# Patient Record
Sex: FEMALE | Race: WHITE | NOT HISPANIC OR LATINO | Employment: PART TIME | URBAN - METROPOLITAN AREA
[De-identification: names, ages, dates, MRNs, and addresses within clinical notes are randomized per-mention and may not be internally consistent; named-entity substitution may affect disease eponyms.]

---

## 2020-04-10 ENCOUNTER — TELEMEDICINE (OUTPATIENT)
Dept: GASTROENTEROLOGY | Facility: CLINIC | Age: 60
End: 2020-04-10
Payer: COMMERCIAL

## 2020-04-10 VITALS — WEIGHT: 227.07 LBS | HEIGHT: 64 IN | BODY MASS INDEX: 38.77 KG/M2

## 2020-04-10 DIAGNOSIS — K21.9 GERD WITHOUT ESOPHAGITIS: ICD-10-CM

## 2020-04-10 DIAGNOSIS — N12 PYELONEPHRITIS OF RIGHT KIDNEY: ICD-10-CM

## 2020-04-10 DIAGNOSIS — K58.9 IRRITABLE BOWEL SYNDROME, UNSPECIFIED TYPE: ICD-10-CM

## 2020-04-10 DIAGNOSIS — K30 FUNCTIONAL DYSPEPSIA: ICD-10-CM

## 2020-04-10 DIAGNOSIS — K57.92 DIVERTICULITIS: Primary | ICD-10-CM

## 2020-04-10 PROCEDURE — 99214 OFFICE O/P EST MOD 30 MIN: CPT | Performed by: INTERNAL MEDICINE

## 2020-04-10 RX ORDER — MECLIZINE HYDROCHLORIDE 25 MG/1
TABLET ORAL EVERY 12 HOURS PRN
COMMUNITY

## 2020-04-10 RX ORDER — ATORVASTATIN CALCIUM 40 MG/1
40 TABLET, FILM COATED ORAL
COMMUNITY

## 2020-04-10 RX ORDER — METOPROLOL SUCCINATE 25 MG/1
25 TABLET, EXTENDED RELEASE ORAL DAILY
COMMUNITY

## 2020-04-10 RX ORDER — CETIRIZINE HYDROCHLORIDE 10 MG/1
10 TABLET ORAL DAILY
COMMUNITY

## 2020-04-10 RX ORDER — ZOLMITRIPTAN 5 MG/1
5 TABLET, FILM COATED ORAL ONCE AS NEEDED
COMMUNITY

## 2020-04-10 RX ORDER — HYDROCHLOROTHIAZIDE 25 MG/1
25 TABLET ORAL DAILY
COMMUNITY

## 2020-04-10 RX ORDER — PANTOPRAZOLE SODIUM 40 MG/1
40 TABLET, DELAYED RELEASE ORAL DAILY
COMMUNITY

## 2020-04-10 RX ORDER — FAMOTIDINE 40 MG/1
40 TABLET, FILM COATED ORAL DAILY
COMMUNITY
End: 2020-06-08

## 2020-04-10 RX ORDER — CEFUROXIME AXETIL 500 MG/1
500 TABLET ORAL EVERY 12 HOURS SCHEDULED
COMMUNITY
Start: 2020-04-01 | End: 2020-04-15

## 2020-04-10 RX ORDER — MULTIVITAMIN
1 TABLET ORAL DAILY
COMMUNITY

## 2020-04-10 RX ORDER — IBUPROFEN 800 MG/1
800 TABLET ORAL EVERY 6 HOURS PRN
COMMUNITY

## 2020-04-10 RX ORDER — LOSARTAN POTASSIUM 50 MG/1
50 TABLET ORAL DAILY
COMMUNITY

## 2020-04-10 RX ORDER — TRIAMCINOLONE ACETONIDE 55 UG/1
SPRAY, METERED NASAL
COMMUNITY

## 2020-04-10 RX ORDER — GLIMEPIRIDE 4 MG/1
4 TABLET ORAL 2 TIMES DAILY
COMMUNITY

## 2020-04-10 RX ORDER — MELATONIN
1000 DAILY
COMMUNITY

## 2020-04-10 RX ORDER — TOPIRAMATE 100 MG/1
100 TABLET, FILM COATED ORAL DAILY
COMMUNITY
End: 2020-06-29

## 2020-04-10 RX ORDER — ASPIRIN 81 MG/1
81 TABLET ORAL DAILY
COMMUNITY

## 2020-06-08 ENCOUNTER — TELEMEDICINE (OUTPATIENT)
Dept: GASTROENTEROLOGY | Facility: CLINIC | Age: 60
End: 2020-06-08
Payer: COMMERCIAL

## 2020-06-08 VITALS — BODY MASS INDEX: 38.76 KG/M2 | HEIGHT: 64 IN | WEIGHT: 227 LBS

## 2020-06-08 DIAGNOSIS — K58.9 IRRITABLE BOWEL SYNDROME, UNSPECIFIED TYPE: Primary | ICD-10-CM

## 2020-06-08 DIAGNOSIS — K30 FUNCTIONAL DYSPEPSIA: ICD-10-CM

## 2020-06-08 DIAGNOSIS — K21.9 GERD WITHOUT ESOPHAGITIS: ICD-10-CM

## 2020-06-08 DIAGNOSIS — K57.92 DIVERTICULITIS: ICD-10-CM

## 2020-06-08 PROCEDURE — 99214 OFFICE O/P EST MOD 30 MIN: CPT | Performed by: INTERNAL MEDICINE

## 2020-06-08 NOTE — H&P (VIEW-ONLY)
Virtual Brief Visit  It was my intent to perform this visit via video technology but the patient was not able to do a video connection so the visit was completed via audio telephone only  Assessment/Plan:  1  Diverticulitis  2  Pyelonephritis of right kidney  Hospitalized in March at [facility] secondary to left lower quadrant abdominal pain and a CT scan which showed right pyelonephritis  Completed course of antibiotics with resolution of symptoms  Still with some intermittent abdominal pain which is probably related to her IBS  Last colonoscopy 2013  - continue probiotic  - colonoscopy at [facility]     3  Irritable bowel syndrome, unspecified type  Chronic problem  Stable  - follow exam  - eventual colonoscopy     4  GERD without esophagitis  5  Functional dyspepsia  Stable  - continue Protonix  Problem List Items Addressed This Visit        Digestive    Irritable bowel syndrome - Primary    Functional dyspepsia    GERD without esophagitis      Other Visit Diagnoses     Diverticulitis                    Reason for visit is   Chief Complaint   Patient presents with    Follow-up     Diverticulitis    Virtual Brief Visit        Encounter provider Lloyd Phillips MD    Provider located at [address] [address] [phone]    Recent Visits  No visits were found meeting these conditions  Showing recent visits within past 7 days and meeting all other requirements     Today's Visits  Date Type Provider Dept   06/08/20 Telemedicine Myriam Block MD [facility]   Showing today's visits and meeting all other requirements     Future Appointments  No visits were found meeting these conditions     Showing future appointments within next 150 days and meeting all other requirements        After connecting through telephone and patient was informed that this is not a secure, HIPAA-complaint platform  She agrees to proceed  , the patient was identified by name and date of birth  Cong Sanchez was informed that this is a telemedicine visit and that the visit is being conducted through telephone and patient was informed that this is not a secure, HIPAA-complaint platform  She agrees to proceed     My office door was closed  No one else was in the room  She acknowledged consent and understanding of privacy and security of the platform  The patient has agreed to participate and understands she can discontinue the visit at any time  Patient is aware this is a billable service  Subjective    Cong Sanchez is a 60 y o  female with a history of irritable bowel syndrome and diverticulitis was hospitalized in March with left-sided abdominal pain suspicious for diverticulitis although CT scan showed some mild right pyelonephritis  She was treated with a course of antibiotics and her symptoms have resolved  Her bowels are back to normal which is twice a day  She denies any diarrhea or rectal bleeding  She reports some intermittent lower quadrant abdominal pain which is her chronic IBS symptoms  From a GERD standpoint she is taking Protonix daily and this is controlling her symptoms  Her weight is stable  Her last colonoscopy was 2013        HPI     Past Medical History:   Diagnosis Date    Diabetes mellitus ([address] )     Functional dyspepsia     GERD without esophagitis     Hyperlipidemia     Irritable bowel syndrome        Past Surgical History:   Procedure Laterality Date    APPENDECTOMY      CHOLECYSTECTOMY      FOOT SURGERY Left     Bone spur       Current Outpatient Medications   Medication Sig Dispense Refill    aspirin (ECOTRIN LOW STRENGTH) 81 mg EC tablet Take 81 mg by mouth daily      atorvastatin (LIPITOR) 40 mg tablet Take 40 mg by mouth daily at bedtime      cetirizine (ZyrTEC) 10 mg tablet Take 10 mg by mouth daily      cholecalciferol (VITAMIN D3) 1,000 units tablet Take 1,000 Units by mouth daily 2 daily      glimepiride (AMARYL) 4 mg tablet Take 4 mg by mouth 2 (two) times a day      hydrochlorothiazide (HYDRODIURIL) 25 mg tablet Take 25 mg by mouth daily      ibuprofen (MOTRIN) 800 mg tablet Take 800 mg by mouth every 6 (six) hours as needed for mild pain      losartan (COZAAR) 50 mg tablet Take 50 mg by mouth daily      meclizine (ANTIVERT) 25 mg tablet Take by mouth every 12 (twelve) hours as needed for dizziness      metoprolol succinate (TOPROL-XL) 25 mg 24 hr tablet Take 25 mg by mouth daily      Multiple Vitamin (MULTIVITAMIN) tablet Take 1 tablet by mouth daily      other medication, see sig, Medication/product name: Lifitegrast (xiidra)  Strength:   Sig (include dose, route, frequency): one drop ou BID      pantoprazole (PROTONIX) 40 mg tablet Take 40 mg by mouth daily      sertraline (ZOLOFT) 50 mg tablet Take 150 mg by mouth daily      topiramate (TOPAMAX) 100 mg tablet Take 100 mg by mouth daily Daily at HS      Triamcinolone Acetonide 55 MCG/ACT AERO into each nostril One spray each nostril daily      ZOLMitriptan (ZOMIG) 5 MG tablet Take 5 mg by mouth once as needed for migraine Daily prn       No current facility-administered medications for this visit  Allergies   Allergen Reactions    Sulfa Antibiotics      High fever  Pt unsure - it happened when she was 12years old       Review of Systems    Vitals:    06/08/20 0915   Weight: 103 kg (227 lb)   Height: 5' 4" (1 626 m)       As a result of this visit, I have not referred the patient for further respiratory evaluation  I spent 20 minutes directly with the patient during this visit    VIRTUAL VISIT DISCLAIMER    Sarah Colon acknowledges that she has consented to an online visit or consultation   She understands that the online visit is based solely on information provided by her, and that, in the absence of a face-to-face physical evaluation by the physician, the diagnosis she receives is both limited and provisional in terms of accuracy and completeness  This is not intended to replace a full medical face-to-face evaluation by the physician  Clemencia Pathak understands and accepts these terms

## 2020-06-22 ENCOUNTER — CLINICAL SUPPORT (OUTPATIENT)
Dept: GASTROENTEROLOGY | Facility: CLINIC | Age: 60
End: 2020-06-22

## 2020-06-22 VITALS — BODY MASS INDEX: 38.76 KG/M2 | HEIGHT: 64 IN | WEIGHT: 227 LBS

## 2020-06-22 DIAGNOSIS — K57.92 DIVERTICULITIS: Primary | ICD-10-CM

## 2020-06-23 DIAGNOSIS — K57.92 DIVERTICULITIS: ICD-10-CM

## 2020-06-23 DIAGNOSIS — Z11.59 SCREENING FOR VIRAL DISEASE: ICD-10-CM

## 2020-06-23 PROCEDURE — U0003 INFECTIOUS AGENT DETECTION BY NUCLEIC ACID (DNA OR RNA); SEVERE ACUTE RESPIRATORY SYNDROME CORONAVIRUS 2 (SARS-COV-2) (CORONAVIRUS DISEASE [COVID-19]), AMPLIFIED PROBE TECHNIQUE, MAKING USE OF HIGH THROUGHPUT TECHNOLOGIES AS DESCRIBED BY CMS-2020-01-R: HCPCS

## 2020-06-23 RX ORDER — SODIUM PICOSULFATE, MAGNESIUM OXIDE, AND ANHYDROUS CITRIC ACID 10; 3.5; 12 MG/160ML; G/160ML; G/160ML
LIQUID ORAL
Qty: 320 ML | Refills: 0 | Status: SHIPPED | OUTPATIENT
Start: 2020-06-23 | End: 2020-06-24

## 2020-06-24 ENCOUNTER — TELEPHONE (OUTPATIENT)
Dept: GASTROENTEROLOGY | Facility: CLINIC | Age: 60
End: 2020-06-24

## 2020-06-24 DIAGNOSIS — K57.92 DIVERTICULITIS: ICD-10-CM

## 2020-06-24 LAB — SARS-COV-2 RNA SPEC QL NAA+PROBE: NOT DETECTED

## 2020-06-24 RX ORDER — SODIUM PICOSULFATE, MAGNESIUM OXIDE, AND ANHYDROUS CITRIC ACID 10; 3.5; 12 MG/160ML; G/160ML; G/160ML
LIQUID ORAL
Qty: 320 ML | Refills: 0 | Status: SHIPPED | OUTPATIENT
Start: 2020-06-24

## 2020-06-26 ENCOUNTER — ANESTHESIA EVENT (OUTPATIENT)
Dept: GASTROENTEROLOGY | Facility: AMBULATORY SURGERY CENTER | Age: 60
End: 2020-06-26

## 2020-06-29 ENCOUNTER — ANESTHESIA (OUTPATIENT)
Dept: GASTROENTEROLOGY | Facility: AMBULATORY SURGERY CENTER | Age: 60
End: 2020-06-29

## 2020-06-29 ENCOUNTER — HOSPITAL ENCOUNTER (OUTPATIENT)
Dept: GASTROENTEROLOGY | Facility: AMBULATORY SURGERY CENTER | Age: 60
Discharge: HOME | End: 2020-06-29
Payer: COMMERCIAL

## 2020-06-29 VITALS
DIASTOLIC BLOOD PRESSURE: 82 MMHG | RESPIRATION RATE: 24 BRPM | TEMPERATURE: 98.8 F | OXYGEN SATURATION: 94 % | HEART RATE: 87 BPM | SYSTOLIC BLOOD PRESSURE: 140 MMHG

## 2020-06-29 DIAGNOSIS — K57.92 DIVERTICULITIS: ICD-10-CM

## 2020-06-29 DIAGNOSIS — K58.9 IRRITABLE BOWEL SYNDROME, UNSPECIFIED TYPE: ICD-10-CM

## 2020-06-29 PROCEDURE — 45385 COLONOSCOPY W/LESION REMOVAL: CPT | Performed by: INTERNAL MEDICINE

## 2020-06-29 RX ORDER — SODIUM CHLORIDE 9 MG/ML
50 INJECTION, SOLUTION INTRAVENOUS ONCE
Status: COMPLETED | OUTPATIENT
Start: 2020-06-29 | End: 2020-06-29

## 2020-06-29 RX ORDER — SODIUM CHLORIDE 9 MG/ML
INJECTION, SOLUTION INTRAVENOUS CONTINUOUS PRN
Status: DISCONTINUED | OUTPATIENT
Start: 2020-06-29 | End: 2020-06-29 | Stop reason: SURG

## 2020-06-29 RX ORDER — PROPOFOL 10 MG/ML
INJECTION, EMULSION INTRAVENOUS AS NEEDED
Status: DISCONTINUED | OUTPATIENT
Start: 2020-06-29 | End: 2020-06-29 | Stop reason: SURG

## 2020-06-29 RX ADMIN — PROPOFOL 50 MG: 10 INJECTION, EMULSION INTRAVENOUS at 11:10

## 2020-06-29 RX ADMIN — PROPOFOL 50 MG: 10 INJECTION, EMULSION INTRAVENOUS at 11:18

## 2020-06-29 RX ADMIN — PROPOFOL 50 MG: 10 INJECTION, EMULSION INTRAVENOUS at 11:24

## 2020-06-29 RX ADMIN — SODIUM CHLORIDE: 9 INJECTION, SOLUTION INTRAVENOUS at 10:53

## 2020-06-29 RX ADMIN — PROPOFOL 50 MG: 10 INJECTION, EMULSION INTRAVENOUS at 11:16

## 2020-06-29 RX ADMIN — PROPOFOL 50 MG: 10 INJECTION, EMULSION INTRAVENOUS at 11:13

## 2020-06-29 RX ADMIN — PROPOFOL 100 MG: 10 INJECTION, EMULSION INTRAVENOUS at 11:02

## 2020-06-29 RX ADMIN — PROPOFOL 50 MG: 10 INJECTION, EMULSION INTRAVENOUS at 11:06

## 2020-06-29 RX ADMIN — PROPOFOL 50 MG: 10 INJECTION, EMULSION INTRAVENOUS at 11:05

## 2020-06-29 RX ADMIN — SODIUM CHLORIDE 50 ML/HR: 9 INJECTION, SOLUTION INTRAVENOUS at 10:57

## 2020-06-29 RX ADMIN — PROPOFOL 50 MG: 10 INJECTION, EMULSION INTRAVENOUS at 11:27

## 2020-06-29 NOTE — DISCHARGE INSTRUCTIONS
Diverticulosis   WHAT YOU NEED TO KNOW:   What is diverticulosis? Diverticulosis is a condition that causes small pockets called diverticula to form in your intestine  These pockets make it difficult for bowel movements to pass through your digestive system  What causes diverticulosis? Diverticula form when muscles have to work hard to move bowel movements through the intestine  The force causes bulges to form at weak areas in the intestine  This may happen if you eat foods that are low in fiber  Fiber helps give your bowel movements more bulk so they are larger and easier to move through your colon  The following may increase your risk of diverticulosis:  · A history of constipation    · Age 36 or older    · Obesity    · Lack of exercise  What are the signs and symptoms of diverticulosis? Diverticulosis usually does not cause any signs or symptoms  It may cause any of the following in some people:  · Pain or discomfort in your lower abdomen    · Abdominal bloating    · Constipation or diarrhea  How is diverticulosis diagnosed? Your healthcare provider will examine you and ask about your bowel movements, diet, and symptoms  He or she will also ask about any medical conditions you have or medicines you take  You may need any of the following:  · Blood tests  may be done to check for signs of inflammation  · A barium enema  is an x-ray of your colon that may show diverticula  A tube is put into your anus, and a liquid called barium is put through the tube  Barium is used so that healthcare providers can see your colon more clearly  · Flexible sigmoidoscopy  is a test to look for any changes in your lower intestines and rectum  It may also show the cause of any bleeding or pain  A soft, bendable tube with a light on the end will be put into your anus  It will then be moved forward into your intestine  · A colonoscopy  is used to look at your whole colon   A scope (long bendable tube with a light on the end) is used to take pictures  This test may show diverticula  · A CT scan , or CAT scan, may show diverticula  You may be given contrast liquid before the scan  Tell the healthcare provider if you have ever had an allergic reaction to contrast liquid  How is diverticulosis managed? The goal of treatment is to manage any symptoms you have and prevent other problems such as diverticulitis  Diverticulitis is swelling or infection of the diverticula  Your healthcare provider may recommend any of the following:  · Eat a variety of high-fiber foods  High-fiber foods help you have regular bowel movements  High-fiber foods include cooked beans, fruits, vegetables, and some cereals  Most adults need 25 to 35 grams of fiber each day  Your healthcare provider may recommend that you have more  Ask your healthcare provider how much fiber you need  Increase fiber slowly  You may have abdominal discomfort, bloating, and gas if you add fiber to your diet too quickly  You may need to take a fiber supplement if you are not getting enough fiber from food  · Medicines  to soften your bowel movements may be given  You may also need medicines to treat symptoms such as bloating and pain  · Drink liquids as directed  You may need to drink 2 to 3 liters (8 to 12 cups) of liquids every day  Ask your healthcare provider how much liquid to drink each day and which liquids are best for you  · Apply heat  on your abdomen for 20 to 30 minutes every 2 hours for as many days as directed  Heat helps decrease pain and muscle spasms  How can I help prevent diverticulitis or other symptoms? The following may help decrease your risk for diverticulitis or symptoms, such as bleeding  Talk to your provider about these or other things you can do to prevent problems that may occur with diverticulosis  · Exercise regularly  Ask your healthcare provider about the best exercise plan for you   Exercise can help you have regular bowel movements  Get 30 minutes of exercise on most days of the week  · Maintain a healthy weight  Ask your healthcare provider how much you should weigh  Ask him or her to help you create a weight loss plan if you are overweight  · Do not smoke  Nicotine and other chemicals in cigarettes increase your risk for diverticulitis  Ask your healthcare provider for information if you currently smoke and need help to quit  E-cigarettes or smokeless tobacco still contain nicotine  Talk to your healthcare provider before you use these products  · Ask your healthcare provider if it is safe to take NSAIDs  NSAIDs may increase your risk of diverticulitis  When should I seek immediate care? · You have severe pain on the left side of your lower abdomen  · Your bowel movements are bright or dark red  When should I contact my healthcare provider? · You have a fever and chills  · You feel dizzy or lightheaded  · You have nausea, or you are vomiting  · You have a change in your bowel movements  · You have questions or concerns about your condition or care  CARE AGREEMENT:   You have the right to help plan your care  Learn about your health condition and how it may be treated  Discuss treatment options with your caregivers to decide what care you want to receive  You always have the right to refuse treatment  The above information is an  only  It is not intended as medical advice for individual conditions or treatments  Talk to your doctor, nurse or pharmacist before following any medical regimen to see if it is safe and effective for you  © 2017 [address] [employer] [employer] Information is for End User's use only and may not be sold, redistributed or otherwise used for commercial purposes  All illustrations and images included in CareNotes® are the copyrighted property of A D [employer] M [employer]  or Marcial Raman  Hemorrhoids   WHAT YOU NEED TO KNOW:   What are hemorrhoids? Hemorrhoids are swollen blood vessels inside your rectum (internal hemorrhoids) or on your anus (external hemorrhoids)  Sometimes a hemorrhoid may prolapse  This means it extends out of your anus  What increases my risk for hemorrhoids? · Pregnancy or obesity    · Straining or sitting for a long time during bowel movements    · Liver disease    · Weak muscles around the anus caused by older age, rectal surgery, or anal intercourse    · A lack of physical activity    · Chronic diarrhea or constipation    · A low-fiber diet  What are the signs and symptoms of hemorrhoids? · Pain or itching around your anus or inside your rectum    · Swelling or bumps around your anus    · Bright red blood in your bowel movement, on the toilet paper, or in the toilet bowl    · Tissue bulging out of your anus (prolapsed hemorrhoids)    · Incontinence (poor control over urine or bowel movements)  How are hemorrhoids diagnosed? Your healthcare provider will ask about your symptoms, the foods you eat, and your bowel movements  He will examine your anus for external hemorrhoids  You may need the following:  · A digital rectal exam  is a test to check for hemorrhoids  Your healthcare provider will put a gloved finger inside your anus to feel for the hemorrhoids  · An anoscopy  is a test that uses a scope (small tube with a light and camera on the end) to look at your hemorrhoids  How are hemorrhoids treated? Treatment will depend on your symptoms  You may need any of the following:  · Medicines  can help decrease pain and swelling, and soften your bowel movement  The medicine may be a pill, pad, cream, or ointment  · Procedures  may be used to shrink or remove your hemorrhoid  Examples include rubber-band ligation, sclerotherapy, and photocoagulation  These procedures may be done in your healthcare provider's office  Ask your healthcare provider for more information about these procedures       · Surgery  may be needed to shrink or remove your hemorrhoids  How can I manage my symptoms? · Apply ice on your anus for 15 to 20 minutes every hour or as directed  Use an ice pack, or put crushed ice in a plastic bag  Cover it with a towel before you apply it to your anus  Ice helps prevent tissue damage and decreases swelling and pain  · Take a sitz bath  Fill a bathtub with 4 to 6 inches of warm water  You may also use a sitz bath pan that fits inside a toilet bowl  Sit in the sitz bath for 15 minutes  Do this 3 times a day, and after each bowel movement  The warm water can help decrease pain and swelling  · Keep your anal area clean  Gently wash the area with warm water daily  Soap may irritate the area  After a bowel movement, wipe with moist towelettes or wet toilet paper  Dry toilet paper can irritate the area  How can I help prevent hemorrhoids? · Do not strain to have a bowel movement  Do not sit on the toilet too long  These actions can increase pressure on the tissues in your rectum and anus  · Drink plenty of liquids  Liquids can help prevent constipation  Ask how much liquid to drink each day and which liquids are best for you  · Eat a variety of high-fiber foods  Examples include fruits, vegetables, and whole grains  Ask your healthcare provider how much fiber you need each day  You may need to take a fiber supplement  · Exercise as directed  Exercise, such as walking, may make it easier to have a bowel movement  Ask your healthcare provider to help you create an exercise plan  · Do not have anal sex  Anal sex can weaken the skin around your rectum and anus  · Avoid heavy lifting  This can cause straining and increase your risk for another hemorrhoid  When should I seek immediate care? · You have severe pain in your rectum or around your anus  · You have severe pain in your abdomen and you are vomiting       · You have bleeding from your anus that soaks through your underwear  When should I contact my healthcare provider? · You have frequent and painful bowel movements  · Your hemorrhoid looks or feels more swollen than usual      · You do not have a bowel movement for 2 days or more  · You see or feel tissue coming through your anus  · You have questions or concerns about your condition or care  CARE AGREEMENT:   You have the right to help plan your care  Learn about your health condition and how it may be treated  Discuss treatment options with your caregivers to decide what care you want to receive  You always have the right to refuse treatment  The above information is an  only  It is not intended as medical advice for individual conditions or treatments  Talk to your doctor, nurse or pharmacist before following any medical regimen to see if it is safe and effective for you  © 2017 [address] Information is for End User's use only and may not be sold, redistributed or otherwise used for commercial purposes  All illustrations and images included in CareNotes® are the copyrighted property of A [employer] A M , Inc  or Marcial Raman  Colorectal Polyps   WHAT YOU NEED TO KNOW:   What are colorectal polyps? Colorectal polyps are small growths of tissue in the lining of the colon and rectum  Most polyps are hyperplastic polyps and are usually benign (noncancerous)  Certain types of polyps, called adenomatous polyps, may turn into cancer  What increases my risk of colorectal polyps? The exact cause of colorectal polyps is unknown  The following may increase your risk:  · Older age    · A diet of foods high in fat and low in fiber     · Family history of polyps    · Intestinal diseases, such as Crohn's disease or ulcerative colitis    · An unhealthy lifestyle, such as physical inactivity, smoking, or drinking alcohol    · Obesity  What are the signs and symptoms of colorectal polyps?    · Blood in your bowel movement or bleeding from the rectum    · Change in bowel movement habits, such as diarrhea and constipation    · Abdominal pain  How are colorectal polyps diagnosed? You should have fecal blood screening once a year for colorectal disease if you are over 48years old  You should be screened earlier if you have an intestinal disease or a family history of polyps or colorectal cancer  During this screening, a sample of your bowel movement is checked for blood, which may be an early sign of colorectal polyps or cancer  You may also need any of the following tests:  · Digital rectal exam:  Your healthcare provider will examine your anus and use a finger to check your rectum for polyps  · Barium enema: A barium enema is an x-ray of the colon  A tube is put into your anus, and a liquid called barium is put through the tube  Barium is used so that caregivers can see your colon better on the x-ray film  · Virtual colonoscopy: This is a CT scan that takes pictures of the inside of your colon and rectum  A small, flexible tube is put into your rectum and air or carbon dioxide (gas) is used to expand your colon  This lets healthcare providers clearly see your colon and any polyps on a monitor  · Colonoscopy or sigmoidoscopy: These procedures help your healthcare provider see the inside of your colon using a flexible tube with a small light and camera on the end  During a sigmoidoscopy, your healthcare provider will only look at rectum and lower colon  During a colonoscopy, healthcare providers will look at the full length of your colon  Healthcare providers may remove a small amount of tissue from the colon for a biopsy  How are colorectal polyps treated? · Polyp removal:  Polyps may be removed during your sigmoidoscopy or colonoscopy  · Polypectomy: This is surgery to remove your polyps  You may need laparoscopic or open surgery, depending on the type, size, and number of polyps that you have   Laparoscopy is done by inserting a small, flexible scope into incisions made on your abdomen  Open surgery is done by making a larger incision on your abdomen   What are the risks of colorectal polyps? You may bleed during a colonoscopy procedure  Your bowel may be perforated (torn) when polyps are removed  This may lead to an open abdominal surgery  During surgery, you may bleed too much or get an infection  Adenomatous polyps that are not removed may turn into cancer and become more difficult to treat  Where can I find support and more information? · Paty Epps ([facility])  [address] [place], [place] [address]  Phone: [phone]  Web Address: Chela Mendez  [facility] gov  When should I contact my healthcare provider? · You have a fever  · You have chills, a cough, or feel weak and achy  · You have abdominal pain that does not go away or gets worse after you take medicine  · Your abdomen is swollen  · You are losing weight without trying  · You have questions or concerns about your condition or care  When should I seek immediate care or call 911? · You have sudden shortness of breath  · You have a fast heart rate, fast breathing, or are too dizzy to stand up  · You have severe abdominal pain  · You see blood in your bowel movement  CARE AGREEMENT:   You have the right to help plan your care  Learn about your health condition and how it may be treated  Discuss treatment options with your caregivers to decide what care you want to receive  You always have the right to refuse treatment  The above information is an  only  It is not intended as medical advice for individual conditions or treatments  Talk to your doctor, nurse or pharmacist before following any medical regimen to see if it is safe and effective for you    © 2017 [employer] Information is for End User's use only and may not be sold, redistributed or otherwise used for commercial purposes  All illustrations and images included in CareNotes® are the copyrighted property of A [employer] A M , Inc  or Marcial Raman

## 2020-09-19 LAB
CREAT ?TM UR-SCNC: 221.4 UMOL/L
EXT MICROALBUMIN URINE RANDOM: 184
HBA1C MFR BLD HPLC: 8.1 %
MICROALBUMIN/CREAT UR: 83 MG/G{CREAT}

## 2022-12-14 ENCOUNTER — TELEPHONE (OUTPATIENT)
Dept: GASTROENTEROLOGY | Facility: CLINIC | Age: 62
End: 2022-12-14

## 2022-12-14 ENCOUNTER — PREP FOR PROCEDURE (OUTPATIENT)
Dept: GASTROENTEROLOGY | Facility: CLINIC | Age: 62
End: 2022-12-14

## 2022-12-14 DIAGNOSIS — Z86.010 HISTORY OF COLON POLYPS: Primary | ICD-10-CM

## 2022-12-14 NOTE — TELEPHONE ENCOUNTER
12/14/22  Screened by: Jhonny Melo MA    Referring Provider recall oa    Pre- Screening: There is no height or weight on file to calculate BMI  Has patient been referred for a routine screening Colonoscopy? yes  Is the patient between 39-70 years old? yes      Previous Colonoscopy yes   If yes:    Date: 1-2 years ago    Facility:     Reason:       SCHEDULING STAFF: If the patient is between 39yrs-47yrs, please advise patient to confirm benefits/coverage with their insurance company for a routine screening colonoscopy, some insurance carriers will only cover at [address] or older  If the patient is over 66years old, please schedule an office visit  Does the patient want to see a Gastroenterologist prior to their procedure OR are they having any GI symptoms? no    Has the patient been hospitalized or had abdominal surgery in the past 6 months? no    Does the patient use supplemental oxygen? no    Does the patient take Coumadin, Lovenox, Plavix, Elliquis, Xarelto, or other blood thinning medication? no    Has the patient had a stroke, cardiac event, or stent placed in the past year? no    SCHEDULING STAFF: If patient answers NO to above questions, then schedule procedure  If patient answers YES to above questions, then schedule office appointment  If patient is between 45yrs - 49yrs, please advise patient that we will have to confirm benefits & coverage with their insurance company for a routine screening colonoscopy

## 2022-12-14 NOTE — TELEPHONE ENCOUNTER
ASC Assessment    If the patient answers yes to any of these questions, schedule in a hospital  Are you pregnant: No  Do you rely on a wheelchair for mobility: No  Have you been diagnosed with End Stage Renal Disease (ESRD): No  Do you need oxygen during the day: No  Have you had a heart attack or stroke within the past three months: No  Have you had a seizure within the past three months: No  Have you ever been informed by anesthesia that you have a difficult airway: No  Additional Questions  Have you had any cardiac testing or are under the care of a Cardiologist (see cardiac list): No  Cardiac list:   Do you have an implanted cardiac defibrillator: No (Comment:  This patient should be scheduled in the hospital)    Have any bleeding problems, such as anemia or hemophilia (If patient has H&H result below 8, schedule in hospital   H&H must be within 30 days of procedure): No    Had an organ transplant within the past 3 months: No    Do you have any present infections: No  Do you get short of breath when walking a few blocks: Yes  Have you been diagnosed with diabetes: Yes  Comments (provide cardiac provider information if applicable):

## 2022-12-14 NOTE — TELEPHONE ENCOUNTER
Scheduled date of colonoscopy (as of today): 02/17/23  Physician performing colonoscopy:tiara  Location of colonoscopy:[facility]  Bowel prep reviewed with patient: to review with pt  Instructions reviewed with patient by: to review with pt  Clearances:sofia

## 2022-12-30 ENCOUNTER — TELEPHONE (OUTPATIENT)
Dept: GASTROENTEROLOGY | Facility: CLINIC | Age: 62
End: 2022-12-30

## 2022-12-30 NOTE — TELEPHONE ENCOUNTER
Pt cannot be scheduled for colon until recall date of 6/29/23 per tracy  Pt is aware the 2/17/23 date call center scheduled has been cancelled  Will contact pt when June schedule is available

## 2023-06-02 ENCOUNTER — HOSPITAL ENCOUNTER (INPATIENT)
Facility: HOSPITAL | Age: 63
LOS: 5 days | Discharge: HOME HEALTH | DRG: 740 | End: 2023-06-07
Attending: OBSTETRICS & GYNECOLOGY | Admitting: OBSTETRICS & GYNECOLOGY
Payer: COMMERCIAL

## 2023-06-02 DIAGNOSIS — R10.30 LOWER ABDOMINAL PAIN: ICD-10-CM

## 2023-06-02 DIAGNOSIS — Z98.890 POST-OPERATIVE STATE: Primary | ICD-10-CM

## 2023-06-02 DIAGNOSIS — C54.9 UTERINE SARCOMA (HCC): ICD-10-CM

## 2023-06-02 PROCEDURE — 99223 1ST HOSP IP/OBS HIGH 75: CPT | Performed by: PHYSICIAN ASSISTANT

## 2023-06-02 RX ORDER — METOPROLOL SUCCINATE 25 MG/1
25 TABLET, EXTENDED RELEASE ORAL DAILY
Status: DISCONTINUED | OUTPATIENT
Start: 2023-06-03 | End: 2023-06-03

## 2023-06-02 RX ORDER — SERTRALINE HYDROCHLORIDE 100 MG/1
100 TABLET, FILM COATED ORAL DAILY
Status: DISCONTINUED | OUTPATIENT
Start: 2023-06-03 | End: 2023-06-07 | Stop reason: HOSPADM

## 2023-06-02 RX ORDER — TOPIRAMATE 100 MG/1
100 TABLET, FILM COATED ORAL
Status: DISCONTINUED | OUTPATIENT
Start: 2023-06-02 | End: 2023-06-07 | Stop reason: HOSPADM

## 2023-06-02 RX ORDER — LORATADINE 10 MG/1
10 TABLET ORAL DAILY
Status: COMPLETED | OUTPATIENT
Start: 2023-06-03 | End: 2023-06-03

## 2023-06-02 RX ORDER — HYDROMORPHONE HCL/PF 1 MG/ML
1 SYRINGE (ML) INJECTION
Status: DISCONTINUED | OUTPATIENT
Start: 2023-06-02 | End: 2023-06-04

## 2023-06-02 RX ORDER — LOSARTAN POTASSIUM 50 MG/1
50 TABLET ORAL DAILY
Status: COMPLETED | OUTPATIENT
Start: 2023-06-03 | End: 2023-06-03

## 2023-06-02 RX ORDER — DEXTROSE AND SODIUM CHLORIDE 5; .45 G/100ML; G/100ML
75 INJECTION, SOLUTION INTRAVENOUS CONTINUOUS
Status: DISCONTINUED | OUTPATIENT
Start: 2023-06-02 | End: 2023-06-03

## 2023-06-02 RX ORDER — FAMOTIDINE 20 MG/1
20 TABLET, FILM COATED ORAL DAILY
Status: DISCONTINUED | OUTPATIENT
Start: 2023-06-03 | End: 2023-06-07 | Stop reason: HOSPADM

## 2023-06-02 RX ORDER — HYDROCHLOROTHIAZIDE 25 MG/1
25 TABLET ORAL DAILY
Status: COMPLETED | OUTPATIENT
Start: 2023-06-03 | End: 2023-06-03

## 2023-06-02 RX ORDER — OXYCODONE HYDROCHLORIDE 10 MG/1
10 TABLET ORAL EVERY 4 HOURS PRN
Status: DISCONTINUED | OUTPATIENT
Start: 2023-06-02 | End: 2023-06-04

## 2023-06-02 RX ORDER — ROPINIROLE 0.25 MG/1
0.5 TABLET, FILM COATED ORAL
Status: DISCONTINUED | OUTPATIENT
Start: 2023-06-02 | End: 2023-06-07 | Stop reason: HOSPADM

## 2023-06-02 RX ORDER — ONDANSETRON 2 MG/ML
4 INJECTION INTRAMUSCULAR; INTRAVENOUS EVERY 6 HOURS PRN
Status: DISCONTINUED | OUTPATIENT
Start: 2023-06-02 | End: 2023-06-07 | Stop reason: HOSPADM

## 2023-06-02 RX ORDER — OXYCODONE HYDROCHLORIDE 5 MG/1
5 TABLET ORAL EVERY 4 HOURS PRN
Status: DISCONTINUED | OUTPATIENT
Start: 2023-06-02 | End: 2023-06-04

## 2023-06-02 RX ORDER — HEPARIN SODIUM 5000 [USP'U]/ML
5000 INJECTION, SOLUTION INTRAVENOUS; SUBCUTANEOUS EVERY 8 HOURS SCHEDULED
Status: DISCONTINUED | OUTPATIENT
Start: 2023-06-02 | End: 2023-06-03

## 2023-06-02 RX ORDER — ATORVASTATIN CALCIUM 40 MG/1
40 TABLET, FILM COATED ORAL
Status: COMPLETED | OUTPATIENT
Start: 2023-06-02 | End: 2023-06-03

## 2023-06-02 RX ORDER — PANTOPRAZOLE SODIUM 40 MG/1
40 TABLET, DELAYED RELEASE ORAL
Status: DISCONTINUED | OUTPATIENT
Start: 2023-06-03 | End: 2023-06-07 | Stop reason: HOSPADM

## 2023-06-02 RX ORDER — DULOXETIN HYDROCHLORIDE 30 MG/1
30 CAPSULE, DELAYED RELEASE ORAL DAILY
Status: DISCONTINUED | OUTPATIENT
Start: 2023-06-03 | End: 2023-06-07 | Stop reason: HOSPADM

## 2023-06-02 RX ORDER — GLIMEPIRIDE 2 MG/1
4 TABLET ORAL
Status: DISCONTINUED | OUTPATIENT
Start: 2023-06-03 | End: 2023-06-03

## 2023-06-02 RX ADMIN — ATORVASTATIN CALCIUM 40 MG: 40 TABLET, FILM COATED ORAL at 22:34

## 2023-06-02 RX ADMIN — ROPINIROLE 0.5 MG: 0.25 TABLET, FILM COATED ORAL at 22:34

## 2023-06-02 RX ADMIN — HEPARIN SODIUM 5000 UNITS: 5000 INJECTION INTRAVENOUS; SUBCUTANEOUS at 22:35

## 2023-06-02 RX ADMIN — TOPIRAMATE 100 MG: 100 TABLET, FILM COATED ORAL at 23:23

## 2023-06-02 RX ADMIN — DEXTROSE AND SODIUM CHLORIDE 75 ML/HR: 5; .45 INJECTION, SOLUTION INTRAVENOUS at 22:34

## 2023-06-03 ENCOUNTER — PREP FOR PROCEDURE (OUTPATIENT)
Dept: GYNECOLOGIC ONCOLOGY | Facility: CLINIC | Age: 63
End: 2023-06-03

## 2023-06-03 ENCOUNTER — APPOINTMENT (INPATIENT)
Dept: RADIOLOGY | Facility: HOSPITAL | Age: 63
DRG: 740 | End: 2023-06-03
Payer: COMMERCIAL

## 2023-06-03 DIAGNOSIS — C54.9 UTERINE SARCOMA (HCC): ICD-10-CM

## 2023-06-03 DIAGNOSIS — R10.30 LOWER ABDOMINAL PAIN: Primary | ICD-10-CM

## 2023-06-03 LAB
ABO GROUP BLD: NORMAL
ALBUMIN SERPL BCP-MCNC: 3.3 G/DL (ref 3.5–5)
ALP SERPL-CCNC: 79 U/L (ref 46–116)
ALT SERPL W P-5'-P-CCNC: 19 U/L (ref 12–78)
ANION GAP SERPL CALCULATED.3IONS-SCNC: 1 MMOL/L (ref 4–13)
AST SERPL W P-5'-P-CCNC: 12 U/L (ref 5–45)
BILIRUB SERPL-MCNC: 1.18 MG/DL (ref 0.2–1)
BLD GP AB SCN SERPL QL: NEGATIVE
BUN SERPL-MCNC: 11 MG/DL (ref 5–25)
CALCIUM ALBUM COR SERPL-MCNC: 9.4 MG/DL (ref 8.3–10.1)
CALCIUM SERPL-MCNC: 8.8 MG/DL (ref 8.3–10.1)
CHLORIDE SERPL-SCNC: 104 MMOL/L (ref 96–108)
CO2 SERPL-SCNC: 29 MMOL/L (ref 21–32)
CREAT SERPL-MCNC: 0.62 MG/DL (ref 0.6–1.3)
ERYTHROCYTE [DISTWIDTH] IN BLOOD BY AUTOMATED COUNT: 13.9 % (ref 11.6–15.1)
GFR SERPL CREATININE-BSD FRML MDRD: 96 ML/MIN/1.73SQ M
GLUCOSE SERPL-MCNC: 125 MG/DL (ref 65–140)
GLUCOSE SERPL-MCNC: 146 MG/DL (ref 65–140)
GLUCOSE SERPL-MCNC: 149 MG/DL (ref 65–140)
GLUCOSE SERPL-MCNC: 162 MG/DL (ref 65–140)
GLUCOSE SERPL-MCNC: 95 MG/DL (ref 65–140)
HCT VFR BLD AUTO: 31.6 % (ref 34.8–46.1)
HGB BLD-MCNC: 10.3 G/DL (ref 11.5–15.4)
INR PPP: 0.99 (ref 0.84–1.19)
MAGNESIUM SERPL-MCNC: 2.2 MG/DL (ref 1.6–2.6)
MCH RBC QN AUTO: 29.5 PG (ref 26.8–34.3)
MCHC RBC AUTO-ENTMCNC: 32.6 G/DL (ref 31.4–37.4)
MCV RBC AUTO: 91 FL (ref 82–98)
PHOSPHATE SERPL-MCNC: 2.9 MG/DL (ref 2.3–4.1)
PLATELET # BLD AUTO: 246 THOUSANDS/UL (ref 149–390)
PLATELET # BLD AUTO: 270 THOUSANDS/UL (ref 149–390)
PMV BLD AUTO: 10.6 FL (ref 8.9–12.7)
PMV BLD AUTO: 11 FL (ref 8.9–12.7)
POTASSIUM SERPL-SCNC: 3.7 MMOL/L (ref 3.5–5.3)
PROT SERPL-MCNC: 7.2 G/DL (ref 6.4–8.4)
PROTHROMBIN TIME: 13.3 SECONDS (ref 11.6–14.5)
RBC # BLD AUTO: 3.49 MILLION/UL (ref 3.81–5.12)
RH BLD: POSITIVE
SODIUM SERPL-SCNC: 134 MMOL/L (ref 135–147)
SPECIMEN EXPIRATION DATE: NORMAL
WBC # BLD AUTO: 9.04 THOUSAND/UL (ref 4.31–10.16)

## 2023-06-03 PROCEDURE — 86900 BLOOD TYPING SEROLOGIC ABO: CPT | Performed by: OBSTETRICS & GYNECOLOGY

## 2023-06-03 PROCEDURE — 84100 ASSAY OF PHOSPHORUS: CPT | Performed by: PHYSICIAN ASSISTANT

## 2023-06-03 PROCEDURE — 85610 PROTHROMBIN TIME: CPT | Performed by: OBSTETRICS & GYNECOLOGY

## 2023-06-03 PROCEDURE — 82948 REAGENT STRIP/BLOOD GLUCOSE: CPT

## 2023-06-03 PROCEDURE — G1004 CDSM NDSC: HCPCS

## 2023-06-03 PROCEDURE — 80053 COMPREHEN METABOLIC PANEL: CPT | Performed by: PHYSICIAN ASSISTANT

## 2023-06-03 PROCEDURE — 85049 AUTOMATED PLATELET COUNT: CPT | Performed by: PHYSICIAN ASSISTANT

## 2023-06-03 PROCEDURE — 71250 CT THORAX DX C-: CPT

## 2023-06-03 PROCEDURE — 86920 COMPATIBILITY TEST SPIN: CPT

## 2023-06-03 PROCEDURE — 85027 COMPLETE CBC AUTOMATED: CPT | Performed by: PHYSICIAN ASSISTANT

## 2023-06-03 PROCEDURE — 86850 RBC ANTIBODY SCREEN: CPT | Performed by: OBSTETRICS & GYNECOLOGY

## 2023-06-03 PROCEDURE — 86901 BLOOD TYPING SEROLOGIC RH(D): CPT | Performed by: OBSTETRICS & GYNECOLOGY

## 2023-06-03 PROCEDURE — 83735 ASSAY OF MAGNESIUM: CPT | Performed by: PHYSICIAN ASSISTANT

## 2023-06-03 RX ORDER — METRONIDAZOLE 500 MG/100ML
500 INJECTION, SOLUTION INTRAVENOUS EVERY 8 HOURS
Status: CANCELLED | OUTPATIENT
Start: 2023-06-03

## 2023-06-03 RX ORDER — CEFAZOLIN SODIUM 2 G/50ML
2000 SOLUTION INTRAVENOUS ONCE
Status: CANCELLED | OUTPATIENT
Start: 2023-06-03 | End: 2023-06-03

## 2023-06-03 RX ORDER — ACETAMINOPHEN 325 MG/1
975 TABLET ORAL ONCE
Status: CANCELLED | OUTPATIENT
Start: 2023-06-03 | End: 2023-06-03

## 2023-06-03 RX ORDER — SODIUM CHLORIDE 9 MG/ML
50 INJECTION, SOLUTION INTRAVENOUS CONTINUOUS
Status: DISCONTINUED | OUTPATIENT
Start: 2023-06-03 | End: 2023-06-07

## 2023-06-03 RX ORDER — HEPARIN SODIUM 5000 [USP'U]/ML
5000 INJECTION, SOLUTION INTRAVENOUS; SUBCUTANEOUS EVERY 8 HOURS SCHEDULED
Status: COMPLETED | OUTPATIENT
Start: 2023-06-03 | End: 2023-06-03

## 2023-06-03 RX ORDER — HEPARIN SODIUM 5000 [USP'U]/ML
5000 INJECTION, SOLUTION INTRAVENOUS; SUBCUTANEOUS
Status: CANCELLED | OUTPATIENT
Start: 2023-06-03 | End: 2023-06-04

## 2023-06-03 RX ORDER — METOPROLOL SUCCINATE 25 MG/1
25 TABLET, EXTENDED RELEASE ORAL DAILY
Status: DISCONTINUED | OUTPATIENT
Start: 2023-06-04 | End: 2023-06-07 | Stop reason: HOSPADM

## 2023-06-03 RX ORDER — GABAPENTIN 100 MG/1
200 CAPSULE ORAL ONCE
Status: CANCELLED | OUTPATIENT
Start: 2023-06-03 | End: 2023-06-03

## 2023-06-03 RX ORDER — SODIUM CHLORIDE, SODIUM LACTATE, POTASSIUM CHLORIDE, CALCIUM CHLORIDE 600; 310; 30; 20 MG/100ML; MG/100ML; MG/100ML; MG/100ML
125 INJECTION, SOLUTION INTRAVENOUS CONTINUOUS
Status: CANCELLED | OUTPATIENT
Start: 2023-06-03

## 2023-06-03 RX ORDER — INSULIN LISPRO 100 [IU]/ML
1-6 INJECTION, SOLUTION INTRAVENOUS; SUBCUTANEOUS
Status: DISCONTINUED | OUTPATIENT
Start: 2023-06-03 | End: 2023-06-07 | Stop reason: HOSPADM

## 2023-06-03 RX ADMIN — HEPARIN SODIUM 5000 UNITS: 5000 INJECTION INTRAVENOUS; SUBCUTANEOUS at 06:19

## 2023-06-03 RX ADMIN — SODIUM CHLORIDE 50 ML/HR: 0.9 INJECTION, SOLUTION INTRAVENOUS at 08:48

## 2023-06-03 RX ADMIN — TOPIRAMATE 100 MG: 100 TABLET, FILM COATED ORAL at 21:18

## 2023-06-03 RX ADMIN — HYDROCHLOROTHIAZIDE 25 MG: 25 TABLET ORAL at 08:09

## 2023-06-03 RX ADMIN — OXYCODONE HYDROCHLORIDE 10 MG: 10 TABLET ORAL at 08:48

## 2023-06-03 RX ADMIN — SERTRALINE 100 MG: 100 TABLET, FILM COATED ORAL at 08:09

## 2023-06-03 RX ADMIN — PANTOPRAZOLE SODIUM 40 MG: 40 TABLET, DELAYED RELEASE ORAL at 06:19

## 2023-06-03 RX ADMIN — ROPINIROLE 0.5 MG: 0.25 TABLET, FILM COATED ORAL at 21:18

## 2023-06-03 RX ADMIN — LOSARTAN POTASSIUM 50 MG: 50 TABLET, FILM COATED ORAL at 08:09

## 2023-06-03 RX ADMIN — FAMOTIDINE 20 MG: 20 TABLET, FILM COATED ORAL at 08:09

## 2023-06-03 RX ADMIN — LORATADINE 10 MG: 10 TABLET ORAL at 08:09

## 2023-06-03 RX ADMIN — METOPROLOL SUCCINATE 25 MG: 25 TABLET, FILM COATED, EXTENDED RELEASE ORAL at 08:13

## 2023-06-03 RX ADMIN — DULOXETINE HYDROCHLORIDE 30 MG: 30 CAPSULE, DELAYED RELEASE ORAL at 08:09

## 2023-06-03 RX ADMIN — HEPARIN SODIUM 5000 UNITS: 5000 INJECTION INTRAVENOUS; SUBCUTANEOUS at 15:18

## 2023-06-03 RX ADMIN — HEPARIN SODIUM 5000 UNITS: 5000 INJECTION INTRAVENOUS; SUBCUTANEOUS at 21:18

## 2023-06-03 RX ADMIN — GLIMEPIRIDE 4 MG: 2 TABLET ORAL at 08:10

## 2023-06-03 RX ADMIN — ATORVASTATIN CALCIUM 40 MG: 40 TABLET, FILM COATED ORAL at 17:21

## 2023-06-03 RX ADMIN — OXYCODONE HYDROCHLORIDE 5 MG: 5 TABLET ORAL at 15:21

## 2023-06-03 NOTE — UTILIZATION REVIEW
Initial Clinical Review    Admission: Date/Time/Statement:   Admission Orders (From admission, onward)     Ordered        06/02/23 2149  Inpatient Admission  Once                      Orders Placed This Encounter   Procedures   • Inpatient Admission     Standing Status:   Standing     Number of Occurrences:   1     Order Specific Question:   Level of Care     Answer:   Med Surg [16]     Order Specific Question:   Estimated length of stay     Answer:   More than 2 Midnights     Order Specific Question:   Certification     Answer:   I certify that inpatient services are medically necessary for this patient for a duration of greater than two midnights  See H&P and MD Progress Notes for additional information about the patient's course of treatment  Initial Presentation: 60 y o  female with a pmh of type 2 diabetes mellitus, hypertension, hypercholesterolemia, uterine myoma, restless leg syndrome, GERD, IBS, cardiomyopathy, and obesity  Presented to the ED at [facility]  With c/o lower abdominal pain  She reports she has a history of intermittent lower and mid abd pain  She was found to have a marked interval enlargement  Of her uterus on CT scan ( from 3/31/20) which measures 20 5x 14 5 x 19 5 cm with multiple large fibroids and cystic degeneration  Her pcp ordered a CT scan a month ago but was  refused by her insurance company she was then given pain meds  Due to concerns for malignancy  She is transferred to [facility] for gynelogic evaluation  She is admitted inpatient  For possible surgical intervention for enlarged uterus, fibroid  vrs malignant pelvic mass  CT scan ordered to r/o lung malignancy  She reports a 29 pound weight loss in the last 2 months due to low appetite  Date: 6/3/2023     Day 2:    Pt with known fibroid uterus, and hx of abnormal bleeding  S/p endometrial ablation 13 years ago   Markedly enl;arged uterus,  21 x 20 cm, She has severe abd and pelvic  Pain requiring multiple doses of narcotics  Discussed Surgical options  And all other indicated procedures  She consents to the surgery  Transition to sliding scale insulin for today and post op when NPO  Emmanuel Perera Renal stone with follow up with urology after hysterectomy               ED Triage Vitals [06/02/23 2100]   Temperature Pulse Respirations Blood Pressure SpO2   99 °F (37 2 °C) 68 18 138/80 90 %      Temp Source Heart Rate Source Patient Position - Orthostatic VS BP Location FiO2 (%)   Oral Monitor Lying Right arm --      Pain Score       No Pain          Wt Readings from Last 1 Encounters:   06/22/20 103 kg (227 lb)     Additional Vital Signs:   Date/Time Temp Pulse Resp BP MAP (mmHg) SpO2 O2 Device Patient Position - Orthostatic VS   06/03/23 15:31:39 97 9 °F (36 6 °C) 66 17 121/68 86 94 % -- --   06/03/23 08:33:14 98 7 °F (37 1 °C) 71 -- 126/82 97 90 % -- --   06/03/23 08:11:40 98 6 °F (37 °C) 71 -- 128/79 95 91 % -- --   06/02/23 2224 -- -- -- -- -- -- None (Room air) --       Pertinent Labs/Diagnostic Test Results:   CT chest wo contrast    (Results Pending) 6/3          Results from last 7 days   Lab Units 06/03/23  0723 06/03/23  0021   HEMATOCRIT % 31 6*  --    HEMOGLOBIN g/dL 10 3*  --    PLATELETS Thousands/uL 246 270   WBC Thousand/uL 9 04  --          Results from last 7 days   Lab Units 06/03/23  0723   ANION GAP mmol/L 1*   BUN mg/dL 11   CALCIUM mg/dL 8 8   CHLORIDE mmol/L 104   CO2 mmol/L 29   CREATININE mg/dL 0 62   EGFR ml/min/1 73sq m 96   POTASSIUM mmol/L 3 7   MAGNESIUM mg/dL 2 2   PHOSPHORUS mg/dL 2 9   SODIUM mmol/L 134*     Results from last 7 days   Lab Units 06/03/23  0723   ALBUMIN g/dL 3 3*   ALK PHOS U/L 79   ALT U/L 19   AST U/L 12   TOTAL BILIRUBIN mg/dL 1 18*   TOTAL PROTEIN g/dL 7 2     Results from last 7 days   Lab Units 06/03/23  1209 06/03/23  0856   POC GLUCOSE mg/dl 146* 149*     Results from last 7 days   Lab Units 06/03/23  0723   GLUCOSE RANDOM mg/dL 162*       Results from last 7 days   Lab Units 06/03/23  1133   INR  0 99   PROTIME seconds 13 3         Past Medical History:   Diagnosis Date   • Anxiety    • Depression    • Diabetes mellitus ([facility] [address] )    • Functional dyspepsia    • GERD without esophagitis    • Hyperlipidemia    • Hypertension    • Irritable bowel syndrome    • Migraines      Present on Admission:  **None**      Admitting Diagnosis: Abdominal pain [R10 9]  Pelvic mass [R19 00]  Uterine fibroid [D25 9]  Age/Sex: 60 y o  Female      Admission Orders:  Scheduled Medications:  atorvastatin, 40 mg, Oral, Daily With Dinner  DULoxetine, 30 mg, Oral, Daily  famotidine, 20 mg, Oral, Daily  heparin (porcine) 5,000 Units in sodium chloride 0 9 % 500 mL irrigation, , Irrigation, Once  heparin (porcine), 5,000 Units, Subcutaneous, Q8H MAN  insulin lispro, 1-6 Units, Subcutaneous, TID AC  [START ON 6/4/2023] metoprolol succinate, 25 mg, Oral, Daily  pantoprazole, 40 mg, Oral, Early Morning  rOPINIRole, 0 5 mg, Oral, HS  sertraline, 100 mg, Oral, Daily  topiramate, 100 mg, Oral, HS      Continuous IV Infusions:  sodium chloride, 50 mL/hr, Intravenous, Continuous- started 6/3 @ 8:48  dextrose 5 % and sodium chloride 0 45 % infusion  Rate: 75 mL/hr Dose: 75 mL/hr  Freq: Continuous Route: IV  Indications of Use: IV Hydration  Last Dose: Stopped (06/03/23 0808)  Start: 06/02/23 2200 End: 06/03/23 0806    PRN Meds:  HYDROmorphone, 1 mg, Intravenous, Q3H PRN  ondansetron, 4 mg, Intravenous, Q6H PRN  oxyCODONE, 10 mg, Oral, Q4H PRN-6/3 x 1   oxyCODONE, 5 mg, Oral, Q4H PRN-6/3 x 1             Network Utilization Review Department  ATTENTION: Please call with any questions or concerns to [phone] and carefully listen to the prompts so that you are directed to the right person   All voicemails are confidential   Mohit Bentley all requests for admission clinical reviews, approved or denied determinations and any other requests to dedicated fax number below belonging to the campus where the patient is receiving treatment   List of dedicated fax numbers for the Facilities:  [address] [place] [address] DENIALS (Administrative/Medical Necessity) [phone]   [address]  (Maternity/NICU/Pediatrics) [phone]   [address] Tre[address] [phone]   Joi [address] [phone]   [address] [address] [facility] [phone]   [address] [facility] Thais Cavazos [facility] 134 815 [facility] [phone]

## 2023-06-03 NOTE — H&P
H&P Exam - GYN ONC Surgery   Red Lozoya 60 y o  female MRN: [id number]  [facility] Encounter: [id number]    Assessment/Plan     Assessment:  27-year-old female, with a past medical history of type 2 diabetes mellitus, hypertension, hypercholesterolemia, uterine myoma, restless leg syndrome, GERD, IBS, cardiomyopathy, and obesity,  And  a 1 month history of severe lower and mid abdominal pain presented to [facility] yesterday for evaluation  She was found to have a marked interval enlargement of her uterus on CT scan (from 3/31/2020) which measures 20 5 x 14 5 x 19 5 cm with multiple large fibroids and cystic degeneration  Because of concerns for malignancy patient was transferred to [facility] for gynecologic oncology evaluation  Plan:  Admit to [address] [place] [address] service for further evaluation and surgical invention for enlarged uterus, fibroid versus malignant pelvic mass  Will obtain CT chest to r/o lung malignancy  Fluids, pain meds, AM labs, diet, home meds ordered  See orders     1 cm nonobstructing calculus of the right kidney, can be followed up as an outpatient       History of Present Illness     HPI:  Red Lozoya is a 60 y o  female who presents with a 1 month history of intermittent lower abdominal and mid abdominal pain  She states she saw her PCP who initially ordered a CT scan a month ago, however, she relates that it was refused by her insurance company  She was then given medications for pain, she thinks it may have been a narcotic  The pain did subside until yesterday when she developed a sudden onset of lower abdominal pain that caused her to double over  Her work colleagues called the ambulance and she was taken to [facility]   Patient denied any associated nausea, vomiting, fever, chills, diarrhea, constipation had a normal bowel movement yesterday  Patient states she had a 29 pound weight loss the last 2 months because she had no appetite   She has a distant history of a prior appendectomy and cholecystectomy  She also has had diverticulitis in the past and she initially associated her pain with that  Patient denies any vaginal bleeding or vaginal discharge  Patient denies all urinary related symptoms  Review of Systems   Constitutional: Positive for appetite change and unexpected weight change  Negative for activity change, chills, diaphoresis, fatigue and fever  HENT: Negative  Eyes: Negative  Respiratory: Negative for chest tightness, shortness of breath, wheezing and stridor  Cardiovascular: Negative for chest pain, palpitations and leg swelling  Gastrointestinal: Positive for abdominal distention and abdominal pain  Negative for anal bleeding, blood in stool, constipation, diarrhea, nausea, rectal pain and vomiting  Bloating   Endocrine: Negative  Genitourinary: Negative for difficulty urinating, dysuria, flank pain, frequency, hematuria, vaginal bleeding, vaginal discharge and vaginal pain  Musculoskeletal: Negative  Neurological: Negative  Psychiatric/Behavioral: Negative          Historical Information   Past Medical History:   Diagnosis Date   • Anxiety    • Depression    • Diabetes mellitus ([facility] [address] )    • Functional dyspepsia    • GERD without esophagitis    • Hyperlipidemia    • Hypertension    • Irritable bowel syndrome    • Migraines      Past Surgical History:   Procedure Laterality Date   • APPENDECTOMY     • CHOLECYSTECTOMY     • FOOT SURGERY Left     Bone spur     Social History   Social History     Substance and Sexual Activity   Alcohol Use Never     Social History     Substance and Sexual Activity   Drug Use Not on file     Social History     Tobacco Use   Smoking Status Never   Smokeless Tobacco Never     E-Cigarette/Vaping   • E-Cigarette Use Never User      E-Cigarette/Vaping Substances   • Nicotine No    • THC No    • CBD No    • Flavoring No    • Other No    • Unknown No      Family History: Patient states her mother had breast cancer and her father had cancer in his jaw  Meds/Allergies   PTA meds:   Prior to Admission Medications   Prescriptions Last Dose Informant Patient Reported? Taking?    CLENPIQ 10-3 5-12 MG-GM -GM/160ML SOLN   No No   Sig: USE AS DIRECTED   Multiple Vitamin (MULTIVITAMIN) tablet  Self Yes No   Sig: Take 1 tablet by mouth daily   Triamcinolone Acetonide 55 MCG/ACT AERO  Self Yes No   Sig: into each nostril One spray each nostril daily   ZOLMitriptan (ZOMIG) 5 MG tablet  Self Yes No   Sig: Take 5 mg by mouth once as needed for migraine Daily prn   aspirin (ECOTRIN LOW STRENGTH) 81 mg EC tablet  Self Yes No   Sig: Take 81 mg by mouth daily   atorvastatin (LIPITOR) 40 mg tablet  Self Yes No   Sig: Take 40 mg by mouth daily at bedtime   cetirizine (ZyrTEC) 10 mg tablet  Self Yes No   Sig: Take 10 mg by mouth daily   cholecalciferol (VITAMIN D3) 1,000 units tablet  Self Yes No   Sig: Take 1,000 Units by mouth daily 2 daily   glimepiride (AMARYL) 4 mg tablet  Self Yes No   Sig: Take 4 mg by mouth 2 (two) times a day   hydrochlorothiazide (HYDRODIURIL) 25 mg tablet  Self Yes No   Sig: Take 25 mg by mouth daily   ibuprofen (MOTRIN) 800 mg tablet  Self Yes No   Sig: Take 800 mg by mouth every 6 (six) hours as needed for mild pain   losartan (COZAAR) 50 mg tablet  Self Yes No   Sig: Take 50 mg by mouth daily   meclizine (ANTIVERT) 25 mg tablet  Self Yes No   Sig: Take by mouth every 12 (twelve) hours as needed for dizziness   metoprolol succinate (TOPROL-XL) 25 mg 24 hr tablet  Self Yes No   Sig: Take 25 mg by mouth daily   other medication, see sig,  Self Yes No   Sig: Medication/product name: Lifitegrast (xiidra)  Strength:   Sig (include dose, route, frequency): one drop ou BID   pantoprazole (PROTONIX) 40 mg tablet  Self Yes No   Sig: Take 40 mg by mouth daily   sertraline (ZOLOFT) 50 mg tablet  Self Yes No   Sig: Take 150 mg by mouth daily      Facility-Administered Medications: None Allergies   Allergen Reactions   • Sulfa Antibiotics      High fever  Pt unsure - it happened when she was 12years old       Objective   First Vitals:     /80 (BP Location: Right arm)   Pulse 68   Temp 99 °F (37 2 °C) (Oral)   Resp 18   SpO2 90%     Current Vitals:        No intake or output data in the 24 hours ending 06/02/23 2057    Invasive Devices     None                 Physical Exam  Vitals (from [facility]) reviewed  Constitutional:       Appearance: Normal appearance  She is obese  HENT:      Head: Normocephalic and atraumatic  Nose: Nose normal  No congestion  Mouth/Throat:      Mouth: Mucous membranes are dry  Pharynx: Oropharynx is clear  Eyes:      Extraocular Movements: Extraocular movements intact  Pupils: Pupils are equal, round, and reactive to light  Cardiovascular:      Rate and Rhythm: Normal rate and regular rhythm  Heart sounds: No murmur heard  No friction rub  No gallop  Pulmonary:      Effort: Pulmonary effort is normal  No respiratory distress  Breath sounds: Normal breath sounds  No wheezing or rales  Abdominal:      Tenderness: There is abdominal tenderness  There is guarding  There is no right CVA tenderness, left CVA tenderness or rebound  Comments: Obese,  very tender right mid to upper abdomen and suprapubic area, otherwise soft  Genitourinary:     Comments: Deferred  Musculoskeletal:         General: No swelling  Normal range of motion  Cervical back: Normal range of motion  Skin:     General: Skin is warm and dry  Neurological:      General: No focal deficit present  Mental Status: She is alert and oriented to person, place, and time  Lab Results: I have personally reviewed pertinent lab results  Br[facility] patient from [facility] to [facility]  Imaging: I have personally reviewed pertinent reports  EKG, Pathology, and Other Studies: I have personally reviewed pertinent reports        Code Status: level 1  Advance Directive and Living Will:      Power of :    POLST:      Counseling / Coordination of Care  Total floor / unit time spent today 60 minutes  Greater than 50% of total time was spent with the patient and / or family counseling and / or coordination of care  Hilaria Meadows PA-C     This text is generated with voice recognition software  There may be translation, syntax,  or grammatical errors  If you have any questions, please contact the dictating provider

## 2023-06-03 NOTE — CASE MANAGEMENT
Case Management Assessment & Discharge Planning Note    Patient name Richard Talbot  Location [address] 934/PPHP 904-40 MRN [id number]  : 1960 Date 6/3/2023       Current Admission Date: 2023  Current Admission Diagnosis:Abdominal pain, Pelvic mass, Uterine fibroid   Patient Active Problem List    Diagnosis Date Noted   • Irritable bowel syndrome    • Functional dyspepsia    • GERD without esophagitis       LOS (days): 1  Geometric Mean LOS (GMLOS) (days):   Days to GMLOS:     OBJECTIVE:    Risk of Unplanned Readmission Score: 5 94         Current admission status: Inpatient       Preferred Pharmacy:   [id number], [address] [place] [address]  Phone: [phone] Fax: [phone]    Primary Care Provider: Eda Rico MD    Primary Insurance: BLUE CROSS  Secondary Insurance:     ASSESSMENT:  [address] Proxies    There are no active Health Care Proxies on file  Advance Directives  Does patient have a [address] [place] [address] Avenue?: No  Does patient have Advance Directives?: No         Readmission Root Cause  30 Day Readmission: No    Patient Information  Admitted from[de-identified] Facility  Mental Status: Alert  During Assessment patient was accompanied by: Not accompanied during assessment  Assessment information provided by[de-identified] Patient  Primary Caregiver: Self  Support Systems: Spouse/significant other, Friends/neighbors, Family members  [place]   Select all that apply : Stairs  Number of steps to enter home : 5  Do the steps have railings?: Yes  Type of Current Residence: 2 story home  Upon entering residence, is there a bedroom on the main floor (no further steps)?: No  A bedroom is located on the following floor levels of residence (select all that apply):: 2nd Floor  Upon entering residence, is there a bathroom on the main floor (no further steps)?: No  Indicate which floors of current residence have a bathroom (select all the apply):: 2nd Floor  Number of steps to 2nd floor from main floor: One Flight  In the last 12 months, was there a time when you were not able to pay the mortgage or rent on time?: No  In the last 12 months, how many places have you lived?: 1  In the last 12 months, was there a time when you did not have a steady place to sleep or slept in a shelter (including now)?: No  Living Arrangements: Lives w/ Spouse/significant other, Lives w/ Extended Family    Activities of Daily Living Prior to Admission  Functional Status: Independent  Completes ADLs independently?: Yes  Ambulates independently?: Yes  Does patient use assisted devices?: No  Does patient currently own DME?: No  Does patient have a history of Outpatient Therapy (PT/OT)?: No  Does the patient have a history of Short-Term Rehab?: No  Does patient have a history of HHC?: Yes (unknown name)         Patient Information Continued  Income Source: Employed  Does patient have prescription coverage?: Yes  Within the past 12 months, you worried that your food would run out before you got the money to buy more : Never true  Within the past 12 months, the food you bought just didn't last and you didn't have money to get more : Never true  Does patient receive dialysis treatments?: No  Does patient have a history of substance abuse?: No  Does patient have a history of Mental Health Diagnosis?: No         Means of Transportation  Means of Transport to Appts[de-identified] Drives Self  In the past 12 months, has lack of transportation kept you from medical appointments or from getting medications?: No  In the past 12 months, has lack of transportation kept you from meetings, work, or from getting things needed for daily living?: No        DISCHARGE DETAILS:    Discharge planning discussed with[de-identified] patient  Freedom of Choice: Yes                   Contacts  Patient Contacts: rudy Melendez  Relationship to Patient[de-identified] Family  Contact Method: Phone  Phone Number: [phone]  Reason/Outcome: Continuity of Care, Emergency Contact, Discharge Planning                   Would you like to participate in our [address] service program?  : No - Declined                                                 Additional Comments: [facility]     CM reviewed d/c planning process including the following: identifying help at home, patient preference for d/c planning needs, Discharge Lounge, [facility] Meds to Bed program, availability of treatment team to discuss questions or concerns patient and/or family may have regarding understanding medications and recognizing signs and symptoms once discharged  CM also encouraged patient to follow up with all recommended appointments after discharge  Patient advised of importance for patient and family to participate in managing patient’s medical well being  Patient/caregiver received discharge checklist   Content reviewed  Patient/caregiver encouraged to participate in discharge plan of care prior to discharge home

## 2023-06-03 NOTE — PLAN OF CARE
Problem: PAIN - ADULT  Goal: Verbalizes/displays adequate comfort level or baseline comfort level  Description: Interventions:  - Encourage patient to monitor pain and request assistance  - Assess pain using appropriate pain scale  - Administer analgesics based on type and severity of pain and evaluate response  - Implement non-pharmacological measures as appropriate and evaluate response  - Consider cultural and social influences on pain and pain management  - Notify physician/advanced practitioner if interventions unsuccessful or patient reports new pain  Outcome: Progressing     Problem: INFECTION - ADULT  Goal: Absence or prevention of progression during hospitalization  Description: INTERVENTIONS:  - Assess and monitor for signs and symptoms of infection  - Monitor lab/diagnostic results  - Monitor all insertion sites, i e  indwelling lines, tubes, and drains  - Monitor endotracheal if appropriate and nasal secretions for changes in amount and color  - [place] appropriate cooling/warming therapies per order  - Administer medications as ordered  - Instruct and encourage patient and family to use good hand hygiene technique  - Identify and instruct in appropriate isolation precautions for identified infection/condition  Outcome: Progressing  Goal: Absence of fever/infection during neutropenic period  Description: INTERVENTIONS:  - Monitor WBC    Outcome: Progressing     Problem: SAFETY ADULT  Goal: Patient will remain free of falls  Description: INTERVENTIONS:  - Educate patient/family on patient safety including physical limitations  - Instruct patient to call for assistance with activity   - Consult OT/PT to assist with strengthening/mobility   - Keep Call bell within reach  - Keep bed low and locked with side rails adjusted as appropriate  - Keep care items and personal belongings within reach  - Initiate and maintain comfort rounds  - Make Fall Risk Sign visible to staff  - Apply yellow socks and bracelet for high fall risk patients  - Consider moving patient to room near nurses station  Outcome: Progressing  Goal: Maintain or return to baseline ADL function  Description: INTERVENTIONS:  -  Assess patient's ability to carry out ADLs; assess patient's baseline for ADL function and identify physical deficits which impact ability to perform ADLs (bathing, care of mouth/teeth, toileting, grooming, dressing, etc )  - Assess/evaluate cause of self-care deficits   - Assess range of motion  - Assess patient's mobility; develop plan if impaired  - Assess patient's need for assistive devices and provide as appropriate  - Encourage maximum independence but intervene and supervise when necessary  - Involve family in performance of ADLs  - Assess for home care needs following discharge   - Consider OT consult to assist with ADL evaluation and planning for discharge  - Provide patient education as appropriate  Outcome: Progressing  Goal: Maintains/Returns to pre admission functional level  Description: INTERVENTIONS:  - Perform BMAT or MOVE assessment daily    - Set and communicate daily mobility goal to care team and patient/family/caregiver     - Collaborate with rehabilitation services on mobility goals if consulted  - Record patient progress and toleration of activity level   Outcome: Progressing     Problem: DISCHARGE PLANNING  Goal: Discharge to home or other facility with appropriate resources  Description: INTERVENTIONS:  - Identify barriers to discharge w/patient and caregiver  - Arrange for needed discharge resources and transportation as appropriate  - Identify discharge learning needs (meds, wound care, etc )  - Arrange for interpretive services to assist at discharge as needed  - Refer to Case Management Department for coordinating discharge planning if the patient needs post-hospital services based on physician/advanced practitioner order or complex needs related to functional status, cognitive ability, or social support system  Outcome: Progressing     Problem: Knowledge Deficit  Goal: Patient/family/caregiver demonstrates understanding of disease process, treatment plan, medications, and discharge instructions  Description: Complete learning assessment and assess knowledge base    Interventions:  - Provide teaching at level of understanding  - Provide teaching via preferred learning methods  Outcome: Progressing

## 2023-06-04 ENCOUNTER — ANESTHESIA EVENT (INPATIENT)
Dept: PERIOP | Facility: HOSPITAL | Age: 63
DRG: 740 | End: 2023-06-04
Payer: COMMERCIAL

## 2023-06-04 ENCOUNTER — ANESTHESIA (INPATIENT)
Dept: PERIOP | Facility: HOSPITAL | Age: 63
DRG: 740 | End: 2023-06-04
Payer: COMMERCIAL

## 2023-06-04 PROBLEM — N85.8 UTERINE MASS: Status: ACTIVE | Noted: 2023-06-04

## 2023-06-04 PROBLEM — R10.30 LOWER ABDOMINAL PAIN: Status: ACTIVE | Noted: 2023-06-04

## 2023-06-04 PROBLEM — E66.09 CLASS 2 OBESITY DUE TO EXCESS CALORIES WITHOUT SERIOUS COMORBIDITY IN ADULT: Status: ACTIVE | Noted: 2023-06-04

## 2023-06-04 PROBLEM — E11.9 TYPE 2 DIABETES MELLITUS WITHOUT COMPLICATION, WITHOUT LONG-TERM CURRENT USE OF INSULIN (HCC): Status: ACTIVE | Noted: 2023-06-04

## 2023-06-04 PROBLEM — E66.812 CLASS 2 OBESITY DUE TO EXCESS CALORIES WITHOUT SERIOUS COMORBIDITY IN ADULT: Status: ACTIVE | Noted: 2023-06-04

## 2023-06-04 LAB
ABO GROUP BLD: NORMAL
ALBUMIN SERPL BCP-MCNC: 3.3 G/DL (ref 3.5–5)
ALP SERPL-CCNC: 83 U/L (ref 46–116)
ALT SERPL W P-5'-P-CCNC: 17 U/L (ref 12–78)
ANION GAP SERPL CALCULATED.3IONS-SCNC: 3 MMOL/L (ref 4–13)
ANION GAP SERPL CALCULATED.3IONS-SCNC: 3 MMOL/L (ref 4–13)
AST SERPL W P-5'-P-CCNC: 16 U/L (ref 5–45)
BASOPHILS # BLD AUTO: 0.02 THOUSANDS/ÂΜL (ref 0–0.1)
BASOPHILS # BLD AUTO: 0.04 THOUSANDS/ÂΜL (ref 0–0.1)
BASOPHILS NFR BLD AUTO: 0 % (ref 0–1)
BASOPHILS NFR BLD AUTO: 0 % (ref 0–1)
BILIRUB SERPL-MCNC: 1.22 MG/DL (ref 0.2–1)
BUN SERPL-MCNC: 11 MG/DL (ref 5–25)
BUN SERPL-MCNC: 12 MG/DL (ref 5–25)
CALCIUM ALBUM COR SERPL-MCNC: 9.8 MG/DL (ref 8.3–10.1)
CALCIUM SERPL-MCNC: 7.8 MG/DL (ref 8.3–10.1)
CALCIUM SERPL-MCNC: 9.2 MG/DL (ref 8.3–10.1)
CHLORIDE SERPL-SCNC: 106 MMOL/L (ref 96–108)
CHLORIDE SERPL-SCNC: 107 MMOL/L (ref 96–108)
CO2 SERPL-SCNC: 24 MMOL/L (ref 21–32)
CO2 SERPL-SCNC: 26 MMOL/L (ref 21–32)
CREAT SERPL-MCNC: 0.59 MG/DL (ref 0.6–1.3)
CREAT SERPL-MCNC: 0.63 MG/DL (ref 0.6–1.3)
EOSINOPHIL # BLD AUTO: 0.15 THOUSAND/ÂΜL (ref 0–0.61)
EOSINOPHIL # BLD AUTO: 0.39 THOUSAND/ÂΜL (ref 0–0.61)
EOSINOPHIL NFR BLD AUTO: 1 % (ref 0–6)
EOSINOPHIL NFR BLD AUTO: 4 % (ref 0–6)
ERYTHROCYTE [DISTWIDTH] IN BLOOD BY AUTOMATED COUNT: 14 % (ref 11.6–15.1)
ERYTHROCYTE [DISTWIDTH] IN BLOOD BY AUTOMATED COUNT: 14 % (ref 11.6–15.1)
GFR SERPL CREATININE-BSD FRML MDRD: 95 ML/MIN/1.73SQ M
GFR SERPL CREATININE-BSD FRML MDRD: 97 ML/MIN/1.73SQ M
GLUCOSE SERPL-MCNC: 146 MG/DL (ref 65–140)
GLUCOSE SERPL-MCNC: 150 MG/DL (ref 65–140)
GLUCOSE SERPL-MCNC: 154 MG/DL (ref 65–140)
GLUCOSE SERPL-MCNC: 179 MG/DL (ref 65–140)
GLUCOSE SERPL-MCNC: 198 MG/DL (ref 65–140)
GLUCOSE SERPL-MCNC: 207 MG/DL (ref 65–140)
HCT VFR BLD AUTO: 29.4 % (ref 34.8–46.1)
HCT VFR BLD AUTO: 31.4 % (ref 34.8–46.1)
HGB BLD-MCNC: 10.6 G/DL (ref 11.5–15.4)
HGB BLD-MCNC: 9.5 G/DL (ref 11.5–15.4)
IMM GRANULOCYTES # BLD AUTO: 0.04 THOUSAND/UL (ref 0–0.2)
IMM GRANULOCYTES # BLD AUTO: 0.16 THOUSAND/UL (ref 0–0.2)
IMM GRANULOCYTES NFR BLD AUTO: 0 % (ref 0–2)
IMM GRANULOCYTES NFR BLD AUTO: 1 % (ref 0–2)
LYMPHOCYTES # BLD AUTO: 2.35 THOUSANDS/ÂΜL (ref 0.6–4.47)
LYMPHOCYTES # BLD AUTO: 3.32 THOUSANDS/ÂΜL (ref 0.6–4.47)
LYMPHOCYTES NFR BLD AUTO: 12 % (ref 14–44)
LYMPHOCYTES NFR BLD AUTO: 34 % (ref 14–44)
MCH RBC QN AUTO: 29.7 PG (ref 26.8–34.3)
MCH RBC QN AUTO: 30.5 PG (ref 26.8–34.3)
MCHC RBC AUTO-ENTMCNC: 32.3 G/DL (ref 31.4–37.4)
MCHC RBC AUTO-ENTMCNC: 33.8 G/DL (ref 31.4–37.4)
MCV RBC AUTO: 90 FL (ref 82–98)
MCV RBC AUTO: 92 FL (ref 82–98)
MONOCYTES # BLD AUTO: 0.46 THOUSAND/ÂΜL (ref 0.17–1.22)
MONOCYTES # BLD AUTO: 0.51 THOUSAND/ÂΜL (ref 0.17–1.22)
MONOCYTES NFR BLD AUTO: 2 % (ref 4–12)
MONOCYTES NFR BLD AUTO: 5 % (ref 4–12)
NEUTROPHILS # BLD AUTO: 16.03 THOUSANDS/ÂΜL (ref 1.85–7.62)
NEUTROPHILS # BLD AUTO: 5.52 THOUSANDS/ÂΜL (ref 1.85–7.62)
NEUTS SEG NFR BLD AUTO: 57 % (ref 43–75)
NEUTS SEG NFR BLD AUTO: 84 % (ref 43–75)
NRBC BLD AUTO-RTO: 0 /100 WBCS
NRBC BLD AUTO-RTO: 0 /100 WBCS
PLATELET # BLD AUTO: 275 THOUSANDS/UL (ref 149–390)
PLATELET # BLD AUTO: 297 THOUSANDS/UL (ref 149–390)
PMV BLD AUTO: 10.3 FL (ref 8.9–12.7)
PMV BLD AUTO: 10.6 FL (ref 8.9–12.7)
POTASSIUM SERPL-SCNC: 3.5 MMOL/L (ref 3.5–5.3)
POTASSIUM SERPL-SCNC: 3.5 MMOL/L (ref 3.5–5.3)
PROT SERPL-MCNC: 7.2 G/DL (ref 6.4–8.4)
RBC # BLD AUTO: 3.2 MILLION/UL (ref 3.81–5.12)
RBC # BLD AUTO: 3.48 MILLION/UL (ref 3.81–5.12)
RH BLD: POSITIVE
SODIUM SERPL-SCNC: 134 MMOL/L (ref 135–147)
SODIUM SERPL-SCNC: 135 MMOL/L (ref 135–147)
WBC # BLD AUTO: 19.19 THOUSAND/UL (ref 4.31–10.16)
WBC # BLD AUTO: 9.8 THOUSAND/UL (ref 4.31–10.16)

## 2023-06-04 PROCEDURE — 99255 IP/OBS CONSLTJ NEW/EST HI 80: CPT | Performed by: STUDENT IN AN ORGANIZED HEALTH CARE EDUCATION/TRAINING PROGRAM

## 2023-06-04 PROCEDURE — 99233 SBSQ HOSP IP/OBS HIGH 50: CPT | Performed by: OBSTETRICS & GYNECOLOGY

## 2023-06-04 PROCEDURE — 80053 COMPREHEN METABOLIC PANEL: CPT | Performed by: OBSTETRICS & GYNECOLOGY

## 2023-06-04 PROCEDURE — 82948 REAGENT STRIP/BLOOD GLUCOSE: CPT

## 2023-06-04 PROCEDURE — 03HY32Z INSERTION OF MONITORING DEVICE INTO UPPER ARTERY, PERCUTANEOUS APPROACH: ICD-10-PCS | Performed by: OBSTETRICS & GYNECOLOGY

## 2023-06-04 PROCEDURE — 0UT90ZZ RESECTION OF UTERUS, OPEN APPROACH: ICD-10-PCS | Performed by: OBSTETRICS & GYNECOLOGY

## 2023-06-04 PROCEDURE — 0UT20ZZ RESECTION OF BILATERAL OVARIES, OPEN APPROACH: ICD-10-PCS | Performed by: OBSTETRICS & GYNECOLOGY

## 2023-06-04 PROCEDURE — 85025 COMPLETE CBC W/AUTO DIFF WBC: CPT | Performed by: OBSTETRICS & GYNECOLOGY

## 2023-06-04 PROCEDURE — 80048 BASIC METABOLIC PNL TOTAL CA: CPT

## 2023-06-04 PROCEDURE — 85025 COMPLETE CBC W/AUTO DIFF WBC: CPT

## 2023-06-04 PROCEDURE — 0UT70ZZ RESECTION OF BILATERAL FALLOPIAN TUBES, OPEN APPROACH: ICD-10-PCS | Performed by: OBSTETRICS & GYNECOLOGY

## 2023-06-04 RX ORDER — SODIUM CHLORIDE, SODIUM LACTATE, POTASSIUM CHLORIDE, CALCIUM CHLORIDE 600; 310; 30; 20 MG/100ML; MG/100ML; MG/100ML; MG/100ML
INJECTION, SOLUTION INTRAVENOUS CONTINUOUS PRN
Status: DISCONTINUED | OUTPATIENT
Start: 2023-06-04 | End: 2023-06-04

## 2023-06-04 RX ORDER — METRONIDAZOLE 500 MG/100ML
500 INJECTION, SOLUTION INTRAVENOUS EVERY 8 HOURS
Status: DISCONTINUED | OUTPATIENT
Start: 2023-06-04 | End: 2023-06-04 | Stop reason: HOSPADM

## 2023-06-04 RX ORDER — HYDROMORPHONE HCL/PF 1 MG/ML
0.5 SYRINGE (ML) INJECTION EVERY 2 HOUR PRN
Status: DISCONTINUED | OUTPATIENT
Start: 2023-06-04 | End: 2023-06-07 | Stop reason: HOSPADM

## 2023-06-04 RX ORDER — BUPIVACAINE HYDROCHLORIDE 2.5 MG/ML
INJECTION, SOLUTION EPIDURAL; INFILTRATION; INTRACAUDAL AS NEEDED
Status: DISCONTINUED | OUTPATIENT
Start: 2023-06-04 | End: 2023-06-04

## 2023-06-04 RX ORDER — SODIUM CHLORIDE 9 MG/ML
INJECTION, SOLUTION INTRAVENOUS CONTINUOUS PRN
Status: DISCONTINUED | OUTPATIENT
Start: 2023-06-04 | End: 2023-06-04

## 2023-06-04 RX ORDER — FENTANYL CITRATE 50 UG/ML
INJECTION, SOLUTION INTRAMUSCULAR; INTRAVENOUS AS NEEDED
Status: DISCONTINUED | OUTPATIENT
Start: 2023-06-04 | End: 2023-06-04

## 2023-06-04 RX ORDER — MIDAZOLAM HYDROCHLORIDE 2 MG/2ML
INJECTION, SOLUTION INTRAMUSCULAR; INTRAVENOUS AS NEEDED
Status: DISCONTINUED | OUTPATIENT
Start: 2023-06-04 | End: 2023-06-04

## 2023-06-04 RX ORDER — HEPARIN SODIUM 5000 [USP'U]/ML
5000 INJECTION, SOLUTION INTRAVENOUS; SUBCUTANEOUS EVERY 8 HOURS SCHEDULED
Status: DISCONTINUED | OUTPATIENT
Start: 2023-06-04 | End: 2023-06-07 | Stop reason: HOSPADM

## 2023-06-04 RX ORDER — DEXAMETHASONE SODIUM PHOSPHATE 10 MG/ML
INJECTION, SOLUTION INTRAMUSCULAR; INTRAVENOUS AS NEEDED
Status: DISCONTINUED | OUTPATIENT
Start: 2023-06-04 | End: 2023-06-04

## 2023-06-04 RX ORDER — METHOCARBAMOL 500 MG/1
500 TABLET, FILM COATED ORAL EVERY 6 HOURS SCHEDULED
Status: DISCONTINUED | OUTPATIENT
Start: 2023-06-04 | End: 2023-06-07 | Stop reason: HOSPADM

## 2023-06-04 RX ORDER — CEFAZOLIN SODIUM 2 G/50ML
2000 SOLUTION INTRAVENOUS ONCE
Status: COMPLETED | OUTPATIENT
Start: 2023-06-04 | End: 2023-06-04

## 2023-06-04 RX ORDER — LIDOCAINE HYDROCHLORIDE 10 MG/ML
INJECTION, SOLUTION EPIDURAL; INFILTRATION; INTRACAUDAL; PERINEURAL AS NEEDED
Status: DISCONTINUED | OUTPATIENT
Start: 2023-06-04 | End: 2023-06-04

## 2023-06-04 RX ORDER — ACETAMINOPHEN 325 MG/1
975 TABLET ORAL EVERY 8 HOURS
Status: DISCONTINUED | OUTPATIENT
Start: 2023-06-04 | End: 2023-06-07 | Stop reason: HOSPADM

## 2023-06-04 RX ORDER — HYDROMORPHONE HCL IN WATER/PF 6 MG/30 ML
0.2 PATIENT CONTROLLED ANALGESIA SYRINGE INTRAVENOUS
Status: DISCONTINUED | OUTPATIENT
Start: 2023-06-04 | End: 2023-06-04 | Stop reason: HOSPADM

## 2023-06-04 RX ORDER — VASOPRESSIN 20 U/ML
INJECTION PARENTERAL AS NEEDED
Status: DISCONTINUED | OUTPATIENT
Start: 2023-06-04 | End: 2023-06-04

## 2023-06-04 RX ORDER — GLYCOPYRROLATE 0.2 MG/ML
INJECTION INTRAMUSCULAR; INTRAVENOUS AS NEEDED
Status: DISCONTINUED | OUTPATIENT
Start: 2023-06-04 | End: 2023-06-04

## 2023-06-04 RX ORDER — MAGNESIUM HYDROXIDE 1200 MG/15ML
LIQUID ORAL AS NEEDED
Status: DISCONTINUED | OUTPATIENT
Start: 2023-06-04 | End: 2023-06-04 | Stop reason: HOSPADM

## 2023-06-04 RX ORDER — ATORVASTATIN CALCIUM 40 MG/1
40 TABLET, FILM COATED ORAL
Status: DISCONTINUED | OUTPATIENT
Start: 2023-06-05 | End: 2023-06-07 | Stop reason: HOSPADM

## 2023-06-04 RX ORDER — PROPOFOL 10 MG/ML
INJECTION, EMULSION INTRAVENOUS AS NEEDED
Status: DISCONTINUED | OUTPATIENT
Start: 2023-06-04 | End: 2023-06-04

## 2023-06-04 RX ORDER — BUPIVACAINE HYDROCHLORIDE AND EPINEPHRINE 2.5; 5 MG/ML; UG/ML
INJECTION, SOLUTION EPIDURAL; INFILTRATION; INTRACAUDAL; PERINEURAL AS NEEDED
Status: DISCONTINUED | OUTPATIENT
Start: 2023-06-04 | End: 2023-06-04

## 2023-06-04 RX ORDER — EPHEDRINE SULFATE 50 MG/ML
INJECTION INTRAVENOUS AS NEEDED
Status: DISCONTINUED | OUTPATIENT
Start: 2023-06-04 | End: 2023-06-04

## 2023-06-04 RX ORDER — ONDANSETRON 2 MG/ML
4 INJECTION INTRAMUSCULAR; INTRAVENOUS ONCE AS NEEDED
Status: DISCONTINUED | OUTPATIENT
Start: 2023-06-04 | End: 2023-06-04 | Stop reason: HOSPADM

## 2023-06-04 RX ORDER — METOCLOPRAMIDE HYDROCHLORIDE 5 MG/ML
10 INJECTION INTRAMUSCULAR; INTRAVENOUS ONCE AS NEEDED
Status: DISCONTINUED | OUTPATIENT
Start: 2023-06-04 | End: 2023-06-04 | Stop reason: HOSPADM

## 2023-06-04 RX ORDER — DIPHENHYDRAMINE HYDROCHLORIDE 50 MG/ML
12.5 INJECTION INTRAMUSCULAR; INTRAVENOUS ONCE AS NEEDED
Status: DISCONTINUED | OUTPATIENT
Start: 2023-06-04 | End: 2023-06-04 | Stop reason: HOSPADM

## 2023-06-04 RX ORDER — ROCURONIUM BROMIDE 10 MG/ML
INJECTION, SOLUTION INTRAVENOUS AS NEEDED
Status: DISCONTINUED | OUTPATIENT
Start: 2023-06-04 | End: 2023-06-04

## 2023-06-04 RX ORDER — FENTANYL CITRATE/PF 50 MCG/ML
25 SYRINGE (ML) INJECTION
Status: DISCONTINUED | OUTPATIENT
Start: 2023-06-04 | End: 2023-06-04 | Stop reason: HOSPADM

## 2023-06-04 RX ORDER — ONDANSETRON 2 MG/ML
INJECTION INTRAMUSCULAR; INTRAVENOUS AS NEEDED
Status: DISCONTINUED | OUTPATIENT
Start: 2023-06-04 | End: 2023-06-04

## 2023-06-04 RX ADMIN — SODIUM CHLORIDE, SODIUM LACTATE, POTASSIUM CHLORIDE, AND CALCIUM CHLORIDE: .6; .31; .03; .02 INJECTION, SOLUTION INTRAVENOUS at 09:31

## 2023-06-04 RX ADMIN — BUPIVACAINE HYDROCHLORIDE 2.5 ML: 2.5 INJECTION, SOLUTION EPIDURAL; INFILTRATION; INTRACAUDAL; PERINEURAL at 09:09

## 2023-06-04 RX ADMIN — BUPIVACAINE HYDROCHLORIDE 2.5 ML: 2.5 INJECTION, SOLUTION EPIDURAL; INFILTRATION; INTRACAUDAL; PERINEURAL at 09:36

## 2023-06-04 RX ADMIN — BUPIVACAINE HYDROCHLORIDE 2.5 ML: 2.5 INJECTION, SOLUTION EPIDURAL; INFILTRATION; INTRACAUDAL; PERINEURAL at 10:05

## 2023-06-04 RX ADMIN — DEXAMETHASONE SODIUM PHOSPHATE 8 MG: 10 INJECTION, SOLUTION INTRAMUSCULAR; INTRAVENOUS at 09:03

## 2023-06-04 RX ADMIN — METHOCARBAMOL TABLETS 500 MG: 500 TABLET, COATED ORAL at 23:11

## 2023-06-04 RX ADMIN — ROCURONIUM BROMIDE 10 MG: 10 INJECTION, SOLUTION INTRAVENOUS at 10:03

## 2023-06-04 RX ADMIN — VASOPRESSIN 2 UNITS: 20 INJECTION INTRAVENOUS at 08:49

## 2023-06-04 RX ADMIN — ROPIVACAINE HYDROCHLORIDE: 2 INJECTION, SOLUTION EPIDURAL; INFILTRATION at 11:15

## 2023-06-04 RX ADMIN — SUGAMMADEX 200 MG: 100 INJECTION, SOLUTION INTRAVENOUS at 10:29

## 2023-06-04 RX ADMIN — GLYCOPYRROLATE 0.4 MG: 0.2 INJECTION, SOLUTION INTRAMUSCULAR; INTRAVENOUS at 08:44

## 2023-06-04 RX ADMIN — TOPIRAMATE 100 MG: 100 TABLET, FILM COATED ORAL at 23:12

## 2023-06-04 RX ADMIN — EPHEDRINE SULFATE 10 MG: 50 INJECTION INTRAVENOUS at 08:47

## 2023-06-04 RX ADMIN — ACETAMINOPHEN 975 MG: 325 TABLET, FILM COATED ORAL at 23:11

## 2023-06-04 RX ADMIN — PANTOPRAZOLE SODIUM 40 MG: 40 TABLET, DELAYED RELEASE ORAL at 06:05

## 2023-06-04 RX ADMIN — FENTANYL CITRATE 50 MCG: 50 INJECTION, SOLUTION INTRAMUSCULAR; INTRAVENOUS at 10:03

## 2023-06-04 RX ADMIN — BUPIVACAINE HYDROCHLORIDE AND EPINEPHRINE BITARTRATE 5 ML: 2.5; .005 INJECTION, SOLUTION EPIDURAL; INFILTRATION; INTRACAUDAL; PERINEURAL at 07:50

## 2023-06-04 RX ADMIN — ROPIVACAINE HYDROCHLORIDE: 5 INJECTION EPIDURAL; INFILTRATION; PERINEURAL at 11:25

## 2023-06-04 RX ADMIN — SODIUM CHLORIDE: 0.9 INJECTION, SOLUTION INTRAVENOUS at 08:18

## 2023-06-04 RX ADMIN — FAMOTIDINE 20 MG: 20 TABLET, FILM COATED ORAL at 13:32

## 2023-06-04 RX ADMIN — HEPARIN SODIUM 5000 UNITS: 5000 INJECTION INTRAVENOUS; SUBCUTANEOUS at 23:11

## 2023-06-04 RX ADMIN — METHOCARBAMOL TABLETS 500 MG: 500 TABLET, COATED ORAL at 17:32

## 2023-06-04 RX ADMIN — SERTRALINE 100 MG: 100 TABLET, FILM COATED ORAL at 13:32

## 2023-06-04 RX ADMIN — MIDAZOLAM 2 MG: 1 INJECTION INTRAMUSCULAR; INTRAVENOUS at 07:47

## 2023-06-04 RX ADMIN — ONDANSETRON 4 MG: 2 INJECTION INTRAMUSCULAR; INTRAVENOUS at 09:54

## 2023-06-04 RX ADMIN — BUPIVACAINE HYDROCHLORIDE 5 ML: 2.5 INJECTION, SOLUTION EPIDURAL; INFILTRATION; INTRACAUDAL; PERINEURAL at 08:18

## 2023-06-04 RX ADMIN — ROPINIROLE 0.5 MG: 0.25 TABLET, FILM COATED ORAL at 23:11

## 2023-06-04 RX ADMIN — DULOXETINE HYDROCHLORIDE 30 MG: 30 CAPSULE, DELAYED RELEASE ORAL at 13:32

## 2023-06-04 RX ADMIN — INSULIN LISPRO 2 UNITS: 100 INJECTION, SOLUTION INTRAVENOUS; SUBCUTANEOUS at 17:32

## 2023-06-04 RX ADMIN — SODIUM CHLORIDE 50 ML/HR: 0.9 INJECTION, SOLUTION INTRAVENOUS at 11:21

## 2023-06-04 RX ADMIN — ROPIVACAINE HYDROCHLORIDE: 5 INJECTION EPIDURAL; INFILTRATION; PERINEURAL at 15:32

## 2023-06-04 RX ADMIN — OXYCODONE HYDROCHLORIDE 10 MG: 10 TABLET ORAL at 00:13

## 2023-06-04 RX ADMIN — LIDOCAINE HYDROCHLORIDE 50 MG: 10 INJECTION, SOLUTION EPIDURAL; INFILTRATION; INTRACAUDAL; PERINEURAL at 08:16

## 2023-06-04 RX ADMIN — ROCURONIUM BROMIDE 50 MG: 10 INJECTION, SOLUTION INTRAVENOUS at 08:16

## 2023-06-04 RX ADMIN — METRONIDAZOLE: 500 INJECTION, SOLUTION INTRAVENOUS at 08:29

## 2023-06-04 RX ADMIN — SODIUM CHLORIDE 50 ML/HR: 0.9 INJECTION, SOLUTION INTRAVENOUS at 06:06

## 2023-06-04 RX ADMIN — SODIUM CHLORIDE 50 ML/HR: 0.9 INJECTION, SOLUTION INTRAVENOUS at 23:15

## 2023-06-04 RX ADMIN — HYDROMORPHONE HYDROCHLORIDE 0.5 MG: 1 INJECTION, SOLUTION INTRAMUSCULAR; INTRAVENOUS; SUBCUTANEOUS at 17:32

## 2023-06-04 RX ADMIN — EPHEDRINE SULFATE 10 MG: 50 INJECTION INTRAVENOUS at 10:30

## 2023-06-04 RX ADMIN — PHENYLEPHRINE HYDROCHLORIDE 20 MCG/MIN: 10 INJECTION INTRAVENOUS at 08:44

## 2023-06-04 RX ADMIN — PROPOFOL 150 MG: 10 INJECTION, EMULSION INTRAVENOUS at 08:16

## 2023-06-04 RX ADMIN — ROCURONIUM BROMIDE 20 MG: 10 INJECTION, SOLUTION INTRAVENOUS at 08:55

## 2023-06-04 RX ADMIN — FENTANYL CITRATE 50 MCG: 50 INJECTION, SOLUTION INTRAMUSCULAR; INTRAVENOUS at 08:16

## 2023-06-04 RX ADMIN — SODIUM CHLORIDE, SODIUM LACTATE, POTASSIUM CHLORIDE, AND CALCIUM CHLORIDE: .6; .31; .03; .02 INJECTION, SOLUTION INTRAVENOUS at 07:43

## 2023-06-04 RX ADMIN — ACETAMINOPHEN 975 MG: 325 TABLET, FILM COATED ORAL at 15:34

## 2023-06-04 RX ADMIN — CEFAZOLIN SODIUM 2000 MG: 2 SOLUTION INTRAVENOUS at 08:10

## 2023-06-04 RX ADMIN — METOPROLOL SUCCINATE 25 MG: 25 TABLET, EXTENDED RELEASE ORAL at 06:09

## 2023-06-04 RX ADMIN — ROCURONIUM BROMIDE 20 MG: 10 INJECTION, SOLUTION INTRAVENOUS at 09:31

## 2023-06-04 NOTE — QUICK NOTE
"Post-Op Check - GYN ONC Surgery   Red Lozoya 60 y o  female MRN: [id number]  Unit/Bed#: [facility] 934-01 Encounter: [id number]    Assessment:  25-year-old female who underwent a DOROTHY/BSO with excision and biopsy lesion/mass abdominal-pelvic peritoneum earlier today with Dr Sykes Files  Plan:  Continue to trend labs   Monitor hgb- 9 5 post-op  SCD's for DVT ppx  Continue IVF   Clear liquid diet  PCEA  SQH for DVT prophylaxis  Incentive spirometry- encourage use  Zofran PRN nausea  *Anesthesia concern for higher epidural placement making patient a fall risk- extreme care must be taken when moving patient to chair etc  as patient has increased risk for LE weakness*    Subjective/Objective      Subjective: Patient seen and examined at bedside  Patient is very sleepy but arousable  She endorses abdominal pain across lower abdomen with epidural in place but falls asleep shortly after conversing  Encouraged utilization of button for pain control  Denies nausea and tolerating sips of water  She is waiting for her clear liquid meal to come  Spencer in place with clear, pale yellow urine in bag  Denies CP, SOB  Objective: Patient is resting but arousable, NAD  Laying in bed  Spencer in place  Blood pressure 155/96, pulse 75, temperature 98 9 °F (37 2 °C), resp  rate 20, height 5' 4\" (1 626 m), weight 100 kg (221 lb 5 5 oz), SpO2 95 %  ,Body mass index is 37 99 kg/m²        Intake/Output Summary (Last 24 hours) at 6/4/2023 1453  Last data filed at 6/4/2023 1145  Gross per 24 hour   Intake 2200 ml   Output 1425 ml   Net 775 ml       Invasive Devices     Peripheral Intravenous Line  Duration           Peripheral IV 06/02/23 Right Antecubital 1 day    Peripheral IV 06/04/23 Left Forearm <1 day          Epidural Line  Duration           Epidural Catheter 06/04/23 <1 day          Drain  Duration           Urethral Catheter Non-latex 16 Fr  <1 day              Physical Exam:  Gen: Resting but arousable, in NAD   Cardiovascular: " Normal rate and regular rhythm  Respiratory: In no acute respiratory distress, pulmonary effort is normal and breathing appropriate  Abd: Soft, tender throughout with light palpation  No guarding  Midline incision covered with ABD pad x2 and tape  Extremities: No visible wounds or edema   Skin: Warm/dry  Psychiatric: Appropriate mood/affect      Lab, Imaging and other studies:  I have personally reviewed pertinent lab results       CBC:   Lab Results   Component Value Date    HCT 29 4 (L) 06/04/2023    HGB 9 5 (L) 06/04/2023    MCH 29 7 06/04/2023    MCHC 32 3 06/04/2023    MCV 92 06/04/2023    MPV 10 3 06/04/2023    NRBC 0 06/04/2023     06/04/2023    RBC 3 20 (L) 06/04/2023    RDW 14 0 06/04/2023    WBC 19 19 (H) 06/04/2023    CMP:   Lab Results   Component Value Date    ALKPHOS 83 06/04/2023    ALT 17 06/04/2023    AST 16 06/04/2023    BUN 12 06/04/2023    CALCIUM 7 8 (L) 06/04/2023     06/04/2023    CO2 24 06/04/2023    CREATININE 0 63 06/04/2023    EGFR 95 06/04/2023    K 3 5 06/04/2023    SODIUM 134 (L) 06/04/2023     VTE Pharmacologic Prophylaxis: Heparin  VTE Mechanical Prophylaxis: sequential compression device     Katarzyna Glaser PA-C   6/4/2023  3:30 PM

## 2023-06-04 NOTE — OP NOTE
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]    Procedure(s): HYSTERECTOMY TOTAL ABDOMINAL (DOROTHY)  BILATERAL SALPINGOOPHORECTOMY  EXCISION  BIOPSY LESION/MASS ABDOMINAL-PELVIC PERITONEUM    Specimen(s):  ID Type Source Tests Collected by Time Destination   1 :  Tissue Uterus w/Bilateral Ovaries and Fallopian Tubes TISSUE EXAM Shawna Null MD 2023 [id number]    2 : right pelvic peritoneum Tissue Peritoneum TISSUE EXAM Shawna Null MD 2023 [id number]        Estimated Blood Loss:   800 mL    Drains:  Urethral Catheter Non-latex 16 Fr  (Active)   Number of days: 0       Anesthesia Type:   General w/ Epidural    Operative Indications:  Lower abdominal pain [R10 30]  Uterine sarcoma ([address] ) [C479]  60year-old with a rapidly enlarging uterus now measuring 20 x 20 cm, severe abdominal pain requiring narcotics presents for definitive operative management  Operative Findings:  1  Exam under anesthesia revealed a gross normal cervix  The uterus was measured approximately 22 weeks in size and was mobile  2   On laparotomy, there was approximately 250 mL of dark blood present in the peritoneum  The uterus was markedly enlarged  There was a mass at the right fundus which was densely adherent to the ileal mesentery  Grossly, 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  The adnexa were grossly normal   Given the blood in the abdomen, there was concern for preoperative rupture of the right fundal fibroid/sarcoma   3    A portion of ileal peritoneum extending into the right pelvic sidewall was removed with gross residual sarcoma present  The peritoneal incision measured 10 x 8 cm  The mass that was present on the peritoneum measured approximately 2 x 4 cm and was likely a continuation of the uterine sarcoma  It was not a separate peritoneal lesion  4   At the conclusion the procedure, there was no gross visible disease remaining  Complications:   None    Procedure and Technique:  After informed consent was obtained, the patient underwent epidural placement followed by general endotracheal anesthesia without incident  She was then prepped and draped in the normal sterile fashion in the low dorsolithotomy position  Examination under anesthesia was then performed with findings noted as above  Spencer catheter was inserted  Attention was then turned to the abdomen  A midline vertical incision was made using cutting current cautery  This was taken down to the underlying layer of fascia with cautery  The fascia was opened in the midline and the fascial incision extended superiorly and inferiorly  The peritoneum was then identified and entered sharply  The peritoneal incision extended superiorly and inferiorly  Blood in the peritoneum was suctioned using a pool suction device  The uterus was able to be partially extruded from the incision  There was a dense adhesion to the mesentery and epiploic appendages  These were all lysed using the Enseal X1  The uterus was unable to be mobilized entirely out of the pelvis  It was markedly enlarged and measured approximately 22 weeks in size  There was bleeding noted from the right fundal myoma  A Bookwalter retractor was placed  The bowel was packed away with laparotomy sponges  The left retroperitoneal space was opened using monopolar cautery  The round ligament was cauterized and transected  The ureter was identified coursing normally within the medial leaf of the broad ligament    The infundibulopelvic ligament was then skeletonized, cauterized and transected using the Enseal X1  Attention was then turned to the right side  The round ligament was cauterized and transected  The retroperitoneal space was opened  The ureter was identified coursing normally within the medial leaf of the broad ligament  The infundibulopelvic ligament was then skeletonized, cauterized and transected with the excellent  The vesicovaginal space was then developed  The bladder was able to be taken down below the level of the external os of the cervix  The left uterine vasculature was then skeletonized, clamped with Zeppelin clamps transected and secured using 0 Vicryl suture  On the right side, the uterine vessels were skeletonized, clamped with Zeppelin clamps transected and secured using 0 Vicryl suture  The bilateral cardinal ligaments were then clamped with straight Zeppelin clamps transected and secured using 0 Vicryl suture until the level of the external os of the cervix was reached  The upper vagina was then clamped with a right angle Zeppelin clamps transected and secured using interrupted figure-of-eight 0 Vicryl suture  Hemostasis was found to be excellent  There is a small amount of bleeding noted in the right parametria which was controlled using interrupted 3-0 Vicryl suture  Attention was then turned to the upper abdomen  The small bowel and mesentery was run from the ligament of Treitz to the terminal ileum  There was a lesion noted on the ileal mesentery extending to the right pelvic sidewall  The cecum was elevated  There was bleeding at the mesentery as well  The bleeding was controlled using a 3-0 Vicryl suture  An incision was made on the mesentery and the peritoneum reflected along with the residual mass  The ureter was then dissected  The infundibulopelvic ligament was dissected and the peritoneum was removed from the terminal ileum mesentery extending across the common iliac vessel and into the pelvis  The peritoneal incision measured approximately 10 x 8 cm  The residual mass was removed completely  The remainder of the peritoneal cavity was then inspected  There were no masses present in the omentum  The gutters were clear  There is no evidence of any palpable liver surface disease  The abdomen pelvis were then irrigated extensively  Hemostasis was noted to be adequate  Gowns and gloves were then exchanged  The closure tray was opened  The fascial incision was closed using #1 looped PDS in a running fashion  The subcutaneous space was irrigated  Subcutaneous adipose tissue was closed using interrupted 2-0 plain suture  The skin was closed with 3-0 strata fix in a subcuticular fashion  Exofin was applied  A sterile dressing was applied  She was then awakened and transferred to the recovery room in stable condition  All instrument counts correct x2 for the procedure  No complications  Estimate blood loss was 800 mL which included the 250 mL present in the peritoneum initially  I was present for the entire procedure , A qualified resident physician was not available  and A physician assistant was required during the procedure for retraction, tissue handling, dissection and suturing      Patient Disposition:  PACU         SIGNATURE: Shawna Chan MD  DATE: June 4, 2023  TIME: 10:40 AM

## 2023-06-04 NOTE — PLAN OF CARE
Problem: PAIN - ADULT  Goal: Verbalizes/displays adequate comfort level or baseline comfort level  Description: Interventions:  - Encourage patient to monitor pain and request assistance  - Assess pain using appropriate pain scale  - Administer analgesics based on type and severity of pain and evaluate response  - Implement non-pharmacological measures as appropriate and evaluate response  - Consider cultural and social influences on pain and pain management  - Notify physician/advanced practitioner if interventions unsuccessful or patient reports new pain  Outcome: Progressing     Problem: INFECTION - ADULT  Goal: Absence or prevention of progression during hospitalization  Description: INTERVENTIONS:  - Assess and monitor for signs and symptoms of infection  - Monitor lab/diagnostic results  - Monitor all insertion sites, i e  indwelling lines, tubes, and drains  - Monitor endotracheal if appropriate and nasal secretions for changes in amount and color  - [place] appropriate cooling/warming therapies per order  - Administer medications as ordered  - Instruct and encourage patient and family to use good hand hygiene technique  - Identify and instruct in appropriate isolation precautions for identified infection/condition  Outcome: Progressing  Goal: Absence of fever/infection during neutropenic period  Description: INTERVENTIONS:  - Monitor WBC    Outcome: Progressing     Problem: SAFETY ADULT  Goal: Patient will remain free of falls  Description: INTERVENTIONS:  - Educate patient/family on patient safety including physical limitations  - Instruct patient to call for assistance with activity   - Consult OT/PT to assist with strengthening/mobility   - Keep Call bell within reach  - Keep bed low and locked with side rails adjusted as appropriate  - Keep care items and personal belongings within reach  - Initiate and maintain comfort rounds  - Make Fall Risk Sign visible to staff  - Apply yellow socks and bracelet for high fall risk patients  - Consider moving patient to room near nurses station  Outcome: Progressing  Goal: Maintain or return to baseline ADL function  Description: INTERVENTIONS:  -  Assess patient's ability to carry out ADLs; assess patient's baseline for ADL function and identify physical deficits which impact ability to perform ADLs (bathing, care of mouth/teeth, toileting, grooming, dressing, etc )  - Assess/evaluate cause of self-care deficits   - Assess range of motion  - Assess patient's mobility; develop plan if impaired  - Assess patient's need for assistive devices and provide as appropriate  - Encourage maximum independence but intervene and supervise when necessary  - Involve family in performance of ADLs  - Assess for home care needs following discharge   - Consider OT consult to assist with ADL evaluation and planning for discharge  - Provide patient education as appropriate  Outcome: Progressing  Goal: Maintains/Returns to pre admission functional level  Description: INTERVENTIONS:  - Perform BMAT or MOVE assessment daily    - Set and communicate daily mobility goal to care team and patient/family/caregiver     - Collaborate with rehabilitation services on mobility goals if consulted  - Record patient progress and toleration of activity level   Outcome: Progressing     Problem: DISCHARGE PLANNING  Goal: Discharge to home or other facility with appropriate resources  Description: INTERVENTIONS:  - Identify barriers to discharge w/patient and caregiver  - Arrange for needed discharge resources and transportation as appropriate  - Identify discharge learning needs (meds, wound care, etc )  - Arrange for interpretive services to assist at discharge as needed  - Refer to Case Management Department for coordinating discharge planning if the patient needs post-hospital services based on physician/advanced practitioner order or complex needs related to functional status, cognitive ability, or social support system  Outcome: Progressing     Problem: Knowledge Deficit  Goal: Patient/family/caregiver demonstrates understanding of disease process, treatment plan, medications, and discharge instructions  Description: Complete learning assessment and assess knowledge base    Interventions:  - Provide teaching at level of understanding  - Provide teaching via preferred learning methods  Outcome: Progressing     Problem: Prexisting or High Potential for Compromised Skin Integrity  Goal: Skin integrity is maintained or improved  Description: INTERVENTIONS:  - Identify patients at risk for skin breakdown  - Assess and monitor skin integrity  - Assess and monitor nutrition and hydration status  - Monitor labs   - Assess for incontinence   - Turn and reposition patient  - Assist with mobility/ambulation  - Relieve pressure over bony prominences  - Avoid friction and shearing  - Provide appropriate hygiene as needed including keeping skin clean and dry  - Evaluate need for skin moisturizer/barrier cream  - Collaborate with interdisciplinary team   - Patient/family teaching  - Consider wound care consult   Outcome: Progressing

## 2023-06-04 NOTE — UTILIZATION REVIEW
Continued Stay Review    Date: 6/4/2023                         Current Patient Class:  Inpatient   Current Level of Care:  Med surg     HPI:62 y o  female initially admitted on 6/2  Due to possible surgical intervention for enlarged uterus vrs malignant pelvic mass  Assessment/Plan: 6/4 - pre-op note - positive abdominal tenderness with guarding  No new symptoms,   Plan for DOROTHY, BSO and all indicated procedures  OP report - 6/4 - Procedure(s): HYSTERECTOMY TOTAL ABDOMINAL (DOROTHY)  BILATERAL SALPINGOOPHORECTOMY  EXCISION  BIOPSY LESION/MASS ABDOMINAL-PELVIC PERITONEUM   Anesthesia - General w/ epidural  Operative findings - 1  Exam under anesthesia revealed a gross normal cervix  The uterus was measured approximately 22 weeks in size and was mobile  2   On laparotomy, there was approximately 250 mL of dark blood present in the peritoneum  The uterus was markedly enlarged  There was a mass at the right fundus which was densely adherent to the ileal mesentery  Grossly, 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  The adnexa were grossly normal   Given the blood in the abdomen, there was concern for preoperative rupture of the right fundal fibroid/sarcoma   3  A portion of ileal peritoneum extending into the right pelvic sidewall was removed with gross residual sarcoma present  The peritoneal incision measured 10 x 8 cm  The mass that was present on the peritoneum measured approximately 2 x 4 cm and was likely a continuation of the uterine sarcoma  It was not a separate peritoneal lesion    4   At the conclusion the procedure, there was no gross visible disease remaining            Vital Signs:   Date/Time Temp Pulse Resp BP MAP (mmHg) SpO2 Calculated FIO2 (%) - Nasal Cannula O2 Flow Rate (L/min) Nasal Cannula O2 Flow Rate (L/min) O2 Device Cardiac (WDL) Patient Position - Orthostatic VS   06/04/23 13:35:35 98 9 °F (37 2 °C) 75 20 155/96 116 95 % -- -- -- -- -- --   06/04/23 1252 -- -- -- -- -- -- 28 -- 2 L/min Nasal cannula -- --   06/04/23 1145 98 °F (36 7 °C) 64 19 129/74 98 94 % 36 -- 4 L/min Nasal cannula WDL --   06/04/23 1115 -- 62 22 98/58 70 95 % 44 -- 6 L/min Nasal cannula -- --   06/04/23 1100 -- 70 22 115/57 72 93 % 44 -- 6 L/min Nasal cannula -- --   06/04/23 1047 97 7 °F (36 5 °C) 71 16 125/69 97 95 % -- 6 L/min -- Simple mask WDL --   06/04/23 07:12:54 98 6 °F (37 °C) 65 -- 125/80 95 93 % -- -- -- -- -- --   06/04/23 06:09:12 -- 63 -- 124/82 96 91 % -- -- -- -- -- --   06/04/23 0609 -- 63 -- 124/82 -- -- -- -- -- -- -- --   06/03/23 22:14:33 99 °F (37 2 °C) 66 17 120/85 97 92 % -- -- -- None (Room air) -- --   06/03/23 15:31:39 97 9 °F (36 6 °C) 66 17 121/68 86 94 % -- -- -- -- -- --   06/03/23 08:33:14 98 7 °F (37 1 °C) 71 -- 126/82 97 90 % -- -- -- -- --      Pertinent Labs/Diagnostic Results:       Results from last 7 days   Lab Units 06/04/23  1056 06/04/23  0601 06/03/23  0723   HEMATOCRIT % 29 4* 31 4* 31 6*   HEMOGLOBIN g/dL 9 5* 10 6* 10 3*   NEUTROS ABS Thousands/µL 16 03* 5 52  --    PLATELETS Thousands/uL 297 275 246   WBC Thousand/uL 19 19* 9 80 9 04         Results from last 7 days   Lab Units 06/04/23  1056 06/04/23  0601 06/03/23  0723   ANION GAP mmol/L 3* 3* 1*   BUN mg/dL 12 11 11   CALCIUM mg/dL 7 8* 9 2 8 8   CHLORIDE mmol/L 107 106 104   CO2 mmol/L 24 26 29   CREATININE mg/dL 0 63 0 59* 0 62   EGFR ml/min/1 73sq m 95 97 96   POTASSIUM mmol/L 3 5 3 5 3 7   MAGNESIUM mg/dL  --   --  2 2   PHOSPHORUS mg/dL  --   --  2 9   SODIUM mmol/L 134* 135 134*     Results from last 7 days   Lab Units 06/04/23  0601 06/03/23  0723   ALBUMIN g/dL 3 3* 3 3*   ALK PHOS U/L 83 79   ALT U/L 17 19   AST U/L 16 12   TOTAL BILIRUBIN mg/dL 1 22* 1 18*   TOTAL PROTEIN g/dL 7 2 7 2     Results from last 7 days   Lab Units 06/04/23  1056 06/04/23  0713 06/03/23  2047 06/03/23  1648 06/03/23  1209 06/03/23  0856   POC GLUCOSE mg/dl 179* 146* 125 95 146* 149*     Results from last 7 days   Lab Units 06/04/23  1056 06/04/23  0601 06/03/23  0723   GLUCOSE RANDOM mg/dL 207* 150* 162*       Results from last 7 days   Lab Units 06/03/23  1133   INR  0 99   PROTIME seconds 13 3       Results from last 7 days   Lab Units 06/04/23  1048   CROSSMATCH  Compatible  Compatible   UNITABO  A  A   UNIT DISPENSE STATUS  Crossmatched  Crossmatched   UNIT NUMBER  [id number]  [id number]   UNIT PRODUCT CODE  [id number]  [id number]   UNIT PRODUCT VOL mL 350  350   UNITRH  POS  POS       Medications:   Scheduled Medications:  [START ON 6/5/2023] atorvastatin, 40 mg, Oral, Daily With Dinner -  on hold on 6/4  DULoxetine, 30 mg, Oral, Daily  famotidine, 20 mg, Oral, Daily  heparin (porcine), 5,000 Units, Subcutaneous, Q8H MAN  insulin lispro, 1-6 Units, Subcutaneous, TID AC  metoprolol succinate, 25 mg, Oral, Daily  pantoprazole, 40 mg, Oral, Early Morning  rOPINIRole, 0 5 mg, Oral, HS  sertraline, 100 mg, Oral, Daily  topiramate, 100 mg, Oral, HS  Ancef 2000 mg iv once 6/4 x 1   Flagyl 500 mg iv once 6/4 x 1   Claritin 10 mg po 6/3 x 1  Cozaar 50 mg po qd 6/3 x 1       Continuous IV Infusions:  ropivacaine 0 1% and fentaNYL 2 mcg/mL, , Epidural, Continuous  sodium chloride, 50 mL/hr, Intravenous, Continuous      PRN Meds:  ondansetron, 4 mg, Intravenous, Q6H PRN  Oxycodone 10 mg po prn - 6/3 x1 - 6/4 x 1   Oxycodone 5mg po prn - 6/3 x 1         Discharge Plan:  D     Network Utilization Review Department  ATTENTION: Please call with any questions or concerns to [phone] and carefully listen to the prompts so that you are directed to the right person  All voicemails are confidential   Doug Montano all requests for admission clinical reviews, approved or denied determinations and any other requests to dedicated fax number below belonging to the campus where the patient is receiving treatment   List of dedicated fax numbers for the Facilities:  FACILITY NAME UR FAX NUMBER   ADMISSION DENIALS (Administrative/Medical Necessity) [address] [facility] (Maternity/NICU/Pediatrics) Meka Enriquez [address] Maya Byrnes  [phone]   [address] [place] [address] [facility] [address] [facility] [address] [facility] 134 815 [facility] [phone]

## 2023-06-04 NOTE — ANESTHESIA POSTPROCEDURE EVALUATION
Post-Op Assessment Note    No notable events documented  Patient has a high lumbar epidural  Pain is well controlled  Fall risk bracelet attached in PACU  Communicated to primary team and bedside nurse that high lumbar has increased risk for leg weakness and fall risk  Patient with good motor function in PACU but will continue to monitor  Pain team aware and appreciate sukumar Flores MD

## 2023-06-04 NOTE — PROGRESS NOTES
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  Severe lower abdominal pain requiring multiple doses of narcotics  1   Plan for total abdominal hysterectomy, bilateral salpingo-oophorectomy, all other indicated procedures  Subjective:    Carlos Lopez has lower abdominal pain  No new symptoms  No postmenopausal bleeding  Objective:  /80   Pulse 65   Temp 98 6 °F (37 °C)   Resp 17   Ht 5' 4\" (1 626 m)   Wt 100 kg (221 lb 5 5 oz)   SpO2 93%   BMI 37 99 kg/m²     I/O last 3 completed shifts: In: 717 5 [I V :717 5]  Out: 0   No intake/output data recorded  Lab Results   Component Value Date    HCT 31 4 (L) 06/04/2023    HGB 10 6 (L) 06/04/2023    MCV 90 06/04/2023     06/04/2023    WBC 9 80 06/04/2023       Lab Results   Component Value Date    BUN 11 06/04/2023    CALCIUM 9 2 06/04/2023     06/04/2023    CO2 26 06/04/2023    CREATININE 0 59 (L) 06/04/2023    K 3 5 06/04/2023           Physical Exam  Vitals reviewed  Constitutional:       General: She is not in acute distress  Appearance: Normal appearance  She is obese  She is not ill-appearing  HENT:      Head: Normocephalic and atraumatic  Mouth/Throat:      Mouth: Mucous membranes are moist    Eyes:      General: No scleral icterus  Right eye: No discharge  Left eye: No discharge        Conjunctiva/sclera: Conjunctivae normal  " Pulmonary:      Effort: Pulmonary effort is normal    Abdominal:      General: There is no distension  Palpations: Abdomen is soft  There is no mass  Tenderness: There is abdominal tenderness  There is guarding  Musculoskeletal:      Right lower leg: No edema  Left lower leg: No edema  Skin:     General: Skin is warm and dry  Coloration: Skin is not jaundiced  Findings: No rash  Neurological:      General: No focal deficit present  Mental Status: She is alert and oriented to person, place, and time  Cranial Nerves: No cranial nerve deficit  Sensory: No sensory deficit  Motor: No weakness  Gait: Gait normal    Psychiatric:         Mood and Affect: Mood normal          Behavior: Behavior normal          Thought Content:  Thought content normal          Judgment: Judgment normal            Yu Holland MD  6/4/2023  7:47 AM

## 2023-06-04 NOTE — ANESTHESIA PREPROCEDURE EVALUATION
Procedure:  HYSTERECTOMY TOTAL ABDOMINAL (DOROTHY), tumor resection (Abdomen)    Relevant Problems   ENDO   (+) Type 2 diabetes mellitus without complication, without long-term current use of insulin (HCC)      GI/HEPATIC   (+) GERD without esophagitis        Physical Exam    Airway    Mallampati score: II  TM Distance: >3 FB  Neck ROM: full     Dental       Cardiovascular      Pulmonary      Other Findings        Anesthesia Plan  ASA Score- 3     Anesthesia Type- general with ASA Monitors  Additional Monitors:   Airway Plan: ETT  Comment: High lumbar epidural and ETT  Plan Factors-Exercise tolerance (METS): >4 METS  Chart reviewed  Patient is not a current smoker  Patient did not smoke on day of surgery  Obstructive sleep apnea risk education given perioperatively  Induction- intravenous  Postoperative Plan- Plan for postoperative opioid use  Planned trial extubation    Informed Consent- Anesthetic plan and risks discussed with patient  I personally reviewed this patient with the CRNA  Discussed and agreed on the Anesthesia Plan with the CRNA  Eugenio Brock Anesthesia plan and consent discussed with Yolanda Mendez who expressed understanding and agreement  Risks/benefits and alternatives discussed with patient including possible PONV, sore throat, damage to teeth/lips/gums and possibility of rare anesthetic and surgical emergencies

## 2023-06-04 NOTE — CONSULTS
Consult Note- Acute Pain Service   Dorcas Moran 60 y o  female MRN: [id number]  Unit/Bed#: [facility] 934-01 Encounter: [id number]               Assessment/Plan     Assessment:   Patient Active Problem List   Diagnosis   • Irritable bowel syndrome   • Functional dyspepsia   • GERD without esophagitis   • Uterine mass   • Lower abdominal pain   • Type 2 diabetes mellitus without complication, without long-term current use of insulin (HCC)   • Class 2 obesity due to excess calories without serious comorbidity in adult   • Uterine sarcoma (HCC)      Dorcas Moran is a 60 y o  female with PMHx of GERD, obesity who originally presented with uterine mass now s/p open DOROTHY-BSO on 6/4  A high lumbar epidural was placed for post-operative analgesia  APS consulted for epidural/post-op pain control  Upon bedside evaluation, Song Plascencia was somewhat sleepy but responsive as she arrived from the PACU  She reports breakthrough abdominal pain especially in the lower quadrants despite epidural support  Good motor function in the LE bilaterally despite lumbar epidural placement  Epidural site c/d/i  No evidence of excessive sympathectomy-induced hypotension/pressor requirements/abnormal sensorimotor deficits  Plan:   - Continue lumbar epidural, will increase PCEA settings to 10/5/10/3; monitor for LE weakness with PCEA setting adjustment; alert APS if there are LE motor issues  - IV dilaudid 0 5mg q2hr PRN for breakthrough pain    Multimodal analgesia:  - Tylenol 975 mg PO q8hrs standing  - Robaxin 500 mg PO q6hrs   - Cymbalta 30mg daily    Bowel Regimen:  - Bowel regimen when appropriate from surgical perspective    APS will continue to follow  Please contact Acute Pain Service - [facility] via [facility] from [phone] with additional questions or concerns  See Sergo or Rowena for additional contacts and after hours information      History of Present Illness    Admit Date:  6/2/2023  Hospital Day:  2 days  Primary Service:  GYN Oncology  Attending Provider:  Amy Padron,*  Reason for Consult / Principal Problem: Post-operative pain control  HPI: Greg Balderas is a 60 y o  female who presents with abdominal-pelvic mass s/p open DOROTHY-BSO on 6/4  A high lumbar epidural was placed for post-operative analgesia  Current pain location(s): Abdomen  Pain Scale:   8-10  Quality: Sharp, achy  Current Analgesic regimen:  See above    Pain History: N/A  Pain Management Provider:  N/A    I have reviewed the patient's controlled substance dispensing history in the Prescription Drug Monitoring Program in compliance with the [facility] regulations before prescribing any controlled substances  Inpatient consult to Acute Pain Service  Consult performed by: Lora Mueller MD  Consult ordered by: Abbi Milligan MD          Review of Systems   Constitutional: Negative  HENT: Negative  Respiratory: Negative  Cardiovascular: Negative  Gastrointestinal: Positive for abdominal pain  Genitourinary: Negative  Musculoskeletal: Negative  Skin: Negative  Neurological: Negative  Psychiatric/Behavioral: Negative          Historical Information   Past Medical History:   Diagnosis Date   • Anxiety    • Depression    • Diabetes mellitus ([facility] [address] )    • Functional dyspepsia    • GERD without esophagitis    • Hyperlipidemia    • Hypertension    • Irritable bowel syndrome    • Migraines      Past Surgical History:   Procedure Laterality Date   • APPENDECTOMY     • CHOLECYSTECTOMY     • FOOT SURGERY Left     Bone spur     Social History   Social History     Substance and Sexual Activity   Alcohol Use Never     Social History     Substance and Sexual Activity   Drug Use Not on file     Social History     Tobacco Use   Smoking Status Never   Smokeless Tobacco Never     Family History: non-contributory    Meds/Allergies   all current active meds have been reviewed    Allergies   Allergen Reactions   • Bee Pollen Swelling   • Sulfa Antibiotics      High fever  Pt unsure - it happened when she was 12years old       Objective   Temp:  [97 7 °F (36 5 °C)-99 °F (37 2 °C)] 97 8 °F (36 6 °C)  HR:  [62-82] 82  Resp:  [16-22] 16  BP: ()/(57-97) 154/97    Intake/Output Summary (Last 24 hours) at 6/4/2023 1620  Last data filed at 6/4/2023 1532  Gross per 24 hour   Intake 2265 5 ml   Output 1425 ml   Net 840 5 ml       Physical Exam  Constitutional:       Appearance: She is obese  HENT:      Head: Atraumatic  Mouth/Throat:      Mouth: Mucous membranes are moist    Eyes:      Pupils: Pupils are equal, round, and reactive to light  Cardiovascular:      Rate and Rhythm: Normal rate and regular rhythm  Pulses: Normal pulses  Pulmonary:      Effort: Pulmonary effort is normal    Abdominal:      Palpations: Abdomen is soft  Tenderness: There is abdominal tenderness  Musculoskeletal:         General: Normal range of motion  Skin:     General: Skin is dry  Neurological:      General: No focal deficit present  Mental Status: She is alert and oriented to person, place, and time  Psychiatric:         Mood and Affect: Mood normal          Lab Results: I have personally reviewed pertinent labs  Imaging Studies: I have personally reviewed pertinent reports  EKG, Pathology, and Other Studies: I have personally reviewed pertinent reports  Counseling / Coordination of Care  Total floor / unit time spent today Level 1 = 20 minutes  Greater than 50% of total time was spent with the patient and / or family counseling and / or coordination of care  Please note that the APS provides consultative services regarding pain management only  With the exception of ketamine and epidural infusions and except when indicated, final decisions regarding starting or changing doses of analgesic medications are at the discretion of the consulting service    Off hours consultation and/or medication management is generally not available      Radha Young MD  Acute Pain Service

## 2023-06-04 NOTE — ASSESSMENT & PLAN NOTE
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

## 2023-06-04 NOTE — ANESTHESIA POSTPROCEDURE EVALUATION
Post-Op Assessment Note    CV Status:  Stable  Pain Score: 0    Pain management: adequate  Multimodal analgesia used between 6 hours prior to anesthesia start to PACU discharge    Mental Status:  Awake and sleepy   Hydration Status:  Euvolemic   PONV Controlled:  Controlled   Airway Patency:  Patent  Airway: intubated   Two or more mitigation strategies used for obstructive sleep apnea   Post Op Vitals Reviewed: Yes      Staff: CRNA     Post-op block assessment: sterile dressing applied and secured with tape      No notable events documented      /69 (06/04/23 1047)    Temp 97 7 °F (36 5 °C) (06/04/23 1047)    Pulse 71 (06/04/23 1047)   Resp 16 (06/04/23 1047)    SpO2 95 % (06/04/23 1047)

## 2023-06-04 NOTE — ANESTHESIA PROCEDURE NOTES
"Arterial Line Insertion    Performed by: Nancy Connell MD  Authorized by: Nancy Connell MD  Consent: Written consent obtained  Risks and benefits: risks, benefits and alternatives were discussed  Consent given by: patient  Patient understanding: patient states understanding of the procedure being performed  Patient consent: the patient's understanding of the procedure matches consent given  Procedure consent: procedure consent matches procedure scheduled  Relevant documents: relevant documents present and verified  Test results: test results available and properly labeled  Site marked: the operative site was marked  Radiology Images: Radiology Images displayed and confirmed  If images not available, report reviewed  Required items: required blood products, implants, devices, and special equipment available  Patient identity confirmed: arm band and hospital-assigned identification number  Time out: Immediately prior to procedure a \"time out\" was called to verify the correct patient, procedure, equipment, support staff and site/side marked as required  Preparation: Patient was prepped and draped in the usual sterile fashion    Indications: hemodynamic monitoring  Orientation:  Left  Location: radial artery  Sedation:  Patient sedated: no    Procedure Details:  Yaniv's test normal: yes  Needle gauge: 20  Seldinger technique: Seldinger technique used  Number of attempts: 1    Post-procedure:  Post-procedure: dressing applied  Waveform: good waveform  Post-procedure CNS: normal  Patient tolerance: patient tolerated the procedure well with no immediate complications          "

## 2023-06-04 NOTE — ANESTHESIA PROCEDURE NOTES
Epidural Block    Patient location during procedure: holding area  Start time: 6/4/2023 7:50 AM  Reason for block: procedure for pain and at surgeon's request  Staffing  Performed by: Pete Mejia MD  Authorized by: Pete Mejia MD    Preanesthetic Checklist  Completed: patient identified, IV checked, site marked, risks and benefits discussed, surgical consent, monitors and equipment checked, pre-op evaluation and timeout performed  Epidural  Patient position: sitting  Prep: ChloraPrep  Patient monitoring: cardiac monitor and frequent blood pressure checks  Approach: midline  Location: lumbar  Injection technique: LICHA air  Needle  Needle type: Tuohy   Needle gauge: 18 G  Catheter type: side hole  Catheter size: 20 G  Catheter at skin depth: 11 cm  Catheter securement method: clear occlusive dressing  Test dose: negative  Assessment  Number of attempts: 1negative aspiration for CSF, negative aspiration for heme and no paresthesia on injection  patient tolerated the procedure well with no immediate complications  Additional Notes  Uncomplicated single pass high lumbar epidural

## 2023-06-05 PROBLEM — Z98.890 POST-OPERATIVE STATE: Status: ACTIVE | Noted: 2023-06-05

## 2023-06-05 LAB
ABO GROUP BLD BPU: NORMAL
ABO GROUP BLD BPU: NORMAL
ALBUMIN SERPL BCP-MCNC: 3.2 G/DL (ref 3.5–5)
ALP SERPL-CCNC: 71 U/L (ref 46–116)
ALT SERPL W P-5'-P-CCNC: 16 U/L (ref 12–78)
ANION GAP SERPL CALCULATED.3IONS-SCNC: 1 MMOL/L (ref 4–13)
AST SERPL W P-5'-P-CCNC: 17 U/L (ref 5–45)
BASOPHILS # BLD AUTO: 0.01 THOUSANDS/ÂΜL (ref 0–0.1)
BASOPHILS NFR BLD AUTO: 0 % (ref 0–1)
BILIRUB SERPL-MCNC: 1.06 MG/DL (ref 0.2–1)
BPU ID: NORMAL
BPU ID: NORMAL
BUN SERPL-MCNC: 11 MG/DL (ref 5–25)
CALCIUM ALBUM COR SERPL-MCNC: 8.9 MG/DL (ref 8.3–10.1)
CALCIUM SERPL-MCNC: 8.3 MG/DL (ref 8.3–10.1)
CHLORIDE SERPL-SCNC: 108 MMOL/L (ref 96–108)
CO2 SERPL-SCNC: 27 MMOL/L (ref 21–32)
CREAT SERPL-MCNC: 0.6 MG/DL (ref 0.6–1.3)
CROSSMATCH: NORMAL
CROSSMATCH: NORMAL
EOSINOPHIL # BLD AUTO: 0.01 THOUSAND/ÂΜL (ref 0–0.61)
EOSINOPHIL NFR BLD AUTO: 0 % (ref 0–6)
ERYTHROCYTE [DISTWIDTH] IN BLOOD BY AUTOMATED COUNT: 14.2 % (ref 11.6–15.1)
GFR SERPL CREATININE-BSD FRML MDRD: 97 ML/MIN/1.73SQ M
GLUCOSE SERPL-MCNC: 137 MG/DL (ref 65–140)
GLUCOSE SERPL-MCNC: 138 MG/DL (ref 65–140)
GLUCOSE SERPL-MCNC: 140 MG/DL (ref 65–140)
GLUCOSE SERPL-MCNC: 148 MG/DL (ref 65–140)
GLUCOSE SERPL-MCNC: 152 MG/DL (ref 65–140)
HCT VFR BLD AUTO: 29.3 % (ref 34.8–46.1)
HGB BLD-MCNC: 9.8 G/DL (ref 11.5–15.4)
IMM GRANULOCYTES # BLD AUTO: 0.08 THOUSAND/UL (ref 0–0.2)
IMM GRANULOCYTES NFR BLD AUTO: 1 % (ref 0–2)
LYMPHOCYTES # BLD AUTO: 2.43 THOUSANDS/ÂΜL (ref 0.6–4.47)
LYMPHOCYTES NFR BLD AUTO: 19 % (ref 14–44)
MAGNESIUM SERPL-MCNC: 2.1 MG/DL (ref 1.6–2.6)
MCH RBC QN AUTO: 30.3 PG (ref 26.8–34.3)
MCHC RBC AUTO-ENTMCNC: 33.4 G/DL (ref 31.4–37.4)
MCV RBC AUTO: 91 FL (ref 82–98)
MONOCYTES # BLD AUTO: 0.79 THOUSAND/ÂΜL (ref 0.17–1.22)
MONOCYTES NFR BLD AUTO: 6 % (ref 4–12)
NEUTROPHILS # BLD AUTO: 9.31 THOUSANDS/ÂΜL (ref 1.85–7.62)
NEUTS SEG NFR BLD AUTO: 74 % (ref 43–75)
NRBC BLD AUTO-RTO: 0 /100 WBCS
PHOSPHATE SERPL-MCNC: 3.5 MG/DL (ref 2.3–4.1)
PLATELET # BLD AUTO: 323 THOUSANDS/UL (ref 149–390)
PMV BLD AUTO: 10.6 FL (ref 8.9–12.7)
POTASSIUM SERPL-SCNC: 3.5 MMOL/L (ref 3.5–5.3)
PROT SERPL-MCNC: 7.1 G/DL (ref 6.4–8.4)
RBC # BLD AUTO: 3.23 MILLION/UL (ref 3.81–5.12)
SODIUM SERPL-SCNC: 136 MMOL/L (ref 135–147)
UNIT DISPENSE STATUS: NORMAL
UNIT DISPENSE STATUS: NORMAL
UNIT PRODUCT CODE: NORMAL
UNIT PRODUCT CODE: NORMAL
UNIT PRODUCT VOLUME: 350 ML
UNIT PRODUCT VOLUME: 350 ML
UNIT RH: NORMAL
UNIT RH: NORMAL
WBC # BLD AUTO: 12.63 THOUSAND/UL (ref 4.31–10.16)

## 2023-06-05 PROCEDURE — 82948 REAGENT STRIP/BLOOD GLUCOSE: CPT

## 2023-06-05 PROCEDURE — 83735 ASSAY OF MAGNESIUM: CPT

## 2023-06-05 PROCEDURE — 84100 ASSAY OF PHOSPHORUS: CPT

## 2023-06-05 PROCEDURE — 99024 POSTOP FOLLOW-UP VISIT: CPT | Performed by: OBSTETRICS & GYNECOLOGY

## 2023-06-05 PROCEDURE — 85025 COMPLETE CBC W/AUTO DIFF WBC: CPT

## 2023-06-05 PROCEDURE — 80053 COMPREHEN METABOLIC PANEL: CPT

## 2023-06-05 PROCEDURE — 99233 SBSQ HOSP IP/OBS HIGH 50: CPT | Performed by: STUDENT IN AN ORGANIZED HEALTH CARE EDUCATION/TRAINING PROGRAM

## 2023-06-05 RX ORDER — OXYCODONE HYDROCHLORIDE 10 MG/1
10 TABLET ORAL EVERY 4 HOURS PRN
Status: DISCONTINUED | OUTPATIENT
Start: 2023-06-05 | End: 2023-06-07 | Stop reason: HOSPADM

## 2023-06-05 RX ORDER — KETOROLAC TROMETHAMINE 30 MG/ML
15 INJECTION, SOLUTION INTRAMUSCULAR; INTRAVENOUS EVERY 6 HOURS SCHEDULED
Status: DISCONTINUED | OUTPATIENT
Start: 2023-06-05 | End: 2023-06-07 | Stop reason: HOSPADM

## 2023-06-05 RX ORDER — OXYCODONE HYDROCHLORIDE 5 MG/1
5 TABLET ORAL EVERY 4 HOURS PRN
Status: DISCONTINUED | OUTPATIENT
Start: 2023-06-05 | End: 2023-06-07 | Stop reason: HOSPADM

## 2023-06-05 RX ADMIN — TOPIRAMATE 100 MG: 100 TABLET, FILM COATED ORAL at 22:03

## 2023-06-05 RX ADMIN — SERTRALINE 100 MG: 100 TABLET, FILM COATED ORAL at 08:51

## 2023-06-05 RX ADMIN — SODIUM CHLORIDE 50 ML/HR: 0.9 INJECTION, SOLUTION INTRAVENOUS at 16:43

## 2023-06-05 RX ADMIN — FAMOTIDINE 20 MG: 20 TABLET, FILM COATED ORAL at 08:51

## 2023-06-05 RX ADMIN — KETOROLAC TROMETHAMINE 15 MG: 30 INJECTION, SOLUTION INTRAMUSCULAR; INTRAVENOUS at 18:08

## 2023-06-05 RX ADMIN — METOPROLOL SUCCINATE 25 MG: 25 TABLET, EXTENDED RELEASE ORAL at 05:51

## 2023-06-05 RX ADMIN — PANTOPRAZOLE SODIUM 40 MG: 40 TABLET, DELAYED RELEASE ORAL at 05:51

## 2023-06-05 RX ADMIN — DULOXETINE HYDROCHLORIDE 30 MG: 30 CAPSULE, DELAYED RELEASE ORAL at 08:51

## 2023-06-05 RX ADMIN — HEPARIN SODIUM 5000 UNITS: 5000 INJECTION INTRAVENOUS; SUBCUTANEOUS at 22:03

## 2023-06-05 RX ADMIN — METHOCARBAMOL TABLETS 500 MG: 500 TABLET, COATED ORAL at 05:51

## 2023-06-05 RX ADMIN — HEPARIN SODIUM 5000 UNITS: 5000 INJECTION INTRAVENOUS; SUBCUTANEOUS at 05:52

## 2023-06-05 RX ADMIN — ATORVASTATIN CALCIUM 40 MG: 40 TABLET, FILM COATED ORAL at 16:30

## 2023-06-05 RX ADMIN — ACETAMINOPHEN 975 MG: 325 TABLET, FILM COATED ORAL at 05:51

## 2023-06-05 RX ADMIN — INSULIN LISPRO 1 UNITS: 100 INJECTION, SOLUTION INTRAVENOUS; SUBCUTANEOUS at 16:42

## 2023-06-05 RX ADMIN — HYDROMORPHONE HYDROCHLORIDE 0.5 MG: 1 INJECTION, SOLUTION INTRAMUSCULAR; INTRAVENOUS; SUBCUTANEOUS at 08:51

## 2023-06-05 RX ADMIN — HEPARIN SODIUM 5000 UNITS: 5000 INJECTION INTRAVENOUS; SUBCUTANEOUS at 16:32

## 2023-06-05 RX ADMIN — ROPIVACAINE HYDROCHLORIDE: 5 INJECTION EPIDURAL; INFILTRATION; PERINEURAL at 05:42

## 2023-06-05 RX ADMIN — ACETAMINOPHEN 975 MG: 325 TABLET, FILM COATED ORAL at 16:30

## 2023-06-05 RX ADMIN — METHOCARBAMOL TABLETS 500 MG: 500 TABLET, COATED ORAL at 18:08

## 2023-06-05 RX ADMIN — KETOROLAC TROMETHAMINE 15 MG: 30 INJECTION, SOLUTION INTRAMUSCULAR; INTRAVENOUS at 12:11

## 2023-06-05 RX ADMIN — ROPINIROLE 0.5 MG: 0.25 TABLET, FILM COATED ORAL at 22:03

## 2023-06-05 RX ADMIN — METHOCARBAMOL TABLETS 500 MG: 500 TABLET, COATED ORAL at 12:11

## 2023-06-05 NOTE — PLAN OF CARE
Problem: PAIN - ADULT  Goal: Verbalizes/displays adequate comfort level or baseline comfort level  Description: Interventions:  - Encourage patient to monitor pain and request assistance  - Assess pain using appropriate pain scale  - Administer analgesics based on type and severity of pain and evaluate response  - Implement non-pharmacological measures as appropriate and evaluate response  - Consider cultural and social influences on pain and pain management  - Notify physician/advanced practitioner if interventions unsuccessful or patient reports new pain  6/5/2023 0902 by Amy Mercedes RN  Outcome: Progressing  6/5/2023 0902 by Amy Mercedes RN  Outcome: Progressing     Problem: INFECTION - ADULT  Goal: Absence or prevention of progression during hospitalization  Description: INTERVENTIONS:  - Assess and monitor for signs and symptoms of infection  - Monitor lab/diagnostic results  - Monitor all insertion sites, i e  indwelling lines, tubes, and drains  - Monitor endotracheal if appropriate and nasal secretions for changes in amount and color  - [place] appropriate cooling/warming therapies per order  - Administer medications as ordered  - Instruct and encourage patient and family to use good hand hygiene technique  - Identify and instruct in appropriate isolation precautions for identified infection/condition  6/5/2023 0902 by Amy Mercedes RN  Outcome: Progressing  6/5/2023 0902 by Amy Mercedes RN  Outcome: Progressing  Goal: Absence of fever/infection during neutropenic period  Description: INTERVENTIONS:  - Monitor WBC    6/5/2023 0902 by Amy Mercedes RN  Outcome: Progressing  6/5/2023 0902 by Amy Mercedes RN  Outcome: Progressing     Problem: SAFETY ADULT  Goal: Patient will remain free of falls  Description: INTERVENTIONS:  - Educate patient/family on patient safety including physical limitations  - Instruct patient to call for assistance with activity   - Consult OT/PT to assist with strengthening/mobility   - Keep Call bell within reach  - Keep bed low and locked with side rails adjusted as appropriate  - Keep care items and personal belongings within reach  - Initiate and maintain comfort rounds  - Make Fall Risk Sign visible to staff  - Apply yellow socks and bracelet for high fall risk patients  - Consider moving patient to room near nurses station  6/5/2023 0902 by Aura Rose RN  Outcome: Progressing  6/5/2023 0902 by Aura Rose RN  Outcome: Progressing  Goal: Maintain or return to baseline ADL function  Description: INTERVENTIONS:  -  Assess patient's ability to carry out ADLs; assess patient's baseline for ADL function and identify physical deficits which impact ability to perform ADLs (bathing, care of mouth/teeth, toileting, grooming, dressing, etc )  - Assess/evaluate cause of self-care deficits   - Assess range of motion  - Assess patient's mobility; develop plan if impaired  - Assess patient's need for assistive devices and provide as appropriate  - Encourage maximum independence but intervene and supervise when necessary  - Involve family in performance of ADLs  - Assess for home care needs following discharge   - Consider OT consult to assist with ADL evaluation and planning for discharge  - Provide patient education as appropriate  6/5/2023 0902 by Aura Rose RN  Outcome: Progressing  6/5/2023 0902 by Aura Rsoe RN  Outcome: Progressing  Goal: Maintains/Returns to pre admission functional level  Description: INTERVENTIONS:  - Perform BMAT or MOVE assessment daily    - Set and communicate daily mobility goal to care team and patient/family/caregiver     - Collaborate with rehabilitation services on mobility goals if consulted  - Record patient progress and toleration of activity level   6/5/2023 0902 by Aura Rose RN  Outcome: Progressing  6/5/2023 0902 by Aura Rose RN  Outcome: Progressing     Problem: DISCHARGE PLANNING  Goal: Discharge to home or other facility with appropriate resources  Description: INTERVENTIONS:  - Identify barriers to discharge w/patient and caregiver  - Arrange for needed discharge resources and transportation as appropriate  - Identify discharge learning needs (meds, wound care, etc )  - Arrange for interpretive services to assist at discharge as needed  - Refer to Case Management Department for coordinating discharge planning if the patient needs post-hospital services based on physician/advanced practitioner order or complex needs related to functional status, cognitive ability, or social support system  6/5/2023 0902 by Faye Mijares RN  Outcome: Progressing  6/5/2023 0902 by Faye Mijares RN  Outcome: Progressing     Problem: Knowledge Deficit  Goal: Patient/family/caregiver demonstrates understanding of disease process, treatment plan, medications, and discharge instructions  Description: Complete learning assessment and assess knowledge base    Interventions:  - Provide teaching at level of understanding  - Provide teaching via preferred learning methods  6/5/2023 0902 by Faye Mijares RN  Outcome: Progressing  6/5/2023 0902 by Faye Mijares RN  Outcome: Progressing     Problem: Prexisting or High Potential for Compromised Skin Integrity  Goal: Skin integrity is maintained or improved  Description: INTERVENTIONS:  - Identify patients at risk for skin breakdown  - Assess and monitor skin integrity  - Assess and monitor nutrition and hydration status  - Monitor labs   - Assess for incontinence   - Turn and reposition patient  - Assist with mobility/ambulation  - Relieve pressure over bony prominences  - Avoid friction and shearing  - Provide appropriate hygiene as needed including keeping skin clean and dry  - Evaluate need for skin moisturizer/barrier cream  - Collaborate with interdisciplinary team   - Patient/family teaching  - Consider wound care consult   6/5/2023 0902 by Faye Mijares RN  Outcome: Progressing  6/5/2023 0902 by Faye Mijares RN  Outcome: Progressing     Problem: MOBILITY - ADULT  Goal: Maintain or return to baseline ADL function  Description: INTERVENTIONS:  -  Assess patient's ability to carry out ADLs; assess patient's baseline for ADL function and identify physical deficits which impact ability to perform ADLs (bathing, care of mouth/teeth, toileting, grooming, dressing, etc )  - Assess/evaluate cause of self-care deficits   - Assess range of motion  - Assess patient's mobility; develop plan if impaired  - Assess patient's need for assistive devices and provide as appropriate  - Encourage maximum independence but intervene and supervise when necessary  - Involve family in performance of ADLs  - Assess for home care needs following discharge   - Consider OT consult to assist with ADL evaluation and planning for discharge  - Provide patient education as appropriate  6/5/2023 0902 by Alin Lock RN  Outcome: Progressing  6/5/2023 0902 by Alin Lock RN  Outcome: Progressing  Goal: Maintains/Returns to pre admission functional level  Description: INTERVENTIONS:  - Perform BMAT or MOVE assessment daily    - Set and communicate daily mobility goal to care team and patient/family/caregiver     - Collaborate with rehabilitation services on mobility goals if consulted  - Record patient progress and toleration of activity level   6/5/2023 0902 by Alin Lock RN  Outcome: Progressing  6/5/2023 0902 by Alin Lock RN  Outcome: Progressing

## 2023-06-05 NOTE — RESTORATIVE TECHNICIAN NOTE
Restorative Technician Note      Patient Name: [facility]     Restorative Tech Visit Date: 06/05/23  Note Type: Mobility  Patient Position Upon Consult: Other (Comment) (pt seated in BR)  Activity Performed: Ambulated  Assistive Device: Roller walker  Patient Position at End of Consult: Supine;  All needs within [facility]

## 2023-06-05 NOTE — UTILIZATION REVIEW
NOTIFICATION OF INPATIENT ADMISSION   AUTHORIZATION REQUEST   SERVICING FACILITY:   [facility]  Address: [address] [place] [address], [address] [place] [address]  Tax ID: [id number]  NPI: [id number] ATTENDING PROVIDER:  Attending Name and NPI#: Shakira Stewart Heshamjohnny [[id number]]  Address: [address] [place] [address], [address] [place] [address]  Phone: [phone]   ADMISSION INFORMATION:  Place of Service: [place] [address]  Place of Service Code: 21  Inpatient Admission Date/Time: 6/2/23  8:26 PM  Discharge Date/Time: No discharge date for patient encounter  Admitting Diagnosis Code/Description:  Abdominal pain [R10 9]  Pelvic mass [R19 00]  Uterine fibroid [D25 9]     UTILIZATION REVIEW CONTACT:  Hira Estrada Utilization   Network Utilization Review Department  Phone: [phone]  Fax: [phone]  Email: [email or web address]  Contact for approvals/pending authorizations, clinical reviews, and discharge  PHYSICIAN ADVISORY SERVICES:  Medical Necessity Denial & [facility] Review  Phone: [phone]  Fax: [phone]  Email: [email or web address]

## 2023-06-05 NOTE — PLAN OF CARE
Problem: PAIN - ADULT  Goal: Verbalizes/displays adequate comfort level or baseline comfort level  Description: Interventions:  - Encourage patient to monitor pain and request assistance  - Assess pain using appropriate pain scale  - Administer analgesics based on type and severity of pain and evaluate response  - Implement non-pharmacological measures as appropriate and evaluate response  - Consider cultural and social influences on pain and pain management  - Notify physician/advanced practitioner if interventions unsuccessful or patient reports new pain  Outcome: Progressing       Outcome: Progressing     Problem: INFECTION - ADULT  Goal: Absence or prevention of progression during hospitalization  Description: INTERVENTIONS:  - Assess and monitor for signs and symptoms of infection  - Monitor lab/diagnostic results  - Monitor all insertion sites, i e  indwelling lines, tubes, and drains  - Monitor endotracheal if appropriate and nasal secretions for changes in amount and color  - [place] appropriate cooling/warming therapies per order  - Administer medications as ordered  - Instruct and encourage patient and family to use good hand hygiene technique  - Identify and instruct in appropriate isolation precautions for identified infection/condition  Outcome: Progressing  Goal: Absence of fever/infection during neutropenic period  Description: INTERVENTIONS:  - Monitor WBC    Outcome: Progressing

## 2023-06-05 NOTE — ASSESSMENT & PLAN NOTE
Follow up final pathology  Pain control with: PO pain medications ordered per anesthesia   Voiding spontaneously   Cosider d/cing IV fluids  Advanced to regular diet, tolerating well   VTE prophylaxis:    Encourage ambulation and out of bed as tolerated, SubQ heparin  Encourage incentive spirometer use

## 2023-06-05 NOTE — PROGRESS NOTES
"For questions/concerns on this patient, please reach out to the following:  [facility]-OB GYN-GynOnc- Resident/AP  Gyn Oncology Progress note   Lilliam Richard 60 y o  female MRN: [id number]  [facility] Encounter: [id number]    Assessment/Plan:    60 y o  female POD# 1 from total abdominal hysterectomy, bilateral salpingo-oophorectomy, excision biopsy of abdominal/pelvic peritoneum    Post-operative state  Assessment & Plan  Follow up final pathology  Pain control with: PCA  Voiding, johnston to be discontiuned today   Advance to regular diet  VTE prophylaxis:   Encourage ambulation and out of bed as tolerated, Heparin drip  Encourage incentive spirometer use     Uterine mass  Assessment & Plan  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  Severe lower abdominal pain requiring multiple doses of narcotics  1   Plan for total abdominal hysterectomy, bilateral salpingo-oophorectomy, all other indicated procedures  Subjective:    Lilliam Richard has no current complaints  Patient is currently on a PCA, which she says helps when she remembers to use her button  Patient currently has a johnston catheter in place  She is ambulating  Patient is not currently passing flatus and has had no bowel movement  She is tolerating PO, and denies nausea or vomitting  Patient denies fever, chills, chest pain, shortness of breath, or calf tenderness  Objective:  /86   Pulse 70   Temp 98 5 °F (36 9 °C)   Resp 16   Ht 5' 4\" (1 626 m)   Wt 100 kg (221 lb 5 5 oz)   SpO2 96%   BMI 37 99 kg/m²     I/O last 3 completed shifts: In: 2690 5 [P O :240; I V :2450 5]  Out: 3325 [Urine:2525; Blood:800]  I/O this shift:   In: 71 5 [I V :71 5]  Out: 550 [Urine:550]    Lab Results   Component Value Date    HCT 29 3 (L) 06/05/2023    HGB 9 8 (L) 06/05/2023    MCV 91 06/05/2023     06/05/2023    WBC 12 63 (H) 06/05/2023       Lab Results   Component Value Date    BUN 11 06/05/2023    CALCIUM 8 3 06/05/2023     " 06/05/2023    CO2 27 06/05/2023    CREATININE 0 60 06/05/2023    K 3 5 06/05/2023           Physical Exam  Constitutional:       Appearance: Normal appearance  HENT:      Head: Normocephalic and atraumatic  Cardiovascular:      Rate and Rhythm: Normal rate and regular rhythm  Abdominal:      General: Abdomen is flat  Palpations: Abdomen is soft  Comments: Tender in lower quadrants bilaterally   Skin:     General: Skin is warm and dry  Neurological:      General: No focal deficit present  Mental Status: She is alert     Psychiatric:         Mood and Affect: Mood normal            Nusratverly Halsted, MD  6/5/2023  9:28 AM

## 2023-06-05 NOTE — CASE MANAGEMENT
Case Management Discharge Planning Note    Patient name Armando aWnding  Location [address] [place] [address] [facility] [id number]  : 1960 Date 2023       Current Admission Date: 2023  Current Admission Diagnosis:Lower abdominal pain   Patient Active Problem List    Diagnosis Date Noted   • Post-operative state 2023   • Uterine mass 2023   • Lower abdominal pain 2023   • Type 2 diabetes mellitus without complication, without long-term current use of insulin ([facility] [address] ) 2023   • Class 2 obesity due to excess calories without serious comorbidity in adult 2023   • Uterine sarcoma ([facility] [address] )    • Irritable bowel syndrome    • Functional dyspepsia    • GERD without esophagitis       LOS (days): 3  Geometric Mean LOS (GMLOS) (days):   Days to GMLOS:     OBJECTIVE:  Risk of Unplanned Readmission Score: 8 61         Current admission status: Inpatient   Preferred Pharmacy:   [facility]/pharmacy #[id number]- [address]  Phone: [phone] Fax: [phone]    Primary Care Provider: Sahra Daily MD    Primary Insurance: BLUE CROSS  Secondary Insurance:     DISCHARGE DETAILS:       Additional Comments: Per chart review, pt not yet medically clear for discharge  CM to follow

## 2023-06-05 NOTE — PROGRESS NOTES
Epidural Follow-up Note - Acute Pain Service    Kelly Levine 60 y o  female MRN: [id number]  Unit/Bed#: [facility] 934-01 Encounter: [id number]      Assessment:   Principal Problem:    Lower abdominal pain  Active Problems:    Uterine mass    Type 2 diabetes mellitus without complication, without long-term current use of insulin (HCC)    Class 2 obesity due to excess calories without serious comorbidity in adult    Uterine sarcoma [facility])    Post-operative state      Kelly Levine is a 60y o  year old female with PMHx of GERD, obesity who originally presented with uterine mass now s/p open DOROTHY-BSO on 6/4  A high lumbar epidural was placed for post-operative analgesia  APS consulted for epidural/post-op pain control  Upon bedside evaluation, Nickolas Mckeon was resting in bed  She reports moderate-severe abdominal pain  Minimal relief with extra PCEA dosing  Ice-testing revealed no sensory deficits  Epidural still remains intact upon inspection of the back  No evidence of excessive sympathectomy-induced hypotension/pressor requirements  Denies opioid-induced side effects including nausea/vomiting/itching/constipation  Plan: Lack of sensory deficits and severe post-operative pain indicates non-functioning epidural  Discussed with Dr Abhi Thapa to remove epidural and transition to PO/IV opioids for post-operative analgesia       Analgesia:  - Discontinue Thoracic epidural infusion around noon today (6 hours from previous SQ heparin dose)   - heparin SQ last at 0600 on 6/5;    - Last platelet count   Lab Results   Component Value Date     06/05/2023     - Transition to standard oral opioid regimen with oxycodone 5 mg/10 mg PO q4hrs PRN for moderate/severe pain, Dilaudid 0 5 mg IV q2hrs PRN for breakthrough pain   - Start IV toradol 15mg q6hr x3 days    Multimodal analgesia:  - Tylenol 975 mg PO q8hrs standing  - Robaxin 500 mg PO q6hrs    - Cymbalta 30mg daily    Bowel Regimen:  - Bowel regimen when appropriate from surgical perspective    APS will continue to follow  Please contact Acute Pain Service - [facility] via [place] from [phone] with additional questions or concerns  See Sergo or Rowena for additional contacts and after hours information  Plan discussed with primary team    Pain History  Current pain location(s): Low abdomen  Pain Scale:   8-9/10  Quality: Diffuse, achy  24 hour history: See above    PCEA use: 11 doses requested, 11 doses given  Opioid requirement previous 24 hours: IV dilaudid 1mg, 100mcg fentanyl    Meds/Allergies   all current active meds have been reviewed    Allergies   Allergen Reactions   • Bee Pollen Swelling   • Sulfa Antibiotics      High fever  Pt unsure - it happened when she was 12years old       Objective     Temp:  [97 4 °F (36 3 °C)-99 °F (37 2 °C)] 98 5 °F (36 9 °C)  HR:  [62-82] 70  Resp:  [16-22] 16  BP: ()/(57-97) 128/86    Physical Exam  HENT:      Head: Atraumatic  Mouth/Throat:      Mouth: Mucous membranes are dry  Eyes:      Pupils: Pupils are equal, round, and reactive to light  Cardiovascular:      Rate and Rhythm: Normal rate  Pulses: Normal pulses  Pulmonary:      Effort: Pulmonary effort is normal    Abdominal:      Palpations: Abdomen is soft  Tenderness: There is abdominal tenderness  Musculoskeletal:         General: Normal range of motion  Skin:     General: Skin is dry  Neurological:      General: No focal deficit present  Mental Status: She is alert and oriented to person, place, and time     Psychiatric:         Mood and Affect: Mood normal        Epidural: Site clean/dry/intact, no surrounding erythema/edema/induration, infusion functioning appropriately    Lab Results:   Results from last 7 days   Lab Units 06/05/23  0505   HEMATOCRIT % 29 3*   HEMOGLOBIN g/dL 9 8*   PLATELETS Thousands/uL 323   WBC Thousand/uL 12 63*      Results from last 7 days   Lab Units 06/05/23  0505   ALK PHOS U/L 71   ALT U/L 16   AST U/L 17   BUN mg/dL 11 CALCIUM mg/dL 8 3   CHLORIDE mmol/L 108   CO2 mmol/L 27   CREATININE mg/dL 0 60   POTASSIUM mmol/L 3 5      Results from last 7 days   Lab Units 06/03/23  1133   INR  0 99       Imaging Studies: I have personally reviewed pertinent reports  EKG, Pathology, and Other Studies: I have personally reviewed pertinent reports  Counseling / Coordination of Care  Total floor / unit time spent today 20 minutes  Greater than 50% of total time was spent with the patient and / or family counseling and / or coordination of care  Please note that the APS provides consultative services regarding pain management only  With the exception of ketamine, peripheral nerve catheters, and epidural infusions (and except when indicated), final decisions regarding starting or changing doses of analgesic medications are at the discretion of the consulting service  Off hours consultation and/or medication management is generally not available      Savita Mtz MD  Acute Pain Service

## 2023-06-06 LAB
ALBUMIN SERPL BCP-MCNC: 3 G/DL (ref 3.5–5)
ALP SERPL-CCNC: 78 U/L (ref 46–116)
ALT SERPL W P-5'-P-CCNC: 18 U/L (ref 12–78)
ANION GAP SERPL CALCULATED.3IONS-SCNC: 0 MMOL/L (ref 4–13)
AST SERPL W P-5'-P-CCNC: 18 U/L (ref 5–45)
BASOPHILS # BLD AUTO: 0.03 THOUSANDS/ÂΜL (ref 0–0.1)
BASOPHILS NFR BLD AUTO: 0 % (ref 0–1)
BILIRUB SERPL-MCNC: 0.71 MG/DL (ref 0.2–1)
BUN SERPL-MCNC: 13 MG/DL (ref 5–25)
CALCIUM ALBUM COR SERPL-MCNC: 8.8 MG/DL (ref 8.3–10.1)
CALCIUM SERPL-MCNC: 8 MG/DL (ref 8.3–10.1)
CHLORIDE SERPL-SCNC: 111 MMOL/L (ref 96–108)
CO2 SERPL-SCNC: 27 MMOL/L (ref 21–32)
CREAT SERPL-MCNC: 0.59 MG/DL (ref 0.6–1.3)
EOSINOPHIL # BLD AUTO: 0.24 THOUSAND/ÂΜL (ref 0–0.61)
EOSINOPHIL NFR BLD AUTO: 2 % (ref 0–6)
ERYTHROCYTE [DISTWIDTH] IN BLOOD BY AUTOMATED COUNT: 14.5 % (ref 11.6–15.1)
GFR SERPL CREATININE-BSD FRML MDRD: 97 ML/MIN/1.73SQ M
GLUCOSE SERPL-MCNC: 127 MG/DL (ref 65–140)
GLUCOSE SERPL-MCNC: 132 MG/DL (ref 65–140)
GLUCOSE SERPL-MCNC: 134 MG/DL (ref 65–140)
GLUCOSE SERPL-MCNC: 139 MG/DL (ref 65–140)
GLUCOSE SERPL-MCNC: 156 MG/DL (ref 65–140)
HCT VFR BLD AUTO: 27.7 % (ref 34.8–46.1)
HGB BLD-MCNC: 9.1 G/DL (ref 11.5–15.4)
IMM GRANULOCYTES # BLD AUTO: 0.05 THOUSAND/UL (ref 0–0.2)
IMM GRANULOCYTES NFR BLD AUTO: 1 % (ref 0–2)
LYMPHOCYTES # BLD AUTO: 2.96 THOUSANDS/ÂΜL (ref 0.6–4.47)
LYMPHOCYTES NFR BLD AUTO: 27 % (ref 14–44)
MAGNESIUM SERPL-MCNC: 2.1 MG/DL (ref 1.6–2.6)
MCH RBC QN AUTO: 29.9 PG (ref 26.8–34.3)
MCHC RBC AUTO-ENTMCNC: 32.9 G/DL (ref 31.4–37.4)
MCV RBC AUTO: 91 FL (ref 82–98)
MONOCYTES # BLD AUTO: 0.44 THOUSAND/ÂΜL (ref 0.17–1.22)
MONOCYTES NFR BLD AUTO: 4 % (ref 4–12)
NEUTROPHILS # BLD AUTO: 7.09 THOUSANDS/ÂΜL (ref 1.85–7.62)
NEUTS SEG NFR BLD AUTO: 66 % (ref 43–75)
NRBC BLD AUTO-RTO: 0 /100 WBCS
PHOSPHATE SERPL-MCNC: 2.5 MG/DL (ref 2.3–4.1)
PLATELET # BLD AUTO: 287 THOUSANDS/UL (ref 149–390)
PMV BLD AUTO: 10.2 FL (ref 8.9–12.7)
POTASSIUM SERPL-SCNC: 3.6 MMOL/L (ref 3.5–5.3)
PROT SERPL-MCNC: 6.7 G/DL (ref 6.4–8.4)
RBC # BLD AUTO: 3.04 MILLION/UL (ref 3.81–5.12)
SODIUM SERPL-SCNC: 138 MMOL/L (ref 135–147)
WBC # BLD AUTO: 10.81 THOUSAND/UL (ref 4.31–10.16)

## 2023-06-06 PROCEDURE — 82948 REAGENT STRIP/BLOOD GLUCOSE: CPT

## 2023-06-06 PROCEDURE — 99024 POSTOP FOLLOW-UP VISIT: CPT | Performed by: OBSTETRICS & GYNECOLOGY

## 2023-06-06 PROCEDURE — 84100 ASSAY OF PHOSPHORUS: CPT

## 2023-06-06 PROCEDURE — 80053 COMPREHEN METABOLIC PANEL: CPT

## 2023-06-06 PROCEDURE — 99233 SBSQ HOSP IP/OBS HIGH 50: CPT | Performed by: STUDENT IN AN ORGANIZED HEALTH CARE EDUCATION/TRAINING PROGRAM

## 2023-06-06 PROCEDURE — 83735 ASSAY OF MAGNESIUM: CPT

## 2023-06-06 PROCEDURE — 85025 COMPLETE CBC W/AUTO DIFF WBC: CPT

## 2023-06-06 RX ADMIN — HEPARIN SODIUM 5000 UNITS: 5000 INJECTION INTRAVENOUS; SUBCUTANEOUS at 06:31

## 2023-06-06 RX ADMIN — METHOCARBAMOL TABLETS 500 MG: 500 TABLET, COATED ORAL at 13:53

## 2023-06-06 RX ADMIN — ROPINIROLE 0.5 MG: 0.25 TABLET, FILM COATED ORAL at 22:56

## 2023-06-06 RX ADMIN — INSULIN LISPRO 1 UNITS: 100 INJECTION, SOLUTION INTRAVENOUS; SUBCUTANEOUS at 07:52

## 2023-06-06 RX ADMIN — HEPARIN SODIUM 5000 UNITS: 5000 INJECTION INTRAVENOUS; SUBCUTANEOUS at 22:56

## 2023-06-06 RX ADMIN — FAMOTIDINE 20 MG: 20 TABLET, FILM COATED ORAL at 07:55

## 2023-06-06 RX ADMIN — ACETAMINOPHEN 975 MG: 325 TABLET, FILM COATED ORAL at 07:53

## 2023-06-06 RX ADMIN — ACETAMINOPHEN 975 MG: 325 TABLET, FILM COATED ORAL at 00:35

## 2023-06-06 RX ADMIN — ATORVASTATIN CALCIUM 40 MG: 40 TABLET, FILM COATED ORAL at 17:25

## 2023-06-06 RX ADMIN — DULOXETINE HYDROCHLORIDE 30 MG: 30 CAPSULE, DELAYED RELEASE ORAL at 07:54

## 2023-06-06 RX ADMIN — KETOROLAC TROMETHAMINE 15 MG: 30 INJECTION, SOLUTION INTRAMUSCULAR; INTRAVENOUS at 17:25

## 2023-06-06 RX ADMIN — SODIUM CHLORIDE 50 ML/HR: 0.9 INJECTION, SOLUTION INTRAVENOUS at 13:54

## 2023-06-06 RX ADMIN — TOPIRAMATE 100 MG: 100 TABLET, FILM COATED ORAL at 22:56

## 2023-06-06 RX ADMIN — HEPARIN SODIUM 5000 UNITS: 5000 INJECTION INTRAVENOUS; SUBCUTANEOUS at 13:56

## 2023-06-06 RX ADMIN — METHOCARBAMOL TABLETS 500 MG: 500 TABLET, COATED ORAL at 00:35

## 2023-06-06 RX ADMIN — KETOROLAC TROMETHAMINE 15 MG: 30 INJECTION, SOLUTION INTRAMUSCULAR; INTRAVENOUS at 00:35

## 2023-06-06 RX ADMIN — METHOCARBAMOL TABLETS 500 MG: 500 TABLET, COATED ORAL at 17:25

## 2023-06-06 RX ADMIN — SERTRALINE 100 MG: 100 TABLET, FILM COATED ORAL at 07:55

## 2023-06-06 RX ADMIN — HYDROMORPHONE HYDROCHLORIDE 0.5 MG: 1 INJECTION, SOLUTION INTRAMUSCULAR; INTRAVENOUS; SUBCUTANEOUS at 07:49

## 2023-06-06 RX ADMIN — ACETAMINOPHEN 975 MG: 325 TABLET, FILM COATED ORAL at 17:25

## 2023-06-06 RX ADMIN — METHOCARBAMOL TABLETS 500 MG: 500 TABLET, COATED ORAL at 06:31

## 2023-06-06 RX ADMIN — KETOROLAC TROMETHAMINE 15 MG: 30 INJECTION, SOLUTION INTRAMUSCULAR; INTRAVENOUS at 06:31

## 2023-06-06 RX ADMIN — KETOROLAC TROMETHAMINE 15 MG: 30 INJECTION, SOLUTION INTRAMUSCULAR; INTRAVENOUS at 13:55

## 2023-06-06 RX ADMIN — PANTOPRAZOLE SODIUM 40 MG: 40 TABLET, DELAYED RELEASE ORAL at 06:31

## 2023-06-06 RX ADMIN — OXYCODONE HYDROCHLORIDE 10 MG: 10 TABLET ORAL at 06:32

## 2023-06-06 RX ADMIN — METOPROLOL SUCCINATE 25 MG: 25 TABLET, EXTENDED RELEASE ORAL at 06:36

## 2023-06-06 NOTE — CASE MANAGEMENT
Case Management Discharge Planning Note    Patient name Eddie Diallo  Location [address] 934/[facility] 395-70 MRN [id number]  : 1960 Date 2023       Current Admission Date: 2023  Current Admission Diagnosis:Lower abdominal pain   Patient Active Problem List    Diagnosis Date Noted   • Post-operative state 2023   • Uterine mass 2023   • Lower abdominal pain 2023   • Type 2 diabetes mellitus without complication, without long-term current use of insulin ([facility] [address] ) 2023   • Class 2 obesity due to excess calories without serious comorbidity in adult 2023   • Uterine sarcoma (HCC)    • Irritable bowel syndrome    • Functional dyspepsia    • GERD without esophagitis       LOS (days): 4  Geometric Mean LOS (GMLOS) (days): 6 50  Days to GMLOS:2 8     OBJECTIVE:  Risk of Unplanned Readmission Score: 10 17         Current admission status: Inpatient   Preferred Pharmacy:   [facility]/pharmacy #[id number]- [address]  Phone: [phone] Fax: [phone]    Primary Care Provider: Magnus Hawkins MD    Primary Insurance: BLUE CROSS  Secondary Insurance:     DISCHARGE DETAILS:      [address]         Is the patient interested in [facility] at discharge?: Yes  [facility] [facility] requested[de-identified] [address] Name[de-identified] Other (Pending referral)  [address] Provider[de-identified] PCP  Home Health Services Needed[de-identified] Other (comment) (Post-op care)  Homebound Criteria Met[de-identified] Requires the Assistance of Another Person for Safe Ambulation or to Leave the Home  Supporting Clincal Findings[de-identified] Fatigues Easliy in [employer]         Other Referral/Resources/Interventions Provided:  Interventions: [facility]  Referral Comments: Referral in Aidin         Treatment Team Recommendation: Home with  [address]  Discharge Destination Plan[de-identified] Home with [place] at Discharge : Family            Additional Comments: Per provider, pt and family inquiring about VNA for pt's incisions and post-op care  CM called pt's daughter who was agreeable to referral for VNA  CM sent referral via Aidin

## 2023-06-06 NOTE — CASE MANAGEMENT
Case Management Discharge Planning Note    Patient name Myriam Hutchinson  Location [address] [facility] [id number]  : 1960 Date 2023       Current Admission Date: 2023  Current Admission Diagnosis:Lower abdominal pain   Patient Active Problem List    Diagnosis Date Noted   • Post-operative state 2023   • Uterine mass 2023   • Lower abdominal pain 2023   • Type 2 diabetes mellitus without complication, without long-term current use of insulin ([facility] [address] ) 2023   • Class 2 obesity due to excess calories without serious comorbidity in adult 2023   • Uterine sarcoma (HCC)    • Irritable bowel syndrome    • Functional dyspepsia    • GERD without esophagitis       LOS (days): 4  Geometric Mean LOS (GMLOS) (days): 6 50  Days to GMLOS:2 7     OBJECTIVE:  Risk of Unplanned Readmission Score: 10 19         Current admission status: Inpatient   Preferred Pharmacy:   [facility]/pharmacy #[id number]- [address] [place] [address]  Phone: [phone] Fax: [phone]    Primary Care Provider: Luis F Joseph MD    Primary Insurance: BLUE CROSS  Secondary Insurance:     DISCHARGE DETAILS:       DME Referral Provided  Referral made for DME?: Yes  DME referral completed for the following items[de-identified] Bedside Commode  DME Supplier Name[de-identified] [employer]       Additional Comments: Provider reached out to CM via TT to order [facility] for pt  CM placed order via [place]  CM to follow-up tomorrow morning

## 2023-06-06 NOTE — ASSESSMENT & PLAN NOTE
Lab Results   Component Value Date    HGBA1C 8 1 09/19/2020       Recent Labs     06/05/23  0805 06/05/23  1105 06/05/23  1549 06/05/23  2116   POCGLU 137 138 152* 148*       Blood Sugar Average: Last 72 hrs:  (P) 147 25     Currently on SSI, has received 3 units of insulin total in last 24 hours

## 2023-06-06 NOTE — PROGRESS NOTES
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[facility])    Post-operative state    Dar Morley is a 60 y o  female  with PMHx of GERD, obesity who originally presented with uterine mass now s/p open DOROTHY-BSO on 6/4  A high lumbar epidural was placed on 6/4 but removed on 6/5 due to a non-functioning catheter  APS consulted for post-operative pain control  Upon bedside evaluation, Sue Romberg was resting in bed eating breakfast  Reports moderate lower quadrant abdominal pain but PO oxycodone seems to alleviate this pain somewhat  Otherwise doing well  +Flatus  Denies opioid-induced side effects including nausea/vomiting/itching/constipation  Able to get OOB into chair  Tolerating PO  Plan:   - Continue PO oxycodone 5/10mg q4hr PRN for moderate-severe pain  - Continue IV dilaudid 0 5mg q2hr PRN for breakthrough    Multimodal analgesia:  - Tylenol 975 mg PO q8hrs standing  - Robaxin 500 mg PO q6hrs    - Cymbalta 30mg daily  - IV toradol 15mg q6hr scheduled    Bowel Regimen:  - Bowel regimen when appropriate from surgical perspective    APS will sign off at this time  Thank you for the consult  All opioids and other analgesics to be written at discretion of primary team  Please contact Acute Pain Service - [facility] via [employer] from [phone] with additional questions or concerns  See Sergo or Rowena for additional contacts and after hours information      Pain History  Current pain location(s): Abdomen (lower quadrants)  Pain Scale:   5-8/10  Quality: Achy, throbbing  24 hour history: See above    Opioid requirement previous 24 hours: PO oxycodone 10mg, IV dilaudid 0 5mg    Meds/Allergies   all current active meds have been reviewed    Allergies   Allergen Reactions   • Bee Pollen Swelling   • Sulfa Antibiotics      High fever  Pt unsure - it happened when she was 12years old       Objective     Temp:  [98 4 °F (36 9 °C)-99 1 °F (37 3 °C)] 99 1 °F (37 3 °C)  HR:  [62-72] 68  Resp:  [18-19] 18  BP: (113-137)/(61-81) 137/80    Physical Exam  Constitutional:       Appearance: She is obese  HENT:      Head: Atraumatic  Mouth/Throat:      Mouth: Mucous membranes are dry  Eyes:      Pupils: Pupils are equal, round, and reactive to light  Cardiovascular:      Rate and Rhythm: Normal rate  Pulses: Normal pulses  Pulmonary:      Effort: Pulmonary effort is normal    Abdominal:      Palpations: Abdomen is soft  Tenderness: There is abdominal tenderness  Musculoskeletal:         General: Normal range of motion  Skin:     General: Skin is dry  Neurological:      General: No focal deficit present  Mental Status: She is alert and oriented to person, place, and time  Psychiatric:         Mood and Affect: Mood normal          Lab Results:   Results from last 7 days   Lab Units 06/06/23  0944   HEMATOCRIT % 27 7*   HEMOGLOBIN g/dL 9 1*   PLATELETS Thousands/uL 287   WBC Thousand/uL 10 81*      Results from last 7 days   Lab Units 06/05/23  0505   ALK PHOS U/L 71   ALT U/L 16   AST U/L 17   BUN mg/dL 11   CALCIUM mg/dL 8 3   CHLORIDE mmol/L 108   CO2 mmol/L 27   CREATININE mg/dL 0 60   POTASSIUM mmol/L 3 5       Imaging Studies: I have personally reviewed pertinent reports  EKG, Pathology, and Other Studies: I have personally reviewed pertinent reports  Counseling / Coordination of Care  Total floor / unit time spent today 20 minutes  Greater than 50% of total time was spent with the patient and / or family counseling and / or coordination of care  Please note that the APS provides consultative services regarding pain management only  With the exception of ketamine and epidural infusions and except when indicated, final decisions regarding starting or changing doses of analgesic medications are at the discretion of the consulting service  Off hours consultation and/or medication management is generally not available      Radha Young MD  Acute Pain Service

## 2023-06-06 NOTE — UTILIZATION REVIEW
Continued Stay Review    Date:    6/6/23                          Current Patient Class:   Inpatient  Current Level of Care:   Med surg    HPI:62 y o  female initially admitted on    6/2   Due to possible surgical intervention for enlarged uterus vrs malignant pelvic mass       Assessment/Plan: 6/4 - pre-op note - positive abdominal tenderness with guarding  No new symptoms,   Plan for DOROTHY, BSO and all indicated procedures       OP report - 6/4 - Procedure(s): HYSTERECTOMY TOTAL ABDOMINAL (DOROTHY)  BILATERAL SALPINGOOPHORECTOMY  EXCISION  BIOPSY LESION/MASS ABDOMINAL-PELVIC PERITONEUM    Assessment/Plan:   6/6  POD   #  2  Continue   Post op care  Spencer  D/c  Can advance diet to regular  Continue  IVF  50/hr  PCA  D/c and continue pain control as needed  Encourage ambulation  - BM   - flatus    Vital Signs:    99 1-68-18      137/80          sats  97  %   1L  NC    Pertinent Labs/Diagnostic Results:       Results from last 7 days   Lab Units 06/06/23  0944 06/05/23  0505 06/04/23  1056 06/04/23  0601 06/03/23  0723 06/03/23  0723   HEMATOCRIT % 27 7* 29 3* 29 4* 31 4*  --  31 6*   HEMOGLOBIN g/dL 9 1* 9 8* 9 5* 10 6*  --  10 3*   NEUTROS ABS Thousands/µL 7 09 9 31* 16 03* 5 52   < >  --    PLATELETS Thousands/uL 287 323 297 275  --  246   WBC Thousand/uL 10 81* 12 63* 19 19* 9 80  --  9 04    < > = values in this interval not displayed           Results from last 7 days   Lab Units 06/06/23  0944 06/05/23  0505 06/04/23  1056 06/04/23  0601 06/03/23  0723   ANION GAP mmol/L 0* 1* 3* 3* 1*   BUN mg/dL 13 11 12 11 11   CALCIUM mg/dL 8 0* 8 3 7 8* 9 2 8 8   CHLORIDE mmol/L 111* 108 107 106 104   CO2 mmol/L 27 27 24 26 29   CREATININE mg/dL 0 59* 0 60 0 63 0 59* 0 62   EGFR ml/min/1 73sq m 97 97 95 97 96   POTASSIUM mmol/L 3 6 3 5 3 5 3 5 3 7   MAGNESIUM mg/dL 2 1 2 1  --   --  2 2   PHOSPHORUS mg/dL 2 5 3 5  --   --  2 9   SODIUM mmol/L 138 136 134* 135 134*     Results from last 7 days   Lab Units 06/06/23  0903 06/05/23  0505 06/04/23  0601 06/03/23  0723   ALBUMIN g/dL 3 0* 3 2* 3 3* 3 3*   ALK PHOS U/L 78 71 83 79   ALT U/L 18 16 17 19   AST U/L 18 17 16 12   TOTAL BILIRUBIN mg/dL 0 71 1 06* 1 22* 1 18*   TOTAL PROTEIN g/dL 6 7 7 1 7 2 7 2     Results from last 7 days   Lab Units 06/06/23  1108 06/06/23  0715 06/05/23  2116 06/05/23  1549 06/05/23  1105 06/05/23  0805 06/04/23  2100 06/04/23  1628 06/04/23  1056 06/04/23  0713 06/03/23  2047 06/03/23  1648   POC GLUCOSE mg/dl 127 156* 148* 152* 138 137 154* 198* 179* 146* 125 95     Results from last 7 days   Lab Units 06/06/23  0944 06/05/23  0505 06/04/23  1056 06/04/23  0601 06/03/23  0723   GLUCOSE RANDOM mg/dL 139 140 207* 150* 162*               Results from last 7 days   Lab Units 06/03/23  1133   INR  0 99   PROTIME seconds 13 3                   Results from last 7 days   Lab Units 06/05/23  0707   CROSSMATCH  Compatible  Compatible   UNITABO  A  A   UNIT DISPENSE STATUS  Return to [facility]  Return to [facility]   UNIT NUMBER  [id number]  [id number]   UNIT PRODUCT CODE  [id number]  [id number]   UNIT PRODUCT VOL mL 350  350   UNITRH  POS  POS         Medications:   Scheduled Medications:  acetaminophen, 975 mg, Oral, Q8H  atorvastatin, 40 mg, Oral, Daily With Dinner  DULoxetine, 30 mg, Oral, Daily  famotidine, 20 mg, Oral, Daily  heparin (porcine), 5,000 Units, Subcutaneous, Q8H MAN  insulin lispro, 1-6 Units, Subcutaneous, TID AC  ketorolac, 15 mg, Intravenous, Q6H MAN  methocarbamol, 500 mg, Oral, Q6H MAN  metoprolol succinate, 25 mg, Oral, Daily  pantoprazole, 40 mg, Oral, Early Morning  rOPINIRole, 0 5 mg, Oral, HS  sertraline, 100 mg, Oral, Daily  topiramate, 100 mg, Oral, HS      Continuous IV Infusions:  sodium chloride, 50 mL/hr, Intravenous, Continuous      PRN Meds:  HYDROmorphone, 0 5 mg, Intravenous, Q2H PRN  ( x1  6/6 thus far)  ondansetron, 4 mg, Intravenous, Q6H PRN  oxyCODONE, 10 mg, Oral, Q4H PRN  oxyCODONE, 5 mg, Oral, Q4H PRN        Discharge Plan:   D    Network Utilization Review Department  ATTENTION: Please call with any questions or concerns to [phone] and carefully listen to the prompts so that you are directed to the right person  All voicemails are confidential   Enmanuel Dykes all requests for admission clinical reviews, approved or denied determinations and any other requests to dedicated fax number below belonging to the campus where the patient is receiving treatment   List of dedicated fax numbers for the Facilities:  [address] DENIALS (Administrative/Medical Necessity) [phone]   [address] (Maternity/NICU/Pediatrics) [phone]   91 Suri Trejo [phone]   [facility] 77 [phone]   [address] [address] Carleen Dorado 28 [phone]   155 [facility] Thais Cavazos [facility] 134 815 [facility] [phone]

## 2023-06-06 NOTE — PROGRESS NOTES
"For questions/concerns on this patient, please reach out to the following:  [facility]-OB GYN-GynOnc- Resident/AP  Gyn Oncology Progress note   Dorcas Moran 60 y o  female MRN: [id number]  Unit/Bed#: [facility] 934-01 Encounter: [id number]    Assessment/Plan:    60 y o  total abdominal hysterectomy, bilateral salpingo-oophorectomy, excision biopsy of abdominal/pelvic peritoneum    Post-operative state  Assessment & Plan  Follow up final pathology  Pain control with: PO pain medications ordered per anesthesia   Voiding spontaneously   Cosider d/cing IV fluids  Advanced to regular diet, tolerating well   VTE prophylaxis:   Encourage ambulation and out of bed as tolerated, Heparin drip  Encourage incentive spirometer use     Type 2 diabetes mellitus without complication, without long-term current use of insulin [facility])  Assessment & Plan  Lab Results   Component Value Date    HGBA1C 8 1 09/19/2020       Recent Labs     06/05/23  0805 06/05/23  1105 06/05/23  1549 06/05/23  2116   POCGLU 137 138 152* 148*       Blood Sugar Average: Last 72 hrs:  (P) 147 25     Currently on SSI, has received 3 units of insulin total in last 24 hours    Uterine mass  Assessment & Plan  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  Severe lower abdominal pain requiring multiple doses of narcotics  Now s/p abdominal hysterectomy, bilateral salpingo-oophorectomy           Subjective:    Dorcas Moran has no currently complains of pain but states the medications that she receives do help Patient is currently voiding  She is ambulating  Patient is currently passing flatus and has had no bowel movement  She is tolerating PO, and denies nausea or vomitting  Patient denies fever, chills, chest pain, shortness of breath, or calf tenderness  Objective:  /80   Pulse 72   Temp 98 4 °F (36 9 °C)   Resp 19   Ht 5' 4\" (1 626 m)   Wt 100 kg (221 lb 5 5 oz)   SpO2 97%   BMI 37 99 kg/m²     I/O last 3 completed shifts:   In: 3001 9 [P O :480; " I V :2521 9]  Out: 6300 [Urine:3325; Blood:800]  I/O this shift:  In: 250 [P O :250]  Out: 1625 [Urine:1625]    Lab Results   Component Value Date    HCT 29 3 (L) 06/05/2023    HGB 9 8 (L) 06/05/2023    MCV 91 06/05/2023     06/05/2023    WBC 12 63 (H) 06/05/2023       Lab Results   Component Value Date    BUN 11 06/05/2023    CALCIUM 8 3 06/05/2023     06/05/2023    CO2 27 06/05/2023    CREATININE 0 60 06/05/2023    K 3 5 06/05/2023           Physical Exam  Cardiovascular:      Rate and Rhythm: Normal rate and regular rhythm  Pulses: Normal pulses  Pulmonary:      Effort: Pulmonary effort is normal    Abdominal:      Palpations: Abdomen is soft  Comments: Well healing midline inscsion present  CDI  ABD and gauze changed today   Skin:     General: Skin is warm and dry  Neurological:      General: No focal deficit present  Mental Status: She is alert     Psychiatric:         Mood and Affect: Mood normal          Behavior: Behavior normal            Carlton Macias MD  6/6/2023  6:51 AM

## 2023-06-06 NOTE — PLAN OF CARE
Problem: PAIN - ADULT  Goal: Verbalizes/displays adequate comfort level or baseline comfort level  Description: Interventions:  - Encourage patient to monitor pain and request assistance  - Assess pain using appropriate pain scale  - Administer analgesics based on type and severity of pain and evaluate response  - Implement non-pharmacological measures as appropriate and evaluate response  - Consider cultural and social influences on pain and pain management  - Notify physician/advanced practitioner if interventions unsuccessful or patient reports new pain  Outcome: Progressing     Problem: INFECTION - ADULT  Goal: Absence or prevention of progression during hospitalization  Description: INTERVENTIONS:  - Assess and monitor for signs and symptoms of infection  - Monitor lab/diagnostic results  - Monitor all insertion sites, i e  indwelling lines, tubes, and drains  - Monitor endotracheal if appropriate and nasal secretions for changes in amount and color  - [place] appropriate cooling/warming therapies per order  - Administer medications as ordered  - Instruct and encourage patient and family to use good hand hygiene technique  - Identify and instruct in appropriate isolation precautions for identified infection/condition  Outcome: Progressing  Goal: Absence of fever/infection during neutropenic period  Description: INTERVENTIONS:  - Monitor WBC    Outcome: Progressing     Problem: SAFETY ADULT  Goal: Patient will remain free of falls  Description: INTERVENTIONS:  - Educate patient/family on patient safety including physical limitations  - Instruct patient to call for assistance with activity   - Consult OT/PT to assist with strengthening/mobility   - Keep Call bell within reach  - Keep bed low and locked with side rails adjusted as appropriate  - Keep care items and personal belongings within reach  - Initiate and maintain comfort rounds  - Make Fall Risk Sign visible to staff  - Apply yellow socks and bracelet for high fall risk patients  - Consider moving patient to room near nurses station  Outcome: Progressing  Goal: Maintain or return to baseline ADL function  Description: INTERVENTIONS:  -  Assess patient's ability to carry out ADLs; assess patient's baseline for ADL function and identify physical deficits which impact ability to perform ADLs (bathing, care of mouth/teeth, toileting, grooming, dressing, etc )  - Assess/evaluate cause of self-care deficits   - Assess range of motion  - Assess patient's mobility; develop plan if impaired  - Assess patient's need for assistive devices and provide as appropriate  - Encourage maximum independence but intervene and supervise when necessary  - Involve family in performance of ADLs  - Assess for home care needs following discharge   - Consider OT consult to assist with ADL evaluation and planning for discharge  - Provide patient education as appropriate  Outcome: Progressing  Goal: Maintains/Returns to pre admission functional level  Description: INTERVENTIONS:  - Perform BMAT or MOVE assessment daily    - Set and communicate daily mobility goal to care team and patient/family/caregiver     - Collaborate with rehabilitation services on mobility goals if consulted  - Record patient progress and toleration of activity level   Outcome: Progressing     Problem: DISCHARGE PLANNING  Goal: Discharge to home or other facility with appropriate resources  Description: INTERVENTIONS:  - Identify barriers to discharge w/patient and caregiver  - Arrange for needed discharge resources and transportation as appropriate  - Identify discharge learning needs (meds, wound care, etc )  - Arrange for interpretive services to assist at discharge as needed  - Refer to Case Management Department for coordinating discharge planning if the patient needs post-hospital services based on physician/advanced practitioner order or complex needs related to functional status, cognitive ability, or social support system  Outcome: Progressing     Problem: Knowledge Deficit  Goal: Patient/family/caregiver demonstrates understanding of disease process, treatment plan, medications, and discharge instructions  Description: Complete learning assessment and assess knowledge base    Interventions:  - Provide teaching at level of understanding  - Provide teaching via preferred learning methods  Outcome: Progressing     Problem: Prexisting or High Potential for Compromised Skin Integrity  Goal: Skin integrity is maintained or improved  Description: INTERVENTIONS:  - Identify patients at risk for skin breakdown  - Assess and monitor skin integrity  - Assess and monitor nutrition and hydration status  - Monitor labs   - Assess for incontinence   - Turn and reposition patient  - Assist with mobility/ambulation  - Relieve pressure over bony prominences  - Avoid friction and shearing  - Provide appropriate hygiene as needed including keeping skin clean and dry  - Evaluate need for skin moisturizer/barrier cream  - Collaborate with interdisciplinary team   - Patient/family teaching  - Consider wound care consult   Outcome: Progressing     Problem: MOBILITY - ADULT  Goal: Maintain or return to baseline ADL function  Description: INTERVENTIONS:  -  Assess patient's ability to carry out ADLs; assess patient's baseline for ADL function and identify physical deficits which impact ability to perform ADLs (bathing, care of mouth/teeth, toileting, grooming, dressing, etc )  - Assess/evaluate cause of self-care deficits   - Assess range of motion  - Assess patient's mobility; develop plan if impaired  - Assess patient's need for assistive devices and provide as appropriate  - Encourage maximum independence but intervene and supervise when necessary  - Involve family in performance of ADLs  - Assess for home care needs following discharge   - Consider OT consult to assist with ADL evaluation and planning for discharge  - Provide patient education as appropriate  Outcome: Progressing  Goal: Maintains/Returns to pre admission functional level  Description: INTERVENTIONS:  - Perform BMAT or MOVE assessment daily    - Set and communicate daily mobility goal to care team and patient/family/caregiver  - Collaborate with rehabilitation services on mobility goals if consulted  - Record patient progress and toleration of activity level   Outcome: Progressing     Problem: Nutrition/Hydration-ADULT  Goal: Nutrient/Hydration intake appropriate for improving, restoring or maintaining nutritional needs  Description: Monitor and assess patient's nutrition/hydration status for malnutrition  Collaborate with interdisciplinary team and initiate plan and interventions as ordered  Monitor patient's weight and dietary intake as ordered or per policy  Utilize nutrition screening tool and intervene as necessary  Determine patient's food preferences and provide high-protein, high-caloric foods as appropriate       INTERVENTIONS:  - Monitor oral intake, urinary output, labs, and treatment plans  - Assess nutrition and hydration status and recommend course of action  - Evaluate amount of meals eaten  - Assist patient with eating if necessary   - Allow adequate time for meals  - Recommend/ encourage appropriate diets, oral nutritional supplements, and vitamin/mineral supplements  - Order, calculate, and assess calorie counts as needed  - Recommend, monitor, and adjust tube feedings and TPN/PPN based on assessed needs  - Assess need for intravenous fluids  - Provide specific nutrition/hydration education as appropriate  - Include patient/family/caregiver in decisions related to nutrition  Outcome: Progressing

## 2023-06-06 NOTE — CASE MANAGEMENT
Case Management Discharge Planning Note    Patient name Brian Pandya  Location [address] 934/PPHP 403-88 MRN [id number]  : 1960 Date 2023       Current Admission Date: 2023  Current Admission Diagnosis:Lower abdominal pain   Patient Active Problem List    Diagnosis Date Noted   • Post-operative state 2023   • Uterine mass 2023   • Lower abdominal pain 2023   • Type 2 diabetes mellitus without complication, without long-term current use of insulin ([facility] [place] [address] ) 2023   • Class 2 obesity due to excess calories without serious comorbidity in adult 2023   • Uterine sarcoma ([facility] [address] )    • Irritable bowel syndrome    • Functional dyspepsia    • GERD without esophagitis       LOS (days): 4  Geometric Mean LOS (GMLOS) (days): 6 50  Days to GMLOS:2 7     OBJECTIVE:  Risk of Unplanned Readmission Score: 10 19         Current admission status: Inpatient   Preferred Pharmacy:   [facility]/pharmacy #[id number] [address]  Phone: [phone] Fax: [phone]    Primary Care Provider: Karla Yan MD    Primary Insurance: BLUE CROSS  Secondary Insurance:     DISCHARGE DETAILS:      [address] [facility] Agency Name[de-identified] Other ([address] [place] [address])    DME Referral Provided  Referral made for DME?: No    Other Referral/Resources/Interventions Provided:  Referral Comments: [address] [place] [address]       Additional Comments: CM reviewed patient choice list with pt's daughter  [address] [place] [address] was chosen  Reserved in [address] [place] [address]

## 2023-06-07 VITALS
RESPIRATION RATE: 19 BRPM | HEIGHT: 64 IN | SYSTOLIC BLOOD PRESSURE: 143 MMHG | DIASTOLIC BLOOD PRESSURE: 85 MMHG | TEMPERATURE: 97.2 F | HEART RATE: 63 BPM | WEIGHT: 221.34 LBS | OXYGEN SATURATION: 97 % | BODY MASS INDEX: 37.79 KG/M2

## 2023-06-07 LAB
ALBUMIN SERPL BCP-MCNC: 3.2 G/DL (ref 3.5–5)
ALP SERPL-CCNC: 81 U/L (ref 46–116)
ALT SERPL W P-5'-P-CCNC: 17 U/L (ref 12–78)
ANION GAP SERPL CALCULATED.3IONS-SCNC: 2 MMOL/L (ref 4–13)
AST SERPL W P-5'-P-CCNC: 16 U/L (ref 5–45)
BASOPHILS # BLD AUTO: 0.02 THOUSANDS/ÂΜL (ref 0–0.1)
BASOPHILS NFR BLD AUTO: 0 % (ref 0–1)
BILIRUB SERPL-MCNC: 0.66 MG/DL (ref 0.2–1)
BUN SERPL-MCNC: 12 MG/DL (ref 5–25)
CALCIUM ALBUM COR SERPL-MCNC: 9.3 MG/DL (ref 8.3–10.1)
CALCIUM SERPL-MCNC: 8.7 MG/DL (ref 8.3–10.1)
CHLORIDE SERPL-SCNC: 112 MMOL/L (ref 96–108)
CO2 SERPL-SCNC: 25 MMOL/L (ref 21–32)
CREAT SERPL-MCNC: 0.74 MG/DL (ref 0.6–1.3)
EOSINOPHIL # BLD AUTO: 0.3 THOUSAND/ÂΜL (ref 0–0.61)
EOSINOPHIL NFR BLD AUTO: 3 % (ref 0–6)
ERYTHROCYTE [DISTWIDTH] IN BLOOD BY AUTOMATED COUNT: 14.4 % (ref 11.6–15.1)
GFR SERPL CREATININE-BSD FRML MDRD: 86 ML/MIN/1.73SQ M
GLUCOSE SERPL-MCNC: 127 MG/DL (ref 65–140)
GLUCOSE SERPL-MCNC: 137 MG/DL (ref 65–140)
GLUCOSE SERPL-MCNC: 139 MG/DL (ref 65–140)
HCT VFR BLD AUTO: 28.6 % (ref 34.8–46.1)
HGB BLD-MCNC: 9.4 G/DL (ref 11.5–15.4)
IMM GRANULOCYTES # BLD AUTO: 0.04 THOUSAND/UL (ref 0–0.2)
IMM GRANULOCYTES NFR BLD AUTO: 0 % (ref 0–2)
LYMPHOCYTES # BLD AUTO: 2.94 THOUSANDS/ÂΜL (ref 0.6–4.47)
LYMPHOCYTES NFR BLD AUTO: 31 % (ref 14–44)
MCH RBC QN AUTO: 29.9 PG (ref 26.8–34.3)
MCHC RBC AUTO-ENTMCNC: 32.9 G/DL (ref 31.4–37.4)
MCV RBC AUTO: 91 FL (ref 82–98)
MONOCYTES # BLD AUTO: 0.41 THOUSAND/ÂΜL (ref 0.17–1.22)
MONOCYTES NFR BLD AUTO: 4 % (ref 4–12)
NEUTROPHILS # BLD AUTO: 5.82 THOUSANDS/ÂΜL (ref 1.85–7.62)
NEUTS SEG NFR BLD AUTO: 62 % (ref 43–75)
NRBC BLD AUTO-RTO: 0 /100 WBCS
PLATELET # BLD AUTO: 317 THOUSANDS/UL (ref 149–390)
PMV BLD AUTO: 10.3 FL (ref 8.9–12.7)
POTASSIUM SERPL-SCNC: 3.6 MMOL/L (ref 3.5–5.3)
PROT SERPL-MCNC: 7.1 G/DL (ref 6.4–8.4)
RBC # BLD AUTO: 3.14 MILLION/UL (ref 3.81–5.12)
SODIUM SERPL-SCNC: 139 MMOL/L (ref 135–147)
WBC # BLD AUTO: 9.53 THOUSAND/UL (ref 4.31–10.16)

## 2023-06-07 PROCEDURE — 82948 REAGENT STRIP/BLOOD GLUCOSE: CPT

## 2023-06-07 PROCEDURE — NC001 PR NO CHARGE: Performed by: OBSTETRICS & GYNECOLOGY

## 2023-06-07 PROCEDURE — 85025 COMPLETE CBC W/AUTO DIFF WBC: CPT

## 2023-06-07 PROCEDURE — 80053 COMPREHEN METABOLIC PANEL: CPT

## 2023-06-07 PROCEDURE — 99024 POSTOP FOLLOW-UP VISIT: CPT | Performed by: OBSTETRICS & GYNECOLOGY

## 2023-06-07 RX ORDER — OXYCODONE HYDROCHLORIDE AND ACETAMINOPHEN 5; 325 MG/1; MG/1
1 TABLET ORAL EVERY 4 HOURS PRN
Qty: 30 TABLET | Refills: 0 | Status: SHIPPED | OUTPATIENT
Start: 2023-06-07

## 2023-06-07 RX ADMIN — KETOROLAC TROMETHAMINE 15 MG: 30 INJECTION, SOLUTION INTRAMUSCULAR; INTRAVENOUS at 11:22

## 2023-06-07 RX ADMIN — PANTOPRAZOLE SODIUM 40 MG: 40 TABLET, DELAYED RELEASE ORAL at 06:43

## 2023-06-07 RX ADMIN — HEPARIN SODIUM 5000 UNITS: 5000 INJECTION INTRAVENOUS; SUBCUTANEOUS at 06:43

## 2023-06-07 RX ADMIN — METOPROLOL SUCCINATE 25 MG: 25 TABLET, EXTENDED RELEASE ORAL at 06:45

## 2023-06-07 RX ADMIN — METHOCARBAMOL TABLETS 500 MG: 500 TABLET, COATED ORAL at 06:43

## 2023-06-07 RX ADMIN — METHOCARBAMOL TABLETS 500 MG: 500 TABLET, COATED ORAL at 00:28

## 2023-06-07 RX ADMIN — KETOROLAC TROMETHAMINE 15 MG: 30 INJECTION, SOLUTION INTRAMUSCULAR; INTRAVENOUS at 06:43

## 2023-06-07 RX ADMIN — FAMOTIDINE 20 MG: 20 TABLET, FILM COATED ORAL at 09:19

## 2023-06-07 RX ADMIN — METHOCARBAMOL TABLETS 500 MG: 500 TABLET, COATED ORAL at 11:22

## 2023-06-07 RX ADMIN — SODIUM CHLORIDE 50 ML/HR: 0.9 INJECTION, SOLUTION INTRAVENOUS at 06:52

## 2023-06-07 RX ADMIN — OXYCODONE HYDROCHLORIDE 10 MG: 10 TABLET ORAL at 09:22

## 2023-06-07 RX ADMIN — ACETAMINOPHEN 975 MG: 325 TABLET, FILM COATED ORAL at 00:27

## 2023-06-07 RX ADMIN — ACETAMINOPHEN 975 MG: 325 TABLET, FILM COATED ORAL at 09:19

## 2023-06-07 RX ADMIN — SERTRALINE 100 MG: 100 TABLET, FILM COATED ORAL at 09:19

## 2023-06-07 RX ADMIN — DULOXETINE HYDROCHLORIDE 30 MG: 30 CAPSULE, DELAYED RELEASE ORAL at 09:19

## 2023-06-07 RX ADMIN — OXYCODONE HYDROCHLORIDE 10 MG: 10 TABLET ORAL at 13:54

## 2023-06-07 RX ADMIN — KETOROLAC TROMETHAMINE 15 MG: 30 INJECTION, SOLUTION INTRAMUSCULAR; INTRAVENOUS at 00:27

## 2023-06-07 NOTE — CASE MANAGEMENT
Case Management Discharge Planning Note    Patient name Dorcas Moran  Location [address] [facility] [facility] [address] 934/PPHP 476-48 MRN [id number]  : 1960 Date 2023       Current Admission Date: 2023  Current Admission Diagnosis:Lower abdominal pain   Patient Active Problem List    Diagnosis Date Noted   • Post-operative state 2023   • Uterine mass 2023   • Lower abdominal pain 2023   • Type 2 diabetes mellitus without complication, without long-term current use of insulin ([facility] ) 2023   • Class 2 obesity due to excess calories without serious comorbidity in adult 2023   • Uterine sarcoma (HCC)    • Irritable bowel syndrome    • Functional dyspepsia    • GERD without esophagitis       LOS (days): 5  Geometric Mean LOS (GMLOS) (days): 6 50  Days to GMLOS:1 9     OBJECTIVE:  Risk of Unplanned Readmission Score: 10 18         Current admission status: Inpatient   Preferred Pharmacy:   [facility]pharmacy #[id number]- [address] [place]  Phone: [phone] Fax: [phone]    Primary Care Provider: Ana Galeano MD    Primary Insurance: BLUE CROSS  Secondary Insurance:     DISCHARGE DETAILS:          Additional Comments: CM reviewed [facility] with pt and reviewed co-pay for [facility]  Pt reported her neighbor is providing her with a BSC, so she is agreeable to CM cancelling order  [facility]

## 2023-06-07 NOTE — DISCHARGE INSTRUCTIONS
[facility]  Natanael Neri, Carola 1981, and Herman Guevara  [phone]    Hysterectomy Discharge Instructions    WHAT YOU NEED TO KNOW:   A hysterectomy is surgery to remove your uterus  Your ovaries, fallopian tubes, cervix, or part of your vagina may also need to be removed  The organs and tissue that will be removed depends on your medical condition  After a hysterectomy, you will not be able to become pregnant  If your ovaries are removed, you will go through menopause if you have not already  DISCHARGE INSTRUCTIONS:   Contact your doctor at the number above if:   You have a fever over 101o  You have nausea or are vomiting that does not improve after a light meal    Your pain is getting worse, even after you take medicine  You feel pain or burning when you urinate, or you have trouble urinating  You have pus or a foul-smelling odor coming from your vagina  Your wound is red, swollen, or draining pus  You see new or an increased amount of bright red blood coming from your vagina or your incisions  You have questions or concerns about your condition or care  Seek care immediately:   Your arm or leg feels warm, tender, and painful  It may look swollen and red  You have increasing abdominal or pelvic pain  You have heavy vaginal bleeding that fills 1 or more sanitary pads in 1 hour  Call 911 for any of the following: You feel lightheaded, short of breath, and have chest pain  You cough up blood  Medicines: You may need any of the following:  Prescription medicine may be given  You may receive a prescription for pain medication or be advised to use over the counter (OTC) pain medication such as acetaminophen (Tylenol) or ibuprofen (Advil, Motrin)  Ask your healthcare provider how to take this medicine safely  NSAIDs , such as ibuprofen, help decrease swelling, pain, and fever   NSAIDs can cause stomach bleeding or kidney problems in certain people  If you take blood thinner medicine, always ask your healthcare provider if NSAIDs are safe for you  Always read the medicine label and follow directions  Stool softeners help treat or prevent constipation  Take your medicine as directed  Contact your healthcare provider if you think your medicine is not helping or if you have side effects  Tell him or her if you are allergic to any medicine  Keep a list of the medicines, vitamins, and herbs you take  Include the amounts, and when and why you take them  Bring the list or the pill bottles to follow-up visits  Carry your medicine list with you in case of an emergency  Activity:   Rest as needed  Get up and move around as directed to help prevent blood clots  Start with short walks and slowly increase the distance every day  Limit the number of times you climb stairs to 2 times each day for the first week  Plan most of your daily activities on one level of your home  Do not lift objects heavier than 10 pounds for 6 weeks  Avoid strenuous activity for 2 weeks  Do not strain during bowel movements  High-fiber foods and extra liquids can help you prevent constipation  Examples of high-fiber foods are fruit and bran  Prune juice and water are good liquids to drink  Do not have sex, use tampons, or douche for up to 8 weeks  You may shower as soon as the day after surgery  Tub baths can be taken starting 2 weeks after surgery  Do not go into pools or hot tubs until cleared by your doctor  Ask when it is safe for you to drive  It is generally safe to drive after 2 weeks and when no longer taking prescription pain medication  Ask when you may return to work and to other regular activities  Wound care: Care for your abdominal incisions as directed  Carefully wash around the wound with soap and water   If you have Hibiclens or medicated soap that you were instructed to use before surgery, you may use that to wash with for up to 2 days after surgery  If not, any mild non-scented, non-abrasive soap is safe  It is okay to let the soap and water run over your incision  Do not scrub your incision  Dry the area and put on new, clean bandages as directed  Change your bandages when they get wet or dirty  If you have strips of medical tape, let them fall off on their own  It may take 7 to 14 days for them to fall off  Check your incision every day for redness, swelling, or pus  Deep breathing: Take deep breaths and cough 10 times each hour  This will decrease your risk for a lung infection  Take a deep breath and hold it for as long as you can  Let the air out and then cough strongly  Deep breaths help open your airway  You may be given an incentive spirometer to help you take deep breaths  Put the plastic piece in your mouth and take a slow, deep breath, then let the air out and cough  Repeat these steps 10 times every hour  Get support: This surgery may be life-changing for you and your family  You will no longer be able to get pregnant  Sudden changes in the levels of your hormones may occur and cause mood swings and depression  You may feel angry, sad, or frightened, or cry frequently and unexpectedly  These feelings are normal  Talk to your healthcare provider about where you can get support  You can also ask if hormone replacement medicine is right for you  Follow up with your healthcare provider or gynecologist as directed: You may need to return to have stitches removed, and for other tests  Write down your questions so you remember to ask them during your visits  © 2017 [employer] Information is for End User's use only and may not be sold, redistributed or otherwise used for commercial purposes  All illustrations and images included in CareNotes® are the copyrighted property of A D A M [employer] Inc  or Marcial Raman  The above information is an  only   It is not intended as medical advice for individual conditions or treatments  Talk to your doctor, nurse or pharmacist before following any medical regimen to see if it is safe and effective for you

## 2023-06-07 NOTE — QUICK NOTE
Quick note  Called to send prescription for Percocet to patient's local pharmacy after she was already discharged  According to her nurse, at the time of her discharge she did not want to wait for prescription to be written by her physician  After she got home, she and her daughter called the hospital for prescription  Sent a prescription for Percocet 5/325 (30) [facility] DELVIN Meadows PA-C

## 2023-06-07 NOTE — PROGRESS NOTES
"For questions/concerns on this patient, please reach out to the following:  [facility] G[facility]-GynOnc- Resident/AP  Gyn Oncology Progress note   Kelly Levine 60 y o  female MRN: [id number]  [facility] Encounter: [id number]    Assessment/Plan:    60 y o  POD #3 total abdominal hysterectomy, bilateral salpingo-oophorectomy, excision biopsy of abdominal/pelvic peritoneum  She is meeting post-operative milsetones and should be discharged later today  Post-operative state  Assessment & Plan  Follow up final pathology  Pain control with: PO pain medications ordered per anesthesia   Voiding spontaneously   Cosider d/cing IV fluids  Advanced to regular diet, tolerating well   VTE prophylaxis:   Encourage ambulation and out of bed as tolerated, SubQ heparin  Encourage incentive spirometer use     Type 2 diabetes mellitus without complication, without long-term current use of insulin [facility])  Assessment & Plan  Lab Results   Component Value Date    HGBA1C 8 1 09/19/2020       Recent Labs     06/05/23  0805 06/05/23  1105 06/05/23  1549 06/05/23  2116   POCGLU 137 138 152* 148*       Blood Sugar Average: Last 72 hrs:  (P) 147 25     Currently on SSI, has received 3 units of insulin total in last 24 hours    Uterine mass  Assessment & Plan  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  Severe lower abdominal pain requiring multiple doses of narcotics  Now s/p abdominal hysterectomy, bilateral salpingo-oophorectomy           Subjective:    Kelly Levine has no current complaints  Pain is well controlled  Patient is currently voiding  She is ambulating  Patient is currently passing flatus and has had no bowel movement  She is tolerating PO, and denies nausea or vomitting  Patient denies fever, chills, chest pain, shortness of breath, or calf tenderness       Objective:  /65 (BP Location: Right arm)   Pulse 74   Temp 98 7 °F (37 1 °C) (Oral)   Resp 22   Ht 5' 4\" (1 626 m)   Wt 100 kg (221 lb 5 5 oz)   " SpO2 98%   BMI 37 99 kg/m²     I/O last 3 completed shifts: In: 961 5 [P O :490; I V :471 5]  Out: 3125 [BDEKX:2834]  I/O this shift:  In: 120 [P O :120]  Out: 1300 [Urine:1300]    Lab Results   Component Value Date    HCT 27 7 (L) 06/06/2023    HGB 9 1 (L) 06/06/2023    MCV 91 06/06/2023     06/06/2023    WBC 10 81 (H) 06/06/2023       Lab Results   Component Value Date    BUN 13 06/06/2023    CALCIUM 8 0 (L) 06/06/2023     (H) 06/06/2023    CO2 27 06/06/2023    CREATININE 0 59 (L) 06/06/2023    K 3 6 06/06/2023           Physical Exam  Vitals reviewed  HENT:      Head: Normocephalic and atraumatic  Cardiovascular:      Rate and Rhythm: Normal rate  Pulmonary:      Effort: Pulmonary effort is normal       Breath sounds: Normal breath sounds  Abdominal:      Comments: Midline incision CDI  Moderate tenderness to palpation throughout, particularly in lower abdomen   Skin:     General: Skin is warm and dry  Neurological:      General: No focal deficit present  Mental Status: She is alert     Psychiatric:         Mood and Affect: Mood normal          Behavior: Behavior normal            Carter Diego MD  6/7/2023  6:38 AM

## 2023-06-07 NOTE — DISCHARGE SUMMARY
Gyn Oncology Discharge Summary   Neto Soni MRN: [id number]  Unit/Bed#: [facility] 934-01 Encounter: [id number]      Admission Date: 6/2/2023     Discharge Date: 6/7/2023    Attending: Ian Betancourt,*    Principal Diagnosis: Abdominal pain [R10 9]  Pelvic mass [R19 00]  Uterine fibroid [D25 9]    Procedures:       Hospital course: Patient was initially admitted on 6/02/23 due to a one month history of severe lower and mid abdominal pain  She had a known history of a fibroid uterus and a history of abnormal uterine bleeding  On admission her uterus was noted to be markedly enlarged (21X 20 cm from 18 X 10cm in 2020)  Given her severe pain the decision was made to proceed with total abdominal hysterectomy, bilateral salpingo-oophorectomy  Patient tolerated the procedure well  On post-operative day one, she was advanced to a regular diet and her johnston catheter was removed and she was able to void spontaneously  Her epidural was also removed at this time by anesthesia and she was transitioned to oral pain medications  Patient was discharged home on post-operative day 3  On day of discharge, patient was ambulating, voiding spontaneously, tolerating PO, and passing flatus  Her incision was clean, dry, and intact  She was seen by case management, and home health was coordinated to assist with wound care as needed  Patient was given discharge instructions and precautions      Lab Results:   Lab Results   Component Value Date    HCT 28 6 (L) 06/07/2023    HGB 9 4 (L) 06/07/2023    MCV 91 06/07/2023     06/07/2023    WBC 9 53 06/07/2023     Lab Results   Component Value Date    BUN 12 06/07/2023    CALCIUM 8 7 06/07/2023     (H) 06/07/2023    CO2 25 06/07/2023    CREATININE 0 74 06/07/2023    K 3 6 06/07/2023     Lab Results   Component Value Date/Time    POCGLU 137 06/07/2023 11:06 AM    POCGLU 139 06/07/2023 07:16 AM    POCGLU 132 06/06/2023 09:00 PM    POCGLU 134 06/06/2023 04:02 PM    POCGLU "127 06/06/2023 11:08 AM     No results found for: \"PTT\"  Lab Results   Component Value Date    INR 0 99 06/03/2023    PROTIME 13 3 67/87/8570       Complications: none apparent    Condition at discharge: stable     Discharge instructions/Information to patient and family:   See After Visit Summary for information provided to patient and family  Provisions for Follow-Up Care:  See After Visit Summary for information related to follow-up care and any pertinent home health orders  Disposition: See After Visit Summary for discharge disposition information  Planned Readmission: No    Discharge Medications: For a complete list of the patient's medications, please refer to her med rec      Alex Garza MD  6/7/2023  2:32 PM                "

## 2023-06-07 NOTE — CASE MANAGEMENT
Case Management Discharge Planning Note    Patient name Socrates Naqvi  Location [address] 934/PPHP 255-49 MRN [id number]  : 1960 Date 2023       Current Admission Date: 2023  Current Admission Diagnosis:Lower abdominal pain   Patient Active Problem List    Diagnosis Date Noted   • Post-operative state 2023   • Uterine mass 2023   • Lower abdominal pain 2023   • Type 2 diabetes mellitus without complication, without long-term current use of insulin ([facility] [address] ) 2023   • Class 2 obesity due to excess calories without serious comorbidity in adult 2023   • Uterine sarcoma ([facility] [address] )    • Irritable bowel syndrome    • Functional dyspepsia    • GERD without esophagitis       LOS (days): 5  Geometric Mean LOS (GMLOS) (days): 6 50  Days to GMLOS:1 9     OBJECTIVE:  Risk of Unplanned Readmission Score: 10 18         Current admission status: Inpatient   Preferred Pharmacy:   [facility]/pharmacy #[id number]- [address]  Phone: [phone] Fax: [phone]    Primary Care Provider: Ap Brown MD    Primary Insurance: BLUE CROSS  Secondary Insurance:     DISCHARGE DETAILS:        Additional Comments: Per chart, pt likely to be discharged today  [facility] liaison to deliver [facility] pt's room prior to discharge  [facility] is reserved for SN once pt is discharged  Provider notified

## 2023-06-08 ENCOUNTER — TELEPHONE (OUTPATIENT)
Dept: HEMATOLOGY ONCOLOGY | Facility: CLINIC | Age: 63
End: 2023-06-08

## 2023-06-08 NOTE — TELEPHONE ENCOUNTER
Return call placed to patient's daughter  Post-operative appointment scheduled 6/27/23  Information patient and family results are not yet back

## 2023-06-08 NOTE — UTILIZATION REVIEW
NOTIFICATION OF ADMISSION DISCHARGE   This is a Notification of Discharge from [address]  Please be advised that this patient has been discharge from our facility  Below you will find the admission and discharge date and time including the patient’s disposition  UTILIZATION REVIEW CONTACT:  Radha Keller  Utilization   Network Utilization Review Department  Phone: [phone] x carefully listen to the prompts  All voicemails are confidential   Email: [email or web address]     ADMISSION INFORMATION  PRESENTATION DATE: 6/2/2023  8:26 PM  OBERVATION ADMISSION DATE:   INPATIENT ADMISSION DATE: 6/2/23  8:26 PM   DISCHARGE DATE: 6/7/2023  2:40 PM   DISPOSITION:Home with Home Health Care    IMPORTANT INFORMATION:  Send all requests for admission clinical reviews, approved or denied determinations and any other requests to dedicated fax number below belonging to the campus where the patient is receiving treatment   List of dedicated fax numbers:  [address] DENIALS (Administrative/Medical Necessity) [phone]   [address] (Maternity/NICU/Pediatrics) [phone]   [facility] [phone]   [facility] 87 [phone]   [address] [phone]   220 [facility] [phone]   90 [facility] [phone]   [facility] 119 [phone]   [facility]  [phone]   [address] [facility] [phone] 412 [facility] [address] [facility] [phone]

## 2023-06-08 NOTE — TELEPHONE ENCOUNTER
Patient Call    Who are you speaking with? Patient and Child    If it is not the patient, are they listed on an active communication consent form? N/A   What is the reason for this call? Patient and her daughter, Devan Serrano, calling in regarding a hospital follow up needed with Dr Aster Aviles around 6/28/23  The next available in  is not until 7/13/23, and in [place] it looked as if there would be nothing sooner  Devan Serrano would like to know if the patient is okay to wait this long or how they should go forward  The patient and Devan Serrano would also like to know if any test results have came back yet  Does this require a call back? Yes   If a call back is required, please list best call back number [phone]   If a call back is required, advise that a message will be forwarded to their care team and someone will return their call as soon as possible  Did you relay this information to the patient?  Yes

## 2023-06-19 ENCOUNTER — TELEPHONE (OUTPATIENT)
Dept: HEMATOLOGY ONCOLOGY | Facility: CLINIC | Age: 63
End: 2023-06-19

## 2023-06-19 NOTE — TELEPHONE ENCOUNTER
Return call placed to patient  Pathology is still preliminary  Patient was seen in the ED and placed on antibiotic due to green drainage from her incision  This is not worsening

## 2023-06-19 NOTE — TELEPHONE ENCOUNTER
Patient Call    Who are you speaking with? Child    If it is not the patient, are they listed on an active communication consent form? No  Patient does not have a current communication consent form on file  What is the reason for this call? Patient's daughter Tricia Koehler calling to discuss if patient has any new test results in from Dr Kentrell Herbert   Does this require a call back? Yes   If a call back is required, please list [facility] call back number [phone]   If a call back is required, advise that a message will be forwarded to their care team and someone will return their call as soon as possible  Did you relay this information to the patient?  Yes

## 2023-06-26 ENCOUNTER — TELEPHONE (OUTPATIENT)
Dept: HEMATOLOGY ONCOLOGY | Facility: CLINIC | Age: 63
End: 2023-06-26

## 2023-06-26 NOTE — TELEPHONE ENCOUNTER
Return call to Florin Hart notifying her that her daughter Kash Rosario called requesting test results  Explained that she has a f/u appointment this week with Dr Leydi Obrien and he will go over all results and potential POC if indicated at that time

## 2023-06-26 NOTE — TELEPHONE ENCOUNTER
Patient Call    Who are you speaking with? Layo Tucker    If it is not the patient, are they listed on an active communication consent form? No   What is the reason for this call? Patients daughter would like to speak with Dr Josef Morton  She wants to go over test results    Does this require a call back? Yes    If a call back is required, please list best call back number [phone]   If a call back is required, advise that a message will be forwarded to their care team and someone will return their call as soon as possible  Did you relay this information to the patient?  yes

## 2023-06-28 ENCOUNTER — DOCUMENTATION (OUTPATIENT)
Dept: HEMATOLOGY ONCOLOGY | Facility: CLINIC | Age: 63
End: 2023-06-28

## 2023-06-28 NOTE — PROGRESS NOTES
In-basket message received from Dr Sharan Jeronimo to add patient to [facility] on 7/10/2023  Chart reviewed and prep completed

## 2023-06-29 ENCOUNTER — TELEPHONE (OUTPATIENT)
Dept: INTERVENTIONAL RADIOLOGY/VASCULAR | Facility: HOSPITAL | Age: 63
End: 2023-06-29

## 2023-06-29 ENCOUNTER — OFFICE VISIT (OUTPATIENT)
Age: 63
End: 2023-06-29
Payer: COMMERCIAL

## 2023-06-29 VITALS
HEIGHT: 64 IN | TEMPERATURE: 97.9 F | HEART RATE: 61 BPM | WEIGHT: 211.4 LBS | SYSTOLIC BLOOD PRESSURE: 130 MMHG | BODY MASS INDEX: 36.09 KG/M2 | DIASTOLIC BLOOD PRESSURE: 90 MMHG | OXYGEN SATURATION: 97 %

## 2023-06-29 DIAGNOSIS — C55 UTERINE LEIOMYOSARCOMA (HCC): Primary | ICD-10-CM

## 2023-06-29 PROCEDURE — 99215 OFFICE O/P EST HI 40 MIN: CPT | Performed by: OBSTETRICS & GYNECOLOGY

## 2023-06-29 RX ORDER — ESCITALOPRAM OXALATE 10 MG/1
10 TABLET ORAL DAILY
COMMUNITY

## 2023-06-29 RX ORDER — CEFAZOLIN SODIUM 2 G/50ML
2000 SOLUTION INTRAVENOUS ONCE
Status: CANCELLED | OUTPATIENT
Start: 2023-06-30

## 2023-06-29 RX ORDER — SODIUM CHLORIDE 9 MG/ML
75 INJECTION, SOLUTION INTRAVENOUS CONTINUOUS
Status: CANCELLED | OUTPATIENT
Start: 2023-06-30

## 2023-06-29 RX ORDER — DULOXETIN HYDROCHLORIDE 30 MG/1
CAPSULE, DELAYED RELEASE ORAL
COMMUNITY
Start: 2023-06-16

## 2023-06-29 RX ORDER — FAMOTIDINE 40 MG/1
TABLET, FILM COATED ORAL
COMMUNITY
Start: 2023-06-25

## 2023-06-29 RX ORDER — NYSTATIN 100000 [USP'U]/G
POWDER TOPICAL
COMMUNITY
Start: 2023-06-16

## 2023-06-29 RX ORDER — DULOXETIN HYDROCHLORIDE 30 MG/1
30 CAPSULE, DELAYED RELEASE ORAL DAILY
COMMUNITY

## 2023-06-29 RX ORDER — SERTRALINE HYDROCHLORIDE 100 MG/1
TABLET, FILM COATED ORAL
COMMUNITY
Start: 2023-06-16

## 2023-06-29 NOTE — PROGRESS NOTES
Assessment/Plan:    Problem List Items Addressed This Visit        Genitourinary    Uterine leiomyosarcoma ([facility] [address] ) - Primary     50-year-old with stage II uterine leiomyosarcoma, peritoneal cavity at risk for recurrence given the malignancy had perforated the uterus  She is recovering well from surgery  Her performance status is 1   1   I discussed the pathophysiology and prognosis of uterine leiomyosarcoma  2   Given the peritoneal cavity is at risk for metastatic disease, I recommended starting with adjuvant chemotherapy  Radiation can be considered to reduce the risk of pelvic disease after completion of chemotherapy  3   I discussed the risks and benefits of starting gemcitabine at 800 mg per metered squared on day 1 and 8 of a 21-day cycle along with docetaxel at 65 mg per metered squared on day 8 of a 21-day cycle followed by PEG filgrastim for 4 cycles followed by 4 cycles of single agent Adriamycin at 60 mg per metered squared every 21 days for 4 additional cycles  She understands the risks and benefits of treatment including the risks of pancytopenia, alopecia, allergic reaction, neuropathy, fluid retention, hepatic and renal damage, skin rashes, fatigue and she agrees to proceed as outlined  Consent for treatment was obtained by me in the office  Plan for follow-up CT imaging after completion of 3 cycles of of gemcitabine and docetaxel  4   Referral to interventional radiology for Mediport placement  5   We discussed ongoing activity limitations  I spent 30 minutes with the patient  More than half the time was spent in counseling and coordination of care regarding treatment of her leiomyosarcoma  Only brief time was spent on the postoperative history and physical examination           Relevant Orders    CBC and differential    Comprehensive metabolic panel    Ambulatory referral to Interventional Radiology         CHIEF COMPLAINT: Treatment discussion, postoperative evaluation Problem:  Cancer Staging   Uterine leiomyosarcoma [facility])  Staging form: Corpus Uteri - Sarcoma, AJCC 8th Edition  - Pathologic stage from 6/4/2023: FIGO Stage II, calculated as Stage Unknown (pT2, pNX, cM0) - Signed by Suki Gill MD on 6/29/2023        Previous therapy:  Oncology History   Uterine leiomyosarcoma ([facility] [address] )   6/4/2023 Initial Diagnosis    Uterine sarcoma ([facility] [address] )     6/4/2023 -  Cancer Staged    Staging form: Corpus Uteri - Sarcoma, AJCC 8th Edition  - Pathologic stage from 6/4/2023: FIGO Stage II, calculated as Stage Unknown (pT2, pNX, cM0) - Signed by Suki Gill MD on 6/29/2023  Stage prefix: Initial diagnosis       6/4/2023 Surgery    HYSTERECTOMY TOTAL ABDOMINAL (DOROTHY)  BILATERAL SALPINGOOPHORECTOMY  EXCISION  BIOPSY LESION/MASS ABDOMINAL-PELVIC PERITONEUM  -Leiomyosarcoma 13 5 cm extending through the myometrium, right pelvic peritoneum involved with uterine leiomyosarcoma with tumor present at unoriented resection surface  Patient ID: Jarrod Medina is a 60 y o  female  Who returns for postoperative evaluation and treatment discussion  She is ambulating, voiding, having bowel movements but is constipated  No vaginal bleeding  No fevers  She does not require pain medication  The following portions of the patient's history were reviewed and updated as appropriate: allergies, current medications, past family history, past medical history, past social history, past surgical history and problem list     Review of Systems   Constitutional: Negative for activity change and unexpected weight change  HENT: Negative  Eyes: Negative  Respiratory: Negative  Cardiovascular: Negative  Gastrointestinal: Positive for constipation  Negative for abdominal distention and abdominal pain  Endocrine: Negative  Genitourinary: Negative for pelvic pain and vaginal bleeding  Musculoskeletal: Negative  Skin: Negative  Allergic/Immunologic: Negative  Neurological: Negative  Hematological: Negative  Psychiatric/Behavioral: Negative            Current Outpatient Medications:   •  aspirin (ECOTRIN LOW STRENGTH) 81 mg EC tablet, Take 81 mg by mouth daily, Disp: , Rfl:   •  atorvastatin (LIPITOR) 40 mg tablet, Take 40 mg by mouth daily at bedtime, Disp: , Rfl:   •  cetirizine (ZyrTEC) 10 mg tablet, Take 10 mg by mouth daily, Disp: , Rfl:   •  Cetirizine HCl 0 24 % SOLN, Take by mouth, Disp: , Rfl:   •  cholecalciferol (VITAMIN D3) 1,000 units tablet, Take 1,000 Units by mouth daily 2 daily, Disp: , Rfl:   •  CLENPIQ 10-3 5-12 MG-GM -GM/160ML SOLN, USE AS DIRECTED, Disp: 320 mL, Rfl: 0  •  DULoxetine (CYMBALTA) 30 mg delayed release capsule, Take 30 mg by mouth daily, Disp: , Rfl:   •  DULoxetine (CYMBALTA) 30 mg delayed release capsule, TAKE 1 CAPSULE BY MOUTH EVERY DAY FOR 90 DAYS, Disp: , Rfl:   •  escitalopram (LEXAPRO) 10 mg tablet, Take 10 mg by mouth daily, Disp: , Rfl:   •  famotidine (PEPCID) 40 MG tablet, , Disp: , Rfl:   •  glimepiride (AMARYL) 4 mg tablet, Take 4 mg by mouth 2 (two) times a day, Disp: , Rfl:   •  hydrochlorothiazide (HYDRODIURIL) 25 mg tablet, Take 25 mg by mouth daily, Disp: , Rfl:   •  ibuprofen (MOTRIN) 800 mg tablet, Take 800 mg by mouth every 6 (six) hours as needed for mild pain, Disp: , Rfl:   •  losartan (COZAAR) 50 mg tablet, Take 50 mg by mouth daily, Disp: , Rfl:   •  meclizine (ANTIVERT) 25 mg tablet, Take by mouth every 12 (twelve) hours as needed for dizziness, Disp: , Rfl:   •  metoprolol succinate (TOPROL-XL) 25 mg 24 hr tablet, Take 25 mg by mouth daily, Disp: , Rfl:   •  Multiple Vitamin (MULTIVITAMIN) tablet, Take 1 tablet by mouth daily, Disp: , Rfl:   •  nystatin (MYCOSTATIN) powder, USE AS DIRECTED 3 TIMES A DAY FOR 10 DAYS, Disp: , Rfl:   •  other medication, see sig,, Medication/product name: Lifitegrast (xiidra) Strength:  Sig (include dose, route, frequency): one drop ou BID, Disp: , Rfl:   • "oxyCODONE-acetaminophen (Percocet) 5-325 mg per tablet, Take 1 tablet by mouth every 4 (four) hours as needed for moderate pain for up to 30 doses Max Daily Amount: 6 tablets, Disp: 30 tablet, Rfl: 0  •  pantoprazole (PROTONIX) 40 mg tablet, Take 40 mg by mouth daily, Disp: , Rfl:   •  sertraline (ZOLOFT) 100 mg tablet, TAKE 1 AND 1/2 TABS BY MOUTH DAILY, Disp: , Rfl:   •  sertraline (ZOLOFT) 50 mg tablet, Take 150 mg by mouth daily, Disp: , Rfl:   •  Triamcinolone Acetonide 55 MCG/ACT AERO, into each nostril One spray each nostril daily, Disp: , Rfl:   •  ZOLMitriptan (ZOMIG) 5 MG tablet, Take 5 mg by mouth once as needed for migraine Daily prn, Disp: , Rfl:     Objective:    Blood pressure 130/90, pulse 61, temperature 97 9 °F (36 6 °C), temperature source Temporal, height 5' 4\" (1 626 m), weight 95 9 kg (211 lb 6 4 oz), SpO2 97 %  Body mass index is 36 29 kg/m²  Body surface area is 2 meters squared  Physical Exam  Vitals reviewed  Constitutional:       General: She is not in acute distress  Appearance: Normal appearance  HENT:      Head: Normocephalic and atraumatic  Mouth/Throat:      Mouth: Mucous membranes are moist    Pulmonary:      Effort: Pulmonary effort is normal       Breath sounds: Normal breath sounds  Abdominal:      Palpations: Abdomen is soft  There is no mass  Tenderness: There is no abdominal tenderness  There is no guarding or rebound  Hernia: No hernia is present  Skin:     General: Skin is warm and dry  Comments: Incision is clean dry and intact  Neurological:      Mental Status: She is alert and oriented to person, place, and time  Psychiatric:         Mood and Affect: Mood normal          Behavior: Behavior normal          Thought Content:  Thought content normal          Judgment: Judgment normal                       "

## 2023-06-29 NOTE — ASSESSMENT & PLAN NOTE
59-year-old with stage II uterine leiomyosarcoma, peritoneal cavity at risk for recurrence given the malignancy had perforated the uterus  She is recovering well from surgery  Her performance status is 1   1   I discussed the pathophysiology and prognosis of uterine leiomyosarcoma  2   Given the peritoneal cavity is at risk for metastatic disease, I recommended starting with adjuvant chemotherapy  Radiation can be considered to reduce the risk of pelvic disease after completion of chemotherapy  3   I discussed the risks and benefits of starting gemcitabine at 800 mg per metered squared on day 1 and 8 of a 21-day cycle along with docetaxel at 65 mg per metered squared on day 8 of a 21-day cycle followed by PEG filgrastim for 4 cycles followed by 4 cycles of single agent Adriamycin at 60 mg per metered squared every 21 days for 4 additional cycles  She understands the risks and benefits of treatment including the risks of pancytopenia, alopecia, allergic reaction, neuropathy, fluid retention, hepatic and renal damage, skin rashes, fatigue and she agrees to proceed as outlined  Consent for treatment was obtained by me in the office  Plan for follow-up CT imaging after completion of 3 cycles of of gemcitabine and docetaxel  4   Referral to interventional radiology for Mediport placement  5   We discussed ongoing activity limitations  I spent 30 minutes with the patient  More than half the time was spent in counseling and coordination of care regarding treatment of her leiomyosarcoma  Only brief time was spent on the postoperative history and physical examination

## 2023-06-29 NOTE — H&P (VIEW-ONLY)
Assessment/Plan:    Problem List Items Addressed This Visit        Genitourinary    Uterine leiomyosarcoma ([facility] [address] ) - Primary     50-year-old with stage II uterine leiomyosarcoma, peritoneal cavity at risk for recurrence given the malignancy had perforated the uterus  She is recovering well from surgery  Her performance status is 1   1   I discussed the pathophysiology and prognosis of uterine leiomyosarcoma  2   Given the peritoneal cavity is at risk for metastatic disease, I recommended starting with adjuvant chemotherapy  Radiation can be considered to reduce the risk of pelvic disease after completion of chemotherapy  3   I discussed the risks and benefits of starting gemcitabine at 800 mg per metered squared on day 1 and 8 of a 21-day cycle along with docetaxel at 65 mg per metered squared on day 8 of a 21-day cycle followed by PEG filgrastim for 4 cycles followed by 4 cycles of single agent Adriamycin at 60 mg per metered squared every 21 days for 4 additional cycles  She understands the risks and benefits of treatment including the risks of pancytopenia, alopecia, allergic reaction, neuropathy, fluid retention, hepatic and renal damage, skin rashes, fatigue and she agrees to proceed as outlined  Consent for treatment was obtained by me in the office  Plan for follow-up CT imaging after completion of 3 cycles of of gemcitabine and docetaxel  4   Referral to interventional radiology for Mediport placement  5   We discussed ongoing activity limitations  I spent 30 minutes with the patient  More than half the time was spent in counseling and coordination of care regarding treatment of her leiomyosarcoma  Only brief time was spent on the postoperative history and physical examination           Relevant Orders    CBC and differential    Comprehensive metabolic panel    Ambulatory referral to Interventional Radiology         CHIEF COMPLAINT: Treatment discussion, postoperative evaluation Problem:  Cancer Staging   Uterine leiomyosarcoma [facility])  Staging form: Corpus Uteri - Sarcoma, AJCC 8th Edition  - Pathologic stage from 6/4/2023: FIGO Stage II, calculated as Stage Unknown (pT2, pNX, cM0) - Signed by Misael Cardenas MD on 6/29/2023        Previous therapy:  Oncology History   Uterine leiomyosarcoma ([facility] [address] )   6/4/2023 Initial Diagnosis    Uterine sarcoma ([facility] [address] )     6/4/2023 -  Cancer Staged    Staging form: Corpus Uteri - Sarcoma, AJCC 8th Edition  - Pathologic stage from 6/4/2023: FIGO Stage II, calculated as Stage Unknown (pT2, pNX, cM0) - Signed by Misael Cardenas MD on 6/29/2023  Stage prefix: Initial diagnosis       6/4/2023 Surgery    HYSTERECTOMY TOTAL ABDOMINAL (DOROTHY)  BILATERAL SALPINGOOPHORECTOMY  EXCISION  BIOPSY LESION/MASS ABDOMINAL-PELVIC PERITONEUM  -Leiomyosarcoma 13 5 cm extending through the myometrium, right pelvic peritoneum involved with uterine leiomyosarcoma with tumor present at unoriented resection surface  Patient ID: Mihaela Whipple is a 60 y o  female  Who returns for postoperative evaluation and treatment discussion  She is ambulating, voiding, having bowel movements but is constipated  No vaginal bleeding  No fevers  She does not require pain medication  The following portions of the patient's history were reviewed and updated as appropriate: allergies, current medications, past family history, past medical history, past social history, past surgical history and problem list     Review of Systems   Constitutional: Negative for activity change and unexpected weight change  HENT: Negative  Eyes: Negative  Respiratory: Negative  Cardiovascular: Negative  Gastrointestinal: Positive for constipation  Negative for abdominal distention and abdominal pain  Endocrine: Negative  Genitourinary: Negative for pelvic pain and vaginal bleeding  Musculoskeletal: Negative  Skin: Negative  Allergic/Immunologic: Negative  Neurological: Negative  Hematological: Negative  Psychiatric/Behavioral: Negative            Current Outpatient Medications:   •  aspirin (ECOTRIN LOW STRENGTH) 81 mg EC tablet, Take 81 mg by mouth daily, Disp: , Rfl:   •  atorvastatin (LIPITOR) 40 mg tablet, Take 40 mg by mouth daily at bedtime, Disp: , Rfl:   •  cetirizine (ZyrTEC) 10 mg tablet, Take 10 mg by mouth daily, Disp: , Rfl:   •  Cetirizine HCl 0 24 % SOLN, Take by mouth, Disp: , Rfl:   •  cholecalciferol (VITAMIN D3) 1,000 units tablet, Take 1,000 Units by mouth daily 2 daily, Disp: , Rfl:   •  CLENPIQ 10-3 5-12 MG-GM -GM/160ML SOLN, USE AS DIRECTED, Disp: 320 mL, Rfl: 0  •  DULoxetine (CYMBALTA) 30 mg delayed release capsule, Take 30 mg by mouth daily, Disp: , Rfl:   •  DULoxetine (CYMBALTA) 30 mg delayed release capsule, TAKE 1 CAPSULE BY MOUTH EVERY DAY FOR 90 DAYS, Disp: , Rfl:   •  escitalopram (LEXAPRO) 10 mg tablet, Take 10 mg by mouth daily, Disp: , Rfl:   •  famotidine (PEPCID) 40 MG tablet, , Disp: , Rfl:   •  glimepiride (AMARYL) 4 mg tablet, Take 4 mg by mouth 2 (two) times a day, Disp: , Rfl:   •  hydrochlorothiazide (HYDRODIURIL) 25 mg tablet, Take 25 mg by mouth daily, Disp: , Rfl:   •  ibuprofen (MOTRIN) 800 mg tablet, Take 800 mg by mouth every 6 (six) hours as needed for mild pain, Disp: , Rfl:   •  losartan (COZAAR) 50 mg tablet, Take 50 mg by mouth daily, Disp: , Rfl:   •  meclizine (ANTIVERT) 25 mg tablet, Take by mouth every 12 (twelve) hours as needed for dizziness, Disp: , Rfl:   •  metoprolol succinate (TOPROL-XL) 25 mg 24 hr tablet, Take 25 mg by mouth daily, Disp: , Rfl:   •  Multiple Vitamin (MULTIVITAMIN) tablet, Take 1 tablet by mouth daily, Disp: , Rfl:   •  nystatin (MYCOSTATIN) powder, USE AS DIRECTED 3 TIMES A DAY FOR 10 DAYS, Disp: , Rfl:   •  other medication, see sig,, Medication/product name: Lifitegrast (xiidra) Strength:  Sig (include dose, route, frequency): one drop ou BID, Disp: , Rfl:   • "oxyCODONE-acetaminophen (Percocet) 5-325 mg per tablet, Take 1 tablet by mouth every 4 (four) hours as needed for moderate pain for up to 30 doses Max Daily Amount: 6 tablets, Disp: 30 tablet, Rfl: 0  •  pantoprazole (PROTONIX) 40 mg tablet, Take 40 mg by mouth daily, Disp: , Rfl:   •  sertraline (ZOLOFT) 100 mg tablet, TAKE 1 AND 1/2 TABS BY MOUTH DAILY, Disp: , Rfl:   •  sertraline (ZOLOFT) 50 mg tablet, Take 150 mg by mouth daily, Disp: , Rfl:   •  Triamcinolone Acetonide 55 MCG/ACT AERO, into each nostril One spray each nostril daily, Disp: , Rfl:   •  ZOLMitriptan (ZOMIG) 5 MG tablet, Take 5 mg by mouth once as needed for migraine Daily prn, Disp: , Rfl:     Objective:    Blood pressure 130/90, pulse 61, temperature 97 9 °F (36 6 °C), temperature source Temporal, height 5' 4\" (1 626 m), weight 95 9 kg (211 lb 6 4 oz), SpO2 97 %  Body mass index is 36 29 kg/m²  Body surface area is 2 meters squared  Physical Exam  Vitals reviewed  Constitutional:       General: She is not in acute distress  Appearance: Normal appearance  HENT:      Head: Normocephalic and atraumatic  Mouth/Throat:      Mouth: Mucous membranes are moist    Pulmonary:      Effort: Pulmonary effort is normal       Breath sounds: Normal breath sounds  Abdominal:      Palpations: Abdomen is soft  There is no mass  Tenderness: There is no abdominal tenderness  There is no guarding or rebound  Hernia: No hernia is present  Skin:     General: Skin is warm and dry  Comments: Incision is clean dry and intact  Neurological:      Mental Status: She is alert and oriented to person, place, and time  Psychiatric:         Mood and Affect: Mood normal          Behavior: Behavior normal          Thought Content:  Thought content normal          Judgment: Judgment normal                       "

## 2023-06-29 NOTE — LETTER
June 29, 2023     [address]    Patient: Reginaldo Gomez   YOB: 1960   Date of Visit: 6/29/2023       Dear Dr Dylan Parker: Thank you for referring Reginaldo Gomez to me for evaluation  Below are my notes for this consultation  If you have questions, please do not hesitate to call me  I look forward to following your patient along with you  Sincerely,        Marianne Huerta MD        CC: No Recipients    Marianne Huerta MD  6/29/2023  6:26 PM  Sign when Signing Visit  Assessment/Plan:    Problem List Items Addressed This Visit          Genitourinary    Uterine leiomyosarcoma ([facility] [address] ) - Primary     27-year-old with stage II uterine leiomyosarcoma, peritoneal cavity at risk for recurrence given the malignancy had perforated the uterus  She is recovering well from surgery  Her performance status is 1   1   I discussed the pathophysiology and prognosis of uterine leiomyosarcoma  2   Given the peritoneal cavity is at risk for metastatic disease, I recommended starting with adjuvant chemotherapy  Radiation can be considered to reduce the risk of pelvic disease after completion of chemotherapy  3   I discussed the risks and benefits of starting gemcitabine at 800 mg per metered squared on day 1 and 8 of a 21-day cycle along with docetaxel at 65 mg per metered squared on day 8 of a 21-day cycle followed by PEG filgrastim for 4 cycles followed by 4 cycles of single agent Adriamycin at 60 mg per metered squared every 21 days for 4 additional cycles  She understands the risks and benefits of treatment including the risks of pancytopenia, alopecia, allergic reaction, neuropathy, fluid retention, hepatic and renal damage, skin rashes, fatigue and she agrees to proceed as outlined  Consent for treatment was obtained by me in the office  Plan for follow-up CT imaging after completion of 3 cycles of of gemcitabine and docetaxel    4   Referral to interventional radiology for Mediport placement  5   We discussed ongoing activity limitations  I spent 30 minutes with the patient  More than half the time was spent in counseling and coordination of care regarding treatment of her leiomyosarcoma  Only brief time was spent on the postoperative history and physical examination  Relevant Orders    CBC and differential    Comprehensive metabolic panel    Ambulatory referral to Interventional Radiology         CHIEF COMPLAINT: Treatment discussion, postoperative evaluation       Problem:  Cancer Staging   Uterine leiomyosarcoma [facility])  Staging form: Corpus Uteri - Sarcoma, AJCC 8th Edition  - Pathologic stage from 6/4/2023: FIGO Stage II, calculated as Stage Unknown (pT2, pNX, cM0) - Signed by Kirsten Umaña MD on 6/29/2023        Previous therapy:  Oncology History   Uterine leiomyosarcoma ([facility] [address] )   6/4/2023 Initial Diagnosis    Uterine sarcoma ([facility] [address] )     6/4/2023 -  Cancer Staged    Staging form: Corpus Uteri - Sarcoma, AJCC 8th Edition  - Pathologic stage from 6/4/2023: FIGO Stage II, calculated as Stage Unknown (pT2, pNX, cM0) - Signed by Kirsten Umaña MD on 6/29/2023  Stage prefix: Initial diagnosis       6/4/2023 Surgery    HYSTERECTOMY TOTAL ABDOMINAL (DOROTHY)  BILATERAL SALPINGOOPHORECTOMY  EXCISION  BIOPSY LESION/MASS ABDOMINAL-PELVIC PERITONEUM  -Leiomyosarcoma 13 5 cm extending through the myometrium, right pelvic peritoneum involved with uterine leiomyosarcoma with tumor present at unoriented resection surface  Patient ID: Wagner Kim is a 60 y o  female  Who returns for postoperative evaluation and treatment discussion  She is ambulating, voiding, having bowel movements but is constipated  No vaginal bleeding  No fevers  She does not require pain medication        The following portions of the patient's history were reviewed and updated as appropriate: allergies, current medications, past family history, past medical history, past social history, past surgical history and problem list     Review of Systems   Constitutional: Negative for activity change and unexpected weight change  HENT: Negative  Eyes: Negative  Respiratory: Negative  Cardiovascular: Negative  Gastrointestinal: Positive for constipation  Negative for abdominal distention and abdominal pain  Endocrine: Negative  Genitourinary: Negative for pelvic pain and vaginal bleeding  Musculoskeletal: Negative  Skin: Negative  Allergic/Immunologic: Negative  Neurological: Negative  Hematological: Negative  Psychiatric/Behavioral: Negative            Current Outpatient Medications:   •  aspirin (ECOTRIN LOW STRENGTH) 81 mg EC tablet, Take 81 mg by mouth daily, Disp: , Rfl:   •  atorvastatin (LIPITOR) 40 mg tablet, Take 40 mg by mouth daily at bedtime, Disp: , Rfl:   •  cetirizine (ZyrTEC) 10 mg tablet, Take 10 mg by mouth daily, Disp: , Rfl:   •  Cetirizine HCl 0 24 % SOLN, Take by mouth, Disp: , Rfl:   •  cholecalciferol (VITAMIN D3) 1,000 units tablet, Take 1,000 Units by mouth daily 2 daily, Disp: , Rfl:   •  CLENPIQ 10-3 5-12 MG-GM -GM/160ML SOLN, USE AS DIRECTED, Disp: 320 mL, Rfl: 0  •  DULoxetine (CYMBALTA) 30 mg delayed release capsule, Take 30 mg by mouth daily, Disp: , Rfl:   •  DULoxetine (CYMBALTA) 30 mg delayed release capsule, TAKE 1 CAPSULE BY MOUTH EVERY DAY FOR 90 DAYS, Disp: , Rfl:   •  escitalopram (LEXAPRO) 10 mg tablet, Take 10 mg by mouth daily, Disp: , Rfl:   •  famotidine (PEPCID) 40 MG tablet, , Disp: , Rfl:   •  glimepiride (AMARYL) 4 mg tablet, Take 4 mg by mouth 2 (two) times a day, Disp: , Rfl:   •  hydrochlorothiazide (HYDRODIURIL) 25 mg tablet, Take 25 mg by mouth daily, Disp: , Rfl:   •  ibuprofen (MOTRIN) 800 mg tablet, Take 800 mg by mouth every 6 (six) hours as needed for mild pain, Disp: , Rfl:   •  losartan (COZAAR) 50 mg tablet, Take 50 mg by mouth daily, Disp: , Rfl:   •  meclizine (ANTIVERT) "25 mg tablet, Take by mouth every 12 (twelve) hours as needed for dizziness, Disp: , Rfl:   •  metoprolol succinate (TOPROL-XL) 25 mg 24 hr tablet, Take 25 mg by mouth daily, Disp: , Rfl:   •  Multiple Vitamin (MULTIVITAMIN) tablet, Take 1 tablet by mouth daily, Disp: , Rfl:   •  nystatin (MYCOSTATIN) powder, USE AS DIRECTED 3 TIMES A DAY FOR 10 DAYS, Disp: , Rfl:   •  other medication, see sig,, Medication/product name: Lifitegrast (xiidra) Strength:  Sig (include dose, route, frequency): one drop ou BID, Disp: , Rfl:   •  oxyCODONE-acetaminophen (Percocet) 5-325 mg per tablet, Take 1 tablet by mouth every 4 (four) hours as needed for moderate pain for up to 30 doses Max Daily Amount: 6 tablets, Disp: 30 tablet, Rfl: 0  •  pantoprazole (PROTONIX) 40 mg tablet, Take 40 mg by mouth daily, Disp: , Rfl:   •  sertraline (ZOLOFT) 100 mg tablet, TAKE 1 AND 1/2 TABS BY MOUTH DAILY, Disp: , Rfl:   •  sertraline (ZOLOFT) 50 mg tablet, Take 150 mg by mouth daily, Disp: , Rfl:   •  Triamcinolone Acetonide 55 MCG/ACT AERO, into each nostril One spray each nostril daily, Disp: , Rfl:   •  ZOLMitriptan (ZOMIG) 5 MG tablet, Take 5 mg by mouth once as needed for migraine Daily prn, Disp: , Rfl:     Objective:    Blood pressure 130/90, pulse 61, temperature 97 9 °F (36 6 °C), temperature source Temporal, height 5' 4\" (1 626 m), weight 95 9 kg (211 lb 6 4 oz), SpO2 97 %  Body mass index is 36 29 kg/m²  Body surface area is 2 meters squared  Physical Exam  Vitals reviewed  Constitutional:       General: She is not in acute distress  Appearance: Normal appearance  HENT:      Head: Normocephalic and atraumatic  Mouth/Throat:      Mouth: Mucous membranes are moist    Pulmonary:      Effort: Pulmonary effort is normal       Breath sounds: Normal breath sounds  Abdominal:      Palpations: Abdomen is soft  There is no mass  Tenderness: There is no abdominal tenderness  There is no guarding or rebound        Hernia: " No hernia is present  Skin:     General: Skin is warm and dry  Comments: Incision is clean dry and intact  Neurological:      Mental Status: She is alert and oriented to person, place, and time  Psychiatric:         Mood and Affect: Mood normal          Behavior: Behavior normal          Thought Content:  Thought content normal          Judgment: Judgment normal

## 2023-06-30 ENCOUNTER — HOSPITAL ENCOUNTER (OUTPATIENT)
Dept: INTERVENTIONAL RADIOLOGY/VASCULAR | Facility: HOSPITAL | Age: 63
Discharge: HOME | End: 2023-06-30
Attending: OBSTETRICS & GYNECOLOGY | Admitting: RADIOLOGY
Payer: COMMERCIAL

## 2023-06-30 VITALS
HEART RATE: 79 BPM | SYSTOLIC BLOOD PRESSURE: 121 MMHG | OXYGEN SATURATION: 95 % | DIASTOLIC BLOOD PRESSURE: 70 MMHG | RESPIRATION RATE: 18 BRPM | TEMPERATURE: 98.2 F

## 2023-06-30 DIAGNOSIS — C55 UTERINE LEIOMYOSARCOMA (HCC): Primary | ICD-10-CM

## 2023-06-30 DIAGNOSIS — C55 UTERINE LEIOMYOSARCOMA (HCC): ICD-10-CM

## 2023-06-30 PROBLEM — Z45.2 ENCOUNTER FOR CENTRAL LINE CARE: Status: ACTIVE | Noted: 2023-06-30

## 2023-06-30 LAB — GLUCOSE SERPL-MCNC: 146 MG/DL (ref 65–140)

## 2023-06-30 PROCEDURE — 77001 FLUOROGUIDE FOR VEIN DEVICE: CPT

## 2023-06-30 PROCEDURE — 82948 REAGENT STRIP/BLOOD GLUCOSE: CPT

## 2023-06-30 PROCEDURE — C1788 PORT, INDWELLING, IMP: HCPCS

## 2023-06-30 PROCEDURE — 99152 MOD SED SAME PHYS/QHP 5/>YRS: CPT

## 2023-06-30 PROCEDURE — 36561 INSERT TUNNELED CV CATH: CPT

## 2023-06-30 PROCEDURE — 99153 MOD SED SAME PHYS/QHP EA: CPT

## 2023-06-30 PROCEDURE — C1894 INTRO/SHEATH, NON-LASER: HCPCS

## 2023-06-30 PROCEDURE — 76937 US GUIDE VASCULAR ACCESS: CPT

## 2023-06-30 RX ORDER — SODIUM CHLORIDE 9 MG/ML
75 INJECTION, SOLUTION INTRAVENOUS CONTINUOUS
Status: DISCONTINUED | OUTPATIENT
Start: 2023-06-30 | End: 2023-07-01 | Stop reason: HOSPADM

## 2023-06-30 RX ORDER — ONDANSETRON HYDROCHLORIDE 8 MG/1
8 TABLET, FILM COATED ORAL EVERY 8 HOURS PRN
Qty: 20 TABLET | Refills: 1 | Status: SHIPPED | OUTPATIENT
Start: 2023-06-30

## 2023-06-30 RX ORDER — LORAZEPAM 0.5 MG/1
0.5 TABLET ORAL ONCE
Status: DISCONTINUED | OUTPATIENT
Start: 2023-06-30 | End: 2023-06-30

## 2023-06-30 RX ORDER — MIDAZOLAM HYDROCHLORIDE 2 MG/2ML
INJECTION, SOLUTION INTRAMUSCULAR; INTRAVENOUS AS NEEDED
Status: COMPLETED | OUTPATIENT
Start: 2023-06-30 | End: 2023-06-30

## 2023-06-30 RX ORDER — FENTANYL CITRATE 50 UG/ML
INJECTION, SOLUTION INTRAMUSCULAR; INTRAVENOUS AS NEEDED
Status: COMPLETED | OUTPATIENT
Start: 2023-06-30 | End: 2023-06-30

## 2023-06-30 RX ORDER — LORAZEPAM 1 MG/1
1 TABLET ORAL EVERY 8 HOURS PRN
Qty: 30 TABLET | Refills: 0 | Status: SHIPPED | OUTPATIENT
Start: 2023-06-30

## 2023-06-30 RX ORDER — CEFAZOLIN SODIUM 2 G/50ML
2000 SOLUTION INTRAVENOUS ONCE
Status: COMPLETED | OUTPATIENT
Start: 2023-06-30 | End: 2023-06-30

## 2023-06-30 RX ORDER — DEXAMETHASONE 4 MG/1
TABLET ORAL
Qty: 40 TABLET | Refills: 0 | Status: SHIPPED | OUTPATIENT
Start: 2023-06-30

## 2023-06-30 RX ADMIN — MIDAZOLAM 0.5 MG: 1 INJECTION INTRAMUSCULAR; INTRAVENOUS at 09:12

## 2023-06-30 RX ADMIN — Medication 10 ML: at 09:20

## 2023-06-30 RX ADMIN — FENTANYL CITRATE 25 MCG: 50 INJECTION, SOLUTION INTRAMUSCULAR; INTRAVENOUS at 09:13

## 2023-06-30 RX ADMIN — FENTANYL CITRATE 50 MCG: 50 INJECTION, SOLUTION INTRAMUSCULAR; INTRAVENOUS at 09:07

## 2023-06-30 RX ADMIN — CEFAZOLIN SODIUM 2000 MG: 2 SOLUTION INTRAVENOUS at 09:02

## 2023-06-30 RX ADMIN — Medication 10 ML: at 09:22

## 2023-06-30 RX ADMIN — MIDAZOLAM 0.5 MG: 1 INJECTION INTRAMUSCULAR; INTRAVENOUS at 09:16

## 2023-06-30 RX ADMIN — FENTANYL CITRATE 25 MCG: 50 INJECTION, SOLUTION INTRAMUSCULAR; INTRAVENOUS at 09:16

## 2023-06-30 RX ADMIN — MIDAZOLAM 1 MG: 1 INJECTION INTRAMUSCULAR; INTRAVENOUS at 09:07

## 2023-06-30 NOTE — DISCHARGE INSTRUCTIONS
Implanted Venous Access Port     WHAT YOU NEED TO KNOW:   An implanted venous access port is a device used to give treatments and take blood  It may also be called a central venous access device (CVAD)  The port is a small container that is placed under your skin, usually in your upper chest  The port is attached to a catheter that enters a large vein  DISCHARGE INSTRUCTIONS:   Resume your normal diet  Small sips of flat soda will help with mild nausea  Prevent an infection:   Wash your hands often  Use soap and water  Clean your hands before and after you care for your port  Remind everyone who cares for your port to wash their hands  Check your skin for infection every day  Look for redness, swelling, or fluid oozing from the port site  Care for your port:   1  You may shower beginning 48 hours after procedure  2   Leave glue in place  3  It is normal for some bruising to occur  4  Use Tylenol for pain  5  Limit use of arm on the side that your port was placed  Lift nothing heavier than 5 pounds for 1 week, and then gradually increase activity as tolerated  6  DO NOT apply ointment, lotion or cream to port site until incision is healed  Allow glue to fall off  DO NOT attempt to peel glue from skin even it it begins to flake  7  After the port incision is healed you may swim, bathe  Notify the Interventional Radiologist if you have any of the followin  Fever above 101 F    2  Increased redness or swelling after 1st day  3  Increased pain after 1st day  4  Any sign of infection (drainage from port site, skin separation, hot to touch)  5  Persistent nausea or vomiting  Contact Interventional Radiology at [phone] Procedural Sedation   WHAT YOU NEED TO KNOW:   Procedural sedation is medicine used during procedures to help you feel relaxed and calm  You will remember little to none of the procedure  After sedation you may feel tired, weak, or unsteady on your feet  You may also have trouble concentrating or short-term memory loss  These symptoms should go away in 24 hours or less  DISCHARGE INSTRUCTIONS:     Call 911 or have someone else call for any of the following: You have sudden trouble breathing  You cannot be woken  Contact Interventional Radiology at [phone]   Jonah PATIENTS: Contact Interventional Radiology at [phone]) Wiley Araujo PATIENTS: Contact Interventional Radiology at [phone]) if any of the following occur: You have a severe headache or dizziness  Your heart is beating faster than usual     You have a fever or chills  Your skin is itchy, swollen, or you have a rash  You have nausea or are vomiting for more than 8 hours after the procedure  You have questions or concerns about your condition or care  Self-care:   Have someone stay with you for 24 hours  This person can drive you to errands and help you do things around the house  This person can also watch for problems  Rest and do quiet activities for 24 hours  Do not exercise, ride a bike, or play sports  Stand up slowly to prevent dizziness and falls  Take short walks around the house with another person  Slowly return to your usual activities the next day  Do not drive or use dangerous machines or tools for 24 hours  You may injure yourself or others  Examples include a lawnmower, saw, or drill  Do not return to work for 24 hours if you use dangerous machines or tools for work  Do not make important decisions for 24 hours  For example, do not sign important papers or invest money  Drink liquids as directed  Liquids help flush the sedation medicine out of your body  Ask how much liquid to drink each day and which liquids are best for you  Eat small, frequent meals to prevent nausea and vomiting  Start with clear liquids such as juice or broth  If you do not vomit after clear liquids, you can eat your usual foods        Do not drink alcohol or take medicines that make you drowsy  This includes medicines that help you sleep and anxiety medicines  Ask your healthcare provider if it is safe for you to take pain medicine  Follow up with your healthcare provider as directed: Write down your questions so you remember to ask them during your visits

## 2023-06-30 NOTE — INTERVAL H&P NOTE
Patient arrived to IR for port placement    The procedure and risks were discussed with the patient  All questions were answered  Informed consent was obtained  H & P reviewed after examining the patient and I find no changes in the patient condition since the H & P has been written  /84   Pulse 81   Temp 98 1 °F (36 7 °C) (Oral)   Resp 18   SpO2 97%     Patient re-evaluated   Accept as history and physical     Raman Shelton, DO/June 30, 2023/8:40 AM

## 2023-07-07 ENCOUNTER — HOSPITAL ENCOUNTER (OUTPATIENT)
Dept: INFUSION CENTER | Facility: HOSPITAL | Age: 63
End: 2023-07-07
Payer: COMMERCIAL

## 2023-07-07 ENCOUNTER — PATIENT OUTREACH (OUTPATIENT)
Dept: HEMATOLOGY ONCOLOGY | Facility: CLINIC | Age: 63
End: 2023-07-07

## 2023-07-07 ENCOUNTER — TELEPHONE (OUTPATIENT)
Age: 63
End: 2023-07-07

## 2023-07-07 DIAGNOSIS — C55 UTERINE LEIOMYOSARCOMA (HCC): Primary | ICD-10-CM

## 2023-07-07 DIAGNOSIS — C55 UTERINE LEIOMYOSARCOMA (HCC): ICD-10-CM

## 2023-07-07 LAB
ALBUMIN SERPL BCP-MCNC: 4.4 G/DL (ref 3.5–5)
ALP SERPL-CCNC: 73 U/L (ref 34–104)
ALT SERPL W P-5'-P-CCNC: 12 U/L (ref 7–52)
ANION GAP SERPL CALCULATED.3IONS-SCNC: 8 MMOL/L
AST SERPL W P-5'-P-CCNC: 12 U/L (ref 13–39)
BASOPHILS # BLD AUTO: 0.03 THOUSANDS/ÂΜL (ref 0–0.1)
BASOPHILS NFR BLD AUTO: 0 % (ref 0–1)
BILIRUB SERPL-MCNC: 0.93 MG/DL (ref 0.2–1)
BUN SERPL-MCNC: 23 MG/DL (ref 5–25)
CALCIUM SERPL-MCNC: 10.2 MG/DL (ref 8.4–10.2)
CHLORIDE SERPL-SCNC: 100 MMOL/L (ref 96–108)
CO2 SERPL-SCNC: 31 MMOL/L (ref 21–32)
CREAT SERPL-MCNC: 0.76 MG/DL (ref 0.6–1.3)
EOSINOPHIL # BLD AUTO: 0.18 THOUSAND/ÂΜL (ref 0–0.61)
EOSINOPHIL NFR BLD AUTO: 2 % (ref 0–6)
ERYTHROCYTE [DISTWIDTH] IN BLOOD BY AUTOMATED COUNT: 13.2 % (ref 11.6–15.1)
GFR SERPL CREATININE-BSD FRML MDRD: 83 ML/MIN/1.73SQ M
GLUCOSE SERPL-MCNC: 141 MG/DL (ref 65–140)
HCT VFR BLD AUTO: 36.4 % (ref 34.8–46.1)
HGB BLD-MCNC: 11.7 G/DL (ref 11.5–15.4)
IMM GRANULOCYTES # BLD AUTO: 0.02 THOUSAND/UL (ref 0–0.2)
IMM GRANULOCYTES NFR BLD AUTO: 0 % (ref 0–2)
LYMPHOCYTES # BLD AUTO: 4.02 THOUSANDS/ÂΜL (ref 0.6–4.47)
LYMPHOCYTES NFR BLD AUTO: 43 % (ref 14–44)
MAGNESIUM SERPL-MCNC: 1.8 MG/DL (ref 1.9–2.7)
MCH RBC QN AUTO: 29.4 PG (ref 26.8–34.3)
MCHC RBC AUTO-ENTMCNC: 32.1 G/DL (ref 31.4–37.4)
MCV RBC AUTO: 92 FL (ref 82–98)
MONOCYTES # BLD AUTO: 0.5 THOUSAND/ÂΜL (ref 0.17–1.22)
MONOCYTES NFR BLD AUTO: 5 % (ref 4–12)
NEUTROPHILS # BLD AUTO: 4.54 THOUSANDS/ÂΜL (ref 1.85–7.62)
NEUTS SEG NFR BLD AUTO: 50 % (ref 43–75)
NRBC BLD AUTO-RTO: 0 /100 WBCS
PLATELET # BLD AUTO: 312 THOUSANDS/UL (ref 149–390)
PMV BLD AUTO: 10.2 FL (ref 8.9–12.7)
POTASSIUM SERPL-SCNC: 3.7 MMOL/L (ref 3.5–5.3)
PROT SERPL-MCNC: 7.7 G/DL (ref 6.4–8.4)
RBC # BLD AUTO: 3.98 MILLION/UL (ref 3.81–5.12)
SODIUM SERPL-SCNC: 139 MMOL/L (ref 135–147)
WBC # BLD AUTO: 9.29 THOUSAND/UL (ref 4.31–10.16)

## 2023-07-07 PROCEDURE — 80053 COMPREHEN METABOLIC PANEL: CPT

## 2023-07-07 PROCEDURE — 83735 ASSAY OF MAGNESIUM: CPT

## 2023-07-07 PROCEDURE — 85025 COMPLETE CBC W/AUTO DIFF WBC: CPT

## 2023-07-07 RX ORDER — LIDOCAINE AND PRILOCAINE 25; 25 MG/G; MG/G
CREAM TOPICAL
Qty: 30 G | Refills: 2 | Status: SHIPPED | OUTPATIENT
Start: 2023-07-07

## 2023-07-07 NOTE — PROGRESS NOTES
Pt arrived amb with family for port labs. Questions answered to pt and family's satisfaction. Port accessed, brisk blood return, labs obtained, de-accessed, dsd applied. Tour of unit given. Disch amb to home, steady gait.

## 2023-07-07 NOTE — TELEPHONE ENCOUNTER
Diana White stopped in to the office today after appointment at infusion to get a script for emla cream. Please send to her [facility] pharmacy in [address]

## 2023-07-10 RX ORDER — MAGNESIUM SULFATE HEPTAHYDRATE 40 MG/ML
2 INJECTION, SOLUTION INTRAVENOUS ONCE
Status: CANCELLED | OUTPATIENT
Start: 2023-07-11

## 2023-07-10 RX ORDER — SODIUM CHLORIDE 9 MG/ML
20 INJECTION, SOLUTION INTRAVENOUS ONCE
Status: CANCELLED | OUTPATIENT
Start: 2023-07-11

## 2023-07-11 ENCOUNTER — DOCUMENTATION (OUTPATIENT)
Dept: GYNECOLOGIC ONCOLOGY | Facility: CLINIC | Age: 63
End: 2023-07-11

## 2023-07-11 ENCOUNTER — HOSPITAL ENCOUNTER (OUTPATIENT)
Dept: INFUSION CENTER | Facility: HOSPITAL | Age: 63
Discharge: HOME | End: 2023-07-11
Attending: OBSTETRICS & GYNECOLOGY
Payer: COMMERCIAL

## 2023-07-11 ENCOUNTER — PATIENT OUTREACH (OUTPATIENT)
Dept: CASE MANAGEMENT | Facility: HOSPITAL | Age: 63
End: 2023-07-11

## 2023-07-11 VITALS
OXYGEN SATURATION: 97 % | RESPIRATION RATE: 16 BRPM | TEMPERATURE: 97.8 F | WEIGHT: 212.3 LBS | DIASTOLIC BLOOD PRESSURE: 81 MMHG | SYSTOLIC BLOOD PRESSURE: 131 MMHG | HEART RATE: 78 BPM | HEIGHT: 65 IN | BODY MASS INDEX: 35.37 KG/M2

## 2023-07-11 DIAGNOSIS — C55 UTERINE LEIOMYOSARCOMA (HCC): Primary | ICD-10-CM

## 2023-07-11 PROCEDURE — 96367 TX/PROPH/DG ADDL SEQ IV INF: CPT

## 2023-07-11 PROCEDURE — 96413 CHEMO IV INFUSION 1 HR: CPT

## 2023-07-11 PROCEDURE — 96368 THER/DIAG CONCURRENT INF: CPT

## 2023-07-11 RX ORDER — MAGNESIUM SULFATE HEPTAHYDRATE 40 MG/ML
2 INJECTION, SOLUTION INTRAVENOUS ONCE
Status: COMPLETED | OUTPATIENT
Start: 2023-07-11 | End: 2023-07-11

## 2023-07-11 RX ORDER — SODIUM CHLORIDE 9 MG/ML
20 INJECTION, SOLUTION INTRAVENOUS ONCE
Status: COMPLETED | OUTPATIENT
Start: 2023-07-11 | End: 2023-07-11

## 2023-07-11 RX ADMIN — DEXAMETHASONE SODIUM PHOSPHATE 20 MG: 10 INJECTION, SOLUTION INTRAMUSCULAR; INTRAVENOUS at 08:35

## 2023-07-11 RX ADMIN — GEMCITABINE 1600 MG: 38 INJECTION, SOLUTION INTRAVENOUS at 09:09

## 2023-07-11 RX ADMIN — MAGNESIUM SULFATE HEPTAHYDRATE 2 G: 2 INJECTION, SOLUTION INTRAVENOUS at 08:43

## 2023-07-11 RX ADMIN — SODIUM CHLORIDE 20 ML/HR: 0.9 INJECTION, SOLUTION INTRAVENOUS at 08:35

## 2023-07-11 NOTE — PROGRESS NOTES
Multidisciplinary Gynecologic Oncology Tumor Case Review       Physician Recommended Plan     China Eckert is a 60 y.o. female     Diagnosis: Stage 2 B uterine leiomyosarcoma     Patient was discussed at the Multidisciplinary Gynecologic Oncology Case review on 7/10/2023. The group recommended to consider treatment with chemotherapy and referral to Radiation Oncology in consideration of vaginal brachytherapy. Follow-up appointment with Severo Roberson PA-C on 8/3/2023. NCCN guidelines were available for review. The final treatment plan will be left to the discretion of the patient and the treating physician. DISCLAIMERS:    TO THE TREATING PHYSICIAN:  This conference is a meeting of clinicians from various specialty areas who evaluate and discuss patients for whom a multidisciplinary treatment approach is being considered. Please note that the above opinion was a consensus of the conference attendees and is intended only to assist in quality care of the discussed patient. The responsibility for follow up on the input given during the conference, along with any final decisions regarding plan of care, is that of the patient and the patient's provider. Accordingly, appointments have only been recommended based on this information and have NOT been scheduled unless otherwise noted. TO THE PATIENT:  This summary is a brief record of major aspects of your cancer treatment. You may choose to share a copy with any of your doctors or nurses. However, this is not a detailed or comprehensive record of your care.

## 2023-07-11 NOTE — PROGRESS NOTES
Chemotherapy infusion completed with no adverse reactions. Declined AVS, left unit ambulatory with steady gait.

## 2023-07-11 NOTE — PROGRESS NOTES
Biopsychosocial and Barriers Assessment    Cancer Diagnosis: Uterine Leiomyosarcoma  Home/Cell Phone: [phone] (W) [phone] (H)  Emergency Contact: Trudy Argueta [phone]  Marital Status:   Interpretation concerns, speaks another language, preferred language: English   Cultural concerns: none reported   Ability to read or write:     Caregiver/Support: Patient's daughter, Katelin Miller is a stay at home mom and provides support to patient in her home and accompanies to appointments. Patient's spouse also has cancer and is on an oral treatment. Children: Giuseppe Schmidt (daughter), Katelin Miller (daughter) and Mora Cm (son)      Child/Elder care: none    Housing: owns home   Home Setup: Patient has steps to enter and steps to get to room. Patient reported that she is currently sleeping downstairs in recliner due to not being able to lay flat. Lives With:  Spouse and 21 yr old grandson  Daily Living Activities: independent   Durable Medical Equipment: Has a commode and a shower chair  Ambulation: independent     Preferred Pharmacy:  [facility]  High co-pays with insurance:  none     High co-pays with medication coverage: Patient reported that she was getting a numbing cream but payment with insurance was $50 and the pharmacy used prescription card and the cost was below $10.   No medication coverage:  Has medication coverage     Primary Care Provider: Dr. Evlin Contreras  Hx of [address] [place] [address] St: [address] [place] [address]  Hx of Short term rehab:  none  Mental Health Hx: depression, treated with medication  Substance Abuse Hx:  none  Employment: Works as a  during the year, currently on unemployment   Status/Location: none  Ability to pay bills: Patient is on payment plans for some of her bills. Patient does have an outstanding bill for PPL she thinks of $800. Patient was switched over to paperless and didn't realize that she hadn't paid the bills.  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 POA/LW/AD: was provided today with the [facility] CHRISTIAN Representative and Advanced Medical Directive Declaration. Transportation Plan/Concerns: patient denies difficulty with transportation. Daughter or patient's best friend. What do you know about your Cancer Diagnosis    What has your doctor told you about your cancer diagnosis:    What has your doctor told you about your cancer treatment: Patient starting first treatment today. Patient reported that she doesn't have any questions/concerns today. What specific concerns do you have about your diagnosis and treatment: denies, stating that her provider answered questions. Have you been made aware of any hair loss associated with treatment:    Additional Comments: Met with patient and her daughter, Hugo Romero in the infusion center. Patient is starting her first chemo today. Patient reported that she does have a history of depression but is being treated medically for it and it has been helpful. Patient reported that she has always had difficulty sleeping, gets about 4-5 hours a night. Patient reported that she does nap during the day. Patient does report difficulty with some of her bills, particularly her PPL bill as it transitioned to paperless and she lost track. She is having difficulty catching up due to being on unemployment for the summer. Patient's daughter, Hugo Romero and her best friend assist with transportation. Patient was provided with paperwork to complete her Advanced Directive and LSW reviewed with her. Patient was also provided with information on virtual event provided by [facility] Oncology Department. Patient will bring in bill for PPL to see if she can be assisted with payment.

## 2023-07-12 ENCOUNTER — DOCUMENTATION (OUTPATIENT)
Dept: HEMATOLOGY ONCOLOGY | Facility: CLINIC | Age: 63
End: 2023-07-12

## 2023-07-12 NOTE — PROGRESS NOTES
Oncology Finance Advocacy Intake and Intervention  Oncology Finance Counselor/Advocate placed call to patient. This writer informed patient that this writer is here to assist patient with billing questions, financial assistance, payment/payment plans, quotes, copayment assistance, insurance optimization, and insurance navigation. This writer conducted a thorough benefit review of copayment, deductible, and out of pocket cost. This information is documented below and has been reviewed with patient. Copayment: N/A  Deductible: $2000 MET REMAINING -0-  Out of Pocket Cost: $8550 MET $2860.25 REMAINING $0098.47  Insurance optimization (Limited benefit vs self-pay):  Patient assistance status:  Free Drug Applications:  Interventions:  Called pt on [phone] got voicemail left a message for pt to call me back  Called pt on [phone] got her voicemail left a message for pt to call me back  Added to careteam        Information above was review thoroughly with patient and patient was advise of possible assistance programs/interventions. If any question arise patient can contact this writer at below information. This information was given to patient at time of contact. Jenny Castañeda  Phone:[phone]  Email: [email or web address]

## 2023-07-14 ENCOUNTER — HOSPITAL ENCOUNTER (OUTPATIENT)
Dept: INFUSION CENTER | Facility: HOSPITAL | Age: 63
End: 2023-07-14
Attending: OBSTETRICS & GYNECOLOGY
Payer: COMMERCIAL

## 2023-07-14 DIAGNOSIS — Z45.2 ENCOUNTER FOR CENTRAL LINE CARE: ICD-10-CM

## 2023-07-14 DIAGNOSIS — C55 UTERINE LEIOMYOSARCOMA (HCC): Primary | ICD-10-CM

## 2023-07-14 PROBLEM — D70.1 CHEMOTHERAPY INDUCED NEUTROPENIA (HCC): Status: ACTIVE | Noted: 2023-07-14

## 2023-07-14 PROBLEM — T45.1X5A CHEMOTHERAPY INDUCED NEUTROPENIA (HCC): Status: ACTIVE | Noted: 2023-07-14

## 2023-07-14 LAB
ALBUMIN SERPL BCP-MCNC: 4.3 G/DL (ref 3.5–5)
ALP SERPL-CCNC: 68 U/L (ref 34–104)
ALT SERPL W P-5'-P-CCNC: 13 U/L (ref 7–52)
ANION GAP SERPL CALCULATED.3IONS-SCNC: 9 MMOL/L
AST SERPL W P-5'-P-CCNC: 13 U/L (ref 13–39)
BASOPHILS # BLD AUTO: 0.02 THOUSANDS/ÂΜL (ref 0–0.1)
BASOPHILS NFR BLD AUTO: 0 % (ref 0–1)
BILIRUB SERPL-MCNC: 1.31 MG/DL (ref 0.2–1)
BUN SERPL-MCNC: 22 MG/DL (ref 5–25)
CALCIUM SERPL-MCNC: 10.1 MG/DL (ref 8.4–10.2)
CHLORIDE SERPL-SCNC: 98 MMOL/L (ref 96–108)
CO2 SERPL-SCNC: 31 MMOL/L (ref 21–32)
CREAT SERPL-MCNC: 0.8 MG/DL (ref 0.6–1.3)
EOSINOPHIL # BLD AUTO: 0.11 THOUSAND/ÂΜL (ref 0–0.61)
EOSINOPHIL NFR BLD AUTO: 1 % (ref 0–6)
ERYTHROCYTE [DISTWIDTH] IN BLOOD BY AUTOMATED COUNT: 13.1 % (ref 11.6–15.1)
GFR SERPL CREATININE-BSD FRML MDRD: 78 ML/MIN/1.73SQ M
GLUCOSE SERPL-MCNC: 139 MG/DL (ref 65–140)
HCT VFR BLD AUTO: 36.5 % (ref 34.8–46.1)
HGB BLD-MCNC: 12 G/DL (ref 11.5–15.4)
IMM GRANULOCYTES # BLD AUTO: 0.03 THOUSAND/UL (ref 0–0.2)
IMM GRANULOCYTES NFR BLD AUTO: 0 % (ref 0–2)
LYMPHOCYTES # BLD AUTO: 3.72 THOUSANDS/ÂΜL (ref 0.6–4.47)
LYMPHOCYTES NFR BLD AUTO: 43 % (ref 14–44)
MAGNESIUM SERPL-MCNC: 1.9 MG/DL (ref 1.9–2.7)
MCH RBC QN AUTO: 29.6 PG (ref 26.8–34.3)
MCHC RBC AUTO-ENTMCNC: 32.9 G/DL (ref 31.4–37.4)
MCV RBC AUTO: 90 FL (ref 82–98)
MONOCYTES # BLD AUTO: 0.05 THOUSAND/ÂΜL (ref 0.17–1.22)
MONOCYTES NFR BLD AUTO: 1 % (ref 4–12)
NEUTROPHILS # BLD AUTO: 4.8 THOUSANDS/ÂΜL (ref 1.85–7.62)
NEUTS SEG NFR BLD AUTO: 55 % (ref 43–75)
NRBC BLD AUTO-RTO: 0 /100 WBCS
PLATELET # BLD AUTO: 337 THOUSANDS/UL (ref 149–390)
PMV BLD AUTO: 10.2 FL (ref 8.9–12.7)
POTASSIUM SERPL-SCNC: 3.6 MMOL/L (ref 3.5–5.3)
PROT SERPL-MCNC: 7.3 G/DL (ref 6.4–8.4)
RBC # BLD AUTO: 4.05 MILLION/UL (ref 3.81–5.12)
SODIUM SERPL-SCNC: 138 MMOL/L (ref 135–147)
WBC # BLD AUTO: 8.73 THOUSAND/UL (ref 4.31–10.16)

## 2023-07-14 PROCEDURE — 85025 COMPLETE CBC W/AUTO DIFF WBC: CPT

## 2023-07-14 PROCEDURE — 80053 COMPREHEN METABOLIC PANEL: CPT

## 2023-07-14 PROCEDURE — 83735 ASSAY OF MAGNESIUM: CPT

## 2023-07-14 RX ORDER — SODIUM CHLORIDE 9 MG/ML
20 INJECTION, SOLUTION INTRAVENOUS ONCE
Status: CANCELLED | OUTPATIENT
Start: 2023-07-17

## 2023-07-17 ENCOUNTER — HOSPITAL ENCOUNTER (OUTPATIENT)
Dept: INFUSION CENTER | Facility: HOSPITAL | Age: 63
Discharge: HOME | End: 2023-07-17
Attending: OBSTETRICS & GYNECOLOGY
Payer: COMMERCIAL

## 2023-07-17 VITALS
DIASTOLIC BLOOD PRESSURE: 75 MMHG | TEMPERATURE: 98.3 F | HEART RATE: 87 BPM | SYSTOLIC BLOOD PRESSURE: 137 MMHG | BODY MASS INDEX: 35.11 KG/M2 | WEIGHT: 210.76 LBS | RESPIRATION RATE: 12 BRPM | OXYGEN SATURATION: 97 % | HEIGHT: 65 IN

## 2023-07-17 DIAGNOSIS — C55 UTERINE LEIOMYOSARCOMA (HCC): Primary | ICD-10-CM

## 2023-07-17 DIAGNOSIS — D70.1 CHEMOTHERAPY INDUCED NEUTROPENIA (HCC): ICD-10-CM

## 2023-07-17 DIAGNOSIS — T45.1X5A CHEMOTHERAPY INDUCED NEUTROPENIA (HCC): ICD-10-CM

## 2023-07-17 PROCEDURE — 96367 TX/PROPH/DG ADDL SEQ IV INF: CPT

## 2023-07-17 PROCEDURE — 96413 CHEMO IV INFUSION 1 HR: CPT

## 2023-07-17 PROCEDURE — 96415 CHEMO IV INFUSION ADDL HR: CPT

## 2023-07-17 PROCEDURE — 96417 CHEMO IV INFUS EACH ADDL SEQ: CPT

## 2023-07-17 RX ORDER — SODIUM CHLORIDE 9 MG/ML
20 INJECTION, SOLUTION INTRAVENOUS ONCE
Status: COMPLETED | OUTPATIENT
Start: 2023-07-17 | End: 2023-07-17

## 2023-07-17 RX ADMIN — SODIUM CHLORIDE 20 ML/HR: 0.9 INJECTION, SOLUTION INTRAVENOUS at 12:14

## 2023-07-17 RX ADMIN — DOCETAXEL 130 MG: 20 INJECTION, SOLUTION, CONCENTRATE INTRAVENOUS at 14:24

## 2023-07-17 RX ADMIN — GEMCITABINE 1600 MG: 38 INJECTION, SOLUTION INTRAVENOUS at 12:49

## 2023-07-17 RX ADMIN — DEXAMETHASONE SODIUM PHOSPHATE 20 MG: 10 INJECTION, SOLUTION INTRAMUSCULAR; INTRAVENOUS at 12:14

## 2023-07-17 NOTE — PATIENT INSTRUCTIONS
Cycle 1, Day 8 Cycle 1, Day 9       23     24     25     26     27     28     29                30     31                                                Treatment Details         7/11/2023 - Cycle 1, Day 1      Supportive Care: magnesium sulfate      Chemotherapy: ONCBCN PROVIDER COMMUNICATION8, GEMCITABINE INFUSION    7/17/2023 - Cycle 1, Day 8      Chemotherapy: ONCBCN PROVIDER COMMUNICATION2, GEMCITABINE INFUSION, DOCETAXEL INFUSION    7/18/2023 - Cycle 1, Day 9      Supportive Care: pegfilgrastim (Fredia Mask), APPT 17 August 2023 Sunday Monday Tuesday Wednesday Thursday Friday Saturday             1     2     3    OFFICE VISIT SHORT PG  10:45 AM   (30 min.)   Eva Nelson PA-C   [facility] Gynecology Oncology Upper [address]    INF CATHETER MAINTENANCE  11:30 AM   (60 min.)   UB INF QUICK CHAIR   [facility] [address] [place] [address] [address] [place] 4    INF ONCOLOGY TX-TREATMENT PLAN  11:00 AM   (140 min.)   UB INFUSION CHAIR 7   [address]1 [address] [facility] [place] 5            Cycle 2, Day 1    6     7     8     9    INF CATHETER MAINTENANCE  10:30 AM   (60 min.)   UB INF 7101 [facility] [address] [place] 10     11    INF ONCOLOGY TX-TREATMENT PLAN  10:00 AM   (200 min.)   UB INFUSION CHAIR 10   [place] [id number] Infusion Center 12            Cycle 2, Day 8    13     14     15     16    INF CATHETER MAINTENANCE  11:00 AM   (60 min.)   [address] Infusion Center 17     18     19                20     21     22     23     24    OFFICE VISIT SHORT PG   9:45 AM   (30 min.)   Eva Nelson PA-C   [facility] Gynecology Oncology Upper [address]    INF CATHETER MAINTENANCE  10:30 AM   (60 min.)   UB INFUSION CHAIR 4   [address] Infusion Center 25    INF ONCOLOGY TX-TREATMENT PLAN  10:30 AM   (140 min.) UB INFUSION CHAIR 2   [facility] Infusion Center 26            Cycle 3, Day 1    27     28     29     30    INF CATHETER MAINTENANCE  11:00 AM   (60 min.)   UB INF 7101 [facility] [address] [place] 31                                 Treatment Details         8/4/2023 - Cycle 2, Day 1      Chemotherapy: ONCBCN PROVIDER COMMUNICATION8, GEMCITABINE INFUSION    8/11/2023 - Cycle 2, Day 8      Chemotherapy: ONCBCN PROVIDER COMMUNICATION2, GEMCITABINE INFUSION, DOCETAXEL INFUSION      Supportive Care: [facility] PROVIDER COMMUNICATION7, pegfilgrastim (Livan Melinda)    8/25/2023 - Cycle 3, Day 1      Chemotherapy: ONCBCN PROVIDER COMMUNICATION8, GEMCITABINE INFUSION             July 2023 Sunday Monday Tuesday Wednesday Thursday Friday Saturday                                 1                2     3     4     5     6     7    INF CATHETER MAINTENANCE  11:30 AM   (60 min.)   UB INF QUICK CHAIR   [facility] [address] [place] 8                9     10     11    INF ONCOLOGY TX-TREATMENT PLAN   8:00 AM   (140 min.)   UB INFUSION CHAIR 3   [facility] [address] Infusion Center 12     13     14    INF CATHETER MAINTENANCE   2:00 PM   (60 min.)   UB INFUSION CHAIR 6   [facility] [address] [place] 15         Cycle 1, Day 1       16     17    INF ONCOLOGY TX-TREATMENT PLAN  12:00 PM   (200 min.)   UB INFUSION CHAIR 12   [facility] [address] Infusion Center 18    INF THERAPY PLAN   3:00 PM   (30 min.)   UB INF QUICK CHAIR   [facility] [address] Infusion Center 19     20     21     22        Cycle 1, Day 8 Cycle 1, Day 9       23     24     25     26     27     28     29                30     31                                                Treatment Details         7/11/2023 - Cycle 1, Day 1      Supportive Care: magnesium sulfate      Chemotherapy: ONCBCN PROVIDER COMMUNICATION8, GEMCITABINE INFUSION    7/17/2023 - Cycle 1, Day 8      Chemotherapy: ONCBCN PROVIDER COMMUNICATION2, GEMCITABINE INFUSION, DOCETAXEL INFUSION    7/18/2023 - Cycle 1, Day 9      Supportive Care: pegfilgrastim (Yumiko Zeng), APPT 17 August 2023 Sunday Monday Tuesday Wednesday Thursday Friday Saturday             1     2     3    OFFICE VISIT SHORT PG  10:45 AM   (30 min.)   Rafa Duke PA-C   [facility] 2215 [facility] Gynecology Oncology Upper [address]    INF CATHETER MAINTENANCE  11:30 AM   (60 min.)   UB INF QUICK CHAIR   [facility] Upper [address] [place] 4    INF ONCOLOGY TX-TREATMENT PLAN  11:00 AM   (140 min.)   UB INFUSION CHAIR 7   . [facility] Upper [address] [place] 5            Cycle 2, Day 1    6     7     8     9    INF CATHETER MAINTENANCE  10:30 AM   (60 min.)   UB INF [address] [place] 10     11    INF ONCOLOGY TX-TREATMENT PLAN  10:00 AM   (200 min.)   UB INFUSION CHAIR 10   [facility] Upper [address] [place] 12            Cycle 2, Day 8    13     14     15     16    INF CATHETER MAINTENANCE  11:00 AM   (60 min.)   UB INF [address] [place] 17     18     19                20     21     22     23     24    OFFICE VISIT SHORT PG   9:45 AM   (30 min.)   Rafa Duke PA-C   1650 [facility] Gynecology Oncology Upper [address]    INF CATHETER MAINTENANCE  10:30 AM   (60 min.)   UB INFUSION CHAIR 4   [facility] Upper [address] [place] 25    INF ONCOLOGY TX-TREATMENT PLAN  10:30 AM   (140 min.)   UB INFUSION CHAIR 2   [address] [place] [address] [place] 26            Cycle 3, Day 1    27     28     29     30    INF CATHETER MAINTENANCE  11:00 AM   (60 min.)   UB INF [address] [place] 31                                 Treatment Details         8/4/2023 - Cycle 2, Day 1      Chemotherapy: [address], GEMCITABINE INFUSION    8/11/2023 - Cycle 2, Day 8      Chemotherapy: ONCBCN PROVIDER COMMUNICATION2, GEMCITABINE INFUSION, DOCETAXEL INFUSION      Supportive Care: [facility] PROVIDER COMMUNICATION7, pegfilgrastim (NEULASTA ONPRO)    8/25/2023 - Cycle 3, Day 1      Chemotherapy: ONCBCN PROVIDER COMMUNICATION8, GEMCITABINE INFUSION              July 2023 Sunday Monday Tuesday Wednesday Thursday Friday Saturday                                 1                2     3     4     5     6     7    INF CATHETER MAINTENANCE  11:30 AM   (60 min.)   UB INF QUICK CHAIR   [facility] Upper [address] [place] 8                9     10     11    INF ONCOLOGY TX-TREATMENT PLAN   8:00 AM   (140 min.)   UB INFUSION CHAIR 3   [facility] Upper [address] Infusion Center 12     13     14    INF CATHETER MAINTENANCE   2:00 PM   (60 min.)   UB INFUSION CHAIR 6   [facility] Infusion Center 15         Cycle 1, Day 1       16     17    INF ONCOLOGY TX-TREATMENT PLAN  12:00 PM   (200 min.)   UB INFUSION CHAIR 12   [facility] Upper [address] Infusion Center 18    INF THERAPY PLAN   3:00 PM   (30 min.)   UB INF QUICK CHAIR   [facility] Upper [address] Infusion Center 19     20     21     22        Cycle 1, Day 8 Cycle 1, Day 9       23     24     25     26     27     28     29                30     31                                                Treatment Details         7/11/2023 - Cycle 1, Day 1      Supportive Care: magnesium sulfate      Chemotherapy: ONCBCN PROVIDER COMMUNICATION8, GEMCITABINE INFUSION    7/17/2023 - Cycle 1, Day 8      Chemotherapy: ONCBCN PROVIDER COMMUNICATION2, GEMCITABINE INFUSION, DOCETAXEL INFUSION    7/18/2023 - Cycle 1, Day 9      Supportive Care: pegfilgrastim (Fabricio Zavala), APPT 17 August 2023 Sunday Monday Tuesday Wednesday Thursday Friday Saturday             1     2     3    OFFICE VISIT SHORT PG  10:45 AM   (30 min.)   JIM Saeed's 2215 [facility] Gynecology Oncology Upper [address]    INF CATHETER MAINTENANCE  11:30 AM   (60 min.)   UB INF QUICK CHAIR   [facility] [address] [place] 4    INF ONCOLOGY TX-TREATMENT PLAN  11:00 AM   (140 min.)   UB INFUSION CHAIR 7   [facility] [address] [place] 5            Cycle 2, Day 1    6     7     8     9    INF CATHETER MAINTENANCE  10:30 AM   (60 min.)   UB INF [address] [place] 10     11    INF ONCOLOGY TX-TREATMENT PLAN  10:00 AM   (200 min.)   UB INFUSION CHAIR 10   [facility] [address] Infusion Center 12            Cycle 2, Day 8    13     14     15     16    INF CATHETER MAINTENANCE  11:00 AM   (60 min.)   UB INF [address] [facility] [address] Infusion Center 17     18     19                20     21     22     23     24    OFFICE VISIT SHORT PG   9:45 AM   (30 min.)   Baljinder Dubon PA-C   1650 [facility] Gynecology Oncology Upper [address]    INF CATHETER MAINTENANCE  10:30 AM   (60 min.)   UB INFUSION CHAIR 4   [facility] [address] [place] 25    INF ONCOLOGY TX-TREATMENT PLAN  10:30 AM   (140 min.)   UB INFUSION CHAIR 2   [address] [facility] [address] [place] 26            Cycle 3, Day 1    27     28     29     30    INF CATHETER MAINTENANCE  11:00 AM   (60 min.)   UB INF 7101 [facility] [address] [place] 31                                 Treatment Details         8/4/2023 - Cycle 2, Day 1      Chemotherapy: ONCBCN PROVIDER COMMUNICATION8, GEMCITABINE INFUSION    8/11/2023 - Cycle 2, Day 8      Chemotherapy: ONCBCN PROVIDER COMMUNICATION2, GEMCITABINE INFUSION, DOCETAXEL INFUSION      Supportive Care: [facility] PROVIDER COMMUNICATION7, pegfilgrastim (Angel Garza)    8/25/2023 - Cycle 3, Day 1      Chemotherapy: ONCBCN PROVIDER COMMUNICATION8, GEMCITABINE INFUSION              July 2023 Sunday Monday Tuesday Wednesday Thursday Friday Saturday                                 1                2     3     4     5     6     7    INF CATHETER MAINTENANCE  11:30 AM   (60 min.)   UB INF QUICK CHAIR   [facility] Upper [address] [place] 8                9     10     11    INF ONCOLOGY TX-TREATMENT PLAN   8:00 AM   (140 min.)   UB INFUSION CHAIR 3   [facility] Upper [address] Infusion Center 12     13     14    INF CATHETER MAINTENANCE   2:00 PM   (60 min.)   UB INFUSION CHAIR 6   [facility] Upper [address] Infusion Center 15         Cycle 1, Day 1       16     17    INF ONCOLOGY TX-TREATMENT PLAN  12:00 PM   (200 min.)   UB INFUSION CHAIR 12   [facility] [address] Infusion Center 18    INF THERAPY PLAN   3:00 PM   (30 min.)   UB INF QUICK CHAIR   [facility] Upper [address] Infusion Center 19     20     21     22        Cycle 1, Day 8 Cycle 1, Day 9       23     24     25     26     27     28     29                30     31                                                Treatment Details         7/11/2023 - Cycle 1, Day 1      Supportive Care: magnesium sulfate      Chemotherapy: ONCBCN PROVIDER COMMUNICATION8, GEMCITABINE INFUSION    7/17/2023 - Cycle 1, Day 8      Chemotherapy: ONCBCN PROVIDER COMMUNICATION2, GEMCITABINE INFUSION, DOCETAXEL INFUSION    7/18/2023 - Cycle 1, Day 9      Supportive Care: pegfilgrastim (Jose M Cook), APPT 17 August 2023 Sunday Monday Tuesday Wednesday Thursday Friday Saturday             1     2     3    OFFICE VISIT SHORT PG  10:45 AM   (30 min.)   Karen Gee PA-C   1650 [facility] Gynecology Oncology Upper [address]    INF CATHETER MAINTENANCE  11:30 AM   (60 min.)   UB INF 7101 [facility] Infusion Center 4    INF ONCOLOGY TX-TREATMENT PLAN  11:00 AM   (140 min.)   UB INFUSION CHAIR 7   [address] 5            Cycle 2, Day 1    6     7     8     9    INF CATHETER MAINTENANCE  10:30 AM   (60 min.)   UB INF QUICK CHAIR   [facility] Upper [address] [place] 10     11    INF ONCOLOGY TX-TREATMENT PLAN  10:00 AM   (200 min.)   UB INFUSION CHAIR 10   [facility] Upper [address] Infusion Center 12            Cycle 2, Day 8    13     14     15     16    INF CATHETER MAINTENANCE  11:00 AM   (60 min.)   UB INF 7101 [facility] [address] [place] 17     18     19                20     21     22     23     24    OFFICE VISIT SHORT PG   9:45 AM   (30 min.)   Paloma Steel PA-C   1650 [facility] Gynecology Oncology Upper [address]    INF CATHETER MAINTENANCE  10:30 AM   (60 min.)   UB INFUSION CHAIR 4   [place] [address] Infusion Center 25    INF ONCOLOGY TX-TREATMENT PLAN  10:30 AM   (140 min.)   UB INFUSION CHAIR 2   [address] [place] 26            Cycle 3, Day 1    27     28     29     30    INF CATHETER MAINTENANCE  11:00 AM   (60 min.)   UB INF 7101 [facility] [address] [place] 31                                 Treatment Details         8/4/2023 - Cycle 2, Day 1      Chemotherapy: ONCBCN PROVIDER COMMUNICATION8, GEMCITABINE INFUSION    8/11/2023 - Cycle 2, Day 8      Chemotherapy: ONCBCN PROVIDER COMMUNICATION2, GEMCITABINE INFUSION, DOCETAXEL INFUSION      Supportive Care: [facility] PROVIDER COMMUNICATION7, pegfilgrastim (Aretta Nic)    8/25/2023 - Cycle 3, Day 1      Chemotherapy: ONCBCN PROVIDER COMMUNICATION8, GEMCITABINE INFUSION              July 2023 Sunday Monday Tuesday Wednesday Thursday Friday Saturday                                 1                2     3     4     5     6     7    INF CATHETER MAINTENANCE  11:30 AM   (60 min.)   UB  [place] 8                9     10     11    INF ONCOLOGY TX-TREATMENT PLAN   8:00 AM   (140 min.)   UB INFUSION CHAIR 3   [facility] [place] Infusion Center 12     13     14    INF CATHETER MAINTENANCE   2:00 PM   (60 min.)   UB INFUSION CHAIR 1000 [facility] 15         Cycle 1, Day 1       16     17    INF ONCOLOGY TX-TREATMENT PLAN  12:00 PM   (200 min.)   UB INFUSION CHAIR 12   [facility] [address] Infusion Center 18    INF THERAPY PLAN   3:00 PM   (30 min.)   UB INF QUICK CHAIR   [facility] Upper [address] Infusion Center 19     20     21     22        Cycle 1, Day 8 Cycle 1, Day 9       23     24     25     26     27     28     29                30     31                                                Treatment Details         7/11/2023 - Cycle 1, Day 1      Supportive Care: magnesium sulfate      Chemotherapy: ONCBCN PROVIDER COMMUNICATION8, GEMCITABINE INFUSION    7/17/2023 - Cycle 1, Day 8      Chemotherapy: ONCBCN PROVIDER COMMUNICATION2, GEMCITABINE INFUSION, DOCETAXEL INFUSION    7/18/2023 - Cycle 1, Day 9      Supportive Care: pegfilgrastim (Alejandra Mellow), APPT 17 August 2023 Sunday Monday Tuesday Wednesday Thursday Friday Saturday             1     2     3    OFFICE VISIT SHORT PG  10:45 AM   (30 min.)   Birdie Leo PA-C   1650 [facility] Gynecology Oncology Upper [address]    INF CATHETER MAINTENANCE  11:30 AM   (60 min.)   UB INF 7101 [facility] 113 [address] [place] 4    INF ONCOLOGY TX-TREATMENT PLAN  11:00 AM   (140 min.)   UB INFUSION CHAIR 7   [facility] Upper [address] 5            Cycle 2, Day 1    6     7     8     9    INF CATHETER MAINTENANCE  10:30 AM   (60 min.)   UB INF [address] [facility] [address] [place] 10     11    INF ONCOLOGY TX-TREATMENT PLAN  10:00 AM   (200 min.)   UB INFUSION CHAIR [address] [facility] Infusion Center 12            Cycle 2, Day 8    13     14     15     16    INF CATHETER MAINTENANCE  11:00 AM   (60 min.)   [address] [place] [address] [address] [facility] Infusion Center 17     18     19                20     21     22     23     24    OFFICE VISIT SHORT PG   9:45 AM   (30 min.)   Cecilio Cranker, PA-C   1650 [facility] Gynecology Oncology Upper [address]    INF CATHETER MAINTENANCE  10:30 AM   (60 min.)   UB INFUSION CHAIR 4   [facility] Upper [address] 25    INF ONCOLOGY TX-TREATMENT PLAN  10:30 AM   (140 min.)   UB INFUSION CHAIR 2   [facility] [address] Infusion Center 26            Cycle 3, Day 1    27     28     29     30    INF CATHETER MAINTENANCE  11:00 AM   (60 min.)   UB INF 7101 [facility] Infusion Center 31                                 Treatment Details         8/4/2023 - Cycle 2, Day 1      Chemotherapy: ONCBCN PROVIDER COMMUNICATION8, GEMCITABINE INFUSION    8/11/2023 - Cycle 2, Day 8      Chemotherapy: ONCBCN PROVIDER COMMUNICATION2, GEMCITABINE INFUSION, DOCETAXEL INFUSION      Supportive Care: [facility] PROVIDER COMMUNICATION7, pegfilgrastim (Kasia Lion)    8/25/2023 - Cycle 3, Day 1      Chemotherapy: ONCBCN PROVIDER COMMUNICATION8, GEMCITABINE INFUSION              July 2023 Sunday Monday Tuesday Wednesday Thursday Friday Saturday                                 1                2     3     4     5     6     7    INF CATHETER MAINTENANCE  11:30 AM   (60 min.)   UB INF 7101 [facility] [address] [place] 8                9     10     11    INF ONCOLOGY TX-TREATMENT PLAN   8:00 AM   (140 min.)   UB INFUSION CHAIR [address] [facility] [facility] 12     13     14    INF CATHETER MAINTENANCE   2:00 PM   (60 min.)   [facility]IR [facility] [address] [place] 15         Cycle 1, Day 1       16     17    INF ONCOLOGY TX-TREATMENT PLAN  12:00 PM   (200 min.)   UB INFUSION CHAIR 12   [facility] Infusion Center 18    INF THERAPY PLAN   3:00 PM   (30 min.) UB INF QUICK CHAIR   [facility] Upper [address] Infusion Center 19     20     21     22        Cycle 1, Day 8 Cycle 1, Day 9       23     24     25     26     27     28     29                30     31                                                Treatment Details         7/11/2023 - Cycle 1, Day 1      Supportive Care: magnesium sulfate      Chemotherapy: ONCBCN PROVIDER COMMUNICATION8, GEMCITABINE INFUSION    7/17/2023 - Cycle 1, Day 8      Chemotherapy: ONCBCN PROVIDER COMMUNICATION2, GEMCITABINE INFUSION, DOCETAXEL INFUSION    7/18/2023 - Cycle 1, Day 9      Supportive Care: pegfilgrastim (Tyler Hack), APPT 17 August 2023 Sunday Monday Tuesday Wednesday Thursday Friday Saturday             1     2     3    OFFICE VISIT SHORT PG  10:45 AM   (30 min.)   Cailin Adame PA-C   1650 [facility] Gynecology Oncology Upper [address]    INF CATHETER MAINTENANCE  11:30 AM   (60 min.)   UB INF QUICK CHAIR   [facility] Upper [address] [place] 4    INF ONCOLOGY TX-TREATMENT PLAN  11:00 AM   (140 min.)   UB INFUSION CHAIR 7   [facility] Upper [address] [place] 5            Cycle 2, Day 1    6     7     8     9    INF CATHETER MAINTENANCE  10:30 AM   (60 min.)   UB INF 7101 [facility] [address] [place] 10     11    INF ONCOLOGY TX-TREATMENT PLAN  10:00 AM   (200 min.)   UB INFUSION CHAIR 10   [facility] Upper [address] [place] 12            Cycle 2, Day 8    13     14     15     16    INF CATHETER MAINTENANCE  11:00 AM   (60 min.)   [facility] [address] Infusion Center 17     18     19                20     21     22     23     24    OFFICE VISIT SHORT PG   9:45 AM   (30 min.)   Cailin Adame PA-C   1650 [facility] Gynecology Oncology Upper [address]    INF CATHETER MAINTENANCE  10:30 AM   (60 min.)   UB INFUSION CHAIR 113 [facility] 113 [address] [place] 25    INF ONCOLOGY TX-TREATMENT PLAN  10:30 AM   (140 min.)   UB INFUSION CHAIR 2   [facility] 26            Cycle 3, Day 1    27     28     29     30    INF CATHETER MAINTENANCE  11:00 AM   (60 min.)   UB INF 7101 [facility] Infusion Center 31                                 Treatment Details         8/4/2023 - Cycle 2, Day 1      Chemotherapy: ONCBCN PROVIDER COMMUNICATION8, GEMCITABINE INFUSION    8/11/2023 - Cycle 2, Day 8      Chemotherapy: ONCBCN PROVIDER COMMUNICATION2, GEMCITABINE INFUSION, DOCETAXEL INFUSION      Supportive Care: [facility] PROVIDER COMMUNICATION7, pegfilgrastim (NEULASTA ONPRO)    8/25/2023 - Cycle 3, Day 1      Chemotherapy: ONCBCN PROVIDER COMMUNICATION8, GEMCITABINE INFUSION              July 2023 Sunday Monday Tuesday Wednesday Thursday Friday Saturday                                 1                2     3     4     5     6     7    INF CATHETER MAINTENANCE  11:30 AM   (60 min.)   UB INF QUICK CHAIR   [facility] [address] [place] 8                9     10     11    INF ONCOLOGY TX-TREATMENT PLAN   8:00 AM   (140 min.)   UB INFUSION CHAIR 3   [facility] [address] Infusion Center 12     13     14    INF CATHETER MAINTENANCE   2:00 PM   (60 min.)   UB INFUSION CHAIR 6   [facility] [address] [place] 15         Cycle 1, Day 1       16     17    INF ONCOLOGY TX-TREATMENT PLAN  12:00 PM   (200 min.)   UB INFUSION CHAIR 12   [facility] [address] Infusion Center 18    INF THERAPY PLAN   3:00 PM   (30 min.)   UB INF QUICK CHAIR   [facility] [address] Infusion Center 19     20     21     22        Cycle 1, Day 8 Cycle 1, Day 9       23     24     25     26     27     28     29                30     31                                                Treatment Details         7/11/2023 - Cycle 1, Day 1      Supportive Care: magnesium sulfate      Chemotherapy: ONCBCN PROVIDER COMMUNICATION8, GEMCITABINE INFUSION    7/17/2023 - Cycle 1, Day 8      Chemotherapy: ONCBCN PROVIDER COMMUNICATION2, GEMCITABINE INFUSION, DOCETAXEL INFUSION    7/18/2023 - Cycle 1, Day 9      Supportive Care: pegfilgrastim (Herber Claw), APPT 17 August 2023 Sunday Monday Tuesday Wednesday Thursday Friday Saturday             1     2     3    OFFICE VISIT SHORT PG  10:45 AM   (30 min.)   Kaylah Rya PA-C   1650 [facility] Gynecology Oncology Upper [address]    INF CATHETER MAINTENANCE  11:30 AM   (60 min.)   UB INF QUICK CHAIR   [facility] [address] [place] 4    INF ONCOLOGY TX-TREATMENT PLAN  11:00 AM   (140 min.)   UB INFUSION CHAIR 7   [facility] [address] [place] 5            Cycle 2, Day 1    6     7     8     9    INF CATHETER MAINTENANCE  10:30 AM   (60 min.)   UB INF [address] [place] 10     11    INF ONCOLOGY TX-TREATMENT PLAN  10:00 AM   (200 min.)   UB INFUSION CHAIR 10   [facility] [address] [place] 12            Cycle 2, Day 8    13     14     15     16    INF CATHETER MAINTENANCE  11:00 AM   (60 min.)   UB INF [address] Infusion Center 17     18     19                20     21     22     23     24    OFFICE VISIT SHORT PG   9:45 AM   (30 min.)   Kaylah Ray PA-C   1650 [facility] Gynecology Oncology Upper [address]    INF CATHETER MAINTENANCE  10:30 AM   (60 min.)   UB INFUSION CHAIR 4   [facility] [address] [place] 25    INF ONCOLOGY TX-TREATMENT PLAN  10:30 AM   (140 min.)   UB INFUSION CHAIR 2   [address] 26            Cycle 3, Day 1    27     28     29     30    INF CATHETER MAINTENANCE  11:00 AM   (60 min.)   UB INF [address] [place] 31                                 Treatment Details 8/4/2023 - Cycle 2, Day 1      Chemotherapy: ONCBCN PROVIDER COMMUNICATION8, GEMCITABINE INFUSION    8/11/2023 - Cycle 2, Day 8      Chemotherapy: ONCBCN PROVIDER COMMUNICATION2, GEMCITABINE INFUSION, DOCETAXEL INFUSION      Supportive Care: [facility] PROVIDER COMMUNICATION7, pegfilgrastim (NEULASTA ONPRO)    8/25/2023 - Cycle 3, Day 1      Chemotherapy: ONCBCN PROVIDER COMMUNICATION8, GEMCITABINE INFUSION              July 2023 Sunday Monday Tuesday Wednesday Thursday Friday Saturday                                 1                2     3     4     5     6     7    INF CATHETER MAINTENANCE  11:30 AM   (60 min.)   UB INF QUICK CHAIR   [facility] [address] [place] 8                9     10     11    INF ONCOLOGY TX-TREATMENT PLAN   8:00 AM   (140 min.)   UB INFUSION CHAIR 3   [facility] [address] [facility]fusion Center 12     13     14    INF CATHETER MAINTENANCE   2:00 PM   (60 min.)   UB INFUSION CHAIR 6   [facility] [address] [place] 15         Cycle 1, Day 1       16     17    INF ONCOLOGY TX-TREATMENT PLAN  12:00 PM   (200 min.)   UB INFUSION CHAIR 12   [facility] [address] Infusion Center 18    INF THERAPY PLAN   3:00 PM   (30 min.)   UB INF QUICK CHAIR   [facility] [address] Infusion Center 19     20     21     22        Cycle 1, Day 8 Cycle 1, Day 9       23     24     25     26     27     28     29                30     31                                                Treatment Details         7/11/2023 - Cycle 1, Day 1      Supportive Care: magnesium sulfate      Chemotherapy: ONCBCN PROVIDER COMMUNICATION8, GEMCITABINE INFUSION    7/17/2023 - Cycle 1, Day 8      Chemotherapy: ONCBCN PROVIDER COMMUNICATION2, GEMCITABINE INFUSION, DOCETAXEL INFUSION    7/18/2023 - Cycle 1, Day 9      Supportive Care: pegfilgrastim (Fabricio Zavala), APPT 17 August 2023 Sunday Monday Tuesday Wednesday Thursday Friday Saturday             1     2     3    OFFICE VISIT SHORT PG  10:45 AM   (30 min.)   Cailin Adame PA-C   [facility] Gynecology Oncology Upper [address]    INF CATHETER MAINTENANCE  11:30 AM   (60 min.)   UB INF QUICK CHAIR   [facility] [address] [place] 4    INF ONCOLOGY TX-TREATMENT PLAN  11:00 AM   (140 min.)   UB INFUSION CHAIR 7   [facility] [address] [place] 5            Cycle 2, Day 1    6     7     8     9    INF CATHETER MAINTENANCE  10:30 AM   (60 min.)   UB INF [address] [place] 10     11    INF ONCOLOGY TX-TREATMENT PLAN  10:00 AM   (200 min.)   UB INFUSION CHAIR 10   [facility] [address] [place] 12            Cycle 2, Day 8    13     14     15     16    INF CATHETER MAINTENANCE  11:00 AM   (60 min.)   UB INF [address] Infusion Center 17     18     19                20     21     22     23     24    OFFICE VISIT SHORT PG   9:45 AM   (30 min.)   Cailin Adame PA-C   [facility] Gyne[facility] Oncology Upper [address]    INF CATHETER MAINTENANCE  10:30 AM   (60 min.)   UB INFUSION CHAIR 4   [facility] [address] [place] 25    INF ONCOLOGY TX-TREATMENT PLAN  10:30 AM   (140 min.)   UB INFUSION CHAIR 2   [facility] [address] [place] 26            Cycle 3, Day 1    27     28     29     30    INF CATHETER MAINTENANCE  11:00 AM   (60 min.)   UB INF [address] [place] 31                                 Treatment Details         8/4/2023 - Cycle 2, Day 1      Chemotherapy: ONCBCN PROVIDER COMMUNICATION8, GEMCITABINE INFUSION    8/11/2023 - Cycle 2, Day 8      Chemotherapy: ONCBCN PROVIDER COMMUNICATION2, GEMCITABINE INFUSION, DOCETAXEL INFUSION      Supportive Care: [facility] PROVIDER COMMUNICATION7, pegfilgrastim (Merari Suazo)    8/25/2023 - Cycle 3, Day 1      Chemotherapy: [facility] PROVIDER COMMUNICATION8, GEMCITABINE INFUSION              July 2023 Sunday Monday Tuesday Wednesday Thursday Friday Saturday                                 1                2     3     4     5     6     7    INF CATHETER MAINTENANCE  11:30 AM   (60 min.)   UB INF QUICK CHAIR   [facility] [address] [place] 8                9     10     11    INF ONCOLOGY TX-TREATMENT PLAN   8:00 AM   (140 min.)   UB INFUSION CHAIR 3   [facility] [address] [facility]fusion Center 12     13     14    INF CATHETER MAINTENANCE   2:00 PM   (60 min.)   UB INFUSION CHAIR 6   [facility] [address] [facility]fusion Center 15         Cycle 1, Day 1       16     17    INF ONCOLOGY TX-TREATMENT PLAN  12:00 PM   (200 min.)   UB INFUSION CHAIR 12   [facility] [address] [facility] Center 18    INF THERAPY PLAN   3:00 PM   (30 min.)   UB INF QUICK CHAIR   [facility] [address] Infusion Center 19     20     21     22        Cycle 1, Day 8 Cycle 1, Day 9       23     24     25     26     27     28     29                30     31                                                Treatment Details         7/11/2023 - Cycle 1, Day 1      Supportive Care: magnesium sulfate      Chemotherapy: ONCBCN PROVIDER COMMUNICATION8, GEMCITABINE INFUSION    7/17/2023 - Cycle 1, Day 8      Chemotherapy: ONCBCN PROVIDER COMMUNICATION2, GEMCITABINE INFUSION, DOCETAXEL INFUSION    7/18/2023 - Cycle 1, Day 9      Supportive Care: pegfilgrastim (Mara Poot), APPT 17 August 2023 Sunday Monday Tuesday Wednesday Thursday Friday Saturday             1     2     3    OFFICE VISIT SHORT PG  10:45 AM   (30 min.)   Paloma Steel PA-C   1650 [facility] Gynecology Oncology Upper [address]    INF CATHETER MAINTENANCE  11:30 AM   (60 min.)   UB INF [address] [facility] [address] [place] 4    INF ONCOLOGY TX-TREATMENT PLAN  11:00 AM   (140 min.)   UB INFUSION CHAIR [address] [facility] Infusion Center 5            Cycle 2, Day 1    6     7     8     9    INF CATHETER MAINTENANCE  10:30 AM   (60 min.)   UB INF QUICK CHAIR   [facility] [address] [place] 10     11    INF ONCOLOGY TX-TREATMENT PLAN  10:00 AM   (200 min.)   UB INFUSION CHAIR 10   [facility] [address] Infusion Center 12            Cycle 2, Day 8    13     14     15     16    INF CATHETER MAINTENANCE  11:00 AM   (60 min.)   UB INF [address] [facility] [address] Infusion Center 17     18     19                20     21     22     23     24    OFFICE VISIT SHORT PG   9:45 AM   (30 min.)   Linnea Rose PA-C   [address] [facility] Gynecology Oncology Upper [address]    INF CATHETER MAINTENANCE  10:30 AM   (60 min.)   UB INFUSION CHAIR 4   [facility] [address] [place] 25    INF ONCOLOGY TX-TREATMENT PLAN  10:30 AM   (140 min.)   UB INFUSION CHAIR 2   [facility] [address] [place] 26            Cycle 3, Day 1    27     28     29     30    INF CATHETER MAINTENANCE  11:00 AM   (60 min.)   UB INF [address] [place] 31                                 Treatment Details         8/4/2023 - Cycle 2, Day 1      Chemotherapy: ONCBCN PROVIDER COMMUNICATION8, GEMCITABINE INFUSION    8/11/2023 - Cycle 2, Day 8      Chemotherapy: ONCBCN PROVIDER COMMUNICATION2, GEMCITABINE INFUSION, DOCETAXEL INFUSION      Supportive Care: [facility] PROVIDER COMMUNICATION7, pegfilgrastim (Sherial Clos)    8/25/2023 - Cycle 3, Day 1      Chemotherapy: ONCBCN PROVIDER COMMUNICATION8, GEMCITABINE INFUSION              July 2023 Sunday Monday Tuesday Wednesday Thursday Friday Saturday                                 1                2     3     4     5     6     7    INF CATHETER MAINTENANCE  11:30 AM   (60 min.)   UB INF [address] [place] 8                9     10     11    INF ONCOLOGY TX-TREATMENT PLAN   8:00 AM   (140 min.)   UB INFUSION CHAIR 3   [facility] [address] [facility] Infusion Center 12     13     14    INF CATHETER MAINTENANCE   2:00 PM   (60 min.)   UB INFUSION CHAIR [address] [facility] 15         Cycle 1, Day 1       16     17    INF ONCOLOGY TX-TREATMENT PLAN  12:00 PM   (200 min.)   UB INFUSION CHAIR 12   [facility] [address] Infusion Center 18    INF THERAPY PLAN   3:00 PM   (30 min.)   UB INF QUICK CHAIR   [facility] [address] Infusion Center 19     20     21     22        Cycle 1, Day 8 Cycle 1, Day 9       23     24     25     26     27     28     29                30     31                                                Treatment Details         7/11/2023 - Cycle 1, Day 1      Supportive Care: magnesium sulfate      Chemotherapy: ONCBCN PROVIDER COMMUNICATION8, GEMCITABINE INFUSION    7/17/2023 - Cycle 1, Day 8      Chemotherapy: ONCBCN PROVIDER COMMUNICATION2, GEMCITABINE INFUSION, DOCETAXEL INFUSION    7/18/2023 - Cycle 1, Day 9      Supportive Care: pegfilgrastim (Tura Downer), APPT 17 August 2023 Sunday Monday Tuesday Wednesday Thursday Friday Saturday             1     2     3    OFFICE VISIT SHORT PG  10:45 AM   (30 min.)   Chad Bernardo PA-C   [address] [facility] Gynecology Oncology Upper [address]    INF CATHETER MAINTENANCE  11:30 AM   (60 min.)   UB INF [address] [facility] Infusion Center 4    INF ONCOLOGY TX-TREATMENT PLAN  11:00 AM   (140 min.)   UB INFUSION CHAIR 7   [facility] [address] 5            Cycle 2, Day 1    6     7     8     9    INF CATHETER MAINTENANCE  10:30 AM   (60 min.)   UB  [facility] 10     11    INF ONCOLOGY TX-TREATMENT PLAN  10:00 AM   (200 min.)   UB INFUSION CHAIR [address] [facility] Infusion Center 12            Cycle 2, Day 8    13     14 15     16    INF CATHETER MAINTENANCE  11:00 AM   (60 min.)   UB INF [address] [place] [address] Infusion Center 17     18     19                20     21     22     23     24    OFFICE VISIT SHORT PG   9:45 AM   (30 min.)   Jonnie Hernandez PA-C   [address] Gynecology Oncology Upper [facility] [address]    INF CATHETER MAINTENANCE  10:30 AM   (60 min.)   UB INFUSION CHAIR 4   [place] [address] Infusion Center 25    INF ONCOLOGY TX-TREATMENT PLAN  10:30 AM   (140 min.)   UB INFUSION CHAIR 2   [place] [address] Infusion Center 26            Cycle 3, Day 1    27     28     29     30    INF CATHETER MAINTENANCE  11:00 AM   (60 min.)   UB INF [address] [place] [address] Infusion Center 31                                 Treatment Details         8/4/2023 - Cycle 2, Day 1      Chemotherapy: ONCBCN PROVIDER COMMUNICATION8, GEMCITABINE INFUSION    8/11/2023 - Cycle 2, Day 8      Chemotherapy: ONCBCN PROVIDER COMMUNICATION2, GEMCITABINE INFUSION, DOCETAXEL INFUSION      Supportive Care: [facility] PROVIDER COMMUNICATION7, pegfilgrastim (Maurer Lesa)    8/25/2023 - Cycle 3, Day 1      Chemotherapy: ONCBCN PROVIDER COMMUNICATION8, GEMCITABINE INFUSION              July 2023 Sunday Monday Tuesday Wednesday Thursday Friday Saturday                                 1                2     3     4     5     6     7    INF CATHETER MAINTENANCE  11:30 AM   (60 min.)   UB INF [address] [place] [address] [address] [place] 8                9     10     11    INF ONCOLOGY TX-TREATMENT PLAN   8:00 AM   (140 min.)   UB INFUSION CHAIR [address] [facility] 12     13     14    INF CATHETER MAINTENANCE   2:00 PM   (60 min.)   UB INFUSION CHAIR 6   [address] [place] [address] 15         Cycle 1, Day 1       16     17    INF ONCOLOGY TX-TREATMENT PLAN  12:00 PM   (200 min.)   UB INFUSION CHAIR 12   . [address] [facility] Infusion Center 18    INF THERAPY PLAN   3:00 PM   (30 min.)   UB INF QUICK CHAIR   [facility] [address] Infusion Center 19     20     21     22        Cycle 1, Day 8 Cycle 1, Day 9       23     24     25     26     27     28     29                30     31                                                Treatment Details         7/11/2023 - Cycle 1, Day 1      Supportive Care: magnesium sulfate      Chemotherapy: ONCBCN PROVIDER COMMUNICATION8, GEMCITABINE INFUSION    7/17/2023 - Cycle 1, Day 8      Chemotherapy: ONCBCN PROVIDER COMMUNICATION2, GEMCITABINE INFUSION, DOCETAXEL INFUSION    7/18/2023 - Cycle 1, Day 9      Supportive Care: pegfilgrastim (Wilbershelia Schilder), APPT 17 August 2023 Sunday Monday Tuesday Wednesday Thursday Friday Saturday             1     2     3    OFFICE VISIT SHORT PG  10:45 AM   (30 min.)   Gris Hensley PA-C   1650 [facility] Gynecology Oncology Upper [address]    INF CATHETER MAINTENANCE  11:30 AM   (60 min.)   UB INF QUICK CHAIR   [facility] [address] [place] 4    INF ONCOLOGY TX-TREATMENT PLAN  11:00 AM   (140 min.)   UB INFUSION CHAIR 7   [facility] [address] 5            Cycle 2, Day 1    6     7     8     9    INF CATHETER MAINTENANCE  10:30 AM   (60 min.)   UB INF 7[address] [facility] [address] [facility] [place] 10     11    INF ONCOLOGY TX-TREATMENT PLAN  10:00 AM   (200 min.)   UB INFUSION CHAIR [facility] 12            Cycle 2, Day 8    13     14     15     16    INF CATHETER MAINTENANCE  11:00 AM   (60 min.)   [address] Infusion Center 17     18     19                20     21     22     23     24    OFFICE VISIT SHORT PG   9:45 AM   (30 min.)   Gris Hensley PA-C   [address] [facility] Gynecology Oncology Upper [address]    INF CATHETER MAINTENANCE  10:30 AM   (60 min.)   UB INFUSION CHAIR 4   [facility] [address] [facility] 25    INF ONCOLOGY TX-TREATMENT PLAN  10:30 AM   (140 min.)   UB INFUSION CHAIR 2   [facility] [address] [facility] Infusion Center 26            Cycle 3, Day 1    27     28     29     30    INF CATHETER MAINTENANCE  11:00 AM   (60 min.)   UB INF 7101 [facility] [address] [place] 31                                 Treatment Details         8/4/2023 - Cycle 2, Day 1      Chemotherapy: ONCBCN PROVIDER COMMUNICATION8, GEMCITABINE INFUSION    8/11/2023 - Cycle 2, Day 8      Chemotherapy: ONCBCN PROVIDER COMMUNICATION2, GEMCITABINE INFUSION, DOCETAXEL INFUSION      Supportive Care: [facility] PROVIDER COMMUNICATION7, pegfilgrastim (Elvin Pipe)    8/25/2023 - Cycle 3, Day 1      Chemotherapy: ONCBCN PROVIDER COMMUNICATION8, GEMCITABINE INFUSION              July 2023 Sunday Monday Tuesday Wednesday Thursday Friday Saturday                                 1                2     3     4     5     6     7    INF CATHETER MAINTENANCE  11:30 AM   (60 min.)   UB INF QUICK CHAIR   [facility] [address] [facility] [address] [place] 8                9     10     11    INF ONCOLOGY TX-TREATMENT PLAN   8:00 AM   (140 min.)   UB INFUSION CHAIR [address] [place] 12     13     14    INF CATHETER MAINTENANCE   2:00 PM   (60 min.)   UB INFUSION CHAIR [address] [place] 15         Cycle 1, Day 1       16     17    INF ONCOLOGY TX-TREATMENT PLAN  12:00 PM   (200 min.)   UB INFUSION CHAIR 12   [facility] Infusion Center 18    INF THERAPY PLAN   3:00 PM   (30 min.)   UB INF 7[address] [facility] Infusion Center 19     20     21     22        Cycle 1, Day 8 Cycle 1, Day 9       23     24     25     26     27     28     29                30     31                                                Treatment Details 7/11/2023 - Cycle 1, Day 1      Supportive Care: magnesium sulfate      Chemotherapy: ONCBCN PROVIDER COMMUNICATION8, GEMCITABINE INFUSION    7/17/2023 - Cycle 1, Day 8      Chemotherapy: ONCBCN PROVIDER COMMUNICATION2, GEMCITABINE INFUSION, DOCETAXEL INFUSION    7/18/2023 - Cycle 1, Day 9      Supportive Care: pegfilgrastim (Tollie Billow), APPT 17 August 2023 Sunday Monday Tuesday Wednesday Thursday Friday Saturday             1     2     3    OFFICE VISIT SHORT PG  10:45 AM   (30 min.)   Linnea Rose PA-C   1650 [facility] Gynecology Oncology Upper [address]    INF CATHETER MAINTENANCE  11:30 AM   (60 min.)   UB INF QUICK CHAIR   [facility] Upper [address] [place] 4    INF ONCOLOGY TX-TREATMENT PLAN  11:00 AM   (140 min.)   UB INFUSION CHAIR 7   [facility] Upper [address] [place] 5            Cycle 2, Day 1    6     7     8     9    INF CATHETER MAINTENANCE  10:30 AM   (60 min.)   UB INF [address] [facility] [address] [place] 10     11    INF ONCOLOGY TX-TREATMENT PLAN  10:00 AM   (200 min.)   UB INFUSION CHAIR 10   [facility] Upper [address] 12            Cycle 2, Day 8    13     14     15     16    INF CATHETER MAINTENANCE  11:00 AM   (60 min.)   [address] [place] 70     11     50

## 2023-07-17 NOTE — PROGRESS NOTES
Patient completed chemotherapy infusion with no adverse reactions. She wore ice packs on hands and feet during taxotere infusion and tolerated with no problem. Dressing CD&I. AVS provided, patient left unit ambulatory with steady gait.

## 2023-07-18 ENCOUNTER — HOSPITAL ENCOUNTER (OUTPATIENT)
Dept: INFUSION CENTER | Facility: HOSPITAL | Age: 63
Discharge: HOME | End: 2023-07-18
Attending: OBSTETRICS & GYNECOLOGY
Payer: COMMERCIAL

## 2023-07-18 DIAGNOSIS — T45.1X5A CHEMOTHERAPY INDUCED NEUTROPENIA (HCC): ICD-10-CM

## 2023-07-18 DIAGNOSIS — C55 UTERINE LEIOMYOSARCOMA (HCC): Primary | ICD-10-CM

## 2023-07-18 DIAGNOSIS — D70.1 CHEMOTHERAPY INDUCED NEUTROPENIA (HCC): ICD-10-CM

## 2023-07-18 RX ADMIN — PEGFILGRASTIM 6 MG: 6 INJECTION SUBCUTANEOUS at 15:14

## 2023-07-18 NOTE — PROGRESS NOTES
Patient received Neulasta injection SQ left arm. Aware of next appointment, left unit ambulatory with steady gait.

## 2023-07-19 ENCOUNTER — TELEPHONE (OUTPATIENT)
Dept: HEMATOLOGY ONCOLOGY | Facility: CLINIC | Age: 63
End: 2023-07-19

## 2023-07-19 NOTE — TELEPHONE ENCOUNTER
Patient Call    Who are you speaking with? Child    If it is not the patient, are they listed on an active communication consent form? Yes   What is the reason for this call? Suzi calling in wanting to know if the patient can swim with her port, or if it has to be covered. Suzi calling in stating they would like to know if Dr Rafael Gonzalez can write a script for the machine that is able to check the patient's blood sugar, and the supplies that come with it. Does this require a call back? Yes   If a call back is required, please list best call back number [phone]   If a call back is required, advise that a message will be forwarded to their care team and someone will return their call as soon as possible. Did you relay this information to the patient?  Yes

## 2023-08-03 ENCOUNTER — HOSPITAL ENCOUNTER (OUTPATIENT)
Dept: INFUSION CENTER | Facility: HOSPITAL | Age: 63
Discharge: HOME | End: 2023-08-03
Payer: COMMERCIAL

## 2023-08-03 ENCOUNTER — DOCUMENTATION (OUTPATIENT)
Dept: HEMATOLOGY ONCOLOGY | Facility: CLINIC | Age: 63
End: 2023-08-03

## 2023-08-03 ENCOUNTER — OFFICE VISIT (OUTPATIENT)
Age: 63
End: 2023-08-03

## 2023-08-03 VITALS
OXYGEN SATURATION: 96 % | HEART RATE: 84 BPM | RESPIRATION RATE: 16 BRPM | SYSTOLIC BLOOD PRESSURE: 118 MMHG | BODY MASS INDEX: 36.15 KG/M2 | DIASTOLIC BLOOD PRESSURE: 98 MMHG | WEIGHT: 217 LBS | HEIGHT: 65 IN | TEMPERATURE: 98 F

## 2023-08-03 DIAGNOSIS — C55 UTERINE LEIOMYOSARCOMA (HCC): Primary | ICD-10-CM

## 2023-08-03 DIAGNOSIS — Z45.2 ENCOUNTER FOR CENTRAL LINE CARE: ICD-10-CM

## 2023-08-03 DIAGNOSIS — Z98.890 POST-OPERATIVE STATE: ICD-10-CM

## 2023-08-03 LAB
ALBUMIN SERPL BCP-MCNC: 4.2 G/DL (ref 3.5–5)
ALP SERPL-CCNC: 76 U/L (ref 34–104)
ALT SERPL W P-5'-P-CCNC: 18 U/L (ref 7–52)
ANION GAP SERPL CALCULATED.3IONS-SCNC: 6 MMOL/L
ANISOCYTOSIS BLD QL SMEAR: PRESENT
AST SERPL W P-5'-P-CCNC: 17 U/L (ref 13–39)
BASOPHILS # BLD MANUAL: 0.2 THOUSAND/UL (ref 0–0.1)
BASOPHILS NFR MAR MANUAL: 1 % (ref 0–1)
BILIRUB SERPL-MCNC: 0.74 MG/DL (ref 0.2–1)
BUN SERPL-MCNC: 21 MG/DL (ref 5–25)
CALCIUM SERPL-MCNC: 9.2 MG/DL (ref 8.4–10.2)
CHLORIDE SERPL-SCNC: 105 MMOL/L (ref 96–108)
CO2 SERPL-SCNC: 28 MMOL/L (ref 21–32)
CREAT SERPL-MCNC: 0.63 MG/DL (ref 0.6–1.3)
EOSINOPHIL # BLD MANUAL: 0 THOUSAND/UL (ref 0–0.4)
EOSINOPHIL NFR BLD MANUAL: 0 % (ref 0–6)
ERYTHROCYTE [DISTWIDTH] IN BLOOD BY AUTOMATED COUNT: 15.1 % (ref 11.6–15.1)
GFR SERPL CREATININE-BSD FRML MDRD: 95 ML/MIN/1.73SQ M
GLUCOSE SERPL-MCNC: 162 MG/DL (ref 65–140)
HCT VFR BLD AUTO: 34.1 % (ref 34.8–46.1)
HGB BLD-MCNC: 11 G/DL (ref 11.5–15.4)
LYMPHOCYTES # BLD AUTO: 35 % (ref 14–44)
LYMPHOCYTES # BLD AUTO: 7.64 THOUSAND/UL (ref 0.6–4.47)
MAGNESIUM SERPL-MCNC: 2 MG/DL (ref 1.9–2.7)
MCH RBC QN AUTO: 29.2 PG (ref 26.8–34.3)
MCHC RBC AUTO-ENTMCNC: 32.3 G/DL (ref 31.4–37.4)
MCV RBC AUTO: 91 FL (ref 82–98)
METAMYELOCYTES NFR BLD MANUAL: 1 % (ref 0–1)
MONOCYTES # BLD AUTO: 1.61 THOUSAND/UL (ref 0–1.22)
MONOCYTES NFR BLD: 8 % (ref 4–12)
MYELOCYTES NFR BLD MANUAL: 1 % (ref 0–1)
NEUTROPHILS # BLD MANUAL: 10.25 THOUSAND/UL (ref 1.85–7.62)
NEUTS SEG NFR BLD AUTO: 51 % (ref 43–75)
PLATELET # BLD AUTO: 463 THOUSANDS/UL (ref 149–390)
PLATELET BLD QL SMEAR: ABNORMAL
PMV BLD AUTO: 9.9 FL (ref 8.9–12.7)
POLYCHROMASIA BLD QL SMEAR: PRESENT
POTASSIUM SERPL-SCNC: 4.2 MMOL/L (ref 3.5–5.3)
PROT SERPL-MCNC: 7.5 G/DL (ref 6.4–8.4)
RBC # BLD AUTO: 3.77 MILLION/UL (ref 3.81–5.12)
RBC MORPH BLD: PRESENT
SODIUM SERPL-SCNC: 139 MMOL/L (ref 135–147)
VARIANT LYMPHS # BLD AUTO: 3 %
WBC # BLD AUTO: 20.1 THOUSAND/UL (ref 4.31–10.16)

## 2023-08-03 PROCEDURE — 83735 ASSAY OF MAGNESIUM: CPT

## 2023-08-03 PROCEDURE — 80053 COMPREHEN METABOLIC PANEL: CPT

## 2023-08-03 PROCEDURE — 85027 COMPLETE CBC AUTOMATED: CPT

## 2023-08-03 PROCEDURE — 99024 POSTOP FOLLOW-UP VISIT: CPT | Performed by: PHYSICIAN ASSISTANT

## 2023-08-03 PROCEDURE — 85007 BL SMEAR W/DIFF WBC COUNT: CPT

## 2023-08-03 RX ORDER — SODIUM CHLORIDE 9 MG/ML
20 INJECTION, SOLUTION INTRAVENOUS ONCE
Status: CANCELLED | OUTPATIENT
Start: 2023-08-04

## 2023-08-03 RX ORDER — OXYCODONE HYDROCHLORIDE 5 MG/1
5 TABLET ORAL EVERY 6 HOURS PRN
Qty: 10 TABLET | Refills: 0 | Status: SHIPPED | OUTPATIENT
Start: 2023-08-03

## 2023-08-03 NOTE — PATIENT INSTRUCTIONS
For constipation:  Colace (stool softener) breakfast/lunch/dinner    For leg pain following treatment:  Start Claritin the Thursday before week 2 and continue through Monday/Tuesday. Continue ibuprofen as needed and new prescription for oxycodone available for severe leg pain.

## 2023-08-03 NOTE — PROGRESS NOTES
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 She tolerated treatment well with the exception of constipation and GSF related bone/muscle pain. Continue with cycle 2 of treatment as planned. Labs from 8/3/23 reviewed and are adequate for treatment. Plan to start claritin day 7 and continue through day 10/11, as well as oxycodone prn for neulasta-related bone bone. Additionally for constipation, will start colace TID with miralax BID. Return to the office as per her chemotherapy calendar. Relevant Medications    oxyCODONE (Roxicodone) 5 immediate release tablet       Other    Post-operative state     S/p DOROTHY, BSO and resection of pelvic mass on 6/4/23. Recovered well from surgery. Vaginal cuff is well healed. CHIEF COMPLAINT:   Pre-chemotherapy and post-operative evaluation    Problem:  Cancer Staging   Uterine leiomyosarcoma [facility])  Staging form: Corpus Uteri - Sarcoma, AJCC 8th Edition  - Pathologic stage from 6/4/2023: FIGO Stage II, calculated as Stage Unknown (pT2, pNX, cM0) - Signed by Genevieve Fuchs MD on 6/29/2023        Previous therapy:  Oncology History   Uterine leiomyosarcoma ([address])   6/4/2023 Initial Diagnosis    Uterine sarcoma ([address])     6/4/2023 -  Cancer Staged    Staging form: Corpus Uteri - Sarcoma, AJCC 8th Edition  - Pathologic stage from 6/4/2023: FIGO Stage II, calculated as Stage Unknown (pT2, pNX, cM0) - Signed by Genevieve Fuchs MD on 6/29/2023  Stage prefix: Initial diagnosis       6/4/2023 Surgery    HYSTERECTOMY TOTAL ABDOMINAL (DOROTHY).  BILATERAL SALPINGOOPHORECTOMY  EXCISION  BIOPSY LESION/MASS ABDOMINAL-PELVIC PERITONEUM  -Leiomyosarcoma 13.5 cm extending through the myometrium, right pelvic peritoneum involved with uterine leiomyosarcoma with tumor present at unoriented resection surface. 7/11/2023 -  Chemotherapy    Gemzar 800 mg/m2 IV day 1 and 8 as well as taxotere 65 mg/m2 day 8 every 21 days. Patient ID: Nahun Youngblood is a 60 y.o. female  who presents to the office for pre-chemotherapy and post-operative evaluation. She has been afebrile and tolerated her first cycle of treatment well. She denies n/v/abdominal pain. Appetite is appropriate. She notes normal bladder function. She is constipated, and has been using colace twice daily. No vaginal bleeding or discharge. The patient notes muscle/bone leg pain for approximately 3 days following neulasta injection. She was using claritin and ibuprofen with minimal relief. CBC/Diff, CMP, Mg from 8/3/23 reveiwed. The following portions of the patient's history were reviewed and updated as appropriate: allergies, current medications, past medical history, past surgical history and problem list.    Review of Systems   Constitutional: Negative. HENT: Negative. Eyes: Negative. Respiratory: Negative. Cardiovascular: Negative. Gastrointestinal: Negative. Genitourinary: Negative. Musculoskeletal: Positive for myalgias (as per HPI). Skin: Negative. Neurological: Negative. Psychiatric/Behavioral: Negative.         Current Outpatient Medications   Medication Sig Dispense Refill   • aspirin (ECOTRIN LOW STRENGTH) 81 mg EC tablet Take 81 mg by mouth daily     • atorvastatin (LIPITOR) 40 mg tablet Take 40 mg by mouth daily at bedtime     • cetirizine (ZyrTEC) 10 mg tablet Take 10 mg by mouth daily     • Cetirizine HCl 0.24 % SOLN Take by mouth     • cholecalciferol (VITAMIN D3) 1,000 units tablet Take 1,000 Units by mouth daily 2 daily     • CLENPIQ 10-3.5-12 MG-GM -GM/160ML SOLN USE AS DIRECTED 320 mL 0   • dexamethasone (DECADRON) 4 mg tablet Take 2 tabs PO BID the day prior to chemo, then 2 tabs PO the night after chemo, then 2 tabs PO BID the day after chemo 40 tablet 0   • DULoxetine (CYMBALTA) 30 mg delayed release capsule Take 30 mg by mouth daily     • escitalopram (LEXAPRO) 10 mg tablet Take 10 mg by mouth daily     • famotidine (PEPCID) 40 MG tablet      • glimepiride (AMARYL) 4 mg tablet Take 4 mg by mouth 2 (two) times a day     • hydrochlorothiazide (HYDRODIURIL) 25 mg tablet Take 25 mg by mouth daily     • lidocaine-prilocaine (EMLA) cream Apply to PORT 30 min prior to labs/chemo 30 g 2   • LORazepam (ATIVAN) 1 mg tablet Take 1 tablet (1 mg total) by mouth every 8 (eight) hours as needed for anxiety (nausea or anxiety) 30 tablet 0   • losartan (COZAAR) 50 mg tablet Take 50 mg by mouth daily     • meclizine (ANTIVERT) 25 mg tablet Take by mouth every 12 (twelve) hours as needed for dizziness     • metoprolol succinate (TOPROL-XL) 25 mg 24 hr tablet Take 25 mg by mouth daily     • Multiple Vitamin (MULTIVITAMIN) tablet Take 1 tablet by mouth daily     • ondansetron (ZOFRAN) 8 mg tablet Take 1 tablet (8 mg total) by mouth every 8 (eight) hours as needed for nausea or vomiting 20 tablet 1   • other medication, see sig, Medication/product name: Lifitegrast (xiidra)  Strength:   Sig (include dose, route, frequency): one drop ou BID     • oxyCODONE (Roxicodone) 5 immediate release tablet Take 1 tablet (5 mg total) by mouth every 6 (six) hours as needed for moderate pain Max Daily Amount: 20 mg 10 tablet 0   • pantoprazole (PROTONIX) 40 mg tablet Take 40 mg by mouth daily     • sertraline (ZOLOFT) 100 mg tablet TAKE 1 AND 1/2 TABS BY MOUTH DAILY     • sertraline (ZOLOFT) 50 mg tablet Take 150 mg by mouth daily     • Triamcinolone Acetonide 55 MCG/ACT AERO into each nostril One spray each nostril daily     • ZOLMitriptan (ZOMIG) 5 MG tablet Take 5 mg by mouth once as needed for migraine Daily prn     • DULoxetine (CYMBALTA) 30 mg delayed release capsule TAKE 1 CAPSULE BY MOUTH EVERY DAY FOR 90 DAYS     • ibuprofen (MOTRIN) 800 mg tablet Take 800 mg by mouth every 6 (six) hours as needed for mild pain (Patient not taking: Reported on 8/3/2023)     • nystatin (MYCOSTATIN) powder USE AS DIRECTED 3 TIMES A DAY FOR 10 DAYS (Patient not taking: Reported on 8/3/2023)     • oxyCODONE-acetaminophen (Percocet) 5-325 mg per tablet Take 1 tablet by mouth every 4 (four) hours as needed for moderate pain for up to 30 doses Max Daily Amount: 6 tablets (Patient not taking: Reported on 8/3/2023) 30 tablet 0     No current facility-administered medications for this visit. Objective:    Blood pressure 118/98, pulse 84, temperature 98 °F (36.7 °C), temperature source Temporal, resp. rate 16, height 5' 5" (1.651 m), weight 98.4 kg (217 lb), SpO2 96 %. Body mass index is 36.11 kg/m². Body surface area is 2.05 meters squared. Physical Exam  Vitals reviewed. Exam conducted with a chaperone present. Constitutional:       General: She is not in acute distress. Appearance: Normal appearance. HENT:      Head: Normocephalic and atraumatic. Mouth/Throat:      Mouth: Mucous membranes are moist.   Abdominal:      Palpations: Abdomen is soft. There is no mass. Tenderness: There is no abdominal tenderness. Genitourinary:     Comments: The external female genitalia is normal. The bartholin's, uretheral and skenes glands are normal. The urethral meatus is normal (midline with no lesions). Anus without fissure or lesion. Speculum exam reveals a grossly normal vagina. Vagina is intact, without dehiscense. No masses, lesions, discharge or bleeding. No significant cystocele or rectocele noted. Bimanual exam notes a surgical absent cervix, uterus and adnexal structures. No masses or fullness. Bladder is without fullness, mass or tenderness. Skin:     General: Skin is warm and dry. Comments: Midline incision is well healed. Neurological:      Mental Status: She is alert and oriented to person, place, and time. Psychiatric:         Mood and Affect: Mood normal.         Behavior: Behavior normal.         Thought Content: Thought content normal.         Judgment: Judgment normal.     Performance status is zero.     Lab Results   Component Value Date    K 4.2 08/03/2023     08/03/2023    CO2 28 08/03/2023    BUN 21 08/03/2023    CREATININE 0.63 08/03/2023    CALCIUM 9.2 08/03/2023    CORRECTEDCA 9.3 06/07/2023    AST 17 08/03/2023    ALT 18 08/03/2023    ALKPHOS 76 08/03/2023    EGFR 95 08/03/2023     Lab Results   Component Value Date    WBC 20.10 (H) 08/03/2023    HGB 11.0 (L) 08/03/2023    HCT 34.1 (L) 08/03/2023    MCV 91 08/03/2023     (H) 08/03/2023     Lab Results   Component Value Date    NEUTROABS 4.80 07/14/2023

## 2023-08-03 NOTE — PROGRESS NOTES
Port labs drawn and sent , no problems encountered. Discharged with steady gait. AVS declined, Aware of next appt.

## 2023-08-03 NOTE — ASSESSMENT & PLAN NOTE
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 She tolerated treatment well with the exception of constipation and GSF related bone/muscle pain. Continue with cycle 2 of treatment as planned. Labs from 8/3/23 reviewed and are adequate for treatment. Plan to start claritin day 7 and continue through day 10/11, as well as oxycodone prn for neulasta-related bone bone. Additionally for constipation, will start colace TID with miralax BID. Return to the office as per her chemotherapy calendar.

## 2023-08-03 NOTE — ASSESSMENT & PLAN NOTE
S/p DOROTHY, BSO and resection of pelvic mass on 6/4/23. Recovered well from surgery. Vaginal cuff is well healed.

## 2023-08-03 NOTE — PROGRESS NOTES
2ND ATTEMPT  Called pt on [phone] got her voicemail left a message for pt to call me back  Called pt on [phone] got her voicemail  Left a message for pt to call me back

## 2023-08-04 ENCOUNTER — HOSPITAL ENCOUNTER (OUTPATIENT)
Dept: INFUSION CENTER | Facility: HOSPITAL | Age: 63
End: 2023-08-04
Attending: OBSTETRICS & GYNECOLOGY
Payer: COMMERCIAL

## 2023-08-04 VITALS
BODY MASS INDEX: 36.11 KG/M2 | WEIGHT: 216.71 LBS | SYSTOLIC BLOOD PRESSURE: 130 MMHG | HEART RATE: 74 BPM | TEMPERATURE: 97.5 F | HEIGHT: 65 IN | RESPIRATION RATE: 16 BRPM | DIASTOLIC BLOOD PRESSURE: 82 MMHG | OXYGEN SATURATION: 94 %

## 2023-08-04 DIAGNOSIS — T45.1X5A CHEMOTHERAPY INDUCED NEUTROPENIA (HCC): ICD-10-CM

## 2023-08-04 DIAGNOSIS — D70.1 CHEMOTHERAPY INDUCED NEUTROPENIA (HCC): ICD-10-CM

## 2023-08-04 DIAGNOSIS — C55 UTERINE LEIOMYOSARCOMA (HCC): Primary | ICD-10-CM

## 2023-08-04 RX ORDER — SODIUM CHLORIDE 9 MG/ML
20 INJECTION, SOLUTION INTRAVENOUS ONCE
Status: COMPLETED | OUTPATIENT
Start: 2023-08-04 | End: 2023-08-04

## 2023-08-04 RX ADMIN — SODIUM CHLORIDE 20 ML/HR: 0.9 INJECTION, SOLUTION INTRAVENOUS at 11:13

## 2023-08-04 RX ADMIN — GEMCITABINE 1600 MG: 38 INJECTION, SOLUTION INTRAVENOUS at 11:43

## 2023-08-04 RX ADMIN — DEXAMETHASONE SODIUM PHOSPHATE 20 MG: 10 INJECTION, SOLUTION INTRAMUSCULAR; INTRAVENOUS at 11:11

## 2023-08-09 ENCOUNTER — HOSPITAL ENCOUNTER (OUTPATIENT)
Dept: INFUSION CENTER | Facility: HOSPITAL | Age: 63
Discharge: HOME | End: 2023-08-09
Payer: COMMERCIAL

## 2023-08-09 DIAGNOSIS — Z45.2 ENCOUNTER FOR CENTRAL LINE CARE: ICD-10-CM

## 2023-08-09 DIAGNOSIS — C55 UTERINE LEIOMYOSARCOMA (HCC): Primary | ICD-10-CM

## 2023-08-09 LAB
ALBUMIN SERPL BCP-MCNC: 3.9 G/DL (ref 3.5–5)
ALP SERPL-CCNC: 72 U/L (ref 34–104)
ALT SERPL W P-5'-P-CCNC: 15 U/L (ref 7–52)
ANION GAP SERPL CALCULATED.3IONS-SCNC: 8 MMOL/L
AST SERPL W P-5'-P-CCNC: 13 U/L (ref 13–39)
BASOPHILS # BLD MANUAL: 0 THOUSAND/UL (ref 0–0.1)
BASOPHILS NFR MAR MANUAL: 0 % (ref 0–1)
BILIRUB SERPL-MCNC: 1.31 MG/DL (ref 0.2–1)
BUN SERPL-MCNC: 25 MG/DL (ref 5–25)
CALCIUM SERPL-MCNC: 8.9 MG/DL (ref 8.4–10.2)
CHLORIDE SERPL-SCNC: 97 MMOL/L (ref 96–108)
CO2 SERPL-SCNC: 28 MMOL/L (ref 21–32)
CREAT SERPL-MCNC: 0.64 MG/DL (ref 0.6–1.3)
EOSINOPHIL # BLD MANUAL: 0.08 THOUSAND/UL (ref 0–0.4)
EOSINOPHIL NFR BLD MANUAL: 1 % (ref 0–6)
ERYTHROCYTE [DISTWIDTH] IN BLOOD BY AUTOMATED COUNT: 15.3 % (ref 11.6–15.1)
GFR SERPL CREATININE-BSD FRML MDRD: 95 ML/MIN/1.73SQ M
GLUCOSE SERPL-MCNC: 316 MG/DL (ref 65–140)
HCT VFR BLD AUTO: 31.9 % (ref 34.8–46.1)
HGB BLD-MCNC: 10.4 G/DL (ref 11.5–15.4)
LYMPHOCYTES # BLD AUTO: 2.48 THOUSAND/UL (ref 0.6–4.47)
LYMPHOCYTES # BLD AUTO: 27 % (ref 14–44)
MAGNESIUM SERPL-MCNC: 1.7 MG/DL (ref 1.9–2.7)
MCH RBC QN AUTO: 29.1 PG (ref 26.8–34.3)
MCHC RBC AUTO-ENTMCNC: 32.6 G/DL (ref 31.4–37.4)
MCV RBC AUTO: 89 FL (ref 82–98)
MONOCYTES # BLD AUTO: 0 THOUSAND/UL (ref 0–1.22)
MONOCYTES NFR BLD: 0 % (ref 4–12)
MYELOCYTES NFR BLD MANUAL: 1 % (ref 0–1)
NEUTROPHILS # BLD MANUAL: 5.62 THOUSAND/UL (ref 1.85–7.62)
NEUTS BAND NFR BLD MANUAL: 1 % (ref 0–8)
NEUTS SEG NFR BLD AUTO: 67 % (ref 43–75)
PLATELET # BLD AUTO: 346 THOUSANDS/UL (ref 149–390)
PLATELET BLD QL SMEAR: ADEQUATE
PMV BLD AUTO: 9.8 FL (ref 8.9–12.7)
POTASSIUM SERPL-SCNC: 3.2 MMOL/L (ref 3.5–5.3)
PROT SERPL-MCNC: 6.8 G/DL (ref 6.4–8.4)
RBC # BLD AUTO: 3.58 MILLION/UL (ref 3.81–5.12)
SODIUM SERPL-SCNC: 133 MMOL/L (ref 135–147)
VARIANT LYMPHS # BLD AUTO: 3 %
WBC # BLD AUTO: 8.27 THOUSAND/UL (ref 4.31–10.16)

## 2023-08-09 PROCEDURE — 80053 COMPREHEN METABOLIC PANEL: CPT

## 2023-08-09 PROCEDURE — 85007 BL SMEAR W/DIFF WBC COUNT: CPT

## 2023-08-09 PROCEDURE — 85027 COMPLETE CBC AUTOMATED: CPT

## 2023-08-09 PROCEDURE — 83735 ASSAY OF MAGNESIUM: CPT

## 2023-08-09 RX ORDER — SODIUM CHLORIDE 9 MG/ML
20 INJECTION, SOLUTION INTRAVENOUS ONCE
Status: CANCELLED | OUTPATIENT
Start: 2023-08-11

## 2023-08-09 NOTE — PROGRESS NOTES
Pt arrived amb for port labs. Accessed, labs obtained, de-accessed, dsd applied, disch amb to home, steady gait.

## 2023-08-11 ENCOUNTER — HOSPITAL ENCOUNTER (OUTPATIENT)
Dept: INFUSION CENTER | Facility: HOSPITAL | Age: 63
Discharge: HOME | End: 2023-08-11
Attending: OBSTETRICS & GYNECOLOGY
Payer: COMMERCIAL

## 2023-08-11 VITALS
HEART RATE: 95 BPM | HEIGHT: 65 IN | RESPIRATION RATE: 16 BRPM | DIASTOLIC BLOOD PRESSURE: 90 MMHG | TEMPERATURE: 97 F | SYSTOLIC BLOOD PRESSURE: 150 MMHG | WEIGHT: 216.27 LBS | OXYGEN SATURATION: 96 % | BODY MASS INDEX: 36.03 KG/M2

## 2023-08-11 DIAGNOSIS — C55 UTERINE LEIOMYOSARCOMA (HCC): Primary | ICD-10-CM

## 2023-08-11 DIAGNOSIS — D70.1 CHEMOTHERAPY INDUCED NEUTROPENIA (HCC): ICD-10-CM

## 2023-08-11 DIAGNOSIS — T45.1X5A CHEMOTHERAPY INDUCED NEUTROPENIA (HCC): ICD-10-CM

## 2023-08-11 PROCEDURE — 96417 CHEMO IV INFUS EACH ADDL SEQ: CPT

## 2023-08-11 PROCEDURE — 96377 APPLICATON ON-BODY INJECTOR: CPT

## 2023-08-11 PROCEDURE — 96367 TX/PROPH/DG ADDL SEQ IV INF: CPT

## 2023-08-11 PROCEDURE — 96413 CHEMO IV INFUSION 1 HR: CPT

## 2023-08-11 RX ORDER — SODIUM CHLORIDE 9 MG/ML
20 INJECTION, SOLUTION INTRAVENOUS ONCE
Status: COMPLETED | OUTPATIENT
Start: 2023-08-11 | End: 2023-08-11

## 2023-08-11 RX ADMIN — DOCETAXEL 133.2 MG: 20 INJECTION, SOLUTION, CONCENTRATE INTRAVENOUS at 12:55

## 2023-08-11 RX ADMIN — SODIUM CHLORIDE 20 ML/HR: 0.9 INJECTION, SOLUTION INTRAVENOUS at 10:34

## 2023-08-11 RX ADMIN — DEXAMETHASONE SODIUM PHOSPHATE 20 MG: 10 INJECTION, SOLUTION INTRAMUSCULAR; INTRAVENOUS at 10:34

## 2023-08-11 RX ADMIN — PEGFILGRASTIM 6 MG: KIT SUBCUTANEOUS at 13:58

## 2023-08-11 RX ADMIN — GEMCITABINE 1600 MG: 38 INJECTION, SOLUTION INTRAVENOUS at 11:14

## 2023-08-11 NOTE — PROGRESS NOTES
Pt tolerated chemo treatment with no adverse reaction. Neulasta OnPro applied to left upper arm. Green light flashing. Pt educated on s/s to report to MD.  Aware of disconnect time. Left unit ambulatory with steady gait. AVS printed and given to pt.

## 2023-08-16 ENCOUNTER — HOSPITAL ENCOUNTER (OUTPATIENT)
Dept: INFUSION CENTER | Facility: HOSPITAL | Age: 63
Discharge: HOME | End: 2023-08-16
Payer: COMMERCIAL

## 2023-08-16 VITALS — TEMPERATURE: 98.6 F

## 2023-08-16 DIAGNOSIS — C55 UTERINE LEIOMYOSARCOMA (HCC): Primary | ICD-10-CM

## 2023-08-16 DIAGNOSIS — K12.1 STOMATITIS: Primary | ICD-10-CM

## 2023-08-16 DIAGNOSIS — Z45.2 ENCOUNTER FOR CENTRAL LINE CARE: ICD-10-CM

## 2023-08-16 DIAGNOSIS — B37.0 CANDIDA, ORAL: ICD-10-CM

## 2023-08-16 LAB
ALBUMIN SERPL BCP-MCNC: 3.7 G/DL (ref 3.5–5)
ALP SERPL-CCNC: 85 U/L (ref 34–104)
ALT SERPL W P-5'-P-CCNC: 18 U/L (ref 7–52)
ANION GAP SERPL CALCULATED.3IONS-SCNC: 9 MMOL/L
AST SERPL W P-5'-P-CCNC: 17 U/L (ref 13–39)
BASOPHILS # BLD MANUAL: 0 THOUSAND/UL (ref 0–0.1)
BASOPHILS NFR MAR MANUAL: 0 % (ref 0–1)
BILIRUB SERPL-MCNC: 1.01 MG/DL (ref 0.2–1)
BUN SERPL-MCNC: 16 MG/DL (ref 5–25)
CALCIUM SERPL-MCNC: 9.1 MG/DL (ref 8.4–10.2)
CHLORIDE SERPL-SCNC: 99 MMOL/L (ref 96–108)
CO2 SERPL-SCNC: 30 MMOL/L (ref 21–32)
CREAT SERPL-MCNC: 0.52 MG/DL (ref 0.6–1.3)
EOSINOPHIL # BLD MANUAL: 0 THOUSAND/UL (ref 0–0.4)
EOSINOPHIL NFR BLD MANUAL: 0 % (ref 0–6)
ERYTHROCYTE [DISTWIDTH] IN BLOOD BY AUTOMATED COUNT: 16.1 % (ref 11.6–15.1)
GFR SERPL CREATININE-BSD FRML MDRD: 102 ML/MIN/1.73SQ M
GLUCOSE SERPL-MCNC: 181 MG/DL (ref 65–140)
HCT VFR BLD AUTO: 27.7 % (ref 34.8–46.1)
HGB BLD-MCNC: 9.2 G/DL (ref 11.5–15.4)
LYMPHOCYTES # BLD AUTO: 1.6 THOUSAND/UL (ref 0.6–4.47)
LYMPHOCYTES # BLD AUTO: 18 % (ref 14–44)
MAGNESIUM SERPL-MCNC: 1.5 MG/DL (ref 1.9–2.7)
MCH RBC QN AUTO: 29.7 PG (ref 26.8–34.3)
MCHC RBC AUTO-ENTMCNC: 33.2 G/DL (ref 31.4–37.4)
MCV RBC AUTO: 89 FL (ref 82–98)
MONOCYTES # BLD AUTO: 0 THOUSAND/UL (ref 0–1.22)
MONOCYTES NFR BLD: 0 % (ref 4–12)
NEUTROPHILS # BLD MANUAL: 7.27 THOUSAND/UL (ref 1.85–7.62)
NEUTS SEG NFR BLD AUTO: 82 % (ref 43–75)
PLATELET # BLD AUTO: 123 THOUSANDS/UL (ref 149–390)
PLATELET BLD QL SMEAR: ABNORMAL
PMV BLD AUTO: 10 FL (ref 8.9–12.7)
POTASSIUM SERPL-SCNC: 3.1 MMOL/L (ref 3.5–5.3)
PROT SERPL-MCNC: 6.3 G/DL (ref 6.4–8.4)
RBC # BLD AUTO: 3.1 MILLION/UL (ref 3.81–5.12)
RBC MORPH BLD: NORMAL
SODIUM SERPL-SCNC: 138 MMOL/L (ref 135–147)
WBC # BLD AUTO: 8.87 THOUSAND/UL (ref 4.31–10.16)

## 2023-08-16 PROCEDURE — 83735 ASSAY OF MAGNESIUM: CPT

## 2023-08-16 PROCEDURE — 85027 COMPLETE CBC AUTOMATED: CPT

## 2023-08-16 PROCEDURE — 85007 BL SMEAR W/DIFF WBC COUNT: CPT

## 2023-08-16 PROCEDURE — 80053 COMPREHEN METABOLIC PANEL: CPT

## 2023-08-16 RX ORDER — DIPHENHYDRAMINE HYDROCHLORIDE AND LIDOCAINE HYDROCHLORIDE AND ALUMINUM HYDROXIDE AND MAGNESIUM HYDRO
KIT
Qty: 300 ML | Refills: 1 | Status: SHIPPED | OUTPATIENT
Start: 2023-08-16

## 2023-08-16 NOTE — PROGRESS NOTES
Pt here for port labs. C/o general fatigue, arm fatigue, and mouth sores. Unable to wear her upper denture. Tongue heavily coated white top and bottom. Mouth sores on palate and corners of lips. Dipak Crawford notified. Magic mouthwash and Nystatin being called in for pt. Pt to call if doesn't help and she continues to not be able to take oral nourishment. Pt understands. Port accessed, labs obtained, de-accessed, dsd applied. Disch amb to home, steady gait.

## 2023-08-17 ENCOUNTER — DOCUMENTATION (OUTPATIENT)
Dept: HEMATOLOGY ONCOLOGY | Facility: CLINIC | Age: 63
End: 2023-08-17

## 2023-08-17 NOTE — PROGRESS NOTES
3RD ATTEMPT  Called pt on [phone] got voicemail left a message for pt to call me back  Called pt on [phone] got voicemail left a message for pt to call me back

## 2023-08-24 ENCOUNTER — TELEPHONE (OUTPATIENT)
Dept: HEMATOLOGY ONCOLOGY | Facility: CLINIC | Age: 63
End: 2023-08-24

## 2023-08-24 ENCOUNTER — OFFICE VISIT (OUTPATIENT)
Age: 63
End: 2023-08-24
Payer: COMMERCIAL

## 2023-08-24 ENCOUNTER — HOSPITAL ENCOUNTER (OUTPATIENT)
Dept: INFUSION CENTER | Facility: HOSPITAL | Age: 63
Discharge: HOME | End: 2023-08-24
Payer: COMMERCIAL

## 2023-08-24 VITALS
WEIGHT: 222 LBS | RESPIRATION RATE: 16 BRPM | HEIGHT: 65 IN | SYSTOLIC BLOOD PRESSURE: 122 MMHG | TEMPERATURE: 98.3 F | DIASTOLIC BLOOD PRESSURE: 86 MMHG | HEART RATE: 130 BPM | BODY MASS INDEX: 36.99 KG/M2 | OXYGEN SATURATION: 96 %

## 2023-08-24 DIAGNOSIS — L73.9 FOLLICULITIS: ICD-10-CM

## 2023-08-24 DIAGNOSIS — C55 UTERINE LEIOMYOSARCOMA (HCC): Primary | ICD-10-CM

## 2023-08-24 DIAGNOSIS — C55 UTERINE LEIOMYOSARCOMA (HCC): ICD-10-CM

## 2023-08-24 DIAGNOSIS — K12.1 STOMATITIS: ICD-10-CM

## 2023-08-24 LAB
ALBUMIN SERPL BCP-MCNC: 3.9 G/DL (ref 3.5–5)
ALP SERPL-CCNC: 149 U/L (ref 34–104)
ALT SERPL W P-5'-P-CCNC: 25 U/L (ref 7–52)
ANION GAP SERPL CALCULATED.3IONS-SCNC: 10 MMOL/L
AST SERPL W P-5'-P-CCNC: 24 U/L (ref 13–39)
BILIRUB SERPL-MCNC: 1 MG/DL (ref 0.2–1)
BUN SERPL-MCNC: 10 MG/DL (ref 5–25)
CALCIUM SERPL-MCNC: 8.9 MG/DL (ref 8.4–10.2)
CHLORIDE SERPL-SCNC: 101 MMOL/L (ref 96–108)
CO2 SERPL-SCNC: 26 MMOL/L (ref 21–32)
CREAT SERPL-MCNC: 0.54 MG/DL (ref 0.6–1.3)
ERYTHROCYTE [DISTWIDTH] IN BLOOD BY AUTOMATED COUNT: 19.9 % (ref 11.6–15.1)
GFR SERPL CREATININE-BSD FRML MDRD: 100 ML/MIN/1.73SQ M
GLUCOSE SERPL-MCNC: 218 MG/DL (ref 65–140)
HCT VFR BLD AUTO: 32.9 % (ref 34.8–46.1)
HGB BLD-MCNC: 10.5 G/DL (ref 11.5–15.4)
MAGNESIUM SERPL-MCNC: 1.8 MG/DL (ref 1.9–2.7)
MCH RBC QN AUTO: 30 PG (ref 26.8–34.3)
MCHC RBC AUTO-ENTMCNC: 31.9 G/DL (ref 31.4–37.4)
MCV RBC AUTO: 94 FL (ref 82–98)
PLATELET # BLD AUTO: 262 THOUSANDS/UL (ref 149–390)
PMV BLD AUTO: 10 FL (ref 8.9–12.7)
POTASSIUM SERPL-SCNC: 3.5 MMOL/L (ref 3.5–5.3)
PROT SERPL-MCNC: 6.7 G/DL (ref 6.4–8.4)
RBC # BLD AUTO: 3.5 MILLION/UL (ref 3.81–5.12)
SODIUM SERPL-SCNC: 137 MMOL/L (ref 135–147)
WBC # BLD AUTO: 47.53 THOUSAND/UL (ref 4.31–10.16)

## 2023-08-24 PROCEDURE — 85007 BL SMEAR W/DIFF WBC COUNT: CPT

## 2023-08-24 PROCEDURE — 80053 COMPREHEN METABOLIC PANEL: CPT

## 2023-08-24 PROCEDURE — 83735 ASSAY OF MAGNESIUM: CPT

## 2023-08-24 PROCEDURE — 99215 OFFICE O/P EST HI 40 MIN: CPT | Performed by: PHYSICIAN ASSISTANT

## 2023-08-24 PROCEDURE — 85027 COMPLETE CBC AUTOMATED: CPT

## 2023-08-24 RX ORDER — SODIUM CHLORIDE 9 MG/ML
20 INJECTION, SOLUTION INTRAVENOUS ONCE
Status: CANCELLED | OUTPATIENT
Start: 2023-08-25

## 2023-08-24 RX ORDER — CLINDAMYCIN PHOSPHATE 11.9 MG/ML
SOLUTION TOPICAL 2 TIMES DAILY
Qty: 60 ML | Refills: 0 | Status: SHIPPED | OUTPATIENT
Start: 2023-08-24

## 2023-08-24 RX ORDER — BETAMETHASONE DIPROPIONATE 0.5 MG/ML
LOTION, AUGMENTED TOPICAL 2 TIMES DAILY
Qty: 60 ML | Refills: 1 | Status: SHIPPED | OUTPATIENT
Start: 2023-08-24

## 2023-08-24 NOTE — PROGRESS NOTES
Pt's port accessed using sterile technique. Labs drawn without difficulty. Port de-accessed. Band-aid applied. Pt ambulated with a steady gait off unit.  Declined AVS

## 2023-08-24 NOTE — ASSESSMENT & PLAN NOTE
Grade 1-2 with cycle 2. Improved with magic mouthwash. Now resolved. Plan to dose reduce gemcitabine to 650 mg/m2 with cycle 3.

## 2023-08-24 NOTE — TELEPHONE ENCOUNTER
Patient Call    Who are you speaking with? [facility]    If it is not the patient, are they listed on an active communication consent form? N/A   What is the reason for this call? Critical result   Does this require a call back? Yes   If a call back is required, please list best call back number [phone]   If a call back is required, advise that a message will be forwarded to their care team and someone will return their call as soon as possible. Did you relay this information to the patient?  Yes

## 2023-08-24 NOTE — TELEPHONE ENCOUNTER
Patient Call    Who are you speaking with? Connor Nolandhleen kenny, Jerica Pineda     If it is not the patient, are they listed on an active communication consent form? N/A   What is the reason for this call? Jerica Pineda is calling in again with critical results for the patient's WBC count. Jerica Pineda states he needs a call back in order to release the results. Does this require a call back? YES   If a call back is required, please list best call back number                   [phone]   If a call back is required, advise that a message will be forwarded to their care team and someone will return their call as soon as possible. Did you relay this information to the patient?  Yes

## 2023-08-24 NOTE — ASSESSMENT & PLAN NOTE
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 She had grade 1-2 stomatitis with cycle 2. Otherwise, tolerating treatment well.     Continue with cycle 3 of treatment as planned as long as her metabolic and hematologic parameters are adequate. Dose-reduce gemcitabine to 650 mg/m2 due to stomatitis.     Repeat CT chest/abdomen/pelvis imaging after completion of cycle 3. Return to the office as per her chemotherapy calendar.

## 2023-08-24 NOTE — PROGRESS NOTES
Assessment/Plan:    Problem List Items Addressed This Visit        Digestive    Stomatitis     Grade 1-2 with cycle 2. Improved with magic mouthwash. Now resolved. Plan to dose reduce gemcitabine to 650 mg/m2 with cycle 3. Musculoskeletal and Integument    Folliculitis     Scalp, related to alopecia. Plan for clindamycin solution and steroid cream.         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 She had grade 1-2 stomatitis with cycle 2. Otherwise, tolerating treatment well.     Continue with cycle 3 of treatment as planned as long as her metabolic and hematologic parameters are adequate. Dose-reduce gemcitabine to 650 mg/m2 due to stomatitis.     Repeat CT chest/abdomen/pelvis imaging after completion of cycle 3. Return to the office as per her chemotherapy calendar.           Relevant Orders    CT chest abdomen pelvis w contrast         CHIEF COMPLAINT:   Pre-chemotherapy evaluation    Problem:  Cancer Staging   Uterine leiomyosarcoma [facility])  Staging form: Corpus Uteri - Sarcoma, AJCC 8th Edition  - Pathologic stage from 6/4/2023: FIGO Stage II, calculated as Stage Unknown (pT2, pNX, cM0) - Signed by Christian Wayne MD on 6/29/2023        Previous therapy:  Oncology History   Uterine leiomyosarcoma ([address])   6/4/2023 Initial Diagnosis    Uterine sarcoma ([address])     6/4/2023 -  Cancer Staged    Staging form: Corpus Uteri - Sarcoma, AJCC 8th Edition  - Pathologic stage from 6/4/2023: FIGO Stage II, calculated as Stage Unknown (pT2, pNX, cM0) - Signed by Christian Wayne MD on 6/29/2023  Stage prefix: Initial diagnosis       6/4/2023 Surgery    HYSTERECTOMY TOTAL ABDOMINAL (DOROTHY). BILATERAL SALPINGOOPHORECTOMY  EXCISION  BIOPSY LESION/MASS ABDOMINAL-PELVIC PERITONEUM  -Leiomyosarcoma 13.5 cm extending through the myometrium, right pelvic peritoneum involved with uterine leiomyosarcoma with tumor present at unoriented resection surface. 7/11/2023 -  Chemotherapy    Gemzar 800 mg/m2 IV day 1 and 8 as well as taxotere 65 mg/m2 day 8 every 21 days. Patient ID: Beau Briggs is a 60 y.o. female  who presents to the office for pre-chemotherapy evaluation. She has been afebrile. She denies n/v/abdominal pain. Appetite is appropriate. She notes improved of mouth pain and sores with magic mouthwash. Her tongue is still sensitive. Normal bowel/bladder function. She notes an itchy, sore scalp. CBC/Diff, CMP, Mg from 8/16/23 reviewed. The following portions of the patient's history were reviewed and updated as appropriate: allergies, current medications, past medical history, past surgical history and problem list.    Review of Systems   Constitutional: Negative. HENT: Negative. As per HPI. Eyes: Negative. Respiratory: Negative. Cardiovascular: Negative. Gastrointestinal: Negative. Genitourinary: Negative. Musculoskeletal: Negative. Skin:        As per HPI. Neurological: Negative. Psychiatric/Behavioral: Negative.         Current Outpatient Medications   Medication Sig Dispense Refill   • aspirin (ECOTRIN LOW STRENGTH) 81 mg EC tablet Take 81 mg by mouth daily     • atorvastatin (LIPITOR) 40 mg tablet Take 40 mg by mouth daily at bedtime     • cetirizine (ZyrTEC) 10 mg tablet Take 10 mg by mouth daily     • Cetirizine HCl 0.24 % SOLN Take by mouth     • cholecalciferol (VITAMIN D3) 1,000 units tablet Take 1,000 Units by mouth daily 2 daily     • CLENPIQ 10-3.5-12 MG-GM -GM/160ML SOLN USE AS DIRECTED 320 mL 0   • dexamethasone (DECADRON) 4 mg tablet Take 2 tabs PO BID the day prior to chemo, then 2 tabs PO the night after chemo, then 2 tabs PO BID the day after chemo 40 tablet 0   • diphenhydramine, lidocaine, Al/Mg hydroxide, simethicone (Magic Mouthwash) SUSP Swish and spit 10 ML TID prn mouth pain 300 mL 1   • DULoxetine (CYMBALTA) 30 mg delayed release capsule Take 30 mg by mouth daily     • DULoxetine (CYMBALTA) 30 mg delayed release capsule TAKE 1 CAPSULE BY MOUTH EVERY DAY FOR 90 DAYS     • escitalopram (LEXAPRO) 10 mg tablet Take 10 mg by mouth daily     • famotidine (PEPCID) 40 MG tablet      • glimepiride (AMARYL) 4 mg tablet Take 4 mg by mouth 2 (two) times a day     • hydrochlorothiazide (HYDRODIURIL) 25 mg tablet Take 25 mg by mouth daily     • ibuprofen (MOTRIN) 800 mg tablet Take 800 mg by mouth every 6 (six) hours as needed for mild pain (Patient not taking: Reported on 8/3/2023)     • lidocaine-prilocaine (EMLA) cream Apply to PORT 30 min prior to labs/chemo 30 g 2   • LORazepam (ATIVAN) 1 mg tablet Take 1 tablet (1 mg total) by mouth every 8 (eight) hours as needed for anxiety (nausea or anxiety) 30 tablet 0   • losartan (COZAAR) 50 mg tablet Take 50 mg by mouth daily     • meclizine (ANTIVERT) 25 mg tablet Take by mouth every 12 (twelve) hours as needed for dizziness     • metoprolol succinate (TOPROL-XL) 25 mg 24 hr tablet Take 25 mg by mouth daily     • Multiple Vitamin (MULTIVITAMIN) tablet Take 1 tablet by mouth daily     • nystatin (MYCOSTATIN) 500,000 units/5 mL suspension Swish and spit 5-10 ml 4 times daily x 5 days 473 mL 0   • nystatin (MYCOSTATIN) powder USE AS DIRECTED 3 TIMES A DAY FOR 10 DAYS (Patient not taking: Reported on 8/3/2023)     • ondansetron (ZOFRAN) 8 mg tablet Take 1 tablet (8 mg total) by mouth every 8 (eight) hours as needed for nausea or vomiting 20 tablet 1   • other medication, see sig, Medication/product name: Lifitegrast (xiidra)  Strength:   Sig (include dose, route, frequency): one drop ou BID     • oxyCODONE (Roxicodone) 5 immediate release tablet Take 1 tablet (5 mg total) by mouth every 6 (six) hours as needed for moderate pain Max Daily Amount: 20 mg 10 tablet 0   • oxyCODONE-acetaminophen (Percocet) 5-325 mg per tablet Take 1 tablet by mouth every 4 (four) hours as needed for moderate pain for up to 30 doses Max Daily Amount: 6 tablets (Patient not taking: Reported on 8/3/2023) 30 tablet 0   • pantoprazole (PROTONIX) 40 mg tablet Take 40 mg by mouth daily     • sertraline (ZOLOFT) 100 mg tablet TAKE 1 AND 1/2 TABS BY MOUTH DAILY     • sertraline (ZOLOFT) 50 mg tablet Take 150 mg by mouth daily     • Triamcinolone Acetonide 55 MCG/ACT AERO into each nostril One spray each nostril daily     • ZOLMitriptan (ZOMIG) 5 MG tablet Take 5 mg by mouth once as needed for migraine Daily prn       No current facility-administered medications for this visit. Objective:    Blood pressure 122/86, pulse (!) 130, temperature 98.3 °F (36.8 °C), temperature source Temporal, resp. rate 16, height 5' 5" (1.651 m), weight 101 kg (222 lb), SpO2 96 %. Body mass index is 36.94 kg/m². Body surface area is 2.07 meters squared. Physical Exam  Vitals reviewed. Constitutional:       General: She is not in acute distress. Appearance: Normal appearance. She is not ill-appearing. HENT:      Head: Normocephalic and atraumatic. Mouth/Throat:      Mouth: Mucous membranes are moist.   Eyes:      General: No scleral icterus. Right eye: No discharge. Left eye: No discharge. Conjunctiva/sclera: Conjunctivae normal.   Pulmonary:      Effort: Pulmonary effort is normal.   Musculoskeletal:      Right lower leg: No edema. Left lower leg: No edema. Skin:     General: Skin is warm and dry. Coloration: Skin is not jaundiced. Findings: No rash. Neurological:      General: No focal deficit present. Mental Status: She is alert and oriented to person, place, and time.       Cranial Nerves: No cranial nerve deficit. Sensory: No sensory deficit. Motor: No weakness. Gait: Gait normal.   Psychiatric:         Mood and Affect: Mood normal.         Behavior: Behavior normal.         Thought Content: Thought content normal.         Judgment: Judgment normal.     Performance status is zero.

## 2023-08-25 ENCOUNTER — HOSPITAL ENCOUNTER (OUTPATIENT)
Dept: INFUSION CENTER | Facility: HOSPITAL | Age: 63
End: 2023-08-25
Attending: OBSTETRICS & GYNECOLOGY
Payer: COMMERCIAL

## 2023-08-25 VITALS
SYSTOLIC BLOOD PRESSURE: 151 MMHG | HEART RATE: 96 BPM | OXYGEN SATURATION: 95 % | RESPIRATION RATE: 18 BRPM | HEIGHT: 65 IN | BODY MASS INDEX: 37.13 KG/M2 | TEMPERATURE: 97.3 F | WEIGHT: 222.88 LBS | DIASTOLIC BLOOD PRESSURE: 76 MMHG

## 2023-08-25 DIAGNOSIS — C55 UTERINE LEIOMYOSARCOMA (HCC): Primary | ICD-10-CM

## 2023-08-25 DIAGNOSIS — D70.1 CHEMOTHERAPY INDUCED NEUTROPENIA (HCC): ICD-10-CM

## 2023-08-25 DIAGNOSIS — T45.1X5A CHEMOTHERAPY INDUCED NEUTROPENIA (HCC): ICD-10-CM

## 2023-08-25 LAB
ANISOCYTOSIS BLD QL SMEAR: PRESENT
BASOPHILS # BLD MANUAL: 0.95 THOUSAND/UL (ref 0–0.1)
BASOPHILS NFR MAR MANUAL: 2 % (ref 0–1)
EOSINOPHIL # BLD MANUAL: 0 THOUSAND/UL (ref 0–0.4)
EOSINOPHIL NFR BLD MANUAL: 0 % (ref 0–6)
LYMPHOCYTES # BLD AUTO: 7 % (ref 14–44)
LYMPHOCYTES # BLD AUTO: 8.08 THOUSAND/UL (ref 0.6–4.47)
METAMYELOCYTES NFR BLD MANUAL: 5 % (ref 0–1)
MONOCYTES # BLD AUTO: 0.95 THOUSAND/UL (ref 0–1.22)
MONOCYTES NFR BLD: 2 % (ref 4–12)
MYELOCYTES NFR BLD MANUAL: 2 % (ref 0–1)
NEUTROPHILS # BLD MANUAL: 34.22 THOUSAND/UL (ref 1.85–7.62)
NEUTS BAND NFR BLD MANUAL: 15 % (ref 0–8)
NEUTS SEG NFR BLD AUTO: 57 % (ref 43–75)
NRBC BLD AUTO-RTO: 2 /100 WBC (ref 0–2)
PATHOLOGY REVIEW: YES
PLATELET BLD QL SMEAR: ADEQUATE
POLYCHROMASIA BLD QL SMEAR: PRESENT
RBC MORPH BLD: PRESENT
VARIANT LYMPHS # BLD AUTO: 10 %

## 2023-08-25 PROCEDURE — 96413 CHEMO IV INFUSION 1 HR: CPT

## 2023-08-25 PROCEDURE — 96367 TX/PROPH/DG ADDL SEQ IV INF: CPT

## 2023-08-25 RX ORDER — SODIUM CHLORIDE 9 MG/ML
20 INJECTION, SOLUTION INTRAVENOUS ONCE
Status: COMPLETED | OUTPATIENT
Start: 2023-08-25 | End: 2023-08-25

## 2023-08-25 RX ADMIN — DEXAMETHASONE SODIUM PHOSPHATE 20 MG: 10 INJECTION, SOLUTION INTRAMUSCULAR; INTRAVENOUS at 10:49

## 2023-08-25 RX ADMIN — GEMCITABINE 1332.3 MG: 38 INJECTION, SOLUTION INTRAVENOUS at 11:24

## 2023-08-25 RX ADMIN — SODIUM CHLORIDE 20 ML/HR: 0.9 INJECTION, SOLUTION INTRAVENOUS at 10:48

## 2023-08-30 ENCOUNTER — PATIENT OUTREACH (OUTPATIENT)
Dept: HEMATOLOGY ONCOLOGY | Facility: CLINIC | Age: 63
End: 2023-08-30

## 2023-08-30 ENCOUNTER — HOSPITAL ENCOUNTER (OUTPATIENT)
Dept: INFUSION CENTER | Facility: HOSPITAL | Age: 63
Discharge: HOME | End: 2023-08-30
Payer: COMMERCIAL

## 2023-08-30 DIAGNOSIS — C55 UTERINE LEIOMYOSARCOMA (HCC): Primary | ICD-10-CM

## 2023-08-30 DIAGNOSIS — Z45.2 ENCOUNTER FOR CENTRAL LINE CARE: ICD-10-CM

## 2023-08-30 LAB
ALBUMIN SERPL BCP-MCNC: 3.8 G/DL (ref 3.5–5)
ALP SERPL-CCNC: 96 U/L (ref 34–104)
ALT SERPL W P-5'-P-CCNC: 25 U/L (ref 7–52)
ANION GAP SERPL CALCULATED.3IONS-SCNC: 8 MMOL/L
AST SERPL W P-5'-P-CCNC: 24 U/L (ref 13–39)
BASOPHILS # BLD AUTO: 0.05 THOUSANDS/ÂΜL (ref 0–0.1)
BASOPHILS NFR BLD AUTO: 1 % (ref 0–1)
BILIRUB SERPL-MCNC: 1.19 MG/DL (ref 0.2–1)
BUN SERPL-MCNC: 17 MG/DL (ref 5–25)
CALCIUM SERPL-MCNC: 9.3 MG/DL (ref 8.4–10.2)
CHLORIDE SERPL-SCNC: 98 MMOL/L (ref 96–108)
CO2 SERPL-SCNC: 27 MMOL/L (ref 21–32)
CREAT SERPL-MCNC: 0.52 MG/DL (ref 0.6–1.3)
EOSINOPHIL # BLD AUTO: 0.03 THOUSAND/ÂΜL (ref 0–0.61)
EOSINOPHIL NFR BLD AUTO: 0 % (ref 0–6)
ERYTHROCYTE [DISTWIDTH] IN BLOOD BY AUTOMATED COUNT: 18.9 % (ref 11.6–15.1)
GFR SERPL CREATININE-BSD FRML MDRD: 102 ML/MIN/1.73SQ M
GLUCOSE SERPL-MCNC: 259 MG/DL (ref 65–140)
HCT VFR BLD AUTO: 31.1 % (ref 34.8–46.1)
HGB BLD-MCNC: 10.3 G/DL (ref 11.5–15.4)
IMM GRANULOCYTES # BLD AUTO: 0.08 THOUSAND/UL (ref 0–0.2)
IMM GRANULOCYTES NFR BLD AUTO: 1 % (ref 0–2)
LYMPHOCYTES # BLD AUTO: 2.65 THOUSANDS/ÂΜL (ref 0.6–4.47)
LYMPHOCYTES NFR BLD AUTO: 25 % (ref 14–44)
MAGNESIUM SERPL-MCNC: 1.5 MG/DL (ref 1.9–2.7)
MCH RBC QN AUTO: 29.5 PG (ref 26.8–34.3)
MCHC RBC AUTO-ENTMCNC: 33.1 G/DL (ref 31.4–37.4)
MCV RBC AUTO: 89 FL (ref 82–98)
MONOCYTES # BLD AUTO: 0.14 THOUSAND/ÂΜL (ref 0.17–1.22)
MONOCYTES NFR BLD AUTO: 1 % (ref 4–12)
NEUTROPHILS # BLD AUTO: 7.6 THOUSANDS/ÂΜL (ref 1.85–7.62)
NEUTS SEG NFR BLD AUTO: 72 % (ref 43–75)
NRBC BLD AUTO-RTO: 0 /100 WBCS
PLATELET # BLD AUTO: 311 THOUSANDS/UL (ref 149–390)
PMV BLD AUTO: 9.5 FL (ref 8.9–12.7)
POTASSIUM SERPL-SCNC: 3.7 MMOL/L (ref 3.5–5.3)
PROT SERPL-MCNC: 6.4 G/DL (ref 6.4–8.4)
RBC # BLD AUTO: 3.49 MILLION/UL (ref 3.81–5.12)
SODIUM SERPL-SCNC: 133 MMOL/L (ref 135–147)
WBC # BLD AUTO: 10.55 THOUSAND/UL (ref 4.31–10.16)

## 2023-08-30 PROCEDURE — 83735 ASSAY OF MAGNESIUM: CPT

## 2023-08-30 PROCEDURE — 80053 COMPREHEN METABOLIC PANEL: CPT

## 2023-08-30 PROCEDURE — 85025 COMPLETE CBC W/AUTO DIFF WBC: CPT

## 2023-08-30 NOTE — PROGRESS NOTES
MSW received a packet of bills from the pt and outreach was made to her this day. Pt was receptive of the call and states that she was not aware of what was in the envelope. Pt asking MSW to call her daughter. Call placed to pt's daughter who shared that she is overwhelmed caring for her mom and her step-father, who is also dealing with cancer. Pt's daughter shared that her mother is receiving unemployment due to being a  and not being able to work. Her stepfather does work, when he is able but his income is less due to his multiple appointments. As a result, they have fallen behind on household bills. They are requesting assistance with the PP&L and tax bill, along with multiple medical bills. MSW provided information on the [facility] and explained how this could be of assistance. Another bill submitted was for their medical insurance. MSW will plan to reach out to Briana Rogers to discuss and see if they may qualify for MA. Daughter verbalized appreciation for any assistance that could be given. MSW will  place a referral to the [facility] and follow up with the daughter. Daughter was encouraged to reach out to [facility] as many of the bills were from the pt's spouse and they have a similar praveena program.  Significant support was offered and MSW will continue to follow.

## 2023-08-30 NOTE — PROGRESS NOTES
Pt here for port labs. C/o fatigue and muscle pains in arms and legs. Port accessed, laba obtained, de-accessed, dsd applied. Disch amb to home, steady gait.

## 2023-08-31 PROBLEM — E83.42 HYPOMAGNESEMIA: Status: ACTIVE | Noted: 2023-08-31

## 2023-08-31 RX ORDER — SODIUM CHLORIDE 9 MG/ML
20 INJECTION, SOLUTION INTRAVENOUS ONCE
Status: CANCELLED | OUTPATIENT
Start: 2023-09-01

## 2023-08-31 RX ORDER — MAGNESIUM SULFATE HEPTAHYDRATE 40 MG/ML
2 INJECTION, SOLUTION INTRAVENOUS ONCE
Status: CANCELLED | OUTPATIENT
Start: 2023-09-01

## 2023-09-01 ENCOUNTER — HOSPITAL ENCOUNTER (OUTPATIENT)
Dept: INFUSION CENTER | Facility: HOSPITAL | Age: 63
End: 2023-09-01
Attending: OBSTETRICS & GYNECOLOGY
Payer: COMMERCIAL

## 2023-09-01 VITALS
OXYGEN SATURATION: 97 % | SYSTOLIC BLOOD PRESSURE: 134 MMHG | HEART RATE: 96 BPM | TEMPERATURE: 96.7 F | DIASTOLIC BLOOD PRESSURE: 78 MMHG | HEIGHT: 65 IN | BODY MASS INDEX: 36.85 KG/M2 | RESPIRATION RATE: 18 BRPM | WEIGHT: 221.2 LBS

## 2023-09-01 DIAGNOSIS — T45.1X5A CHEMOTHERAPY INDUCED NEUTROPENIA (HCC): ICD-10-CM

## 2023-09-01 DIAGNOSIS — E83.42 HYPOMAGNESEMIA: Primary | ICD-10-CM

## 2023-09-01 DIAGNOSIS — D70.1 CHEMOTHERAPY INDUCED NEUTROPENIA (HCC): ICD-10-CM

## 2023-09-01 DIAGNOSIS — C55 UTERINE LEIOMYOSARCOMA (HCC): ICD-10-CM

## 2023-09-01 PROCEDURE — 96377 APPLICATON ON-BODY INJECTOR: CPT

## 2023-09-01 PROCEDURE — 96367 TX/PROPH/DG ADDL SEQ IV INF: CPT

## 2023-09-01 PROCEDURE — 96417 CHEMO IV INFUS EACH ADDL SEQ: CPT

## 2023-09-01 PROCEDURE — 96413 CHEMO IV INFUSION 1 HR: CPT

## 2023-09-01 PROCEDURE — 96415 CHEMO IV INFUSION ADDL HR: CPT

## 2023-09-01 RX ORDER — MAGNESIUM SULFATE HEPTAHYDRATE 40 MG/ML
2 INJECTION, SOLUTION INTRAVENOUS ONCE
Status: COMPLETED | OUTPATIENT
Start: 2023-09-01 | End: 2023-09-01

## 2023-09-01 RX ORDER — SODIUM CHLORIDE 9 MG/ML
20 INJECTION, SOLUTION INTRAVENOUS ONCE
Status: COMPLETED | OUTPATIENT
Start: 2023-09-01 | End: 2023-09-01

## 2023-09-01 RX ADMIN — DEXAMETHASONE SODIUM PHOSPHATE 20 MG: 10 INJECTION, SOLUTION INTRAMUSCULAR; INTRAVENOUS at 12:23

## 2023-09-01 RX ADMIN — GEMCITABINE 1332.3 MG: 38 INJECTION, SOLUTION INTRAVENOUS at 12:50

## 2023-09-01 RX ADMIN — MAGNESIUM SULFATE HEPTAHYDRATE 2 G: 2 INJECTION, SOLUTION INTRAVENOUS at 11:12

## 2023-09-01 RX ADMIN — DOCETAXEL 133.2 MG: 20 INJECTION, SOLUTION, CONCENTRATE INTRAVENOUS at 14:23

## 2023-09-01 RX ADMIN — SODIUM CHLORIDE 20 ML/HR: 0.9 INJECTION, SOLUTION INTRAVENOUS at 11:11

## 2023-09-01 RX ADMIN — PEGFILGRASTIM 6 MG: KIT SUBCUTANEOUS at 15:33

## 2023-09-01 NOTE — PROGRESS NOTES
Pt tolerated chemotherapy infusion without any adverse reactions. Port flushed and de-accessed. Neulasta On-Pro applied to left arm, green light flashing, aware of removal time. Pt ambulated out of unit with a steady gait.  Declined AVS

## 2023-09-06 ENCOUNTER — HOSPITAL ENCOUNTER (OUTPATIENT)
Dept: INFUSION CENTER | Facility: HOSPITAL | Age: 63
Discharge: HOME | End: 2023-09-06
Payer: COMMERCIAL

## 2023-09-06 DIAGNOSIS — C55 UTERINE LEIOMYOSARCOMA (HCC): ICD-10-CM

## 2023-09-06 DIAGNOSIS — Z45.2 ENCOUNTER FOR CENTRAL LINE CARE: Primary | ICD-10-CM

## 2023-09-06 LAB
ALBUMIN SERPL BCP-MCNC: 3.6 G/DL (ref 3.5–5)
ALP SERPL-CCNC: 113 U/L (ref 34–104)
ALT SERPL W P-5'-P-CCNC: 48 U/L (ref 7–52)
ANION GAP SERPL CALCULATED.3IONS-SCNC: 11 MMOL/L
ANISOCYTOSIS BLD QL SMEAR: PRESENT
AST SERPL W P-5'-P-CCNC: 30 U/L (ref 13–39)
BASOPHILS # BLD MANUAL: 0.11 THOUSAND/UL (ref 0–0.1)
BASOPHILS NFR MAR MANUAL: 1 % (ref 0–1)
BILIRUB SERPL-MCNC: 1.53 MG/DL (ref 0.2–1)
BUN SERPL-MCNC: 18 MG/DL (ref 5–25)
CALCIUM SERPL-MCNC: 8.9 MG/DL (ref 8.4–10.2)
CHLORIDE SERPL-SCNC: 98 MMOL/L (ref 96–108)
CO2 SERPL-SCNC: 28 MMOL/L (ref 21–32)
CREAT SERPL-MCNC: 0.42 MG/DL (ref 0.6–1.3)
EOSINOPHIL # BLD MANUAL: 0 THOUSAND/UL (ref 0–0.4)
EOSINOPHIL NFR BLD MANUAL: 0 % (ref 0–6)
ERYTHROCYTE [DISTWIDTH] IN BLOOD BY AUTOMATED COUNT: 20 % (ref 11.6–15.1)
GFR SERPL CREATININE-BSD FRML MDRD: 109 ML/MIN/1.73SQ M
GLUCOSE SERPL-MCNC: 241 MG/DL (ref 65–140)
HCT VFR BLD AUTO: 28.1 % (ref 34.8–46.1)
HGB BLD-MCNC: 9.3 G/DL (ref 11.5–15.4)
LYMPHOCYTES # BLD AUTO: 1.38 THOUSAND/UL (ref 0.6–4.47)
LYMPHOCYTES # BLD AUTO: 12 % (ref 14–44)
MACROCYTES BLD QL AUTO: PRESENT
MAGNESIUM SERPL-MCNC: 1.7 MG/DL (ref 1.9–2.7)
MCH RBC QN AUTO: 30.2 PG (ref 26.8–34.3)
MCHC RBC AUTO-ENTMCNC: 33.1 G/DL (ref 31.4–37.4)
MCV RBC AUTO: 91 FL (ref 82–98)
MONOCYTES # BLD AUTO: 0 THOUSAND/UL (ref 0–1.22)
MONOCYTES NFR BLD: 0 % (ref 4–12)
NEUTROPHILS # BLD MANUAL: 9.12 THOUSAND/UL (ref 1.85–7.62)
NEUTS BAND NFR BLD MANUAL: 1 % (ref 0–8)
NEUTS SEG NFR BLD AUTO: 85 % (ref 43–75)
PLATELET # BLD AUTO: 99 THOUSANDS/UL (ref 149–390)
PLATELET BLD QL SMEAR: ABNORMAL
PMV BLD AUTO: 10.4 FL (ref 8.9–12.7)
POTASSIUM SERPL-SCNC: 3 MMOL/L (ref 3.5–5.3)
PROT SERPL-MCNC: 6.4 G/DL (ref 6.4–8.4)
RBC # BLD AUTO: 3.08 MILLION/UL (ref 3.81–5.12)
RBC MORPH BLD: PRESENT
SODIUM SERPL-SCNC: 137 MMOL/L (ref 135–147)
VARIANT LYMPHS # BLD AUTO: 1 %
WBC # BLD AUTO: 10.61 THOUSAND/UL (ref 4.31–10.16)

## 2023-09-06 PROCEDURE — 85007 BL SMEAR W/DIFF WBC COUNT: CPT

## 2023-09-06 PROCEDURE — 85027 COMPLETE CBC AUTOMATED: CPT

## 2023-09-06 PROCEDURE — 80053 COMPREHEN METABOLIC PANEL: CPT

## 2023-09-06 PROCEDURE — 83735 ASSAY OF MAGNESIUM: CPT

## 2023-09-06 NOTE — PROGRESS NOTES
Pt arrived amb for port labs. "Feel terrible"  Pt states yesterday was worse, but today is not much better. States the Neulasta makes her upper arms and upper legs hurt and feel like jello. She can barely walk to get to the bathroom and unable to lift anything. Takes Claritin every day. Has Oxy at home but hasn't tried it for her arm and leg pain. Will try. VSS, pulse 120 and regular. Messaged Meng Begum to let her know pt status. Nothing more to be done today. Pt continues to eat and drink well. Has Glucerna @ home. Port accessed, labs obtained, de-accessed, dsd applied. Disch amb to home with family, slow steady gait.

## 2023-09-12 ENCOUNTER — HOSPITAL ENCOUNTER (OUTPATIENT)
Dept: CT IMAGING | Facility: HOSPITAL | Age: 63
Discharge: HOME | End: 2023-09-12
Payer: COMMERCIAL

## 2023-09-12 DIAGNOSIS — C55 UTERINE LEIOMYOSARCOMA (HCC): ICD-10-CM

## 2023-09-12 PROCEDURE — G1004 CDSM NDSC: HCPCS

## 2023-09-12 PROCEDURE — 74177 CT ABD & PELVIS W/CONTRAST: CPT

## 2023-09-12 PROCEDURE — 71260 CT THORAX DX C+: CPT

## 2023-09-12 RX ADMIN — IOHEXOL 100 ML: 350 INJECTION, SOLUTION INTRAVENOUS at 13:15

## 2023-09-12 NOTE — LETTER
[address] [place] [address]  [address] [email or web address]  [address] [address]      September 15, 2023    MRN: [id number]     Phone: [phone]     Dear Ms. Jamie Kamara recently had a(n) Cat Scan performed on 9/12/2023 at  [address] [place] [address] that was requested by Trang Kent PA-C. The study was reviewed by a radiologist, which is a physician who specializes in medical imaging. The radiologist issued a report describing his or her findings. In that report there was a finding that the radiologist felt warranted further discussion with your health care provider and that discussion would be beneficial to you. The results were sent to Trang Kent PA-C on 09/12/2023  3:45 PM. We recommend that you contact Trang Kent PA-C at [phone] or set up an appointment to discuss the results of the imaging test. If you have already heard from Trang Kent PA-C regarding the results of your study, you can disregard this letter. This letter is not meant to alarm you, but intended to encourage you to follow-up on your results with the provider that sent you for the imaging study. In addition, we have enclosed answers to frequently asked questions by other patients who have also received a letter to review results with their health care provider (see page two). Thank you for choosing [address] [place] [address] for your medical imaging needs. FREQUENTLY ASKED QUESTIONS    Why am I receiving this letter? [facility] requires us to notify patients who have findings on imaging exams that may require more testing or follow-up with a health professional within the next 3 months.         How serious is the finding on the imaging test?  This letter is sent to all patients who may need follow-up or more testing within the next 3 months. Receiving this letter does not necessarily mean you have a life-threatening imaging finding or disease. Recommendations in the radiologist’s imaging report are general in nature and it is up to your healthcare provider to say whether those recommendations make sense for your situation. You are strongly encouraged to talk to your health care provider about the results and ask whether additional steps need to be taken. Where can I get a copy of the final report for my recent radiology exam?  To get a full copy of the report you can access your records online at [email or web address] or please contact [facility] at [phone] Monday through Friday between 8 am and 6 pm.         What do I need to do now? Please contact your health care provider who requested the imaging study to discuss what further actions (if any) are needed. You may have already heard from (your ordering provider) in regard to this test in which case you can disregard this letter. NOTICE IN ACCORDANCE WITH THE [place] “PATIENT TEST RESULT INFORMATION ACT OF 2018”    You are receiving this notice as a result of a determination by your diagnostic imaging service that further discussions of your test results are warranted and would be beneficial to you. The complete results of your test or tests have been or will be sent to the health care practitioner that ordered the test or tests. It is recommended that you contact your health care practitioner to discuss your results as soon as possible.

## 2023-09-13 ENCOUNTER — TELEPHONE (OUTPATIENT)
Dept: GYNECOLOGIC ONCOLOGY | Facility: CLINIC | Age: 63
End: 2023-09-13

## 2023-09-13 NOTE — RESULT ENCOUNTER NOTE
LMOM x 1. Will assess pulmonary symptoms and refer to pulmonary. Chemo to be held.    Appt with Dr. Jose Pelayo on 9/14/23

## 2023-09-14 ENCOUNTER — OFFICE VISIT (OUTPATIENT)
Age: 63
End: 2023-09-14
Payer: COMMERCIAL

## 2023-09-14 ENCOUNTER — TELEPHONE (OUTPATIENT)
Dept: HEMATOLOGY ONCOLOGY | Facility: CLINIC | Age: 63
End: 2023-09-14

## 2023-09-14 ENCOUNTER — HOSPITAL ENCOUNTER (OUTPATIENT)
Dept: INFUSION CENTER | Facility: HOSPITAL | Age: 63
Discharge: HOME | End: 2023-09-14
Payer: COMMERCIAL

## 2023-09-14 VITALS
WEIGHT: 220.3 LBS | HEIGHT: 65 IN | BODY MASS INDEX: 36.71 KG/M2 | SYSTOLIC BLOOD PRESSURE: 130 MMHG | OXYGEN SATURATION: 93 % | DIASTOLIC BLOOD PRESSURE: 84 MMHG | TEMPERATURE: 97.4 F | HEART RATE: 128 BPM | RESPIRATION RATE: 20 BRPM

## 2023-09-14 DIAGNOSIS — C55 UTERINE LEIOMYOSARCOMA (HCC): Primary | ICD-10-CM

## 2023-09-14 DIAGNOSIS — J18.9 DRUG-INDUCED PNEUMONITIS: ICD-10-CM

## 2023-09-14 DIAGNOSIS — Z45.2 ENCOUNTER FOR CENTRAL LINE CARE: ICD-10-CM

## 2023-09-14 DIAGNOSIS — B33.24 VIRAL CARDIOMYOPATHY (HCC): ICD-10-CM

## 2023-09-14 DIAGNOSIS — T50.905A DRUG-INDUCED PNEUMONITIS: ICD-10-CM

## 2023-09-14 PROBLEM — J67.9 PNEUMONITIS, HYPERSENSITIVITY (HCC): Status: ACTIVE | Noted: 2023-09-14

## 2023-09-14 PROBLEM — J98.4 DRUG-INDUCED PNEUMONITIS: Status: ACTIVE | Noted: 2023-09-14

## 2023-09-14 LAB
ALBUMIN SERPL BCP-MCNC: 3.6 G/DL (ref 3.5–5)
ALP SERPL-CCNC: 166 U/L (ref 34–104)
ALT SERPL W P-5'-P-CCNC: 18 U/L (ref 7–52)
ANION GAP SERPL CALCULATED.3IONS-SCNC: 12 MMOL/L
ANISOCYTOSIS BLD QL SMEAR: PRESENT
AST SERPL W P-5'-P-CCNC: 22 U/L (ref 13–39)
BASOPHILS # BLD MANUAL: 0 THOUSAND/UL (ref 0–0.1)
BASOPHILS NFR MAR MANUAL: 0 % (ref 0–1)
BILIRUB SERPL-MCNC: 1 MG/DL (ref 0.2–1)
BUN SERPL-MCNC: 12 MG/DL (ref 5–25)
CALCIUM SERPL-MCNC: 9.3 MG/DL (ref 8.4–10.2)
CHLORIDE SERPL-SCNC: 98 MMOL/L (ref 96–108)
CO2 SERPL-SCNC: 27 MMOL/L (ref 21–32)
CREAT SERPL-MCNC: 0.59 MG/DL (ref 0.6–1.3)
EOSINOPHIL # BLD MANUAL: 0.53 THOUSAND/UL (ref 0–0.4)
EOSINOPHIL NFR BLD MANUAL: 1 % (ref 0–6)
GFR SERPL CREATININE-BSD FRML MDRD: 97 ML/MIN/1.73SQ M
GLUCOSE SERPL-MCNC: 233 MG/DL (ref 65–140)
LYMPHOCYTES # BLD AUTO: 10 % (ref 14–44)
LYMPHOCYTES # BLD AUTO: 8.44 THOUSAND/UL (ref 0.6–4.47)
MAGNESIUM SERPL-MCNC: 1.6 MG/DL (ref 1.9–2.7)
METAMYELOCYTES NFR BLD MANUAL: 2 % (ref 0–1)
MONOCYTES # BLD AUTO: 3.16 THOUSAND/UL (ref 0–1.22)
MONOCYTES NFR BLD: 6 % (ref 4–12)
MYELOCYTES NFR BLD MANUAL: 1 % (ref 0–1)
NEUTROPHILS # BLD MANUAL: 39.03 THOUSAND/UL (ref 1.85–7.62)
NEUTS BAND NFR BLD MANUAL: 11 % (ref 0–8)
NEUTS SEG NFR BLD AUTO: 63 % (ref 43–75)
NRBC BLD AUTO-RTO: 3 /100 WBC (ref 0–2)
PLATELET BLD QL SMEAR: ABNORMAL
POLYCHROMASIA BLD QL SMEAR: PRESENT
POTASSIUM SERPL-SCNC: 3.3 MMOL/L (ref 3.5–5.3)
PROT SERPL-MCNC: 6.4 G/DL (ref 6.4–8.4)
RBC MORPH BLD: PRESENT
SODIUM SERPL-SCNC: 137 MMOL/L (ref 135–147)
VARIANT LYMPHS # BLD AUTO: 6 %

## 2023-09-14 PROCEDURE — 80053 COMPREHEN METABOLIC PANEL: CPT

## 2023-09-14 PROCEDURE — 85007 BL SMEAR W/DIFF WBC COUNT: CPT

## 2023-09-14 PROCEDURE — 83735 ASSAY OF MAGNESIUM: CPT

## 2023-09-14 PROCEDURE — 85027 COMPLETE CBC AUTOMATED: CPT

## 2023-09-14 PROCEDURE — 99215 OFFICE O/P EST HI 40 MIN: CPT | Performed by: OBSTETRICS & GYNECOLOGY

## 2023-09-14 RX ORDER — METHYLPREDNISOLONE 4 MG/1
TABLET ORAL
Qty: 1 EACH | Refills: 0 | Status: SHIPPED | OUTPATIENT
Start: 2023-09-14

## 2023-09-14 RX ORDER — SIMVASTATIN 40 MG
40 TABLET ORAL DAILY
COMMUNITY

## 2023-09-14 RX ORDER — ROPINIROLE 1 MG/1
TABLET, FILM COATED ORAL
COMMUNITY
Start: 2023-08-04

## 2023-09-14 NOTE — PROGRESS NOTES
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 Will refer to pulmonary for evaluation. Based on dyspnea, will start Medrol Dosepak. 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 Genitourinary    Uterine leiomyosarcoma ([address]) - Primary     79-year-old with stage II leiomyosarcoma at risk for peritoneal recurrence given perforated uterus currently receiving adjuvant chemotherapy with gemcitabine 800 mg per metered squared days 1 and 8 of a 21-day cycle along with Taxotere at 65 mg per metered squared day 8 of a 21-day cycle with growth factor support. I reviewed her CBC, CMP, CT chest abdomen pelvis images. 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 No visible evidence of recurrent disease. Her performance status is 2.  1.  I reviewed the CT imaging in detail with the patient and her family members. The differential diagnosis is inflammatory versus malignancy. Based on CT appearance, inflammatory is favored. 2.  Given dyspnea in the setting of likely gemcitabine induced pneumonitis, I discussed stopping gemcitabine and Taxotere. The patient and family members are in agreement. 3.  Plan to start adjuvant Adriamycin at 50 mg per metered squared every 21 days for 4 cycles after pulmonary evaluation. Repeat CT imaging will be performed after the fourth cycle to assess disease response. 4. We further discussed the use of ctDNA and that it can be measured to assess the absence or presence of molecular residual disease.  The sensitivity of this assay in gyn cancers has not been readily established but is utilized in other disease sites to monitor progression and treatment response. As a result, it may or may not give us clinical information that is useful at this time but may provide added insight for the future. She is interested in CT DNA testing and agrees to proceed. Relevant Medications    apixaban (Eliquis) 2.5 mg    Other Relevant Orders    Echo complete w/ contrast if indicated         CHIEF COMPLAINT: shortness of breath with exertion, peeling skin bottom of foot. Problem:  Cancer Staging   Uterine leiomyosarcoma [facility])  Staging form: Corpus Uteri - Sarcoma, AJCC 8th Edition  - Pathologic stage from 6/4/2023: FIGO Stage II, calculated as Stage Unknown (pT2, pNX, cM0) - Signed by Bryant Mckenzie MD on 6/29/2023        Previous therapy:  Oncology History   Uterine leiomyosarcoma ([address])   6/4/2023 Initial Diagnosis    Uterine sarcoma ([address])     6/4/2023 -  Cancer Staged    Staging form: Corpus Uteri - Sarcoma, AJCC 8th Edition  - Pathologic stage from 6/4/2023: FIGO Stage II, calculated as Stage Unknown (pT2, pNX, cM0) - Signed by Bryant Mckenzie MD on 6/29/2023  Stage prefix: Initial diagnosis       6/4/2023 Surgery    HYSTERECTOMY TOTAL ABDOMINAL (DOROTHY). BILATERAL SALPINGOOPHORECTOMY  EXCISION  BIOPSY LESION/MASS ABDOMINAL-PELVIC PERITONEUM  -Leiomyosarcoma 13.5 cm extending through the myometrium, right pelvic peritoneum involved with uterine leiomyosarcoma with tumor present at unoriented resection surface. 7/11/2023 -  Chemotherapy    Gemzar 800 mg/m2 IV day 1 and 8 as well as taxotere 65 mg/m2 day 8 every 21 days. Patient ID: Alexander Webster is a 60 y.o. female  Who returns for pretreatment evaluation. She has had 3 cycles of gemcitabine and Taxotere. She has stomatitis. Chemotherapy-induced neutropenia has been managed with colony-stimulating factors.   Labs from 9/6/2023 reveal a total white blood cell count of 10.6, hemoglobin 9.3 g/dL and a platelet count of 99 K. Potassium 3, serum creatinine 0.14 mg/dL. Glucose is 241 mg/dL. Total bilirubin 1.53. CT chest abdomen pelvis 9/12/2023 revealed diffuse bilateral opacities consistent with inflammatory change. There was a 6 mm periaortic lymph node. No evidence of peritoneal disease. She has shortness of breath and can only ambulate approximately 20 feet before becoming short of breath. Her activities of daily living have been impaired by dyspnea. The following portions of the patient's history were reviewed and updated as appropriate: allergies, current medications, past family history, past medical history, past social history, past surgical history and problem list.    Review of Systems   Constitutional: Negative for activity change and unexpected weight change. HENT: Negative. Eyes: Negative. Respiratory: Positive for shortness of breath. Cardiovascular: Negative. Gastrointestinal: Negative for abdominal distention and abdominal pain. Endocrine: Negative. Genitourinary: Negative for pelvic pain and vaginal bleeding. Musculoskeletal: Positive for arthralgias. Skin: Negative. Allergic/Immunologic: Negative. Neurological: Positive for weakness. Hematological: Negative. Psychiatric/Behavioral: Negative.         Current Outpatient Medications   Medication Sig Dispense Refill   • apixaban (Eliquis) 2.5 mg Take 1 tablet (2.5 mg total) by mouth 2 (two) times a day 60 tablet 3   • aspirin (ECOTRIN LOW STRENGTH) 81 mg EC tablet Take 81 mg by mouth daily     • atorvastatin (LIPITOR) 40 mg tablet Take 40 mg by mouth daily at bedtime     • betamethasone, augmented, (DIPROLENE) 0.05 % lotion Apply topically 2 (two) times a day 60 mL 1   • cetirizine (ZyrTEC) 10 mg tablet Take 10 mg by mouth daily     • Cetirizine HCl 0.24 % SOLN Take by mouth     • cholecalciferol (VITAMIN D3) 1,000 units tablet Take 1,000 Units by mouth daily 2 daily     • CLENPIQ 10-3.5-12 MG-GM -GM/160ML SOLN USE AS DIRECTED 320 mL 0   • clindamycin (CLEOCIN T) 1 % external solution Apply topically 2 (two) times a day 60 mL 0   • dexamethasone (DECADRON) 4 mg tablet Take 2 tabs PO BID the day prior to chemo, then 2 tabs PO the night after chemo, then 2 tabs PO BID the day after chemo 40 tablet 0   • diphenhydramine, lidocaine, Al/Mg hydroxide, simethicone (Magic Mouthwash) SUSP Swish and spit 10 ML TID prn mouth pain 300 mL 1   • DULoxetine (CYMBALTA) 30 mg delayed release capsule Take 30 mg by mouth daily     • escitalopram (LEXAPRO) 10 mg tablet Take 10 mg by mouth daily     • famotidine (PEPCID) 40 MG tablet      • glimepiride (AMARYL) 4 mg tablet Take 4 mg by mouth 2 (two) times a day     • hydrochlorothiazide (HYDRODIURIL) 25 mg tablet Take 25 mg by mouth daily     • lidocaine-prilocaine (EMLA) cream Apply to PORT 30 min prior to labs/chemo 30 g 2   • LORazepam (ATIVAN) 1 mg tablet Take 1 tablet (1 mg total) by mouth every 8 (eight) hours as needed for anxiety (nausea or anxiety) 30 tablet 0   • losartan (COZAAR) 50 mg tablet Take 50 mg by mouth daily     • meclizine (ANTIVERT) 25 mg tablet Take by mouth every 12 (twelve) hours as needed for dizziness     • methylPREDNISolone 4 MG tablet therapy pack Use as directed on package 1 each 0   • metoprolol succinate (TOPROL-XL) 25 mg 24 hr tablet Take 25 mg by mouth daily     • Multiple Vitamin (MULTIVITAMIN) tablet Take 1 tablet by mouth daily     • nystatin (MYCOSTATIN) 500,000 units/5 mL suspension Swish and spit 5-10 ml 4 times daily x 5 days 473 mL 0   • other medication, see sig, Medication/product name: Lifitegrast (xiidra)  Strength:   Sig (include dose, route, frequency): one drop ou BID     • pantoprazole (PROTONIX) 40 mg tablet Take 40 mg by mouth daily     • rOPINIRole (REQUIP) 1 mg tablet TAKE 1 TABLET BY MOUTH 1 TO 3 HOURS BEFORE BEDTIME     • sertraline (ZOLOFT) 100 mg tablet TAKE 1 AND 1/2 TABS BY MOUTH DAILY     • sertraline (ZOLOFT) 50 mg tablet Take 150 mg by mouth daily     • simvastatin (ZOCOR) 40 mg tablet Take 40 mg by mouth daily     • Triamcinolone Acetonide 55 MCG/ACT AERO into each nostril One spray each nostril daily     • ZOLMitriptan (ZOMIG) 5 MG tablet Take 5 mg by mouth once as needed for migraine Daily prn     • DULoxetine (CYMBALTA) 30 mg delayed release capsule TAKE 1 CAPSULE BY MOUTH EVERY DAY FOR 90 DAYS     • ibuprofen (MOTRIN) 800 mg tablet Take 800 mg by mouth every 6 (six) hours as needed for mild pain (Patient not taking: Reported on 8/3/2023)     • nystatin (MYCOSTATIN) powder USE AS DIRECTED 3 TIMES A DAY FOR 10 DAYS (Patient not taking: Reported on 8/3/2023)     • ondansetron (ZOFRAN) 8 mg tablet Take 1 tablet (8 mg total) by mouth every 8 (eight) hours as needed for nausea or vomiting (Patient not taking: Reported on 8/24/2023) 20 tablet 1   • oxyCODONE (Roxicodone) 5 immediate release tablet Take 1 tablet (5 mg total) by mouth every 6 (six) hours as needed for moderate pain Max Daily Amount: 20 mg (Patient not taking: Reported on 8/24/2023) 10 tablet 0   • oxyCODONE-acetaminophen (Percocet) 5-325 mg per tablet Take 1 tablet by mouth every 4 (four) hours as needed for moderate pain for up to 30 doses Max Daily Amount: 6 tablets (Patient not taking: Reported on 8/3/2023) 30 tablet 0     No current facility-administered medications for this visit. Facility-Administered Medications Ordered in Other Visits   Medication Dose Route Frequency Provider Last Rate Last Admin   • alteplase (CATHFLO) injection 2 mg  2 mg Intracatheter Q1MIN PRN Lawanda Jo MD               Objective:    Blood pressure 130/84, pulse (!) 128, temperature (!) 97.4 °F (36.3 °C), temperature source Temporal, resp. rate 20, height 5' 5" (1.651 m), weight 99.9 kg (220 lb 4.8 oz), SpO2 93 %. Body mass index is 36.66 kg/m². Body surface area is 2.06 meters squared. Physical Exam  Vitals reviewed.    Constitutional:       General: She is not in acute distress. Appearance: Normal appearance. She is not ill-appearing. HENT:      Head: Normocephalic and atraumatic. Mouth/Throat:      Mouth: Mucous membranes are moist.   Eyes:      General: No scleral icterus. Right eye: No discharge. Left eye: No discharge. Conjunctiva/sclera: Conjunctivae normal.   Pulmonary:      Effort: Pulmonary effort is normal.      Breath sounds: Rales present. Comments: Rales left base  Musculoskeletal:      Right lower leg: No edema. Left lower leg: No edema. Skin:     General: Skin is warm and dry. Coloration: Skin is not jaundiced. Findings: No rash. Neurological:      General: No focal deficit present. Mental Status: She is alert and oriented to person, place, and time. Cranial Nerves: No cranial nerve deficit. Sensory: No sensory deficit. Motor: No weakness. Gait: Gait normal.   Psychiatric:         Mood and Affect: Mood normal.         Behavior: Behavior normal.         Thought Content: Thought content normal.         Judgment: Judgment normal.         No results found for: ""  Lab Results   Component Value Date    K 3.3 (L) 09/14/2023    CL 98 09/14/2023    CO2 27 09/14/2023    BUN 12 09/14/2023    CREATININE 0.59 (L) 09/14/2023    CALCIUM 9.3 09/14/2023    CORRECTEDCA 9.3 06/07/2023    AST 22 09/14/2023    ALT 18 09/14/2023    ALKPHOS 166 (H) 09/14/2023    EGFR 97 09/14/2023     Lab Results   Component Value Date    WBC 52.74 (HH) 09/14/2023    HGB 9.5 (L) 09/14/2023    HCT 29.7 (L) 09/14/2023    MCV 93 09/14/2023     (H) 09/14/2023     Lab Results   Component Value Date    NEUTROABS 7.60 08/30/2023     CT chest abdomen pelvis w contrast  Narrative: CT CHEST, ABDOMEN AND PELVIS WITH IV CONTRAST    INDICATION:   C55: Malignant neoplasm of uterus, part unspecified. COMPARISON: Nicole 3, 2023. TECHNIQUE: CT examination of the chest, abdomen and pelvis was performed.  Multiplanar 2D reformatted images were created from the source data. This examination, like all CT scans performed in the [facility], was performed utilizing techniques to minimize radiation dose exposure, including the use of iterative reconstruction and automated exposure control. Radiation dose length   product (DLP) for this visit:  1063 mGy-cm    IV Contrast:  100 mL of iohexol (OMNIPAQUE)  Enteric Contrast: Enteric contrast was administered. FINDINGS:    CHEST    LUNGS: Development of multifocal areas of groundglass density in the both lungs some of which are peribronchial in distribution and others are randomly distributed. Linear density seen in the left lower lobe. Additional area of linear lucencies in the   right lower lung with associated rounded configuration due to coaptation of adjacent linear density  Additional area of mild nodularity seen along the right dome of diaphragm measuring about 7 mm, image 186 series 3  PLEURA:  No pleural effusion seen HEART/GREAT VESSELS: no thoracic aortic aneurysm. MEDIASTINUM AND JACKY: Lower right paratracheal, left paratracheal lymph nodes do not meet the criteria for pathologic enlargement on the basis of size    CHEST WALL AND LOWER NECK:  Unremarkable. ABDOMEN    LIVER/BILIARY TREE: Hepatic steatosis seen    GALLBLADDER:  No calcified gallstones. No pericholecystic inflammatory change. Common bile duct measures 1.2 cm  SPLEEN: Spleen measures 15 cm    PANCREAS:  Unremarkable. ADRENAL GLANDS:  Unremarkable. KIDNEYS/URETERS: Nonobstructing calculus lower pole right kidney measuring about 1.5 cm 1.1 cm cyst lower pole left kidney with minimal septation and subcentimeter hypodensity right kidney    STOMACH AND BOWEL:  Unremarkable. APPENDIX:  No findings to suggest appendicitis.     ABDOMINOPELVIC CAVITY: Small aortocaval lymph node seen measuring about 6 mm, image 167 series 2  No pelvic sidewall lymph node enlargement seen    VESSELS: Celiac trunk appears unremarkable  SMA appear unremarkable  NELSY is patent  A portacaval lymph node seen measuring about 6 mm in short axis, does not meet the criteria for pathologic enlargement on the basis of size. This is also seen in image 78 series 601  PELVIS    REPRODUCTIVE ORGANS: Postsurgical changes from hysterectomy  Vaginal cuff is identified with mild asymmetry on the left side    URINARY BLADDER:  Unremarkable. ABDOMINAL WALL/INGUINAL REGIONS:  Unremarkable. OSSEOUS STRUCTURES: Mild sclerosis seen within the symphysis pubis  No lytic destructive changes seen within the bony skeleton  Impression: Development of heterogeneous opacities in the lungs with combination of scattered areas of groundglass density ,  linear parenchymal bandlike densities, few areas of mild nodularity right lower lobe. This may be an infectious basis or due to drug-induced   pneumonitis. Metastatic disease less likely though not entirely excluded. . Correlate clinically short interval follow-up in 2 to 3 months suggested    There is no visceral metastatic disease in the abdomen and pelvis    No measurable abdominopelvic or mediastinal lymphadenopathy    The study was marked in EPIC for immediate notification.     Workstation performed: [id number]

## 2023-09-14 NOTE — ASSESSMENT & PLAN NOTE
Likely secondary to gemcitabine. Will refer to pulmonary for evaluation. Based on dyspnea, will start Medrol Dosepak.

## 2023-09-14 NOTE — PROGRESS NOTES
Port labs drawn and port flushed then deaccessed. Pt discharged with steady gait to clinic appt. , AVS declined. Pt aware of next appt.

## 2023-09-14 NOTE — LETTER
September 14, 2023     [facility] [address]  [address] [facility]    Patient: Michelle Ziegler   YOB: 1960   Date of Visit: 9/14/2023       Dear Dr. Omer Valladares: Thank you for referring Michelle Ziegler to me for evaluation. Below are my notes for this consultation. If you have questions, please do not hesitate to call me. I look forward to following your patient along with you. 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 Will refer to pulmonary for evaluation. Based on dyspnea, will start Medrol Dosepak. 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 Genitourinary    Uterine leiomyosarcoma ([address]) - Primary     50-year-old with stage II leiomyosarcoma at risk for peritoneal recurrence given perforated uterus currently receiving adjuvant chemotherapy with gemcitabine 800 mg per metered squared days 1 and 8 of a 21-day cycle along with Taxotere at 65 mg per metered squared day 8 of a 21-day cycle with growth factor support. I reviewed her CBC, CMP, CT chest abdomen pelvis images. 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 No visible evidence of recurrent disease. Her performance status is 2.  1.  I reviewed the CT imaging in detail with the patient and her family members. The differential diagnosis is inflammatory versus malignancy. Based on CT appearance, inflammatory is favored.   2.  Given dyspnea in the setting of likely gemcitabine induced pneumonitis, I discussed stopping gemcitabine and Taxotere. The patient and family members are in agreement. 3.  Plan to start adjuvant Adriamycin at 50 mg per metered squared every 21 days for 4 cycles after pulmonary evaluation. Repeat CT imaging will be performed after the fourth cycle to assess disease response. 4. We further discussed the use of ctDNA and that it can be measured to assess the absence or presence of molecular residual disease. The sensitivity of this assay in gyn cancers has not been readily established but is utilized in other disease sites to monitor progression and treatment response. As a result, it may or may not give us clinical information that is useful at this time but may provide added insight for the future. She is interested in CT DNA testing and agrees to proceed. Relevant Medications    apixaban (Eliquis) 2.5 mg    Other Relevant Orders    Echo complete w/ contrast if indicated         CHIEF COMPLAINT: shortness of breath with exertion, peeling skin bottom of foot. Problem:  Cancer Staging   Uterine leiomyosarcoma [facility])  Staging form: Corpus Uteri - Sarcoma, AJCC 8th Edition  - Pathologic stage from 6/4/2023: FIGO Stage II, calculated as Stage Unknown (pT2, pNX, cM0) - Signed by Severino Taylor MD on 6/29/2023        Previous therapy:  Oncology History   Uterine leiomyosarcoma ([address])   6/4/2023 Initial Diagnosis    Uterine sarcoma ([address])     6/4/2023 -  Cancer Staged    Staging form: Corpus Uteri - Sarcoma, AJCC 8th Edition  - Pathologic stage from 6/4/2023: FIGO Stage II, calculated as Stage Unknown (pT2, pNX, cM0) - Signed by Severino Taylor MD on 6/29/2023  Stage prefix: Initial diagnosis       6/4/2023 Surgery    HYSTERECTOMY TOTAL ABDOMINAL (DOROTHY).  BILATERAL SALPINGOOPHORECTOMY  EXCISION  BIOPSY LESION/MASS ABDOMINAL-PELVIC PERITONEUM  -Leiomyosarcoma 13.5 cm extending through the myometrium, right pelvic peritoneum involved with uterine leiomyosarcoma with tumor present at unoriented resection surface. 7/11/2023 -  Chemotherapy    Gemzar 800 mg/m2 IV day 1 and 8 as well as taxotere 65 mg/m2 day 8 every 21 days. Patient ID: Sheng Yun is a 60 y.o. female  Who returns for pretreatment evaluation. She has had 3 cycles of gemcitabine and Taxotere. She has stomatitis. Chemotherapy-induced neutropenia has been managed with colony-stimulating factors. Labs from 9/6/2023 reveal a total white blood cell count of 10.6, hemoglobin 9.3 g/dL and a platelet count of 99 K. Potassium 3, serum creatinine 0.14 mg/dL. Glucose is 241 mg/dL. Total bilirubin 1.53. CT chest abdomen pelvis 9/12/2023 revealed diffuse bilateral opacities consistent with inflammatory change. There was a 6 mm periaortic lymph node. No evidence of peritoneal disease. She has shortness of breath and can only ambulate approximately 20 feet before becoming short of breath. Her activities of daily living have been impaired by dyspnea. The following portions of the patient's history were reviewed and updated as appropriate: allergies, current medications, past family history, past medical history, past social history, past surgical history and problem list.    Review of Systems   Constitutional: Negative for activity change and unexpected weight change. HENT: Negative. Eyes: Negative. Respiratory: Positive for shortness of breath. Cardiovascular: Negative. Gastrointestinal: Negative for abdominal distention and abdominal pain. Endocrine: Negative. Genitourinary: Negative for pelvic pain and vaginal bleeding. Musculoskeletal: Positive for arthralgias. Skin: Negative. Allergic/Immunologic: Negative. Neurological: Positive for weakness. Hematological: Negative. Psychiatric/Behavioral: Negative.         Current Outpatient Medications   Medication Sig Dispense Refill   • apixaban (Eliquis) 2.5 mg Take 1 tablet (2.5 mg total) by mouth 2 (two) times a day 60 tablet 3   • aspirin (ECOTRIN LOW STRENGTH) 81 mg EC tablet Take 81 mg by mouth daily     • atorvastatin (LIPITOR) 40 mg tablet Take 40 mg by mouth daily at bedtime     • betamethasone, augmented, (DIPROLENE) 0.05 % lotion Apply topically 2 (two) times a day 60 mL 1   • cetirizine (ZyrTEC) 10 mg tablet Take 10 mg by mouth daily     • Cetirizine HCl 0.24 % SOLN Take by mouth     • cholecalciferol (VITAMIN D3) 1,000 units tablet Take 1,000 Units by mouth daily 2 daily     • CLENPIQ 10-3.5-12 MG-GM -GM/160ML SOLN USE AS DIRECTED 320 mL 0   • clindamycin (CLEOCIN T) 1 % external solution Apply topically 2 (two) times a day 60 mL 0   • dexamethasone (DECADRON) 4 mg tablet Take 2 tabs PO BID the day prior to chemo, then 2 tabs PO the night after chemo, then 2 tabs PO BID the day after chemo 40 tablet 0   • diphenhydramine, lidocaine, Al/Mg hydroxide, simethicone (Magic Mouthwash) SUSP Swish and spit 10 ML TID prn mouth pain 300 mL 1   • DULoxetine (CYMBALTA) 30 mg delayed release capsule Take 30 mg by mouth daily     • escitalopram (LEXAPRO) 10 mg tablet Take 10 mg by mouth daily     • famotidine (PEPCID) 40 MG tablet      • glimepiride (AMARYL) 4 mg tablet Take 4 mg by mouth 2 (two) times a day     • hydrochlorothiazide (HYDRODIURIL) 25 mg tablet Take 25 mg by mouth daily     • lidocaine-prilocaine (EMLA) cream Apply to PORT 30 min prior to labs/chemo 30 g 2   • LORazepam (ATIVAN) 1 mg tablet Take 1 tablet (1 mg total) by mouth every 8 (eight) hours as needed for anxiety (nausea or anxiety) 30 tablet 0   • losartan (COZAAR) 50 mg tablet Take 50 mg by mouth daily     • meclizine (ANTIVERT) 25 mg tablet Take by mouth every 12 (twelve) hours as needed for dizziness     • methylPREDNISolone 4 MG tablet therapy pack Use as directed on package 1 each 0   • metoprolol succinate (TOPROL-XL) 25 mg 24 hr tablet Take 25 mg by mouth daily     • Multiple Vitamin (MULTIVITAMIN) tablet Take 1 tablet by mouth daily     • nystatin (MYCOSTATIN) 500,000 units/5 mL suspension Swish and spit 5-10 ml 4 times daily x 5 days 473 mL 0   • other medication, see sig, Medication/product name: Lifitegrast (xiidra)  Strength:   Sig (include dose, route, frequency): one drop ou BID     • pantoprazole (PROTONIX) 40 mg tablet Take 40 mg by mouth daily     • rOPINIRole (REQUIP) 1 mg tablet TAKE 1 TABLET BY MOUTH 1 TO 3 HOURS BEFORE BEDTIME     • sertraline (ZOLOFT) 100 mg tablet TAKE 1 AND 1/2 TABS BY MOUTH DAILY     • sertraline (ZOLOFT) 50 mg tablet Take 150 mg by mouth daily     • simvastatin (ZOCOR) 40 mg tablet Take 40 mg by mouth daily     • Triamcinolone Acetonide 55 MCG/ACT AERO into each nostril One spray each nostril daily     • ZOLMitriptan (ZOMIG) 5 MG tablet Take 5 mg by mouth once as needed for migraine Daily prn     • DULoxetine (CYMBALTA) 30 mg delayed release capsule TAKE 1 CAPSULE BY MOUTH EVERY DAY FOR 90 DAYS     • ibuprofen (MOTRIN) 800 mg tablet Take 800 mg by mouth every 6 (six) hours as needed for mild pain (Patient not taking: Reported on 8/3/2023)     • nystatin (MYCOSTATIN) powder USE AS DIRECTED 3 TIMES A DAY FOR 10 DAYS (Patient not taking: Reported on 8/3/2023)     • ondansetron (ZOFRAN) 8 mg tablet Take 1 tablet (8 mg total) by mouth every 8 (eight) hours as needed for nausea or vomiting (Patient not taking: Reported on 8/24/2023) 20 tablet 1   • oxyCODONE (Roxicodone) 5 immediate release tablet Take 1 tablet (5 mg total) by mouth every 6 (six) hours as needed for moderate pain Max Daily Amount: 20 mg (Patient not taking: Reported on 8/24/2023) 10 tablet 0   • oxyCODONE-acetaminophen (Percocet) 5-325 mg per tablet Take 1 tablet by mouth every 4 (four) hours as needed for moderate pain for up to 30 doses Max Daily Amount: 6 tablets (Patient not taking: Reported on 8/3/2023) 30 tablet 0     No current facility-administered medications for this visit. Facility-Administered Medications Ordered in Other Visits   Medication Dose Route Frequency Provider Last Rate Last Admin   • alteplase (CATHFLO) injection 2 mg  2 mg Intracatheter Q1MIN PRN Severino Taylor MD               Objective:    Blood pressure 130/84, pulse (!) 128, temperature (!) 97.4 °F (36.3 °C), temperature source Temporal, resp. rate 20, height 5' 5" (1.651 m), weight 99.9 kg (220 lb 4.8 oz), SpO2 93 %. Body mass index is 36.66 kg/m². Body surface area is 2.06 meters squared. Physical Exam  Vitals reviewed. Constitutional:       General: She is not in acute distress. Appearance: Normal appearance. She is not ill-appearing. HENT:      Head: Normocephalic and atraumatic. Mouth/Throat:      Mouth: Mucous membranes are moist.   Eyes:      General: No scleral icterus. Right eye: No discharge. Left eye: No discharge. Conjunctiva/sclera: Conjunctivae normal.   Pulmonary:      Effort: Pulmonary effort is normal.      Breath sounds: Rales present. Comments: Rales left base  Musculoskeletal:      Right lower leg: No edema. Left lower leg: No edema. Skin:     General: Skin is warm and dry. Coloration: Skin is not jaundiced. Findings: No rash. Neurological:      General: No focal deficit present. Mental Status: She is alert and oriented to person, place, and time. Cranial Nerves: No cranial nerve deficit. Sensory: No sensory deficit. Motor: No weakness. Gait: Gait normal.   Psychiatric:         Mood and Affect: Mood normal.         Behavior: Behavior normal.         Thought Content:  Thought content normal.         Judgment: Judgment normal.         No results found for: ""  Lab Results   Component Value Date    K 3.3 (L) 09/14/2023    CL 98 09/14/2023    CO2 27 09/14/2023    BUN 12 09/14/2023    CREATININE 0.59 (L) 09/14/2023    CALCIUM 9.3 09/14/2023    CORRECTEDCA 9.3 06/07/2023    AST 22 09/14/2023    ALT 18 09/14/2023    ALKPHOS 166 (H) 09/14/2023    EGFR 97 09/14/2023     Lab Results   Component Value Date    WBC 52.74 (HH) 09/14/2023    HGB 9.5 (L) 09/14/2023    HCT 29.7 (L) 09/14/2023    MCV 93 09/14/2023     (H) 09/14/2023     Lab Results   Component Value Date    NEUTROABS 7.60 08/30/2023     CT chest abdomen pelvis w contrast  Narrative: CT CHEST, ABDOMEN AND PELVIS WITH IV CONTRAST    INDICATION:   C55: Malignant neoplasm of uterus, part unspecified. COMPARISON: Nicole 3, 2023. TECHNIQUE: CT examination of the chest, abdomen and pelvis was performed. Multiplanar 2D reformatted images were created from the source data. This examination, like all CT scans performed in the [facility], was performed utilizing techniques to minimize radiation dose exposure, including the use of iterative reconstruction and automated exposure control. Radiation dose length   product (DLP) for this visit:  1063 mGy-cm    IV Contrast:  100 mL of iohexol (OMNIPAQUE)  Enteric Contrast: Enteric contrast was administered. FINDINGS:    CHEST    LUNGS: Development of multifocal areas of groundglass density in the both lungs some of which are peribronchial in distribution and others are randomly distributed. Linear density seen in the left lower lobe. Additional area of linear lucencies in the   right lower lung with associated rounded configuration due to coaptation of adjacent linear density  Additional area of mild nodularity seen along the right dome of diaphragm measuring about 7 mm, image 186 series 3  PLEURA:  No pleural effusion seen HEART/GREAT VESSELS: no thoracic aortic aneurysm. MEDIASTINUM AND JACKY: Lower right paratracheal, left paratracheal lymph nodes do not meet the criteria for pathologic enlargement on the basis of size    CHEST WALL AND LOWER NECK:  Unremarkable. ABDOMEN    LIVER/BILIARY TREE: Hepatic steatosis seen    GALLBLADDER:  No calcified gallstones.  No pericholecystic inflammatory change. Common bile duct measures 1.2 cm  SPLEEN: Spleen measures 15 cm    PANCREAS:  Unremarkable. ADRENAL GLANDS:  Unremarkable. KIDNEYS/URETERS: Nonobstructing calculus lower pole right kidney measuring about 1.5 cm 1.1 cm cyst lower pole left kidney with minimal septation and subcentimeter hypodensity right kidney    STOMACH AND BOWEL:  Unremarkable. APPENDIX:  No findings to suggest appendicitis. ABDOMINOPELVIC CAVITY: Small aortocaval lymph node seen measuring about 6 mm, image 167 series 2  No pelvic sidewall lymph node enlargement seen    VESSELS: Celiac trunk appears unremarkable  SMA appear unremarkable  NELSY is patent  A portacaval lymph node seen measuring about 6 mm in short axis, does not meet the criteria for pathologic enlargement on the basis of size. This is also seen in image 78 series 601  PELVIS    REPRODUCTIVE ORGANS: Postsurgical changes from hysterectomy  Vaginal cuff is identified with mild asymmetry on the left side    URINARY BLADDER:  Unremarkable. ABDOMINAL WALL/INGUINAL REGIONS:  Unremarkable. OSSEOUS STRUCTURES: Mild sclerosis seen within the symphysis pubis  No lytic destructive changes seen within the bony skeleton  Impression: Development of heterogeneous opacities in the lungs with combination of scattered areas of groundglass density ,  linear parenchymal bandlike densities, few areas of mild nodularity right lower lobe. This may be an infectious basis or due to drug-induced   pneumonitis. Metastatic disease less likely though not entirely excluded. . Correlate clinically short interval follow-up in 2 to 3 months suggested    There is no visceral metastatic disease in the abdomen and pelvis    No measurable abdominopelvic or mediastinal lymphadenopathy    The study was marked in EPIC for immediate notification.     Workstation performed: [id number]

## 2023-09-14 NOTE — ASSESSMENT & PLAN NOTE
79-year-old with stage II leiomyosarcoma at risk for peritoneal recurrence given perforated uterus currently receiving adjuvant chemotherapy with gemcitabine 800 mg per metered squared days 1 and 8 of a 21-day cycle along with Taxotere at 65 mg per metered squared day 8 of a 21-day cycle with growth factor support. I reviewed her CBC, CMP, CT chest abdomen pelvis images. 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 No visible evidence of recurrent disease. Her performance status is 2.  1.  I reviewed the CT imaging in detail with the patient and her family members. The differential diagnosis is inflammatory versus malignancy. Based on CT appearance, inflammatory is favored. 2.  Given dyspnea in the setting of likely gemcitabine induced pneumonitis, I discussed stopping gemcitabine and Taxotere. The patient and family members are in agreement. 3.  Plan to start adjuvant Adriamycin at 50 mg per metered squared every 21 days for 4 cycles after pulmonary evaluation. Repeat CT imaging will be performed after the fourth cycle to assess disease response. 4. We further discussed the use of ctDNA and that it can be measured to assess the absence or presence of molecular residual disease. The sensitivity of this assay in gyn cancers has not been readily established but is utilized in other disease sites to monitor progression and treatment response. As a result, it may or may not give us clinical information that is useful at this time but may provide added insight for the future. She is interested in CT DNA testing and agrees to proceed.

## 2023-09-15 ENCOUNTER — HOSPITAL ENCOUNTER (OUTPATIENT)
Dept: INFUSION CENTER | Facility: HOSPITAL | Age: 63
Discharge: HOME | End: 2023-09-15
Attending: OBSTETRICS & GYNECOLOGY

## 2023-09-15 DIAGNOSIS — C55 UTERINE LEIOMYOSARCOMA (HCC): Primary | ICD-10-CM

## 2023-09-15 LAB
ERYTHROCYTE [DISTWIDTH] IN BLOOD BY AUTOMATED COUNT: 22.4 % (ref 11.6–15.1)
HCT VFR BLD AUTO: 29.7 % (ref 34.8–46.1)
HGB BLD-MCNC: 9.5 G/DL (ref 11.5–15.4)
MCH RBC QN AUTO: 29.9 PG (ref 26.8–34.3)
MCHC RBC AUTO-ENTMCNC: 32 G/DL (ref 31.4–37.4)
MCV RBC AUTO: 93 FL (ref 82–98)
PLATELET # BLD AUTO: 438 THOUSANDS/UL (ref 149–390)
PMV BLD AUTO: 10.2 FL (ref 8.9–12.7)
RBC # BLD AUTO: 3.18 MILLION/UL (ref 3.81–5.12)
WBC # BLD AUTO: 52.74 THOUSAND/UL (ref 4.31–10.16)

## 2023-09-18 NOTE — PROGRESS NOTES
Pulmonary Consultation   Ruslan Wei 60 y.o. female MRN: [id number]  9/19/2023      Reason for Consultation:  Pneumonitis evaluation    Requested by: Dr. Mary Rossi:  1. Shortness of breath and abnormal CT chest  · Noted ground glass infiltrates and timing with 3rd round of chemotherapy is very concerning for drug induced pneumonitis - likely gemcitabine, possibly Taxotere but this is less likely. Do not highly suspect autoimmune or infectious pneumonitis based upon presentation but would need to consider if not improving  · Agree with no further chemotherapy for now pending improvements and avoid further gemcitabine dosing  · Will stop medrol taper and start prednisone 40mg daily and wean by 10mg weekly until off  · Will repeat CT chest - HRCT protocol at 4 week interval  · Obtain repeat PFTs at 4 week interval - noted restriction likely related to body habitus and shortened expiratory time however may be related to pneumonitis   · Follow up in 4-6 weeks or sooner if symptoms not improving  · Discussed if symptoms worsening and/or radiographs not improving will need to expand for serology testing and bronchoscopy - she demonstrated understanding    2. Transient exertional hypoxia   · Suspect related to #1  · Given anticipated recovery, will not prescribe supplemental oxygen at this time - she was in agreement with this plan  · Will repeat 6MWT with PFTs at next visit    3. Leiomyosarcoma of uterus - per Gyn/Oncology - Dr. Cassia Vaz    4. History of cardiomyopathy - await TTE results    Plan:    Diagnoses and all orders for this visit:    Abnormal CT of the chest  -     POCT spirometry  -     POCT 6 minute walk  -     predniSONE 10 mg tablet; Take 4 tablets (40 mg total) by mouth daily for 7 days, THEN 3 tablets (30 mg total) daily for 7 days, THEN 2 tablets (20 mg total) daily for 7 days, THEN 1 tablet (10 mg total) daily for 7 days. -     CT chest high resolution;  Future  -     Complete PFT with post Bronchodilator and Six Minute walk; Future    Drug-induced lung disease  -     predniSONE 10 mg tablet; Take 4 tablets (40 mg total) by mouth daily for 7 days, THEN 3 tablets (30 mg total) daily for 7 days, THEN 2 tablets (20 mg total) daily for 7 days, THEN 1 tablet (10 mg total) daily for 7 days. -     CT chest high resolution; Future  -     Complete PFT with post Bronchodilator and Six Minute walk; Future    Shortness of breath  -     albuterol (Ventolin HFA) 90 mcg/act inhaler; Inhale 2 puffs every 6 (six) hours as needed for wheezing or shortness of breath    Uterine leiomyosarcoma (HCC)    Viral cardiomyopathy (HCC)        History of Present Illness   HPI:  Sheng Yun is a 60 y.o. female who uterine leiomyosarcoma who had completed 3 cycles of gemcitabine and Taxotere presents for evaluation of pneumonitis. 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 She presents today at request of Dr. Fide Araujo for concerns of gemcitabine induced pneumonitis. She presents today with her daughter. She reports noticing increased shortness of breath with minimal exertion and nonproductive cough approximately 4 weeks ago. She denied any infectious symptoms. She denied purulent sputum production or hemoptysis, no pleurisy. She denied any dysphgia or aspiration events. She denied any history of prior lung disease including ILD, recurrent respiratory infections, asthma, or VTE. She denied any associated adenopathy, no rashes, no LE edema, no orthopnea. She denied history of autoimmune disorders and denied family history of lung disease. She denied any recent changes to the home environment. She was started on medrol dose pack 6 days ago by Dr. Fide Araujo and she has noted 50% improvement. Previous dyspnea with only few steps but is not able to walk around home and come in from parking lot.          Review of Systems   Constitutional: Positive for activity change and fatigue. Negative for appetite change and fever. HENT: Positive for mouth sores. Negative for ear pain, postnasal drip, rhinorrhea, sneezing, sore throat and trouble swallowing. Respiratory: Positive for cough, shortness of breath and wheezing. Negative for chest tightness. Cardiovascular: Negative for chest pain, palpitations and leg swelling. Gastrointestinal: Negative for abdominal pain, nausea and vomiting. Musculoskeletal: Positive for myalgias. Negative for arthralgias and gait problem. Skin: Negative for rash. Allergic/Immunologic: Positive for immunocompromised state. Neurological: Positive for headaches. Negative for syncope. Hematological: Negative for adenopathy. Psychiatric/Behavioral: Negative for sleep disturbance. The patient is not nervous/anxious.         Historical Information   Past Medical History:   Diagnosis Date   • Anxiety    • Depression    • Diabetes mellitus ([address])    • Functional dyspepsia    • GERD without esophagitis    • Hyperlipidemia    • Hypertension    • Irritable bowel syndrome    • Migraines      Past Surgical History:   Procedure Laterality Date   • APPENDECTOMY     • CHEST WALL BIOPSY N/A 6/4/2023    Procedure: EXCISION  BIOPSY LESION/MASS ABDOMINAL-PELVIC PERITONEUM;  Surgeon: Rajeev Chiang MD;  Location: [facility];  Service: Gynecology Oncology   • CHOLECYSTECTOMY     • FOOT SURGERY Left     Bone spur   • HYSTERECTOMY N/A 6/4/2023    Procedure: HYSTERECTOMY TOTAL ABDOMINAL (DOROTHY), BILATERAL SALPINGOOPHORECTOMY;  Surgeon: Rajeev Chiang MD;  Location: [facility];  Service: Gynecology Oncology   • IR PORT PLACEMENT  6/30/2023     Family History   Problem Relation Age of Onset   • Heart disease Mother    • Breast cancer Father    • Colon polyps Neg Hx    • Colon cancer Neg Hx        Occupational History:  [employer]    Social History: lifelong nonsmoker, pet cats, no exotic animal exposures    Meds/Allergies     Current Outpatient Medications:   •  albuterol (Ventolin HFA) 90 mcg/act inhaler, Inhale 2 puffs every 6 (six) hours as needed for wheezing or shortness of breath, Disp: 18 g, Rfl: 1  •  aspirin (ECOTRIN LOW STRENGTH) 81 mg EC tablet, Take 81 mg by mouth daily, Disp: , Rfl:   •  atorvastatin (LIPITOR) 40 mg tablet, Take 40 mg by mouth daily at bedtime, Disp: , Rfl:   •  betamethasone, augmented, (DIPROLENE) 0.05 % lotion, Apply topically 2 (two) times a day, Disp: 60 mL, Rfl: 1  •  cetirizine (ZyrTEC) 10 mg tablet, Take 10 mg by mouth daily, Disp: , Rfl:   •  Cetirizine HCl 0.24 % SOLN, Take by mouth, Disp: , Rfl:   •  cholecalciferol (VITAMIN D3) 1,000 units tablet, Take 1,000 Units by mouth daily 2 daily, Disp: , Rfl:   •  CLENPIQ 10-3.5-12 MG-GM -GM/160ML SOLN, USE AS DIRECTED, Disp: 320 mL, Rfl: 0  •  clindamycin (CLEOCIN T) 1 % external solution, Apply topically 2 (two) times a day, Disp: 60 mL, Rfl: 0  •  dexamethasone (DECADRON) 4 mg tablet, Take 2 tabs PO BID the day prior to chemo, then 2 tabs PO the night after chemo, then 2 tabs PO BID the day after chemo, Disp: 40 tablet, Rfl: 0  •  diphenhydramine, lidocaine, Al/Mg hydroxide, simethicone (Magic Mouthwash) SUSP, Swish and spit 10 ML TID prn mouth pain, Disp: 300 mL, Rfl: 1  •  DULoxetine (CYMBALTA) 30 mg delayed release capsule, Take 30 mg by mouth daily, Disp: , Rfl:   •  escitalopram (LEXAPRO) 10 mg tablet, Take 10 mg by mouth daily, Disp: , Rfl:   •  famotidine (PEPCID) 40 MG tablet, , Disp: , Rfl:   •  glimepiride (AMARYL) 4 mg tablet, Take 4 mg by mouth 2 (two) times a day, Disp: , Rfl:   •  hydrochlorothiazide (HYDRODIURIL) 25 mg tablet, Take 25 mg by mouth daily, Disp: , Rfl:   •  lidocaine-prilocaine (EMLA) cream, Apply to PORT 30 min prior to labs/chemo, Disp: 30 g, Rfl: 2  •  methylPREDNISolone 4 MG tablet therapy pack, Use as directed on package, Disp: 1 each, Rfl: 0  •  predniSONE 10 mg tablet, Take 4 tablets (40 mg total) by mouth daily for 7 days, THEN 3 tablets (30 mg total) daily for 7 days, THEN 2 tablets (20 mg total) daily for 7 days, THEN 1 tablet (10 mg total) daily for 7 days. , Disp: 70 tablet, Rfl: 0  •  apixaban (Eliquis) 2.5 mg, Take 1 tablet (2.5 mg total) by mouth 2 (two) times a day, Disp: 60 tablet, Rfl: 3  •  DULoxetine (CYMBALTA) 30 mg delayed release capsule, TAKE 1 CAPSULE BY MOUTH EVERY DAY FOR 90 DAYS, Disp: , Rfl:   •  ibuprofen (MOTRIN) 800 mg tablet, Take 800 mg by mouth every 6 (six) hours as needed for mild pain (Patient not taking: Reported on 8/3/2023), Disp: , Rfl:   •  LORazepam (ATIVAN) 1 mg tablet, Take 1 tablet (1 mg total) by mouth every 8 (eight) hours as needed for anxiety (nausea or anxiety), Disp: 30 tablet, Rfl: 0  •  losartan (COZAAR) 50 mg tablet, Take 50 mg by mouth daily, Disp: , Rfl:   •  meclizine (ANTIVERT) 25 mg tablet, Take by mouth every 12 (twelve) hours as needed for dizziness, Disp: , Rfl:   •  metoprolol succinate (TOPROL-XL) 25 mg 24 hr tablet, Take 25 mg by mouth daily, Disp: , Rfl:   •  Multiple Vitamin (MULTIVITAMIN) tablet, Take 1 tablet by mouth daily, Disp: , Rfl:   •  nystatin (MYCOSTATIN) 500,000 units/5 mL suspension, Swish and spit 5-10 ml 4 times daily x 5 days, Disp: 473 mL, Rfl: 0  •  nystatin (MYCOSTATIN) powder, USE AS DIRECTED 3 TIMES A DAY FOR 10 DAYS (Patient not taking: Reported on 8/3/2023), Disp: , Rfl:   •  ondansetron (ZOFRAN) 8 mg tablet, Take 1 tablet (8 mg total) by mouth every 8 (eight) hours as needed for nausea or vomiting (Patient not taking: Reported on 8/24/2023), Disp: 20 tablet, Rfl: 1  •  other medication, see sig,, Medication/product name: Lifitegrast (xiidra) Strength:  Sig (include dose, route, frequency): one drop ou BID, Disp: , Rfl:   •  oxyCODONE (Roxicodone) 5 immediate release tablet, Take 1 tablet (5 mg total) by mouth every 6 (six) hours as needed for moderate pain Max Daily Amount: 20 mg (Patient not taking: Reported on 8/24/2023), Disp: 10 tablet, Rfl: 0  •  oxyCODONE-acetaminophen (Percocet) 5-325 mg per tablet, Take 1 tablet by mouth every 4 (four) hours as needed for moderate pain for up to 30 doses Max Daily Amount: 6 tablets (Patient not taking: Reported on 8/3/2023), Disp: 30 tablet, Rfl: 0  •  pantoprazole (PROTONIX) 40 mg tablet, Take 40 mg by mouth daily, Disp: , Rfl:   •  rOPINIRole (REQUIP) 1 mg tablet, TAKE 1 TABLET BY MOUTH 1 TO 3 HOURS BEFORE BEDTIME, Disp: , Rfl:   •  sertraline (ZOLOFT) 100 mg tablet, TAKE 1 AND 1/2 TABS BY MOUTH DAILY, Disp: , Rfl:   •  sertraline (ZOLOFT) 50 mg tablet, Take 150 mg by mouth daily, Disp: , Rfl:   •  simvastatin (ZOCOR) 40 mg tablet, Take 40 mg by mouth daily, Disp: , Rfl:   •  Triamcinolone Acetonide 55 MCG/ACT AERO, into each nostril One spray each nostril daily, Disp: , Rfl:   •  ZOLMitriptan (ZOMIG) 5 MG tablet, Take 5 mg by mouth once as needed for migraine Daily prn, Disp: , Rfl:   Allergies   Allergen Reactions   • Bee Pollen Swelling   • Sulfa Antibiotics      High fever  Pt unsure - it happened when she was 12years old       Vitals: Blood pressure 124/78, pulse 95, temperature (!) 97.1 °F (36.2 °C), temperature source Tympanic, resp. rate 18, height 5' 5" (1.651 m), weight 98.4 kg (217 lb), SpO2 90 %. , Body mass index is 36.11 kg/m². Oxygen Therapy  SpO2: 90 %    Physical Exam  Physical Exam  Vitals reviewed. Constitutional:       General: She is not in acute distress. Appearance: Normal appearance. She is well-developed. She is obese. She is not ill-appearing, toxic-appearing or diaphoretic. HENT:      Head: Normocephalic and atraumatic. Right Ear: External ear normal.      Left Ear: External ear normal.      Nose: Nose normal. No congestion or rhinorrhea. Mouth/Throat:      Mouth: Mucous membranes are moist.      Pharynx: Oropharynx is clear. No oropharyngeal exudate.       Comments: Dentures in place, no thrush or oral lesions  Eyes:      General: No scleral icterus. Right eye: No discharge. Left eye: No discharge. Conjunctiva/sclera: Conjunctivae normal.      Pupils: Pupils are equal, round, and reactive to light. Neck:      Vascular: No JVD. Trachea: No tracheal deviation. Cardiovascular:      Rate and Rhythm: Normal rate and regular rhythm. Heart sounds: Normal heart sounds. No murmur heard. No gallop. Pulmonary:      Effort: Pulmonary effort is normal. No respiratory distress. Breath sounds: Normal breath sounds. No stridor. No wheezing, rhonchi or rales. Comments: Slightly diminished BS at bases, no wheeze or rales, no rhonchi  Abdominal:      General: Bowel sounds are normal. There is no distension. Palpations: Abdomen is soft. Tenderness: There is no abdominal tenderness. There is no guarding or rebound. Musculoskeletal:         General: No deformity. Right lower leg: No edema. Left lower leg: No edema. Lymphadenopathy:      Cervical: No cervical adenopathy. Skin:     General: Skin is warm and dry. Coloration: Skin is not jaundiced. Findings: No erythema or rash. Neurological:      General: No focal deficit present. Mental Status: She is alert and oriented to person, place, and time. Mental status is at baseline. Psychiatric:         Mood and Affect: Mood normal.         Behavior: Behavior normal.         Thought Content: Thought content normal.         Labs: I have personally reviewed pertinent lab results.   Lab Results   Component Value Date    WBC 52.74 (HH) 09/14/2023    HGB 9.5 (L) 09/14/2023    HCT 29.7 (L) 09/14/2023    MCV 93 09/14/2023     (H) 09/14/2023     Lab Results   Component Value Date    CALCIUM 9.3 09/14/2023    K 3.3 (L) 09/14/2023    CO2 27 09/14/2023    CL 98 09/14/2023    BUN 12 09/14/2023    CREATININE 0.59 (L) 09/14/2023     No results found for: "IGE"  Lab Results   Component Value Date    ALT 18 09/14/2023    AST 22 09/14/2023 ALKPHOS 166 (H) 2023         Imaging and other studies: I have personally reviewed pertinent reports. and I have personally reviewed pertinent films in PACS  CT Chest/Abd/Pelvis 2023- scattered ground glass infiltrates with more linear/rounded density in the RLL, nodularity 7mm dome or right HD, no effusions, no sig mediastinal/hilar adenopathy, no visceral metastatic disease in pelvis or significant abdominal adenopathy    Pulmonary function testin2023 - Ratio 85%, FVC 2.16L (65%), FEV1 1.83L (71%) - moderate restrictive airflow defect with noted shortened expiratory time of 4.4 seconds will over estimate restriction and under estimate obstruction    Ambulation Testing  2023 - 6MWT on room air - ambulated 207 meters in 6 minutes, initial SpO2 92%, theresa 88% with rapid recovery, maximal HR 116bpm    EKG, Pathology, and Other Studies:   TTE pending    Junior Madera DO, FACP  [facility]    Answers for HPI/ROS submitted by the patient on 9/15/2023  Do you have difficulty breathing?: Yes  Chronicity: new  When did you first notice your symptoms?: 1 to 4 weeks ago  How often do your symptoms occur?: constantly  Since you first noticed this problem, how has it changed?: gradually worsening  Do you have shortness of breath that occurs with effort or exertion?: Yes  Do you have ear congestion?: No  Do you have heartburn?: Yes  Do you have fatigue?: Yes  Do you have nasal congestion?: No  Do you have shortness of breath when lying flat?: No  Do you have shortness of breath when you wake up?: No  Do you have sweats?: No  Have you experienced weight loss?: No  Which of the following makes your symptoms worse?: any activity, climbing stairs, exercise, minimal activity, strenuous activity  Which of the following makes your symptoms better?: nothing, change in position, rest  Risk factors for lung disease: animal exposure

## 2023-09-19 ENCOUNTER — OFFICE VISIT (OUTPATIENT)
Age: 63
End: 2023-09-19
Payer: COMMERCIAL

## 2023-09-19 VITALS
DIASTOLIC BLOOD PRESSURE: 78 MMHG | TEMPERATURE: 97.1 F | WEIGHT: 217 LBS | RESPIRATION RATE: 18 BRPM | SYSTOLIC BLOOD PRESSURE: 124 MMHG | OXYGEN SATURATION: 90 % | HEIGHT: 65 IN | HEART RATE: 95 BPM | BODY MASS INDEX: 36.15 KG/M2

## 2023-09-19 DIAGNOSIS — T50.905A DRUG-INDUCED PNEUMONITIS: ICD-10-CM

## 2023-09-19 DIAGNOSIS — J98.4 DRUG-INDUCED PNEUMONITIS: ICD-10-CM

## 2023-09-19 DIAGNOSIS — J98.4 DRUG-INDUCED LUNG DISEASE: ICD-10-CM

## 2023-09-19 DIAGNOSIS — T50.905A DRUG-INDUCED LUNG DISEASE: ICD-10-CM

## 2023-09-19 DIAGNOSIS — R06.02 SHORTNESS OF BREATH: ICD-10-CM

## 2023-09-19 DIAGNOSIS — R93.89 ABNORMAL CT OF THE CHEST: Primary | ICD-10-CM

## 2023-09-19 DIAGNOSIS — B33.24 VIRAL CARDIOMYOPATHY (HCC): ICD-10-CM

## 2023-09-19 DIAGNOSIS — C55 UTERINE LEIOMYOSARCOMA (HCC): ICD-10-CM

## 2023-09-19 PROCEDURE — 94010 BREATHING CAPACITY TEST: CPT | Performed by: INTERNAL MEDICINE

## 2023-09-19 PROCEDURE — 94618 PULMONARY STRESS TESTING: CPT | Performed by: INTERNAL MEDICINE

## 2023-09-19 PROCEDURE — 99245 OFF/OP CONSLTJ NEW/EST HI 55: CPT | Performed by: INTERNAL MEDICINE

## 2023-09-19 RX ORDER — PREDNISONE 10 MG/1
TABLET ORAL
Qty: 70 TABLET | Refills: 0 | Status: SHIPPED | OUTPATIENT
Start: 2023-09-19 | End: 2023-10-17

## 2023-09-19 RX ORDER — ALBUTEROL SULFATE 90 UG/1
2 AEROSOL, METERED RESPIRATORY (INHALATION) EVERY 6 HOURS PRN
Qty: 18 G | Refills: 1 | Status: SHIPPED | OUTPATIENT
Start: 2023-09-19 | End: 2023-10-19

## 2023-09-19 NOTE — LETTER
September 19, 2023     Marlon Peacock, [address] [id number] [facility] [address]  [address] [facility] [address] [address]    Patient: Danica Feliciano   YOB: 1960   Date of Visit: 9/19/2023       Dear Dr. Jc Shabazz: Thank you for referring Danica Feliciano to me for evaluation. Below are my notes for this consultation. If you have questions, please do not hesitate to call me. I look forward to following your patient along with you. Sincerely,        Severo Salazar DO        CC: No Recipients    Severo Salazar DO  9/19/2023  1:05 PM  Sign when Signing Visit  Pulmonary Consultation   Danica Feliciano 60 y.o. female MRN: [id number]  9/19/2023      Reason for Consultation:  Pneumonitis evaluation    Requested by: Dr. Jc Shabazz    Assessment:  Shortness of breath and abnormal CT chest  Noted ground glass infiltrates and timing with 3rd round of chemotherapy is very concerning for drug induced pneumonitis - likely gemcitabine, possibly Taxotere but this is less likely.   Do not highly suspect autoimmune or infectious pneumonitis based upon presentation but would need to consider if not improving  Agree with no further chemotherapy for now pending improvements and avoid further gemcitabine dosing  Will stop medrol taper and start prednisone 40mg daily and wean by 10mg weekly until off  Will repeat CT chest - HRCT protocol at 4 week interval  Obtain repeat PFTs at 4 week interval - noted restriction likely related to body habitus and shortened expiratory time however may be related to pneumonitis   Follow up in 4-6 weeks or sooner if symptoms not improving  Discussed if symptoms worsening and/or radiographs not improving will need to expand for serology testing and bronchoscopy - she demonstrated understanding    Transient exertional hypoxia   Suspect related to #1  Given anticipated recovery, will not prescribe supplemental oxygen at this time - she was in agreement with this plan  Will repeat 6MWT with PFTs at next visit    Leiomyosarcoma of uterus - per Gyn/Oncology - Dr. Yuan Brandon    History of cardiomyopathy - await TTE results    Plan:    Diagnoses and all orders for this visit:    Abnormal CT of the chest  -     POCT spirometry  -     POCT 6 minute walk  -     predniSONE 10 mg tablet; Take 4 tablets (40 mg total) by mouth daily for 7 days, THEN 3 tablets (30 mg total) daily for 7 days, THEN 2 tablets (20 mg total) daily for 7 days, THEN 1 tablet (10 mg total) daily for 7 days. -     CT chest high resolution; Future  -     Complete PFT with post Bronchodilator and Six Minute walk; Future    Drug-induced lung disease  -     predniSONE 10 mg tablet; Take 4 tablets (40 mg total) by mouth daily for 7 days, THEN 3 tablets (30 mg total) daily for 7 days, THEN 2 tablets (20 mg total) daily for 7 days, THEN 1 tablet (10 mg total) daily for 7 days. -     CT chest high resolution; Future  -     Complete PFT with post Bronchodilator and Six Minute walk; Future    Shortness of breath  -     albuterol (Ventolin HFA) 90 mcg/act inhaler; Inhale 2 puffs every 6 (six) hours as needed for wheezing or shortness of breath    Uterine leiomyosarcoma (HCC)    Viral cardiomyopathy (HCC)        History of Present Illness   HPI:  Andrea Perez is a 60 y.o. female who uterine leiomyosarcoma who had completed 3 cycles of gemcitabine and Taxotere presents for evaluation of pneumonitis. 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 She presents today at request of Dr. Yuan Brandon for concerns of gemcitabine induced pneumonitis. She presents today with her daughter. She reports noticing increased shortness of breath with minimal exertion and nonproductive cough approximately 4 weeks ago. She denied any infectious symptoms. She denied purulent sputum production or hemoptysis, no pleurisy. She denied any dysphgia or aspiration events.  She denied any history of prior lung disease including ILD, recurrent respiratory infections, asthma, or VTE. She denied any associated adenopathy, no rashes, no LE edema, no orthopnea. She denied history of autoimmune disorders and denied family history of lung disease. She denied any recent changes to the home environment. She was started on medrol dose pack 6 days ago by Dr. Jc Shabazz and she has noted 50% improvement. Previous dyspnea with only few steps but is not able to walk around home and come in from parking lot. Review of Systems   Constitutional: Positive for activity change and fatigue. Negative for appetite change and fever. HENT: Positive for mouth sores. Negative for ear pain, postnasal drip, rhinorrhea, sneezing, sore throat and trouble swallowing. Respiratory: Positive for cough, shortness of breath and wheezing. Negative for chest tightness. Cardiovascular: Negative for chest pain, palpitations and leg swelling. Gastrointestinal: Negative for abdominal pain, nausea and vomiting. Musculoskeletal: Positive for myalgias. Negative for arthralgias and gait problem. Skin: Negative for rash. Allergic/Immunologic: Positive for immunocompromised state. Neurological: Positive for headaches. Negative for syncope. Hematological: Negative for adenopathy. Psychiatric/Behavioral: Negative for sleep disturbance. The patient is not nervous/anxious.         Historical Information   Past Medical History:   Diagnosis Date   • Anxiety    • Depression    • Diabetes mellitus ([address])    • Functional dyspepsia    • GERD without esophagitis    • Hyperlipidemia    • Hypertension    • Irritable bowel syndrome    • Migraines      Past Surgical History:   Procedure Laterality Date   • APPENDECTOMY     • CHEST WALL BIOPSY N/A 6/4/2023    Procedure: EXCISION  BIOPSY LESION/MASS ABDOMINAL-PELVIC PERITONEUM;  Surgeon: Marlon Peacock MD;  Location: [facility];  Service: Gynecology Oncology   • CHOLECYSTECTOMY     • FOOT SURGERY Left Bone spur   • HYSTERECTOMY N/A 6/4/2023    Procedure: HYSTERECTOMY TOTAL ABDOMINAL (DOROTHY), BILATERAL SALPINGOOPHORECTOMY;  Surgeon: Federico Crane MD;  Location: [facility];  Service: Gynecology Oncology   • IR PORT PLACEMENT  6/30/2023     Family History   Problem Relation Age of Onset   • Heart disease Mother    • Breast cancer Father    • Colon polyps Neg Hx    • Colon cancer Neg Hx        Occupational History:  [employer]    Social History: lifelong nonsmoker, pet cats, no exotic animal exposures    Meds/Allergies     Current Outpatient Medications:   •  albuterol (Ventolin HFA) 90 mcg/act inhaler, Inhale 2 puffs every 6 (six) hours as needed for wheezing or shortness of breath, Disp: 18 g, Rfl: 1  •  aspirin (ECOTRIN LOW STRENGTH) 81 mg EC tablet, Take 81 mg by mouth daily, Disp: , Rfl:   •  atorvastatin (LIPITOR) 40 mg tablet, Take 40 mg by mouth daily at bedtime, Disp: , Rfl:   •  betamethasone, augmented, (DIPROLENE) 0.05 % lotion, Apply topically 2 (two) times a day, Disp: 60 mL, Rfl: 1  •  cetirizine (ZyrTEC) 10 mg tablet, Take 10 mg by mouth daily, Disp: , Rfl:   •  Cetirizine HCl 0.24 % SOLN, Take by mouth, Disp: , Rfl:   •  cholecalciferol (VITAMIN D3) 1,000 units tablet, Take 1,000 Units by mouth daily 2 daily, Disp: , Rfl:   •  CLENPIQ 10-3.5-12 MG-GM -GM/160ML SOLN, USE AS DIRECTED, Disp: 320 mL, Rfl: 0  •  clindamycin (CLEOCIN T) 1 % external solution, Apply topically 2 (two) times a day, Disp: 60 mL, Rfl: 0  •  dexamethasone (DECADRON) 4 mg tablet, Take 2 tabs PO BID the day prior to chemo, then 2 tabs PO the night after chemo, then 2 tabs PO BID the day after chemo, Disp: 40 tablet, Rfl: 0  •  diphenhydramine, lidocaine, Al/Mg hydroxide, simethicone (Magic Mouthwash) SUSP, Swish and spit 10 ML TID prn mouth pain, Disp: 300 mL, Rfl: 1  •  DULoxetine (CYMBALTA) 30 mg delayed release capsule, Take 30 mg by mouth daily, Disp: , Rfl:   •  escitalopram (LEXAPRO) 10 mg tablet, Take 10 mg by mouth daily, Disp: , Rfl:   •  famotidine (PEPCID) 40 MG tablet, , Disp: , Rfl:   •  glimepiride (AMARYL) 4 mg tablet, Take 4 mg by mouth 2 (two) times a day, Disp: , Rfl:   •  hydrochlorothiazide (HYDRODIURIL) 25 mg tablet, Take 25 mg by mouth daily, Disp: , Rfl:   •  lidocaine-prilocaine (EMLA) cream, Apply to PORT 30 min prior to labs/chemo, Disp: 30 g, Rfl: 2  •  methylPREDNISolone 4 MG tablet therapy pack, Use as directed on package, Disp: 1 each, Rfl: 0  •  predniSONE 10 mg tablet, Take 4 tablets (40 mg total) by mouth daily for 7 days, THEN 3 tablets (30 mg total) daily for 7 days, THEN 2 tablets (20 mg total) daily for 7 days, THEN 1 tablet (10 mg total) daily for 7 days. , Disp: 70 tablet, Rfl: 0  •  apixaban (Eliquis) 2.5 mg, Take 1 tablet (2.5 mg total) by mouth 2 (two) times a day, Disp: 60 tablet, Rfl: 3  •  DULoxetine (CYMBALTA) 30 mg delayed release capsule, TAKE 1 CAPSULE BY MOUTH EVERY DAY FOR 90 DAYS, Disp: , Rfl:   •  ibuprofen (MOTRIN) 800 mg tablet, Take 800 mg by mouth every 6 (six) hours as needed for mild pain (Patient not taking: Reported on 8/3/2023), Disp: , Rfl:   •  LORazepam (ATIVAN) 1 mg tablet, Take 1 tablet (1 mg total) by mouth every 8 (eight) hours as needed for anxiety (nausea or anxiety), Disp: 30 tablet, Rfl: 0  •  losartan (COZAAR) 50 mg tablet, Take 50 mg by mouth daily, Disp: , Rfl:   •  meclizine (ANTIVERT) 25 mg tablet, Take by mouth every 12 (twelve) hours as needed for dizziness, Disp: , Rfl:   •  metoprolol succinate (TOPROL-XL) 25 mg 24 hr tablet, Take 25 mg by mouth daily, Disp: , Rfl:   •  Multiple Vitamin (MULTIVITAMIN) tablet, Take 1 tablet by mouth daily, Disp: , Rfl:   •  nystatin (MYCOSTATIN) 500,000 units/5 mL suspension, Swish and spit 5-10 ml 4 times daily x 5 days, Disp: 473 mL, Rfl: 0  •  nystatin (MYCOSTATIN) powder, USE AS DIRECTED 3 TIMES A DAY FOR 10 DAYS (Patient not taking: Reported on 8/3/2023), Disp: , Rfl:   •  ondansetron (ZOFRAN) 8 mg tablet, Take 1 tablet (8 mg total) by mouth every 8 (eight) hours as needed for nausea or vomiting (Patient not taking: Reported on 8/24/2023), Disp: 20 tablet, Rfl: 1  •  other medication, see sig,, Medication/product name: Lifitegrast (xiidra) Strength:  Sig (include dose, route, frequency): one drop ou BID, Disp: , Rfl:   •  oxyCODONE (Roxicodone) 5 immediate release tablet, Take 1 tablet (5 mg total) by mouth every 6 (six) hours as needed for moderate pain Max Daily Amount: 20 mg (Patient not taking: Reported on 8/24/2023), Disp: 10 tablet, Rfl: 0  •  oxyCODONE-acetaminophen (Percocet) 5-325 mg per tablet, Take 1 tablet by mouth every 4 (four) hours as needed for moderate pain for up to 30 doses Max Daily Amount: 6 tablets (Patient not taking: Reported on 8/3/2023), Disp: 30 tablet, Rfl: 0  •  pantoprazole (PROTONIX) 40 mg tablet, Take 40 mg by mouth daily, Disp: , Rfl:   •  rOPINIRole (REQUIP) 1 mg tablet, TAKE 1 TABLET BY MOUTH 1 TO 3 HOURS BEFORE BEDTIME, Disp: , Rfl:   •  sertraline (ZOLOFT) 100 mg tablet, TAKE 1 AND 1/2 TABS BY MOUTH DAILY, Disp: , Rfl:   •  sertraline (ZOLOFT) 50 mg tablet, Take 150 mg by mouth daily, Disp: , Rfl:   •  simvastatin (ZOCOR) 40 mg tablet, Take 40 mg by mouth daily, Disp: , Rfl:   •  Triamcinolone Acetonide 55 MCG/ACT AERO, into each nostril One spray each nostril daily, Disp: , Rfl:   •  ZOLMitriptan (ZOMIG) 5 MG tablet, Take 5 mg by mouth once as needed for migraine Daily prn, Disp: , Rfl:   Allergies   Allergen Reactions   • Bee Pollen Swelling   • Sulfa Antibiotics      High fever  Pt unsure - it happened when she was 12years old       Vitals: Blood pressure 124/78, pulse 95, temperature (!) 97.1 °F (36.2 °C), temperature source Tympanic, resp. rate 18, height 5' 5" (1.651 m), weight 98.4 kg (217 lb), SpO2 90 %. , Body mass index is 36.11 kg/m². Oxygen Therapy  SpO2: 90 %    Physical Exam  Physical Exam  Vitals reviewed.    Constitutional:       General: She is not in acute distress. Appearance: Normal appearance. She is well-developed. She is obese. She is not ill-appearing, toxic-appearing or diaphoretic. HENT:      Head: Normocephalic and atraumatic. Right Ear: External ear normal.      Left Ear: External ear normal.      Nose: Nose normal. No congestion or rhinorrhea. Mouth/Throat:      Mouth: Mucous membranes are moist.      Pharynx: Oropharynx is clear. No oropharyngeal exudate. Comments: Dentures in place, no thrush or oral lesions  Eyes:      General: No scleral icterus. Right eye: No discharge. Left eye: No discharge. Conjunctiva/sclera: Conjunctivae normal.      Pupils: Pupils are equal, round, and reactive to light. Neck:      Vascular: No JVD. Trachea: No tracheal deviation. Cardiovascular:      Rate and Rhythm: Normal rate and regular rhythm. Heart sounds: Normal heart sounds. No murmur heard. No gallop. Pulmonary:      Effort: Pulmonary effort is normal. No respiratory distress. Breath sounds: Normal breath sounds. No stridor. No wheezing, rhonchi or rales. Comments: Slightly diminished BS at bases, no wheeze or rales, no rhonchi  Abdominal:      General: Bowel sounds are normal. There is no distension. Palpations: Abdomen is soft. Tenderness: There is no abdominal tenderness. There is no guarding or rebound. Musculoskeletal:         General: No deformity. Right lower leg: No edema. Left lower leg: No edema. Lymphadenopathy:      Cervical: No cervical adenopathy. Skin:     General: Skin is warm and dry. Coloration: Skin is not jaundiced. Findings: No erythema or rash. Neurological:      General: No focal deficit present. Mental Status: She is alert and oriented to person, place, and time. Mental status is at baseline. Psychiatric:         Mood and Affect: Mood normal.         Behavior: Behavior normal.         Thought Content:  Thought content normal.         Labs: I have personally reviewed pertinent lab results. Lab Results   Component Value Date    WBC 52.74 (HH) 2023    HGB 9.5 (L) 2023    HCT 29.7 (L) 2023    MCV 93 2023     (H) 2023     Lab Results   Component Value Date    CALCIUM 9.3 2023    K 3.3 (L) 2023    CO2 27 2023    CL 98 2023    BUN 12 2023    CREATININE 0.59 (L) 2023     No results found for: "IGE"  Lab Results   Component Value Date    ALT 18 2023    AST 22 2023    ALKPHOS 166 (H) 2023         Imaging and other studies: I have personally reviewed pertinent reports. and I have personally reviewed pertinent films in PACS  CT Chest/Abd/Pelvis 2023- scattered ground glass infiltrates with more linear/rounded density in the RLL, nodularity 7mm dome or right HD, no effusions, no sig mediastinal/hilar adenopathy, no visceral metastatic disease in pelvis or significant abdominal adenopathy    Pulmonary function testin2023 - Ratio 85%, FVC 2.16L (65%), FEV1 1.83L (71%) - moderate restrictive airflow defect with noted shortened expiratory time of 4.4 seconds will over estimate restriction and under estimate obstruction    Ambulation Testing  2023 - 6MWT on room air - ambulated 207 meters in 6 minutes, initial SpO2 92%, theresa 88% with rapid recovery, maximal HR 116bpm    EKG, Pathology, and Other Studies:   TTE pending    Junior Madera DO, FACP  [facility]    Answers for HPI/ROS submitted by the patient on 9/15/2023  Do you have difficulty breathing?: Yes  Chronicity: new  When did you first notice your symptoms?: 1 to 4 weeks ago  How often do your symptoms occur?: constantly  Since you first noticed this problem, how has it changed?: gradually worsening  Do you have shortness of breath that occurs with effort or exertion?: Yes  Do you have ear congestion?: No  Do you have heartburn?: Yes  Do you have fatigue?: Yes  Do you have nasal congestion?: No  Do you have shortness of breath when lying flat?: No  Do you have shortness of breath when you wake up?: No  Do you have sweats?: No  Have you experienced weight loss?: No  Which of the following makes your symptoms worse?: any activity, climbing stairs, exercise, minimal activity, strenuous activity  Which of the following makes your symptoms better?: nothing, change in position, rest  Risk factors for lung disease: animal exposure

## 2023-09-19 NOTE — PATIENT INSTRUCTIONS
Start prednisone taper - 40mg daily x 7 days, 30mg daily x 7 days, 20mg daily x 7 days,, and 10mg daily x 7 days  Start albuterol inhaler 2puffs every 6 hours as needed for shortness of breath  Get pulmonary function testing and repeat CT chest completed in 4 weeks

## 2023-09-20 ENCOUNTER — HOSPITAL ENCOUNTER (OUTPATIENT)
Dept: INFUSION CENTER | Facility: HOSPITAL | Age: 63
Discharge: HOME | End: 2023-09-20
Payer: COMMERCIAL

## 2023-09-20 DIAGNOSIS — C55 UTERINE LEIOMYOSARCOMA (HCC): Primary | ICD-10-CM

## 2023-09-20 DIAGNOSIS — Z45.2 ENCOUNTER FOR CENTRAL LINE CARE: ICD-10-CM

## 2023-09-20 LAB
ALBUMIN SERPL BCP-MCNC: 3.7 G/DL (ref 3.5–5)
ALP SERPL-CCNC: 108 U/L (ref 34–104)
ALT SERPL W P-5'-P-CCNC: 17 U/L (ref 7–52)
ANION GAP SERPL CALCULATED.3IONS-SCNC: 8 MMOL/L
ANISOCYTOSIS BLD QL SMEAR: PRESENT
AST SERPL W P-5'-P-CCNC: 20 U/L (ref 13–39)
BASOPHILS # BLD MANUAL: 0 THOUSAND/UL (ref 0–0.1)
BASOPHILS NFR MAR MANUAL: 0 % (ref 0–1)
BILIRUB SERPL-MCNC: 0.73 MG/DL (ref 0.2–1)
BUN SERPL-MCNC: 18 MG/DL (ref 5–25)
CALCIUM SERPL-MCNC: 8.9 MG/DL (ref 8.4–10.2)
CHLORIDE SERPL-SCNC: 105 MMOL/L (ref 96–108)
CO2 SERPL-SCNC: 26 MMOL/L (ref 21–32)
CREAT SERPL-MCNC: 0.54 MG/DL (ref 0.6–1.3)
EOSINOPHIL # BLD MANUAL: 0.82 THOUSAND/UL (ref 0–0.4)
EOSINOPHIL NFR BLD MANUAL: 3 % (ref 0–6)
ERYTHROCYTE [DISTWIDTH] IN BLOOD BY AUTOMATED COUNT: 23.2 % (ref 11.6–15.1)
GFR SERPL CREATININE-BSD FRML MDRD: 100 ML/MIN/1.73SQ M
GLUCOSE SERPL-MCNC: 167 MG/DL (ref 65–140)
HCT VFR BLD AUTO: 30.6 % (ref 34.8–46.1)
HGB BLD-MCNC: 9.3 G/DL (ref 11.5–15.4)
LYMPHOCYTES # BLD AUTO: 19 % (ref 14–44)
LYMPHOCYTES # BLD AUTO: 5.76 THOUSAND/UL (ref 0.6–4.47)
MAGNESIUM SERPL-MCNC: 1.7 MG/DL (ref 1.9–2.7)
MCH RBC QN AUTO: 30 PG (ref 26.8–34.3)
MCHC RBC AUTO-ENTMCNC: 30.4 G/DL (ref 31.4–37.4)
MCV RBC AUTO: 99 FL (ref 82–98)
METAMYELOCYTES NFR BLD MANUAL: 1 % (ref 0–1)
MONOCYTES # BLD AUTO: 1.37 THOUSAND/UL (ref 0–1.22)
MONOCYTES NFR BLD: 5 % (ref 4–12)
NEUTROPHILS # BLD MANUAL: 19.19 THOUSAND/UL (ref 1.85–7.62)
NEUTS BAND NFR BLD MANUAL: 1 % (ref 0–8)
NEUTS SEG NFR BLD AUTO: 69 % (ref 43–75)
PLATELET # BLD AUTO: 457 THOUSANDS/UL (ref 149–390)
PLATELET BLD QL SMEAR: ABNORMAL
PMV BLD AUTO: 9.2 FL (ref 8.9–12.7)
POLYCHROMASIA BLD QL SMEAR: PRESENT
POTASSIUM SERPL-SCNC: 3.8 MMOL/L (ref 3.5–5.3)
PROT SERPL-MCNC: 6.5 G/DL (ref 6.4–8.4)
RBC # BLD AUTO: 3.1 MILLION/UL (ref 3.81–5.12)
RBC MORPH BLD: PRESENT
SODIUM SERPL-SCNC: 139 MMOL/L (ref 135–147)
VARIANT LYMPHS # BLD AUTO: 2 %
WBC # BLD AUTO: 27.42 THOUSAND/UL (ref 4.31–10.16)

## 2023-09-20 PROCEDURE — 80053 COMPREHEN METABOLIC PANEL: CPT

## 2023-09-20 PROCEDURE — 85007 BL SMEAR W/DIFF WBC COUNT: CPT

## 2023-09-20 PROCEDURE — 85027 COMPLETE CBC AUTOMATED: CPT

## 2023-09-20 PROCEDURE — 83735 ASSAY OF MAGNESIUM: CPT

## 2023-09-21 ENCOUNTER — PATIENT OUTREACH (OUTPATIENT)
Dept: HEMATOLOGY ONCOLOGY | Facility: CLINIC | Age: 63
End: 2023-09-21

## 2023-09-21 NOTE — PROGRESS NOTES
Pt's daughter called this day and left a vm. MSW returned her call and she had some questions about programs available to her parents. The pt had anticipated returning to work, however her daughter shared that she is not able to do so and as a result, has no income. MSW suggested the pt apply for SSDI and explained the process as well as the wait time. Pt's daughter was asking about the [facility] as she had not yet received the application. MSW e-mailed her an application this day and explained the documents which were needed to apply. The cancer funds paid the electric bill and attempted to pay the tax bill, however since this was not paid in full, the check was not accepted. Pt's daughter states that the  came to her parents house to explain that the payment needed to be paid in full. MSW explained that the bill which was submitted, had several payment due dates and the check sent in was for the October date. The pt's daughter shared that since her parents have not paid last year, they need to pay in full for 2023. She was asking if the check could go towards the bill for last years [employer], which is another agency. MSW will inquire with manager. During the call, pt's daughter sounded overwhelmed at all she has been paying for her parents, out of her own money. One expense is the pt's Eliquis. MSW did send a coupon for this medication and asked pt's daughter to notify if this was accepted at the pharmacy. Daughter was asking about medicaid and had questions for Gwen Burt was in agreement to join the call and then a 3 way call took place. All of the pt's daughters questions were answered. Rebekah Rosa provided information on how the pt can call and apply for medicaid, if they are within the income range. Pt's daughter was also encouraged to request a praveena care application through [facility] as the pt and her  have accrued bills through there as well.   Daughter expressed appreciation for all of the assistance. MSW will plan to follow up and significant support was offered.

## 2023-09-25 ENCOUNTER — TELEPHONE (OUTPATIENT)
Dept: HEMATOLOGY ONCOLOGY | Facility: CLINIC | Age: 63
End: 2023-09-25

## 2023-09-25 NOTE — TELEPHONE ENCOUNTER
Patient Call    Who are you speaking with? Child    If it is not the patient, are they listed on an active communication consent form? Yes   What is the reason for this call? She asked if pt can eat shellfish   Does this require a call back? Yes   If a call back is required, please list best call back number [phone]   If a call back is required, advise that a message will be forwarded to their care team and someone will return their call as soon as possible. Did you relay this information to the patient?  Yes

## 2023-09-25 NOTE — TELEPHONE ENCOUNTER
VM message left stating unless she has a documented allergy to shellfish we would not restrict her ability to eat shellfish from our standpoint.

## 2023-09-26 ENCOUNTER — HOSPITAL ENCOUNTER (OUTPATIENT)
Dept: NON INVASIVE DIAGNOSTICS | Age: 63
Discharge: HOME | End: 2023-09-26
Payer: COMMERCIAL

## 2023-09-26 VITALS
HEIGHT: 65 IN | BODY MASS INDEX: 36.15 KG/M2 | SYSTOLIC BLOOD PRESSURE: 124 MMHG | WEIGHT: 217 LBS | DIASTOLIC BLOOD PRESSURE: 78 MMHG | HEART RATE: 81 BPM

## 2023-09-26 DIAGNOSIS — C55 UTERINE LEIOMYOSARCOMA (HCC): ICD-10-CM

## 2023-09-26 DIAGNOSIS — T50.905A DRUG-INDUCED PNEUMONITIS: ICD-10-CM

## 2023-09-26 DIAGNOSIS — J98.4 DRUG-INDUCED PNEUMONITIS: ICD-10-CM

## 2023-09-26 LAB
AORTIC ROOT: 3.8 CM
AORTIC VALVE MEAN VELOCITY: 8.8 M/S
APICAL FOUR CHAMBER EJECTION FRACTION: 57 %
AV LVOT MEAN GRADIENT: 2 MMHG
AV LVOT PEAK GRADIENT: 4 MMHG
AV MEAN GRADIENT: 3 MMHG
AV PEAK GRADIENT: 7 MMHG
AV VELOCITY RATIO: 0.77
DOP CALC AO PEAK VEL: 1.29 M/S
DOP CALC AO VTI: 23.52 CM
DOP CALC LVOT PEAK VEL VTI: 21.51 CM
DOP CALC LVOT PEAK VEL: 0.99 M/S
DOP CALC MV VTI: 21.03 CM
E WAVE DECELERATION TIME: 224 MS
FRACTIONAL SHORTENING: 37 % (ref 28–44)
GLOBAL LONGITUIDAL STRAIN: -15 %
INTERVENTRICULAR SEPTUM IN DIASTOLE (PARASTERNAL SHORT AXIS VIEW): 1 CM
INTERVENTRICULAR SEPTUM: 1 CM (ref 0.6–1.1)
LAAS-AP2: 20.1 CM2
LAAS-AP4: 21 CM2
LEFT ATRIUM SIZE: 3.8 CM
LEFT ATRIUM VOLUME (MOD BIPLANE): 61 ML
LEFT INTERNAL DIMENSION IN SYSTOLE: 2.9 CM (ref 2.1–4)
LEFT VENTRICLE DIASTOLIC VOLUME (MOD BIPLANE): 85 ML
LEFT VENTRICLE SYSTOLIC VOLUME (MOD BIPLANE): 37 ML
LEFT VENTRICULAR INTERNAL DIMENSION IN DIASTOLE: 4.6 CM (ref 3.5–6)
LEFT VENTRICULAR POSTERIOR WALL IN END DIASTOLE: 0.8 CM
LEFT VENTRICULAR STROKE VOLUME: 65 ML
LV EF: 57 %
LVSV (TEICH): 65 ML
MV E'TISSUE VEL-LAT: 11 CM/S
MV E'TISSUE VEL-SEP: 5 CM/S
MV MEAN GRADIENT: 1 MMHG
MV PEAK A VEL: 0.94 M/S
MV PEAK E VEL: 72 CM/S
MV PEAK GRADIENT: 4 MMHG
MV STENOSIS PRESSURE HALF TIME: 65 MS
MV VALVE AREA P 1/2 METHOD: 3.38 CM2
RIGHT ATRIAL 2D VOLUME: 32 ML
RIGHT ATRIUM AREA SYSTOLE A4C: 13.7 CM2
RIGHT VENTRICLE ID DIMENSION: 4.2 CM
SL CV LEFT ATRIUM LENGTH A2C: 5.5 CM
SL CV LV EF: 60
SL CV PED ECHO LEFT VENTRICLE DIASTOLIC VOLUME (MOD BIPLANE) 2D: 96 ML
SL CV PED ECHO LEFT VENTRICLE SYSTOLIC VOLUME (MOD BIPLANE) 2D: 31 ML
TRICUSPID ANNULAR PLANE SYSTOLIC EXCURSION: 2.3 CM

## 2023-09-26 PROCEDURE — 93306 TTE W/DOPPLER COMPLETE: CPT

## 2023-09-26 PROCEDURE — 93306 TTE W/DOPPLER COMPLETE: CPT | Performed by: INTERNAL MEDICINE

## 2023-09-27 ENCOUNTER — HOSPITAL ENCOUNTER (OUTPATIENT)
Dept: INFUSION CENTER | Facility: HOSPITAL | Age: 63
Discharge: HOME | End: 2023-09-27
Payer: COMMERCIAL

## 2023-09-27 DIAGNOSIS — Z45.2 ENCOUNTER FOR CENTRAL LINE CARE: Primary | ICD-10-CM

## 2023-09-27 DIAGNOSIS — C55 UTERINE LEIOMYOSARCOMA (HCC): ICD-10-CM

## 2023-09-27 LAB
ALBUMIN SERPL BCP-MCNC: 3.7 G/DL (ref 3.5–5)
ALP SERPL-CCNC: 75 U/L (ref 34–104)
ALT SERPL W P-5'-P-CCNC: 18 U/L (ref 7–52)
ANION GAP SERPL CALCULATED.3IONS-SCNC: 9 MMOL/L
AST SERPL W P-5'-P-CCNC: 15 U/L (ref 13–39)
BASOPHILS # BLD AUTO: 0.03 THOUSANDS/ÂΜL (ref 0–0.1)
BASOPHILS NFR BLD AUTO: 0 % (ref 0–1)
BILIRUB SERPL-MCNC: 0.86 MG/DL (ref 0.2–1)
BUN SERPL-MCNC: 25 MG/DL (ref 5–25)
CALCIUM SERPL-MCNC: 9.3 MG/DL (ref 8.4–10.2)
CHLORIDE SERPL-SCNC: 103 MMOL/L (ref 96–108)
CO2 SERPL-SCNC: 28 MMOL/L (ref 21–32)
CREAT SERPL-MCNC: 0.51 MG/DL (ref 0.6–1.3)
EOSINOPHIL # BLD AUTO: 0.01 THOUSAND/ÂΜL (ref 0–0.61)
EOSINOPHIL NFR BLD AUTO: 0 % (ref 0–6)
ERYTHROCYTE [DISTWIDTH] IN BLOOD BY AUTOMATED COUNT: 22.8 % (ref 11.6–15.1)
GFR SERPL CREATININE-BSD FRML MDRD: 102 ML/MIN/1.73SQ M
GLUCOSE SERPL-MCNC: 191 MG/DL (ref 65–140)
HCT VFR BLD AUTO: 34 % (ref 34.8–46.1)
HGB BLD-MCNC: 10.4 G/DL (ref 11.5–15.4)
IMM GRANULOCYTES # BLD AUTO: 0.31 THOUSAND/UL (ref 0–0.2)
IMM GRANULOCYTES NFR BLD AUTO: 2 % (ref 0–2)
LYMPHOCYTES # BLD AUTO: 2.94 THOUSANDS/ÂΜL (ref 0.6–4.47)
LYMPHOCYTES NFR BLD AUTO: 19 % (ref 14–44)
MAGNESIUM SERPL-MCNC: 1.9 MG/DL (ref 1.9–2.7)
MCH RBC QN AUTO: 30 PG (ref 26.8–34.3)
MCHC RBC AUTO-ENTMCNC: 30.6 G/DL (ref 31.4–37.4)
MCV RBC AUTO: 98 FL (ref 82–98)
MONOCYTES # BLD AUTO: 0.84 THOUSAND/ÂΜL (ref 0.17–1.22)
MONOCYTES NFR BLD AUTO: 5 % (ref 4–12)
NEUTROPHILS # BLD AUTO: 11.38 THOUSANDS/ÂΜL (ref 1.85–7.62)
NEUTS SEG NFR BLD AUTO: 74 % (ref 43–75)
NRBC BLD AUTO-RTO: 0 /100 WBCS
PLATELET # BLD AUTO: 338 THOUSANDS/UL (ref 149–390)
PMV BLD AUTO: 9.3 FL (ref 8.9–12.7)
POTASSIUM SERPL-SCNC: 3.8 MMOL/L (ref 3.5–5.3)
PROT SERPL-MCNC: 6.8 G/DL (ref 6.4–8.4)
RBC # BLD AUTO: 3.47 MILLION/UL (ref 3.81–5.12)
SODIUM SERPL-SCNC: 140 MMOL/L (ref 135–147)
WBC # BLD AUTO: 15.51 THOUSAND/UL (ref 4.31–10.16)

## 2023-09-27 PROCEDURE — 83735 ASSAY OF MAGNESIUM: CPT

## 2023-09-27 PROCEDURE — 80053 COMPREHEN METABOLIC PANEL: CPT

## 2023-09-27 PROCEDURE — 85025 COMPLETE CBC W/AUTO DIFF WBC: CPT

## 2023-09-28 RX ORDER — PALONOSETRON 0.05 MG/ML
0.25 INJECTION, SOLUTION INTRAVENOUS ONCE
Status: CANCELLED | OUTPATIENT
Start: 2023-09-29

## 2023-09-28 RX ORDER — SODIUM CHLORIDE 9 MG/ML
20 INJECTION, SOLUTION INTRAVENOUS ONCE
Status: CANCELLED | OUTPATIENT
Start: 2023-09-29

## 2023-09-28 RX ORDER — DOXORUBICIN HYDROCHLORIDE 2 MG/ML
100 INJECTION, SOLUTION INTRAVENOUS ONCE
Status: CANCELLED | OUTPATIENT
Start: 2023-09-29

## 2023-09-29 ENCOUNTER — HOSPITAL ENCOUNTER (OUTPATIENT)
Dept: INFUSION CENTER | Facility: HOSPITAL | Age: 63
End: 2023-09-29
Attending: OBSTETRICS & GYNECOLOGY
Payer: COMMERCIAL

## 2023-09-29 VITALS
RESPIRATION RATE: 16 BRPM | TEMPERATURE: 98.1 F | SYSTOLIC BLOOD PRESSURE: 131 MMHG | HEART RATE: 84 BPM | BODY MASS INDEX: 36.03 KG/M2 | DIASTOLIC BLOOD PRESSURE: 77 MMHG | HEIGHT: 65 IN | WEIGHT: 216.27 LBS | OXYGEN SATURATION: 98 %

## 2023-09-29 DIAGNOSIS — D70.1 CHEMOTHERAPY INDUCED NEUTROPENIA: ICD-10-CM

## 2023-09-29 DIAGNOSIS — C55 UTERINE LEIOMYOSARCOMA (HCC): Primary | ICD-10-CM

## 2023-09-29 DIAGNOSIS — T45.1X5A CHEMOTHERAPY INDUCED NEUTROPENIA: ICD-10-CM

## 2023-09-29 DIAGNOSIS — E83.42 HYPOMAGNESEMIA: ICD-10-CM

## 2023-09-29 PROCEDURE — 96375 TX/PRO/DX INJ NEW DRUG ADDON: CPT

## 2023-09-29 PROCEDURE — 96367 TX/PROPH/DG ADDL SEQ IV INF: CPT

## 2023-09-29 PROCEDURE — 96409 CHEMO IV PUSH SNGL DRUG: CPT

## 2023-09-29 RX ORDER — DOXORUBICIN HYDROCHLORIDE 2 MG/ML
100 INJECTION, SOLUTION INTRAVENOUS ONCE
Status: COMPLETED | OUTPATIENT
Start: 2023-09-29 | End: 2023-09-29

## 2023-09-29 RX ORDER — PALONOSETRON 0.05 MG/ML
0.25 INJECTION, SOLUTION INTRAVENOUS ONCE
Status: COMPLETED | OUTPATIENT
Start: 2023-09-29 | End: 2023-09-29

## 2023-09-29 RX ORDER — SODIUM CHLORIDE 9 MG/ML
20 INJECTION, SOLUTION INTRAVENOUS ONCE
Status: COMPLETED | OUTPATIENT
Start: 2023-09-29 | End: 2023-09-29

## 2023-09-29 RX ADMIN — DOXORUBICIN HYDROCHLORIDE 100 MG: 2 INJECTION, SOLUTION INTRAVENOUS at 09:42

## 2023-09-29 RX ADMIN — FOSAPREPITANT 150 MG: 150 INJECTION, POWDER, LYOPHILIZED, FOR SOLUTION INTRAVENOUS at 09:10

## 2023-09-29 RX ADMIN — FAMOTIDINE 20 MG: 10 INJECTION INTRAVENOUS at 08:47

## 2023-09-29 RX ADMIN — SODIUM CHLORIDE 20 ML/HR: 0.9 INJECTION, SOLUTION INTRAVENOUS at 08:23

## 2023-09-29 RX ADMIN — PALONOSETRON 0.25 MG: 0.05 INJECTION, SOLUTION INTRAVENOUS at 08:23

## 2023-09-29 RX ADMIN — DEXAMETHASONE SODIUM PHOSPHATE 20 MG: 10 INJECTION, SOLUTION INTRAMUSCULAR; INTRAVENOUS at 08:23

## 2023-10-04 ENCOUNTER — HOSPITAL ENCOUNTER (OUTPATIENT)
Dept: INFUSION CENTER | Facility: HOSPITAL | Age: 63
Discharge: HOME | End: 2023-10-04
Payer: COMMERCIAL

## 2023-10-04 DIAGNOSIS — E87.6 HYPOKALEMIA: Primary | ICD-10-CM

## 2023-10-04 DIAGNOSIS — C55 UTERINE LEIOMYOSARCOMA (HCC): ICD-10-CM

## 2023-10-04 DIAGNOSIS — Z45.2 ENCOUNTER FOR CENTRAL LINE CARE: Primary | ICD-10-CM

## 2023-10-04 LAB
ALBUMIN SERPL BCP-MCNC: 4.1 G/DL (ref 3.5–5)
ALP SERPL-CCNC: 63 U/L (ref 34–104)
ALT SERPL W P-5'-P-CCNC: 15 U/L (ref 7–52)
ANION GAP SERPL CALCULATED.3IONS-SCNC: 8 MMOL/L
AST SERPL W P-5'-P-CCNC: 13 U/L (ref 13–39)
BASOPHILS # BLD AUTO: 0.01 THOUSANDS/ÂΜL (ref 0–0.1)
BASOPHILS NFR BLD AUTO: 0 % (ref 0–1)
BILIRUB SERPL-MCNC: 1.22 MG/DL (ref 0.2–1)
BUN SERPL-MCNC: 26 MG/DL (ref 5–25)
CALCIUM SERPL-MCNC: 9.6 MG/DL (ref 8.4–10.2)
CHLORIDE SERPL-SCNC: 99 MMOL/L (ref 96–108)
CO2 SERPL-SCNC: 31 MMOL/L (ref 21–32)
CREAT SERPL-MCNC: 0.62 MG/DL (ref 0.6–1.3)
EOSINOPHIL # BLD AUTO: 0.04 THOUSAND/ÂΜL (ref 0–0.61)
EOSINOPHIL NFR BLD AUTO: 1 % (ref 0–6)
ERYTHROCYTE [DISTWIDTH] IN BLOOD BY AUTOMATED COUNT: 20.9 % (ref 11.6–15.1)
GFR SERPL CREATININE-BSD FRML MDRD: 95 ML/MIN/1.73SQ M
GLUCOSE SERPL-MCNC: 139 MG/DL (ref 65–140)
HCT VFR BLD AUTO: 34.3 % (ref 34.8–46.1)
HGB BLD-MCNC: 10.9 G/DL (ref 11.5–15.4)
IMM GRANULOCYTES # BLD AUTO: 0.03 THOUSAND/UL (ref 0–0.2)
IMM GRANULOCYTES NFR BLD AUTO: 0 % (ref 0–2)
LYMPHOCYTES # BLD AUTO: 2.67 THOUSANDS/ÂΜL (ref 0.6–4.47)
LYMPHOCYTES NFR BLD AUTO: 34 % (ref 14–44)
MAGNESIUM SERPL-MCNC: 1.8 MG/DL (ref 1.9–2.7)
MCH RBC QN AUTO: 30.4 PG (ref 26.8–34.3)
MCHC RBC AUTO-ENTMCNC: 31.8 G/DL (ref 31.4–37.4)
MCV RBC AUTO: 96 FL (ref 82–98)
MONOCYTES # BLD AUTO: 0.07 THOUSAND/ÂΜL (ref 0.17–1.22)
MONOCYTES NFR BLD AUTO: 1 % (ref 4–12)
NEUTROPHILS # BLD AUTO: 4.97 THOUSANDS/ÂΜL (ref 1.85–7.62)
NEUTS SEG NFR BLD AUTO: 64 % (ref 43–75)
NRBC BLD AUTO-RTO: 0 /100 WBCS
PLATELET # BLD AUTO: 224 THOUSANDS/UL (ref 149–390)
PMV BLD AUTO: 9.3 FL (ref 8.9–12.7)
POTASSIUM SERPL-SCNC: 3.2 MMOL/L (ref 3.5–5.3)
PROT SERPL-MCNC: 6.8 G/DL (ref 6.4–8.4)
RBC # BLD AUTO: 3.59 MILLION/UL (ref 3.81–5.12)
SODIUM SERPL-SCNC: 138 MMOL/L (ref 135–147)
WBC # BLD AUTO: 7.79 THOUSAND/UL (ref 4.31–10.16)

## 2023-10-04 PROCEDURE — 83735 ASSAY OF MAGNESIUM: CPT

## 2023-10-04 PROCEDURE — 85025 COMPLETE CBC W/AUTO DIFF WBC: CPT

## 2023-10-04 PROCEDURE — 80053 COMPREHEN METABOLIC PANEL: CPT

## 2023-10-04 RX ORDER — POTASSIUM CHLORIDE 20 MEQ/1
TABLET, EXTENDED RELEASE ORAL
Qty: 30 TABLET | Refills: 0 | Status: SHIPPED | OUTPATIENT
Start: 2023-10-04

## 2023-10-05 ENCOUNTER — PATIENT OUTREACH (OUTPATIENT)
Dept: HEMATOLOGY ONCOLOGY | Facility: CLINIC | Age: 63
End: 2023-10-05

## 2023-10-05 NOTE — PROGRESS NOTES
MSW made outreach to pt's daughter, at pt's request, to discuss the pt's tax bill. Pt submitted a tax bill for the cancer fund to assist with and the total of the bill was $2670.82, with it broken into payments of $900. The cancer center made one payment of $900 and this check was returned. The tax office stated that due to the bill being delinquent, the entire amount is owed. After reviewing the call with Shannan Cleaning, it ws decided that the cancer funds would pay the tax bill in full. This was explained to the pt's daughter, who expressed much gratitude. MSW spoke of other resources that could assist them financially, if needed. Daughter plans to discuss with her parents this weekend. Emotional support offered and pt's daughter encouraged to call after she reviews her parents financial needs.

## 2023-10-10 DIAGNOSIS — C55 UTERINE LEIOMYOSARCOMA (HCC): ICD-10-CM

## 2023-10-10 RX ORDER — LIDOCAINE AND PRILOCAINE 25; 25 MG/G; MG/G
CREAM TOPICAL
Qty: 30 G | Refills: 2 | Status: SHIPPED | OUTPATIENT
Start: 2023-10-10

## 2023-10-11 ENCOUNTER — HOSPITAL ENCOUNTER (OUTPATIENT)
Dept: INFUSION CENTER | Facility: HOSPITAL | Age: 63
Discharge: HOME | End: 2023-10-11
Payer: COMMERCIAL

## 2023-10-11 DIAGNOSIS — Z45.2 ENCOUNTER FOR CENTRAL LINE CARE: ICD-10-CM

## 2023-10-11 DIAGNOSIS — C55 UTERINE LEIOMYOSARCOMA (HCC): Primary | ICD-10-CM

## 2023-10-11 LAB
ALBUMIN SERPL BCP-MCNC: 4.1 G/DL (ref 3.5–5)
ALP SERPL-CCNC: 58 U/L (ref 34–104)
ALT SERPL W P-5'-P-CCNC: 20 U/L (ref 7–52)
ANION GAP SERPL CALCULATED.3IONS-SCNC: 9 MMOL/L
AST SERPL W P-5'-P-CCNC: 15 U/L (ref 13–39)
BASOPHILS # BLD AUTO: 0.02 THOUSANDS/ÂΜL (ref 0–0.1)
BASOPHILS NFR BLD AUTO: 0 % (ref 0–1)
BILIRUB SERPL-MCNC: 1.16 MG/DL (ref 0.2–1)
BUN SERPL-MCNC: 22 MG/DL (ref 5–25)
CALCIUM SERPL-MCNC: 9.5 MG/DL (ref 8.4–10.2)
CHLORIDE SERPL-SCNC: 102 MMOL/L (ref 96–108)
CO2 SERPL-SCNC: 26 MMOL/L (ref 21–32)
CREAT SERPL-MCNC: 0.54 MG/DL (ref 0.6–1.3)
EOSINOPHIL # BLD AUTO: 0.05 THOUSAND/ÂΜL (ref 0–0.61)
EOSINOPHIL NFR BLD AUTO: 1 % (ref 0–6)
ERYTHROCYTE [DISTWIDTH] IN BLOOD BY AUTOMATED COUNT: 20.6 % (ref 11.6–15.1)
GFR SERPL CREATININE-BSD FRML MDRD: 100 ML/MIN/1.73SQ M
GLUCOSE SERPL-MCNC: 114 MG/DL (ref 65–140)
HCT VFR BLD AUTO: 32.5 % (ref 34.8–46.1)
HGB BLD-MCNC: 10.3 G/DL (ref 11.5–15.4)
IMM GRANULOCYTES # BLD AUTO: 0.02 THOUSAND/UL (ref 0–0.2)
IMM GRANULOCYTES NFR BLD AUTO: 0 % (ref 0–2)
LYMPHOCYTES # BLD AUTO: 1.71 THOUSANDS/ÂΜL (ref 0.6–4.47)
LYMPHOCYTES NFR BLD AUTO: 36 % (ref 14–44)
MAGNESIUM SERPL-MCNC: 1.8 MG/DL (ref 1.9–2.7)
MCH RBC QN AUTO: 30.7 PG (ref 26.8–34.3)
MCHC RBC AUTO-ENTMCNC: 31.7 G/DL (ref 31.4–37.4)
MCV RBC AUTO: 97 FL (ref 82–98)
MONOCYTES # BLD AUTO: 0.39 THOUSAND/ÂΜL (ref 0.17–1.22)
MONOCYTES NFR BLD AUTO: 8 % (ref 4–12)
NEUTROPHILS # BLD AUTO: 2.6 THOUSANDS/ÂΜL (ref 1.85–7.62)
NEUTS SEG NFR BLD AUTO: 55 % (ref 43–75)
NRBC BLD AUTO-RTO: 0 /100 WBCS
PLATELET # BLD AUTO: 212 THOUSANDS/UL (ref 149–390)
PMV BLD AUTO: 9.3 FL (ref 8.9–12.7)
POTASSIUM SERPL-SCNC: 3.9 MMOL/L (ref 3.5–5.3)
PROT SERPL-MCNC: 6.9 G/DL (ref 6.4–8.4)
RBC # BLD AUTO: 3.36 MILLION/UL (ref 3.81–5.12)
SODIUM SERPL-SCNC: 137 MMOL/L (ref 135–147)
WBC # BLD AUTO: 4.79 THOUSAND/UL (ref 4.31–10.16)

## 2023-10-11 PROCEDURE — 85025 COMPLETE CBC W/AUTO DIFF WBC: CPT

## 2023-10-11 PROCEDURE — 80053 COMPREHEN METABOLIC PANEL: CPT

## 2023-10-11 PROCEDURE — 83735 ASSAY OF MAGNESIUM: CPT

## 2023-10-12 ENCOUNTER — OFFICE VISIT (OUTPATIENT)
Age: 63
End: 2023-10-12
Payer: COMMERCIAL

## 2023-10-12 VITALS
RESPIRATION RATE: 16 BRPM | TEMPERATURE: 97.1 F | WEIGHT: 220 LBS | DIASTOLIC BLOOD PRESSURE: 70 MMHG | HEIGHT: 65 IN | BODY MASS INDEX: 36.65 KG/M2 | SYSTOLIC BLOOD PRESSURE: 132 MMHG | HEART RATE: 105 BPM | OXYGEN SATURATION: 94 %

## 2023-10-12 DIAGNOSIS — T50.905A DRUG-INDUCED PNEUMONITIS: ICD-10-CM

## 2023-10-12 DIAGNOSIS — J98.4 DRUG-INDUCED PNEUMONITIS: ICD-10-CM

## 2023-10-12 DIAGNOSIS — C55 UTERINE LEIOMYOSARCOMA (HCC): Primary | ICD-10-CM

## 2023-10-12 PROCEDURE — 99215 OFFICE O/P EST HI 40 MIN: CPT | Performed by: PHYSICIAN ASSISTANT

## 2023-10-12 NOTE — PROGRESS NOTES
Assessment/Plan:    Problem List Items Addressed This Visit          Respiratory    Drug-induced pneumonitis     Gemzer-induced, improving. Steroid taper to be completed next week. Continue f/u with pulmonary. Genitourinary    Uterine leiomyosarcoma (HCC) - Primary     Stage II leiomyosarcoma at risk for peritoneal recurrence given perforated uterus who completed gemzar/taxotere with development of gemzar-induced pneumonitis. She is currently receiving adriamycin 50 mg/m2 IV every 21 days. She is tolerating treatment well. Continue with cycle 2 of treatment as planned as long as her metabolic and hematologic parameters are adequate. Return to the office as per her chemotherapy calendar. CHIEF COMPLAINT:   Pre-chemotherapy evaluation    Problem:  Cancer Staging   Uterine leiomyosarcoma [facility])  Staging form: Corpus Uteri - Sarcoma, AJCC 8th Edition  - Pathologic stage from 6/4/2023: FIGO Stage II, calculated as Stage Unknown (pT2, pNX, cM0) - Signed by Bella Avina MD on 6/29/2023        Previous therapy:  Oncology History   Uterine leiomyosarcoma ([address])   6/4/2023 Initial Diagnosis    Uterine sarcoma ([address])     6/4/2023 -  Cancer Staged    Staging form: Corpus Uteri - Sarcoma, AJCC 8th Edition  - Pathologic stage from 6/4/2023: FIGO Stage II, calculated as Stage Unknown (pT2, pNX, cM0) - Signed by Bella Avina MD on 6/29/2023  Stage prefix: Initial diagnosis       6/4/2023 Surgery    HYSTERECTOMY TOTAL ABDOMINAL (DOROTHY). BILATERAL SALPINGOOPHORECTOMY  EXCISION  BIOPSY LESION/MASS ABDOMINAL-PELVIC PERITONEUM  -Leiomyosarcoma 13.5 cm extending through the myometrium, right pelvic peritoneum involved with uterine leiomyosarcoma with tumor present at unoriented resection surface. 7/11/2023 -  Chemotherapy    Gemzar 800 mg/m2 IV day 1 and 8 as well as taxotere 65 mg/m2 day 8 every 21 days. Chemotherapy    Adriamycin 50 mg/m2 IV every 21 days. Patient ID: Andrés Guillen is a 58 y.o. female  who presents to the office for pre-chemotherapy evaluation. Overall, she tolerated her first cycle of adriamycin well. Her shortness of breath is improving. She continues on a steroid taper which is to be completed on Tuesday. She has been afebrile. Appetite is appropriate. She denies n/v/abdominal pain. Normal bowel/bladder function. CBC/Diff, CMP, Mg from 10/11/23 reviewed. The following portions of the patient's history were reviewed and updated as appropriate: allergies, current medications, past medical history, past surgical history, and problem list.    Review of Systems   Constitutional: Negative. HENT: Negative. Eyes: Negative. Respiratory:  Positive for shortness of breath (improving). Cardiovascular: Negative. Gastrointestinal: Negative. Genitourinary: Negative. Musculoskeletal: Negative. Skin: Negative. Neurological: Negative. Psychiatric/Behavioral: Negative.          Current Outpatient Medications   Medication Sig Dispense Refill    albuterol (Ventolin HFA) 90 mcg/act inhaler Inhale 2 puffs every 6 (six) hours as needed for wheezing or shortness of breath 18 g 1    apixaban (Eliquis) 2.5 mg Take 1 tablet (2.5 mg total) by mouth 2 (two) times a day 60 tablet 3    aspirin (ECOTRIN LOW STRENGTH) 81 mg EC tablet Take 81 mg by mouth daily      atorvastatin (LIPITOR) 40 mg tablet Take 40 mg by mouth daily at bedtime      betamethasone, augmented, (DIPROLENE) 0.05 % lotion Apply topically 2 (two) times a day 60 mL 1    cetirizine (ZyrTEC) 10 mg tablet Take 10 mg by mouth daily      Cetirizine HCl 0.24 % SOLN Take by mouth      cholecalciferol (VITAMIN D3) 1,000 units tablet Take 1,000 Units by mouth daily 2 daily      CLENPIQ 10-3.5-12 MG-GM -GM/160ML SOLN USE AS DIRECTED 320 mL 0    clindamycin (CLEOCIN T) 1 % external solution Apply topically 2 (two) times a day 60 mL 0    dexamethasone (DECADRON) 4 mg tablet Take 2 tabs PO BID the day prior to chemo, then 2 tabs PO the night after chemo, then 2 tabs PO BID the day after chemo 40 tablet 0    diphenhydramine, lidocaine, Al/Mg hydroxide, simethicone (Magic Mouthwash) SUSP Swish and spit 10 ML TID prn mouth pain 300 mL 1    DULoxetine (CYMBALTA) 30 mg delayed release capsule Take 30 mg by mouth daily      DULoxetine (CYMBALTA) 30 mg delayed release capsule TAKE 1 CAPSULE BY MOUTH EVERY DAY FOR 90 DAYS      escitalopram (LEXAPRO) 10 mg tablet Take 10 mg by mouth daily      famotidine (PEPCID) 40 MG tablet       glimepiride (AMARYL) 4 mg tablet Take 4 mg by mouth 2 (two) times a day      hydrochlorothiazide (HYDRODIURIL) 25 mg tablet Take 25 mg by mouth daily      lidocaine-prilocaine (EMLA) cream APPLY TO PORT 30 MIN PRIOR TO LABS/CHEMO 30 g 2    LORazepam (ATIVAN) 1 mg tablet Take 1 tablet (1 mg total) by mouth every 8 (eight) hours as needed for anxiety (nausea or anxiety) 30 tablet 0    losartan (COZAAR) 50 mg tablet Take 50 mg by mouth daily      meclizine (ANTIVERT) 25 mg tablet Take by mouth every 12 (twelve) hours as needed for dizziness      methylPREDNISolone 4 MG tablet therapy pack Use as directed on package 1 each 0    metoprolol succinate (TOPROL-XL) 25 mg 24 hr tablet Take 25 mg by mouth daily      Multiple Vitamin (MULTIVITAMIN) tablet Take 1 tablet by mouth daily      nystatin (MYCOSTATIN) 500,000 units/5 mL suspension Swish and spit 5-10 ml 4 times daily x 5 days 473 mL 0    other medication, see sig, Medication/product name: Lifitegrast (xiidra)  Strength:   Sig (include dose, route, frequency): one drop ou BID      pantoprazole (PROTONIX) 40 mg tablet Take 40 mg by mouth daily      potassium chloride (K-DUR,KLOR-CON) 20 mEq tablet Take 2 tablet PO BID x 2 days, then 1 tablet PO daily.  30 tablet 0    predniSONE 10 mg tablet Take 4 tablets (40 mg total) by mouth daily for 7 days, THEN 3 tablets (30 mg total) daily for 7 days, THEN 2 tablets (20 mg total) daily for 7 days, THEN 1 tablet (10 mg total) daily for 7 days. 70 tablet 0    rOPINIRole (REQUIP) 1 mg tablet TAKE 1 TABLET BY MOUTH 1 TO 3 HOURS BEFORE BEDTIME      sertraline (ZOLOFT) 100 mg tablet TAKE 1 AND 1/2 TABS BY MOUTH DAILY      sertraline (ZOLOFT) 50 mg tablet Take 150 mg by mouth daily      simvastatin (ZOCOR) 40 mg tablet Take 40 mg by mouth daily      Triamcinolone Acetonide 55 MCG/ACT AERO into each nostril One spray each nostril daily      ZOLMitriptan (ZOMIG) 5 MG tablet Take 5 mg by mouth once as needed for migraine Daily prn      ibuprofen (MOTRIN) 800 mg tablet Take 800 mg by mouth every 6 (six) hours as needed for mild pain (Patient not taking: Reported on 8/3/2023)      nystatin (MYCOSTATIN) powder USE AS DIRECTED 3 TIMES A DAY FOR 10 DAYS (Patient not taking: Reported on 8/3/2023)      ondansetron (ZOFRAN) 8 mg tablet Take 1 tablet (8 mg total) by mouth every 8 (eight) hours as needed for nausea or vomiting (Patient not taking: Reported on 8/24/2023) 20 tablet 1    oxyCODONE (Roxicodone) 5 immediate release tablet Take 1 tablet (5 mg total) by mouth every 6 (six) hours as needed for moderate pain Max Daily Amount: 20 mg (Patient not taking: Reported on 8/24/2023) 10 tablet 0    oxyCODONE-acetaminophen (Percocet) 5-325 mg per tablet Take 1 tablet by mouth every 4 (four) hours as needed for moderate pain for up to 30 doses Max Daily Amount: 6 tablets (Patient not taking: Reported on 8/3/2023) 30 tablet 0     No current facility-administered medications for this visit. Objective:    Blood pressure 132/70, pulse 105, temperature (!) 97.1 °F (36.2 °C), temperature source Temporal, resp. rate 16, height 5' 5" (1.651 m), weight 99.8 kg (220 lb), SpO2 94 %. Body mass index is 36.61 kg/m². Body surface area is 2.06 meters squared. Physical Exam  Constitutional:       Appearance: She is well-developed. HENT:      Head: Normocephalic and atraumatic.    Pulmonary:      Effort: Pulmonary effort is normal.   Skin:     General: Skin is warm and dry. Findings: No rash. Neurological:      Mental Status: She is alert and oriented to person, place, and time. Psychiatric:         Behavior: Behavior normal.     Performance status is zero.       Lab Results   Component Value Date    K 3.9 10/11/2023     10/11/2023    CO2 26 10/11/2023    BUN 22 10/11/2023    CREATININE 0.54 (L) 10/11/2023    CALCIUM 9.5 10/11/2023    CORRECTEDCA 9.3 06/07/2023    AST 15 10/11/2023    ALT 20 10/11/2023    ALKPHOS 58 10/11/2023    EGFR 100 10/11/2023     Lab Results   Component Value Date    WBC 4.79 10/11/2023    HGB 10.3 (L) 10/11/2023    HCT 32.5 (L) 10/11/2023    MCV 97 10/11/2023     10/11/2023     Lab Results   Component Value Date    NEUTROABS 2.60 10/11/2023

## 2023-10-12 NOTE — ASSESSMENT & PLAN NOTE
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 She is currently receiving adriamycin 50 mg/m2 IV every 21 days. She is tolerating treatment well. Continue with cycle 2 of treatment as planned as long as her metabolic and hematologic parameters are adequate. Return to the office as per her chemotherapy calendar.

## 2023-10-18 ENCOUNTER — HOSPITAL ENCOUNTER (OUTPATIENT)
Dept: INFUSION CENTER | Facility: HOSPITAL | Age: 63
Discharge: HOME | End: 2023-10-18
Payer: COMMERCIAL

## 2023-10-18 DIAGNOSIS — C55 UTERINE LEIOMYOSARCOMA (HCC): ICD-10-CM

## 2023-10-18 DIAGNOSIS — E87.6 HYPOKALEMIA: ICD-10-CM

## 2023-10-18 DIAGNOSIS — Z45.2 ENCOUNTER FOR CENTRAL LINE CARE: Primary | ICD-10-CM

## 2023-10-18 LAB
ALBUMIN SERPL BCP-MCNC: 4.1 G/DL (ref 3.5–5)
ALP SERPL-CCNC: 60 U/L (ref 34–104)
ALT SERPL W P-5'-P-CCNC: 16 U/L (ref 7–52)
ANION GAP SERPL CALCULATED.3IONS-SCNC: 10 MMOL/L
AST SERPL W P-5'-P-CCNC: 18 U/L (ref 13–39)
BASOPHILS # BLD AUTO: 0.04 THOUSANDS/ÂΜL (ref 0–0.1)
BASOPHILS NFR BLD AUTO: 1 % (ref 0–1)
BILIRUB SERPL-MCNC: 0.92 MG/DL (ref 0.2–1)
BUN SERPL-MCNC: 15 MG/DL (ref 5–25)
CALCIUM SERPL-MCNC: 9.7 MG/DL (ref 8.4–10.2)
CHLORIDE SERPL-SCNC: 101 MMOL/L (ref 96–108)
CO2 SERPL-SCNC: 28 MMOL/L (ref 21–32)
CREAT SERPL-MCNC: 0.68 MG/DL (ref 0.6–1.3)
EOSINOPHIL # BLD AUTO: 0.1 THOUSAND/ÂΜL (ref 0–0.61)
EOSINOPHIL NFR BLD AUTO: 1 % (ref 0–6)
ERYTHROCYTE [DISTWIDTH] IN BLOOD BY AUTOMATED COUNT: 19 % (ref 11.6–15.1)
GFR SERPL CREATININE-BSD FRML MDRD: 92 ML/MIN/1.73SQ M
GLUCOSE SERPL-MCNC: 187 MG/DL (ref 65–140)
HCT VFR BLD AUTO: 35.9 % (ref 34.8–46.1)
HGB BLD-MCNC: 11.4 G/DL (ref 11.5–15.4)
IMM GRANULOCYTES # BLD AUTO: 0.05 THOUSAND/UL (ref 0–0.2)
IMM GRANULOCYTES NFR BLD AUTO: 1 % (ref 0–2)
LYMPHOCYTES # BLD AUTO: 3.07 THOUSANDS/ÂΜL (ref 0.6–4.47)
LYMPHOCYTES NFR BLD AUTO: 44 % (ref 14–44)
MAGNESIUM SERPL-MCNC: 1.5 MG/DL (ref 1.9–2.7)
MCH RBC QN AUTO: 30.5 PG (ref 26.8–34.3)
MCHC RBC AUTO-ENTMCNC: 31.8 G/DL (ref 31.4–37.4)
MCV RBC AUTO: 96 FL (ref 82–98)
MONOCYTES # BLD AUTO: 0.94 THOUSAND/ÂΜL (ref 0.17–1.22)
MONOCYTES NFR BLD AUTO: 14 % (ref 4–12)
NEUTROPHILS # BLD AUTO: 2.73 THOUSANDS/ÂΜL (ref 1.85–7.62)
NEUTS SEG NFR BLD AUTO: 39 % (ref 43–75)
NRBC BLD AUTO-RTO: 0 /100 WBCS
PLATELET # BLD AUTO: 376 THOUSANDS/UL (ref 149–390)
PMV BLD AUTO: 9 FL (ref 8.9–12.7)
POTASSIUM SERPL-SCNC: 3.4 MMOL/L (ref 3.5–5.3)
PROT SERPL-MCNC: 6.9 G/DL (ref 6.4–8.4)
RBC # BLD AUTO: 3.74 MILLION/UL (ref 3.81–5.12)
SODIUM SERPL-SCNC: 139 MMOL/L (ref 135–147)
WBC # BLD AUTO: 6.93 THOUSAND/UL (ref 4.31–10.16)

## 2023-10-18 PROCEDURE — 85025 COMPLETE CBC W/AUTO DIFF WBC: CPT

## 2023-10-18 PROCEDURE — 83735 ASSAY OF MAGNESIUM: CPT

## 2023-10-18 PROCEDURE — 80053 COMPREHEN METABOLIC PANEL: CPT

## 2023-10-18 RX ORDER — PALONOSETRON 0.05 MG/ML
0.25 INJECTION, SOLUTION INTRAVENOUS ONCE
Status: CANCELLED | OUTPATIENT
Start: 2023-10-20

## 2023-10-18 RX ORDER — DOXORUBICIN HYDROCHLORIDE 2 MG/ML
100 INJECTION, SOLUTION INTRAVENOUS ONCE
Status: CANCELLED | OUTPATIENT
Start: 2023-10-20

## 2023-10-18 RX ORDER — SODIUM CHLORIDE 9 MG/ML
20 INJECTION, SOLUTION INTRAVENOUS ONCE
Status: CANCELLED | OUTPATIENT
Start: 2023-10-20

## 2023-10-18 RX ORDER — POTASSIUM CHLORIDE 20 MEQ/1
TABLET, EXTENDED RELEASE ORAL
Qty: 30 TABLET | Refills: 0 | Status: SHIPPED | OUTPATIENT
Start: 2023-10-18

## 2023-10-18 NOTE — PROGRESS NOTES
Abbie Cintron notified about skin peeling on feet with redness and also pt's magnesium level of 1.5. Hanna Ocampo states that is ok. They will keep tabs on Mag and pt's skin on feet.

## 2023-10-18 NOTE — PROGRESS NOTES
Pt here for port labs. C/o red and peeling skin on bottoms of her feet. Do not appear too reddened today but can see evidence of peeled skin. Will notify Dr office. Port accessed, labs obtained, de-accessed, dsd applied. Disch amb to home, steady gait.

## 2023-10-20 ENCOUNTER — HOSPITAL ENCOUNTER (OUTPATIENT)
Dept: INFUSION CENTER | Facility: HOSPITAL | Age: 63
End: 2023-10-20
Attending: OBSTETRICS & GYNECOLOGY
Payer: COMMERCIAL

## 2023-10-20 VITALS
HEART RATE: 103 BPM | DIASTOLIC BLOOD PRESSURE: 82 MMHG | HEIGHT: 65 IN | SYSTOLIC BLOOD PRESSURE: 112 MMHG | OXYGEN SATURATION: 95 % | BODY MASS INDEX: 36.8 KG/M2 | TEMPERATURE: 98.7 F | RESPIRATION RATE: 16 BRPM | WEIGHT: 220.9 LBS

## 2023-10-20 DIAGNOSIS — T45.1X5A CHEMOTHERAPY INDUCED NEUTROPENIA: Primary | ICD-10-CM

## 2023-10-20 DIAGNOSIS — C55 UTERINE LEIOMYOSARCOMA (HCC): ICD-10-CM

## 2023-10-20 DIAGNOSIS — D70.1 CHEMOTHERAPY INDUCED NEUTROPENIA: Primary | ICD-10-CM

## 2023-10-20 DIAGNOSIS — E83.42 HYPOMAGNESEMIA: ICD-10-CM

## 2023-10-20 PROCEDURE — 96367 TX/PROPH/DG ADDL SEQ IV INF: CPT

## 2023-10-20 PROCEDURE — 96375 TX/PRO/DX INJ NEW DRUG ADDON: CPT

## 2023-10-20 PROCEDURE — 96409 CHEMO IV PUSH SNGL DRUG: CPT

## 2023-10-20 RX ORDER — PALONOSETRON 0.05 MG/ML
0.25 INJECTION, SOLUTION INTRAVENOUS ONCE
Status: COMPLETED | OUTPATIENT
Start: 2023-10-20 | End: 2023-10-20

## 2023-10-20 RX ORDER — DOXORUBICIN HYDROCHLORIDE 2 MG/ML
100 INJECTION, SOLUTION INTRAVENOUS ONCE
Status: COMPLETED | OUTPATIENT
Start: 2023-10-20 | End: 2023-10-20

## 2023-10-20 RX ORDER — SODIUM CHLORIDE 9 MG/ML
20 INJECTION, SOLUTION INTRAVENOUS ONCE
Status: COMPLETED | OUTPATIENT
Start: 2023-10-20 | End: 2023-10-20

## 2023-10-20 RX ADMIN — FAMOTIDINE 20 MG: 10 INJECTION INTRAVENOUS at 10:21

## 2023-10-20 RX ADMIN — DOXORUBICIN HYDROCHLORIDE 100 MG: 2 INJECTION, SOLUTION INTRAVENOUS at 11:28

## 2023-10-20 RX ADMIN — DEXAMETHASONE SODIUM PHOSPHATE 20 MG: 10 INJECTION, SOLUTION INTRAMUSCULAR; INTRAVENOUS at 09:56

## 2023-10-20 RX ADMIN — PALONOSETRON 0.25 MG: 0.05 INJECTION, SOLUTION INTRAVENOUS at 09:59

## 2023-10-20 RX ADMIN — FOSAPREPITANT DIMEGLUMINE 150 MG: 150 INJECTION, POWDER, LYOPHILIZED, FOR SOLUTION INTRAVENOUS at 10:45

## 2023-10-20 RX ADMIN — SODIUM CHLORIDE 20 ML/HR: 0.9 INJECTION, SOLUTION INTRAVENOUS at 09:55

## 2023-10-20 NOTE — PROGRESS NOTES
Pt here for chemo tolerated well no adverse reactions AVS provided and pt left ambulatory with steady gait.

## 2023-10-23 ENCOUNTER — HOSPITAL ENCOUNTER (OUTPATIENT)
Dept: CT IMAGING | Facility: HOSPITAL | Age: 63
Discharge: HOME | End: 2023-10-23
Attending: INTERNAL MEDICINE
Payer: COMMERCIAL

## 2023-10-23 DIAGNOSIS — T50.905A DRUG-INDUCED LUNG DISEASE: ICD-10-CM

## 2023-10-23 DIAGNOSIS — J98.4 DRUG-INDUCED LUNG DISEASE: ICD-10-CM

## 2023-10-23 DIAGNOSIS — R93.89 ABNORMAL CT OF THE CHEST: ICD-10-CM

## 2023-10-23 PROCEDURE — G1004 CDSM NDSC: HCPCS

## 2023-10-23 PROCEDURE — 71250 CT THORAX DX C-: CPT

## 2023-10-25 ENCOUNTER — HOSPITAL ENCOUNTER (OUTPATIENT)
Dept: PULMONOLOGY | Facility: HOSPITAL | Age: 63
Discharge: HOME | End: 2023-10-25
Attending: INTERNAL MEDICINE
Payer: COMMERCIAL

## 2023-10-25 ENCOUNTER — HOSPITAL ENCOUNTER (OUTPATIENT)
Dept: INFUSION CENTER | Facility: HOSPITAL | Age: 63
Discharge: HOME | End: 2023-10-25
Payer: COMMERCIAL

## 2023-10-25 DIAGNOSIS — T50.905A DRUG-INDUCED LUNG DISEASE: ICD-10-CM

## 2023-10-25 DIAGNOSIS — R93.89 ABNORMAL CT OF THE CHEST: ICD-10-CM

## 2023-10-25 DIAGNOSIS — J98.4 DRUG-INDUCED LUNG DISEASE: ICD-10-CM

## 2023-10-25 DIAGNOSIS — C55 UTERINE LEIOMYOSARCOMA (HCC): ICD-10-CM

## 2023-10-25 DIAGNOSIS — Z45.2 ENCOUNTER FOR CENTRAL LINE CARE: Primary | ICD-10-CM

## 2023-10-25 LAB
ALBUMIN SERPL BCP-MCNC: 3.8 G/DL (ref 3.5–5)
ALP SERPL-CCNC: 53 U/L (ref 34–104)
ALT SERPL W P-5'-P-CCNC: 13 U/L (ref 7–52)
ANION GAP SERPL CALCULATED.3IONS-SCNC: 10 MMOL/L
ANISOCYTOSIS BLD QL SMEAR: PRESENT
AST SERPL W P-5'-P-CCNC: 13 U/L (ref 13–39)
BASOPHILS # BLD MANUAL: 0 THOUSAND/UL (ref 0–0.1)
BASOPHILS NFR MAR MANUAL: 0 % (ref 0–1)
BILIRUB SERPL-MCNC: 0.98 MG/DL (ref 0.2–1)
BUN SERPL-MCNC: 14 MG/DL (ref 5–25)
CALCIUM SERPL-MCNC: 8.8 MG/DL (ref 8.4–10.2)
CHLORIDE SERPL-SCNC: 101 MMOL/L (ref 96–108)
CO2 SERPL-SCNC: 26 MMOL/L (ref 21–32)
CREAT SERPL-MCNC: 0.56 MG/DL (ref 0.6–1.3)
EOSINOPHIL # BLD MANUAL: 0 THOUSAND/UL (ref 0–0.4)
EOSINOPHIL NFR BLD MANUAL: 0 % (ref 0–6)
ERYTHROCYTE [DISTWIDTH] IN BLOOD BY AUTOMATED COUNT: 17.2 % (ref 11.6–15.1)
GFR SERPL CREATININE-BSD FRML MDRD: 98 ML/MIN/1.73SQ M
GLUCOSE SERPL-MCNC: 223 MG/DL (ref 65–140)
HCT VFR BLD AUTO: 33.8 % (ref 34.8–46.1)
HGB BLD-MCNC: 10.8 G/DL (ref 11.5–15.4)
HYPERCHROMIA BLD QL SMEAR: PRESENT
LYMPHOCYTES # BLD AUTO: 1.64 THOUSAND/UL (ref 0.6–4.47)
LYMPHOCYTES # BLD AUTO: 34 % (ref 14–44)
MAGNESIUM SERPL-MCNC: 1.6 MG/DL (ref 1.9–2.7)
MCH RBC QN AUTO: 30.6 PG (ref 26.8–34.3)
MCHC RBC AUTO-ENTMCNC: 32 G/DL (ref 31.4–37.4)
MCV RBC AUTO: 96 FL (ref 82–98)
MONOCYTES # BLD AUTO: 0.05 THOUSAND/UL (ref 0–1.22)
MONOCYTES NFR BLD: 1 % (ref 4–12)
NEUTROPHILS # BLD MANUAL: 3.13 THOUSAND/UL (ref 1.85–7.62)
NEUTS SEG NFR BLD AUTO: 65 % (ref 43–75)
PLATELET # BLD AUTO: 236 THOUSANDS/UL (ref 149–390)
PLATELET BLD QL SMEAR: ADEQUATE
PMV BLD AUTO: 9.3 FL (ref 8.9–12.7)
POTASSIUM SERPL-SCNC: 3.7 MMOL/L (ref 3.5–5.3)
PROT SERPL-MCNC: 6.4 G/DL (ref 6.4–8.4)
RBC # BLD AUTO: 3.53 MILLION/UL (ref 3.81–5.12)
RBC MORPH BLD: PRESENT
SODIUM SERPL-SCNC: 137 MMOL/L (ref 135–147)
WBC # BLD AUTO: 4.82 THOUSAND/UL (ref 4.31–10.16)

## 2023-10-25 PROCEDURE — 94618 PULMONARY STRESS TESTING: CPT | Performed by: INTERNAL MEDICINE

## 2023-10-25 PROCEDURE — 94761 N-INVAS EAR/PLS OXIMETRY MLT: CPT

## 2023-10-25 PROCEDURE — 94729 DIFFUSING CAPACITY: CPT | Performed by: INTERNAL MEDICINE

## 2023-10-25 PROCEDURE — 94729 DIFFUSING CAPACITY: CPT

## 2023-10-25 PROCEDURE — 94060 EVALUATION OF WHEEZING: CPT

## 2023-10-25 PROCEDURE — 94726 PLETHYSMOGRAPHY LUNG VOLUMES: CPT | Performed by: INTERNAL MEDICINE

## 2023-10-25 PROCEDURE — 83735 ASSAY OF MAGNESIUM: CPT

## 2023-10-25 PROCEDURE — 85027 COMPLETE CBC AUTOMATED: CPT

## 2023-10-25 PROCEDURE — 94060 EVALUATION OF WHEEZING: CPT | Performed by: INTERNAL MEDICINE

## 2023-10-25 PROCEDURE — 94726 PLETHYSMOGRAPHY LUNG VOLUMES: CPT

## 2023-10-25 PROCEDURE — 80053 COMPREHEN METABOLIC PANEL: CPT

## 2023-10-25 PROCEDURE — 85007 BL SMEAR W/DIFF WBC COUNT: CPT

## 2023-10-25 RX ORDER — ALBUTEROL SULFATE 2.5 MG/3ML
2.5 SOLUTION RESPIRATORY (INHALATION) ONCE
Status: COMPLETED | OUTPATIENT
Start: 2023-10-25 | End: 2023-10-25

## 2023-10-25 RX ADMIN — ALBUTEROL SULFATE 2.5 MG: 2.5 SOLUTION RESPIRATORY (INHALATION) at 13:51

## 2023-10-25 NOTE — PROGRESS NOTES
Pt here for port labs. Accessed, labs obtained, brisk blood return, de-accessed, dsd applied. Disch amb to home, steady gait.   Pt has more appts made

## 2023-10-28 NOTE — RESULT ENCOUNTER NOTE
Called patient with results of PFTs, overall fairly normal with no obstruction, normal lung volumes, and very mildly reduced diffusion. Noted tachycardia during 6MWT. Called her and she reports some intermittent sensation of palpitations and fast heart rate, none currently, denied LH, no chest pain. Advised should this occur to report to ED. Will plan for repeat ambulation testing in office this week and consider holter monitor. She was in agreement with this plan.     Hemant Stewart, DO

## 2023-10-31 NOTE — PROGRESS NOTES
Pulmonary Follow Up Note   Bernardo Alarcon 58 y.o. female MRN: [id number]  11/1/2023      Assessment:  Shortness of breath and abnormal CT chest  Noted ground glass infiltrates and timing with 3rd round of chemotherapy is consistent with drug induced pneumonitis - likely gemcitabine, possibly Taxotere but this is less likely. Do not highly suspect autoimmune or infectious pneumonitis based upon presentation and given resolution is unlikely  She has completed prednisone taper  PFTs have normalized  HRCT chest has normalized w/o further alveolitis or air trapping  Would any further gemcitabine dosing  She can follow up on as needed basis     Transient exertional hypoxia   Suspect related to #1  Resolved on repeat testing     Leiomyosarcoma of uterus - per Gyn/Oncology - Dr. Dat Zhang     History of cardiomyopathy - TTE preserved EF    Tachycardia   No evidence of severe anemia and normal EF on recent TTE  Some improvement with rest  Appears regular and consistent with ST - may be related to chemotherapy given continued doxorubicin dosing  Will check EKG now for further evaluation, consider holter monitor in future  Advised to go to ED for high risk features - presyncope/syncope, chest pain, pleurisy, etc  Will defer to gyn/onc service further testing after EKG results and cardiology evaluation if needed. Plan:    Diagnoses and all orders for this visit:    Tachycardia  -     ECG 12 lead; Future    Uterine leiomyosarcoma (HCC)    Drug-induced pneumonitis    Abnormal CT of the chest        Return if symptoms worsen or fail to improve, for Recheck. History of Present Illness   HPI:  Bernardo Alarcon is a 58 y.o. female who uterine leiomyosarcoma who had completed 3 cycles of gemcitabine and Taxotere presented in September for evaluation of pneumonitis. 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 She was placed on prednisone taper and had repeat PFTs and HRCT ordered. She presents today for follow up. She completed prednisone. She has remained off gemcitabine but continues with doxorubicin. She denied cough or sputum production. She does note rapid heart rate but denied LH, no presyncope, no pleurisy, no fevers or chills. She denied LE edema or orthopnea. She reports overall feeling  improved since last visit. Review of Systems   Constitutional:  Negative for appetite change, fatigue and fever. HENT:  Negative for ear pain, mouth sores, postnasal drip, rhinorrhea, sneezing, sore throat and trouble swallowing. Respiratory:  Positive for shortness of breath. Negative for cough, chest tightness and wheezing. Cardiovascular:  Positive for palpitations. Negative for chest pain and leg swelling. Gastrointestinal:  Negative for abdominal pain, nausea and vomiting. Endocrine: Negative for cold intolerance and heat intolerance. Musculoskeletal:  Positive for myalgias. Allergic/Immunologic: Positive for immunocompromised state. Neurological:  Positive for headaches. Negative for syncope and light-headedness. Psychiatric/Behavioral:  Negative for sleep disturbance. The patient is not nervous/anxious.         Historical Information   Past Medical History:   Diagnosis Date    Allergic rhinitis     Anemia     Anxiety     Coronary artery disease     Depression     Diabetes mellitus (HCC)     Functional dyspepsia     GERD (gastroesophageal reflux disease)     GERD without esophagitis     Hyperlipidemia     Hypertension     Irritable bowel syndrome     Migraines     Osteoporosis     Pulmonary hypertension ([address])     Sleep apnea, obstructive      Past Surgical History:   Procedure Laterality Date    APPENDECTOMY      CHEST WALL BIOPSY N/A 6/4/2023    Procedure: EXCISION  BIOPSY LESION/MASS ABDOMINAL-PELVIC PERITONEUM;  Surgeon: Federico Crane MD;  Location: [facility]; Service: Gynecology Oncology    CHOLECYSTECTOMY      FOOT SURGERY Left     Bone spur    HYSTERECTOMY N/A 6/4/2023    Procedure: HYSTERECTOMY TOTAL ABDOMINAL (DOROTHY), BILATERAL SALPINGOOPHORECTOMY;  Surgeon: Emani Dorado MD;  Location: [facility];  Service: Gynecology Oncology    IR PORT PLACEMENT  6/30/2023     Family History   Problem Relation Age of Onset    Heart disease Mother     Cancer Mother     Breast cancer Father     Cancer Father     Diabetes Father     Hypertension Father     Coronary artery disease Brother     Diabetes Brother     Heart failure Brother     Hypertension Brother     Colon polyps Neg Hx     Colon cancer Neg Hx          Meds/Allergies     Current Outpatient Medications:     apixaban (Eliquis) 2.5 mg, Take 1 tablet (2.5 mg total) by mouth 2 (two) times a day, Disp: 60 tablet, Rfl: 3    aspirin (ECOTRIN LOW STRENGTH) 81 mg EC tablet, Take 81 mg by mouth daily, Disp: , Rfl:     atorvastatin (LIPITOR) 40 mg tablet, Take 40 mg by mouth daily at bedtime, Disp: , Rfl:     betamethasone, augmented, (DIPROLENE) 0.05 % lotion, Apply topically 2 (two) times a day, Disp: 60 mL, Rfl: 1    cetirizine (ZyrTEC) 10 mg tablet, Take 10 mg by mouth daily, Disp: , Rfl:     Cetirizine HCl 0.24 % SOLN, Take by mouth, Disp: , Rfl:     cholecalciferol (VITAMIN D3) 1,000 units tablet, Take 1,000 Units by mouth daily 2 daily, Disp: , Rfl:     CLENPIQ 10-3.5-12 MG-GM -GM/160ML SOLN, USE AS DIRECTED, Disp: 320 mL, Rfl: 0    clindamycin (CLEOCIN T) 1 % external solution, Apply topically 2 (two) times a day, Disp: 60 mL, Rfl: 0    dexamethasone (DECADRON) 4 mg tablet, Take 2 tabs PO BID the day prior to chemo, then 2 tabs PO the night after chemo, then 2 tabs PO BID the day after chemo, Disp: 40 tablet, Rfl: 0    diphenhydramine, lidocaine, Al/Mg hydroxide, simethicone (Magic Mouthwash) SUSP, Swish and spit 10 ML TID prn mouth pain, Disp: 300 mL, Rfl: 1    DULoxetine (CYMBALTA) 30 mg delayed release capsule, Take 30 mg by mouth daily, Disp: , Rfl:     DULoxetine (CYMBALTA) 30 mg delayed release capsule, TAKE 1 CAPSULE BY MOUTH EVERY DAY FOR 90 DAYS, Disp: , Rfl:     escitalopram (LEXAPRO) 10 mg tablet, Take 10 mg by mouth daily, Disp: , Rfl:     famotidine (PEPCID) 40 MG tablet, , Disp: , Rfl:     glimepiride (AMARYL) 4 mg tablet, Take 4 mg by mouth 2 (two) times a day, Disp: , Rfl:     hydrochlorothiazide (HYDRODIURIL) 25 mg tablet, Take 25 mg by mouth daily, Disp: , Rfl:     lidocaine-prilocaine (EMLA) cream, APPLY TO PORT 30 MIN PRIOR TO LABS/CHEMO, Disp: 30 g, Rfl: 2    LORazepam (ATIVAN) 1 mg tablet, Take 1 tablet (1 mg total) by mouth every 8 (eight) hours as needed for anxiety (nausea or anxiety), Disp: 30 tablet, Rfl: 0    losartan (COZAAR) 50 mg tablet, Take 50 mg by mouth daily, Disp: , Rfl:     meclizine (ANTIVERT) 25 mg tablet, Take by mouth every 12 (twelve) hours as needed for dizziness, Disp: , Rfl:     methylPREDNISolone 4 MG tablet therapy pack, Use as directed on package, Disp: 1 each, Rfl: 0    metoprolol succinate (TOPROL-XL) 25 mg 24 hr tablet, Take 25 mg by mouth daily, Disp: , Rfl:     Multiple Vitamin (MULTIVITAMIN) tablet, Take 1 tablet by mouth daily, Disp: , Rfl:     nystatin (MYCOSTATIN) 500,000 units/5 mL suspension, Swish and spit 5-10 ml 4 times daily x 5 days, Disp: 473 mL, Rfl: 0    other medication, see sig,, Medication/product name: Lifitegrast (xiidra) Strength:  Sig (include dose, route, frequency): one drop ou BID, Disp: , Rfl:     pantoprazole (PROTONIX) 40 mg tablet, Take 40 mg by mouth daily, Disp: , Rfl:     potassium chloride (Klor-Con M20) 20 mEq tablet, TAKE 2 TABLETS BY MOUTH TWICE A DAY FOR 2 DAYS, THEN TAKE 1 TABLET BY MOUTH DAILY, Disp: 30 tablet, Rfl: 0    rOPINIRole (REQUIP) 1 mg tablet, TAKE 1 TABLET BY MOUTH 1 TO 3 HOURS BEFORE BEDTIME, Disp: , Rfl:     sertraline (ZOLOFT) 100 mg tablet, TAKE 1 AND 1/2 TABS BY MOUTH DAILY, Disp: , Rfl:     sertraline (ZOLOFT) 50 mg tablet, Take 150 mg by mouth daily, Disp: , Rfl:     simvastatin (ZOCOR) 40 mg tablet, Take 40 mg by mouth daily, Disp: , Rfl:     Triamcinolone Acetonide 55 MCG/ACT AERO, into each nostril One spray each nostril daily, Disp: , Rfl:     ZOLMitriptan (ZOMIG) 5 MG tablet, Take 5 mg by mouth once as needed for migraine Daily prn, Disp: , Rfl:     ibuprofen (MOTRIN) 800 mg tablet, Take 800 mg by mouth every 6 (six) hours as needed for mild pain (Patient not taking: Reported on 8/3/2023), Disp: , Rfl:     nystatin (MYCOSTATIN) powder, USE AS DIRECTED 3 TIMES A DAY FOR 10 DAYS (Patient not taking: Reported on 8/3/2023), Disp: , Rfl:     ondansetron (ZOFRAN) 8 mg tablet, Take 1 tablet (8 mg total) by mouth every 8 (eight) hours as needed for nausea or vomiting (Patient not taking: Reported on 8/24/2023), Disp: 20 tablet, Rfl: 1    oxyCODONE (Roxicodone) 5 immediate release tablet, Take 1 tablet (5 mg total) by mouth every 6 (six) hours as needed for moderate pain Max Daily Amount: 20 mg (Patient not taking: Reported on 8/24/2023), Disp: 10 tablet, Rfl: 0    oxyCODONE-acetaminophen (Percocet) 5-325 mg per tablet, Take 1 tablet by mouth every 4 (four) hours as needed for moderate pain for up to 30 doses Max Daily Amount: 6 tablets (Patient not taking: Reported on 8/3/2023), Disp: 30 tablet, Rfl: 0  Allergies   Allergen Reactions    Bee Pollen Swelling    Sulfa Antibiotics      High fever  Pt unsure - it happened when she was 12years old       Vitals: Blood pressure 110/80, pulse (!) 125, temperature 99.3 °F (37.4 °C), temperature source Tympanic, resp. rate 18, height 5' 4" (1.626 m), weight 101 kg (222 lb 6.4 oz), SpO2 94 %. Body mass index is 38.17 kg/m². Oxygen Therapy  SpO2: 94 %  Oxygen Therapy: None (Room air)      Physical Exam  Physical Exam  Vitals reviewed. Constitutional:       General: She is not in acute distress. Appearance: Normal appearance. She is well-developed. She is obese.  She is not ill-appearing, toxic-appearing or diaphoretic. HENT:      Head: Normocephalic and atraumatic. Right Ear: External ear normal.      Left Ear: External ear normal.      Nose: Nose normal.      Mouth/Throat:      Mouth: Mucous membranes are moist.      Pharynx: Oropharynx is clear. No oropharyngeal exudate. Eyes:      General: No scleral icterus. Right eye: No discharge. Left eye: No discharge. Conjunctiva/sclera: Conjunctivae normal.      Pupils: Pupils are equal, round, and reactive to light. Neck:      Vascular: No JVD. Trachea: No tracheal deviation. Cardiovascular:      Rate and Rhythm: Regular rhythm. Tachycardia present. Heart sounds: Normal heart sounds. No murmur heard. No gallop. Comments: Regular, single s1, single s2, no m/r  Pulmonary:      Effort: Pulmonary effort is normal. No respiratory distress. Breath sounds: Normal breath sounds. No stridor. No wheezing, rhonchi or rales. Abdominal:      General: Bowel sounds are normal. There is no distension. Palpations: Abdomen is soft. Tenderness: There is no abdominal tenderness. There is no guarding or rebound. Musculoskeletal:         General: No deformity. Right lower leg: No edema. Left lower leg: No edema. Lymphadenopathy:      Cervical: No cervical adenopathy. Skin:     General: Skin is warm and dry. Coloration: Skin is not jaundiced. Findings: No erythema or rash. Neurological:      General: No focal deficit present. Mental Status: She is alert and oriented to person, place, and time. Mental status is at baseline. Psychiatric:         Mood and Affect: Mood normal.         Behavior: Behavior normal.         Thought Content: Thought content normal.         Labs: I have personally reviewed pertinent lab results.   Lab Results   Component Value Date    WBC 4.06 (L) 11/01/2023    HGB 10.8 (L) 11/01/2023    HCT 32.7 (L) 11/01/2023    MCV 94 11/01/2023    PLT 215 11/01/2023     Lab Results   Component Value Date    CALCIUM 8.8 11/01/2023    K 3.5 11/01/2023    CO2 26 11/01/2023     11/01/2023    BUN 16 11/01/2023    CREATININE 0.57 (L) 11/01/2023     No results found for: "IGE"  Lab Results   Component Value Date    ALT 13 11/01/2023    AST 14 11/01/2023    ALKPHOS 51 11/01/2023       Imaging and other studies: new images and reports personally reviewed  HRCT chest 10/23/2023 - resolved GGOs, basilar atelectasis resolved on prone positioning, no evidence of ILD, no effusions, no PTX    CT Chest/Abd/Pelvis 9/12/2023- scattered ground glass infiltrates with more linear/rounded density in the RLL, nodularity 7mm dome or right HD, no effusions, no sig mediastinal/hilar adenopathy, no visceral metastatic disease in pelvis or significant abdominal adenopathy     Pulmonary function testing:   10/25/2023 - Ratio 79%, FVC 2.51L (79%), FEV1 1.98L (80%), post BD FEV1 +3%, TLC 98%, %, DLCO 70% - normal spirometry, no BD response, normal lung volumes, normal diffusion    9/19/2023 - Ratio 85%, FVC 2.16L (65%), FEV1 1.83L (71%) - moderate restrictive airflow defect with noted shortened expiratory time of 4.4 seconds will over estimate restriction and under estimate obstruction     Ambulation Testing  10/25/2023 - 6MWT on .4 meters, initial SpO2 96%, theresa 92%, initial HR 143bpm, maximal HR 166bpm    9/19/2023 - 6MWT on room air - ambulated 207 meters in 6 minutes, initial SpO2 92%, theresa 88% with rapid recovery, maximal HR 116bpm     EKG, Pathology, and Other Studies:   TTE 9/2023 - EF 35%, grade I diastolic dysfunction, mildly dilated RV       Junior Madera, DO, FACP  [facility]    Answers submitted by the patient for this visit:  Pulmonology Questionnaire (Submitted on 10/25/2023)  Chief Complaint: Primary symptoms  Chronicity: recurrent  When did you first notice your symptoms?: more than 1 month ago  How often do your symptoms occur?: intermittently  Since you first noticed this problem, how has it changed?: gradually improving  Do you have shortness of breath that occurs with effort or exertion?: Yes  Do you have ear congestion?: No  Do you have heartburn?: Yes  Do you have fatigue?: Yes  Do you have nasal congestion?: No  Do you have shortness of breath when lying flat?: No  Do you have shortness of breath when you wake up?: No  Do you have sweats?: No  Have you experienced weight loss?: No  Which of the following makes your symptoms worse?: climbing stairs  Which of the following makes your symptoms better?: rest

## 2023-11-01 ENCOUNTER — HOSPITAL ENCOUNTER (OUTPATIENT)
Dept: INFUSION CENTER | Facility: HOSPITAL | Age: 63
Discharge: HOME | End: 2023-11-01
Payer: COMMERCIAL

## 2023-11-01 ENCOUNTER — OFFICE VISIT (OUTPATIENT)
Age: 63
End: 2023-11-01
Payer: COMMERCIAL

## 2023-11-01 VITALS
HEART RATE: 106 BPM | HEIGHT: 64 IN | WEIGHT: 222.4 LBS | TEMPERATURE: 99.3 F | BODY MASS INDEX: 37.97 KG/M2 | RESPIRATION RATE: 18 BRPM | SYSTOLIC BLOOD PRESSURE: 110 MMHG | OXYGEN SATURATION: 94 % | DIASTOLIC BLOOD PRESSURE: 80 MMHG

## 2023-11-01 DIAGNOSIS — Z45.2 ENCOUNTER FOR CENTRAL LINE CARE: Primary | ICD-10-CM

## 2023-11-01 DIAGNOSIS — R00.0 TACHYCARDIA: Primary | ICD-10-CM

## 2023-11-01 DIAGNOSIS — C55 UTERINE LEIOMYOSARCOMA (HCC): ICD-10-CM

## 2023-11-01 DIAGNOSIS — J98.4 DRUG-INDUCED PNEUMONITIS: ICD-10-CM

## 2023-11-01 DIAGNOSIS — T50.905A DRUG-INDUCED PNEUMONITIS: ICD-10-CM

## 2023-11-01 DIAGNOSIS — R93.89 ABNORMAL CT OF THE CHEST: ICD-10-CM

## 2023-11-01 LAB
ALBUMIN SERPL BCP-MCNC: 4 G/DL (ref 3.5–5)
ALP SERPL-CCNC: 51 U/L (ref 34–104)
ALT SERPL W P-5'-P-CCNC: 13 U/L (ref 7–52)
ANION GAP SERPL CALCULATED.3IONS-SCNC: 10 MMOL/L
AST SERPL W P-5'-P-CCNC: 14 U/L (ref 13–39)
BASOPHILS # BLD AUTO: 0.02 THOUSANDS/ÂΜL (ref 0–0.1)
BASOPHILS NFR BLD AUTO: 1 % (ref 0–1)
BILIRUB SERPL-MCNC: 0.78 MG/DL (ref 0.2–1)
BUN SERPL-MCNC: 16 MG/DL (ref 5–25)
CALCIUM SERPL-MCNC: 8.8 MG/DL (ref 8.4–10.2)
CHLORIDE SERPL-SCNC: 102 MMOL/L (ref 96–108)
CO2 SERPL-SCNC: 26 MMOL/L (ref 21–32)
CREAT SERPL-MCNC: 0.57 MG/DL (ref 0.6–1.3)
EOSINOPHIL # BLD AUTO: 0.03 THOUSAND/ÂΜL (ref 0–0.61)
EOSINOPHIL NFR BLD AUTO: 1 % (ref 0–6)
ERYTHROCYTE [DISTWIDTH] IN BLOOD BY AUTOMATED COUNT: 17.5 % (ref 11.6–15.1)
GFR SERPL CREATININE-BSD FRML MDRD: 98 ML/MIN/1.73SQ M
GLUCOSE SERPL-MCNC: 258 MG/DL (ref 65–140)
HCT VFR BLD AUTO: 32.7 % (ref 34.8–46.1)
HGB BLD-MCNC: 10.8 G/DL (ref 11.5–15.4)
IMM GRANULOCYTES # BLD AUTO: 0.01 THOUSAND/UL (ref 0–0.2)
IMM GRANULOCYTES NFR BLD AUTO: 0 % (ref 0–2)
LYMPHOCYTES # BLD AUTO: 1.61 THOUSANDS/ÂΜL (ref 0.6–4.47)
LYMPHOCYTES NFR BLD AUTO: 40 % (ref 14–44)
MAGNESIUM SERPL-MCNC: 1.5 MG/DL (ref 1.9–2.7)
MCH RBC QN AUTO: 30.9 PG (ref 26.8–34.3)
MCHC RBC AUTO-ENTMCNC: 33 G/DL (ref 31.4–37.4)
MCV RBC AUTO: 94 FL (ref 82–98)
MONOCYTES # BLD AUTO: 0.38 THOUSAND/ÂΜL (ref 0.17–1.22)
MONOCYTES NFR BLD AUTO: 9 % (ref 4–12)
NEUTROPHILS # BLD AUTO: 2.01 THOUSANDS/ÂΜL (ref 1.85–7.62)
NEUTS SEG NFR BLD AUTO: 49 % (ref 43–75)
NRBC BLD AUTO-RTO: 0 /100 WBCS
PLATELET # BLD AUTO: 215 THOUSANDS/UL (ref 149–390)
PMV BLD AUTO: 9.9 FL (ref 8.9–12.7)
POTASSIUM SERPL-SCNC: 3.5 MMOL/L (ref 3.5–5.3)
PROT SERPL-MCNC: 6.5 G/DL (ref 6.4–8.4)
RBC # BLD AUTO: 3.49 MILLION/UL (ref 3.81–5.12)
SODIUM SERPL-SCNC: 138 MMOL/L (ref 135–147)
WBC # BLD AUTO: 4.06 THOUSAND/UL (ref 4.31–10.16)

## 2023-11-01 PROCEDURE — 83735 ASSAY OF MAGNESIUM: CPT

## 2023-11-01 PROCEDURE — 99215 OFFICE O/P EST HI 40 MIN: CPT | Performed by: INTERNAL MEDICINE

## 2023-11-01 PROCEDURE — 85025 COMPLETE CBC W/AUTO DIFF WBC: CPT

## 2023-11-01 PROCEDURE — 80053 COMPREHEN METABOLIC PANEL: CPT

## 2023-11-01 NOTE — PROGRESS NOTES
Pt here for port labs, accessed, brisk blood return, labs obtained, de-accessed. Dsd applied. m Disch amb to home, steady gait.

## 2023-11-01 NOTE — LETTER
November 1, 2023     Beba Jules, [id number]  [address] [address] [facility] [address] [address]    Patient: Adrianna Medrano   YOB: 1960   Date of Visit: 11/1/2023       Dear Dr. Sheyla Sanchez: Thank you for referring Cortney Burton to me for evaluation. Below are my notes for this consultation. If you have questions, please do not hesitate to call me. I look forward to following your patient along with you. Sincerely,        Prashant Bowen DO        CC: No Recipients    Prashant Bowen DO  11/1/2023  4:50 PM  Sign when Signing Visit  Pulmonary Follow Up Note   Adrianna Medrano 58 y.o. female MRN: [id number]  11/1/2023      Assessment:  Shortness of breath and abnormal CT chest  Noted ground glass infiltrates and timing with 3rd round of chemotherapy is consistent with drug induced pneumonitis - likely gemcitabine, possibly Taxotere but this is less likely. Do not highly suspect autoimmune or infectious pneumonitis based upon presentation and given resolution is unlikely  She has completed prednisone taper  PFTs have normalized  HRCT chest has normalized w/o further alveolitis or air trapping  Would any further gemcitabine dosing  She can follow up on as needed basis     Transient exertional hypoxia   Suspect related to #1  Resolved on repeat testing     Leiomyosarcoma of uterus - per Gyn/Oncology - Dr. Sheyla Sanchez     History of cardiomyopathy - TTE preserved EF    Tachycardia   No evidence of severe anemia and normal EF on recent TTE  Some improvement with rest  Appears regular and consistent with ST - may be related to chemotherapy  Will check EKG now for further evaluation, consider holter monitor in future  Advised to go to ED for high risk features - presyncope/syncope, chest pain, pleurisy, etc  Will defer to gyn/onc service further testing after EKG results and cardiology evaluation if needed.          Plan:    Diagnoses and all orders for this visit:    Tachycardia  -     ECG 12 lead; Future    Uterine leiomyosarcoma (HCC)    Drug-induced pneumonitis    Abnormal CT of the chest        Return if symptoms worsen or fail to improve, for Recheck. History of Present Illness  HPI:  Andrés Guillen is a 58 y.o. female who uterine leiomyosarcoma who had completed 3 cycles of gemcitabine and Taxotere presented in September for evaluation of pneumonitis. 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 She was placed on prednisone taper and had repeat PFTs and HRCT ordered. She presents today for follow up. She completed prednisone. She has remained off gemcitabine but continues with doxorubicin. She denied cough or sputum production. She does note rapid heart rate but denied LH, no presyncope, no pleurisy, no fevers or chills. She denied LE edema or orthopnea. She reports overall feeling  improved since last visit. Review of Systems   Constitutional:  Negative for appetite change, fatigue and fever. HENT:  Negative for ear pain, mouth sores, postnasal drip, rhinorrhea, sneezing, sore throat and trouble swallowing. Respiratory:  Positive for shortness of breath. Negative for cough, chest tightness and wheezing. Cardiovascular:  Positive for palpitations. Negative for chest pain and leg swelling. Gastrointestinal:  Negative for abdominal pain, nausea and vomiting. Endocrine: Negative for cold intolerance and heat intolerance. Musculoskeletal:  Positive for myalgias. Allergic/Immunologic: Positive for immunocompromised state. Neurological:  Positive for headaches. Negative for syncope and light-headedness. Psychiatric/Behavioral:  Negative for sleep disturbance. The patient is not nervous/anxious.         Historical Information  Past Medical History:   Diagnosis Date   • Allergic rhinitis    • Anemia    • Anxiety    • Coronary artery disease • Depression    • Diabetes mellitus ([address])    • Functional dyspepsia    • GERD (gastroesophageal reflux disease)    • GERD without esophagitis    • Hyperlipidemia    • Hypertension    • Irritable bowel syndrome    • Migraines    • Osteoporosis    • Pulmonary hypertension ([address])    • Sleep apnea, obstructive      Past Surgical History:   Procedure Laterality Date   • APPENDECTOMY     • CHEST WALL BIOPSY N/A 6/4/2023    Procedure: EXCISION  BIOPSY LESION/MASS ABDOMINAL-PELVIC PERITONEUM;  Surgeon: Grace Dixno MD;  Location: [facility];  Service: Gynecology Oncology   • CHOLECYSTECTOMY     • FOOT SURGERY Left     Bone spur   • HYSTERECTOMY N/A 6/4/2023    Procedure: HYSTERECTOMY TOTAL ABDOMINAL (DOROTHY), BILATERAL SALPINGOOPHORECTOMY;  Surgeon: Grace Dixon MD;  Location: [facility];  Service: Gynecology Oncology   • IR PORT PLACEMENT  6/30/2023     Family History   Problem Relation Age of Onset   • Heart disease Mother    • Cancer Mother    • Breast cancer Father    • Cancer Father    • Diabetes Father    • Hypertension Father    • Coronary artery disease Brother    • Diabetes Brother    • Heart failure Brother    • Hypertension Brother    • Colon polyps Neg Hx    • Colon cancer Neg Hx          Meds/Allergies    Current Outpatient Medications:   •  apixaban (Eliquis) 2.5 mg, Take 1 tablet (2.5 mg total) by mouth 2 (two) times a day, Disp: 60 tablet, Rfl: 3  •  aspirin (ECOTRIN LOW STRENGTH) 81 mg EC tablet, Take 81 mg by mouth daily, Disp: , Rfl:   •  atorvastatin (LIPITOR) 40 mg tablet, Take 40 mg by mouth daily at bedtime, Disp: , Rfl:   •  betamethasone, augmented, (DIPROLENE) 0.05 % lotion, Apply topically 2 (two) times a day, Disp: 60 mL, Rfl: 1  •  cetirizine (ZyrTEC) 10 mg tablet, Take 10 mg by mouth daily, Disp: , Rfl:   •  Cetirizine HCl 0.24 % SOLN, Take by mouth, Disp: , Rfl:   •  cholecalciferol (VITAMIN D3) 1,000 units tablet, Take 1,000 Units by mouth daily 2 daily, Disp: , Rfl:   • CLENPIQ 10-3.5-12 MG-GM -GM/160ML SOLN, USE AS DIRECTED, Disp: 320 mL, Rfl: 0  •  clindamycin (CLEOCIN T) 1 % external solution, Apply topically 2 (two) times a day, Disp: 60 mL, Rfl: 0  •  dexamethasone (DECADRON) 4 mg tablet, Take 2 tabs PO BID the day prior to chemo, then 2 tabs PO the night after chemo, then 2 tabs PO BID the day after chemo, Disp: 40 tablet, Rfl: 0  •  diphenhydramine, lidocaine, Al/Mg hydroxide, simethicone (Magic Mouthwash) SUSP, Swish and spit 10 ML TID prn mouth pain, Disp: 300 mL, Rfl: 1  •  DULoxetine (CYMBALTA) 30 mg delayed release capsule, Take 30 mg by mouth daily, Disp: , Rfl:   •  DULoxetine (CYMBALTA) 30 mg delayed release capsule, TAKE 1 CAPSULE BY MOUTH EVERY DAY FOR 90 DAYS, Disp: , Rfl:   •  escitalopram (LEXAPRO) 10 mg tablet, Take 10 mg by mouth daily, Disp: , Rfl:   •  famotidine (PEPCID) 40 MG tablet, , Disp: , Rfl:   •  glimepiride (AMARYL) 4 mg tablet, Take 4 mg by mouth 2 (two) times a day, Disp: , Rfl:   •  hydrochlorothiazide (HYDRODIURIL) 25 mg tablet, Take 25 mg by mouth daily, Disp: , Rfl:   •  lidocaine-prilocaine (EMLA) cream, APPLY TO PORT 30 MIN PRIOR TO LABS/CHEMO, Disp: 30 g, Rfl: 2  •  LORazepam (ATIVAN) 1 mg tablet, Take 1 tablet (1 mg total) by mouth every 8 (eight) hours as needed for anxiety (nausea or anxiety), Disp: 30 tablet, Rfl: 0  •  losartan (COZAAR) 50 mg tablet, Take 50 mg by mouth daily, Disp: , Rfl:   •  meclizine (ANTIVERT) 25 mg tablet, Take by mouth every 12 (twelve) hours as needed for dizziness, Disp: , Rfl:   •  methylPREDNISolone 4 MG tablet therapy pack, Use as directed on package, Disp: 1 each, Rfl: 0  •  metoprolol succinate (TOPROL-XL) 25 mg 24 hr tablet, Take 25 mg by mouth daily, Disp: , Rfl:   •  Multiple Vitamin (MULTIVITAMIN) tablet, Take 1 tablet by mouth daily, Disp: , Rfl:   •  nystatin (MYCOSTATIN) 500,000 units/5 mL suspension, Swish and spit 5-10 ml 4 times daily x 5 days, Disp: 473 mL, Rfl: 0  •  other medication, see sig,, Medication/product name: Lifitegrast (xiidra) Strength:  Sig (include dose, route, frequency): one drop ou BID, Disp: , Rfl:   •  pantoprazole (PROTONIX) 40 mg tablet, Take 40 mg by mouth daily, Disp: , Rfl:   •  potassium chloride (Klor-Con M20) 20 mEq tablet, TAKE 2 TABLETS BY MOUTH TWICE A DAY FOR 2 DAYS, THEN TAKE 1 TABLET BY MOUTH DAILY, Disp: 30 tablet, Rfl: 0  •  rOPINIRole (REQUIP) 1 mg tablet, TAKE 1 TABLET BY MOUTH 1 TO 3 HOURS BEFORE BEDTIME, Disp: , Rfl:   •  sertraline (ZOLOFT) 100 mg tablet, TAKE 1 AND 1/2 TABS BY MOUTH DAILY, Disp: , Rfl:   •  sertraline (ZOLOFT) 50 mg tablet, Take 150 mg by mouth daily, Disp: , Rfl:   •  simvastatin (ZOCOR) 40 mg tablet, Take 40 mg by mouth daily, Disp: , Rfl:   •  Triamcinolone Acetonide 55 MCG/ACT AERO, into each nostril One spray each nostril daily, Disp: , Rfl:   •  ZOLMitriptan (ZOMIG) 5 MG tablet, Take 5 mg by mouth once as needed for migraine Daily prn, Disp: , Rfl:   •  ibuprofen (MOTRIN) 800 mg tablet, Take 800 mg by mouth every 6 (six) hours as needed for mild pain (Patient not taking: Reported on 8/3/2023), Disp: , Rfl:   •  nystatin (MYCOSTATIN) powder, USE AS DIRECTED 3 TIMES A DAY FOR 10 DAYS (Patient not taking: Reported on 8/3/2023), Disp: , Rfl:   •  ondansetron (ZOFRAN) 8 mg tablet, Take 1 tablet (8 mg total) by mouth every 8 (eight) hours as needed for nausea or vomiting (Patient not taking: Reported on 8/24/2023), Disp: 20 tablet, Rfl: 1  •  oxyCODONE (Roxicodone) 5 immediate release tablet, Take 1 tablet (5 mg total) by mouth every 6 (six) hours as needed for moderate pain Max Daily Amount: 20 mg (Patient not taking: Reported on 8/24/2023), Disp: 10 tablet, Rfl: 0  •  oxyCODONE-acetaminophen (Percocet) 5-325 mg per tablet, Take 1 tablet by mouth every 4 (four) hours as needed for moderate pain for up to 30 doses Max Daily Amount: 6 tablets (Patient not taking: Reported on 8/3/2023), Disp: 30 tablet, Rfl: 0  Allergies   Allergen Reactions • Bee Pollen Swelling   • Sulfa Antibiotics      High fever  Pt unsure - it happened when she was 12years old       Vitals: Blood pressure 110/80, pulse (!) 125, temperature 99.3 °F (37.4 °C), temperature source Tympanic, resp. rate 18, height 5' 4" (1.626 m), weight 101 kg (222 lb 6.4 oz), SpO2 94 %. Body mass index is 38.17 kg/m². Oxygen Therapy  SpO2: 94 %  Oxygen Therapy: None (Room air)      Physical Exam  Physical Exam  Vitals reviewed. Constitutional:       General: She is not in acute distress. Appearance: Normal appearance. She is well-developed. She is obese. She is not ill-appearing, toxic-appearing or diaphoretic. HENT:      Head: Normocephalic and atraumatic. Right Ear: External ear normal.      Left Ear: External ear normal.      Nose: Nose normal.      Mouth/Throat:      Mouth: Mucous membranes are moist.      Pharynx: Oropharynx is clear. No oropharyngeal exudate. Eyes:      General: No scleral icterus. Right eye: No discharge. Left eye: No discharge. Conjunctiva/sclera: Conjunctivae normal.      Pupils: Pupils are equal, round, and reactive to light. Neck:      Vascular: No JVD. Trachea: No tracheal deviation. Cardiovascular:      Rate and Rhythm: Regular rhythm. Tachycardia present. Heart sounds: Normal heart sounds. No murmur heard. No gallop. Comments: Regular, single s1, single s2, no m/r  Pulmonary:      Effort: Pulmonary effort is normal. No respiratory distress. Breath sounds: Normal breath sounds. No stridor. No wheezing, rhonchi or rales. Abdominal:      General: Bowel sounds are normal. There is no distension. Palpations: Abdomen is soft. Tenderness: There is no abdominal tenderness. There is no guarding or rebound. Musculoskeletal:         General: No deformity. Right lower leg: No edema. Left lower leg: No edema. Lymphadenopathy:      Cervical: No cervical adenopathy.    Skin:     General: Skin is warm and dry. Coloration: Skin is not jaundiced. Findings: No erythema or rash. Neurological:      General: No focal deficit present. Mental Status: She is alert and oriented to person, place, and time. Mental status is at baseline. Psychiatric:         Mood and Affect: Mood normal.         Behavior: Behavior normal.         Thought Content: Thought content normal.         Labs: I have personally reviewed pertinent lab results.   Lab Results   Component Value Date    WBC 4.06 (L) 11/01/2023    HGB 10.8 (L) 11/01/2023    HCT 32.7 (L) 11/01/2023    MCV 94 11/01/2023     11/01/2023     Lab Results   Component Value Date    CALCIUM 8.8 11/01/2023    K 3.5 11/01/2023    CO2 26 11/01/2023     11/01/2023    BUN 16 11/01/2023    CREATININE 0.57 (L) 11/01/2023     No results found for: "IGE"  Lab Results   Component Value Date    ALT 13 11/01/2023    AST 14 11/01/2023    ALKPHOS 51 11/01/2023       Imaging and other studies: new images and reports personally reviewed  HRCT chest 10/23/2023 - resolved GGOs, basilar atelectasis resolved on prone positioning, no evidence of ILD, no effusions, no PTX    CT Chest/Abd/Pelvis 9/12/2023- scattered ground glass infiltrates with more linear/rounded density in the RLL, nodularity 7mm dome or right HD, no effusions, no sig mediastinal/hilar adenopathy, no visceral metastatic disease in pelvis or significant abdominal adenopathy     Pulmonary function testing:   10/25/2023 - Ratio 79%, FVC 2.51L (79%), FEV1 1.98L (80%), post BD FEV1 +3%, TLC 98%, %, DLCO 70% - normal spirometry, no BD response, normal lung volumes, normal diffusion    9/19/2023 - Ratio 85%, FVC 2.16L (65%), FEV1 1.83L (71%) - moderate restrictive airflow defect with noted shortened expiratory time of 4.4 seconds will over estimate restriction and under estimate obstruction     Ambulation Testing  10/25/2023 - 6MWT on .4 meters, initial SpO2 96%, theresa 92%, initial HR 143bpm, maximal HR 166bpm    9/19/2023 - 6MWT on room air - ambulated 207 meters in 6 minutes, initial SpO2 92%, theresa 88% with rapid recovery, maximal HR 116bpm     EKG, Pathology, and Other Studies:   TTE 9/2023 - EF 87%, grade I diastolic dysfunction, mildly dilated RV       Junior Madera DO, FACP  [facility]    Answers submitted by the patient for this visit:  Pulmonology Questionnaire (Submitted on 10/25/2023)  Chief Complaint: Primary symptoms  Chronicity: recurrent  When did you first notice your symptoms?: more than 1 month ago  How often do your symptoms occur?: intermittently  Since you first noticed this problem, how has it changed?: gradually improving  Do you have shortness of breath that occurs with effort or exertion?: Yes  Do you have ear congestion?: No  Do you have heartburn?: Yes  Do you have fatigue?: Yes  Do you have nasal congestion?: No  Do you have shortness of breath when lying flat?: No  Do you have shortness of breath when you wake up?: No  Do you have sweats?: No  Have you experienced weight loss?: No  Which of the following makes your symptoms worse?: climbing stairs  Which of the following makes your symptoms better?: rest

## 2023-11-02 ENCOUNTER — OFFICE VISIT (OUTPATIENT)
Dept: LAB | Facility: HOSPITAL | Age: 63
End: 2023-11-02
Payer: COMMERCIAL

## 2023-11-02 ENCOUNTER — OFFICE VISIT (OUTPATIENT)
Age: 63
End: 2023-11-02
Payer: COMMERCIAL

## 2023-11-02 VITALS
DIASTOLIC BLOOD PRESSURE: 80 MMHG | BODY MASS INDEX: 38.07 KG/M2 | RESPIRATION RATE: 18 BRPM | WEIGHT: 223 LBS | SYSTOLIC BLOOD PRESSURE: 130 MMHG | OXYGEN SATURATION: 90 % | TEMPERATURE: 97.3 F | HEIGHT: 64 IN | HEART RATE: 109 BPM

## 2023-11-02 DIAGNOSIS — R00.0 TACHYCARDIA: ICD-10-CM

## 2023-11-02 DIAGNOSIS — C55 UTERINE LEIOMYOSARCOMA (HCC): Primary | ICD-10-CM

## 2023-11-02 LAB
ATRIAL RATE: 93 BPM
P AXIS: 37 DEGREES
PR INTERVAL: 184 MS
QRS AXIS: 20 DEGREES
QRSD INTERVAL: 70 MS
QT INTERVAL: 372 MS
QTC INTERVAL: 462 MS
T WAVE AXIS: 60 DEGREES
VENTRICULAR RATE: 93 BPM

## 2023-11-02 PROCEDURE — 93005 ELECTROCARDIOGRAM TRACING: CPT

## 2023-11-02 PROCEDURE — 93010 ELECTROCARDIOGRAM REPORT: CPT | Performed by: INTERNAL MEDICINE

## 2023-11-02 PROCEDURE — 99215 OFFICE O/P EST HI 40 MIN: CPT | Performed by: PHYSICIAN ASSISTANT

## 2023-11-02 NOTE — ASSESSMENT & PLAN NOTE
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 She is currently receiving adriamycin 50 mg/m2 IV every 21 days. She is tolerating treatment well. Continue with cycle 3 of treatment as planned as long as her metabolic and hematologic parameters are adequate. Return to the office as per her chemotherapy calendar. Plan for CT imaging after completion of cycle 4.

## 2023-11-02 NOTE — PROGRESS NOTES
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 She is currently receiving adriamycin 50 mg/m2 IV every 21 days. She is tolerating treatment well. Continue with cycle 3 of treatment as planned as long as her metabolic and hematologic parameters are adequate. Return to the office as per her chemotherapy calendar. Plan for CT imaging after completion of cycle 4. CHIEF COMPLAINT:   Pre-chemotherapy evaluation    Problem:  Cancer Staging   Uterine leiomyosarcoma [facility])  Staging form: Corpus Uteri - Sarcoma, AJCC 8th Edition  - Pathologic stage from 6/4/2023: FIGO Stage II, calculated as Stage Unknown (pT2, pNX, cM0) - Signed by Keyur Maldonado MD on 6/29/2023        Previous therapy:  Oncology History   Uterine leiomyosarcoma ([address])   6/4/2023 Initial Diagnosis    Uterine sarcoma ([address])     6/4/2023 -  Cancer Staged    Staging form: Corpus Uteri - Sarcoma, AJCC 8th Edition  - Pathologic stage from 6/4/2023: FIGO Stage II, calculated as Stage Unknown (pT2, pNX, cM0) - Signed by Keyur Maldonado MD on 6/29/2023  Stage prefix: Initial diagnosis       6/4/2023 Surgery    HYSTERECTOMY TOTAL ABDOMINAL (DOROTHY). BILATERAL SALPINGOOPHORECTOMY  EXCISION  BIOPSY LESION/MASS ABDOMINAL-PELVIC PERITONEUM  -Leiomyosarcoma 13.5 cm extending through the myometrium, right pelvic peritoneum involved with uterine leiomyosarcoma with tumor present at unoriented resection surface. 7/11/2023 -  Chemotherapy    Gemzar 800 mg/m2 IV day 1 and 8 as well as taxotere 65 mg/m2 day 8 every 21 days. Chemotherapy    Adriamycin 50 mg/m2 IV every 21 days. Patient ID: Rocio Wilhelm is a 58 y.o. female  who presents for pre-chemotherapy evaluation.  Overall, she is tolerating treatment well. She has been afebrile. She denies n/v/abdominal pain. Appetite is appropriate. Normal bowel/bladder function. She notes pre-existing peripheral neuropathy, but worsening neuropathy of her hands immediately following treatment. This resolves and becomes intermittent and is not affecting her ADLs. Additionally, patient has an intermittent tremor of her hands. She denies weakness. CBC/Diff, CMP, Mg from 11/1/23 reviewed. The following portions of the patient's history were reviewed and updated as appropriate: allergies, current medications, past medical history, past surgical history, and problem list.    Review of Systems   Constitutional: Negative. HENT: Negative. Eyes: Negative. Respiratory: Negative. Cardiovascular: Negative. Gastrointestinal: Negative. Genitourinary: Negative. Musculoskeletal: Negative. Skin: Negative. Neurological:  Positive for numbness (hands, intermittent). Psychiatric/Behavioral: Negative.          Current Outpatient Medications   Medication Sig Dispense Refill    apixaban (Eliquis) 2.5 mg Take 1 tablet (2.5 mg total) by mouth 2 (two) times a day 60 tablet 3    aspirin (ECOTRIN LOW STRENGTH) 81 mg EC tablet Take 81 mg by mouth daily      atorvastatin (LIPITOR) 40 mg tablet Take 40 mg by mouth daily at bedtime      betamethasone, augmented, (DIPROLENE) 0.05 % lotion Apply topically 2 (two) times a day 60 mL 1    cetirizine (ZyrTEC) 10 mg tablet Take 10 mg by mouth daily      Cetirizine HCl 0.24 % SOLN Take by mouth      cholecalciferol (VITAMIN D3) 1,000 units tablet Take 1,000 Units by mouth daily 2 daily      CLENPIQ 10-3.5-12 MG-GM -GM/160ML SOLN USE AS DIRECTED 320 mL 0    clindamycin (CLEOCIN T) 1 % external solution Apply topically 2 (two) times a day 60 mL 0    dexamethasone (DECADRON) 4 mg tablet Take 2 tabs PO BID the day prior to chemo, then 2 tabs PO the night after chemo, then 2 tabs PO BID the day after chemo 40 tablet 0    diphenhydramine, lidocaine, Al/Mg hydroxide, simethicone (Magic Mouthwash) SUSP Swish and spit 10 ML TID prn mouth pain 300 mL 1    DULoxetine (CYMBALTA) 30 mg delayed release capsule Take 30 mg by mouth daily      DULoxetine (CYMBALTA) 30 mg delayed release capsule TAKE 1 CAPSULE BY MOUTH EVERY DAY FOR 90 DAYS      escitalopram (LEXAPRO) 10 mg tablet Take 10 mg by mouth daily      famotidine (PEPCID) 40 MG tablet       glimepiride (AMARYL) 4 mg tablet Take 4 mg by mouth 2 (two) times a day      hydrochlorothiazide (HYDRODIURIL) 25 mg tablet Take 25 mg by mouth daily      lidocaine-prilocaine (EMLA) cream APPLY TO PORT 30 MIN PRIOR TO LABS/CHEMO 30 g 2    LORazepam (ATIVAN) 1 mg tablet Take 1 tablet (1 mg total) by mouth every 8 (eight) hours as needed for anxiety (nausea or anxiety) 30 tablet 0    losartan (COZAAR) 50 mg tablet Take 50 mg by mouth daily      meclizine (ANTIVERT) 25 mg tablet Take by mouth every 12 (twelve) hours as needed for dizziness      methylPREDNISolone 4 MG tablet therapy pack Use as directed on package 1 each 0    metoprolol succinate (TOPROL-XL) 25 mg 24 hr tablet Take 25 mg by mouth daily      Multiple Vitamin (MULTIVITAMIN) tablet Take 1 tablet by mouth daily      nystatin (MYCOSTATIN) 500,000 units/5 mL suspension Swish and spit 5-10 ml 4 times daily x 5 days 473 mL 0    other medication, see sig, Medication/product name: Lifitegrast (xiidra)  Strength:   Sig (include dose, route, frequency): one drop ou BID      pantoprazole (PROTONIX) 40 mg tablet Take 40 mg by mouth daily      potassium chloride (Klor-Con M20) 20 mEq tablet TAKE 2 TABLETS BY MOUTH TWICE A DAY FOR 2 DAYS, THEN TAKE 1 TABLET BY MOUTH DAILY 30 tablet 0    rOPINIRole (REQUIP) 1 mg tablet TAKE 1 TABLET BY MOUTH 1 TO 3 HOURS BEFORE BEDTIME      sertraline (ZOLOFT) 100 mg tablet TAKE 1 AND 1/2 TABS BY MOUTH DAILY      sertraline (ZOLOFT) 50 mg tablet Take 150 mg by mouth daily      simvastatin (ZOCOR) 40 mg tablet Take 40 mg by mouth daily      Triamcinolone Acetonide 55 MCG/ACT AERO into each nostril One spray each nostril daily      ZOLMitriptan (ZOMIG) 5 MG tablet Take 5 mg by mouth once as needed for migraine Daily prn      ibuprofen (MOTRIN) 800 mg tablet Take 800 mg by mouth every 6 (six) hours as needed for mild pain (Patient not taking: Reported on 8/3/2023)      nystatin (MYCOSTATIN) powder USE AS DIRECTED 3 TIMES A DAY FOR 10 DAYS (Patient not taking: Reported on 8/3/2023)      ondansetron (ZOFRAN) 8 mg tablet Take 1 tablet (8 mg total) by mouth every 8 (eight) hours as needed for nausea or vomiting (Patient not taking: Reported on 8/24/2023) 20 tablet 1    oxyCODONE (Roxicodone) 5 immediate release tablet Take 1 tablet (5 mg total) by mouth every 6 (six) hours as needed for moderate pain Max Daily Amount: 20 mg (Patient not taking: Reported on 8/24/2023) 10 tablet 0    oxyCODONE-acetaminophen (Percocet) 5-325 mg per tablet Take 1 tablet by mouth every 4 (four) hours as needed for moderate pain for up to 30 doses Max Daily Amount: 6 tablets (Patient not taking: Reported on 8/3/2023) 30 tablet 0     No current facility-administered medications for this visit. Objective:    Blood pressure 130/80, pulse (!) 109, temperature (!) 97.3 °F (36.3 °C), temperature source Temporal, resp. rate 18, height 5' 4" (1.626 m), weight 101 kg (223 lb), SpO2 90 %. Body mass index is 38.28 kg/m². Body surface area is 2.05 meters squared. Physical Exam  Constitutional:       Appearance: She is well-developed. Pulmonary:      Effort: Pulmonary effort is normal.   Skin:     General: Skin is warm and dry. Findings: No rash. Neurological:      Mental Status: She is alert and oriented to person, place, and time. Psychiatric:         Behavior: Behavior normal.         Thought Content: Thought content normal.         Judgment: Judgment normal.     Performance status is zero.          Lab Results   Component Value Date    K 3.5 11/01/2023     11/01/2023    CO2 26 11/01/2023    BUN 16 11/01/2023    CREATININE 0.57 (L) 11/01/2023    CALCIUM 8.8 11/01/2023    CORRECTEDCA 9.3 06/07/2023    AST 14 11/01/2023    ALT 13 11/01/2023    ALKPHOS 51 11/01/2023    EGFR 98 11/01/2023     Lab Results   Component Value Date    WBC 4.06 (L) 11/01/2023    HGB 10.8 (L) 11/01/2023    HCT 32.7 (L) 11/01/2023    MCV 94 11/01/2023     11/01/2023     Lab Results   Component Value Date    NEUTROABS 2.01 11/01/2023

## 2023-11-08 ENCOUNTER — HOSPITAL ENCOUNTER (OUTPATIENT)
Dept: INFUSION CENTER | Facility: HOSPITAL | Age: 63
Discharge: HOME | End: 2023-11-08
Payer: COMMERCIAL

## 2023-11-08 DIAGNOSIS — Z45.2 ENCOUNTER FOR CENTRAL LINE CARE: Primary | ICD-10-CM

## 2023-11-08 DIAGNOSIS — C55 UTERINE LEIOMYOSARCOMA (HCC): ICD-10-CM

## 2023-11-08 LAB
ALBUMIN SERPL BCP-MCNC: 4 G/DL (ref 3.5–5)
ALP SERPL-CCNC: 56 U/L (ref 34–104)
ALT SERPL W P-5'-P-CCNC: 18 U/L (ref 7–52)
ANION GAP SERPL CALCULATED.3IONS-SCNC: 12 MMOL/L
AST SERPL W P-5'-P-CCNC: 18 U/L (ref 13–39)
BASOPHILS # BLD AUTO: 0.02 THOUSANDS/ÂΜL (ref 0–0.1)
BASOPHILS NFR BLD AUTO: 1 % (ref 0–1)
BILIRUB SERPL-MCNC: 0.76 MG/DL (ref 0.2–1)
BUN SERPL-MCNC: 11 MG/DL (ref 5–25)
CALCIUM SERPL-MCNC: 9.1 MG/DL (ref 8.4–10.2)
CHLORIDE SERPL-SCNC: 103 MMOL/L (ref 96–108)
CO2 SERPL-SCNC: 24 MMOL/L (ref 21–32)
CREAT SERPL-MCNC: 0.46 MG/DL (ref 0.6–1.3)
EOSINOPHIL # BLD AUTO: 0.03 THOUSAND/ÂΜL (ref 0–0.61)
EOSINOPHIL NFR BLD AUTO: 1 % (ref 0–6)
ERYTHROCYTE [DISTWIDTH] IN BLOOD BY AUTOMATED COUNT: 16.8 % (ref 11.6–15.1)
GFR SERPL CREATININE-BSD FRML MDRD: 105 ML/MIN/1.73SQ M
GLUCOSE SERPL-MCNC: 223 MG/DL (ref 65–140)
HCT VFR BLD AUTO: 36.6 % (ref 34.8–46.1)
HGB BLD-MCNC: 11.7 G/DL (ref 11.5–15.4)
IMM GRANULOCYTES # BLD AUTO: 0.02 THOUSAND/UL (ref 0–0.2)
IMM GRANULOCYTES NFR BLD AUTO: 1 % (ref 0–2)
LYMPHOCYTES # BLD AUTO: 2.14 THOUSANDS/ÂΜL (ref 0.6–4.47)
LYMPHOCYTES NFR BLD AUTO: 50 % (ref 14–44)
MAGNESIUM SERPL-MCNC: 1.6 MG/DL (ref 1.9–2.7)
MCH RBC QN AUTO: 30.1 PG (ref 26.8–34.3)
MCHC RBC AUTO-ENTMCNC: 32 G/DL (ref 31.4–37.4)
MCV RBC AUTO: 94 FL (ref 82–98)
MONOCYTES # BLD AUTO: 0.66 THOUSAND/ÂΜL (ref 0.17–1.22)
MONOCYTES NFR BLD AUTO: 16 % (ref 4–12)
NEUTROPHILS # BLD AUTO: 1.32 THOUSANDS/ÂΜL (ref 1.85–7.62)
NEUTS SEG NFR BLD AUTO: 31 % (ref 43–75)
NRBC BLD AUTO-RTO: 0 /100 WBCS
PLATELET # BLD AUTO: 424 THOUSANDS/UL (ref 149–390)
PMV BLD AUTO: 9.2 FL (ref 8.9–12.7)
POTASSIUM SERPL-SCNC: 3.8 MMOL/L (ref 3.5–5.3)
PROT SERPL-MCNC: 6.7 G/DL (ref 6.4–8.4)
RBC # BLD AUTO: 3.89 MILLION/UL (ref 3.81–5.12)
SODIUM SERPL-SCNC: 139 MMOL/L (ref 135–147)
WBC # BLD AUTO: 4.19 THOUSAND/UL (ref 4.31–10.16)

## 2023-11-08 PROCEDURE — 83735 ASSAY OF MAGNESIUM: CPT

## 2023-11-08 PROCEDURE — 80053 COMPREHEN METABOLIC PANEL: CPT

## 2023-11-08 PROCEDURE — 85025 COMPLETE CBC W/AUTO DIFF WBC: CPT

## 2023-11-08 NOTE — PROGRESS NOTES
Pt arrived amb for port labs. States she fell on her bottom last week. Appears steady today. Port accessed, labs obtained, de-accessed, dsd applied. Disch amb to home, steady gait.

## 2023-11-09 DIAGNOSIS — C55 UTERINE LEIOMYOSARCOMA (HCC): Primary | ICD-10-CM

## 2023-11-09 RX ORDER — DOXORUBICIN HYDROCHLORIDE 2 MG/ML
100 INJECTION, SOLUTION INTRAVENOUS ONCE
Status: CANCELLED | OUTPATIENT
Start: 2023-11-10

## 2023-11-09 RX ORDER — SODIUM CHLORIDE 9 MG/ML
20 INJECTION, SOLUTION INTRAVENOUS ONCE
Status: CANCELLED | OUTPATIENT
Start: 2023-11-10

## 2023-11-09 RX ORDER — PALONOSETRON 0.05 MG/ML
0.25 INJECTION, SOLUTION INTRAVENOUS ONCE
Status: CANCELLED | OUTPATIENT
Start: 2023-11-10

## 2023-11-10 ENCOUNTER — HOSPITAL ENCOUNTER (OUTPATIENT)
Dept: INFUSION CENTER | Facility: HOSPITAL | Age: 63
End: 2023-11-10
Attending: OBSTETRICS & GYNECOLOGY
Payer: COMMERCIAL

## 2023-11-10 VITALS
RESPIRATION RATE: 18 BRPM | HEART RATE: 88 BPM | WEIGHT: 222 LBS | BODY MASS INDEX: 37.9 KG/M2 | HEIGHT: 64 IN | SYSTOLIC BLOOD PRESSURE: 142 MMHG | TEMPERATURE: 97.5 F | OXYGEN SATURATION: 94 % | DIASTOLIC BLOOD PRESSURE: 89 MMHG

## 2023-11-10 DIAGNOSIS — C55 UTERINE LEIOMYOSARCOMA (HCC): ICD-10-CM

## 2023-11-10 DIAGNOSIS — E83.42 HYPOMAGNESEMIA: Primary | ICD-10-CM

## 2023-11-10 DIAGNOSIS — D70.1 CHEMOTHERAPY INDUCED NEUTROPENIA: ICD-10-CM

## 2023-11-10 DIAGNOSIS — T45.1X5A CHEMOTHERAPY INDUCED NEUTROPENIA: ICD-10-CM

## 2023-11-10 DIAGNOSIS — E83.42 HYPOMAGNESEMIA: ICD-10-CM

## 2023-11-10 DIAGNOSIS — C55 UTERINE LEIOMYOSARCOMA (HCC): Primary | ICD-10-CM

## 2023-11-10 LAB
BASOPHILS # BLD AUTO: 0.05 THOUSANDS/ÂΜL (ref 0–0.1)
BASOPHILS NFR BLD AUTO: 1 % (ref 0–1)
EOSINOPHIL # BLD AUTO: 0.05 THOUSAND/ÂΜL (ref 0–0.61)
EOSINOPHIL NFR BLD AUTO: 1 % (ref 0–6)
ERYTHROCYTE [DISTWIDTH] IN BLOOD BY AUTOMATED COUNT: 16.3 % (ref 11.6–15.1)
HCT VFR BLD AUTO: 35.9 % (ref 34.8–46.1)
HGB BLD-MCNC: 11.6 G/DL (ref 11.5–15.4)
IMM GRANULOCYTES # BLD AUTO: 0.03 THOUSAND/UL (ref 0–0.2)
IMM GRANULOCYTES NFR BLD AUTO: 1 % (ref 0–2)
LYMPHOCYTES # BLD AUTO: 2.44 THOUSANDS/ÂΜL (ref 0.6–4.47)
LYMPHOCYTES NFR BLD AUTO: 50 % (ref 14–44)
MCH RBC QN AUTO: 30.1 PG (ref 26.8–34.3)
MCHC RBC AUTO-ENTMCNC: 32.3 G/DL (ref 31.4–37.4)
MCV RBC AUTO: 93 FL (ref 82–98)
MONOCYTES # BLD AUTO: 0.75 THOUSAND/ÂΜL (ref 0.17–1.22)
MONOCYTES NFR BLD AUTO: 15 % (ref 4–12)
NEUTROPHILS # BLD AUTO: 1.59 THOUSANDS/ÂΜL (ref 1.85–7.62)
NEUTS SEG NFR BLD AUTO: 32 % (ref 43–75)
NRBC BLD AUTO-RTO: 0 /100 WBCS
PLATELET # BLD AUTO: 391 THOUSANDS/UL (ref 149–390)
PMV BLD AUTO: 9.2 FL (ref 8.9–12.7)
RBC # BLD AUTO: 3.86 MILLION/UL (ref 3.81–5.12)
WBC # BLD AUTO: 4.91 THOUSAND/UL (ref 4.31–10.16)

## 2023-11-10 PROCEDURE — 96367 TX/PROPH/DG ADDL SEQ IV INF: CPT

## 2023-11-10 PROCEDURE — 96377 APPLICATON ON-BODY INJECTOR: CPT

## 2023-11-10 PROCEDURE — 96375 TX/PRO/DX INJ NEW DRUG ADDON: CPT

## 2023-11-10 PROCEDURE — 96409 CHEMO IV PUSH SNGL DRUG: CPT

## 2023-11-10 PROCEDURE — 85025 COMPLETE CBC W/AUTO DIFF WBC: CPT

## 2023-11-10 RX ORDER — DOXORUBICIN HYDROCHLORIDE 2 MG/ML
100 INJECTION, SOLUTION INTRAVENOUS ONCE
Status: COMPLETED | OUTPATIENT
Start: 2023-11-10 | End: 2023-11-10

## 2023-11-10 RX ORDER — PALONOSETRON 0.05 MG/ML
0.25 INJECTION, SOLUTION INTRAVENOUS ONCE
Status: COMPLETED | OUTPATIENT
Start: 2023-11-10 | End: 2023-11-10

## 2023-11-10 RX ORDER — SODIUM CHLORIDE 9 MG/ML
20 INJECTION, SOLUTION INTRAVENOUS ONCE
Status: COMPLETED | OUTPATIENT
Start: 2023-11-10 | End: 2023-11-10

## 2023-11-10 RX ADMIN — DEXAMETHASONE SODIUM PHOSPHATE 20 MG: 10 INJECTION, SOLUTION INTRAMUSCULAR; INTRAVENOUS at 10:24

## 2023-11-10 RX ADMIN — FOSAPREPITANT DIMEGLUMINE 150 MG: 150 INJECTION, POWDER, LYOPHILIZED, FOR SOLUTION INTRAVENOUS at 11:09

## 2023-11-10 RX ADMIN — PALONOSETRON 0.25 MG: 0.05 INJECTION, SOLUTION INTRAVENOUS at 11:07

## 2023-11-10 RX ADMIN — PEGFILGRASTIM 6 MG: KIT SUBCUTANEOUS at 12:01

## 2023-11-10 RX ADMIN — DOXORUBICIN HYDROCHLORIDE 100 MG: 2 INJECTION, SOLUTION INTRAVENOUS at 11:46

## 2023-11-10 RX ADMIN — FAMOTIDINE 20 MG: 10 INJECTION INTRAVENOUS at 10:45

## 2023-11-10 RX ADMIN — SODIUM CHLORIDE 20 ML/HR: 0.9 INJECTION, SOLUTION INTRAVENOUS at 09:40

## 2023-11-10 NOTE — PROGRESS NOTES
Rec'd pt in good spirits and without new complaints. PN of R hand improved since last visit. Pt states " its mild". CBC with Diff redrawn for low ANC. New results meet treatment parameters and was reported to [facility]. Port esily accessed, premeds now infusing.

## 2023-11-10 NOTE — PROGRESS NOTES
Infusions tolerated well. No complaints offered. Neulasta OnPro kit applied and is blinking green functional. AVS declined next several appts were confirmed. Discharged with steady gait.

## 2023-11-14 DIAGNOSIS — R06.02 SHORTNESS OF BREATH: ICD-10-CM

## 2023-11-14 RX ORDER — ALBUTEROL SULFATE 90 UG/1
AEROSOL, METERED RESPIRATORY (INHALATION)
Qty: 18 G | Refills: 1 | Status: SHIPPED | OUTPATIENT
Start: 2023-11-14 | End: 2023-12-14

## 2023-11-15 ENCOUNTER — HOSPITAL ENCOUNTER (OUTPATIENT)
Dept: INFUSION CENTER | Facility: HOSPITAL | Age: 63
Discharge: HOME | End: 2023-11-15
Payer: COMMERCIAL

## 2023-11-15 DIAGNOSIS — Z45.2 ENCOUNTER FOR CENTRAL LINE CARE: ICD-10-CM

## 2023-11-15 DIAGNOSIS — C55 UTERINE LEIOMYOSARCOMA (HCC): Primary | ICD-10-CM

## 2023-11-15 LAB
ALBUMIN SERPL BCP-MCNC: 4.2 G/DL (ref 3.5–5)
ALP SERPL-CCNC: 118 U/L (ref 34–104)
ALT SERPL W P-5'-P-CCNC: 14 U/L (ref 7–52)
ANION GAP SERPL CALCULATED.3IONS-SCNC: 11 MMOL/L
AST SERPL W P-5'-P-CCNC: 15 U/L (ref 13–39)
BASOPHILS # BLD MANUAL: 0 THOUSAND/UL (ref 0–0.1)
BASOPHILS NFR MAR MANUAL: 0 % (ref 0–1)
BILIRUB SERPL-MCNC: 0.82 MG/DL (ref 0.2–1)
BUN SERPL-MCNC: 14 MG/DL (ref 5–25)
CALCIUM SERPL-MCNC: 9.6 MG/DL (ref 8.4–10.2)
CHLORIDE SERPL-SCNC: 97 MMOL/L (ref 96–108)
CO2 SERPL-SCNC: 29 MMOL/L (ref 21–32)
CREAT SERPL-MCNC: 0.59 MG/DL (ref 0.6–1.3)
EOSINOPHIL # BLD MANUAL: 0 THOUSAND/UL (ref 0–0.4)
EOSINOPHIL NFR BLD MANUAL: 0 % (ref 0–6)
ERYTHROCYTE [DISTWIDTH] IN BLOOD BY AUTOMATED COUNT: 15.9 % (ref 11.6–15.1)
GFR SERPL CREATININE-BSD FRML MDRD: 97 ML/MIN/1.73SQ M
GLUCOSE SERPL-MCNC: 212 MG/DL (ref 65–140)
HCT VFR BLD AUTO: 39 % (ref 34.8–46.1)
HGB BLD-MCNC: 12.6 G/DL (ref 11.5–15.4)
LYMPHOCYTES # BLD AUTO: 11 % (ref 14–44)
LYMPHOCYTES # BLD AUTO: 2.89 THOUSAND/UL (ref 0.6–4.47)
MAGNESIUM SERPL-MCNC: 1.6 MG/DL (ref 1.9–2.7)
MCH RBC QN AUTO: 29.9 PG (ref 26.8–34.3)
MCHC RBC AUTO-ENTMCNC: 32.3 G/DL (ref 31.4–37.4)
MCV RBC AUTO: 92 FL (ref 82–98)
MONOCYTES # BLD AUTO: 0.83 THOUSAND/UL (ref 0–1.22)
MONOCYTES NFR BLD: 4 % (ref 4–12)
NEUTROPHILS # BLD MANUAL: 16.94 THOUSAND/UL (ref 1.85–7.62)
NEUTS BAND NFR BLD MANUAL: 5 % (ref 0–8)
NEUTS SEG NFR BLD AUTO: 77 % (ref 43–75)
PLATELET # BLD AUTO: 302 THOUSANDS/UL (ref 149–390)
PLATELET BLD QL SMEAR: ADEQUATE
PMV BLD AUTO: 9.6 FL (ref 8.9–12.7)
POTASSIUM SERPL-SCNC: 3.4 MMOL/L (ref 3.5–5.3)
PROT SERPL-MCNC: 7.1 G/DL (ref 6.4–8.4)
RBC # BLD AUTO: 4.22 MILLION/UL (ref 3.81–5.12)
RBC MORPH BLD: NORMAL
SODIUM SERPL-SCNC: 137 MMOL/L (ref 135–147)
VARIANT LYMPHS # BLD AUTO: 3 %
WBC # BLD AUTO: 20.66 THOUSAND/UL (ref 4.31–10.16)

## 2023-11-15 PROCEDURE — 85027 COMPLETE CBC AUTOMATED: CPT

## 2023-11-15 PROCEDURE — 85007 BL SMEAR W/DIFF WBC COUNT: CPT

## 2023-11-15 PROCEDURE — 80053 COMPREHEN METABOLIC PANEL: CPT

## 2023-11-15 PROCEDURE — 83735 ASSAY OF MAGNESIUM: CPT

## 2023-11-15 NOTE — PROGRESS NOTES
Port labs drawn without problems. Next 2 appts confirmed. AVS provided. Discharged with steady gait.

## 2023-11-20 DIAGNOSIS — E87.6 HYPOKALEMIA: ICD-10-CM

## 2023-11-20 RX ORDER — POTASSIUM CHLORIDE 20 MEQ/1
TABLET, EXTENDED RELEASE ORAL
Qty: 90 TABLET | Refills: 1 | Status: SHIPPED | OUTPATIENT
Start: 2023-11-20

## 2023-11-22 ENCOUNTER — HOSPITAL ENCOUNTER (OUTPATIENT)
Dept: INFUSION CENTER | Facility: HOSPITAL | Age: 63
Discharge: HOME | End: 2023-11-22
Payer: COMMERCIAL

## 2023-11-22 DIAGNOSIS — Z45.2 ENCOUNTER FOR CENTRAL LINE CARE: Primary | ICD-10-CM

## 2023-11-22 DIAGNOSIS — C55 UTERINE LEIOMYOSARCOMA (HCC): ICD-10-CM

## 2023-11-22 LAB
ALBUMIN SERPL BCP-MCNC: 4.1 G/DL (ref 3.5–5)
ALP SERPL-CCNC: 99 U/L (ref 34–104)
ALT SERPL W P-5'-P-CCNC: 17 U/L (ref 7–52)
ANION GAP SERPL CALCULATED.3IONS-SCNC: 10 MMOL/L
AST SERPL W P-5'-P-CCNC: 18 U/L (ref 13–39)
BASOPHILS # BLD MANUAL: 0 THOUSAND/UL (ref 0–0.1)
BASOPHILS NFR MAR MANUAL: 0 % (ref 0–1)
BILIRUB SERPL-MCNC: 0.54 MG/DL (ref 0.2–1)
BUN SERPL-MCNC: 13 MG/DL (ref 5–25)
CALCIUM SERPL-MCNC: 9.6 MG/DL (ref 8.4–10.2)
CHLORIDE SERPL-SCNC: 101 MMOL/L (ref 96–108)
CO2 SERPL-SCNC: 29 MMOL/L (ref 21–32)
CREAT SERPL-MCNC: 0.55 MG/DL (ref 0.6–1.3)
EOSINOPHIL # BLD MANUAL: 0 THOUSAND/UL (ref 0–0.4)
EOSINOPHIL NFR BLD MANUAL: 0 % (ref 0–6)
ERYTHROCYTE [DISTWIDTH] IN BLOOD BY AUTOMATED COUNT: 16 % (ref 11.6–15.1)
GFR SERPL CREATININE-BSD FRML MDRD: 99 ML/MIN/1.73SQ M
GLUCOSE SERPL-MCNC: 238 MG/DL (ref 65–140)
HCT VFR BLD AUTO: 37.5 % (ref 34.8–46.1)
HGB BLD-MCNC: 12 G/DL (ref 11.5–15.4)
LYMPHOCYTES # BLD AUTO: 2.63 THOUSAND/UL (ref 0.6–4.47)
LYMPHOCYTES # BLD AUTO: 23 % (ref 14–44)
MAGNESIUM SERPL-MCNC: 1.4 MG/DL (ref 1.9–2.7)
MCH RBC QN AUTO: 29.7 PG (ref 26.8–34.3)
MCHC RBC AUTO-ENTMCNC: 32 G/DL (ref 31.4–37.4)
MCV RBC AUTO: 93 FL (ref 82–98)
MONOCYTES # BLD AUTO: 1.14 THOUSAND/UL (ref 0–1.22)
MONOCYTES NFR BLD: 10 % (ref 4–12)
NEUTROPHILS # BLD MANUAL: 7.66 THOUSAND/UL (ref 1.85–7.62)
NEUTS BAND NFR BLD MANUAL: 3 % (ref 0–8)
NEUTS SEG NFR BLD AUTO: 64 % (ref 43–75)
NRBC BLD AUTO-RTO: 1 /100 WBC (ref 0–2)
PLATELET # BLD AUTO: 183 THOUSANDS/UL (ref 149–390)
PLATELET BLD QL SMEAR: ADEQUATE
PMV BLD AUTO: 10.6 FL (ref 8.9–12.7)
POTASSIUM SERPL-SCNC: 3.4 MMOL/L (ref 3.5–5.3)
PROT SERPL-MCNC: 6.7 G/DL (ref 6.4–8.4)
RBC # BLD AUTO: 4.04 MILLION/UL (ref 3.81–5.12)
RBC MORPH BLD: NORMAL
SODIUM SERPL-SCNC: 140 MMOL/L (ref 135–147)
WBC # BLD AUTO: 11.43 THOUSAND/UL (ref 4.31–10.16)

## 2023-11-22 PROCEDURE — 85027 COMPLETE CBC AUTOMATED: CPT

## 2023-11-22 PROCEDURE — 85007 BL SMEAR W/DIFF WBC COUNT: CPT

## 2023-11-22 PROCEDURE — 83735 ASSAY OF MAGNESIUM: CPT

## 2023-11-22 PROCEDURE — 80053 COMPREHEN METABOLIC PANEL: CPT

## 2023-11-30 ENCOUNTER — HOSPITAL ENCOUNTER (OUTPATIENT)
Dept: INFUSION CENTER | Facility: HOSPITAL | Age: 63
Discharge: HOME | End: 2023-11-30
Payer: COMMERCIAL

## 2023-11-30 ENCOUNTER — OFFICE VISIT (OUTPATIENT)
Age: 63
End: 2023-11-30
Payer: COMMERCIAL

## 2023-11-30 VITALS
BODY MASS INDEX: 37.22 KG/M2 | TEMPERATURE: 97.9 F | DIASTOLIC BLOOD PRESSURE: 88 MMHG | RESPIRATION RATE: 18 BRPM | WEIGHT: 218 LBS | HEIGHT: 64 IN | OXYGEN SATURATION: 96 % | HEART RATE: 103 BPM | SYSTOLIC BLOOD PRESSURE: 135 MMHG

## 2023-11-30 DIAGNOSIS — E83.42 HYPOMAGNESEMIA: ICD-10-CM

## 2023-11-30 DIAGNOSIS — C55 UTERINE LEIOMYOSARCOMA (HCC): ICD-10-CM

## 2023-11-30 DIAGNOSIS — C55 UTERINE LEIOMYOSARCOMA (HCC): Primary | ICD-10-CM

## 2023-11-30 DIAGNOSIS — Z45.2 ENCOUNTER FOR CENTRAL LINE CARE: Primary | ICD-10-CM

## 2023-11-30 LAB
ALBUMIN SERPL BCP-MCNC: 3.9 G/DL (ref 3.5–5)
ALP SERPL-CCNC: 68 U/L (ref 34–104)
ALT SERPL W P-5'-P-CCNC: 22 U/L (ref 7–52)
ANION GAP SERPL CALCULATED.3IONS-SCNC: 11 MMOL/L
AST SERPL W P-5'-P-CCNC: 27 U/L (ref 13–39)
BASOPHILS # BLD AUTO: 0.05 THOUSANDS/ÂΜL (ref 0–0.1)
BASOPHILS NFR BLD AUTO: 0 % (ref 0–1)
BILIRUB SERPL-MCNC: 0.73 MG/DL (ref 0.2–1)
BUN SERPL-MCNC: 11 MG/DL (ref 5–25)
CALCIUM SERPL-MCNC: 9.3 MG/DL (ref 8.4–10.2)
CHLORIDE SERPL-SCNC: 100 MMOL/L (ref 96–108)
CO2 SERPL-SCNC: 27 MMOL/L (ref 21–32)
CREAT SERPL-MCNC: 0.56 MG/DL (ref 0.6–1.3)
EOSINOPHIL # BLD AUTO: 0.06 THOUSAND/ÂΜL (ref 0–0.61)
EOSINOPHIL NFR BLD AUTO: 1 % (ref 0–6)
ERYTHROCYTE [DISTWIDTH] IN BLOOD BY AUTOMATED COUNT: 16.2 % (ref 11.6–15.1)
GFR SERPL CREATININE-BSD FRML MDRD: 98 ML/MIN/1.73SQ M
GLUCOSE SERPL-MCNC: 249 MG/DL (ref 65–140)
HCT VFR BLD AUTO: 37.6 % (ref 34.8–46.1)
HGB BLD-MCNC: 12.1 G/DL (ref 11.5–15.4)
IMM GRANULOCYTES # BLD AUTO: 0.13 THOUSAND/UL (ref 0–0.2)
IMM GRANULOCYTES NFR BLD AUTO: 1 % (ref 0–2)
LYMPHOCYTES # BLD AUTO: 2.51 THOUSANDS/ÂΜL (ref 0.6–4.47)
LYMPHOCYTES NFR BLD AUTO: 22 % (ref 14–44)
MAGNESIUM SERPL-MCNC: 1.6 MG/DL (ref 1.9–2.7)
MCH RBC QN AUTO: 29.7 PG (ref 26.8–34.3)
MCHC RBC AUTO-ENTMCNC: 32.2 G/DL (ref 31.4–37.4)
MCV RBC AUTO: 92 FL (ref 82–98)
MONOCYTES # BLD AUTO: 0.84 THOUSAND/ÂΜL (ref 0.17–1.22)
MONOCYTES NFR BLD AUTO: 7 % (ref 4–12)
NEUTROPHILS # BLD AUTO: 8.05 THOUSANDS/ÂΜL (ref 1.85–7.62)
NEUTS SEG NFR BLD AUTO: 69 % (ref 43–75)
NRBC BLD AUTO-RTO: 0 /100 WBCS
PLATELET # BLD AUTO: 313 THOUSANDS/UL (ref 149–390)
PMV BLD AUTO: 10.4 FL (ref 8.9–12.7)
POTASSIUM SERPL-SCNC: 3.6 MMOL/L (ref 3.5–5.3)
PROT SERPL-MCNC: 6.8 G/DL (ref 6.4–8.4)
RBC # BLD AUTO: 4.08 MILLION/UL (ref 3.81–5.12)
SODIUM SERPL-SCNC: 138 MMOL/L (ref 135–147)
WBC # BLD AUTO: 11.64 THOUSAND/UL (ref 4.31–10.16)

## 2023-11-30 PROCEDURE — 80053 COMPREHEN METABOLIC PANEL: CPT

## 2023-11-30 PROCEDURE — 99215 OFFICE O/P EST HI 40 MIN: CPT | Performed by: PHYSICIAN ASSISTANT

## 2023-11-30 PROCEDURE — 83735 ASSAY OF MAGNESIUM: CPT

## 2023-11-30 PROCEDURE — 85025 COMPLETE CBC W/AUTO DIFF WBC: CPT

## 2023-11-30 RX ORDER — SODIUM CHLORIDE 9 MG/ML
20 INJECTION, SOLUTION INTRAVENOUS ONCE
Status: CANCELLED | OUTPATIENT
Start: 2023-12-01

## 2023-11-30 RX ORDER — PALONOSETRON 0.05 MG/ML
0.25 INJECTION, SOLUTION INTRAVENOUS ONCE
Status: CANCELLED | OUTPATIENT
Start: 2023-12-01

## 2023-11-30 RX ORDER — MAGNESIUM SULFATE HEPTAHYDRATE 40 MG/ML
2 INJECTION, SOLUTION INTRAVENOUS ONCE
Status: CANCELLED | OUTPATIENT
Start: 2023-12-01

## 2023-11-30 RX ORDER — DOXORUBICIN HYDROCHLORIDE 2 MG/ML
100 INJECTION, SOLUTION INTRAVENOUS ONCE
Status: CANCELLED | OUTPATIENT
Start: 2023-12-01

## 2023-11-30 NOTE — ASSESSMENT & PLAN NOTE
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 She is currently receiving adriamycin 50 mg/m2 IV every 21 days. She is tolerating treatment well. Continue with cycle 4 of treatment as planned. Metabolic and hematologic parameters from today were reviewed and are adequate for treatment. Plan for CT imaging after completion of cycle 4. Return to the office as per her chemotherapy calendar.

## 2023-11-30 NOTE — PROGRESS NOTES
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 She is currently receiving adriamycin 50 mg/m2 IV every 21 days. She is tolerating treatment well. Continue with cycle 4 of treatment as planned. Metabolic and hematologic parameters from today were reviewed and are adequate for treatment. Plan for CT imaging after completion of cycle 4. Return to the office as per her chemotherapy calendar. Relevant Orders    CT chest abdomen pelvis w contrast       Other    Hypomagnesemia     Replete, 2 gm Mg Sulfate IV              CHIEF COMPLAINT:   Pre-chemotherapy evaluation    Problem:  Cancer Staging   Uterine leiomyosarcoma (HCC)  Staging form: Corpus Uteri - Sarcoma, AJCC 8th Edition  - Pathologic stage from 6/4/2023: FIGO Stage II, calculated as Stage Unknown (pT2, pNX, cM0) - Signed by Brennan Brenner MD on 6/29/2023        Previous therapy:  Oncology History   Uterine leiomyosarcoma ([address])   6/4/2023 Initial Diagnosis    Uterine sarcoma ([address])     6/4/2023 -  Cancer Staged    Staging form: Corpus Uteri - Sarcoma, AJCC 8th Edition  - Pathologic stage from 6/4/2023: FIGO Stage II, calculated as Stage Unknown (pT2, pNX, cM0) - Signed by Brennan Brenner MD on 6/29/2023  Stage prefix: Initial diagnosis       6/4/2023 Surgery    HYSTERECTOMY TOTAL ABDOMINAL (DOROTHY). BILATERAL SALPINGOOPHORECTOMY  EXCISION  BIOPSY LESION/MASS ABDOMINAL-PELVIC PERITONEUM  -Leiomyosarcoma 13.5 cm extending through the myometrium, right pelvic peritoneum involved with uterine leiomyosarcoma with tumor present at unoriented resection surface. 7/11/2023 -  Chemotherapy    Gemzar 800 mg/m2 IV day 1 and 8 as well as taxotere 65 mg/m2 day 8 every 21 days.         Chemotherapy Adriamycin 50 mg/m2 IV every 21 days. Patient ID: Jones Montiel is a 58 y.o. female  who presents to the office for pre-chemotherapy evaluation. Overall, she is tolerating treatment well without new complaints. She has been afebrile. Appetite is appropriate. She denies n/v/abdominal pain. Normal bowel/bladder function. She notes her peripheral neuropathy is stable. CBC/Diff, CMP, Mg from 11/20/23 reviewed. The following portions of the patient's history were reviewed and updated as appropriate: allergies, current medications, past medical history, past surgical history, and problem list.    Review of Systems   Constitutional: Negative. HENT: Negative. Eyes: Negative. Respiratory: Negative. Cardiovascular: Negative. Gastrointestinal: Negative. Genitourinary: Negative. Musculoskeletal: Negative. Skin: Negative. Neurological:  Positive for tremors (stable). Psychiatric/Behavioral: Negative.          Current Outpatient Medications   Medication Sig Dispense Refill    albuterol (PROVENTIL HFA,VENTOLIN HFA) 90 mcg/act inhaler TAKE 2 PUFFS BY MOUTH EVERY 6 HOURS AS NEEDED FOR WHEEZE OR FOR SHORTNESS OF BREATH 18 g 1    apixaban (Eliquis) 2.5 mg Take 1 tablet (2.5 mg total) by mouth 2 (two) times a day 60 tablet 3    aspirin (ECOTRIN LOW STRENGTH) 81 mg EC tablet Take 81 mg by mouth daily      atorvastatin (LIPITOR) 40 mg tablet Take 40 mg by mouth daily at bedtime      betamethasone, augmented, (DIPROLENE) 0.05 % lotion Apply topically 2 (two) times a day 60 mL 1    cetirizine (ZyrTEC) 10 mg tablet Take 10 mg by mouth daily      Cetirizine HCl 0.24 % SOLN Take by mouth      cholecalciferol (VITAMIN D3) 1,000 units tablet Take 1,000 Units by mouth daily 2 daily      CLENPIQ 10-3.5-12 MG-GM -GM/160ML SOLN USE AS DIRECTED 320 mL 0    clindamycin (CLEOCIN T) 1 % external solution Apply topically 2 (two) times a day 60 mL 0    dexamethasone (DECADRON) 4 mg tablet Take 2 tabs PO BID the day prior to chemo, then 2 tabs PO the night after chemo, then 2 tabs PO BID the day after chemo 40 tablet 0    diphenhydramine, lidocaine, Al/Mg hydroxide, simethicone (Magic Mouthwash) SUSP Swish and spit 10 ML TID prn mouth pain 300 mL 1    DULoxetine (CYMBALTA) 30 mg delayed release capsule Take 30 mg by mouth daily      DULoxetine (CYMBALTA) 30 mg delayed release capsule TAKE 1 CAPSULE BY MOUTH EVERY DAY FOR 90 DAYS      escitalopram (LEXAPRO) 10 mg tablet Take 10 mg by mouth daily      famotidine (PEPCID) 40 MG tablet       glimepiride (AMARYL) 4 mg tablet Take 4 mg by mouth 2 (two) times a day      hydrochlorothiazide (HYDRODIURIL) 25 mg tablet Take 25 mg by mouth daily      LORazepam (ATIVAN) 1 mg tablet Take 1 tablet (1 mg total) by mouth every 8 (eight) hours as needed for anxiety (nausea or anxiety) 30 tablet 0    losartan (COZAAR) 50 mg tablet Take 50 mg by mouth daily      meclizine (ANTIVERT) 25 mg tablet Take by mouth every 12 (twelve) hours as needed for dizziness      methylPREDNISolone 4 MG tablet therapy pack Use as directed on package 1 each 0    metoprolol succinate (TOPROL-XL) 25 mg 24 hr tablet Take 25 mg by mouth daily      Multiple Vitamin (MULTIVITAMIN) tablet Take 1 tablet by mouth daily      nystatin (MYCOSTATIN) 500,000 units/5 mL suspension Swish and spit 5-10 ml 4 times daily x 5 days 473 mL 0    pantoprazole (PROTONIX) 40 mg tablet Take 40 mg by mouth daily      potassium chloride (Klor-Con M20) 20 mEq tablet TAKE 2 TABLETS BY MOUTH TWICE A DAY FOR 2 DAYS, THEN TAKE 1 TABLET BY MOUTH DAILY 90 tablet 1    rOPINIRole (REQUIP) 1 mg tablet TAKE 1 TABLET BY MOUTH 1 TO 3 HOURS BEFORE BEDTIME      sertraline (ZOLOFT) 100 mg tablet TAKE 1 AND 1/2 TABS BY MOUTH DAILY      sertraline (ZOLOFT) 50 mg tablet Take 150 mg by mouth daily      simvastatin (ZOCOR) 40 mg tablet Take 40 mg by mouth daily      Triamcinolone Acetonide 55 MCG/ACT AERO into each nostril One spray each nostril daily ZOLMitriptan (ZOMIG) 5 MG tablet Take 5 mg by mouth once as needed for migraine Daily prn      ibuprofen (MOTRIN) 800 mg tablet Take 800 mg by mouth every 6 (six) hours as needed for mild pain (Patient not taking: Reported on 8/3/2023)      lidocaine-prilocaine (EMLA) cream APPLY TO PORT 30 MIN PRIOR TO LABS/CHEMO 30 g 2    nystatin (MYCOSTATIN) powder USE AS DIRECTED 3 TIMES A DAY FOR 10 DAYS (Patient not taking: Reported on 8/3/2023)      ondansetron (ZOFRAN) 8 mg tablet Take 1 tablet (8 mg total) by mouth every 8 (eight) hours as needed for nausea or vomiting (Patient not taking: Reported on 8/24/2023) 20 tablet 1    other medication, see sig, Medication/product name: Lifitegrast (xiidra)  Strength:   Sig (include dose, route, frequency): one drop ou BID      oxyCODONE (Roxicodone) 5 immediate release tablet Take 1 tablet (5 mg total) by mouth every 6 (six) hours as needed for moderate pain Max Daily Amount: 20 mg (Patient not taking: Reported on 8/24/2023) 10 tablet 0    oxyCODONE-acetaminophen (Percocet) 5-325 mg per tablet Take 1 tablet by mouth every 4 (four) hours as needed for moderate pain for up to 30 doses Max Daily Amount: 6 tablets (Patient not taking: Reported on 8/3/2023) 30 tablet 0     No current facility-administered medications for this visit. Facility-Administered Medications Ordered in Other Visits   Medication Dose Route Frequency Provider Last Rate Last Admin    alteplase (CATHFLO) injection 2 mg  2 mg Intracatheter Q1MIN PRN Grace Dxion MD               Objective:    Blood pressure 135/88, pulse 103, temperature 97.9 °F (36.6 °C), temperature source Temporal, resp. rate 18, height 5' 4.02" (1.626 m), weight 98.9 kg (218 lb), SpO2 96 %. Body mass index is 37.4 kg/m². Body surface area is 2.03 meters squared. Physical Exam  Constitutional:       Appearance: She is well-developed.    Pulmonary:      Effort: Pulmonary effort is normal.   Skin:     General: Skin is warm and dry. Findings: No rash. Neurological:      Mental Status: She is alert and oriented to person, place, and time. Psychiatric:         Behavior: Behavior normal.         Thought Content: Thought content normal.         Judgment: Judgment normal.     Performance status is zero.        Lab Results   Component Value Date    K 3.6 11/30/2023     11/30/2023    CO2 27 11/30/2023    BUN 11 11/30/2023    CREATININE 0.56 (L) 11/30/2023    CALCIUM 9.3 11/30/2023    CORRECTEDCA 9.3 06/07/2023    AST 27 11/30/2023    ALT 22 11/30/2023    ALKPHOS 68 11/30/2023    EGFR 98 11/30/2023     Lab Results   Component Value Date    WBC 11.64 (H) 11/30/2023    HGB 12.1 11/30/2023    HCT 37.6 11/30/2023    MCV 92 11/30/2023     11/30/2023     Lab Results   Component Value Date    NEUTROABS 8.05 (H) 11/30/2023

## 2023-12-01 ENCOUNTER — HOSPITAL ENCOUNTER (OUTPATIENT)
Dept: INFUSION CENTER | Facility: HOSPITAL | Age: 63
End: 2023-12-01
Attending: OBSTETRICS & GYNECOLOGY
Payer: COMMERCIAL

## 2023-12-01 ENCOUNTER — PATIENT OUTREACH (OUTPATIENT)
Dept: HEMATOLOGY ONCOLOGY | Facility: CLINIC | Age: 63
End: 2023-12-01

## 2023-12-01 VITALS
WEIGHT: 218.03 LBS | OXYGEN SATURATION: 94 % | HEIGHT: 65 IN | TEMPERATURE: 97.6 F | RESPIRATION RATE: 16 BRPM | BODY MASS INDEX: 36.33 KG/M2 | DIASTOLIC BLOOD PRESSURE: 82 MMHG | SYSTOLIC BLOOD PRESSURE: 135 MMHG | HEART RATE: 95 BPM

## 2023-12-01 DIAGNOSIS — E83.42 HYPOMAGNESEMIA: ICD-10-CM

## 2023-12-01 DIAGNOSIS — C55 UTERINE LEIOMYOSARCOMA (HCC): ICD-10-CM

## 2023-12-01 DIAGNOSIS — D70.1 CHEMOTHERAPY INDUCED NEUTROPENIA: Primary | ICD-10-CM

## 2023-12-01 DIAGNOSIS — T45.1X5A CHEMOTHERAPY INDUCED NEUTROPENIA: Primary | ICD-10-CM

## 2023-12-01 PROCEDURE — 87086 URINE CULTURE/COLONY COUNT: CPT | Performed by: OBSTETRICS & GYNECOLOGY

## 2023-12-01 PROCEDURE — 96375 TX/PRO/DX INJ NEW DRUG ADDON: CPT

## 2023-12-01 PROCEDURE — 96367 TX/PROPH/DG ADDL SEQ IV INF: CPT

## 2023-12-01 PROCEDURE — 96377 APPLICATON ON-BODY INJECTOR: CPT

## 2023-12-01 PROCEDURE — 87077 CULTURE AEROBIC IDENTIFY: CPT | Performed by: OBSTETRICS & GYNECOLOGY

## 2023-12-01 PROCEDURE — 96409 CHEMO IV PUSH SNGL DRUG: CPT

## 2023-12-01 RX ORDER — PALONOSETRON 0.05 MG/ML
0.25 INJECTION, SOLUTION INTRAVENOUS ONCE
Status: COMPLETED | OUTPATIENT
Start: 2023-12-01 | End: 2023-12-01

## 2023-12-01 RX ORDER — DOXORUBICIN HYDROCHLORIDE 2 MG/ML
100 INJECTION, SOLUTION INTRAVENOUS ONCE
Status: COMPLETED | OUTPATIENT
Start: 2023-12-01 | End: 2023-12-01

## 2023-12-01 RX ORDER — SODIUM CHLORIDE 9 MG/ML
20 INJECTION, SOLUTION INTRAVENOUS ONCE
Status: COMPLETED | OUTPATIENT
Start: 2023-12-01 | End: 2023-12-01

## 2023-12-01 RX ORDER — MAGNESIUM SULFATE HEPTAHYDRATE 40 MG/ML
2 INJECTION, SOLUTION INTRAVENOUS ONCE
Status: COMPLETED | OUTPATIENT
Start: 2023-12-01 | End: 2023-12-01

## 2023-12-01 RX ADMIN — FAMOTIDINE 20 MG: 10 INJECTION INTRAVENOUS at 11:50

## 2023-12-01 RX ADMIN — SODIUM CHLORIDE 20 ML/HR: 0.9 INJECTION, SOLUTION INTRAVENOUS at 09:55

## 2023-12-01 RX ADMIN — PALONOSETRON 0.25 MG: 0.05 INJECTION, SOLUTION INTRAVENOUS at 11:21

## 2023-12-01 RX ADMIN — FOSAPREPITANT DIMEGLUMINE 150 MG: 150 INJECTION, POWDER, LYOPHILIZED, FOR SOLUTION INTRAVENOUS at 12:13

## 2023-12-01 RX ADMIN — MAGNESIUM SULFATE IN WATER 2 G: 40 INJECTION, SOLUTION INTRAVENOUS at 10:06

## 2023-12-01 RX ADMIN — PEGFILGRASTIM 6 MG: KIT SUBCUTANEOUS at 13:16

## 2023-12-01 RX ADMIN — DEXAMETHASONE SODIUM PHOSPHATE 20 MG: 100 INJECTION INTRAMUSCULAR; INTRAVENOUS at 11:26

## 2023-12-01 RX ADMIN — DOXORUBICIN HYDROCHLORIDE 100 MG: 2 INJECTION, SOLUTION INTRAVENOUS at 12:58

## 2023-12-01 NOTE — PROGRESS NOTES
Magnesium completed. Premeds and chemo infused. Pt malena well. Urine obtained for culture as ordered. Pt has more appts. Neulasta ONpro applied ROSALIND, instructions reviewed. Disch amb to home, steady gait.

## 2023-12-01 NOTE — PROGRESS NOTES
Today was the pt's last day of chemo. MSW congratulated her for completing this part ofd her journey and assessed if she had any needs. Pt was with her daughter and the pt reports no issues or concerns at this time. She states that with her tax bill paid, she has no further issues. Support was offered. MSW will close the case to follow up services.

## 2023-12-03 LAB
BACTERIA UR CULT: ABNORMAL
BACTERIA UR CULT: ABNORMAL

## 2023-12-04 DIAGNOSIS — N39.0 URINARY TRACT INFECTION WITHOUT HEMATURIA, SITE UNSPECIFIED: Primary | ICD-10-CM

## 2023-12-04 RX ORDER — NITROFURANTOIN 25; 75 MG/1; MG/1
100 CAPSULE ORAL 2 TIMES DAILY
Qty: 14 CAPSULE | Refills: 0 | Status: SHIPPED | OUTPATIENT
Start: 2023-12-04

## 2023-12-07 ENCOUNTER — HOSPITAL ENCOUNTER (OUTPATIENT)
Dept: INFUSION CENTER | Facility: HOSPITAL | Age: 63
Discharge: HOME | End: 2023-12-07
Payer: COMMERCIAL

## 2023-12-07 DIAGNOSIS — Z45.2 ENCOUNTER FOR CENTRAL LINE CARE: Primary | ICD-10-CM

## 2023-12-07 DIAGNOSIS — C55 UTERINE LEIOMYOSARCOMA (HCC): ICD-10-CM

## 2023-12-07 LAB
ALBUMIN SERPL BCP-MCNC: 4.2 G/DL (ref 3.5–5)
ALP SERPL-CCNC: 152 U/L (ref 34–104)
ALT SERPL W P-5'-P-CCNC: 13 U/L (ref 7–52)
ANION GAP SERPL CALCULATED.3IONS-SCNC: 9 MMOL/L
AST SERPL W P-5'-P-CCNC: 13 U/L (ref 13–39)
BASOPHILS # BLD MANUAL: 0 THOUSAND/UL (ref 0–0.1)
BASOPHILS NFR MAR MANUAL: 0 % (ref 0–1)
BILIRUB SERPL-MCNC: 0.67 MG/DL (ref 0.2–1)
BUN SERPL-MCNC: 17 MG/DL (ref 5–25)
CALCIUM SERPL-MCNC: 10 MG/DL (ref 8.4–10.2)
CHLORIDE SERPL-SCNC: 98 MMOL/L (ref 96–108)
CO2 SERPL-SCNC: 30 MMOL/L (ref 21–32)
CREAT SERPL-MCNC: 0.55 MG/DL (ref 0.6–1.3)
EOSINOPHIL # BLD MANUAL: 0.16 THOUSAND/UL (ref 0–0.4)
EOSINOPHIL NFR BLD MANUAL: 1 % (ref 0–6)
ERYTHROCYTE [DISTWIDTH] IN BLOOD BY AUTOMATED COUNT: 16 % (ref 11.6–15.1)
GFR SERPL CREATININE-BSD FRML MDRD: 99 ML/MIN/1.73SQ M
GLUCOSE SERPL-MCNC: 305 MG/DL (ref 65–140)
HCT VFR BLD AUTO: 38.3 % (ref 34.8–46.1)
HGB BLD-MCNC: 12.6 G/DL (ref 11.5–15.4)
LYMPHOCYTES # BLD AUTO: 18 % (ref 14–44)
LYMPHOCYTES # BLD AUTO: 2.81 THOUSAND/UL (ref 0.6–4.47)
MAGNESIUM SERPL-MCNC: 1.5 MG/DL (ref 1.9–2.7)
MCH RBC QN AUTO: 29.9 PG (ref 26.8–34.3)
MCHC RBC AUTO-ENTMCNC: 32.9 G/DL (ref 31.4–37.4)
MCV RBC AUTO: 91 FL (ref 82–98)
MONOCYTES # BLD AUTO: 0.62 THOUSAND/UL (ref 0–1.22)
MONOCYTES NFR BLD: 4 % (ref 4–12)
NEUTROPHILS # BLD MANUAL: 12.03 THOUSAND/UL (ref 1.85–7.62)
NEUTS BAND NFR BLD MANUAL: 2 % (ref 0–8)
NEUTS SEG NFR BLD AUTO: 75 % (ref 43–75)
PLATELET # BLD AUTO: 243 THOUSANDS/UL (ref 149–390)
PLATELET BLD QL SMEAR: ADEQUATE
PMV BLD AUTO: 10 FL (ref 8.9–12.7)
POTASSIUM SERPL-SCNC: 3.6 MMOL/L (ref 3.5–5.3)
PROT SERPL-MCNC: 7.1 G/DL (ref 6.4–8.4)
RBC # BLD AUTO: 4.21 MILLION/UL (ref 3.81–5.12)
RBC MORPH BLD: NORMAL
SODIUM SERPL-SCNC: 137 MMOL/L (ref 135–147)
WBC # BLD AUTO: 15.62 THOUSAND/UL (ref 4.31–10.16)

## 2023-12-07 PROCEDURE — 83735 ASSAY OF MAGNESIUM: CPT

## 2023-12-07 PROCEDURE — 80053 COMPREHEN METABOLIC PANEL: CPT

## 2023-12-07 PROCEDURE — 85027 COMPLETE CBC AUTOMATED: CPT

## 2023-12-07 PROCEDURE — 85007 BL SMEAR W/DIFF WBC COUNT: CPT

## 2023-12-15 ENCOUNTER — HOSPITAL ENCOUNTER (OUTPATIENT)
Dept: INFUSION CENTER | Facility: HOSPITAL | Age: 63
End: 2023-12-15
Payer: COMMERCIAL

## 2023-12-15 ENCOUNTER — HOSPITAL ENCOUNTER (OUTPATIENT)
Dept: CT IMAGING | Facility: HOSPITAL | Age: 63
Discharge: HOME | End: 2023-12-15
Payer: COMMERCIAL

## 2023-12-15 DIAGNOSIS — C55 UTERINE LEIOMYOSARCOMA (HCC): ICD-10-CM

## 2023-12-15 DIAGNOSIS — Z45.2 ENCOUNTER FOR CENTRAL LINE CARE: Primary | ICD-10-CM

## 2023-12-15 LAB
ALBUMIN SERPL BCP-MCNC: 4.1 G/DL (ref 3.5–5)
ALP SERPL-CCNC: 120 U/L (ref 34–104)
ALT SERPL W P-5'-P-CCNC: 17 U/L (ref 7–52)
ANION GAP SERPL CALCULATED.3IONS-SCNC: 13 MMOL/L
ANISOCYTOSIS BLD QL SMEAR: PRESENT
AST SERPL W P-5'-P-CCNC: 19 U/L (ref 13–39)
BASOPHILS # BLD MANUAL: 0 THOUSAND/UL (ref 0–0.1)
BASOPHILS NFR MAR MANUAL: 0 % (ref 0–1)
BILIRUB SERPL-MCNC: 0.8 MG/DL (ref 0.2–1)
BUN SERPL-MCNC: 12 MG/DL (ref 5–25)
CALCIUM SERPL-MCNC: 9.2 MG/DL (ref 8.4–10.2)
CHLORIDE SERPL-SCNC: 98 MMOL/L (ref 96–108)
CO2 SERPL-SCNC: 25 MMOL/L (ref 21–32)
CREAT SERPL-MCNC: 0.6 MG/DL (ref 0.6–1.3)
EOSINOPHIL # BLD MANUAL: 0 THOUSAND/UL (ref 0–0.4)
EOSINOPHIL NFR BLD MANUAL: 0 % (ref 0–6)
ERYTHROCYTE [DISTWIDTH] IN BLOOD BY AUTOMATED COUNT: 16.4 % (ref 11.6–15.1)
GFR SERPL CREATININE-BSD FRML MDRD: 96 ML/MIN/1.73SQ M
GLUCOSE SERPL-MCNC: 303 MG/DL (ref 65–140)
HCT VFR BLD AUTO: 39 % (ref 34.8–46.1)
HGB BLD-MCNC: 12.5 G/DL (ref 11.5–15.4)
LYMPHOCYTES # BLD AUTO: 11 % (ref 14–44)
LYMPHOCYTES # BLD AUTO: 2.73 THOUSAND/UL (ref 0.6–4.47)
MAGNESIUM SERPL-MCNC: 1.4 MG/DL (ref 1.9–2.7)
MCH RBC QN AUTO: 29.2 PG (ref 26.8–34.3)
MCHC RBC AUTO-ENTMCNC: 32.1 G/DL (ref 31.4–37.4)
MCV RBC AUTO: 91 FL (ref 82–98)
METAMYELOCYTES NFR BLD MANUAL: 1 % (ref 0–1)
MONOCYTES # BLD AUTO: 0.91 THOUSAND/UL (ref 0–1.22)
MONOCYTES NFR BLD: 4 % (ref 4–12)
MYELOCYTES NFR BLD MANUAL: 1 % (ref 0–1)
NEUTROPHILS # BLD MANUAL: 18.66 THOUSAND/UL (ref 1.85–7.62)
NEUTS BAND NFR BLD MANUAL: 2 % (ref 0–8)
NEUTS SEG NFR BLD AUTO: 80 % (ref 43–75)
PLATELET # BLD AUTO: 209 THOUSANDS/UL (ref 149–390)
PLATELET BLD QL SMEAR: ADEQUATE
PMV BLD AUTO: 9.6 FL (ref 8.9–12.7)
POTASSIUM SERPL-SCNC: 3.4 MMOL/L (ref 3.5–5.3)
PROT SERPL-MCNC: 7 G/DL (ref 6.4–8.4)
RBC # BLD AUTO: 4.28 MILLION/UL (ref 3.81–5.12)
RBC MORPH BLD: PRESENT
SODIUM SERPL-SCNC: 136 MMOL/L (ref 135–147)
VARIANT LYMPHS # BLD AUTO: 1 %
WBC # BLD AUTO: 22.76 THOUSAND/UL (ref 4.31–10.16)

## 2023-12-15 PROCEDURE — 85027 COMPLETE CBC AUTOMATED: CPT

## 2023-12-15 PROCEDURE — 80053 COMPREHEN METABOLIC PANEL: CPT

## 2023-12-15 PROCEDURE — 85007 BL SMEAR W/DIFF WBC COUNT: CPT

## 2023-12-15 PROCEDURE — 74177 CT ABD & PELVIS W/CONTRAST: CPT

## 2023-12-15 PROCEDURE — G1004 CDSM NDSC: HCPCS

## 2023-12-15 PROCEDURE — 83735 ASSAY OF MAGNESIUM: CPT

## 2023-12-15 PROCEDURE — 71260 CT THORAX DX C+: CPT

## 2023-12-15 RX ADMIN — IOHEXOL 80 ML: 350 INJECTION, SOLUTION INTRAVENOUS at 13:41

## 2023-12-15 NOTE — PROGRESS NOTES
Pt here for port labs. Accessed without difficulty. Labs drawn and sent. Pt remains accessed for Ct scan following appt. Left unit ambulatory with steady gait.  Refused AVS.

## 2023-12-20 ENCOUNTER — HOSPITAL ENCOUNTER (OUTPATIENT)
Dept: INFUSION CENTER | Facility: HOSPITAL | Age: 63
Discharge: HOME | End: 2023-12-20
Payer: COMMERCIAL

## 2023-12-20 DIAGNOSIS — C55 UTERINE LEIOMYOSARCOMA (HCC): Primary | ICD-10-CM

## 2023-12-20 DIAGNOSIS — Z45.2 ENCOUNTER FOR CENTRAL LINE CARE: ICD-10-CM

## 2023-12-20 LAB
ALBUMIN SERPL BCP-MCNC: 4 G/DL (ref 3.5–5)
ALP SERPL-CCNC: 85 U/L (ref 34–104)
ALT SERPL W P-5'-P-CCNC: 17 U/L (ref 7–52)
ANION GAP SERPL CALCULATED.3IONS-SCNC: 12 MMOL/L
AST SERPL W P-5'-P-CCNC: 16 U/L (ref 13–39)
BASOPHILS # BLD AUTO: 0.05 THOUSANDS/ÂΜL (ref 0–0.1)
BASOPHILS NFR BLD AUTO: 0 % (ref 0–1)
BILIRUB SERPL-MCNC: 0.77 MG/DL (ref 0.2–1)
BUN SERPL-MCNC: 12 MG/DL (ref 5–25)
CALCIUM SERPL-MCNC: 9.1 MG/DL (ref 8.4–10.2)
CHLORIDE SERPL-SCNC: 101 MMOL/L (ref 96–108)
CO2 SERPL-SCNC: 25 MMOL/L (ref 21–32)
CREAT SERPL-MCNC: 0.57 MG/DL (ref 0.6–1.3)
EOSINOPHIL # BLD AUTO: 0.1 THOUSAND/ÂΜL (ref 0–0.61)
EOSINOPHIL NFR BLD AUTO: 1 % (ref 0–6)
ERYTHROCYTE [DISTWIDTH] IN BLOOD BY AUTOMATED COUNT: 16.3 % (ref 11.6–15.1)
GFR SERPL CREATININE-BSD FRML MDRD: 98 ML/MIN/1.73SQ M
GLUCOSE SERPL-MCNC: 296 MG/DL (ref 65–140)
HCT VFR BLD AUTO: 38.5 % (ref 34.8–46.1)
HGB BLD-MCNC: 12.7 G/DL (ref 11.5–15.4)
IMM GRANULOCYTES # BLD AUTO: 0.2 THOUSAND/UL (ref 0–0.2)
IMM GRANULOCYTES NFR BLD AUTO: 2 % (ref 0–2)
LYMPHOCYTES # BLD AUTO: 3.14 THOUSANDS/ÂΜL (ref 0.6–4.47)
LYMPHOCYTES NFR BLD AUTO: 27 % (ref 14–44)
MAGNESIUM SERPL-MCNC: 1.6 MG/DL (ref 1.9–2.7)
MCH RBC QN AUTO: 29.6 PG (ref 26.8–34.3)
MCHC RBC AUTO-ENTMCNC: 33 G/DL (ref 31.4–37.4)
MCV RBC AUTO: 90 FL (ref 82–98)
MONOCYTES # BLD AUTO: 0.83 THOUSAND/ÂΜL (ref 0.17–1.22)
MONOCYTES NFR BLD AUTO: 7 % (ref 4–12)
NEUTROPHILS # BLD AUTO: 7.24 THOUSANDS/ÂΜL (ref 1.85–7.62)
NEUTS SEG NFR BLD AUTO: 63 % (ref 43–75)
NRBC BLD AUTO-RTO: 0 /100 WBCS
PLATELET # BLD AUTO: 276 THOUSANDS/UL (ref 149–390)
PMV BLD AUTO: 10 FL (ref 8.9–12.7)
POTASSIUM SERPL-SCNC: 3.5 MMOL/L (ref 3.5–5.3)
PROT SERPL-MCNC: 7 G/DL (ref 6.4–8.4)
RBC # BLD AUTO: 4.29 MILLION/UL (ref 3.81–5.12)
SODIUM SERPL-SCNC: 138 MMOL/L (ref 135–147)
WBC # BLD AUTO: 11.56 THOUSAND/UL (ref 4.31–10.16)

## 2023-12-20 PROCEDURE — 83735 ASSAY OF MAGNESIUM: CPT

## 2023-12-20 PROCEDURE — 80053 COMPREHEN METABOLIC PANEL: CPT

## 2023-12-20 PROCEDURE — 85025 COMPLETE CBC W/AUTO DIFF WBC: CPT

## 2023-12-20 NOTE — PROGRESS NOTES
..Felecia Edward  tolerated treatment well with no complications.      Felecia Edward will make more appointments after seeing oncologist.     AVS printed and given to Felecia Edward No (Declined by Felecia Edward)

## 2023-12-21 ENCOUNTER — OFFICE VISIT (OUTPATIENT)
Age: 63
End: 2023-12-21
Payer: COMMERCIAL

## 2023-12-21 ENCOUNTER — TELEPHONE (OUTPATIENT)
Age: 63
End: 2023-12-21

## 2023-12-21 VITALS
WEIGHT: 213.2 LBS | TEMPERATURE: 98.3 F | HEART RATE: 138 BPM | DIASTOLIC BLOOD PRESSURE: 80 MMHG | SYSTOLIC BLOOD PRESSURE: 130 MMHG | BODY MASS INDEX: 35.52 KG/M2 | OXYGEN SATURATION: 95 % | HEIGHT: 65 IN

## 2023-12-21 DIAGNOSIS — C55 UTERINE LEIOMYOSARCOMA (HCC): Primary | ICD-10-CM

## 2023-12-21 DIAGNOSIS — N20.0 RIGHT RENAL STONE: ICD-10-CM

## 2023-12-21 PROCEDURE — 99214 OFFICE O/P EST MOD 30 MIN: CPT | Performed by: OBSTETRICS & GYNECOLOGY

## 2023-12-21 RX ORDER — TOPIRAMATE 100 MG/1
TABLET, FILM COATED ORAL
COMMUNITY

## 2023-12-21 RX ORDER — LIFITEGRAST 50 MG/ML
SOLUTION/ DROPS OPHTHALMIC
COMMUNITY
Start: 2023-11-14

## 2023-12-21 RX ORDER — ASPIRIN 81 MG/1
TABLET, CHEWABLE ORAL EVERY 24 HOURS
COMMUNITY

## 2023-12-21 RX ORDER — PIOGLITAZONEHYDROCHLORIDE 30 MG/1
TABLET ORAL EVERY 24 HOURS
COMMUNITY

## 2023-12-21 NOTE — PROGRESS NOTES
Assessment/Plan:    Problem List Items Addressed This Visit          Genitourinary    Uterine leiomyosarcoma (HCC) - Primary     63-year-old with stage II leiomyosarcoma who has completed adjuvant chemotherapy with gemcitabine and Taxotere followed by Adriamycin.  Cycle 7 of therapy was administered on 12/1/2023.  I reviewed her CT chest abdomen pelvis images, CBC, CMP, MRD testing results.  She is clinically and radiologically without evidence of disease recurrence.  Her performance status is 0.  1.  Plan to repeat CT of abdomen pelvis prior to her next visit in 3 months to reevaluate the scarring present in the right lower quadrant.  2.  Continue MRD testing.         Relevant Orders    CT abdomen pelvis w contrast    BUN    Creatinine, serum    Right renal stone     1.2 cm right renal stone.  Will refer to urology for evaluation.         Relevant Orders    Ambulatory referral to Urology         CHIEF COMPLAINT: Follow-up after completion of adjuvant chemotherapy      Problem:  Cancer Staging   Uterine leiomyosarcoma (HCC)  Staging form: Corpus Uteri - Sarcoma, AJCC 8th Edition  - Pathologic stage from 6/4/2023: FIGO Stage II, calculated as Stage Unknown (pT2, pNX, cM0) - Signed by Gordo Taveras MD on 6/29/2023        Previous therapy:  Oncology History   Uterine leiomyosarcoma (HCC)   6/4/2023 Initial Diagnosis    Uterine sarcoma (HCC)     6/4/2023 -  Cancer Staged    Staging form: Corpus Uteri - Sarcoma, AJCC 8th Edition  - Pathologic stage from 6/4/2023: FIGO Stage II, calculated as Stage Unknown (pT2, pNX, cM0) - Signed by Gordo Taveras MD on 6/29/2023  Stage prefix: Initial diagnosis       6/4/2023 Surgery    HYSTERECTOMY TOTAL ABDOMINAL (DOROTHY). BILATERAL SALPINGOOPHORECTOMY  EXCISION  BIOPSY LESION/MASS ABDOMINAL-PELVIC PERITONEUM  -Leiomyosarcoma 13.5 cm extending through the myometrium, right pelvic peritoneum involved with uterine leiomyosarcoma with tumor present at unoriented  resection surface.     7/11/2023 -  Chemotherapy    Gemzar 800 mg/m2 IV day 1 and 8 as well as taxotere 65 mg/m2 day 8 every 21 days.        - 12/1/2023 Chemotherapy    Adriamycin 50 mg/m2 IV every 21 days.  Including the gemcitabine and Taxotere, she completed a total of 7 cycles.           Patient ID: Felecia Edward is a 63 y.o. female  Who returns for evaluation after completion of adjuvant chemotherapy for stage II leiomyosarcoma of the uterus.  She completed cycle 7 on 12/1/2023.  Labs from 12/20/2023 revealed a normal CBC, normal CMP with the exception of a glucose of 296 mg/dL.  CT chest abdomen pelvis on 12/16/2023 revealed stable small right upper lobe pulmonary nodule a 1.2 cm right renal stone and scarring in the right lower quadrant adjacent to the prior resection.  I reviewed the images.  There is no evidence of ascites.  No evidence of measurable lymphadenopathy.  She had MRD testing done on 11/3/2023 which was negative.  She remains fatigued.  She had skin peeling on her feet and noted areas that had turned black.  She is ambulating.  No vaginal bleeding.  No pelvic pain.        The following portions of the patient's history were reviewed and updated as appropriate: allergies, current medications, past family history, past medical history, past social history, past surgical history, and problem list.    Review of Systems   Constitutional:  Positive for fatigue. Negative for activity change and unexpected weight change.   HENT: Negative.     Eyes: Negative.    Respiratory: Negative.     Cardiovascular: Negative.    Gastrointestinal:  Negative for abdominal distention and abdominal pain.   Endocrine: Negative.    Genitourinary:  Negative for pelvic pain and vaginal bleeding.   Musculoskeletal: Negative.    Skin:  Positive for color change.   Allergic/Immunologic: Negative.    Neurological: Negative.    Hematological: Negative.    Psychiatric/Behavioral: Negative.         Current Outpatient Medications    Medication Sig Dispense Refill    apixaban (Eliquis) 2.5 mg Take 1 tablet (2.5 mg total) by mouth 2 (two) times a day 60 tablet 3    aspirin (Aspirin 81) 81 mg chewable tablet every 24 hours      aspirin (ECOTRIN LOW STRENGTH) 81 mg EC tablet Take 81 mg by mouth daily      atorvastatin (LIPITOR) 40 mg tablet Take 40 mg by mouth daily at bedtime      betamethasone, augmented, (DIPROLENE) 0.05 % lotion Apply topically 2 (two) times a day 60 mL 1    cetirizine (ZyrTEC) 10 mg tablet Take 10 mg by mouth daily      Cetirizine HCl 0.24 % SOLN Take by mouth      cholecalciferol (VITAMIN D3) 1,000 units tablet Take 1,000 Units by mouth daily 2 daily      CLENPIQ 10-3.5-12 MG-GM -GM/160ML SOLN USE AS DIRECTED 320 mL 0    clindamycin (CLEOCIN T) 1 % external solution Apply topically 2 (two) times a day 60 mL 0    dexamethasone (DECADRON) 4 mg tablet Take 2 tabs PO BID the day prior to chemo, then 2 tabs PO the night after chemo, then 2 tabs PO BID the day after chemo 40 tablet 0    diphenhydramine, lidocaine, Al/Mg hydroxide, simethicone (Magic Mouthwash) SUSP Swish and spit 10 ML TID prn mouth pain 300 mL 1    DULoxetine (CYMBALTA) 30 mg delayed release capsule Take 30 mg by mouth daily      DULoxetine (CYMBALTA) 30 mg delayed release capsule TAKE 1 CAPSULE BY MOUTH EVERY DAY FOR 90 DAYS      escitalopram (LEXAPRO) 10 mg tablet Take 10 mg by mouth daily      famotidine (PEPCID) 40 MG tablet       glimepiride (AMARYL) 4 mg tablet Take 4 mg by mouth 2 (two) times a day      hydrochlorothiazide (HYDRODIURIL) 25 mg tablet Take 25 mg by mouth daily      lidocaine-prilocaine (EMLA) cream APPLY TO PORT 30 MIN PRIOR TO LABS/CHEMO 30 g 2    LORazepam (ATIVAN) 1 mg tablet Take 1 tablet (1 mg total) by mouth every 8 (eight) hours as needed for anxiety (nausea or anxiety) 30 tablet 0    losartan (COZAAR) 50 mg tablet Take 50 mg by mouth daily      meclizine (ANTIVERT) 25 mg tablet Take by mouth every 12 (twelve) hours as needed for  dizziness      methylPREDNISolone 4 MG tablet therapy pack Use as directed on package 1 each 0    metoprolol succinate (TOPROL-XL) 25 mg 24 hr tablet Take 25 mg by mouth daily      Multiple Vitamin (MULTIVITAMIN) tablet Take 1 tablet by mouth daily      nitrofurantoin (MACROBID) 100 mg capsule Take 1 capsule (100 mg total) by mouth 2 (two) times a day 14 capsule 0    nystatin (MYCOSTATIN) 500,000 units/5 mL suspension Swish and spit 5-10 ml 4 times daily x 5 days 473 mL 0    other medication, see sig, Medication/product name: Lifitegrast (xiidra)  Strength:   Sig (include dose, route, frequency): one drop ou BID      pantoprazole (PROTONIX) 40 mg tablet Take 40 mg by mouth daily      pioglitazone (ACTOS) 30 mg tablet every 24 hours      potassium chloride (Klor-Con M20) 20 mEq tablet TAKE 2 TABLETS BY MOUTH TWICE A DAY FOR 2 DAYS, THEN TAKE 1 TABLET BY MOUTH DAILY 90 tablet 1    rOPINIRole (REQUIP) 1 mg tablet TAKE 1 TABLET BY MOUTH 1 TO 3 HOURS BEFORE BEDTIME      sertraline (ZOLOFT) 100 mg tablet TAKE 1 AND 1/2 TABS BY MOUTH DAILY      sertraline (ZOLOFT) 50 mg tablet Take 150 mg by mouth daily      simvastatin (ZOCOR) 40 mg tablet Take 40 mg by mouth daily      topiramate (TOPAMAX) 100 mg tablet 1 tablet Orally at bedtime      Triamcinolone Acetonide 55 MCG/ACT AERO into each nostril One spray each nostril daily      Xiidra 5 % op solution INSTILL 1 DROP INTO BOTH EYES TWICE A DAY 12 HOURS APART      ZOLMitriptan (ZOMIG) 5 MG tablet Take 5 mg by mouth once as needed for migraine Daily prn      ibuprofen (MOTRIN) 800 mg tablet Take 800 mg by mouth every 6 (six) hours as needed for mild pain (Patient not taking: Reported on 8/3/2023)      nystatin (MYCOSTATIN) powder USE AS DIRECTED 3 TIMES A DAY FOR 10 DAYS (Patient not taking: Reported on 8/3/2023)      ondansetron (ZOFRAN) 8 mg tablet Take 1 tablet (8 mg total) by mouth every 8 (eight) hours as needed for nausea or vomiting (Patient not taking: Reported on  "8/24/2023) 20 tablet 1    oxyCODONE (Roxicodone) 5 immediate release tablet Take 1 tablet (5 mg total) by mouth every 6 (six) hours as needed for moderate pain Max Daily Amount: 20 mg (Patient not taking: Reported on 8/24/2023) 10 tablet 0    oxyCODONE-acetaminophen (Percocet) 5-325 mg per tablet Take 1 tablet by mouth every 4 (four) hours as needed for moderate pain for up to 30 doses Max Daily Amount: 6 tablets (Patient not taking: Reported on 8/3/2023) 30 tablet 0     No current facility-administered medications for this visit.     Facility-Administered Medications Ordered in Other Visits   Medication Dose Route Frequency Provider Last Rate Last Admin    alteplase (CATHFLO) injection 2 mg  2 mg Intracatheter Q1MIN PRN Gordo Taveras MD               Objective:    Blood pressure 130/80, pulse (!) 138, temperature 98.3 °F (36.8 °C), height 5' 4.84\" (1.647 m), weight 96.7 kg (213 lb 3.2 oz), SpO2 95%.  Body mass index is 35.65 kg/m².  Body surface area is 2.03 meters squared.    Physical Exam  Vitals reviewed. Exam conducted with a chaperone present.   Constitutional:       General: She is not in acute distress.     Appearance: Normal appearance. She is well-developed. She is obese. She is not ill-appearing, toxic-appearing or diaphoretic.   HENT:      Head: Normocephalic and atraumatic.   Eyes:      General: No scleral icterus.     Extraocular Movements: Extraocular movements intact.      Conjunctiva/sclera: Conjunctivae normal.   Neck:      Thyroid: No thyromegaly.   Pulmonary:      Effort: Pulmonary effort is normal.   Abdominal:      General: There is no distension.      Palpations: Abdomen is soft. There is no mass.      Tenderness: There is no abdominal tenderness. There is no guarding or rebound.      Hernia: No hernia is present.   Genitourinary:     Comments: The external female genitalia is normal. The bartholin's, uretheral and skenes glands are normal. The urethral meatus is normal (midline " "with no lesions). Anus without fissure or lesion. Speculum exam reveals a grossly normal vagina. No masses, lesions,discharge or bleeding. No significant cystocele or rectocele noted. Bimanual exam notes a surgical absent cervix, uterus and adnexal structures. No masses or fullness. Bladder is without fullness, mass or tenderness.    Musculoskeletal:         General: No swelling or tenderness.      Cervical back: Normal range of motion and neck supple.   Lymphadenopathy:      Cervical: No cervical adenopathy.   Skin:     General: Skin is warm and dry.      Coloration: Skin is not jaundiced or pale.      Findings: No lesion or rash.      Comments: There is peeling of the skin on her feet.  No evidence of vascular compromise.  No rash.  No erythema.   Neurological:      General: No focal deficit present.      Mental Status: She is alert and oriented to person, place, and time. Mental status is at baseline.      Cranial Nerves: No cranial nerve deficit.      Motor: No weakness.      Gait: Gait normal.   Psychiatric:         Mood and Affect: Mood normal.         Behavior: Behavior normal.         Thought Content: Thought content normal.         Judgment: Judgment normal.         No results found for: \"\"  Lab Results   Component Value Date    K 3.5 12/20/2023     12/20/2023    CO2 25 12/20/2023    BUN 12 12/20/2023    CREATININE 0.57 (L) 12/20/2023    CALCIUM 9.1 12/20/2023    CORRECTEDCA 9.3 06/07/2023    AST 16 12/20/2023    ALT 17 12/20/2023    ALKPHOS 85 12/20/2023    EGFR 98 12/20/2023     Lab Results   Component Value Date    WBC 11.56 (H) 12/20/2023    HGB 12.7 12/20/2023    HCT 38.5 12/20/2023    MCV 90 12/20/2023     12/20/2023     Lab Results   Component Value Date    NEUTROABS 7.24 12/20/2023        Trend:  No results found for: \"\"               "

## 2023-12-21 NOTE — ASSESSMENT & PLAN NOTE
63-year-old with stage II leiomyosarcoma who has completed adjuvant chemotherapy with gemcitabine and Taxotere followed by Adriamycin.  Cycle 7 of therapy was administered on 12/1/2023.  I reviewed her CT chest abdomen pelvis images, CBC, CMP, MRD testing results.  She is clinically and radiologically without evidence of disease recurrence.  Her performance status is 0.  1.  Plan to repeat CT of abdomen pelvis prior to her next visit in 3 months to reevaluate the scarring present in the right lower quadrant.  2.  Continue MRD testing.

## 2023-12-21 NOTE — ASSESSMENT & PLAN NOTE
1.2 cm right renal stone.  Will refer to urology for evaluation.   Isotretinoin Pregnancy And Lactation Text: This medication is Pregnancy Category X and is considered extremely dangerous during pregnancy. It is unknown if it is excreted in breast milk.

## 2024-01-02 ENCOUNTER — OFFICE VISIT (OUTPATIENT)
Dept: UROLOGY | Facility: AMBULATORY SURGERY CENTER | Age: 64
End: 2024-01-02
Payer: COMMERCIAL

## 2024-01-02 VITALS
BODY MASS INDEX: 35.62 KG/M2 | OXYGEN SATURATION: 96 % | SYSTOLIC BLOOD PRESSURE: 132 MMHG | DIASTOLIC BLOOD PRESSURE: 84 MMHG | HEART RATE: 112 BPM | WEIGHT: 213 LBS

## 2024-01-02 DIAGNOSIS — N20.0 RIGHT RENAL STONE: ICD-10-CM

## 2024-01-02 PROCEDURE — 99203 OFFICE O/P NEW LOW 30 MIN: CPT | Performed by: UROLOGY

## 2024-01-02 NOTE — PROGRESS NOTES
Assessment/Plan:    Right renal stone  The patient has a stable 1.3 cm right interpolar renal stone that is nonobstructive.  It is not symptomatic.  Unclear how long it has been present as we do not have prior imaging before September 2023.  Discussed options of observation versus intervention.      She would prefer to observe.  This is reasonable.  She is going to have a CT scan performed in March for her cancer surveillance.  Possible to have another imaging study to follow.  We will see her back at the end of this year to assess these imaging studies and see how she is doing.  We discussed the symptoms associated with stone passage including flank pain and hematuria and she will call us if she is having issues.          Subjective:      Patient ID: Felecia Edward is a 63 y.o. female.    HPI    63-year-old female with a past medical history significant for uterine leiomyosarcoma cancer with incidental finding of right nonobstructive renal stone.    The patient is CT scan in September then again in December showing a 1.3 cm right interpolar nonobstructive renal stone.  Patient has no prior history of stone disease.  Denies any pain in the right kidney.  The stone did not change in size significantly from CT scan from September to December 2023.        She has undergone surgery and adjuvant chemotherapy for her leiomyosarcoma.  Has not had a radiation.    Past Surgical History:   Procedure Laterality Date    APPENDECTOMY      CHEST WALL BIOPSY N/A 6/4/2023    Procedure: EXCISION  BIOPSY LESION/MASS ABDOMINAL-PELVIC PERITONEUM;  Surgeon: Gordo Taveras MD;  Location: [facility];  Service: Gynecology Oncology    CHOLECYSTECTOMY      FOOT SURGERY Left     Bone spur    HYSTERECTOMY N/A 6/4/2023    Procedure: HYSTERECTOMY TOTAL ABDOMINAL (DOROTHY), BILATERAL SALPINGOOPHORECTOMY;  Surgeon: Gordo Taveras MD;  Location: [facility];  Service: Gynecology Oncology    IR PORT PLACEMENT  6/30/2023        Past  "Medical History:   Diagnosis Date    Allergic rhinitis     Anemia     Anxiety     Coronary artery disease     Depression     Diabetes mellitus (HCC)     Functional dyspepsia     GERD (gastroesophageal reflux disease)     GERD without esophagitis     Hyperlipidemia     Hypertension     Irritable bowel syndrome     Migraines     Osteoporosis     Pulmonary hypertension (HCC)     Sleep apnea, obstructive              Review of Systems   Constitutional:  Negative for chills and fever.   HENT:  Negative for ear pain and sore throat.    Eyes:  Negative for pain and visual disturbance.   Respiratory:  Negative for cough and shortness of breath.    Cardiovascular:  Negative for chest pain and palpitations.   Gastrointestinal:  Negative for abdominal pain and vomiting.   Genitourinary:  Negative for dysuria and hematuria.   Musculoskeletal:  Negative for arthralgias and back pain.   Skin:  Negative for color change and rash.   Neurological:  Negative for seizures and syncope.   All other systems reviewed and are negative.        Objective:      /84 (BP Location: Left arm, Patient Position: Sitting, Cuff Size: Large)   Pulse (!) 112   Wt 96.6 kg (213 lb)   SpO2 96%   BMI 35.62 kg/m²     No results found for: \"PSA\"       Physical Exam  Vitals reviewed.   Constitutional:       General: She is not in acute distress.     Appearance: Normal appearance. She is not ill-appearing, toxic-appearing or diaphoretic.   HENT:      Head: Normocephalic and atraumatic.   Eyes:      Extraocular Movements: Extraocular movements intact.      Pupils: Pupils are equal, round, and reactive to light.   Pulmonary:      Effort: Pulmonary effort is normal.   Abdominal:      General: Abdomen is flat. There is no distension.      Palpations: Abdomen is soft. There is no mass.      Tenderness: There is no abdominal tenderness. There is no guarding or rebound.      Hernia: No hernia is present.   Skin:     General: Skin is warm.   Neurological:    "   General: No focal deficit present.      Mental Status: She is alert and oriented to person, place, and time. Mental status is at baseline.   Psychiatric:         Mood and Affect: Mood normal.         Behavior: Behavior normal.         Thought Content: Thought content normal.           I personally reviewed the patient's CT scan images from his CT December 2023 showing a 1.5 cm right posterior interpolar stone which was nonobstructive.  No other stones were seen.    T CHEST, ABDOMEN AND PELVIS WITH IV CONTRAST     INDICATION:   C55: Malignant neoplasm of uterus, part unspecified.     COMPARISON: 9/12/2023 and CT chest dated 6/3/2023     TECHNIQUE: CT examination of the chest, abdomen and pelvis was performed. Dual energy CT scan technique (DECT) was employed. Multiplanar 2D reformatted images were created from the source data.     This examination, like all CT scans performed in the [facility], was performed utilizing techniques to minimize radiation dose exposure, including the use of iterative reconstruction and automated exposure control. Radiation dose length   product (DLP) for this visit:  1201 mGy-cm     IV Contrast:  80 mL of iohexol (OMNIPAQUE)  Enteric Contrast: Enteric contrast was administered.     FINDINGS:     CHEST     LUNGS: 3 mm noncalcified pulmonary nodule in the right upper lobe on 604/68. Present in retrospect on the study from 10/23/2023 without significant change. Dependent pleural-parenchymal changes. Expiratory phase acquisition diminishing diagnostic   assessment of lung parenchyma, trachea and central airways. No tracheal or central airway filling defect. Nodularity along the right hemidiaphragm in the right lower lobe not seen on today's exam.     PLEURA:  Unremarkable.     HEART/GREAT VESSELS: Heart is unremarkable for patient's age.  No thoracic aortic aneurysm.     MEDIASTINUM AND JACKY:  Unremarkable.     CHEST WALL AND LOWER NECK: Unchanged appearance of the thyroid  gland without distinct nodule however there is some suspected nodularity of the thyroid isthmus and left lobe. Vascular port in the right chest wall with line extending into the superior vena   cava.     ABDOMEN     LIVER/BILIARY TREE: Liver is diffusely decreased in density consistent with fatty change.  No CT evidence of suspicious hepatic mass.  Normal hepatic contours.  No biliary dilatation.     GALLBLADDER: Gallbladder is surgically absent.     SPLEEN:  Unremarkable.     PANCREAS:  Unremarkable.     ADRENAL GLANDS:  Unremarkable.     KIDNEYS/URETERS:  No hydronephrosis. 1.2 cm nonobstructing calculus in the right kidney one or more sharply circumscribed subcentimeter renal hypodensities are present, too small to accurately characterize, and statistically most likely benign findings.   According to recent literature (Radiology 2019) no further workup of these findings is recommended.     STOMACH AND BOWEL:  Unremarkable.     APPENDIX:  No findings to suggest appendicitis.     ABDOMINOPELVIC CAVITY:  No ascites.  No pneumoperitoneum. Aortocaval lymph node is unchanged measuring 0.6 cm on 301/160. Portal caval lymph node on 6/3/2001/116 measuring 0.7 cm in short axis, unchanged. No pathologically enlarged retroperitoneal lymph   nodes.     VESSELS:  Unremarkable for patient's age.     PELVIS     REPRODUCTIVE ORGANS: Surgical changes of prior hysterectomy.     URINARY BLADDER: Under distended diminishing diagnostic assessment. No calculi.     ABDOMINAL WALL/INGUINAL REGIONS: Postsurgical changes of the ventral abdominal wall.     OSSEOUS STRUCTURES:  No acute fracture or destructive osseous lesion.  Spinal degenerative and pelvic changes are noted.     IMPRESSION:     1. 3 mm noncalcified pulmonary nodule in the right upper lobe that is unchanged when seen in retrospect. Based on current Fleischner Society 2017 Guidelines on incidental pulmonary nodule, patients with a known malignancy are at increased risk of    metastasis and should receive followup CT at intervals appropriate for the type of cancer and its risk of pulmonary metastases.  2. No pathologically enlarged lymphadenopathy in the chest abdomen and pelvis.  3. Unchanged 1.2 cm nonobstructing calculus in the right kidney.  4. Additional findings discussed above.  Orders  No orders of the defined types were placed in this encounter.     Home

## 2024-01-02 NOTE — ASSESSMENT & PLAN NOTE
The patient has a stable 1.3 cm right interpolar renal stone that is nonobstructive.  It is not symptomatic.  Unclear how long it has been present as we do not have prior imaging before September 2023.  Discussed options of observation versus intervention.      She would prefer to observe.  This is reasonable.  She is going to have a CT scan performed in March for her cancer surveillance.  Possible to have another imaging study to follow.  We will see her back at the end of this year to assess these imaging studies and see how she is doing.  We discussed the symptoms associated with stone passage including flank pain and hematuria and she will call us if she is having issues.

## 2024-01-23 ENCOUNTER — PATIENT MESSAGE (OUTPATIENT)
Dept: GYNECOLOGIC ONCOLOGY | Facility: CLINIC | Age: 64
End: 2024-01-23

## 2024-01-23 ENCOUNTER — TELEPHONE (OUTPATIENT)
Dept: HEMATOLOGY ONCOLOGY | Facility: CLINIC | Age: 64
End: 2024-01-23

## 2024-01-23 NOTE — TELEPHONE ENCOUNTER
Patient Call    Who are you speaking with? Child    If it is not the patient, are they listed on an active communication consent form? N/A   What is the reason for this call? She is faxing an insurance form for Dr. Taveras to fill out. Gave hopeline   Does this require a call back? N/A   If a call back is required, please list best call back number N/a   If a call back is required, advise that a message will be forwarded to their care team and someone will return their call as soon as possible.   Did you relay this information to the patient? N/A

## 2024-01-26 ENCOUNTER — TELEPHONE (OUTPATIENT)
Dept: GYNECOLOGIC ONCOLOGY | Facility: CLINIC | Age: 64
End: 2024-01-26

## 2024-01-29 ENCOUNTER — TELEPHONE (OUTPATIENT)
Dept: GYNECOLOGIC ONCOLOGY | Facility: CLINIC | Age: 64
End: 2024-01-29

## 2024-01-30 ENCOUNTER — TELEPHONE (OUTPATIENT)
Dept: HEMATOLOGY ONCOLOGY | Facility: CLINIC | Age: 64
End: 2024-01-30

## 2024-01-30 NOTE — TELEPHONE ENCOUNTER
Patient Call    Who are you speaking with? Patient    If it is not the patient, are they listed on an active communication consent form? N/A   What is the reason for this call? Called back to discuss forms   Does this require a call back? Yes   If a call back is required, please list [facility] call back number [phone]    If a call back is required, advise that a message will be forwarded to their care team and someone will return their call as soon as possible.   Did you relay this information to the patient? Yes

## 2024-01-31 NOTE — TELEPHONE ENCOUNTER
Spoke with patient about disability paperwork. Patient states that she is sleeping all the time and is very weak.  Per JS, if symptoms get worse to call the office,but Patient can go back on 4/1/24. This maybe subject to change pending the long term effects of chemotherapy. Patient has a CT on 3/21 and appointment on 3/28 with Dr. Taveras.

## 2024-02-12 ENCOUNTER — NURSE TRIAGE (OUTPATIENT)
Age: 64
End: 2024-02-12

## 2024-02-12 NOTE — TELEPHONE ENCOUNTER
"Answer Assessment - Initial Assessment Questions  1. REASON FOR CALL or QUESTION: \"What is your reason for calling today?\" or \"How can I best help you?\" or \"What question do you have that I can help answer?\"      Pt's daughter called in stating pt woke up having right flank pain. Pt was not available for triage. States she was last seen in office for renal stone in January which was not bothersome for the pt at that time. Daughter states no urinary symptoms at this time or any n/v. Offered to schedule a f/u appt but she would like to get imaging first and go from there. Please advise.    Protocols used: Information Only Call - No Triage-ADULT-[place]    "

## 2024-02-14 ENCOUNTER — TELEPHONE (OUTPATIENT)
Dept: HEMATOLOGY ONCOLOGY | Facility: CLINIC | Age: 64
End: 2024-02-14

## 2024-02-14 NOTE — TELEPHONE ENCOUNTER
Medical Records Request   Who are you speaking with? Physician Office   If it is not the patient, are they listed on an active communication consent form? Yes   What is the purpose of this call? Requesting medical records   What records are being requested? Reports   Details/ Additional Info Echocardiogram results   Which provider does the patient see? Dr. Taveras   Fax Number   Person's name (Attention:)   [phone] Attn: Edie   Facility call back number [phone]    Patient call back number na

## 2024-02-14 NOTE — TELEPHONE ENCOUNTER
called and spoke with daughter if Felecia was  having significant pain we can place an order for her obtain this month.  Otherwise the CT scan is currently scheduled in March would be able to visualize if the stone is passing.  If he is not having any persistent pain I would continue to observe until CT scan at that time. Recommended ibuprofen and moist heating pad.  She stated that the pain has subsided and will call back if they want ct scan sooner

## 2024-03-20 ENCOUNTER — TELEPHONE (OUTPATIENT)
Dept: GYNECOLOGIC ONCOLOGY | Facility: CLINIC | Age: 64
End: 2024-03-20

## 2024-03-20 NOTE — TELEPHONE ENCOUNTER
Left message for Patient to call back asap regarding appt [phone]. Please transfer to TEAMS #

## 2024-03-21 ENCOUNTER — HOSPITAL ENCOUNTER (OUTPATIENT)
Dept: INFUSION CENTER | Facility: HOSPITAL | Age: 64
Discharge: HOME | End: 2024-03-21
Payer: COMMERCIAL

## 2024-03-21 ENCOUNTER — HOSPITAL ENCOUNTER (OUTPATIENT)
Dept: CT IMAGING | Facility: HOSPITAL | Age: 64
Discharge: HOME | End: 2024-03-21
Attending: OBSTETRICS & GYNECOLOGY
Payer: COMMERCIAL

## 2024-03-21 DIAGNOSIS — C55 UTERINE LEIOMYOSARCOMA (HCC): ICD-10-CM

## 2024-03-21 DIAGNOSIS — Z45.2 ENCOUNTER FOR CENTRAL LINE CARE: Primary | ICD-10-CM

## 2024-03-21 LAB
ALBUMIN SERPL BCP-MCNC: 4.2 G/DL (ref 3.5–5)
ALP SERPL-CCNC: 76 U/L (ref 34–104)
ALT SERPL W P-5'-P-CCNC: 15 U/L (ref 7–52)
ANION GAP SERPL CALCULATED.3IONS-SCNC: 8 MMOL/L (ref 4–13)
AST SERPL W P-5'-P-CCNC: 16 U/L (ref 13–39)
BASOPHILS # BLD AUTO: 0.03 THOUSANDS/ÂΜL (ref 0–0.1)
BASOPHILS NFR BLD AUTO: 1 % (ref 0–1)
BILIRUB SERPL-MCNC: 0.99 MG/DL (ref 0.2–1)
BUN SERPL-MCNC: 11 MG/DL (ref 5–25)
CALCIUM SERPL-MCNC: 9.5 MG/DL (ref 8.4–10.2)
CHLORIDE SERPL-SCNC: 102 MMOL/L (ref 96–108)
CO2 SERPL-SCNC: 29 MMOL/L (ref 21–32)
CREAT SERPL-MCNC: 0.52 MG/DL (ref 0.6–1.3)
EOSINOPHIL # BLD AUTO: 0.04 THOUSAND/ÂΜL (ref 0–0.61)
EOSINOPHIL NFR BLD AUTO: 1 % (ref 0–6)
ERYTHROCYTE [DISTWIDTH] IN BLOOD BY AUTOMATED COUNT: 13.2 % (ref 11.6–15.1)
GFR SERPL CREATININE-BSD FRML MDRD: 101 ML/MIN/1.73SQ M
GLUCOSE SERPL-MCNC: 209 MG/DL (ref 65–140)
HCT VFR BLD AUTO: 38.4 % (ref 34.8–46.1)
HGB BLD-MCNC: 12.7 G/DL (ref 11.5–15.4)
IMM GRANULOCYTES # BLD AUTO: 0.03 THOUSAND/UL (ref 0–0.2)
IMM GRANULOCYTES NFR BLD AUTO: 1 % (ref 0–2)
LYMPHOCYTES # BLD AUTO: 2.6 THOUSANDS/ÂΜL (ref 0.6–4.47)
LYMPHOCYTES NFR BLD AUTO: 42 % (ref 14–44)
MAGNESIUM SERPL-MCNC: 1.7 MG/DL (ref 1.9–2.7)
MCH RBC QN AUTO: 28.9 PG (ref 26.8–34.3)
MCHC RBC AUTO-ENTMCNC: 33.1 G/DL (ref 31.4–37.4)
MCV RBC AUTO: 87 FL (ref 82–98)
MONOCYTES # BLD AUTO: 0.32 THOUSAND/ÂΜL (ref 0.17–1.22)
MONOCYTES NFR BLD AUTO: 5 % (ref 4–12)
NEUTROPHILS # BLD AUTO: 3.22 THOUSANDS/ÂΜL (ref 1.85–7.62)
NEUTS SEG NFR BLD AUTO: 50 % (ref 43–75)
NRBC BLD AUTO-RTO: 0 /100 WBCS
PLATELET # BLD AUTO: 279 THOUSANDS/UL (ref 149–390)
PMV BLD AUTO: 9.8 FL (ref 8.9–12.7)
POTASSIUM SERPL-SCNC: 3.9 MMOL/L (ref 3.5–5.3)
PROT SERPL-MCNC: 7.4 G/DL (ref 6.4–8.4)
RBC # BLD AUTO: 4.4 MILLION/UL (ref 3.81–5.12)
SODIUM SERPL-SCNC: 139 MMOL/L (ref 135–147)
WBC # BLD AUTO: 6.24 THOUSAND/UL (ref 4.31–10.16)

## 2024-03-21 PROCEDURE — 83735 ASSAY OF MAGNESIUM: CPT

## 2024-03-21 PROCEDURE — 74177 CT ABD & PELVIS W/CONTRAST: CPT

## 2024-03-21 PROCEDURE — G1004 CDSM NDSC: HCPCS

## 2024-03-21 PROCEDURE — 80053 COMPREHEN METABOLIC PANEL: CPT

## 2024-03-21 PROCEDURE — 85025 COMPLETE CBC W/AUTO DIFF WBC: CPT

## 2024-03-21 RX ADMIN — IOHEXOL 80 ML: 350 INJECTION, SOLUTION INTRAVENOUS at 10:13

## 2024-03-21 NOTE — PROGRESS NOTES
Felecia Edward  tolerated treatment well with no complications.      Felecia Edward is aware of future appt on 5/16 at 0930.     AVS printed and given to Felecia Edward:    No (Declined by Felecia Edward)

## 2024-03-27 ENCOUNTER — TELEPHONE (OUTPATIENT)
Dept: HEMATOLOGY ONCOLOGY | Facility: CLINIC | Age: 64
End: 2024-03-27

## 2024-03-27 NOTE — TELEPHONE ENCOUNTER
Appointment Confirmation   Who are you speaking with? Patient   If it is not the patient, are they listed on an active communication consent form? N/A   Which provider is the appointment scheduled with?  Dr. Taveras   When is the appointment scheduled?  Please list date and time 3/28/24 2:15 PM   At which location is the appointment scheduled to take place? [place]   Did caller verbalize understanding of appointment details? Yes

## 2024-03-28 ENCOUNTER — OFFICE VISIT (OUTPATIENT)
Age: 64
End: 2024-03-28
Payer: COMMERCIAL

## 2024-03-28 VITALS
HEIGHT: 65 IN | DIASTOLIC BLOOD PRESSURE: 98 MMHG | SYSTOLIC BLOOD PRESSURE: 136 MMHG | TEMPERATURE: 97.9 F | OXYGEN SATURATION: 97 % | WEIGHT: 209.8 LBS | BODY MASS INDEX: 34.95 KG/M2 | HEART RATE: 73 BPM

## 2024-03-28 DIAGNOSIS — C55 UTERINE LEIOMYOSARCOMA (HCC): Primary | ICD-10-CM

## 2024-03-28 PROCEDURE — 99214 OFFICE O/P EST MOD 30 MIN: CPT | Performed by: OBSTETRICS & GYNECOLOGY

## 2024-03-28 NOTE — ASSESSMENT & PLAN NOTE
63-year-old with stage II leiomyosarcoma completed adjuvant chemotherapy with gemcitabine and Taxotere followed by Adriamycin.  Cycle 7 of treatment was administered 12/1/2023.  I reviewed CT abdomen pelvis images, CBC, CMP.  MRD testing is negative.  She is clinically and radiologically without evidence of disease recurrence.  Her performance status is 0.  1.  Return in 3 months for sarcoma surveillance.  2.  CT chest abdomen pelvis in 6 months  3.  Continue MRD testing.

## 2024-03-28 NOTE — PROGRESS NOTES
Assessment/Plan:    Problem List Items Addressed This Visit          Genitourinary    Uterine leiomyosarcoma (HCC) - Primary     63-year-old with stage II leiomyosarcoma completed adjuvant chemotherapy with gemcitabine and Taxotere followed by Adriamycin.  Cycle 7 of treatment was administered 12/1/2023.  I reviewed CT abdomen pelvis images, CBC, CMP.  MRD testing is negative.  She is clinically and radiologically without evidence of disease recurrence.  Her performance status is 0.  1.  Return in 3 months for sarcoma surveillance.  2.  CT chest abdomen pelvis in 6 months  3.  Continue MRD testing.              CHIEF COMPLAINT: Leiomyosarcoma surveillance      Problem:  Cancer Staging   Uterine leiomyosarcoma (HCC)  Staging form: Corpus Uteri - Sarcoma, AJCC 8th Edition  - Pathologic stage from 6/4/2023: FIGO Stage II, calculated as Stage Unknown (pT2, pNX, cM0) - Signed by Gordo Taveras MD on 6/29/2023        Previous therapy:  Oncology History   Uterine leiomyosarcoma (HCC)   6/4/2023 Initial Diagnosis    Uterine sarcoma (HCC)     6/4/2023 -  Cancer Staged    Staging form: Corpus Uteri - Sarcoma, AJCC 8th Edition  - Pathologic stage from 6/4/2023: FIGO Stage II, calculated as Stage Unknown (pT2, pNX, cM0) - Signed by Gordo Taveras MD on 6/29/2023  Stage prefix: Initial diagnosis       6/4/2023 Surgery    HYSTERECTOMY TOTAL ABDOMINAL (DOROTHY). BILATERAL SALPINGOOPHORECTOMY  EXCISION  BIOPSY LESION/MASS ABDOMINAL-PELVIC PERITONEUM  -Leiomyosarcoma 13.5 cm extending through the myometrium, right pelvic peritoneum involved with uterine leiomyosarcoma with tumor present at unoriented resection surface.     7/11/2023 -  Chemotherapy    Gemzar 800 mg/m2 IV day 1 and 8 as well as taxotere 65 mg/m2 day 8 every 21 days.        - 12/1/2023 Chemotherapy    Adriamycin 50 mg/m2 IV every 21 days.  Including the gemcitabine and Taxotere, she completed a total of 7 cycles.           Patient ID: Felecia Edward  is a 63 y.o. female  Who returns for sarcoma surveillance.  She is back to her normal level of activity.  She has been losing weight intentionally.  No new complaints.  No abdominal pain, pelvic pain, or vaginal bleeding.  Labs from 3/21/2024 revealed a normal CBC and CMP with the exception of a glucose of 209 mg/dL.  Magnesium 1.7.  MRD testing was last negative on 2/12/2024.  She had a CT scan of the abdomen pelvis on 3/21/2024 that revealed an unchanged renal stone on the right side and unchanged scarring in the right lower quadrant.  I reviewed the images.        The following portions of the patient's history were reviewed and updated as appropriate: allergies, current medications, past family history, past medical history, past social history, past surgical history, and problem list.    Review of Systems   Constitutional:  Negative for activity change and unexpected weight change.   HENT: Negative.     Eyes: Negative.    Respiratory: Negative.     Cardiovascular: Negative.    Gastrointestinal:  Negative for abdominal distention and abdominal pain.   Endocrine: Negative.    Genitourinary:  Negative for pelvic pain and vaginal bleeding.   Musculoskeletal: Negative.    Skin: Negative.    Allergic/Immunologic: Negative.    Neurological: Negative.    Hematological: Negative.    Psychiatric/Behavioral: Negative.         Current Outpatient Medications   Medication Sig Dispense Refill    apixaban (Eliquis) 2.5 mg Take 1 tablet (2.5 mg total) by mouth 2 (two) times a day 60 tablet 3    aspirin (Aspirin 81) 81 mg chewable tablet every 24 hours      aspirin (ECOTRIN LOW STRENGTH) 81 mg EC tablet Take 81 mg by mouth daily      atorvastatin (LIPITOR) 40 mg tablet Take 40 mg by mouth daily at bedtime      betamethasone, augmented, (DIPROLENE) 0.05 % lotion Apply topically 2 (two) times a day 60 mL 1    cetirizine (ZyrTEC) 10 mg tablet Take 10 mg by mouth daily      Cetirizine HCl 0.24 % SOLN Take by mouth       cholecalciferol (VITAMIN D3) 1,000 units tablet Take 1,000 Units by mouth daily 2 daily      CLENPIQ 10-3.5-12 MG-GM -GM/160ML SOLN USE AS DIRECTED 320 mL 0    clindamycin (CLEOCIN T) 1 % external solution Apply topically 2 (two) times a day 60 mL 0    diphenhydramine, lidocaine, Al/Mg hydroxide, simethicone (Magic Mouthwash) SUSP Swish and spit 10 ML TID prn mouth pain 300 mL 1    DULoxetine (CYMBALTA) 30 mg delayed release capsule Take 30 mg by mouth daily      DULoxetine (CYMBALTA) 30 mg delayed release capsule TAKE 1 CAPSULE BY MOUTH EVERY DAY FOR 90 DAYS      escitalopram (LEXAPRO) 10 mg tablet Take 10 mg by mouth daily      famotidine (PEPCID) 40 MG tablet       glimepiride (AMARYL) 4 mg tablet Take 4 mg by mouth 2 (two) times a day      hydrochlorothiazide (HYDRODIURIL) 25 mg tablet Take 25 mg by mouth daily      ibuprofen (MOTRIN) 800 mg tablet Take 800 mg by mouth every 6 (six) hours as needed for mild pain      losartan (COZAAR) 50 mg tablet Take 50 mg by mouth daily      metoprolol succinate (TOPROL-XL) 25 mg 24 hr tablet Take 25 mg by mouth daily      Multiple Vitamin (MULTIVITAMIN) tablet Take 1 tablet by mouth daily      nitrofurantoin (MACROBID) 100 mg capsule Take 1 capsule (100 mg total) by mouth 2 (two) times a day 14 capsule 0    nystatin (MYCOSTATIN) 500,000 units/5 mL suspension Swish and spit 5-10 ml 4 times daily x 5 days 473 mL 0    pantoprazole (PROTONIX) 40 mg tablet Take 40 mg by mouth daily      pioglitazone (ACTOS) 30 mg tablet every 24 hours      rOPINIRole (REQUIP) 1 mg tablet TAKE 1 TABLET BY MOUTH 1 TO 3 HOURS BEFORE BEDTIME      sertraline (ZOLOFT) 100 mg tablet TAKE 1 AND 1/2 TABS BY MOUTH DAILY      sertraline (ZOLOFT) 50 mg tablet Take 150 mg by mouth daily      simvastatin (ZOCOR) 40 mg tablet Take 40 mg by mouth daily      topiramate (TOPAMAX) 100 mg tablet 1 tablet Orally at bedtime      Triamcinolone Acetonide 55 MCG/ACT AERO into each nostril One spray each nostril daily       Xiidra 5 % op solution INSTILL 1 DROP INTO BOTH EYES TWICE A DAY 12 HOURS APART      ZOLMitriptan (ZOMIG) 5 MG tablet Take 5 mg by mouth once as needed for migraine Daily prn      dexamethasone (DECADRON) 4 mg tablet Take 2 tabs PO BID the day prior to chemo, then 2 tabs PO the night after chemo, then 2 tabs PO BID the day after chemo (Patient not taking: Reported on 1/2/2024) 40 tablet 0    lidocaine-prilocaine (EMLA) cream APPLY TO PORT 30 MIN PRIOR TO LABS/CHEMO (Patient not taking: Reported on 1/2/2024) 30 g 2    LORazepam (ATIVAN) 1 mg tablet Take 1 tablet (1 mg total) by mouth every 8 (eight) hours as needed for anxiety (nausea or anxiety) (Patient not taking: Reported on 1/2/2024) 30 tablet 0    meclizine (ANTIVERT) 25 mg tablet Take by mouth every 12 (twelve) hours as needed for dizziness (Patient not taking: Reported on 1/2/2024)      methylPREDNISolone 4 MG tablet therapy pack Use as directed on package (Patient not taking: Reported on 1/2/2024) 1 each 0    nystatin (MYCOSTATIN) powder USE AS DIRECTED 3 TIMES A DAY FOR 10 DAYS (Patient not taking: Reported on 8/3/2023)      ondansetron (ZOFRAN) 8 mg tablet Take 1 tablet (8 mg total) by mouth every 8 (eight) hours as needed for nausea or vomiting (Patient not taking: Reported on 8/24/2023) 20 tablet 1    other medication, see sig, Medication/product name: Lifitegrast (xiidra)  Strength:   Sig (include dose, route, frequency): one drop ou BID (Patient not taking: Reported on 1/2/2024)      oxyCODONE (Roxicodone) 5 immediate release tablet Take 1 tablet (5 mg total) by mouth every 6 (six) hours as needed for moderate pain Max Daily Amount: 20 mg (Patient not taking: Reported on 8/24/2023) 10 tablet 0    oxyCODONE-acetaminophen (Percocet) 5-325 mg per tablet Take 1 tablet by mouth every 4 (four) hours as needed for moderate pain for up to 30 doses Max Daily Amount: 6 tablets (Patient not taking: Reported on 8/3/2023) 30 tablet 0    potassium chloride  "(Klor-Con M20) 20 mEq tablet TAKE 2 TABLETS BY MOUTH TWICE A DAY FOR 2 DAYS, THEN TAKE 1 TABLET BY MOUTH DAILY (Patient not taking: Reported on 1/2/2024) 90 tablet 1     No current facility-administered medications for this visit.           Objective:    Blood pressure 136/98, pulse 73, temperature 97.9 °F (36.6 °C), height 5' 4.84\" (1.647 m), weight 95.2 kg (209 lb 12.8 oz), SpO2 97%.  Body mass index is 35.09 kg/m².  Body surface area is 2.02 meters squared.    Physical Exam  Vitals reviewed. Exam conducted with a chaperone present.   Constitutional:       General: She is not in acute distress.     Appearance: Normal appearance. She is well-developed. She is not ill-appearing, toxic-appearing or diaphoretic.   HENT:      Head: Normocephalic and atraumatic.   Eyes:      General: No scleral icterus.     Extraocular Movements: Extraocular movements intact.      Conjunctiva/sclera: Conjunctivae normal.   Neck:      Thyroid: No thyromegaly.   Pulmonary:      Effort: Pulmonary effort is normal.   Abdominal:      General: There is no distension.      Palpations: Abdomen is soft. There is no mass.      Tenderness: There is no abdominal tenderness. There is no guarding or rebound.      Hernia: A hernia is present.      Comments: Olga-umbilical hernia.  Reducible   Genitourinary:     Comments: The external female genitalia is normal. The bartholin's, uretheral and skenes glands are normal. The urethral meatus is normal (midline with no lesions). Anus without fissure or lesion. Speculum exam reveals a grossly normal vagina. No masses, lesions,discharge or bleeding. No significant cystocele or rectocele noted. Bimanual exam notes a surgical absent cervix, uterus and adnexal structures. No masses or fullness. Bladder is without fullness, mass or tenderness.    Musculoskeletal:         General: No swelling or tenderness.      Cervical back: Normal range of motion and neck supple.      Right lower leg: No edema.      Left lower " "leg: No edema.   Lymphadenopathy:      Cervical: No cervical adenopathy.   Skin:     General: Skin is warm and dry.      Coloration: Skin is not jaundiced or pale.      Findings: No lesion or rash.   Neurological:      General: No focal deficit present.      Mental Status: She is alert and oriented to person, place, and time. Mental status is at baseline.      Cranial Nerves: No cranial nerve deficit.      Motor: No weakness.      Gait: Gait normal.   Psychiatric:         Mood and Affect: Mood normal.         Behavior: Behavior normal.         Thought Content: Thought content normal.         Judgment: Judgment normal.         No results found for: \"\"  Lab Results   Component Value Date    K 3.9 03/21/2024     03/21/2024    CO2 29 03/21/2024    BUN 11 03/21/2024    CREATININE 0.52 (L) 03/21/2024    CALCIUM 9.5 03/21/2024    CORRECTEDCA 9.3 06/07/2023    AST 16 03/21/2024    ALT 15 03/21/2024    ALKPHOS 76 03/21/2024    EGFR 101 03/21/2024     Lab Results   Component Value Date    WBC 6.24 03/21/2024    HGB 12.7 03/21/2024    HCT 38.4 03/21/2024    MCV 87 03/21/2024     03/21/2024     Lab Results   Component Value Date    NEUTROABS 3.22 03/21/2024        Trend:  No results found for: \"\"      CT abdomen pelvis w contrast  Narrative: CT ABDOMEN AND PELVIS WITH IV CONTRAST    INDICATION:   Malignant neoplasm of uterus, part unspecified.      COMPARISON: CT dated 12/15/2023    TECHNIQUE:  CT examination of the abdomen and pelvis was performed. Dual energy CT scan technique (DECT) was employed. Multiplanar 2D reformatted images were created from the source data.    This examination, like all CT scans performed in the [facility], was performed utilizing techniques to minimize radiation dose exposure, including the use of iterative reconstruction and automated exposure control. Radiation dose length   product (DLP) for this visit:    IV Contrast:  CDS - barium (READI-CAT 2) " suspension 900 mL  Enteric Contrast:  Enteric contrast was administered.    FINDINGS:    ABDOMEN    LOWER CHEST: No clinically significant abnormality in the visualized lower chest.    LIVER/BILIARY TREE: Diffuse hepatic steatosis. Unchanged wedge-shaped hypodensity in the right lateral liver (series 301, image 42)    GALLBLADDER: Post cholecystectomy.    SPLEEN: Unremarkable.    PANCREAS: Unremarkable.     ADRENAL GLANDS: Unremarkable.    KIDNEYS/URETERS: Simple renal cyst(s). Unchanged 12 mm nonobstructing right renal calculus. Punctate right lower pole renal calculus. No left renal calculi. No hydronephrosis.    STOMACH AND BOWEL: Unremarkable.    APPENDIX: No findings to suggest appendicitis.    ABDOMINOPELVIC CAVITY: No ascites. No pneumoperitoneum. No lymphadenopathy.    VESSELS: Unremarkable for patient's age.    PELVIS    REPRODUCTIVE ORGANS: Post hysterectomy.    URINARY BLADDER: Unremarkable.    ABDOMINAL WALL/INGUINAL REGIONS: Anterior abdominal wall postsurgical changes    BONES: No acute fracture or suspicious osseous lesion.  Impression: No acute findings in the abdomen or pelvis.    No suspicious findings. No evidence of new or progressive metastatic disease    Electronically signed: 03/21/2024 04:31 PM Tru Murphy MD

## 2024-05-09 ENCOUNTER — OFFICE VISIT (OUTPATIENT)
Dept: GASTROENTEROLOGY | Facility: CLINIC | Age: 64
End: 2024-05-09
Payer: COMMERCIAL

## 2024-05-09 ENCOUNTER — TELEPHONE (OUTPATIENT)
Dept: GASTROENTEROLOGY | Facility: CLINIC | Age: 64
End: 2024-05-09

## 2024-05-09 VITALS
WEIGHT: 203 LBS | HEIGHT: 65 IN | DIASTOLIC BLOOD PRESSURE: 88 MMHG | SYSTOLIC BLOOD PRESSURE: 120 MMHG | BODY MASS INDEX: 33.82 KG/M2

## 2024-05-09 DIAGNOSIS — C55 UTERINE LEIOMYOSARCOMA (HCC): ICD-10-CM

## 2024-05-09 DIAGNOSIS — R10.13 EPIGASTRIC PAIN: ICD-10-CM

## 2024-05-09 DIAGNOSIS — Z86.010 PERSONAL HISTORY OF COLONIC POLYPS: Primary | ICD-10-CM

## 2024-05-09 PROBLEM — Z86.0100 PERSONAL HISTORY OF COLONIC POLYPS: Status: ACTIVE | Noted: 2024-05-09

## 2024-05-09 PROCEDURE — 99244 OFF/OP CNSLTJ NEW/EST MOD 40: CPT | Performed by: INTERNAL MEDICINE

## 2024-05-09 RX ORDER — PANTOPRAZOLE SODIUM 40 MG/1
40 TABLET, DELAYED RELEASE ORAL DAILY
Qty: 90 TABLET | Refills: 5 | Status: SHIPPED | OUTPATIENT
Start: 2024-05-09

## 2024-05-09 RX ORDER — CIPROFLOXACIN 500 MG/1
TABLET, FILM COATED ORAL
COMMUNITY
Start: 2024-04-22

## 2024-05-09 RX ORDER — EMPAGLIFLOZIN 10 MG/1
TABLET, FILM COATED ORAL
COMMUNITY
Start: 2024-05-07

## 2024-05-09 NOTE — TELEPHONE ENCOUNTER
Scheduled date of combo (as of today):08/12/24  Physician performing combo:Cat  Location of combo:[facility]  Bowel prep reviewed with patient:Miralax  Instructions reviewed with patient by:LEA  Clearances: NONE

## 2024-05-09 NOTE — LETTER
May 9, 2024     Clarice Acuna MD  [address]  [facility] [address]    Patient: Felecia Edward   YOB: 1960   Date of Visit: 5/9/2024       Dear Dr. Acuna:    Thank you for referring Felecia Edward to me for evaluation. Below are my notes for this consultation.    If you have questions, please do not hesitate to call me. I look forward to following your patient along with you.         Sincerely,        Vignesh Shelton MD        CC: MD Vignesh Gillette MD  5/9/2024 11:23 AM  Sign when Signing Visit  [facility] - Outpatient Consultation  Felecia Edward 63 y.o. female MRN: [id number]  Encounter: [id number]          ASSESSMENT AND PLAN:      1. Epigastric pain  Known history of GERD and functional dyspepsia.  Previously on Protonix and Pepcid  -Restart pantoprazole (PROTONIX) 40 mg tablet; Take 1 tablet (40 mg total) by mouth daily  Dispense: 90 tablet; Refill: 5  - EGD; Future    2. Personal history of colonic polyps  Last colonoscopy June 2020.  3-year recall recommended  - Colonoscopy; Future    3. Uterine leiomyosarcoma (HCC)  Follows with Dr. Taveras.  S/P hysterectomy and chemotherapy    ______________________________________________________________________    HPI: 63-year-old woman seen by me in the past secondary to GERD and functional dyspepsia who had been on Protonix and Pepcid in the past.  She also had colon polyps and was due for her follow-up colonoscopy last year.  Unfortunately she was diagnosed with a leiomyosarcoma of the uterus requiring hysterectomy and chemotherapy which she has now completed.  She reports that she had stopped all of her medications because they are making her sick so she is no longer taking the pantoprazole or Pepcid.  From a GI standpoint she is complaining of epigastric abdominal pain with some associated heartburn and regurgitation.  She denies any dysphagia, odynophagia, early satiety.  She  is moving her bowels regularly.  She denies any melena or hematochezia.  Her weight is relatively stable      REVIEW OF SYSTEMS:    CONSTITUTIONAL: Denies any fever, chills, rigors, and weight loss.  HEENT: No earache or tinnitus. Denies hearing loss or visual disturbances.  CARDIOVASCULAR: No chest pain or palpitations.   RESPIRATORY: Denies any cough, hemoptysis, shortness of breath or dyspnea on exertion.  GASTROINTESTINAL: As noted in the History of Present Illness.   GENITOURINARY: Hysterectomy last year secondary to uterine cancer  NEUROLOGIC: No dizziness or vertigo, denies headaches.   MUSCULOSKELETAL: Denies any muscle or joint pain.   SKIN: Denies skin rashes or itching.   ENDOCRINE: Denies excessive thirst. Denies intolerance to heat or cold.  PSYCHOSOCIAL: Denies depression or anxiety. Denies any recent memory loss.       Historical Information  Past Medical History:   Diagnosis Date   • Allergic rhinitis    • Anemia    • Anxiety    • Coronary artery disease    • Depression    • Diabetes mellitus (HCC)    • Functional dyspepsia    • GERD (gastroesophageal reflux disease)    • GERD without esophagitis    • Hyperlipidemia    • Hypertension    • Irritable bowel syndrome    • Migraines    • Osteoporosis    • Pulmonary hypertension (HCC)    • Sleep apnea, obstructive      Past Surgical History:   Procedure Laterality Date   • APPENDECTOMY     • CHEST WALL BIOPSY N/A 6/4/2023    Procedure: EXCISION  BIOPSY LESION/MASS ABDOMINAL-PELVIC PERITONEUM;  Surgeon: Gordo Taveras MD;  Location: [facility];  Service: Gynecology Oncology   • CHOLECYSTECTOMY     • FOOT SURGERY Left     Bone spur   • HYSTERECTOMY N/A 6/4/2023    Procedure: HYSTERECTOMY TOTAL ABDOMINAL (DOROTHY), BILATERAL SALPINGOOPHORECTOMY;  Surgeon: Gordo Taveras MD;  Location: [facility];  Service: Gynecology Oncology   • IR PORT PLACEMENT  6/30/2023     Social History  Social History     Substance and Sexual Activity   Alcohol Use  "Never     Social History     Substance and Sexual Activity   Drug Use Never     Social History     Tobacco Use   Smoking Status Never   Smokeless Tobacco Never     Family History   Problem Relation Age of Onset   • Heart disease Mother    • Cancer Mother    • Breast cancer Father    • Cancer Father    • Diabetes Father    • Hypertension Father    • Coronary artery disease Brother    • Diabetes Brother    • Heart failure Brother    • Hypertension Brother    • Colon polyps Neg Hx    • Colon cancer Neg Hx        Meds/Allergies      Current Outpatient Medications:   •  aspirin (Aspirin 81) 81 mg chewable tablet  •  atorvastatin (LIPITOR) 40 mg tablet  •  betamethasone, augmented, (DIPROLENE) 0.05 % lotion  •  cetirizine (ZyrTEC) 10 mg tablet  •  cholecalciferol (VITAMIN D3) 1,000 units tablet  •  DULoxetine (CYMBALTA) 30 mg delayed release capsule  •  escitalopram (LEXAPRO) 10 mg tablet  •  famotidine (PEPCID) 40 MG tablet  •  glimepiride (AMARYL) 4 mg tablet  •  hydrochlorothiazide (HYDRODIURIL) 25 mg tablet  •  ibuprofen (MOTRIN) 800 mg tablet  •  Jardiance 10 MG TABS tablet  •  losartan (COZAAR) 50 mg tablet  •  meclizine (ANTIVERT) 25 mg tablet  •  metoprolol succinate (TOPROL-XL) 25 mg 24 hr tablet  •  Multiple Vitamin (MULTIVITAMIN) tablet  •  pantoprazole (PROTONIX) 40 mg tablet  •  pioglitazone (ACTOS) 30 mg tablet  •  rOPINIRole (REQUIP) 1 mg tablet  •  sertraline (ZOLOFT) 100 mg tablet  •  sertraline (ZOLOFT) 50 mg tablet  •  simvastatin (ZOCOR) 40 mg tablet  •  topiramate (TOPAMAX) 100 mg tablet  •  Triamcinolone Acetonide 55 MCG/ACT AERO  •  Xiidra 5 % op solution  •  ZOLMitriptan (ZOMIG) 5 MG tablet  •  ciprofloxacin (CIPRO) 500 mg tablet    Allergies   Allergen Reactions   • Bee Pollen Swelling   • Gabapentin Fatigue   • Metformin GI Intolerance   • Sulfa Antibiotics      High fever  Pt unsure - it happened when she was 14 years old           Objective    Blood pressure 120/88, height 5' 4.84\" (1.647 m), " weight 92.1 kg (203 lb). Body mass index is 33.95 kg/m².        PHYSICAL EXAM:      General Appearance:   Alert, cooperative, no distress   HEENT:   Normocephalic, atraumatic, anicteric.     Neck:  Supple, symmetrical, trachea midline   Lungs:   Clear to auscultation bilaterally; no rales, rhonchi or wheezing; respirations unlabored    Heart::   Regular rate and rhythm; no murmur, rub, or gallop.   Abdomen:   Soft, mild epigastric tenderness without rebound or guarding, non-distended; normal bowel sounds; no masses, no organomegaly    Genitalia:   Deferred    Rectal:   Deferred    Extremities:  No cyanosis, clubbing or edema    Pulses:  2+ and symmetric    Skin:  No jaundice, rashes, or lesions    Lymph nodes:  No palpable cervical lymphadenopathy        Lab Results:   No visits with results within 1 Day(s) from this visit.   Latest known visit with results is:   Hospital Outpatient Visit on 03/21/2024   Component Date Value   • WBC 03/21/2024 6.24    • RBC 03/21/2024 4.40    • Hemoglobin 03/21/2024 12.7    • Hematocrit 03/21/2024 38.4    • MCV 03/21/2024 87    • MCH 03/21/2024 28.9    • MCHC 03/21/2024 33.1    • RDW 03/21/2024 13.2    • MPV 03/21/2024 9.8    • Platelets 03/21/2024 279    • nRBC 03/21/2024 0    • Segmented % 03/21/2024 50    • Immature Grans % 03/21/2024 1    • Lymphocytes % 03/21/2024 42    • Monocytes % 03/21/2024 5    • Eosinophils Relative 03/21/2024 1    • Basophils Relative 03/21/2024 1    • Absolute Neutrophils 03/21/2024 3.22    • Absolute Immature Grans 03/21/2024 0.03    • Absolute Lymphocytes 03/21/2024 2.60    • Absolute Monocytes 03/21/2024 0.32    • Eosinophils Absolute 03/21/2024 0.04    • Basophils Absolute 03/21/2024 0.03    • Sodium 03/21/2024 139    • Potassium 03/21/2024 3.9    • Chloride 03/21/2024 102    • CO2 03/21/2024 29    • ANION GAP 03/21/2024 8    • BUN 03/21/2024 11    • Creatinine 03/21/2024 0.52 (L)    • Glucose 03/21/2024 209 (H)    • Calcium 03/21/2024 9.5    • AST  03/21/2024 16    • ALT 03/21/2024 15    • Alkaline Phosphatase 03/21/2024 76    • Total Protein 03/21/2024 7.4    • Albumin 03/21/2024 4.2    • Total Bilirubin 03/21/2024 0.99    • eGFR 03/21/2024 101    • Magnesium 03/21/2024 1.7 (L)          Radiology Results:   No results found.

## 2024-05-09 NOTE — PROGRESS NOTES
[facility] - Outpatient Consultation  Felecia Edward 63 y.o. female MRN: [id number]  Encounter: [id number]          ASSESSMENT AND PLAN:      1. Epigastric pain  Known history of GERD and functional dyspepsia.  Previously on Protonix and Pepcid  -Restart pantoprazole (PROTONIX) 40 mg tablet; Take 1 tablet (40 mg total) by mouth daily  Dispense: 90 tablet; Refill: 5  - EGD; Future    2. Personal history of colonic polyps  Last colonoscopy June 2020.  3-year recall recommended  - Colonoscopy; Future    3. Uterine leiomyosarcoma (HCC)  Follows with Dr. Taveras.  S/P hysterectomy and chemotherapy    ______________________________________________________________________    HPI: 63-year-old woman seen by me in the past secondary to GERD and functional dyspepsia who had been on Protonix and Pepcid in the past.  She also had colon polyps and was due for her follow-up colonoscopy last year.  Unfortunately she was diagnosed with a leiomyosarcoma of the uterus requiring hysterectomy and chemotherapy which she has now completed.  She reports that she had stopped all of her medications because they are making her sick so she is no longer taking the pantoprazole or Pepcid.  From a GI standpoint she is complaining of epigastric abdominal pain with some associated heartburn and regurgitation.  She denies any dysphagia, odynophagia, early satiety.  She is moving her bowels regularly.  She denies any melena or hematochezia.  Her weight is relatively stable      REVIEW OF SYSTEMS:    CONSTITUTIONAL: Denies any fever, chills, rigors, and weight loss.  HEENT: No earache or tinnitus. Denies hearing loss or visual disturbances.  CARDIOVASCULAR: No chest pain or palpitations.   RESPIRATORY: Denies any cough, hemoptysis, shortness of breath or dyspnea on exertion.  GASTROINTESTINAL: As noted in the History of Present Illness.   GENITOURINARY: Hysterectomy last year secondary to uterine  cancer  NEUROLOGIC: No dizziness or vertigo, denies headaches.   MUSCULOSKELETAL: Denies any muscle or joint pain.   SKIN: Denies skin rashes or itching.   ENDOCRINE: Denies excessive thirst. Denies intolerance to heat or cold.  PSYCHOSOCIAL: Denies depression or anxiety. Denies any recent memory loss.       Historical Information   Past Medical History:   Diagnosis Date    Allergic rhinitis     Anemia     Anxiety     Coronary artery disease     Depression     Diabetes mellitus (HCC)     Functional dyspepsia     GERD (gastroesophageal reflux disease)     GERD without esophagitis     Hyperlipidemia     Hypertension     Irritable bowel syndrome     Migraines     Osteoporosis     Pulmonary hypertension (HCC)     Sleep apnea, obstructive      Past Surgical History:   Procedure Laterality Date    APPENDECTOMY      CHEST WALL BIOPSY N/A 6/4/2023    Procedure: EXCISION  BIOPSY LESION/MASS ABDOMINAL-PELVIC PERITONEUM;  Surgeon: Gordo Taveras MD;  Location: [facility];  Service: Gynecology Oncology    CHOLECYSTECTOMY      FOOT SURGERY Left     Bone spur    HYSTERECTOMY N/A 6/4/2023    Procedure: HYSTERECTOMY TOTAL ABDOMINAL (DOROTHY), BILATERAL SALPINGOOPHORECTOMY;  Surgeon: Gordo Taveras MD;  Location: [facility];  Service: Gynecology Oncology    IR PORT PLACEMENT  6/30/2023     Social History   Social History     Substance and Sexual Activity   Alcohol Use Never     Social History     Substance and Sexual Activity   Drug Use Never     Social History     Tobacco Use   Smoking Status Never   Smokeless Tobacco Never     Family History   Problem Relation Age of Onset    Heart disease Mother     Cancer Mother     Breast cancer Father     Cancer Father     Diabetes Father     Hypertension Father     Coronary artery disease Brother     Diabetes Brother     Heart failure Brother     Hypertension Brother     Colon polyps Neg Hx     Colon cancer Neg Hx        Meds/Allergies       Current Outpatient Medications:      "aspirin (Aspirin 81) 81 mg chewable tablet    atorvastatin (LIPITOR) 40 mg tablet    betamethasone, augmented, (DIPROLENE) 0.05 % lotion    cetirizine (ZyrTEC) 10 mg tablet    cholecalciferol (VITAMIN D3) 1,000 units tablet    DULoxetine (CYMBALTA) 30 mg delayed release capsule    escitalopram (LEXAPRO) 10 mg tablet    famotidine (PEPCID) 40 MG tablet    glimepiride (AMARYL) 4 mg tablet    hydrochlorothiazide (HYDRODIURIL) 25 mg tablet    ibuprofen (MOTRIN) 800 mg tablet    Jardiance 10 MG TABS tablet    losartan (COZAAR) 50 mg tablet    meclizine (ANTIVERT) 25 mg tablet    metoprolol succinate (TOPROL-XL) 25 mg 24 hr tablet    Multiple Vitamin (MULTIVITAMIN) tablet    pantoprazole (PROTONIX) 40 mg tablet    pioglitazone (ACTOS) 30 mg tablet    rOPINIRole (REQUIP) 1 mg tablet    sertraline (ZOLOFT) 100 mg tablet    sertraline (ZOLOFT) 50 mg tablet    simvastatin (ZOCOR) 40 mg tablet    topiramate (TOPAMAX) 100 mg tablet    Triamcinolone Acetonide 55 MCG/ACT AERO    Xiidra 5 % op solution    ZOLMitriptan (ZOMIG) 5 MG tablet    ciprofloxacin (CIPRO) 500 mg tablet    Allergies   Allergen Reactions    Bee Pollen Swelling    Gabapentin Fatigue    Metformin GI Intolerance    Sulfa Antibiotics      High fever  Pt unsure - it happened when she was 14 years old           Objective     Blood pressure 120/88, height 5' 4.84\" (1.647 m), weight 92.1 kg (203 lb). Body mass index is 33.95 kg/m².        PHYSICAL EXAM:      General Appearance:   Alert, cooperative, no distress   HEENT:   Normocephalic, atraumatic, anicteric.     Neck:  Supple, symmetrical, trachea midline   Lungs:   Clear to auscultation bilaterally; no rales, rhonchi or wheezing; respirations unlabored    Heart::   Regular rate and rhythm; no murmur, rub, or gallop.   Abdomen:   Soft, mild epigastric tenderness without rebound or guarding, non-distended; normal bowel sounds; no masses, no organomegaly    Genitalia:   Deferred    Rectal:   Deferred    Extremities:  " No cyanosis, clubbing or edema    Pulses:  2+ and symmetric    Skin:  No jaundice, rashes, or lesions    Lymph nodes:  No palpable cervical lymphadenopathy        Lab Results:   No visits with results within 1 Day(s) from this visit.   Latest known visit with results is:   Hospital Outpatient Visit on 03/21/2024   Component Date Value    WBC 03/21/2024 6.24     RBC 03/21/2024 4.40     Hemoglobin 03/21/2024 12.7     Hematocrit 03/21/2024 38.4     MCV 03/21/2024 87     MCH 03/21/2024 28.9     MCHC 03/21/2024 33.1     RDW 03/21/2024 13.2     MPV 03/21/2024 9.8     Platelets 03/21/2024 279     nRBC 03/21/2024 0     Segmented % 03/21/2024 50     Immature Grans % 03/21/2024 1     Lymphocytes % 03/21/2024 42     Monocytes % 03/21/2024 5     Eosinophils Relative 03/21/2024 1     Basophils Relative 03/21/2024 1     Absolute Neutrophils 03/21/2024 3.22     Absolute Immature Grans 03/21/2024 0.03     Absolute Lymphocytes 03/21/2024 2.60     Absolute Monocytes 03/21/2024 0.32     Eosinophils Absolute 03/21/2024 0.04     Basophils Absolute 03/21/2024 0.03     Sodium 03/21/2024 139     Potassium 03/21/2024 3.9     Chloride 03/21/2024 102     CO2 03/21/2024 29     ANION GAP 03/21/2024 8     BUN 03/21/2024 11     Creatinine 03/21/2024 0.52 (L)     Glucose 03/21/2024 209 (H)     Calcium 03/21/2024 9.5     AST 03/21/2024 16     ALT 03/21/2024 15     Alkaline Phosphatase 03/21/2024 76     Total Protein 03/21/2024 7.4     Albumin 03/21/2024 4.2     Total Bilirubin 03/21/2024 0.99     eGFR 03/21/2024 101     Magnesium 03/21/2024 1.7 (L)          Radiology Results:   No results found.

## 2024-05-14 ENCOUNTER — TELEPHONE (OUTPATIENT)
Dept: GYNECOLOGIC ONCOLOGY | Facility: CLINIC | Age: 64
End: 2024-05-14

## 2024-05-14 NOTE — TELEPHONE ENCOUNTER
I left a message informing the patient that Dr. Taveras will be out of the office in the morning on 6/27/2024. I asked the patient to call the office to reschedule the appointment with Dr. Taveras on 6/27/2024 from the morning to the afternoon.

## 2024-05-16 ENCOUNTER — HOSPITAL ENCOUNTER (OUTPATIENT)
Dept: INFUSION CENTER | Facility: HOSPITAL | Age: 64
Discharge: HOME | End: 2024-05-16

## 2024-05-20 ENCOUNTER — TELEPHONE (OUTPATIENT)
Dept: HEMATOLOGY ONCOLOGY | Facility: CLINIC | Age: 64
End: 2024-05-20

## 2024-05-20 NOTE — TELEPHONE ENCOUNTER
Appointment Change  Cancel, Reschedule, Change to Virtual      Who are you speaking with? Patient   If it is not the patient, is the caller listed on the communication consent form? Yes   Which provider is the appointment scheduled with? Dr. Taveras   When was the original appointment scheduled?    Please list date and time 6/27/24     At which location is the appointment scheduled to take place? [place]   Was the appointment rescheduled?     Was the appointment changed from an in person visit to a virtual visit?    If so, please list the details of the change. 8/1/24   What is the reason for the appointment change? provider

## 2024-05-21 ENCOUNTER — TELEPHONE (OUTPATIENT)
Dept: GYNECOLOGIC ONCOLOGY | Facility: CLINIC | Age: 64
End: 2024-05-21

## 2024-05-21 NOTE — TELEPHONE ENCOUNTER
She needs to be seen sooner than that. Is there another available appointment? If not, I can make a spot.

## 2024-05-21 NOTE — TELEPHONE ENCOUNTER
Left message that per Pati 8/1/2024 is too long to wait to see the patient.  Patient needs to be scheduled sooner than 8/1/2024 with Dr. Taveras.

## 2024-05-24 ENCOUNTER — HOSPITAL ENCOUNTER (OUTPATIENT)
Dept: INFUSION CENTER | Facility: HOSPITAL | Age: 64
End: 2024-05-24
Payer: COMMERCIAL

## 2024-05-24 DIAGNOSIS — C55 UTERINE LEIOMYOSARCOMA (HCC): Primary | ICD-10-CM

## 2024-05-24 DIAGNOSIS — Z45.2 ENCOUNTER FOR CENTRAL LINE CARE: ICD-10-CM

## 2024-05-24 LAB
ALBUMIN SERPL BCP-MCNC: 4.2 G/DL (ref 3.5–5)
ALP SERPL-CCNC: 75 U/L (ref 34–104)
ALT SERPL W P-5'-P-CCNC: 13 U/L (ref 7–52)
ANION GAP SERPL CALCULATED.3IONS-SCNC: 9 MMOL/L (ref 4–13)
AST SERPL W P-5'-P-CCNC: 16 U/L (ref 13–39)
BASOPHILS # BLD AUTO: 0.01 THOUSANDS/ÂΜL (ref 0–0.1)
BASOPHILS NFR BLD AUTO: 0 % (ref 0–1)
BILIRUB SERPL-MCNC: 1.2 MG/DL (ref 0.2–1)
BUN SERPL-MCNC: 12 MG/DL (ref 5–25)
CALCIUM SERPL-MCNC: 9.4 MG/DL (ref 8.4–10.2)
CHLORIDE SERPL-SCNC: 106 MMOL/L (ref 96–108)
CO2 SERPL-SCNC: 27 MMOL/L (ref 21–32)
CREAT SERPL-MCNC: 0.54 MG/DL (ref 0.6–1.3)
EOSINOPHIL # BLD AUTO: 0.1 THOUSAND/ÂΜL (ref 0–0.61)
EOSINOPHIL NFR BLD AUTO: 1 % (ref 0–6)
ERYTHROCYTE [DISTWIDTH] IN BLOOD BY AUTOMATED COUNT: 13.7 % (ref 11.6–15.1)
GFR SERPL CREATININE-BSD FRML MDRD: 100 ML/MIN/1.73SQ M
GLUCOSE SERPL-MCNC: 136 MG/DL (ref 65–140)
HCT VFR BLD AUTO: 36.2 % (ref 34.8–46.1)
HGB BLD-MCNC: 11.9 G/DL (ref 11.5–15.4)
IMM GRANULOCYTES # BLD AUTO: 0.02 THOUSAND/UL (ref 0–0.2)
IMM GRANULOCYTES NFR BLD AUTO: 0 % (ref 0–2)
LYMPHOCYTES # BLD AUTO: 3 THOUSANDS/ÂΜL (ref 0.6–4.47)
LYMPHOCYTES NFR BLD AUTO: 39 % (ref 14–44)
MAGNESIUM SERPL-MCNC: 2 MG/DL (ref 1.9–2.7)
MCH RBC QN AUTO: 29.3 PG (ref 26.8–34.3)
MCHC RBC AUTO-ENTMCNC: 32.9 G/DL (ref 31.4–37.4)
MCV RBC AUTO: 89 FL (ref 82–98)
MONOCYTES # BLD AUTO: 0.34 THOUSAND/ÂΜL (ref 0.17–1.22)
MONOCYTES NFR BLD AUTO: 4 % (ref 4–12)
NEUTROPHILS # BLD AUTO: 4.25 THOUSANDS/ÂΜL (ref 1.85–7.62)
NEUTS SEG NFR BLD AUTO: 56 % (ref 43–75)
NRBC BLD AUTO-RTO: 0 /100 WBCS
PLATELET # BLD AUTO: 254 THOUSANDS/UL (ref 149–390)
PMV BLD AUTO: 9.8 FL (ref 8.9–12.7)
POTASSIUM SERPL-SCNC: 3.9 MMOL/L (ref 3.5–5.3)
PROT SERPL-MCNC: 7.5 G/DL (ref 6.4–8.4)
RBC # BLD AUTO: 4.06 MILLION/UL (ref 3.81–5.12)
SODIUM SERPL-SCNC: 142 MMOL/L (ref 135–147)
WBC # BLD AUTO: 7.72 THOUSAND/UL (ref 4.31–10.16)

## 2024-05-24 PROCEDURE — 83735 ASSAY OF MAGNESIUM: CPT

## 2024-05-24 PROCEDURE — 80053 COMPREHEN METABOLIC PANEL: CPT

## 2024-05-24 PROCEDURE — 85025 COMPLETE CBC W/AUTO DIFF WBC: CPT

## 2024-05-24 NOTE — PROGRESS NOTES
..Felecia Edward  tolerated treatment well with no complications.      Felecia Edward will call to make future appointments.    AVS printed and given to Felecia Edward:   No (Declined by Felecia Edward)

## 2024-06-06 ENCOUNTER — TELEPHONE (OUTPATIENT)
Dept: HEMATOLOGY ONCOLOGY | Facility: CLINIC | Age: 64
End: 2024-06-06

## 2024-06-06 NOTE — TELEPHONE ENCOUNTER
Patient Call    Who are you speaking with? Patient and Spouse    If it is not the patient, are they listed on an active communication consent form? Yes   What is the reason for this call? Patient was calling as she was advised to rs her appt to be seen sooner. The next appt was 7/25/24 and she didn't think that made sense to change for only a week   Does this require a call back? Yes   If a call back is required, please list best call back number [phone]   If a call back is required, advise that a message will be forwarded to their care team and someone will return their call as soon as possible.   Did you relay this information to the patient? Yes

## 2024-06-07 ENCOUNTER — TELEPHONE (OUTPATIENT)
Dept: GYNECOLOGIC ONCOLOGY | Facility: CLINIC | Age: 64
End: 2024-06-07

## 2024-06-07 NOTE — TELEPHONE ENCOUNTER
Called to inform patient that provider has some openings on his schedule on 7/18/24 in [place] if she was interested in being seen then. Asked patient to call the office back if she would like to move her appointment to that date. Provided office number for patient to call back

## 2024-06-07 NOTE — TELEPHONE ENCOUNTER
I left a message for the patient to call the office to let us know if she would be willing to come to the [place] office on Monday, 6/10/2024 at 8:45AM or 11:00AM to see Dr. Tavears since she needs to be seen sooner than 8/1/2024.

## 2024-07-15 ENCOUNTER — TELEPHONE (OUTPATIENT)
Age: 64
End: 2024-07-15

## 2024-07-18 ENCOUNTER — OFFICE VISIT (OUTPATIENT)
Age: 64
End: 2024-07-18
Payer: COMMERCIAL

## 2024-07-18 VITALS
RESPIRATION RATE: 16 BRPM | HEART RATE: 96 BPM | SYSTOLIC BLOOD PRESSURE: 138 MMHG | WEIGHT: 207.3 LBS | BODY MASS INDEX: 34.54 KG/M2 | TEMPERATURE: 98 F | DIASTOLIC BLOOD PRESSURE: 88 MMHG | OXYGEN SATURATION: 95 % | HEIGHT: 65 IN

## 2024-07-18 DIAGNOSIS — C55 UTERINE LEIOMYOSARCOMA (HCC): Primary | ICD-10-CM

## 2024-07-18 PROCEDURE — 99214 OFFICE O/P EST MOD 30 MIN: CPT | Performed by: OBSTETRICS & GYNECOLOGY

## 2024-07-18 RX ORDER — ATORVASTATIN CALCIUM 80 MG/1
80 TABLET, FILM COATED ORAL DAILY
COMMUNITY
Start: 2024-06-20

## 2024-07-18 RX ORDER — POTASSIUM CHLORIDE 1500 MG/1
1 TABLET, EXTENDED RELEASE ORAL 2 TIMES DAILY WITH MEALS
COMMUNITY
Start: 2024-06-17

## 2024-07-18 NOTE — PROGRESS NOTES
Assessment/Plan:    Problem List Items Addressed This Visit          Genitourinary    Uterine leiomyosarcoma (HCC) - Primary     63-year-old with stage II leiomyosarcoma who completed adjuvant chemotherapy with gemcitabine and Taxotere followed by Adriamycin.  Cycle 7 of treatment was administered 12/1/2023.  Last CT imaging 3/21/2024 was without evidence of measurable disease.  She has had MRD testing which has been negative as of 4/24/2024.  I reviewed CBC and CMP from 5/24/2024.  She is clinically without evidence of disease recurrence.  Her performance status is 0.  1.  Plan for follow-up CT chest abdomen pelvis in September prior to her next visit.  2.  Continue MRD testing.         Relevant Orders    CT chest abdomen pelvis w contrast    BUN    Creatinine, serum         CHIEF COMPLAINT: Sarcoma surveillance      Problem:  Cancer Staging   Uterine leiomyosarcoma (HCC)  Staging form: Corpus Uteri - Sarcoma, AJCC 8th Edition  - Pathologic stage from 6/4/2023: FIGO Stage II, calculated as Stage Unknown (pT2, pNX, cM0) - Signed by Gordo Taveras MD on 6/29/2023        Previous therapy:  Oncology History   Uterine leiomyosarcoma (HCC)   6/4/2023 Initial Diagnosis    Uterine sarcoma (HCC)     6/4/2023 -  Cancer Staged    Staging form: Corpus Uteri - Sarcoma, AJCC 8th Edition  - Pathologic stage from 6/4/2023: FIGO Stage II, calculated as Stage Unknown (pT2, pNX, cM0) - Signed by Gordo Taveras MD on 6/29/2023  Stage prefix: Initial diagnosis       6/4/2023 Surgery    HYSTERECTOMY TOTAL ABDOMINAL (DOROTHY). BILATERAL SALPINGOOPHORECTOMY  EXCISION  BIOPSY LESION/MASS ABDOMINAL-PELVIC PERITONEUM  -Leiomyosarcoma 13.5 cm extending through the myometrium, right pelvic peritoneum involved with uterine leiomyosarcoma with tumor present at unoriented resection surface.     7/11/2023 -  Chemotherapy    Gemzar 800 mg/m2 IV day 1 and 8 as well as taxotere 65 mg/m2 day 8 every 21 days.        - 12/1/2023  Chemotherapy    Adriamycin 50 mg/m2 IV every 21 days.  Including the gemcitabine and Taxotere, she completed a total of 7 cycles.           Patient ID: Felecia Edward is a 63 y.o. female  Returns for sarcoma surveillance.  She has no new complaints.  She states she has gained 5 pounds.  No vaginal bleeding, pelvic pain, abdominal pain.  Her quality of life is good.  No other interval change in medications or medical history since her last visit.  MRD testing has been negative x 3 last 4/24/2024.  Last CT imaging 3/21/2024 was without evidence of measurable disease.        The following portions of the patient's history were reviewed and updated as appropriate: allergies, current medications, past family history, past medical history, past social history, past surgical history, and problem list.    Review of Systems   Constitutional:  Negative for activity change and unexpected weight change.   HENT: Negative.     Eyes: Negative.    Respiratory: Negative.     Cardiovascular: Negative.    Gastrointestinal:  Negative for abdominal distention and abdominal pain.   Endocrine: Negative.    Genitourinary:  Negative for pelvic pain and vaginal bleeding.   Musculoskeletal: Negative.    Skin: Negative.    Allergic/Immunologic: Negative.    Neurological: Negative.    Hematological: Negative.    Psychiatric/Behavioral: Negative.         Current Outpatient Medications   Medication Sig Dispense Refill    aspirin (Aspirin 81) 81 mg chewable tablet every 24 hours      atorvastatin (LIPITOR) 80 mg tablet Take 80 mg by mouth daily      betamethasone, augmented, (DIPROLENE) 0.05 % lotion Apply topically 2 (two) times a day 60 mL 1    cetirizine (ZyrTEC) 10 mg tablet Take 10 mg by mouth daily      cholecalciferol (VITAMIN D3) 1,000 units tablet Take 1,000 Units by mouth daily 2 daily      DULoxetine (CYMBALTA) 30 mg delayed release capsule TAKE 1 CAPSULE BY MOUTH EVERY DAY FOR 90 DAYS      escitalopram (LEXAPRO) 10 mg tablet Take 10  mg by mouth daily      famotidine (PEPCID) 40 MG tablet       glimepiride (AMARYL) 4 mg tablet Take 4 mg by mouth 2 (two) times a day      hydrochlorothiazide (HYDRODIURIL) 25 mg tablet Take 25 mg by mouth daily      ibuprofen (MOTRIN) 800 mg tablet Take 800 mg by mouth every 6 (six) hours as needed for mild pain      Jardiance 10 MG TABS tablet       losartan (COZAAR) 50 mg tablet Take 50 mg by mouth daily      meclizine (ANTIVERT) 25 mg tablet Take by mouth every 12 (twelve) hours as needed for dizziness      metoprolol succinate (TOPROL-XL) 25 mg 24 hr tablet Take 25 mg by mouth daily      Multiple Vitamin (MULTIVITAMIN) tablet Take 1 tablet by mouth daily      pantoprazole (PROTONIX) 40 mg tablet Take 1 tablet (40 mg total) by mouth daily 90 tablet 5    pioglitazone (ACTOS) 30 mg tablet every 24 hours      Potassium Chloride ER 20 MEQ TBCR Take 1 tablet by mouth 2 (two) times a day with meals      rOPINIRole (REQUIP) 1 mg tablet TAKE 1 TABLET BY MOUTH 1 TO 3 HOURS BEFORE BEDTIME      sertraline (ZOLOFT) 100 mg tablet TAKE 1 AND 1/2 TABS BY MOUTH DAILY      sertraline (ZOLOFT) 50 mg tablet Take 150 mg by mouth daily      simvastatin (ZOCOR) 40 mg tablet Take 40 mg by mouth daily      topiramate (TOPAMAX) 100 mg tablet 1 tablet Orally at bedtime      Triamcinolone Acetonide 55 MCG/ACT AERO into each nostril One spray each nostril daily      Xiidra 5 % op solution INSTILL 1 DROP INTO BOTH EYES TWICE A DAY 12 HOURS APART      ZOLMitriptan (ZOMIG) 5 MG tablet Take 5 mg by mouth once as needed for migraine Daily prn      atorvastatin (LIPITOR) 40 mg tablet Take 40 mg by mouth daily at bedtime (Patient not taking: Reported on 7/18/2024)      ciprofloxacin (CIPRO) 500 mg tablet  (Patient not taking: Reported on 5/9/2024)       No current facility-administered medications for this visit.           Objective:    Blood pressure 138/88, pulse 96, temperature 98 °F (36.7 °C), temperature source Temporal, resp. rate 16,  "height 5' 4.84\" (1.647 m), weight 94 kg (207 lb 4.8 oz), SpO2 95%.  Body mass index is 34.67 kg/m².  Body surface area is 2 meters squared.    Physical Exam  Vitals reviewed. Exam conducted with a chaperone present.   Constitutional:       General: She is not in acute distress.     Appearance: Normal appearance. She is well-developed. She is not ill-appearing, toxic-appearing or diaphoretic.   HENT:      Head: Normocephalic and atraumatic.   Eyes:      General: No scleral icterus.     Extraocular Movements: Extraocular movements intact.      Conjunctiva/sclera: Conjunctivae normal.   Neck:      Thyroid: No thyromegaly.   Pulmonary:      Effort: Pulmonary effort is normal.   Abdominal:      General: There is no distension.      Palpations: Abdomen is soft. There is no mass.      Tenderness: There is no abdominal tenderness. There is no guarding or rebound.      Hernia: A hernia is present.   Genitourinary:     Comments: The external female genitalia is normal. The bartholin's, uretheral and skenes glands are normal. The urethral meatus is normal (midline with no lesions). Anus without fissure or lesion. Speculum exam reveals a grossly normal vagina. No masses, lesions,discharge or bleeding. No significant cystocele or rectocele noted. Bimanual exam notes a surgical absent cervix, uterus and adnexal structures. No masses or fullness. Bladder is without fullness, mass or tenderness.    Musculoskeletal:         General: No swelling or tenderness.      Cervical back: Normal range of motion and neck supple.      Right lower leg: Edema present.      Left lower leg: Edema present.      Comments: 1+ bilateral   Lymphadenopathy:      Cervical: No cervical adenopathy.   Skin:     General: Skin is warm and dry.      Coloration: Skin is not jaundiced or pale.      Findings: No lesion or rash.   Neurological:      General: No focal deficit present.      Mental Status: She is alert and oriented to person, place, and time. Mental " "status is at baseline.      Cranial Nerves: No cranial nerve deficit.      Motor: No weakness.      Gait: Gait normal.   Psychiatric:         Mood and Affect: Mood normal.         Behavior: Behavior normal.         Thought Content: Thought content normal.         Judgment: Judgment normal.         No results found for: \"\"  Lab Results   Component Value Date    K 3.9 05/24/2024     05/24/2024    CO2 27 05/24/2024    BUN 12 05/24/2024    CREATININE 0.54 (L) 05/24/2024    CALCIUM 9.4 05/24/2024    CORRECTEDCA 9.3 06/07/2023    AST 16 05/24/2024    ALT 13 05/24/2024    ALKPHOS 75 05/24/2024    EGFR 100 05/24/2024     Lab Results   Component Value Date    WBC 7.72 05/24/2024    HGB 11.9 05/24/2024    HCT 36.2 05/24/2024    MCV 89 05/24/2024     05/24/2024     Lab Results   Component Value Date    NEUTROABS 4.25 05/24/2024                "

## 2024-07-18 NOTE — ASSESSMENT & PLAN NOTE
63-year-old with stage II leiomyosarcoma who completed adjuvant chemotherapy with gemcitabine and Taxotere followed by Adriamycin.  Cycle 7 of treatment was administered 12/1/2023.  Last CT imaging 3/21/2024 was without evidence of measurable disease.  She has had MRD testing which has been negative as of 4/24/2024.  I reviewed CBC and CMP from 5/24/2024.  She is clinically without evidence of disease recurrence.  Her performance status is 0.  1.  Plan for follow-up CT chest abdomen pelvis in September prior to her next visit.  2.  Continue MRD testing.

## 2024-08-01 ENCOUNTER — TELEPHONE (OUTPATIENT)
Dept: GASTROENTEROLOGY | Facility: CLINIC | Age: 64
End: 2024-08-01

## 2024-08-12 ENCOUNTER — ANESTHESIA (OUTPATIENT)
Dept: GASTROENTEROLOGY | Facility: HOSPITAL | Age: 64
End: 2024-08-12

## 2024-08-12 ENCOUNTER — HOSPITAL ENCOUNTER (OUTPATIENT)
Dept: GASTROENTEROLOGY | Facility: HOSPITAL | Age: 64
Setting detail: OUTPATIENT SURGERY
Discharge: HOME | End: 2024-08-12
Attending: INTERNAL MEDICINE
Payer: COMMERCIAL

## 2024-08-12 ENCOUNTER — ANESTHESIA EVENT (OUTPATIENT)
Dept: GASTROENTEROLOGY | Facility: HOSPITAL | Age: 64
End: 2024-08-12

## 2024-08-12 VITALS
HEART RATE: 69 BPM | DIASTOLIC BLOOD PRESSURE: 80 MMHG | SYSTOLIC BLOOD PRESSURE: 153 MMHG | RESPIRATION RATE: 15 BRPM | OXYGEN SATURATION: 96 % | TEMPERATURE: 98.1 F

## 2024-08-12 DIAGNOSIS — Z86.010 PERSONAL HISTORY OF COLONIC POLYPS: ICD-10-CM

## 2024-08-12 DIAGNOSIS — R10.13 EPIGASTRIC PAIN: ICD-10-CM

## 2024-08-12 PROCEDURE — 88305 TISSUE EXAM BY PATHOLOGIST: CPT | Performed by: PATHOLOGY

## 2024-08-12 PROCEDURE — 43239 EGD BIOPSY SINGLE/MULTIPLE: CPT | Performed by: INTERNAL MEDICINE

## 2024-08-12 PROCEDURE — 45380 COLONOSCOPY AND BIOPSY: CPT | Performed by: INTERNAL MEDICINE

## 2024-08-12 PROCEDURE — 45385 COLONOSCOPY W/LESION REMOVAL: CPT | Performed by: INTERNAL MEDICINE

## 2024-08-12 RX ORDER — PROPOFOL 10 MG/ML
INJECTION, EMULSION INTRAVENOUS AS NEEDED
Status: DISCONTINUED | OUTPATIENT
Start: 2024-08-12 | End: 2024-08-12

## 2024-08-12 RX ORDER — SODIUM CHLORIDE 9 MG/ML
INJECTION, SOLUTION INTRAVENOUS CONTINUOUS PRN
Status: DISCONTINUED | OUTPATIENT
Start: 2024-08-12 | End: 2024-08-12

## 2024-08-12 RX ORDER — LIDOCAINE HYDROCHLORIDE 10 MG/ML
INJECTION, SOLUTION EPIDURAL; INFILTRATION; INTRACAUDAL; PERINEURAL AS NEEDED
Status: DISCONTINUED | OUTPATIENT
Start: 2024-08-12 | End: 2024-08-12

## 2024-08-12 RX ORDER — PROPOFOL 10 MG/ML
INJECTION, EMULSION INTRAVENOUS CONTINUOUS PRN
Status: DISCONTINUED | OUTPATIENT
Start: 2024-08-12 | End: 2024-08-12

## 2024-08-12 RX ADMIN — PROPOFOL 40 MG: 10 INJECTION, EMULSION INTRAVENOUS at 09:49

## 2024-08-12 RX ADMIN — PROPOFOL 50 MG: 10 INJECTION, EMULSION INTRAVENOUS at 10:18

## 2024-08-12 RX ADMIN — PROPOFOL 50 MG: 10 INJECTION, EMULSION INTRAVENOUS at 10:24

## 2024-08-12 RX ADMIN — SODIUM CHLORIDE: 0.9 INJECTION, SOLUTION INTRAVENOUS at 10:30

## 2024-08-12 RX ADMIN — PROPOFOL 120 MCG/KG/MIN: 10 INJECTION, EMULSION INTRAVENOUS at 09:54

## 2024-08-12 RX ADMIN — PROPOFOL 40 MG: 10 INJECTION, EMULSION INTRAVENOUS at 09:53

## 2024-08-12 RX ADMIN — PROPOFOL 100 MG: 10 INJECTION, EMULSION INTRAVENOUS at 09:48

## 2024-08-12 RX ADMIN — LIDOCAINE HYDROCHLORIDE 50 MG: 10 INJECTION, SOLUTION EPIDURAL; INFILTRATION; INTRACAUDAL; PERINEURAL at 09:48

## 2024-08-12 RX ADMIN — SODIUM CHLORIDE: 0.9 INJECTION, SOLUTION INTRAVENOUS at 09:43

## 2024-08-12 RX ADMIN — PROPOFOL 40 MG: 10 INJECTION, EMULSION INTRAVENOUS at 09:51

## 2024-08-12 NOTE — ANESTHESIA PREPROCEDURE EVALUATION
Procedure:  COLONOSCOPY  EGD    Relevant Problems   ENDO   (+) Type 2 diabetes mellitus without complication, without long-term current use of insulin (HCC)      GI/HEPATIC   (+) GERD without esophagitis      /RENAL   (+) Right renal stone      GYN   (+) Uterine leiomyosarcoma (HCC)      Oncology   (+) Chemotherapy induced neutropenia (HCC)      Cardiovascular/Peripheral Vascular   (+) Viral cardiomyopathy (HCC)      Respiratory/Allergy   (+) Drug-induced pneumonitis        Physical Exam    Airway    Mallampati score: I  TM Distance: >3 FB  Neck ROM: full     Dental       Cardiovascular  Cardiovascular exam normal    Pulmonary  Pulmonary exam normal     Other Findings  post-pubertal.      Anesthesia Plan  ASA Score- 3     Anesthesia Type- IV sedation with anesthesia with ASA Monitors.         Additional Monitors:     Airway Plan:            Plan Factors-Exercise tolerance (METS): >4 METS.    Chart reviewed. EKG reviewed. Imaging results reviewed. Existing labs reviewed. Patient summary reviewed.                  Induction- intravenous.    Postoperative Plan- Plan for postoperative opioid use. Planned trial extubation        Informed Consent- Anesthetic plan and risks discussed with patient.  I personally reviewed this patient with the CRNA. Discussed and agreed on the Anesthesia Plan with the CRNA..

## 2024-08-12 NOTE — H&P
History and Physical - [facility]  Felecia Edward 63 y.o. female MRN: [id number]    HPI: Felecia Edward is a 63 y.o. year old female who presents for EGD and colonoscopy secondary to abdominal personal history of colon polyps    REVIEW OF SYSTEMS: Per the HPI, and otherwise unremarkable.    Historical Information   Past Medical History:   Diagnosis Date    Allergic rhinitis     Anemia     Anxiety     Coronary artery disease     Depression     Diabetes mellitus (HCC)     Functional dyspepsia     GERD (gastroesophageal reflux disease)     GERD without esophagitis     Hyperlipidemia     Hypertension     Irritable bowel syndrome     Migraines     Osteoporosis     Pulmonary hypertension (HCC)     Sleep apnea, obstructive      Past Surgical History:   Procedure Laterality Date    APPENDECTOMY      CHEST WALL BIOPSY N/A 6/4/2023    Procedure: EXCISION  BIOPSY LESION/MASS ABDOMINAL-PELVIC PERITONEUM;  Surgeon: Gordo Taveras MD;  Location: [facility];  Service: Gynecology Oncology    CHOLECYSTECTOMY      FOOT SURGERY Left     Bone spur    HYSTERECTOMY N/A 6/4/2023    Procedure: HYSTERECTOMY TOTAL ABDOMINAL (DOROTHY), BILATERAL SALPINGOOPHORECTOMY;  Surgeon: Gordo Taveras MD;  Location: [facility];  Service: Gynecology Oncology    IR PORT PLACEMENT  6/30/2023     Social History   Social History     Substance and Sexual Activity   Alcohol Use Never     Social History     Substance and Sexual Activity   Drug Use Never     Social History     Tobacco Use   Smoking Status Never   Smokeless Tobacco Never     Family History   Problem Relation Age of Onset    Heart disease Mother     Cancer Mother     Breast cancer Father     Cancer Father     Diabetes Father     Hypertension Father     Coronary artery disease Brother     Diabetes Brother     Heart failure Brother     Hypertension Brother     Colon polyps Neg Hx     Colon cancer Neg Hx        Meds/Allergies       Current Outpatient  Medications:     aspirin (Aspirin 81) 81 mg chewable tablet    atorvastatin (LIPITOR) 80 mg tablet    betamethasone, augmented, (DIPROLENE) 0.05 % lotion    cetirizine (ZyrTEC) 10 mg tablet    cholecalciferol (VITAMIN D3) 1,000 units tablet    DULoxetine (CYMBALTA) 30 mg delayed release capsule    escitalopram (LEXAPRO) 10 mg tablet    famotidine (PEPCID) 40 MG tablet    glimepiride (AMARYL) 4 mg tablet    hydrochlorothiazide (HYDRODIURIL) 25 mg tablet    ibuprofen (MOTRIN) 800 mg tablet    losartan (COZAAR) 50 mg tablet    meclizine (ANTIVERT) 25 mg tablet    metoprolol succinate (TOPROL-XL) 25 mg 24 hr tablet    Multiple Vitamin (MULTIVITAMIN) tablet    pantoprazole (PROTONIX) 40 mg tablet    pioglitazone (ACTOS) 30 mg tablet    Potassium Chloride ER 20 MEQ TBCR    rOPINIRole (REQUIP) 1 mg tablet    sertraline (ZOLOFT) 100 mg tablet    sertraline (ZOLOFT) 50 mg tablet    simvastatin (ZOCOR) 40 mg tablet    topiramate (TOPAMAX) 100 mg tablet    Triamcinolone Acetonide 55 MCG/ACT AERO    Xiidra 5 % op solution    atorvastatin (LIPITOR) 40 mg tablet    ciprofloxacin (CIPRO) 500 mg tablet    Jardiance 10 MG TABS tablet    ZOLMitriptan (ZOMIG) 5 MG tablet    Allergies   Allergen Reactions    Bee Pollen Swelling    Gabapentin Fatigue    Metformin GI Intolerance    Sulfa Antibiotics      High fever  Pt unsure - it happened when she was 14 years old       Objective     There were no vitals taken for this visit.    PHYSICAL EXAM    Gen: NAD AAOx3  Head: Normocephalic, Atraumatic  CV: S1S2 RRR no m/r/g  CHEST: Clear b/l no c/r/w  ABD: soft, +BS NT/ND  EXT: no edema    ASSESSMENT/PLAN:  This is a 63 y.o. year old female here for EGD and colonoscopy, and she is stable and optimized for her procedure.

## 2024-08-12 NOTE — ANESTHESIA POSTPROCEDURE EVALUATION
Post-Op Assessment Note    CV Status:  Stable  Pain Score: 0    Pain management: adequate       Mental Status:  Alert and awake   Hydration Status:  Euvolemic   PONV Controlled:  Controlled   Airway Patency:  Patent     Post Op Vitals Reviewed: Yes    No anethesia notable event occurred.    Staff: CRNA               /73 (08/12/24 1036)    Temp   97.5   Pulse 77 (08/12/24 1036)   Resp 19 (08/12/24 1036)    SpO2 92 % (08/12/24 1036)

## 2024-08-14 PROCEDURE — 88305 TISSUE EXAM BY PATHOLOGIST: CPT | Performed by: PATHOLOGY

## 2024-09-10 ENCOUNTER — HOSPITAL ENCOUNTER (OUTPATIENT)
Dept: CT IMAGING | Facility: HOSPITAL | Age: 64
Discharge: HOME | End: 2024-09-10
Attending: OBSTETRICS & GYNECOLOGY
Payer: COMMERCIAL

## 2024-09-10 ENCOUNTER — HOSPITAL ENCOUNTER (OUTPATIENT)
Dept: INFUSION CENTER | Facility: HOSPITAL | Age: 64
Discharge: HOME | End: 2024-09-10
Payer: COMMERCIAL

## 2024-09-10 DIAGNOSIS — C55 UTERINE LEIOMYOSARCOMA (HCC): ICD-10-CM

## 2024-09-10 DIAGNOSIS — C55 UTERINE LEIOMYOSARCOMA (HCC): Primary | ICD-10-CM

## 2024-09-10 DIAGNOSIS — Z45.2 ENCOUNTER FOR CENTRAL LINE CARE: Primary | ICD-10-CM

## 2024-09-10 LAB
BUN SERPL-MCNC: 19 MG/DL (ref 5–25)
CREAT SERPL-MCNC: 0.62 MG/DL (ref 0.6–1.3)
GFR SERPL CREATININE-BSD FRML MDRD: 95 ML/MIN/1.73SQ M

## 2024-09-10 PROCEDURE — 74177 CT ABD & PELVIS W/CONTRAST: CPT

## 2024-09-10 PROCEDURE — 84520 ASSAY OF UREA NITROGEN: CPT

## 2024-09-10 PROCEDURE — 82565 ASSAY OF CREATININE: CPT

## 2024-09-10 PROCEDURE — 71260 CT THORAX DX C+: CPT

## 2024-09-10 RX ADMIN — IOHEXOL 75 ML: 350 INJECTION, SOLUTION INTRAVENOUS at 09:42

## 2024-09-10 NOTE — PROGRESS NOTES
Pt here for port labs and port access for CT Scan; labs obtained without difficulty; pt sent to CT Scan.

## 2024-10-17 ENCOUNTER — TELEPHONE (OUTPATIENT)
Age: 64
End: 2024-10-17

## 2024-10-17 ENCOUNTER — OFFICE VISIT (OUTPATIENT)
Age: 64
End: 2024-10-17
Payer: MEDICARE

## 2024-10-17 VITALS
WEIGHT: 208.4 LBS | HEART RATE: 87 BPM | HEIGHT: 65 IN | SYSTOLIC BLOOD PRESSURE: 136 MMHG | BODY MASS INDEX: 34.72 KG/M2 | OXYGEN SATURATION: 97 % | RESPIRATION RATE: 16 BRPM | TEMPERATURE: 97.4 F | DIASTOLIC BLOOD PRESSURE: 92 MMHG

## 2024-10-17 DIAGNOSIS — C55 UTERINE LEIOMYOSARCOMA (HCC): Primary | ICD-10-CM

## 2024-10-17 DIAGNOSIS — N20.0 RIGHT RENAL STONE: ICD-10-CM

## 2024-10-17 DIAGNOSIS — E11.40 TYPE 2 DIABETES MELLITUS WITH DIABETIC NEUROPATHY, WITHOUT LONG-TERM CURRENT USE OF INSULIN (HCC): ICD-10-CM

## 2024-10-17 PROBLEM — E11.9 TYPE 2 DIABETES MELLITUS WITHOUT COMPLICATION, WITHOUT LONG-TERM CURRENT USE OF INSULIN (HCC): Status: RESOLVED | Noted: 2023-06-04 | Resolved: 2024-10-17

## 2024-10-17 PROCEDURE — 99214 OFFICE O/P EST MOD 30 MIN: CPT | Performed by: OBSTETRICS & GYNECOLOGY

## 2024-10-17 PROCEDURE — 99459 PELVIC EXAMINATION: CPT | Performed by: OBSTETRICS & GYNECOLOGY

## 2024-10-17 NOTE — TELEPHONE ENCOUNTER
Pt calling to schedule f/u appt for right renal stone. Pt gyn onc recommended f/u due to renal stone increased in size from 1.2 mm in December 2023 to 13mm in September 2024. Pt is currently experiencing right flank pain.     Pt scheduled for next available appt in [place] on 10/30 at 940am.

## 2024-10-17 NOTE — PROGRESS NOTES
Assessment/Plan:    Problem List Items Addressed This Visit          Endocrine    Type 2 diabetes mellitus with diabetic neuropathy, without long-term current use of insulin (HCC)       Lab Results   Component Value Date    HGBA1C 8.1 09/19/2020   Has follow-up hemoglobin A1c scheduled.  Managed by her primary care physician.            Genitourinary    Uterine leiomyosarcoma (HCC) - Primary     64-year-old with history of stage II leiomyosarcoma who completed adjuvant chemotherapy 12/1/2023.  I reviewed CT chest abdomen pelvis images from 9/10/2024.  There is no visible evidence of disease recurrence.  I concur with the radiologist's opinion.  MRD testing has been negative x 4 most recently 8/21/2024.  She is clinically and radiologically without evidence of disease.  Her performance status is 0.  1.  Referral to interventional radiology for removal of Mediport  2.  Plan to repeat surveillance CT chest abdomen pelvis in March 2025  3.  Continue MRD testing  4.  Return in 3 months for evaluation         Relevant Orders    Ambulatory referral to Interventional Radiology    Right renal stone     1.3 cm right renal stone, symptomatic.  Plan to refer back to urology for evaluation.  The stone has been stable over time.         Relevant Orders    Ambulatory referral to Urology         CHIEF COMPLAINT: Follow-up leiomyosarcoma, flank pain      Problem:  Cancer Staging   Uterine leiomyosarcoma (HCC)  Staging form: Corpus Uteri - Sarcoma, AJCC 8th Edition  - Pathologic stage from 6/4/2023: FIGO Stage II, calculated as Stage Unknown (pT2, pNX, cM0) - Signed by Gordo Taveras MD on 6/29/2023        Previous therapy:  Oncology History   Uterine leiomyosarcoma (HCC)   6/4/2023 Initial Diagnosis    Uterine sarcoma (HCC)     6/4/2023 -  Cancer Staged    Staging form: Corpus Uteri - Sarcoma, AJCC 8th Edition  - Pathologic stage from 6/4/2023: FIGO Stage II, calculated as Stage Unknown (pT2, pNX, cM0) - Signed by Gordo  Jaiden Taveras MD on 6/29/2023  Stage prefix: Initial diagnosis       6/4/2023 Surgery    HYSTERECTOMY TOTAL ABDOMINAL (DOROTHY). BILATERAL SALPINGOOPHORECTOMY  EXCISION  BIOPSY LESION/MASS ABDOMINAL-PELVIC PERITONEUM  -Leiomyosarcoma 13.5 cm extending through the myometrium, right pelvic peritoneum involved with uterine leiomyosarcoma with tumor present at unoriented resection surface.     7/11/2023 -  Chemotherapy    Gemzar 800 mg/m2 IV day 1 and 8 as well as taxotere 65 mg/m2 day 8 every 21 days.        - 12/1/2023 Chemotherapy    Adriamycin 50 mg/m2 IV every 21 days.  Including the gemcitabine and Taxotere, she completed a total of 7 cycles.           Patient ID: Felecia Edward is a 64 y.o. female  Who returns for follow-up of uterine leiomyosarcoma.  Last MRD testing was 8/21/2024 and was negative.  She had a CT scan of the chest abdomen pelvis on 9/10/2024 that did not reveal any evidence of measurable disease.  I reviewed the images and concur with the radiologist's opinion.  She is ambulating.  She has normal bowel and bladder function.  No vaginal bleeding.  No pelvic pain.  She is working.  She has pain from her right sided kidney stone which was stable on CT imaging.  She had seen urology previously and the plan was for observation.  She is able to perform her actives of daily living without difficulty.  No other interval change in medications or medical history since her last visit to the office.        The following portions of the patient's history were reviewed and updated as appropriate: allergies, current medications, past family history, past medical history, past social history, past surgical history, and problem list.    Review of Systems   Constitutional:  Negative for activity change and unexpected weight change.   HENT: Negative.     Eyes: Negative.    Respiratory: Negative.     Cardiovascular: Negative.    Gastrointestinal:  Negative for abdominal distention and abdominal pain.   Endocrine:  Negative.    Genitourinary:  Positive for flank pain. Negative for pelvic pain and vaginal bleeding.   Musculoskeletal:  Positive for arthralgias.   Skin: Negative.    Allergic/Immunologic: Negative.    Neurological: Negative.    Hematological: Negative.    Psychiatric/Behavioral: Negative.         Current Outpatient Medications   Medication Sig Dispense Refill    aspirin (Aspirin 81) 81 mg chewable tablet every 24 hours      atorvastatin (LIPITOR) 80 mg tablet Take 80 mg by mouth daily      betamethasone, augmented, (DIPROLENE) 0.05 % lotion Apply topically 2 (two) times a day 60 mL 1    cetirizine (ZyrTEC) 10 mg tablet Take 10 mg by mouth daily      cholecalciferol (VITAMIN D3) 1,000 units tablet Take 1,000 Units by mouth daily 2 daily      DULoxetine (CYMBALTA) 30 mg delayed release capsule TAKE 1 CAPSULE BY MOUTH EVERY DAY FOR 90 DAYS      escitalopram (LEXAPRO) 10 mg tablet Take 10 mg by mouth daily      famotidine (PEPCID) 40 MG tablet       glimepiride (AMARYL) 4 mg tablet Take 4 mg by mouth 2 (two) times a day      hydrochlorothiazide (HYDRODIURIL) 25 mg tablet Take 25 mg by mouth daily      ibuprofen (MOTRIN) 800 mg tablet Take 800 mg by mouth every 6 (six) hours as needed for mild pain      Jardiance 10 MG TABS tablet       losartan (COZAAR) 50 mg tablet Take 50 mg by mouth daily      meclizine (ANTIVERT) 25 mg tablet Take by mouth every 12 (twelve) hours as needed for dizziness      metoprolol succinate (TOPROL-XL) 25 mg 24 hr tablet Take 25 mg by mouth daily      Multiple Vitamin (MULTIVITAMIN) tablet Take 1 tablet by mouth daily      pantoprazole (PROTONIX) 40 mg tablet Take 1 tablet (40 mg total) by mouth daily 90 tablet 5    pioglitazone (ACTOS) 30 mg tablet every 24 hours      Potassium Chloride ER 20 MEQ TBCR Take 1 tablet by mouth 2 (two) times a day with meals      rOPINIRole (REQUIP) 1 mg tablet TAKE 1 TABLET BY MOUTH 1 TO 3 HOURS BEFORE BEDTIME      sertraline (ZOLOFT) 100 mg tablet TAKE 1 AND  "1/2 TABS BY MOUTH DAILY      sertraline (ZOLOFT) 50 mg tablet Take 150 mg by mouth daily      simvastatin (ZOCOR) 40 mg tablet Take 40 mg by mouth daily      topiramate (TOPAMAX) 100 mg tablet 1 tablet Orally at bedtime      Triamcinolone Acetonide 55 MCG/ACT AERO into each nostril One spray each nostril daily      Xiidra 5 % op solution INSTILL 1 DROP INTO BOTH EYES TWICE A DAY 12 HOURS APART      ZOLMitriptan (ZOMIG) 5 MG tablet Take 5 mg by mouth once as needed for migraine Daily prn      atorvastatin (LIPITOR) 40 mg tablet Take 40 mg by mouth daily at bedtime (Patient not taking: Reported on 7/18/2024)      ciprofloxacin (CIPRO) 500 mg tablet  (Patient not taking: Reported on 5/9/2024)       No current facility-administered medications for this visit.           Objective:    Blood pressure 136/92, pulse 87, temperature (!) 97.4 °F (36.3 °C), temperature source Temporal, resp. rate 16, height 5' 4.84\" (1.647 m), weight 94.5 kg (208 lb 6.4 oz), SpO2 97%.  Body mass index is 34.85 kg/m².  Body surface area is 2.01 meters squared.    Physical Exam  Vitals reviewed. Exam conducted with a chaperone present.   Constitutional:       General: She is not in acute distress.     Appearance: Normal appearance. She is well-developed. She is obese. She is not ill-appearing, toxic-appearing or diaphoretic.   HENT:      Head: Normocephalic and atraumatic.   Eyes:      General: No scleral icterus.     Extraocular Movements: Extraocular movements intact.      Conjunctiva/sclera: Conjunctivae normal.   Neck:      Thyroid: No thyromegaly.   Pulmonary:      Effort: Pulmonary effort is normal.   Abdominal:      General: There is no distension.      Palpations: Abdomen is soft. There is no mass.      Tenderness: There is no abdominal tenderness. There is no guarding or rebound.      Hernia: No hernia is present.   Genitourinary:     Comments: The external female genitalia is normal. The bartholin's, uretheral and skenes glands are " "normal. The urethral meatus is normal (midline with no lesions). Anus without fissure or lesion. Speculum exam reveals a grossly normal vagina. No masses, lesions,discharge or bleeding. No significant cystocele or rectocele noted. Bimanual exam notes a surgical absent cervix, uterus and adnexal structures. No masses or fullness. Bladder is without fullness, mass or tenderness.    Musculoskeletal:         General: No swelling or tenderness.      Cervical back: Normal range of motion and neck supple.      Right lower leg: No edema.      Left lower leg: No edema.   Lymphadenopathy:      Cervical: No cervical adenopathy.   Skin:     General: Skin is warm and dry.      Coloration: Skin is not jaundiced or pale.      Findings: No lesion or rash.   Neurological:      General: No focal deficit present.      Mental Status: She is alert and oriented to person, place, and time. Mental status is at baseline.      Cranial Nerves: No cranial nerve deficit.      Motor: No weakness.      Gait: Gait normal.   Psychiatric:         Mood and Affect: Mood normal.         Behavior: Behavior normal.         Thought Content: Thought content normal.         Judgment: Judgment normal.         No results found for: \"\"  Lab Results   Component Value Date    K 3.9 05/24/2024     05/24/2024    CO2 27 05/24/2024    BUN 19 09/10/2024    CREATININE 0.62 09/10/2024    CALCIUM 9.4 05/24/2024    CORRECTEDCA 9.3 06/07/2023    AST 16 05/24/2024    ALT 13 05/24/2024    ALKPHOS 75 05/24/2024    EGFR 95 09/10/2024     Lab Results   Component Value Date    WBC 7.72 05/24/2024    HGB 11.9 05/24/2024    HCT 36.2 05/24/2024    MCV 89 05/24/2024     05/24/2024     Lab Results   Component Value Date    NEUTROABS 4.25 05/24/2024     CT chest abdomen pelvis w contrast  Narrative: CT CHEST, ABDOMEN AND PELVIS WITH IV CONTRAST    INDICATION: C55: Malignant neoplasm of uterus, part unspecified.    COMPARISON: Multiple examinations dating back to " 6/3/2023    TECHNIQUE: CT examination of the chest, abdomen and pelvis was performed. Dual energy CT scan technique (DECT) was employed. Multiplanar 2D reformatted images were created from the source data.    This examination, like all CT scans performed in the [facility], was performed utilizing techniques to minimize radiation dose exposure, including the use of iterative reconstruction and automated exposure control. Radiation dose length   product (DLP) for this visit: 1160.18 mGy-cm    IV Contrast: 75 mL of iohexol (OMNIPAQUE)  Enteric Contrast: Administered.    FINDINGS:    CHEST    LUNGS: 3 mm noncalcified nodule in the right upper lobe on series 604 image #71, unchanged. No new suspicious pulmonary nodules. No tracheal or endobronchial lesion.    PLEURA: Unremarkable.    HEART/GREAT VESSELS: Heart is unremarkable for patient's age. No thoracic aortic aneurysm.    MEDIASTINUM AND JACKY: Unremarkable.    CHEST WALL AND LOWER NECK: Vascular port in the right chest wall with tip in the SVC.    ABDOMEN    LIVER/BILIARY TREE: Hepatic steatosis. No suspicious mass. Normal hepatic contours. Mild dilatation of the common duct measuring 1 cm. No significant mild intrahepatic biliary dilatation.    GALLBLADDER: Post cholecystectomy.    SPLEEN: Unremarkable.    PANCREAS: Unremarkable.    ADRENAL GLANDS: Unremarkable.    KIDNEYS/URETERS: No hydronephrosis. 13 mm nonobstructing right renal calculus. Subcentimeter hypoattenuating renal lesion(s), too small to characterize but statistically likely benign, which do not warrant follow-up (Radiology June 2019).    STOMACH AND BOWEL: Colonic diverticulosis without findings of acute diverticulitis.    APPENDIX: No findings to suggest appendicitis.    ABDOMINOPELVIC CAVITY: No ascites. No pneumoperitoneum. No lymphadenopathy.    VESSELS: Unremarkable for patient's age.    PELVIS    REPRODUCTIVE ORGANS: Post hysterectomy.    URINARY BLADDER: Small focus of air in  the urinary bladder lumen. May be due to infection or recent instrumentation.    ABDOMINAL WALL/INGUINAL REGIONS: Postsurgical changes of the ventral abdominal wall.    BONES: No acute fracture or suspicious osseous lesion. Spinal degenerative changes.  Impression: 1.  Stable right upper lobe nodule. No new suspicious pulmonary nodules.  2.  Otherwise no evidence of metastatic disease is evident in the chest abdomen and pelvis.  3.  Other findings discussed above.    Workstation performed: [id number]

## 2024-10-17 NOTE — ASSESSMENT & PLAN NOTE
64-year-old with history of stage II leiomyosarcoma who completed adjuvant chemotherapy 12/1/2023.  I reviewed CT chest abdomen pelvis images from 9/10/2024.  There is no visible evidence of disease recurrence.  I concur with the radiologist's opinion.  MRD testing has been negative x 4 most recently 8/21/2024.  She is clinically and radiologically without evidence of disease.  Her performance status is 0.  1.  Referral to interventional radiology for removal of Mediport  2.  Plan to repeat surveillance CT chest abdomen pelvis in March 2025  3.  Continue MRD testing  4.  Return in 3 months for evaluation

## 2024-10-17 NOTE — ASSESSMENT & PLAN NOTE
1.3 cm right renal stone, symptomatic.  Plan to refer back to urology for evaluation.  The stone has been stable over time.

## 2024-10-29 NOTE — PROGRESS NOTES
Ambulatory Visit  Name: Felecia Edward      : 1960      MRN: [id number]  Encounter Provider: Ember Connor PA-C  Encounter Date: 10/30/2024   [facility]    Assessment & Plan  Right renal stone  CT c/a/p 9/10/24 -- No hydronephrosis. 13 mm nonobstructing right renal calculus  Kidney function 9/10/24 - creatinine 0.62, GFR 95   Patient reports intermittent pain at the left flank intermittent    Urine dip -- unable to provide a sample   Urinating well, no symptoms of UTI  Kidney stone diet discussed   ER protocol reviewed   Discussed ESWL vs laser lithotripsy --- patient will call us back with her decision, wishes to look into both procedures further   KUB ordered in the mean time   Surgical scheduling to follow     Orders:    Ambulatory referral to Urology    XR abdomen 1 view kub; Future    ketorolac (TORADOL) 10 mg tablet; Take 1 tablet (10 mg total) by mouth every 6 (six) hours as needed for moderate pain    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  DOROTHY/BSO- uterine sarcoma 2023  Most recent CT scan does not show any sign of recurrence or metastatic disease    History of Present Illness     Felecia Edward is a 64 y.o. female with a history of uterine leiomyosarcoma cancer presenting to urology as a new patient due to incidental finding of right nonobstructive renal stone on recent imaging.  CT scan 9/10/2024 revealed a 13 mm nonobstructing right renal calculi, no hydronephrosis.  Kidney function at that time remained stable, creatinine 0.62, GFR 95.  Patient reports she has had intermittent right flank pain on and off for about a month.  She denies difficulty urinating, nausea/vomiting, pain within the left flank/bladder, and/or fever/chills.  She is unable to provide a ride a urine sample today in the office but denies symptoms of UTI.  She  feels as if she is urinating normally, emptying well.  ER protocol was reviewed in detail with the patient in case her stone begins moving within the upcoming weeks.  As the stone is 13 mm, we discussed in detail that she will not be able to pass it independently.  Patient educated on surgical options including ESWL versus laser lithotripsy.  At this time KUB will be ordered to ensure we can visualize the kidney stone on x-ray in case she is leaning towards ESWL.  Felecia prefers to do some further investigation on both surgical options and will reach out to the office with her preference.  In the meantime, we will know if ESWL is an option for her based on the KUB.  Patient educated that once she selects for surgical preference Case request will be placed and surgical consent will be signed online by myself.  At that time she can enter into [employer] and complete surgical consent on her end so that surgical schedulers can reach out to her with available dates/times.  For the time being, Toradol will be sent to the patient's pharmacy to use as needed for ongoing pain.  Patient works as a  and will not take the medication while driving, but states it will be useful for her before bed when she seems to get the most pain.  All the patient's questions were answered today in office and she is agreeable with plan.    History obtained from : patient    Review of Systems   Constitutional:  Negative for activity change, chills, fatigue and fever.   Respiratory:  Negative for cough and shortness of breath.    Cardiovascular:  Negative for chest pain and leg swelling.   Gastrointestinal:  Negative for abdominal distention, abdominal pain, constipation, diarrhea, nausea and vomiting.   Genitourinary:  Positive for flank pain (right, intermittent). Negative for difficulty urinating, dysuria, frequency, hematuria, pelvic pain, urgency, vaginal bleeding and vaginal discharge.   Musculoskeletal:  Negative for arthralgias and  back pain.   Neurological:  Negative for dizziness and headaches.   Psychiatric/Behavioral: Negative.     All other systems reviewed and are negative.    Medical History Reviewed by provider this encounter:  Tobacco  Allergies  Meds  Problems  Med Hx  Surg Hx  Fam Hx       Current Outpatient Medications on File Prior to Visit   Medication Sig Dispense Refill    aspirin (Aspirin 81) 81 mg chewable tablet every 24 hours      atorvastatin (LIPITOR) 80 mg tablet Take 80 mg by mouth daily      cetirizine (ZyrTEC) 10 mg tablet Take 10 mg by mouth daily      cholecalciferol (VITAMIN D3) 1,000 units tablet Take 1,000 Units by mouth daily 2 daily      DULoxetine (CYMBALTA) 30 mg delayed release capsule TAKE 1 CAPSULE BY MOUTH EVERY DAY FOR 90 DAYS      escitalopram (LEXAPRO) 10 mg tablet Take 10 mg by mouth daily      famotidine (PEPCID) 40 MG tablet       glimepiride (AMARYL) 4 mg tablet Take 4 mg by mouth 2 (two) times a day      hydrochlorothiazide (HYDRODIURIL) 25 mg tablet Take 25 mg by mouth daily      ibuprofen (MOTRIN) 800 mg tablet Take 800 mg by mouth every 6 (six) hours as needed for mild pain      Jardiance 10 MG TABS tablet       losartan (COZAAR) 50 mg tablet Take 50 mg by mouth daily      meclizine (ANTIVERT) 25 mg tablet Take by mouth every 12 (twelve) hours as needed for dizziness      metoprolol succinate (TOPROL-XL) 25 mg 24 hr tablet Take 25 mg by mouth daily      Multiple Vitamin (MULTIVITAMIN) tablet Take 1 tablet by mouth daily      pantoprazole (PROTONIX) 40 mg tablet Take 1 tablet (40 mg total) by mouth daily 90 tablet 5    pioglitazone (ACTOS) 30 mg tablet every 24 hours      Potassium Chloride ER 20 MEQ TBCR Take 1 tablet by mouth 2 (two) times a day with meals      rOPINIRole (REQUIP) 1 mg tablet TAKE 1 TABLET BY MOUTH 1 TO 3 HOURS BEFORE BEDTIME      sertraline (ZOLOFT) 100 mg tablet TAKE 1 AND 1/2 TABS BY MOUTH DAILY      sertraline (ZOLOFT) 50 mg tablet Take 150 mg by mouth daily       "simvastatin (ZOCOR) 40 mg tablet Take 40 mg by mouth daily      topiramate (TOPAMAX) 100 mg tablet 1 tablet Orally at bedtime      Triamcinolone Acetonide 55 MCG/ACT AERO into each nostril One spray each nostril daily      Xiidra 5 % op solution INSTILL 1 DROP INTO BOTH EYES TWICE A DAY 12 HOURS APART      ZOLMitriptan (ZOMIG) 5 MG tablet Take 5 mg by mouth once as needed for migraine Daily prn      atorvastatin (LIPITOR) 40 mg tablet Take 40 mg by mouth daily at bedtime (Patient not taking: Reported on 7/18/2024)      betamethasone, augmented, (DIPROLENE) 0.05 % lotion Apply topically 2 (two) times a day (Patient not taking: Reported on 10/30/2024) 60 mL 1    ciprofloxacin (CIPRO) 500 mg tablet  (Patient not taking: Reported on 5/9/2024)       No current facility-administered medications on file prior to visit.      Social History     Tobacco Use    Smoking status: Never    Smokeless tobacco: Never   Vaping Use    Vaping status: Never Used   Substance and Sexual Activity    Alcohol use: Never    Drug use: Never    Sexual activity: Not Currently     Partners: Male     Birth control/protection: Abstinence, Post-menopausal, Surgical, Female Sterilization           Objective     /84 (BP Location: Left arm, Patient Position: Sitting, Cuff Size: Adult)   Pulse 63   Ht 5' 4.84\" (1.647 m)   Wt 94.3 kg (208 lb)   SpO2 99%   BMI 34.78 kg/m²   Physical Exam  Vitals and nursing note reviewed.   Constitutional:       General: She is not in acute distress.     Appearance: Normal appearance. She is well-developed. She is obese. She is not ill-appearing.   HENT:      Head: Normocephalic and atraumatic.   Eyes:      Conjunctiva/sclera: Conjunctivae normal.   Cardiovascular:      Rate and Rhythm: Normal rate and regular rhythm.      Heart sounds: No murmur heard.  Pulmonary:      Effort: Pulmonary effort is normal. No respiratory distress.      Breath sounds: Normal breath sounds.   Abdominal:      General: Abdomen is " "flat. There is no distension.      Palpations: Abdomen is soft.      Tenderness: There is no abdominal tenderness. There is right CVA tenderness. There is no left CVA tenderness.   Musculoskeletal:         General: No swelling.      Cervical back: Neck supple.   Skin:     General: Skin is warm and dry.      Capillary Refill: Capillary refill takes less than 2 seconds.   Neurological:      Mental Status: She is alert.   Psychiatric:         Mood and Affect: Mood normal.       Results  No results found for: \"PSA\"  Lab Results   Component Value Date    CALCIUM 9.4 05/24/2024    K 3.9 05/24/2024    CO2 27 05/24/2024     05/24/2024    BUN 19 09/10/2024    CREATININE 0.62 09/10/2024     Lab Results   Component Value Date    WBC 7.72 05/24/2024    HGB 11.9 05/24/2024    HCT 36.2 05/24/2024    MCV 89 05/24/2024     05/24/2024       Office Urine Dip  No results found for this or any previous visit (from the past 1 hour(s)).]    Administrative Statements   I have spent a total time of 47 minutes in caring for this patient on the day of the visit/encounter including Diagnostic results, Prognosis, Risks and benefits of tx options, Instructions for management, Patient and family education, Importance of tx compliance, Risk factor reductions, Impressions, Counseling / Coordination of care, Documenting in the medical record, Reviewing / ordering tests, medicine, procedures  , and Obtaining or reviewing history  .      "

## 2024-10-30 ENCOUNTER — TELEPHONE (OUTPATIENT)
Dept: UROLOGY | Facility: HOSPITAL | Age: 64
End: 2024-10-30

## 2024-10-30 ENCOUNTER — OFFICE VISIT (OUTPATIENT)
Dept: UROLOGY | Facility: HOSPITAL | Age: 64
End: 2024-10-30
Attending: OBSTETRICS & GYNECOLOGY
Payer: COMMERCIAL

## 2024-10-30 ENCOUNTER — HOSPITAL ENCOUNTER (OUTPATIENT)
Dept: RADIOLOGY | Facility: HOSPITAL | Age: 64
Discharge: HOME | End: 2024-10-30
Payer: COMMERCIAL

## 2024-10-30 VITALS
HEART RATE: 63 BPM | DIASTOLIC BLOOD PRESSURE: 84 MMHG | OXYGEN SATURATION: 99 % | HEIGHT: 65 IN | SYSTOLIC BLOOD PRESSURE: 126 MMHG | BODY MASS INDEX: 34.66 KG/M2 | WEIGHT: 208 LBS

## 2024-10-30 DIAGNOSIS — N20.0 RIGHT RENAL STONE: ICD-10-CM

## 2024-10-30 DIAGNOSIS — E66.01 MORBID OBESITY (HCC): ICD-10-CM

## 2024-10-30 DIAGNOSIS — C53.8 MALIGNANT NEOPLASM OF OVERLAPPING SITES OF CERVIX (HCC): Primary | ICD-10-CM

## 2024-10-30 DIAGNOSIS — N20.0 RIGHT RENAL STONE: Primary | ICD-10-CM

## 2024-10-30 PROCEDURE — 99214 OFFICE O/P EST MOD 30 MIN: CPT

## 2024-10-30 PROCEDURE — 74018 RADEX ABDOMEN 1 VIEW: CPT

## 2024-10-30 RX ORDER — KETOROLAC TROMETHAMINE 10 MG/1
10 TABLET, FILM COATED ORAL EVERY 6 HOURS PRN
Qty: 30 TABLET | Refills: 0 | Status: SHIPPED | OUTPATIENT
Start: 2024-10-30 | End: 2024-10-30

## 2024-10-30 RX ORDER — KETOROLAC TROMETHAMINE 10 MG/1
10 TABLET, FILM COATED ORAL EVERY 6 HOURS PRN
Qty: 30 TABLET | Refills: 0 | Status: SHIPPED | OUTPATIENT
Start: 2024-10-30

## 2024-10-30 NOTE — ASSESSMENT & PLAN NOTE
CT c/a/p 9/10/24 -- No hydronephrosis. 13 mm nonobstructing right renal calculus  Kidney function 9/10/24 - creatinine 0.62, GFR 95   Patient reports intermittent pain at the left flank intermittent    Urine dip -- unable to provide a sample   Urinating well, no symptoms of UTI  Kidney stone diet discussed   ER protocol reviewed   Discussed ESWL vs laser lithotripsy --- patient will call us back with her decision, wishes to look into both procedures further   KUB ordered in the mean time   Surgical scheduling to follow     Orders:    Ambulatory referral to Urology    XR abdomen 1 view kub; Future    ketorolac (TORADOL) 10 mg tablet; Take 1 tablet (10 mg total) by mouth every 6 (six) hours as needed for moderate pain

## 2024-10-30 NOTE — TELEPHONE ENCOUNTER
Patient underwent stat KUB to see if she qualifies for ESWL stone surgery.  Unfortunately, the stone was not visualized on KUB, so she will need to move forward with laser lithotripsy as her mode of surgical intervention.      CT stone study will be ordered as they will likely want a repeat study performed before she undergoes surgery to know the exact location of the stone (likely in December/January).  Please reach out to the patient and see if she would like to be scheduled for laser lithotripsy at this time and I can place the case.  Thank you!

## 2024-10-30 NOTE — ASSESSMENT & PLAN NOTE
History of uterine leiomyosarcoma cancer [facility] uterine [facility] 6/2023  Most recent CT scan does not show any sign of recurrence or metastatic disease

## 2024-10-31 LAB — HBA1C MFR BLD HPLC: 7.4 %

## 2024-10-31 NOTE — TELEPHONE ENCOUNTER
LM for Felecia about her xray - to see if she qualifies for a Eswl stonesurgery and no stone visulized Ct stone study will be ordered if she wish to have a laser lithotripsy

## 2024-11-06 ENCOUNTER — PREP FOR PROCEDURE (OUTPATIENT)
Dept: UROLOGY | Facility: HOSPITAL | Age: 64
End: 2024-11-06

## 2024-11-06 DIAGNOSIS — N20.0 NEPHROLITHIASIS: Primary | ICD-10-CM

## 2024-11-06 NOTE — TELEPHONE ENCOUNTER
Jaspreet for wil that CT scan is placed in system and case requestio to remove stone.  Someonw will be calling her to se savage up - She can yovani [phone] to schedule CT Scan so they can see placement of stone fpr surgery

## 2024-11-06 NOTE — TELEPHONE ENCOUNTER
Case request will be placed for non-urgent laser lithotripsy due to the patients 13 mm right intrarenal stone that causes intermittent pain. She will need repeat CT prior so we can better evaluate the exact position of the stone. She has follow up with our team in December, if surgery is later than that, consent can be obtained at this visit and repeat CT can be reviewed.

## 2024-11-15 ENCOUNTER — HOSPITAL ENCOUNTER (OUTPATIENT)
Dept: CT IMAGING | Facility: HOSPITAL | Age: 64
Discharge: HOME | End: 2024-11-15
Payer: COMMERCIAL

## 2024-11-15 DIAGNOSIS — N20.0 RIGHT RENAL STONE: ICD-10-CM

## 2024-11-15 PROCEDURE — 74176 CT ABD & PELVIS W/O CONTRAST: CPT

## 2024-11-21 ENCOUNTER — RESULTS FOLLOW-UP (OUTPATIENT)
Dept: OTHER | Facility: HOSPITAL | Age: 64
End: 2024-11-21

## 2024-11-21 NOTE — RESULT ENCOUNTER NOTE
CT stone study reviewed:    1. Previously visualized 10 mm nonobstructing right renal calculi and additional punctuate bilateral nonobstructing calculi again visualized.  No signs of hydronephrosis.    Nonurologic findings:   1.  Enlarging nodular soft tissue lesion at the right side of the vaginal cuff adjacent to the sigmoid colon suspicious for local tumor, GYN oncology will be made aware in case sooner follow-up as needed warranted due to the patient's history of uterine leiomyosarcoma

## 2024-11-22 ENCOUNTER — TELEPHONE (OUTPATIENT)
Dept: GYNECOLOGIC ONCOLOGY | Facility: CLINIC | Age: 64
End: 2024-11-22

## 2024-11-22 DIAGNOSIS — C54.1 MALIGNANT NEOPLASM OF ENDOMETRIUM (HCC): ICD-10-CM

## 2024-11-22 DIAGNOSIS — C55 UTERINE LEIOMYOSARCOMA (HCC): Primary | ICD-10-CM

## 2024-11-22 NOTE — TELEPHONE ENCOUNTER
Left a phone message to this pt relaying this message for Ember in full. I also left the main line number in case she has any questions. If this pt calls back just please relay these messages again. Thank you                   ----- Message from Ember Connor PA-C sent at 11/21/2024  1:42 PM EST -----  CT stone study reviewed:    1. Previously visualized 10 mm nonobstructing right renal calculi and additional punctuate bilateral nonobstructing calculi again visualized.  No signs of hydronephrosis.    Nonurologic findings:   1.  Enlarging nodular soft tissue lesion at the right side of the vaginal cuff adjacent to the sigmoid colon suspicious for local tumor, GYN oncology will be made aware in case sooner follow-up as needed warranted due to the patient's history of uterine leiomyosarcoma

## 2024-11-22 NOTE — TELEPHONE ENCOUNTER
Called and left a message that we scheduled a PET scan per Dr. Taveras and to call back for details.    Called patient's daughter.  Spoke with her about Dr. Taveras requesting PET scan. Told her that Dr. Taveras sent a [employer] message to patient with information.  PET is scheduled for 12/2 at 2 pm in .  Patient's daughter stated that she will talk to her Mother.  Went over instructions, times, data, and location.  Patient's daughter gave her verbal understanding.

## 2024-11-26 RX ORDER — SODIUM CHLORIDE 9 MG/ML
30 INJECTION, SOLUTION INTRAVENOUS CONTINUOUS
OUTPATIENT
Start: 2024-11-26

## 2024-12-02 ENCOUNTER — HOSPITAL ENCOUNTER (OUTPATIENT)
Dept: NUCLEAR MEDICINE | Facility: HOSPITAL | Age: 64
Discharge: HOME | End: 2024-12-02
Attending: OBSTETRICS & GYNECOLOGY
Payer: COMMERCIAL

## 2024-12-02 DIAGNOSIS — C55 UTERINE LEIOMYOSARCOMA (HCC): ICD-10-CM

## 2024-12-02 DIAGNOSIS — C54.1 MALIGNANT NEOPLASM OF ENDOMETRIUM (HCC): ICD-10-CM

## 2024-12-02 LAB — GLUCOSE SERPL-MCNC: 113 MG/DL (ref 65–140)

## 2024-12-02 PROCEDURE — 78815 PET IMAGE W/CT SKULL-THIGH: CPT

## 2024-12-02 PROCEDURE — 82948 REAGENT STRIP/BLOOD GLUCOSE: CPT

## 2024-12-02 PROCEDURE — A9552 F18 FDG: HCPCS

## 2024-12-03 ENCOUNTER — TELEPHONE (OUTPATIENT)
Dept: GYNECOLOGIC ONCOLOGY | Facility: CLINIC | Age: 64
End: 2024-12-03

## 2024-12-03 ENCOUNTER — TELEPHONE (OUTPATIENT)
Age: 64
End: 2024-12-03

## 2024-12-03 NOTE — TELEPHONE ENCOUNTER
Madison calling from [facility]'s Radiology Reading Room with significant findings on PET scan from 12/2/24. Will send high priority to office for review.

## 2024-12-03 NOTE — TELEPHONE ENCOUNTER
I left a message informing the patient that the appointment with Dr. Taveras on 1/9/2025 was rescheduled to 12/12/2024 at 2:00PM in [facility]) to discuss the significant findings from the PET scan.     I provided the office number for the patient to call with any questions or concerns.

## 2024-12-05 ENCOUNTER — DOCUMENTATION (OUTPATIENT)
Dept: HEMATOLOGY ONCOLOGY | Facility: CLINIC | Age: 64
End: 2024-12-05

## 2024-12-05 NOTE — PROGRESS NOTES
In-basket message received from Dr. Taveras to add patient to the next available gyn [facility] on 12/16/2024. Chart reviewed and prep completed.

## 2024-12-12 ENCOUNTER — TELEPHONE (OUTPATIENT)
Dept: GYNECOLOGIC ONCOLOGY | Facility: CLINIC | Age: 64
End: 2024-12-12

## 2024-12-12 ENCOUNTER — OFFICE VISIT (OUTPATIENT)
Age: 64
End: 2024-12-12
Payer: COMMERCIAL

## 2024-12-12 VITALS
HEART RATE: 94 BPM | BODY MASS INDEX: 33.82 KG/M2 | HEIGHT: 65 IN | TEMPERATURE: 97.5 F | SYSTOLIC BLOOD PRESSURE: 132 MMHG | DIASTOLIC BLOOD PRESSURE: 70 MMHG | OXYGEN SATURATION: 99 % | WEIGHT: 203 LBS | RESPIRATION RATE: 18 BRPM

## 2024-12-12 DIAGNOSIS — G89.3 CANCER ASSOCIATED PAIN: ICD-10-CM

## 2024-12-12 DIAGNOSIS — E11.40 TYPE 2 DIABETES MELLITUS WITH DIABETIC NEUROPATHY, WITHOUT LONG-TERM CURRENT USE OF INSULIN (HCC): ICD-10-CM

## 2024-12-12 DIAGNOSIS — C55 UTERINE LEIOMYOSARCOMA (HCC): Primary | ICD-10-CM

## 2024-12-12 PROCEDURE — 99215 OFFICE O/P EST HI 40 MIN: CPT | Performed by: OBSTETRICS & GYNECOLOGY

## 2024-12-12 PROCEDURE — G2211 COMPLEX E/M VISIT ADD ON: HCPCS | Performed by: OBSTETRICS & GYNECOLOGY

## 2024-12-12 PROCEDURE — 99459 PELVIC EXAMINATION: CPT | Performed by: OBSTETRICS & GYNECOLOGY

## 2024-12-12 RX ORDER — HEPARIN SODIUM 5000 [USP'U]/ML
5000 INJECTION, SOLUTION INTRAVENOUS; SUBCUTANEOUS
OUTPATIENT
Start: 2024-12-13 | End: 2024-12-14

## 2024-12-12 RX ORDER — METRONIDAZOLE 500 MG/1
500 TABLET ORAL ONCE
Qty: 1 TABLET | Refills: 0 | Status: SHIPPED | OUTPATIENT
Start: 2024-12-12 | End: 2024-12-12

## 2024-12-12 RX ORDER — NEOMYCIN SULFATE 500 MG/1
1000 TABLET ORAL 3 TIMES DAILY
Qty: 6 TABLET | Refills: 0 | Status: SHIPPED | OUTPATIENT
Start: 2024-12-12 | End: 2024-12-13

## 2024-12-12 RX ORDER — OXYCODONE HYDROCHLORIDE 5 MG/1
5 TABLET ORAL EVERY 6 HOURS PRN
Qty: 20 TABLET | Refills: 0 | Status: SHIPPED | OUTPATIENT
Start: 2024-12-12

## 2024-12-12 RX ORDER — ACETAMINOPHEN 325 MG/1
975 TABLET ORAL ONCE
OUTPATIENT
Start: 2024-12-12 | End: 2024-12-12

## 2024-12-12 RX ORDER — SODIUM CHLORIDE, SODIUM LACTATE, POTASSIUM CHLORIDE, CALCIUM CHLORIDE 600; 310; 30; 20 MG/100ML; MG/100ML; MG/100ML; MG/100ML
125 INJECTION, SOLUTION INTRAVENOUS CONTINUOUS
OUTPATIENT
Start: 2024-12-12

## 2024-12-12 RX ORDER — POLYETHYLENE GLYCOL 3350 17 G/17G
POWDER, FOR SOLUTION ORAL
Qty: 238 G | Refills: 0 | Status: SHIPPED | OUTPATIENT
Start: 2024-12-12

## 2024-12-12 NOTE — ASSESSMENT & PLAN NOTE
We discussed stopping Jardiance 4 days prior to surgery.  Lab Results   Component Value Date    HGBA1C 8.1 09/19/2020

## 2024-12-12 NOTE — PATIENT INSTRUCTIONS
1. Nothing to eat or drink after midnight prior to the operation.    2. Please avoid ibuprofen, aspirin, fish oil for 7 days prior to surgery  3. Medications to take the morning of surgery with a sip of water: lexapro, metoprolol, protonix  4. Hold jardiance for 4 days prior to surgery

## 2024-12-12 NOTE — TELEPHONE ENCOUNTER
Phoned patient to discuss surgery scheduling, surgery scheduled for 1/6/2025 at [facility].  Pre Op and Post op instructions reviewed.  Post op appointment scheduled.   All questions/concerns addressed.  Provided patient with call back number for any questions/concerns.

## 2024-12-12 NOTE — ASSESSMENT & PLAN NOTE
64-year-old with likely recurrent stage II uterine leiomyosarcoma completed adjuvant chemotherapy 12/1/2023.  There is an enlarging vaginal apex lesion as well as a PET +0.8 cm left external iliac lymph node.  I reviewed the CT renal stone images as well as the PET/CT images.  I also reviewed NGS testing results.  Her performance status is 0.  1.  I discussed treatment options for likely recurrent uterine sarcoma including surgical resection plus or minus adjuvant therapy, radiation therapy to the pelvis, second line chemotherapy, hormone therapy, no further treatment.  She understands that leiomyosarcoma is are often resistant to chemotherapy and can also be resistant to radiation therapy.  Based on the risks and benefits of the different treatment approaches, she would prefer surgical resection followed by adjuvant treatment if needed.  2.  I discussed the risks of examination under anesthesia, robotic assisted laparoscopic resection of the vaginal mass, possible colectomy, resection of left external iliac lymph node, possible ostomy, possible exploratory laparotomy and all other indicated procedures.  She understands the risks of the operation including additional risks of infection due to diabetes, injury to other organs from prior surgery and she agrees to proceed as outlined.  Consent for surgery was obtained by me.  3.  In the event that she has extensive peritoneal disease at the time of laparoscopy, biopsy will be obtained she understands that resection would not be performed.  4.  If the disease can be resected completely, I recommend adjuvant hormonal therapy.  5.  We reviewed perioperative medication management and activity limitations.  Orders:    Case request operating room: LAPAROTOMY EXPLORATORY W/ ROBOTICS, PELVIC LYMPH NODE DISSECTION, POSSIBLE BOWEL RESECTION, POSSIBLE OSTOMY; Standing    Type and screen; Future    CBC and Platelet; Future    Comprehensive metabolic panel; Future    HEMOGLOBIN A1C  W/ EAG ESTIMATION; Future    Protime-INR; Future    neomycin (MYCIFRADIN) 500 mg tablet; Take 2 tablets (1,000 mg total) by mouth 3 (three) times a day for 3 doses Take at 1 PM, 4 PM, and 9 PM the day before procedure.    metroNIDAZOLE (FLAGYL) 500 mg tablet; Take 1 tablet (500 mg total) by mouth once for 1 dose Take at 9 PM the night before the procedure    polyethylene glycol (MiraLax) 17 GM/SCOOP powder; Mix with 64 oz Gatorade, begin 4 PM day before surgery per bowel prep instructions.

## 2024-12-12 NOTE — PROGRESS NOTES
Name: Felecia Edward      : 1960      MRN: [id number]  Encounter Provider: Gordo Taveras MD  Encounter Date: 2024   Encounter department: [facility] GYNECOLOGY ONCOLOGY 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  There is an enlarging vaginal apex lesion as well as a PET +0.8 cm left external iliac lymph node.  I reviewed the CT renal stone images as well as the PET/CT images.  I also reviewed NGS testing results.  Her performance status is 0.  1.  I discussed treatment options for likely recurrent uterine sarcoma including surgical resection plus or minus adjuvant therapy, radiation therapy to the pelvis, second line chemotherapy, hormone therapy, no further treatment.  She understands that leiomyosarcoma is are often resistant to chemotherapy and can also be resistant to radiation therapy.  Based on the risks and benefits of the different treatment approaches, she would prefer surgical resection followed by adjuvant treatment if needed.  2.  I discussed the risks of examination under anesthesia, robotic assisted laparoscopic resection of the vaginal mass, possible colectomy, resection of left external iliac lymph node, possible ostomy, possible exploratory laparotomy and all other indicated procedures.  She understands the risks of the operation including additional risks of infection due to diabetes, injury to other organs from prior surgery and she agrees to proceed as outlined.  Consent for surgery was obtained by me.  3.  In the event that she has extensive peritoneal disease at the time of laparoscopy, biopsy will be obtained she understands that resection would not be performed.  4.  If the disease can be resected completely, I recommend adjuvant hormonal therapy.  5.  We reviewed perioperative medication management and  activity limitations.  Orders:    Case request operating room: LAPAROTOMY EXPLORATORY W/ ROBOTICS, PELVIC LYMPH NODE DISSECTION, POSSIBLE BOWEL RESECTION, POSSIBLE OSTOMY; Standing    Type and screen; Future    CBC and Platelet; Future    Comprehensive metabolic panel; Future    HEMOGLOBIN A1C W/ EAG ESTIMATION; Future    Protime-INR; Future    neomycin (MYCIFRADIN) 500 mg tablet; Take 2 tablets (1,000 mg total) by mouth 3 (three) times a day for 3 doses Take at 1 PM, 4 PM, and 9 PM the day before procedure.    metroNIDAZOLE (FLAGYL) 500 mg tablet; Take 1 tablet (500 mg total) by mouth once for 1 dose Take at 9 PM the night before the procedure    polyethylene glycol (MiraLax) 17 GM/SCOOP powder; Mix with 64 oz Gatorade, begin 4 PM day before surgery per bowel prep instructions.    Type 2 diabetes mellitus with diabetic neuropathy, without long-term current use of insulin (HCC)  We discussed stopping Jardiance 4 days prior to surgery.  Lab Results   Component Value Date    HGBA1C 8.1 09/19/2020                    History of Present Illness   Reason for Visit / CC: Follow-up PET/CT results, discussed treatment   Felecia Edward is a 64 y.o. female   Who returns for a treatment discussion.  She was having hematuria/back pain and had a CT renal stone protocol performed on 11/15/2024 to revealed a 10 mm right ureteral stone but there was also a new mass at the vaginal apex.  She then had a PET CT scan on 12/2/2024 which revealed the right aspect of the vaginal cuff as PET positive, 0.8 cm left external iliac lymph node without other evidence of disease.  She is asymptomatic and able to perform her actives of daily living without difficulty.  She has no new complaints.  Review of NGS testing reveals the tumor to be pMMR, PD-L1 negative, MO positive, ER 2+, HER2 negative.      Pertinent Medical History   Type 2 diabetes   Oncology History   Oncology History   Uterine leiomyosarcoma (HCC)   6/4/2023 Initial Diagnosis     Uterine sarcoma (HCC)     6/4/2023 -  Cancer Staged    Staging form: Corpus Uteri - Sarcoma, AJCC 8th Edition  - Pathologic stage from 6/4/2023: FIGO Stage II, calculated as Stage Unknown (pT2, pNX, cM0) - Signed by Gordo Taveras MD on 6/29/2023  Stage prefix: Initial diagnosis       6/4/2023 Surgery    HYSTERECTOMY TOTAL ABDOMINAL (DOROTHY). BILATERAL SALPINGOOPHORECTOMY  EXCISION  BIOPSY LESION/MASS ABDOMINAL-PELVIC PERITONEUM  -Leiomyosarcoma 13.5 cm extending through the myometrium, right pelvic peritoneum involved with uterine leiomyosarcoma with tumor present at unoriented resection surface.     7/11/2023 -  Chemotherapy    Gemzar 800 mg/m2 IV day 1 and 8 as well as taxotere 65 mg/m2 day 8 every 21 days.        - 12/1/2023 Chemotherapy    Adriamycin 50 mg/m2 IV every 21 days.  Including the gemcitabine and Taxotere, she completed a total of 7 cycles.        Review of Systems A complete review of systems is negative other than that noted above in the HPI.  Past Medical History   Past Medical History:   Diagnosis Date    Allergic rhinitis     Anemia     Anxiety     Coronary artery disease     Depression     Diabetes mellitus (HCC)     Functional dyspepsia     GERD (gastroesophageal reflux disease)     GERD without esophagitis     Hyperlipidemia     Hypertension     Irritable bowel syndrome     Migraines     Osteoporosis     Pulmonary hypertension (HCC)     Sleep apnea, obstructive      Past Surgical History:   Procedure Laterality Date    APPENDECTOMY      CHEST WALL BIOPSY N/A 6/4/2023    Procedure: EXCISION  BIOPSY LESION/MASS ABDOMINAL-PELVIC PERITONEUM;  Surgeon: Gordo Taveras MD;  Location: [facility];  Service: Gynecology Oncology    CHOLECYSTECTOMY      FOOT SURGERY Left     Bone spur    HYSTERECTOMY N/A 6/4/2023    Procedure: HYSTERECTOMY TOTAL ABDOMINAL (DOROTHY), BILATERAL SALPINGOOPHORECTOMY;  Surgeon: Gordo Taveras MD;  Location: [facility];  Service: Gynecology Oncology     IR PORT PLACEMENT  6/30/2023     Family History   Problem Relation Age of Onset    Heart disease Mother     Cancer Mother     Breast cancer Father     Cancer Father     Diabetes Father     Hypertension Father     Coronary artery disease Brother     Diabetes Brother     Heart failure Brother     Hypertension Brother     Colon polyps Neg Hx     Colon cancer Neg Hx       reports that she has never smoked. She has never used smokeless tobacco. She reports that she does not drink alcohol and does not use drugs.  Current Outpatient Medications on File Prior to Visit   Medication Sig Dispense Refill    aspirin (Aspirin 81) 81 mg chewable tablet every 24 hours      atorvastatin (LIPITOR) 40 mg tablet Take 40 mg by mouth daily at bedtime      atorvastatin (LIPITOR) 80 mg tablet Take 80 mg by mouth daily      cetirizine (ZyrTEC) 10 mg tablet Take 10 mg by mouth daily      cholecalciferol (VITAMIN D3) 1,000 units tablet Take 1,000 Units by mouth daily 2 daily      DULoxetine (CYMBALTA) 30 mg delayed release capsule TAKE 1 CAPSULE BY MOUTH EVERY DAY FOR 90 DAYS      escitalopram (LEXAPRO) 10 mg tablet Take 10 mg by mouth daily      famotidine (PEPCID) 40 MG tablet       glimepiride (AMARYL) 4 mg tablet Take 4 mg by mouth 2 (two) times a day      hydrochlorothiazide (HYDRODIURIL) 25 mg tablet Take 25 mg by mouth daily      ibuprofen (MOTRIN) 800 mg tablet Take 800 mg by mouth every 6 (six) hours as needed for mild pain      Jardiance 10 MG TABS tablet       ketorolac (TORADOL) 10 mg tablet Take 1 tablet (10 mg total) by mouth every 6 (six) hours as needed for moderate pain 30 tablet 0    losartan (COZAAR) 50 mg tablet Take 50 mg by mouth daily      meclizine (ANTIVERT) 25 mg tablet Take by mouth every 12 (twelve) hours as needed for dizziness      metoprolol succinate (TOPROL-XL) 25 mg 24 hr tablet Take 25 mg by mouth daily      Multiple Vitamin (MULTIVITAMIN) tablet Take 1 tablet by mouth daily      pantoprazole (PROTONIX) 40  "mg tablet Take 1 tablet (40 mg total) by mouth daily 90 tablet 5    pioglitazone (ACTOS) 30 mg tablet every 24 hours      Potassium Chloride ER 20 MEQ TBCR Take 1 tablet by mouth 2 (two) times a day with meals      rOPINIRole (REQUIP) 1 mg tablet TAKE 1 TABLET BY MOUTH 1 TO 3 HOURS BEFORE BEDTIME      sertraline (ZOLOFT) 100 mg tablet TAKE 1 AND 1/2 TABS BY MOUTH DAILY      sertraline (ZOLOFT) 50 mg tablet Take 150 mg by mouth daily      simvastatin (ZOCOR) 40 mg tablet Take 40 mg by mouth daily      topiramate (TOPAMAX) 100 mg tablet 1 tablet Orally at bedtime      Triamcinolone Acetonide 55 MCG/ACT AERO into each nostril One spray each nostril daily      Xiidra 5 % op solution INSTILL 1 DROP INTO BOTH EYES TWICE A DAY 12 HOURS APART      ZOLMitriptan (ZOMIG) 5 MG tablet Take 5 mg by mouth once as needed for migraine Daily prn      betamethasone, augmented, (DIPROLENE) 0.05 % lotion Apply topically 2 (two) times a day (Patient not taking: Reported on 10/30/2024) 60 mL 1    ciprofloxacin (CIPRO) 500 mg tablet  (Patient not taking: Reported on 5/9/2024)       No current facility-administered medications on file prior to visit.     Allergies   Allergen Reactions    Bee Pollen Swelling    Gabapentin Fatigue    Metformin GI Intolerance    Sulfa Antibiotics      High fever  Pt unsure - it happened when she was 14 years old         Objective   /70 (BP Location: Left arm, Patient Position: Sitting, Cuff Size: Large)   Pulse 94   Temp 97.5 °F (36.4 °C)   Resp 18   Ht 5' 4.84\" (1.647 m)   Wt 92.1 kg (203 lb)   SpO2 99%   BMI 33.95 kg/m²     Body mass index is 33.95 kg/m².  ECOG   0  Physical Exam  Vitals reviewed. Exam conducted with a chaperone present.   Constitutional:       General: She is not in acute distress.     Appearance: Normal appearance. She is well-developed. She is obese. She is not ill-appearing, toxic-appearing or diaphoretic.   HENT:      Head: Normocephalic and atraumatic.   Eyes:      " "General: No scleral icterus.     Extraocular Movements: Extraocular movements intact.      Conjunctiva/sclera: Conjunctivae normal.   Neck:      Thyroid: No thyromegaly.   Pulmonary:      Effort: Pulmonary effort is normal.   Abdominal:      General: There is no distension.      Palpations: Abdomen is soft. There is no mass.      Tenderness: There is no abdominal tenderness. There is no guarding or rebound.      Hernia: No hernia is present.   Genitourinary:     Comments: The external female genitalia is normal. The bartholin's, uretheral and skenes glands are normal. The urethral meatus is normal (midline with no lesions). Anus without fissure or lesion. Speculum exam reveals a grossly normal vagina. No masses, lesions,discharge or bleeding. No significant cystocele or rectocele noted. Bimanual exam notes a surgical absent cervix, uterus and adnexal structures. No masses or fullness. Bladder is without fullness, mass or tenderness.    Musculoskeletal:         General: No swelling or tenderness.      Cervical back: Normal range of motion and neck supple.      Right lower leg: No edema.      Left lower leg: No edema.   Lymphadenopathy:      Cervical: No cervical adenopathy.   Skin:     General: Skin is warm and dry.      Coloration: Skin is not jaundiced or pale.      Findings: No lesion or rash.   Neurological:      General: No focal deficit present.      Mental Status: She is alert and oriented to person, place, and time. Mental status is at baseline.      Cranial Nerves: No cranial nerve deficit.      Motor: No weakness.      Gait: Gait normal.   Psychiatric:         Mood and Affect: Mood normal.         Behavior: Behavior normal.         Thought Content: Thought content normal.         Judgment: Judgment normal.          Labs: I have reviewed pertinent labs.   No results found for: \"\"  Lab Results   Component Value Date/Time    Potassium 3.9 05/24/2024 09:40 AM    Chloride 106 05/24/2024 09:40 AM    CO2 27 " "05/24/2024 09:40 AM    BUN 19 09/10/2024 09:25 AM    Creatinine 0.62 09/10/2024 09:25 AM    Calcium 9.4 05/24/2024 09:40 AM    AST 16 05/24/2024 09:40 AM    ALT 13 05/24/2024 09:40 AM    Alkaline Phosphatase 75 05/24/2024 09:40 AM    eGFR 95 09/10/2024 09:25 AM     Lab Results   Component Value Date/Time    WBC 7.72 05/24/2024 09:40 AM    Hemoglobin 11.9 05/24/2024 09:40 AM    Hematocrit 36.2 05/24/2024 09:40 AM    MCV 89 05/24/2024 09:40 AM    Platelets 254 05/24/2024 09:40 AM     Lab Results   Component Value Date/Time    Absolute Neutrophils 4.25 05/24/2024 09:40 AM        Trend:  No results found for: \"\"    Radiology Results Review: I personally reviewed the following image studies in PACS and associated radiology reports: PET/CT and CT abdomen/pelvis. My interpretation of the radiology images/reports is: Enlarging vaginal apex nodule, no evidence of ascites or peritoneal carcinomatosis.  PET/CT imaging with isolated left external iliac lymph node as well as isolated vaginal apex lesion identified.  I concur with the radiologist's opinion..  Other Study Results Review : My interpretation of other studies include: NGS results. Pathology reports reviewed.      "

## 2024-12-13 ENCOUNTER — TELEPHONE (OUTPATIENT)
Dept: GYNECOLOGIC ONCOLOGY | Facility: CLINIC | Age: 64
End: 2024-12-13

## 2024-12-17 ENCOUNTER — PREP FOR PROCEDURE (OUTPATIENT)
Dept: OTHER | Facility: HOSPITAL | Age: 64
End: 2024-12-17

## 2024-12-17 ENCOUNTER — DOCUMENTATION (OUTPATIENT)
Dept: GYNECOLOGIC ONCOLOGY | Facility: CLINIC | Age: 64
End: 2024-12-17

## 2024-12-17 DIAGNOSIS — N20.0 NEPHROLITHIASIS: Primary | ICD-10-CM

## 2024-12-17 NOTE — PROGRESS NOTES
Multidisciplinary Gynecologic Oncology Tumor Case Review       Physician Recommended Plan     Felecia Edward is a 64 y.o. female     Diagnosis: Stage II uterine leiomyosarcoma with concern recurrence to the vaginal cuff on recent PET scan     Patient was discussed at the Multidisciplinary Gynecologic Oncology Case review on 11/16/2024. The group recommended to consider treatment with Surgical resection then consider treatment with letrozole, trabectedin, consider palliative radiation     Follow-up appointment with Dr Taveras on 1/23/2024.     NCCN guidelines were available for review.     The final treatment plan will be left to the discretion of the patient and the treating physician.       DISCLAIMERS:    TO THE TREATING PHYSICIAN:  This conference is a meeting of clinicians from various specialty areas who evaluate and discuss patients for whom a multidisciplinary treatment approach is being considered. Please note that the above opinion was a consensus of the conference attendees and is intended only to assist in quality care of the discussed patient.  The responsibility for follow up on the input given during the conference, along with any final decisions regarding plan of care, is that of the patient and the patient's provider. Accordingly, appointments have only been recommended based on this information and have NOT been scheduled unless otherwise noted.      TO THE PATIENT:  This summary is a brief record of major aspects of your cancer treatment. You may choose to share a copy with any of your doctors or nurses. However, this is not a detailed or comprehensive record of your care.

## 2024-12-24 ENCOUNTER — LAB REQUISITION (OUTPATIENT)
Dept: LAB | Facility: HOSPITAL | Age: 64
End: 2024-12-24
Payer: COMMERCIAL

## 2024-12-24 ENCOUNTER — APPOINTMENT (OUTPATIENT)
Dept: LAB | Facility: HOSPITAL | Age: 64
End: 2024-12-24
Payer: COMMERCIAL

## 2024-12-24 DIAGNOSIS — A75.3: ICD-10-CM

## 2024-12-24 DIAGNOSIS — C55 UTERINE LEIOMYOSARCOMA (HCC): ICD-10-CM

## 2024-12-24 DIAGNOSIS — C55 MALIGNANT NEOPLASM OF UTERUS, PART UNSPECIFIED (HCC): ICD-10-CM

## 2024-12-24 LAB
ABO GROUP BLD: NORMAL
ALBUMIN SERPL BCG-MCNC: 4.4 G/DL (ref 3.5–5)
ALP SERPL-CCNC: 78 U/L (ref 34–104)
ALT SERPL W P-5'-P-CCNC: 14 U/L (ref 7–52)
ANION GAP SERPL CALCULATED.3IONS-SCNC: 10 MMOL/L (ref 4–13)
AST SERPL W P-5'-P-CCNC: 18 U/L (ref 13–39)
BILIRUB SERPL-MCNC: 1.14 MG/DL (ref 0.2–1)
BLD GP AB SCN SERPL QL: NEGATIVE
BUN SERPL-MCNC: 18 MG/DL (ref 5–25)
CALCIUM SERPL-MCNC: 9.3 MG/DL (ref 8.4–10.2)
CHLORIDE SERPL-SCNC: 100 MMOL/L (ref 96–108)
CO2 SERPL-SCNC: 28 MMOL/L (ref 21–32)
CREAT SERPL-MCNC: 0.59 MG/DL (ref 0.6–1.3)
ERYTHROCYTE [DISTWIDTH] IN BLOOD BY AUTOMATED COUNT: 13.5 % (ref 11.6–15.1)
EST. AVERAGE GLUCOSE BLD GHB EST-MCNC: 171 MG/DL
GFR SERPL CREATININE-BSD FRML MDRD: 96 ML/MIN/1.73SQ M
GLUCOSE SERPL-MCNC: 130 MG/DL (ref 65–140)
HBA1C MFR BLD: 7.6 %
HCT VFR BLD AUTO: 42.9 % (ref 34.8–46.1)
HGB BLD-MCNC: 13.9 G/DL (ref 11.5–15.4)
INR PPP: 0.93 (ref 0.85–1.19)
MCH RBC QN AUTO: 28.9 PG (ref 26.8–34.3)
MCHC RBC AUTO-ENTMCNC: 32.4 G/DL (ref 31.4–37.4)
MCV RBC AUTO: 89 FL (ref 82–98)
PLATELET # BLD AUTO: 287 THOUSANDS/UL (ref 149–390)
PMV BLD AUTO: 10 FL (ref 8.9–12.7)
POTASSIUM SERPL-SCNC: 4 MMOL/L (ref 3.5–5.3)
PROT SERPL-MCNC: 7.7 G/DL (ref 6.4–8.4)
PROTHROMBIN TIME: 13 SECONDS (ref 12.3–15)
RBC # BLD AUTO: 4.81 MILLION/UL (ref 3.81–5.12)
RH BLD: POSITIVE
SODIUM SERPL-SCNC: 138 MMOL/L (ref 135–147)
SPECIMEN EXPIRATION DATE: NORMAL
WBC # BLD AUTO: 7.84 THOUSAND/UL (ref 4.31–10.16)

## 2024-12-24 PROCEDURE — 86901 BLOOD TYPING SEROLOGIC RH(D): CPT | Performed by: OBSTETRICS & GYNECOLOGY

## 2024-12-24 PROCEDURE — 85610 PROTHROMBIN TIME: CPT

## 2024-12-24 PROCEDURE — 86900 BLOOD TYPING SEROLOGIC ABO: CPT | Performed by: OBSTETRICS & GYNECOLOGY

## 2024-12-24 PROCEDURE — 85027 COMPLETE CBC AUTOMATED: CPT

## 2024-12-24 PROCEDURE — 36415 COLL VENOUS BLD VENIPUNCTURE: CPT

## 2024-12-24 PROCEDURE — 80053 COMPREHEN METABOLIC PANEL: CPT

## 2024-12-24 PROCEDURE — 83036 HEMOGLOBIN GLYCOSYLATED A1C: CPT

## 2024-12-24 PROCEDURE — 86850 RBC ANTIBODY SCREEN: CPT | Performed by: OBSTETRICS & GYNECOLOGY

## 2024-12-27 ENCOUNTER — NURSE TRIAGE (OUTPATIENT)
Age: 64
End: 2024-12-27

## 2024-12-27 ENCOUNTER — TELEPHONE (OUTPATIENT)
Dept: GYNECOLOGIC ONCOLOGY | Facility: CLINIC | Age: 64
End: 2024-12-27

## 2024-12-27 ENCOUNTER — ANESTHESIA EVENT (OUTPATIENT)
Dept: PERIOP | Facility: HOSPITAL | Age: 64
End: 2024-12-27
Payer: COMMERCIAL

## 2024-12-27 ENCOUNTER — APPOINTMENT (OUTPATIENT)
Dept: LAB | Facility: HOSPITAL | Age: 64
End: 2024-12-27
Payer: COMMERCIAL

## 2024-12-27 DIAGNOSIS — R30.0 DYSURIA: ICD-10-CM

## 2024-12-27 DIAGNOSIS — R30.0 DYSURIA: Primary | ICD-10-CM

## 2024-12-27 PROCEDURE — 87086 URINE CULTURE/COLONY COUNT: CPT

## 2024-12-27 RX ORDER — NITROFURANTOIN 25; 75 MG/1; MG/1
100 CAPSULE ORAL 2 TIMES DAILY
Qty: 10 CAPSULE | Refills: 0 | Status: SHIPPED | OUTPATIENT
Start: 2024-12-27

## 2024-12-27 NOTE — TELEPHONE ENCOUNTER
Can you let her know I will put in an order for a urine culture? I would like her to go for this today. After she provides the sample, she can go to the pharmacy and  an antibiotic to start as we await the culture results. If she develops fevers, worsening pain, please have her call us immediately. Thanks!

## 2024-12-27 NOTE — TELEPHONE ENCOUNTER
I spoke with the patient and informed her to go to the lab today for a urine culture. I also informed the patient that an antibiotic was being sent to her pharmacy for her to  today to start while we await the culture results. I informed the patient to call the office immediately if she develops any fevers or worsening pain.

## 2024-12-27 NOTE — TELEPHONE ENCOUNTER
"Provider: Dr. Taveras    Reason for Call: Hematuria and painful urination    Pt reports these symptoms started at 7am this morning and has had 3 episodes today of pink-bright red urine, abdominal cramping and 5/10 burning pain. Takes daily aspirin, denies any other blood thinners. Advised her to increase oral fluid intake and will call her back with recommendations made by provider.     Scheduled for robotic assisted resection of vaginal mass, exam under anesthesia, ex lap, left pelvis lymph node, colectomy, colostomy on 1/6 for uterine leiomyosarcoma     Best callback number: [phone]  Reason for Disposition   Pain or burning with passing urine (urination)    Answer Assessment - Initial Assessment Questions  1. COLOR of URINE: \"Describe the color of the urine.\"  (e.g., tea-colored, pink, red, bloody) \"Do you have blood clots in your urine?\" (e.g., none, pea, grape, small coin)      Pink-bright red, denies clots, red-pink on toilet paper    2. ONSET: \"When did the bleeding start?\"       This morning around 7am    3. EPISODES: \"How many times has there been blood in the urine?\" or \"How many times today?\"      3 episodes today so far    4. PAIN with URINATION: \"Is there any pain with passing your urine?\" If Yes, ask: \"How bad is the pain?\"  (Scale 1-10; or mild, moderate, severe)      5/10 pain with urination    5. FEVER: \"Do you have a fever?\" If Yes, ask: \"What is your temperature, how was it measured, and when did it start?\"      Denies    6. ASSOCIATED SYMPTOMS: \"Are you passing urine more frequently than usual?\"      Denies increased freuqency    7. OTHER SYMPTOMS: \"Do you have any other symptoms?\" (e.g., back/flank pain, abdomen pain, vomiting)     Lower abd cramping, denies nausea vomiting    Protocols used: Urine - Blood In-Adult-OH    "

## 2024-12-29 LAB — BACTERIA UR CULT: NORMAL

## 2024-12-31 ENCOUNTER — PREP FOR PROCEDURE (OUTPATIENT)
Dept: UROLOGY | Facility: CLINIC | Age: 64
End: 2024-12-31

## 2024-12-31 DIAGNOSIS — Z01.810 PRE-OPERATIVE CARDIOVASCULAR EXAMINATION: ICD-10-CM

## 2024-12-31 DIAGNOSIS — N20.0 NEPHROLITHIASIS: Primary | ICD-10-CM

## 2024-12-31 DIAGNOSIS — Z01.812 PRE-OPERATIVE LABORATORY EXAMINATION: ICD-10-CM

## 2024-12-31 DIAGNOSIS — R39.89 SUSPECTED UTI: ICD-10-CM

## 2024-12-31 RX ORDER — METRONIDAZOLE 500 MG/1
TABLET ORAL
Status: ON HOLD | COMMUNITY
Start: 2024-12-14 | End: 2025-01-06

## 2024-12-31 RX ORDER — NEOMYCIN SULFATE 500 MG/1
TABLET ORAL
Status: ON HOLD | COMMUNITY
Start: 2024-12-14 | End: 2025-01-06

## 2024-12-31 NOTE — PRE-PROCEDURE INSTRUCTIONS
Pre-Surgery Instructions:   Medication Instructions    aspirin (Aspirin 81) 81 mg chewable tablet Instructions provided by MD    atorvastatin (LIPITOR) 40 mg tablet Take night before surgery    cholecalciferol (VITAMIN D3) 1,000 units tablet Stop taking 7 days prior to surgery.    DULoxetine (CYMBALTA) 30 mg delayed release capsule Take night before surgery    glimepiride (AMARYL) 4 mg tablet Hold day of surgery.    hydrochlorothiazide (HYDRODIURIL) 25 mg tablet Hold day of surgery.    ibuprofen (MOTRIN) 800 mg tablet Stop taking 3 days prior to surgery.    Jardiance 10 MG TABS tablet Stop taking 4 days prior to surgery.    losartan (COZAAR) 50 mg tablet Hold day of surgery.    metoprolol succinate (TOPROL-XL) 25 mg 24 hr tablet Take day of surgery.    Multiple Vitamin (MULTIVITAMIN) tablet Stop taking 7 days prior to surgery.    nitrofurantoin (MACROBID) 100 mg capsule Instructions provided by MD    oxyCODONE (Roxicodone) 5 immediate release tablet Uses PRN- OK to take day of surgery    pantoprazole (PROTONIX) 40 mg tablet Take day of surgery.    pioglitazone (ACTOS) 30 mg tablet Hold day of surgery.    Potassium Chloride ER 20 MEQ TBCR Hold day of surgery.    rOPINIRole (REQUIP) 1 mg tablet Take night before surgery    sertraline (ZOLOFT) 50 mg tablet Take day of surgery.    simvastatin (ZOCOR) 40 mg tablet Take day of surgery.    topiramate (TOPAMAX) 100 mg tablet Take night before surgery    Xiidra 5 % op solution Take day of surgery.   Medication instructions for day surgery reviewed. Please use only a sip of water to take your instructed medications. Avoid all over the counter vitamins, supplements and NSAIDS for one week prior to surgery per anesthesia guidelines. Tylenol is ok to take as needed.     You will receive a call one business day prior to surgery with an arrival time and hospital directions. If your surgery is scheduled on a Monday, the hospital will be calling you on the Friday prior to your  surgery. If you have not heard from anyone by 8pm, please call the hospital supervisor through the hospital  at [phone]. ([place] [phone] or [place] [phone]).    Do not eat or drink anything after midnight the night before your surgery, including candy, mints, lifesavers, or chewing gum. Do not drink alcohol 24hrs before your surgery. Try not to smoke at least 24hrs before your surgery.       Follow the pre surgery showering instructions as listed in the “My Surgical Experience Booklet” or otherwise provided by your surgeon's office. Do not use a blade to shave the surgical area 1 week before surgery. It is okay to use a clean electric clippers up to 24 hours before surgery. Do not apply any lotions, creams, including makeup, cologne, deodorant, or perfumes after showering on the day of your surgery. Do not use dry shampoo, hair spray, hair gel, or any type of hair products.     No contact lenses, eye make-up, or artificial eyelashes. Remove nail polish, including gel polish, and any artificial, gel, or acrylic nails if possible. Remove all jewelry including rings and body piercing jewelry.     Wear causal clothing that is easy to take on and off. Consider your type of surgery.    Keep any valuables, jewelry, piercings at home. Please bring any specially ordered equipment (sling, braces) if indicated.    Arrange for a responsible person to drive you to and from the hospital on the day of your surgery. Please confirm the visitor policy for the day of your procedure when you receive your phone call with an arrival time.     Call the surgeon's office with any new illnesses, exposures, or additional questions prior to surgery.    Please reference your “My Surgical Experience Booklet” for additional information to prepare for your upcoming surgery. Follow bowel prep instructions and pre op antibiotics

## 2025-01-02 ENCOUNTER — PATIENT MESSAGE (OUTPATIENT)
Age: 65
End: 2025-01-02

## 2025-01-05 NOTE — ANESTHESIA PREPROCEDURE EVALUATION
Procedure:  ROBOTIC ASSISTED RESECTION OF VAGINAL MASS, EXAM UNDER ANESTHESIA AND ALL OTHER INDICATED PROCEDURES (Abdomen)  EXPLORATORY LAPAROTOMY, LEFT PELVIC LYMPH NODE, COLECTOMY, COLOSTOMY (Abdomen)    PMH HLD, HTN, viral CM, dyspnea, migraine, DM2, GERD, uterine leiomyosarcoma, chemo.     EKG 11/2023:  Normal sinus rhythm  Poor anterior R wave progression is noted  Abnormal ECG       Echo 9/2023:    Left Ventricle: Left ventricular cavity size is normal. Wall thickness is normal. The left ventricular ejection fraction is 60%. Systolic function is normal. Global longitudinal strain is reduced at -15%. The 2D image quality is suboptimal for strain assessment which may lead to underestimation of global longitudinal strain. Although no diagnostic regional wall motion abnormality was identified, this possibility cannot be completely excluded on the basis of this study. Diastolic function is mildly abnormal, consistent with grade I (abnormal) relaxation.    Right Ventricle: Right ventricular cavity size is mildly dilated. Systolic function is normal.  No significant valvular abnormalities      Relevant Problems   ENDO   (+) Type 2 diabetes mellitus with diabetic neuropathy, without long-term current use of insulin (HCC)      GI/HEPATIC   (+) GERD without esophagitis      /RENAL   (+) Right renal stone      GYN   (+) Malignant neoplasm of overlapping sites of cervix (HCC)   (+) Uterine leiomyosarcoma (HCC)      NEURO/PSYCH   (+) Type 2 diabetes mellitus with diabetic neuropathy, without long-term current use of insulin ([facility])     6/2023 TAHBLSO:  Mask ventilation, Mac, 2b view 7.0 ETT, bougie assisted; Lt radial A-line. High lumbar epidural covered pain well      Meds:  ASA  HCTZ  Jardiance stopped wednesday  Losartan  Metop XL last taken on friday  Oxycodone    Patient reports she has STOPPED TAKING all blood pressure medication ~3 days ago    METS:  ~4    NPO:  Since last night    Labs:  PLT and HGB  wnl  Electrolytes wnl     Physical Exam    Airway    Mallampati score: II         Dental        Cardiovascular  Cardiovascular exam normal    Pulmonary  Pulmonary exam normal     Other Findings  post-pubertal.      Anesthesia Plan  ASA Score- 3     Anesthesia Type- general with ASA Monitors.         Additional Monitors: arterial line.    Airway Plan: ETT.    Comment: General + epidural or spinal.       Plan Factors-Exercise tolerance (METS): >4 METS.    Chart reviewed. EKG reviewed.  Existing labs reviewed. Patient summary reviewed.                  Induction- intravenous.    Postoperative Plan- Plan for postoperative opioid use. Planned trial extubation        Informed Consent- Anesthetic plan and risks discussed with patient.  I personally reviewed this patient with the CRNA. Discussed and agreed on the Anesthesia Plan with the CRNA..

## 2025-01-06 ENCOUNTER — HOSPITAL ENCOUNTER (OUTPATIENT)
Facility: HOSPITAL | Age: 65
Discharge: HOME | End: 2025-01-07
Attending: OBSTETRICS & GYNECOLOGY | Admitting: OBSTETRICS & GYNECOLOGY
Payer: COMMERCIAL

## 2025-01-06 ENCOUNTER — ANESTHESIA (OUTPATIENT)
Dept: PERIOP | Facility: HOSPITAL | Age: 65
End: 2025-01-06
Payer: COMMERCIAL

## 2025-01-06 DIAGNOSIS — Z78.9 DEEP VEIN THROMBOSIS (DVT) PROPHYLAXIS PRESCRIBED AT DISCHARGE: Primary | ICD-10-CM

## 2025-01-06 DIAGNOSIS — C55 UTERINE LEIOMYOSARCOMA (HCC): ICD-10-CM

## 2025-01-06 DIAGNOSIS — C53.8 MALIGNANT NEOPLASM OF OVERLAPPING SITES OF CERVIX (HCC): ICD-10-CM

## 2025-01-06 DIAGNOSIS — G89.3 CANCER ASSOCIATED PAIN: Primary | ICD-10-CM

## 2025-01-06 LAB
ABO GROUP BLD: NORMAL
BLD GP AB SCN SERPL QL: NEGATIVE
GLUCOSE SERPL-MCNC: 235 MG/DL (ref 65–140)
GLUCOSE SERPL-MCNC: 246 MG/DL (ref 65–140)
GLUCOSE SERPL-MCNC: 249 MG/DL (ref 65–140)
RH BLD: POSITIVE
SPECIMEN EXPIRATION DATE: NORMAL

## 2025-01-06 PROCEDURE — 58662 LAPAROSCOPY EXCISE LESIONS: CPT | Performed by: OBSTETRICS & GYNECOLOGY

## 2025-01-06 PROCEDURE — S2900 ROBOTIC SURGICAL SYSTEM: HCPCS | Performed by: OBSTETRICS & GYNECOLOGY

## 2025-01-06 PROCEDURE — 88342 IMHCHEM/IMCYTCHM 1ST ANTB: CPT | Performed by: PATHOLOGY

## 2025-01-06 PROCEDURE — 94660 CPAP INITIATION&MGMT: CPT

## 2025-01-06 PROCEDURE — 82948 REAGENT STRIP/BLOOD GLUCOSE: CPT

## 2025-01-06 PROCEDURE — 88307 TISSUE EXAM BY PATHOLOGIST: CPT | Performed by: PATHOLOGY

## 2025-01-06 PROCEDURE — NC001 PR NO CHARGE: Performed by: PHYSICIAN ASSISTANT

## 2025-01-06 PROCEDURE — 94760 N-INVAS EAR/PLS OXIMETRY 1: CPT

## 2025-01-06 PROCEDURE — 86901 BLOOD TYPING SEROLOGIC RH(D): CPT | Performed by: OBSTETRICS & GYNECOLOGY

## 2025-01-06 PROCEDURE — 86850 RBC ANTIBODY SCREEN: CPT | Performed by: OBSTETRICS & GYNECOLOGY

## 2025-01-06 PROCEDURE — 88305 TISSUE EXAM BY PATHOLOGIST: CPT | Performed by: PATHOLOGY

## 2025-01-06 PROCEDURE — 86900 BLOOD TYPING SEROLOGIC ABO: CPT | Performed by: OBSTETRICS & GYNECOLOGY

## 2025-01-06 PROCEDURE — 38571 LAPAROSCOPY LYMPHADENECTOMY: CPT | Performed by: OBSTETRICS & GYNECOLOGY

## 2025-01-06 PROCEDURE — 88341 IMHCHEM/IMCYTCHM EA ADD ANTB: CPT | Performed by: PATHOLOGY

## 2025-01-06 RX ORDER — CEFAZOLIN SODIUM 2 G/50ML
2000 SOLUTION INTRAVENOUS ONCE
Status: COMPLETED | OUTPATIENT
Start: 2025-01-06 | End: 2025-01-06

## 2025-01-06 RX ORDER — SODIUM CHLORIDE, SODIUM LACTATE, POTASSIUM CHLORIDE, CALCIUM CHLORIDE 600; 310; 30; 20 MG/100ML; MG/100ML; MG/100ML; MG/100ML
100 INJECTION, SOLUTION INTRAVENOUS CONTINUOUS
Status: DISCONTINUED | OUTPATIENT
Start: 2025-01-06 | End: 2025-01-07

## 2025-01-06 RX ORDER — PROPOFOL 10 MG/ML
INJECTION, EMULSION INTRAVENOUS AS NEEDED
Status: DISCONTINUED | OUTPATIENT
Start: 2025-01-06 | End: 2025-01-06

## 2025-01-06 RX ORDER — DEXAMETHASONE SODIUM PHOSPHATE 10 MG/ML
INJECTION, SOLUTION INTRAMUSCULAR; INTRAVENOUS AS NEEDED
Status: DISCONTINUED | OUTPATIENT
Start: 2025-01-06 | End: 2025-01-06

## 2025-01-06 RX ORDER — HYDROMORPHONE HCL/PF 1 MG/ML
0.5 SYRINGE (ML) INJECTION EVERY 2 HOUR PRN
Refills: 0 | Status: DISCONTINUED | OUTPATIENT
Start: 2025-01-06 | End: 2025-01-07 | Stop reason: HOSPADM

## 2025-01-06 RX ORDER — SODIUM CHLORIDE 9 MG/ML
INJECTION, SOLUTION INTRAVENOUS CONTINUOUS PRN
Status: DISCONTINUED | OUTPATIENT
Start: 2025-01-06 | End: 2025-01-06

## 2025-01-06 RX ORDER — PROPOFOL 10 MG/ML
INJECTION, EMULSION INTRAVENOUS CONTINUOUS PRN
Status: DISCONTINUED | OUTPATIENT
Start: 2025-01-06 | End: 2025-01-06

## 2025-01-06 RX ORDER — SODIUM CHLORIDE, SODIUM LACTATE, POTASSIUM CHLORIDE, CALCIUM CHLORIDE 600; 310; 30; 20 MG/100ML; MG/100ML; MG/100ML; MG/100ML
125 INJECTION, SOLUTION INTRAVENOUS CONTINUOUS
Status: DISCONTINUED | OUTPATIENT
Start: 2025-01-06 | End: 2025-01-06

## 2025-01-06 RX ORDER — METOPROLOL SUCCINATE 25 MG/1
25 TABLET, EXTENDED RELEASE ORAL DAILY
Status: DISCONTINUED | OUTPATIENT
Start: 2025-01-07 | End: 2025-01-07 | Stop reason: HOSPADM

## 2025-01-06 RX ORDER — ACETAMINOPHEN 10 MG/ML
1000 INJECTION, SOLUTION INTRAVENOUS ONCE
Status: COMPLETED | OUTPATIENT
Start: 2025-01-06 | End: 2025-01-06

## 2025-01-06 RX ORDER — ROCURONIUM BROMIDE 10 MG/ML
INJECTION, SOLUTION INTRAVENOUS AS NEEDED
Status: DISCONTINUED | OUTPATIENT
Start: 2025-01-06 | End: 2025-01-06

## 2025-01-06 RX ORDER — GLYCOPYRROLATE 0.2 MG/ML
INJECTION INTRAMUSCULAR; INTRAVENOUS AS NEEDED
Status: DISCONTINUED | OUTPATIENT
Start: 2025-01-06 | End: 2025-01-06

## 2025-01-06 RX ORDER — HEPARIN SODIUM 5000 [USP'U]/ML
5000 INJECTION, SOLUTION INTRAVENOUS; SUBCUTANEOUS
Status: COMPLETED | OUTPATIENT
Start: 2025-01-06 | End: 2025-01-06

## 2025-01-06 RX ORDER — FENTANYL CITRATE 50 UG/ML
INJECTION, SOLUTION INTRAMUSCULAR; INTRAVENOUS AS NEEDED
Status: DISCONTINUED | OUTPATIENT
Start: 2025-01-06 | End: 2025-01-06

## 2025-01-06 RX ORDER — METOCLOPRAMIDE HYDROCHLORIDE 5 MG/ML
INJECTION INTRAMUSCULAR; INTRAVENOUS AS NEEDED
Status: DISCONTINUED | OUTPATIENT
Start: 2025-01-06 | End: 2025-01-06

## 2025-01-06 RX ORDER — KETAMINE HCL IN NACL, ISO-OSM 100MG/10ML
SYRINGE (ML) INJECTION AS NEEDED
Status: DISCONTINUED | OUTPATIENT
Start: 2025-01-06 | End: 2025-01-06

## 2025-01-06 RX ORDER — BUPIVACAINE HYDROCHLORIDE 2.5 MG/ML
INJECTION, SOLUTION EPIDURAL; INFILTRATION; INTRACAUDAL AS NEEDED
Status: DISCONTINUED | OUTPATIENT
Start: 2025-01-06 | End: 2025-01-06 | Stop reason: HOSPADM

## 2025-01-06 RX ORDER — ONDANSETRON 2 MG/ML
INJECTION INTRAMUSCULAR; INTRAVENOUS AS NEEDED
Status: DISCONTINUED | OUTPATIENT
Start: 2025-01-06 | End: 2025-01-06

## 2025-01-06 RX ORDER — MAGNESIUM HYDROXIDE 1200 MG/15ML
LIQUID ORAL AS NEEDED
Status: DISCONTINUED | OUTPATIENT
Start: 2025-01-06 | End: 2025-01-06 | Stop reason: HOSPADM

## 2025-01-06 RX ORDER — IBUPROFEN 600 MG/1
600 TABLET, FILM COATED ORAL EVERY 6 HOURS SCHEDULED
Status: DISCONTINUED | OUTPATIENT
Start: 2025-01-06 | End: 2025-01-07 | Stop reason: HOSPADM

## 2025-01-06 RX ORDER — TOPIRAMATE 100 MG/1
100 TABLET, FILM COATED ORAL
Status: DISCONTINUED | OUTPATIENT
Start: 2025-01-06 | End: 2025-01-07 | Stop reason: HOSPADM

## 2025-01-06 RX ORDER — DULOXETIN HYDROCHLORIDE 30 MG/1
30 CAPSULE, DELAYED RELEASE ORAL DAILY
Status: DISCONTINUED | OUTPATIENT
Start: 2025-01-07 | End: 2025-01-07 | Stop reason: HOSPADM

## 2025-01-06 RX ORDER — INSULIN LISPRO 100 [IU]/ML
1-6 INJECTION, SOLUTION INTRAVENOUS; SUBCUTANEOUS
Status: DISCONTINUED | OUTPATIENT
Start: 2025-01-06 | End: 2025-01-07 | Stop reason: HOSPADM

## 2025-01-06 RX ORDER — FENTANYL CITRATE/PF 50 MCG/ML
25 SYRINGE (ML) INJECTION
Status: DISCONTINUED | OUTPATIENT
Start: 2025-01-06 | End: 2025-01-06 | Stop reason: HOSPADM

## 2025-01-06 RX ORDER — INSULIN LISPRO 100 [IU]/ML
3 INJECTION, SOLUTION INTRAVENOUS; SUBCUTANEOUS ONCE
Status: COMPLETED | OUTPATIENT
Start: 2025-01-06 | End: 2025-01-06

## 2025-01-06 RX ORDER — LIDOCAINE HCL/PF 100 MG/5ML
SYRINGE (ML) INJECTION AS NEEDED
Status: DISCONTINUED | OUTPATIENT
Start: 2025-01-06 | End: 2025-01-06

## 2025-01-06 RX ORDER — ONDANSETRON 2 MG/ML
4 INJECTION INTRAMUSCULAR; INTRAVENOUS EVERY 6 HOURS PRN
Status: DISCONTINUED | OUTPATIENT
Start: 2025-01-06 | End: 2025-01-07 | Stop reason: HOSPADM

## 2025-01-06 RX ORDER — SODIUM CHLORIDE, SODIUM LACTATE, POTASSIUM CHLORIDE, CALCIUM CHLORIDE 600; 310; 30; 20 MG/100ML; MG/100ML; MG/100ML; MG/100ML
75 INJECTION, SOLUTION INTRAVENOUS CONTINUOUS
Status: DISCONTINUED | OUTPATIENT
Start: 2025-01-06 | End: 2025-01-07

## 2025-01-06 RX ORDER — METRONIDAZOLE 500 MG/100ML
500 INJECTION, SOLUTION INTRAVENOUS ONCE
Status: COMPLETED | OUTPATIENT
Start: 2025-01-06 | End: 2025-01-06

## 2025-01-06 RX ORDER — KETOROLAC TROMETHAMINE 30 MG/ML
INJECTION, SOLUTION INTRAMUSCULAR; INTRAVENOUS AS NEEDED
Status: DISCONTINUED | OUTPATIENT
Start: 2025-01-06 | End: 2025-01-06

## 2025-01-06 RX ORDER — LIDOCAINE HYDROCHLORIDE AND EPINEPHRINE 15; 5 MG/ML; UG/ML
INJECTION, SOLUTION EPIDURAL
Status: COMPLETED | OUTPATIENT
Start: 2025-01-06 | End: 2025-01-06

## 2025-01-06 RX ORDER — HYDROMORPHONE HYDROCHLORIDE 1 MG/ML
INJECTION, SOLUTION INTRAMUSCULAR; INTRAVENOUS; SUBCUTANEOUS AS NEEDED
Status: DISCONTINUED | OUTPATIENT
Start: 2025-01-06 | End: 2025-01-06

## 2025-01-06 RX ORDER — ALBUTEROL SULFATE 0.83 MG/ML
2.5 SOLUTION RESPIRATORY (INHALATION) ONCE AS NEEDED
Status: DISCONTINUED | OUTPATIENT
Start: 2025-01-06 | End: 2025-01-06

## 2025-01-06 RX ORDER — HYDRALAZINE HYDROCHLORIDE 20 MG/ML
10 INJECTION INTRAMUSCULAR; INTRAVENOUS
Status: DISCONTINUED | OUTPATIENT
Start: 2025-01-06 | End: 2025-01-06 | Stop reason: HOSPADM

## 2025-01-06 RX ORDER — NALOXONE HYDROCHLORIDE 0.4 MG/ML
0.1 INJECTION, SOLUTION INTRAMUSCULAR; INTRAVENOUS; SUBCUTANEOUS
Status: DISCONTINUED | OUTPATIENT
Start: 2025-01-06 | End: 2025-01-07 | Stop reason: HOSPADM

## 2025-01-06 RX ORDER — DIPHENHYDRAMINE HYDROCHLORIDE 50 MG/ML
25 INJECTION INTRAMUSCULAR; INTRAVENOUS EVERY 6 HOURS PRN
Status: DISCONTINUED | OUTPATIENT
Start: 2025-01-06 | End: 2025-01-07 | Stop reason: HOSPADM

## 2025-01-06 RX ORDER — MIDAZOLAM HYDROCHLORIDE 2 MG/2ML
INJECTION, SOLUTION INTRAMUSCULAR; INTRAVENOUS AS NEEDED
Status: DISCONTINUED | OUTPATIENT
Start: 2025-01-06 | End: 2025-01-06

## 2025-01-06 RX ORDER — ROPIVACAINE HYDROCHLORIDE 2 MG/ML
INJECTION, SOLUTION EPIDURAL; INFILTRATION; PERINEURAL AS NEEDED
Status: DISCONTINUED | OUTPATIENT
Start: 2025-01-06 | End: 2025-01-06

## 2025-01-06 RX ORDER — PANTOPRAZOLE SODIUM 40 MG/1
40 TABLET, DELAYED RELEASE ORAL DAILY
Status: DISCONTINUED | OUTPATIENT
Start: 2025-01-07 | End: 2025-01-07 | Stop reason: HOSPADM

## 2025-01-06 RX ORDER — DIPHENHYDRAMINE HYDROCHLORIDE 50 MG/ML
12.5 INJECTION INTRAMUSCULAR; INTRAVENOUS ONCE AS NEEDED
Status: DISCONTINUED | OUTPATIENT
Start: 2025-01-06 | End: 2025-01-06 | Stop reason: HOSPADM

## 2025-01-06 RX ORDER — ACETAMINOPHEN 325 MG/1
975 TABLET ORAL ONCE
Status: COMPLETED | OUTPATIENT
Start: 2025-01-06 | End: 2025-01-06

## 2025-01-06 RX ORDER — METOPROLOL SUCCINATE 25 MG/1
12.5 TABLET, EXTENDED RELEASE ORAL ONCE
Status: COMPLETED | OUTPATIENT
Start: 2025-01-06 | End: 2025-01-06

## 2025-01-06 RX ADMIN — PROPOFOL 120 MG: 10 INJECTION, EMULSION INTRAVENOUS at 08:14

## 2025-01-06 RX ADMIN — GLYCOPYRROLATE 0.2 MG: 0.2 INJECTION, SOLUTION INTRAMUSCULAR; INTRAVENOUS at 08:57

## 2025-01-06 RX ADMIN — SODIUM CHLORIDE: 0.9 INJECTION, SOLUTION INTRAVENOUS at 08:30

## 2025-01-06 RX ADMIN — INSULIN LISPRO 3 UNITS: 100 INJECTION, SOLUTION INTRAVENOUS; SUBCUTANEOUS at 13:40

## 2025-01-06 RX ADMIN — SODIUM CHLORIDE, SODIUM LACTATE, POTASSIUM CHLORIDE, AND CALCIUM CHLORIDE: .6; .31; .03; .02 INJECTION, SOLUTION INTRAVENOUS at 07:26

## 2025-01-06 RX ADMIN — PROPOFOL 30 MG: 10 INJECTION, EMULSION INTRAVENOUS at 13:16

## 2025-01-06 RX ADMIN — ROCURONIUM BROMIDE 70 MG: 10 INJECTION, SOLUTION INTRAVENOUS at 08:14

## 2025-01-06 RX ADMIN — ROPIVACAINE HYDROCHLORIDE: 5 INJECTION EPIDURAL; INFILTRATION; PERINEURAL at 13:39

## 2025-01-06 RX ADMIN — HEPARIN SODIUM 5000 UNITS: 5000 INJECTION, SOLUTION INTRAVENOUS; SUBCUTANEOUS at 09:15

## 2025-01-06 RX ADMIN — SUGAMMADEX 400 MG: 100 INJECTION, SOLUTION INTRAVENOUS at 13:16

## 2025-01-06 RX ADMIN — ACETAMINOPHEN 1000 MG: 10 INJECTION INTRAVENOUS at 13:41

## 2025-01-06 RX ADMIN — FENTANYL CITRATE 25 MCG: 50 INJECTION INTRAMUSCULAR; INTRAVENOUS at 12:36

## 2025-01-06 RX ADMIN — DIPHENHYDRAMINE HYDROCHLORIDE 25 MG: 50 INJECTION, SOLUTION INTRAMUSCULAR; INTRAVENOUS at 21:26

## 2025-01-06 RX ADMIN — ONDANSETRON 4 MG: 2 INJECTION INTRAMUSCULAR; INTRAVENOUS at 13:04

## 2025-01-06 RX ADMIN — ROCURONIUM BROMIDE 30 MG: 10 INJECTION, SOLUTION INTRAVENOUS at 10:12

## 2025-01-06 RX ADMIN — LIDOCAINE HYDROCHLORIDE AND EPINEPHRINE 3 ML: 15; 5 INJECTION, SOLUTION EPIDURAL at 07:58

## 2025-01-06 RX ADMIN — FENTANYL CITRATE 50 MCG: 50 INJECTION INTRAMUSCULAR; INTRAVENOUS at 08:14

## 2025-01-06 RX ADMIN — ROCURONIUM BROMIDE 30 MG: 10 INJECTION, SOLUTION INTRAVENOUS at 09:24

## 2025-01-06 RX ADMIN — SODIUM CHLORIDE, SODIUM LACTATE, POTASSIUM CHLORIDE, AND CALCIUM CHLORIDE 100 ML/HR: .6; .31; .03; .02 INJECTION, SOLUTION INTRAVENOUS at 16:46

## 2025-01-06 RX ADMIN — ROPIVACAINE HYDROCHLORIDE 3 ML: 2 INJECTION, SOLUTION EPIDURAL; INFILTRATION at 13:12

## 2025-01-06 RX ADMIN — CEFAZOLIN SODIUM 2000 MG: 2 SOLUTION INTRAVENOUS at 12:14

## 2025-01-06 RX ADMIN — Medication 10 MG: at 09:24

## 2025-01-06 RX ADMIN — MIDAZOLAM 1 MG: 1 INJECTION INTRAMUSCULAR; INTRAVENOUS at 07:30

## 2025-01-06 RX ADMIN — HYDROMORPHONE HYDROCHLORIDE 0.5 MG: 1 INJECTION, SOLUTION INTRAMUSCULAR; INTRAVENOUS; SUBCUTANEOUS at 12:40

## 2025-01-06 RX ADMIN — Medication 10 MG: at 11:08

## 2025-01-06 RX ADMIN — FENTANYL CITRATE 25 MCG: 50 INJECTION INTRAMUSCULAR; INTRAVENOUS at 12:08

## 2025-01-06 RX ADMIN — PROPOFOL 40 MG: 10 INJECTION, EMULSION INTRAVENOUS at 11:35

## 2025-01-06 RX ADMIN — MIDAZOLAM 1 MG: 1 INJECTION INTRAMUSCULAR; INTRAVENOUS at 08:40

## 2025-01-06 RX ADMIN — ROCURONIUM BROMIDE 20 MG: 10 INJECTION, SOLUTION INTRAVENOUS at 10:45

## 2025-01-06 RX ADMIN — CEFAZOLIN SODIUM 2000 MG: 2 SOLUTION INTRAVENOUS at 08:18

## 2025-01-06 RX ADMIN — LIDOCAINE HYDROCHLORIDE 100 MG: 20 INJECTION INTRAVENOUS at 08:14

## 2025-01-06 RX ADMIN — Medication 10 MG: at 12:54

## 2025-01-06 RX ADMIN — METOCLOPRAMIDE 10 MG: 5 INJECTION, SOLUTION INTRAMUSCULAR; INTRAVENOUS at 13:04

## 2025-01-06 RX ADMIN — INSULIN LISPRO 3 UNITS: 100 INJECTION, SOLUTION INTRAVENOUS; SUBCUTANEOUS at 07:00

## 2025-01-06 RX ADMIN — SODIUM CHLORIDE, SODIUM LACTATE, POTASSIUM CHLORIDE, AND CALCIUM CHLORIDE: .6; .31; .03; .02 INJECTION, SOLUTION INTRAVENOUS at 09:47

## 2025-01-06 RX ADMIN — ACETAMINOPHEN 975 MG: 325 TABLET, FILM COATED ORAL at 06:42

## 2025-01-06 RX ADMIN — FENTANYL CITRATE 50 MCG: 50 INJECTION INTRAMUSCULAR; INTRAVENOUS at 09:24

## 2025-01-06 RX ADMIN — ROCURONIUM BROMIDE 20 MG: 10 INJECTION, SOLUTION INTRAVENOUS at 12:14

## 2025-01-06 RX ADMIN — KETOROLAC TROMETHAMINE 30 MG: 30 INJECTION, SOLUTION INTRAMUSCULAR; INTRAVENOUS at 13:28

## 2025-01-06 RX ADMIN — Medication 40 MG: at 08:14

## 2025-01-06 RX ADMIN — METRONIDAZOLE: 500 SOLUTION INTRAVENOUS at 08:18

## 2025-01-06 RX ADMIN — ROCURONIUM BROMIDE 30 MG: 10 INJECTION, SOLUTION INTRAVENOUS at 12:36

## 2025-01-06 RX ADMIN — METOPROLOL SUCCINATE 12.5 MG: 25 TABLET, EXTENDED RELEASE ORAL at 14:56

## 2025-01-06 RX ADMIN — ROCURONIUM BROMIDE 20 MG: 10 INJECTION, SOLUTION INTRAVENOUS at 11:29

## 2025-01-06 RX ADMIN — Medication 20 MG: at 10:15

## 2025-01-06 RX ADMIN — ROPIVACAINE HYDROCHLORIDE 5 ML: 2 INJECTION, SOLUTION EPIDURAL; INFILTRATION at 11:16

## 2025-01-06 RX ADMIN — DEXAMETHASONE SODIUM PHOSPHATE 10 MG: 10 INJECTION, SOLUTION INTRAMUSCULAR; INTRAVENOUS at 08:14

## 2025-01-06 RX ADMIN — ROPIVACAINE HYDROCHLORIDE 2 ML: 2 INJECTION, SOLUTION EPIDURAL; INFILTRATION at 12:08

## 2025-01-06 RX ADMIN — TOPIRAMATE 100 MG: 100 TABLET, FILM COATED ORAL at 21:09

## 2025-01-06 RX ADMIN — Medication 10 MG: at 12:08

## 2025-01-06 NOTE — QUICK NOTE
"Post Op Check - Gyn Onc  Felecia Edward 64 y.o. female MRN: [id number]  Unit/Bed#: [facility] 921-01 Encounter: [id number]    Chief concern: POD#0 s/p robotic assisted-radical bilateral parametrectomy, upper vaginectomy, left obturator lymph node dissection, cystoscopy     SUBJECTIVE:  Felecia states she is doing well post-op  Pain: well-controlled  Tolerating PO: has not eaten/drank yet, but feels thirsty; denies nausea/vomiting  Voiding: Spencer in place draining clear yellow urine  Chest pain: denies  Shortness of breath: denies  Leg pain: denies    OBJECTIVE:  Vitals:   /79   Pulse 105   Temp 98.4 °F (36.9 °C)   Resp 18   Ht 5' 4\" (1.626 m)   Wt 93.4 kg (206 lb)   SpO2 93%   BMI 35.36 kg/m²       Intake/Output Summary (Last 24 hours) at 1/6/2025 1624  Last data filed at 1/6/2025 1533  Gross per 24 hour   Intake 2100 ml   Output 920 ml   Net 1180 ml     Invasive Devices       Central Venous Catheter Line  Duration             Port A Cath 06/30/23 Right Subclavian 556 days              Peripheral Intravenous Line  Duration             Peripheral IV 01/06/25 Left Forearm <1 day    Peripheral IV 01/06/25 Right Hand <1 day              Epidural Line  Duration             Epidural Catheter 01/06/25 <1 day              Drain  Duration             Urethral Catheter Straight-tip;Non-latex 16 Fr. <1 day                  Physical Exam:   GEN: well-appearing, alert and oriented x3  ABDOMEN: soft, nontender, nondistended, 5 small incisions across midline abdomen, all c/d/i  EXTREMITIES: SCDs in place    ASSESSMENT:  #S/p RA-radical bl parametrectomy, upper vaginectomy, L obturator LND, cysto - postop day 0, stable    PLAN:  Remove epidural 1/7 AM   Spencer in place, urinary output 2.15cc/kg/hr over past 2hrs; plan to maintain Spencer until POD7   Advance diet as tolerated; ordered for Diabetic diet   Encourage ambulation   Anticipate discharge POD1    Megha Villeda MD  OBGYN PGY-1  01/06/25  4:24 PM  "

## 2025-01-06 NOTE — OP NOTE
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 RESECTION BILATERAL PARAMETRIAL TUMORS. UPPER VAGINECTOMY. LEFT OBTURATOR LYMPH NODE DISSECTION  CYSTOSCOPY    Specimen(s):  ID Type Source Tests Collected by Time Destination   1 : Upper vagina Tissue Vaginal TISSUE EXAM Gordo Taveras MD 2025 11:19 AM    2 : Right perimetrial mass Tissue Mass TISSUE EXAM Gordo Taveras MD 2025 11:22 AM    3 : Left perimetrium Tissue Soft Tissue, Other TISSUE EXAM Gordo Taveras MD 2025 11:50 AM    4 : left obturator lymph node Tissue Lymph Node TISSUE EXAM Gordo Taveras MD 2025 12:10 PM        Estimated Blood Loss:   Minimal    Drains:  Urethral Catheter Straight-tip;Non-latex 16 Fr. 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  The right vaginal apex disease measured approximately 5 cm in diameter.  She presented for definitive operative management/tumor debulking.    Operative Findings:  1.  Exam under anesthesia revealed a mobile vaginal apex.  There was a mass  palpable in the right hemipelvis there was some thickening of the left upper vagina.  2.  On laparoscopy, there were extensive adhesions present from the omentum and small bowel to the anterior abdominal wall.  There are multiple serosal adhesions to the small bowel in the midline.  There were also adhesions from the sigmoid colon to the left hemipelvis, sigmoid colon to bladder serosa, rectum to right pelvis, small bowel to right pelvic sidewall, interloop adhesions.  It took approximately an hour and 15 minutes to completely lyse the adhesions.  An inherent cautery injury to the sigmoid serosa was oversewn.  There was no evidence of upper abdominal disease.  3.  In the pelvis, there was a 5 cm bilobed appearing mass emanating from the right aspect of the vaginal apex involving the right parametria, right uterosacral ligament and distal right ureter.  There was also a 2 cm lesion that was concerning for possible recurrence in the left parametria adjacent to the vaginal apex also involving the left ureter.  There was a slightly enlarged left obturator lymph node as well.  No evidence of peritoneal disease.  4.  At the completion the procedure, there is no gross visible tumor remaining.  5.  Cystoscopy revealed bilateral jets and no evidence of bladder injury.  Additional difficulty was incurred in the performance of this procedure secondary to the extensive abdominal and pelvic adhesions.      Complications:   None    Procedure and Technique:  After informed consent was obtained, the patient was taken to the operating room where general endotracheal anesthesia was administered without incident.  She was then prepped and draped in the normal sterile fashion in the low dorsolithotomy position.  Examination under anesthesia was then performed and findings noted as above.  A Spencer catheter was inserted.  Tension was then turned to the abdomen.  0.25% Marcaine was used to infiltrate the skin prior to placement of any  trocar.  The first insertion site was in the left upper quadrant approximately 8 cm to the left of the previous midline vertical incision.  A 5 mm skin incision was made and a 5 mm laparoscopic trocar was then advanced under direct visualization into the abdominal cavity.  The abdomen is then insufflated to 12 mmHg using CO2 gas.  Findings are noted as above.  An 8 mm robotic trocar was then able to be placed in the left upper quadrant lateral to the initial insertion site and using these 2 trocar sites, adhesions from the omentum to the anterior abdominal wall were taken down using the Enseal bipolar cautery device.  This initial adhesiolysis allowed additional trocars to be placed under direct visualization including the midline 8 mm robotic trocar, a right upper quadrant 8 mm robotic trocar and a 12 mm assistant port in the right upper quadrant as well.  The robot was docked.  Additional adhesiolysis was then necessary.  There were dense small bowel adhesions to the anterior abdominal wall in the midline.  These were antimesenteric adhesions between the serosa of the small bowel and the anterior abdominal wall.  A portion of the anterior abdominal wall peritoneum was taken down along with some of the denser adhesions to avoid serosal injury to the small intestine.  This adhesiolysis was complex and took approximately 40 minutes to complete.  Once the small bowel was adequately taken down from the anterior abdominal wall using sharp dissection, attention was then turned to the right pelvis.  Adhesions from the ileum to the right hemipelvis were lysed.  These were along the psoas muscle.  This allowed better retraction of the small bowel out of the pelvis.  Adhesions from the distal small bowel to the sigmoid colon were then lysed again to allow better retraction of the small bowel out of the pelvis for the surgery.  Adhesions from the sigmoid colon to the right pelvic sidewall were then lysed using sharp  dissection and cautery.  Adhesions from the sigmoid colon to the left pelvic sidewall were then lysed.  These were dense as well.  A inherent cautery injury to the serosa of the sigmoid colon was identified.  It was then oversewn using interrupted 3-0 Vicryl suture.  Approximately 3 interrupted sutures were necessary to oversew the cauterized area completely.  Once the colon was mobilized completely out of the pelvis, the mass could be visualized fully.  An EEA sizer was then placed in the vagina.  The right ureter was able to be identified.  A incision was made retroperitoneally to lateralize the right ureter.  The ureter was then tracked into the pelvis and it was clear that the ureter was intimately associated with the 5 cm bilobed appearing right parametrial mass.  Using the EEA sizer as a retractor, the vesicovaginal space was able to be developed.  The bladder was then taken down well below the EEA sizer to allow full exposure of the upper vagina.  This incision extended laterally to include the serosa of the bladder.  The mass was released from adhesions laterally and superior and medial traction was able to be placed on the mass to better identify the relationship to the ureter.  The ureter was then released from its medial peritoneal attachments and the right side and completely skeletonized from the pelvic brim to the level of the uterine vasculature.  A plane was identified lateral to the ureter and the uterine vessels were then cauterized and transected using the vessel sealer lateral to the ureter.  The entire remainder of the left parametria was then able to be lifted over the right ureter.  The dissection was completed by cauterizing and transecting the anterior vesicouterine ligaments and the ureter was able to be identified going into the bladder.  The uterosacral ligament attachments of the mass were then cauterized and transected.  Mesorectal attachments were cauterized and transected.   Attachments to the perivesical fat were then dissected using the vessel sealer.  It was then released from the bladder.  The mass was also adherent to the posterior vagina these adhesions were cauterized and transected and the mass including the right parametria were then removed.  The mass was placed in the cul-de-sac.  A circumferential incision was made in the upper vagina to include the area where the mass was attached to ensure complete removal and a margin around the mass.  This portion of upper vagina was then removed transvaginally.  It measured approximately 3 x 3 cm.  A anchor bag was then advanced into the pelvis transvaginally and the mass was placed in the anchor bag and removed transvaginally.  Attention was then turned to the left side.  The left ureter was able to be identified.  It was released from its medial peritoneal attachments.  It was tracked to the level of the uterine vasculature.  A plane was identified lateral to the ureter and the uterine vasculature was able to be identified, skeletonized, cauterized and transected at its origin.  In a similar fashion, the ureter was then freed from attachments to the parametria and the parametria was released from the ureter inclusive of the left anterior vesicouterine ligament, uterosacral ligament attachments, bladder the parametria including the mass at the left apex of the vagina was then removed on the left side.  The mass on the left side measured approximately 2 cm in diameter.  It was removed transvaginally as well.  There was some tumor evident just lateral to the uterine vasculature which was removed.  The left external iliac vein was identified and skeletonized.  The paravesical space was fully developed on the left side.  The obturator nerve was identified.  Lymph node bearing tissue was then removed from the external iliac vein superiorly to the obturator internus muscle inferiorly to the obturator nerve to the level of the bifurcation of  the iliac.  Hemostasis was achieved using monopolar and bipolar cautery.  The obturator lymph nodes on the left side were then removed transvaginally.  Once there was no visible pelvic tumor remaining, a 2-0 STRATAFIX suture was then advanced into the abdomen through the 12 mm assistant port and used to close the upper vagina in a running fashion.  Hemostasis was adequate.  Attention was then turned to some residual bleeding in the left parametria.  This was addressed using an interrupted figure-of-eight 3-0 Vicryl suture.  It was immediately medial to the left ureter.  The left ureter was noted to be free.  Hemostasis was then adequately controlled.  Surgiflo was then applied to the bilateral parametrial dissections bilaterally.  A cystoscopy was then performed in the usual fashion with a 30 degree cystoscope with findings noted as above.  The Spenecr catheter was then replaced.  The pressure in the abdomen was brought down to less than 5 mmHg and there was no evidence of active bleeding identified.  The robot was then undocked.  The 12 mm trocar site was then removed and closed using a single interrupted 0 Vicryl suture to the fascia.  The gas was then removed from abdominal cavity.  The ports were then removed under direct visualization.  The skin was closed at all sites using 4-0 Monocryl followed by Exofin.  She is then awakened and transferred to the recovery room in stable condition.  All instrument counts correct x 2 for procedure.  No complications.  Estimated blood loss is 50 mL.   I was present for the entire procedure.    Patient Disposition:  PACU              SIGNATURE: Gordo Taveras MD  DATE: January 6, 2025  TIME: 1:03 PM

## 2025-01-06 NOTE — ANESTHESIA POSTPROCEDURE EVALUATION
Post-Op Assessment Note    CV Status:  Stable    Pain management: adequate       Mental Status:  Awake and sleepy   Hydration Status:  Euvolemic   PONV Controlled:  Controlled   Airway Patency:  Patent     Post Op Vitals Reviewed: Yes    No anethesia notable event occurred.    Staff: Anesthesiologist, CRNA           Last Filed PACU Vitals:  Vitals Value Taken Time   Temp 98.4 °F (36.9 °C) 01/06/25 1500   Pulse 104 01/06/25 1528   /85 01/06/25 1500   Resp 15 01/06/25 1528   SpO2 92 % 01/06/25 1528   Vitals shown include unfiled device data.    Modified Elaine:     Vitals Value Taken Time   Activity 2 01/06/25 1500   Respiration 2 01/06/25 1500   Circulation 2 01/06/25 1500   Consciousness 1 01/06/25 1500   Oxygen Saturation 1 01/06/25 1500     Modified Elaine Score: 8

## 2025-01-06 NOTE — PLAN OF CARE
Problem: PAIN - ADULT  Goal: Verbalizes/displays adequate comfort level or baseline comfort level  Description: Interventions:  - Encourage patient to monitor pain and request assistance  - Assess pain using appropriate pain scale  - Administer analgesics based on type and severity of pain and evaluate response  - Implement non-pharmacological measures as appropriate and evaluate response  - Consider cultural and social influences on pain and pain management  - Notify physician/advanced practitioner if interventions unsuccessful or patient reports new pain  Outcome: Progressing     Problem: INFECTION - ADULT  Goal: Absence or prevention of progression during hospitalization  Description: INTERVENTIONS:  - Assess and monitor for signs and symptoms of infection  - Monitor lab/diagnostic results  - Monitor all insertion sites, i.e. indwelling lines, tubes, and drains  - Monitor endotracheal if appropriate and nasal secretions for changes in amount and color  - [place] appropriate cooling/warming therapies per order  - Administer medications as ordered  - Instruct and encourage patient and family to use good hand hygiene technique  - Identify and instruct in appropriate isolation precautions for identified infection/condition  Outcome: Progressing

## 2025-01-06 NOTE — ANESTHESIA PROCEDURE NOTES
Epidural Block    Patient location during procedure: holding area  Start time: 1/6/2025 7:58 AM  Reason for block: at surgeon's request and post-op pain management  Staffing  Performed by: Elisa Dodge MD  Authorized by: Elisa Dodge MD    Preanesthetic Checklist  Completed: patient identified, IV checked, site marked, risks and benefits discussed, surgical consent, monitors and equipment checked, pre-op evaluation and timeout performed  Epidural  Patient position: sitting  Prep: ChloraPrep  Sedation Level: no sedation  Patient monitoring: frequent blood pressure checks, continuous pulse oximetry and heart rate  Approach: midline  Location: thoracic, T11-12  Injection technique: LICHA saline  Needle  Needle type: Tuohy   Needle gauge: 17 G  Needle insertion depth: 5 cm  Catheter type: multi-orifice  Catheter size: 19 G  Catheter at skin depth: 12 cm  Catheter securement method: clear occlusive dressing and tape  Test dose: negativelidocaine-epinephrine (XYLOCAINE-MPF/EPINEPHRINE) 1.5 %-1:200,000 injection 3 mL - Epidural   3 mL - 1/6/2025 7:58:00 AM  Assessment  Number of attempts: 3 or morenegative aspiration for CSF, negative aspiration for heme and no paresthesia on injection  patient tolerated the procedure well with no immediate complications  Additional Notes  2 attempts at upper lumbar - to - low thoracic, osseous encounters  1 attempt by Dr Nirav Martinez, at ~T11,T12 successful  Negative aspiration, easy threading, negative test dose  Loss at 5cm,threaded to 12cm

## 2025-01-07 VITALS
RESPIRATION RATE: 18 BRPM | WEIGHT: 206 LBS | BODY MASS INDEX: 35.17 KG/M2 | HEIGHT: 64 IN | DIASTOLIC BLOOD PRESSURE: 79 MMHG | TEMPERATURE: 98.7 F | SYSTOLIC BLOOD PRESSURE: 128 MMHG | HEART RATE: 73 BPM | OXYGEN SATURATION: 94 %

## 2025-01-07 LAB
ANION GAP SERPL CALCULATED.3IONS-SCNC: 7 MMOL/L (ref 4–13)
BUN SERPL-MCNC: 17 MG/DL (ref 5–25)
CALCIUM SERPL-MCNC: 8.7 MG/DL (ref 8.4–10.2)
CHLORIDE SERPL-SCNC: 103 MMOL/L (ref 96–108)
CO2 SERPL-SCNC: 28 MMOL/L (ref 21–32)
CREAT SERPL-MCNC: 0.72 MG/DL (ref 0.6–1.3)
ERYTHROCYTE [DISTWIDTH] IN BLOOD BY AUTOMATED COUNT: 14.1 % (ref 11.6–15.1)
GFR SERPL CREATININE-BSD FRML MDRD: 88 ML/MIN/1.73SQ M
GLUCOSE P FAST SERPL-MCNC: 149 MG/DL (ref 65–99)
GLUCOSE SERPL-MCNC: 146 MG/DL (ref 65–140)
GLUCOSE SERPL-MCNC: 148 MG/DL (ref 65–140)
GLUCOSE SERPL-MCNC: 149 MG/DL (ref 65–140)
GLUCOSE SERPL-MCNC: 153 MG/DL (ref 65–140)
HCT VFR BLD AUTO: 37 % (ref 34.8–46.1)
HGB BLD-MCNC: 12 G/DL (ref 11.5–15.4)
MAGNESIUM SERPL-MCNC: 2.3 MG/DL (ref 1.9–2.7)
MCH RBC QN AUTO: 28.9 PG (ref 26.8–34.3)
MCHC RBC AUTO-ENTMCNC: 32.4 G/DL (ref 31.4–37.4)
MCV RBC AUTO: 89 FL (ref 82–98)
PLATELET # BLD AUTO: 246 THOUSANDS/UL (ref 149–390)
PMV BLD AUTO: 10.1 FL (ref 8.9–12.7)
POTASSIUM SERPL-SCNC: 3.8 MMOL/L (ref 3.5–5.3)
RBC # BLD AUTO: 4.15 MILLION/UL (ref 3.81–5.12)
SODIUM SERPL-SCNC: 138 MMOL/L (ref 135–147)
WBC # BLD AUTO: 8.91 THOUSAND/UL (ref 4.31–10.16)

## 2025-01-07 PROCEDURE — 80048 BASIC METABOLIC PNL TOTAL CA: CPT

## 2025-01-07 PROCEDURE — 99024 POSTOP FOLLOW-UP VISIT: CPT | Performed by: OBSTETRICS & GYNECOLOGY

## 2025-01-07 PROCEDURE — 94760 N-INVAS EAR/PLS OXIMETRY 1: CPT

## 2025-01-07 PROCEDURE — 83735 ASSAY OF MAGNESIUM: CPT

## 2025-01-07 PROCEDURE — 99204 OFFICE O/P NEW MOD 45 MIN: CPT | Performed by: ANESTHESIOLOGY

## 2025-01-07 PROCEDURE — 82948 REAGENT STRIP/BLOOD GLUCOSE: CPT

## 2025-01-07 PROCEDURE — 94660 CPAP INITIATION&MGMT: CPT

## 2025-01-07 PROCEDURE — 85027 COMPLETE CBC AUTOMATED: CPT

## 2025-01-07 RX ORDER — ACETAMINOPHEN 325 MG/1
975 TABLET ORAL EVERY 8 HOURS SCHEDULED
Status: DISCONTINUED | OUTPATIENT
Start: 2025-01-07 | End: 2025-01-07 | Stop reason: HOSPADM

## 2025-01-07 RX ORDER — OXYCODONE HYDROCHLORIDE 5 MG/1
5 TABLET ORAL EVERY 6 HOURS PRN
Qty: 5 TABLET | Refills: 0 | OUTPATIENT
Start: 2025-01-07 | End: 2025-01-17

## 2025-01-07 RX ORDER — OXYCODONE HYDROCHLORIDE 5 MG/1
5 TABLET ORAL EVERY 4 HOURS PRN
Refills: 0 | Status: DISCONTINUED | OUTPATIENT
Start: 2025-01-07 | End: 2025-01-07 | Stop reason: HOSPADM

## 2025-01-07 RX ORDER — GLIMEPIRIDE 2 MG/1
4 TABLET ORAL 2 TIMES DAILY
Status: DISCONTINUED | OUTPATIENT
Start: 2025-01-07 | End: 2025-01-07 | Stop reason: HOSPADM

## 2025-01-07 RX ADMIN — ACETAMINOPHEN 975 MG: 325 TABLET, FILM COATED ORAL at 17:29

## 2025-01-07 RX ADMIN — IBUPROFEN 600 MG: 600 TABLET, FILM COATED ORAL at 12:03

## 2025-01-07 RX ADMIN — IBUPROFEN 600 MG: 600 TABLET, FILM COATED ORAL at 00:57

## 2025-01-07 RX ADMIN — IBUPROFEN 600 MG: 600 TABLET, FILM COATED ORAL at 05:27

## 2025-01-07 RX ADMIN — GLIMEPIRIDE 4 MG: 2 TABLET ORAL at 17:29

## 2025-01-07 RX ADMIN — GLIMEPIRIDE 4 MG: 2 TABLET ORAL at 10:13

## 2025-01-07 RX ADMIN — IBUPROFEN 600 MG: 600 TABLET, FILM COATED ORAL at 17:29

## 2025-01-07 RX ADMIN — PANTOPRAZOLE SODIUM 40 MG: 40 TABLET, DELAYED RELEASE ORAL at 08:40

## 2025-01-07 RX ADMIN — EMPAGLIFLOZIN 10 MG: 10 TABLET, FILM COATED ORAL at 10:13

## 2025-01-07 RX ADMIN — METOPROLOL SUCCINATE 25 MG: 25 TABLET, FILM COATED, EXTENDED RELEASE ORAL at 08:40

## 2025-01-07 RX ADMIN — HYDROMORPHONE HYDROCHLORIDE 0.5 MG: 1 INJECTION, SOLUTION INTRAMUSCULAR; INTRAVENOUS; SUBCUTANEOUS at 16:41

## 2025-01-07 RX ADMIN — DIPHENHYDRAMINE HYDROCHLORIDE 25 MG: 50 INJECTION, SOLUTION INTRAMUSCULAR; INTRAVENOUS at 03:13

## 2025-01-07 RX ADMIN — SODIUM CHLORIDE, SODIUM LACTATE, POTASSIUM CHLORIDE, AND CALCIUM CHLORIDE 250 ML: .6; .31; .03; .02 INJECTION, SOLUTION INTRAVENOUS at 08:00

## 2025-01-07 RX ADMIN — INSULIN LISPRO 1 UNITS: 100 INJECTION, SOLUTION INTRAVENOUS; SUBCUTANEOUS at 08:21

## 2025-01-07 RX ADMIN — SODIUM CHLORIDE, SODIUM LACTATE, POTASSIUM CHLORIDE, AND CALCIUM CHLORIDE 100 ML/HR: .6; .31; .03; .02 INJECTION, SOLUTION INTRAVENOUS at 03:18

## 2025-01-07 RX ADMIN — DULOXETINE HYDROCHLORIDE 30 MG: 30 CAPSULE, DELAYED RELEASE ORAL at 08:40

## 2025-01-07 RX ADMIN — SERTRALINE HYDROCHLORIDE 150 MG: 100 TABLET ORAL at 08:40

## 2025-01-07 NOTE — PROGRESS NOTES
Progress Note - GYN Oncology   Name: Felecia Edward 64 y.o. female I MRN: [id number]  Unit/Bed#: [facility] 922-01 I Date of Admission: 1/6/2025   Date of Service: 1/7/2025 I Hospital Day: 0     Assessment & Plan  Uterine leiomyosarcoma (HCC)  65yo POD#1 s/p robotic assisted-radical bilateral parametrectomy, upper vaginectomy, left obturator lymph node dissection, cystoscopy.    Plan:  Epidural removal this morning  Spencer in place with plan to maintain until POD#7 in the setting of extensive bladder dissection  UOP 0.27cc/kg/hr, will give 250cc IVF bolus  Hgb 13.9 -> 12, will f/up CBC  Tolerating diabetic diet  DVT ppx: SCDs in place, no heparin due to plan for epidural removal  Anticipate discharge later this morning      Subjective   Pain: well-controlled  Tolerating PO: tolerating PO liquids, breakfast ordered; denies nausea/vomiting  Voiding: Spencer in place draining clear yellow urine  Chest pain: denies  Shortness of breath: denies  Leg pain: denies    Objective :  Temp:  [97.8 °F (36.6 °C)-98.5 °F (36.9 °C)] 98.5 °F (36.9 °C)  HR:  [] 81  BP: (126-173)/(79-93) 127/82  Resp:  [13-28] 19  SpO2:  [87 %-95 %] 93 %  O2 Device: None (Room air)  Nasal Cannula O2 Flow Rate (L/min):  [2 L/min-6 L/min] 2 L/min    I/O         01/05 0701 01/06 0700 01/06 0701 01/07 0700    I.V. (mL/kg)  3053.3 (32.7)    IV Piggyback  100    Total Intake(mL/kg)  3153.3 (33.8)    Urine (mL/kg/hr)  1180 (0.5)    Blood  40    Total Output  1220    Net  +1933.3                Lines/Drains/Airways       Active Status       Name Placement date Placement time Site Days    Epidural Catheter 01/06/25 01/06/25  0758  -- less than 1    Urethral Catheter Straight-tip;Non-latex 16 Fr. 01/06/25  0848  Straight-tip;Non-latex  less than 1                  Physical Exam  GEN: The patient was alert and oriented x3, pleasant well-appearing female in no acute distress.   CV: Regular rate  PULM: Non-labored respirations  MSK: SCDs in place  Skin: Warm,  dry  Neuro: No focal deficits  Psych: Normal affect and judgement, cooperative  Abd: Soft, nontender, nondistended, 5 small incisions across midline abdomen, all c/d/i       Lab Results: I have reviewed the following results:  Recent Labs     01/07/25  0610   WBC 8.91   HGB 12.0   HCT 37.0          Imaging Results Review: No pertinent imaging studies reviewed.  Other Study Results Review: No additional pertinent studies reviewed.    VTE Pharmacologic Prophylaxis: Reason for no pharmacologic prophylaxis plan for epidural removal  VTE Mechanical Prophylaxis: sequential compression device    Keli Mcdowell MD   OB/Gyn PGY-4  7:13 AM  01/07/25

## 2025-01-07 NOTE — PLAN OF CARE
Problem: PAIN - ADULT  Goal: Verbalizes/displays adequate comfort level or baseline comfort level  Description: Interventions:  - Encourage patient to monitor pain and request assistance  - Assess pain using appropriate pain scale  - Administer analgesics based on type and severity of pain and evaluate response  - Implement non-pharmacological measures as appropriate and evaluate response  - Consider cultural and social influences on pain and pain management  - Notify physician/advanced practitioner if interventions unsuccessful or patient reports new pain  Outcome: Progressing     Problem: SAFETY ADULT  Goal: Patient will remain free of falls  Description: INTERVENTIONS:  - Educate patient/family on patient safety including physical limitations  - Instruct patient to call for assistance with activity   - Consult OT/PT to assist with strengthening/mobility   - Keep Call bell within reach  - Keep bed low and locked with side rails adjusted as appropriate  - Keep care items and personal belongings within reach  - Initiate and maintain comfort rounds  - Make Fall Risk Sign visible to staff  - Offer Toileting every  Hours, in advance of need  - Initiate/Maintain alarm  - Obtain necessary fall risk management equipment:   - Apply yellow socks and bracelet for high fall risk patients  - Consider moving patient to room near nurses station  Outcome: Progressing  Goal: Maintain or return to baseline ADL function  Description: INTERVENTIONS:  -  Assess patient's ability to carry out ADLs; assess patient's baseline for ADL function and identify physical deficits which impact ability to perform ADLs (bathing, care of mouth/teeth, toileting, grooming, dressing, etc.)  - Assess/evaluate cause of self-care deficits   - Assess range of motion  - Assess patient's mobility; develop plan if impaired  - Assess patient's need for assistive devices and provide as appropriate  - Encourage maximum independence but intervene and supervise  when necessary  - Involve family in performance of ADLs  - Assess for home care needs following discharge   - Consider OT consult to assist with ADL evaluation and planning for discharge  - Provide patient education as appropriate  Outcome: Progressing  Goal: Maintains/Returns to pre admission functional level  Description: INTERVENTIONS:  - Perform AM-PAC 6 Click Basic Mobility/ Daily Activity assessment daily.  - Set and communicate daily mobility goal to care team and patient/family/caregiver.   - Collaborate with rehabilitation services on mobility goals if consulted  - Perform Range of Motion  times a day.  - Reposition patient every hours.  - Dangle patient  times a day  - Stand patient  times a day  - Ambulate patient  times a day  - Out of bed to chair  times a day   - Out of bed for meals 3 times a day  - Out of bed for toileting  - Record patient progress and toleration of activity level   Outcome: Progressing     Problem: INFECTION - ADULT  Goal: Absence or prevention of progression during hospitalization  Description: INTERVENTIONS:  - Assess and monitor for signs and symptoms of infection  - Monitor lab/diagnostic results  - Monitor all insertion sites, i.e. indwelling lines, tubes, and drains  - Monitor endotracheal if appropriate and nasal secretions for changes in amount and color  - [place] appropriate cooling/warming therapies per order  - Administer medications as ordered  - Instruct and encourage patient and family to use good hand hygiene technique  - Identify and instruct in appropriate isolation precautions for identified infection/condition  Outcome: Progressing    Pt will leave with indwelling catheter.  Reviewed with patient at home care with urinary catheter, how to change to leg bag and back to larger drainage back for when pt is at home /at night.  Reviewed care of urinary catheter, how to prevent infections and when to call the MD.  Pt verbalized understanding of all discharge  instructions for urinary catheter.  Pt was able to demonstrate reclipping catheter tubing into stat-lock and opening drainage bags.  Plan for pt to be discharged home later today.

## 2025-01-07 NOTE — ASSESSMENT & PLAN NOTE
Functioning epidural working well, removed, tip intact.   Resume pain control with typical post operative protocol as per primary team.  Potential for discharge today.

## 2025-01-07 NOTE — CONSULTS
Consultation - Acute Pain   Name: Felecia Edward 64 y.o. female I MRN: [id number]  [facility] I Date of Admission: 1/6/2025   Date of Service: 1/7/2025 I Hospital Day: 0   Inpatient consult to Acute Pain Service  Consult performed by: Adrian Kaet MD  Consult ordered by: Elisa Dodge MD        Physician Requesting Evaluation: Gordo Taveras,*   Reason for Evaluation / Principal Problem: epidural placement pre op    Assessment & Plan  Uterine leiomyosarcoma (HCC)  Functioning epidural working well, removed, tip intact.   Resume pain control with typical post operative protocol as per primary team.  Potential for discharge today.        APS will continue to follow. Please contact Acute Pain Service - via SecureChat from [phone] with additional questions or concerns. See SecureChat or [employer] for additional contacts and after hours information.     History of Present Illness    HPI: Felecia Edward is a 64 y.o. year old female who presents with recurrent uterine leiomyosarcoma. She was taken to the operating room 1/6 for robotic assisted-radical bilateral parametrectomy, upper vaginectomy, left obturator lymph node dissection, cystoscopy. An epidural was placed pre operatively for post operative analgesia.    Current pain location(s): Pain Score: 0  Pain Location/Orientation: Location: Abdomen, Location: Incision  Pain Scale: Pain Assessment Tool: 0-10  Current Analgesic regimen:  see MAR    Pain History: none  Pain Management Physician:  n/a  I have reviewed the patient's controlled substance dispensing history in the Prescription Drug Monitoring Program in compliance with the ABISAI regulations before prescribing any controlled substances.     Review of Systems   Constitutional: Negative.    HENT: Negative.     Eyes: Negative.    Respiratory: Negative.     Cardiovascular: Negative.    Gastrointestinal: Negative.    Endocrine: Negative.    Genitourinary:  Positive for  menstrual problem, pelvic pain and vaginal pain.   Musculoskeletal: Negative.    Allergic/Immunologic: Negative.    Neurological: Negative.    Hematological: Negative.    Psychiatric/Behavioral: Negative.       I have reviewed the patient's PMH, PSH, Social History, Family History, Meds, and Allergies    Objective :  Temp:  [97.8 °F (36.6 °C)-98.5 °F (36.9 °C)] 98.5 °F (36.9 °C)  HR:  [] 68  BP: (115-173)/(74-93) 115/74  Resp:  [13-28] 14  SpO2:  [87 %-95 %] 94 %  O2 Device: None (Room air)  Nasal Cannula O2 Flow Rate (L/min):  [2 L/min-6 L/min] 2 L/min    Physical Exam  Constitutional:       General: She is not in acute distress.     Appearance: She is obese.   HENT:      Head: Normocephalic and atraumatic.      Nose: Nose normal.      Mouth/Throat:      Mouth: Mucous membranes are moist.   Pulmonary:      Effort: Pulmonary effort is normal.   Abdominal:      General: Abdomen is flat.      Tenderness: There is abdominal tenderness.   Musculoskeletal:      Cervical back: Normal range of motion.   Skin:     General: Skin is warm and dry.   Neurological:      General: No focal deficit present.      Mental Status: She is oriented to person, place, and time. Mental status is at baseline.   Psychiatric:         Mood and Affect: Mood normal.         Behavior: Behavior normal.         Thought Content: Thought content normal.         Judgment: Judgment normal.      Epidural: Site clean/dry/intact, no surrounding erythema/edema/induration, infusion functioning appropriately      Lab Results: I have reviewed the following results:  Estimated Creatinine Clearance: 86.3 mL/min (by C-G formula based on SCr of 0.72 mg/dL).  Lab Results   Component Value Date    WBC 8.91 01/07/2025    HGB 12.0 01/07/2025    HCT 37.0 01/07/2025     01/07/2025         Component Value Date/Time    K 3.8 01/07/2025 0610     01/07/2025 0610    CO2 28 01/07/2025 0610    BUN 17 01/07/2025 0610    CREATININE 0.72 01/07/2025 0610          Component Value Date/Time    CALCIUM 8.7 01/07/2025 0610    ALKPHOS 78 12/24/2024 1337    AST 18 12/24/2024 1337    ALT 14 12/24/2024 1337    TP 7.7 12/24/2024 1337    ALB 4.4 12/24/2024 1337       Imaging Results Review: No pertinent imaging studies reviewed.  Other Study Results Review: No additional pertinent studies reviewed.

## 2025-01-07 NOTE — ASSESSMENT & PLAN NOTE
65yo POD#1 s/p robotic assisted-radical bilateral parametrectomy, upper vaginectomy, left obturator lymph node dissection, cystoscopy.    Plan:  Epidural removal this morning  Spencer in place with plan to maintain until POD#7 in the setting of extensive bladder dissection  UOP 0.27cc/kg/hr, will give 250cc IVF bolus  Hgb 13.9 -> 12, will f/up CBC  Tolerating diabetic diet  DVT ppx: SCDs in place, no heparin due to plan for epidural removal  Anticipate discharge later this morning

## 2025-01-07 NOTE — PLAN OF CARE
Problem: PAIN - ADULT  Goal: Verbalizes/displays adequate comfort level or baseline comfort level  Description: Interventions:  - Encourage patient to monitor pain and request assistance  - Assess pain using appropriate pain scale  - Administer analgesics based on type and severity of pain and evaluate response  - Implement non-pharmacological measures as appropriate and evaluate response  - Consider cultural and social influences on pain and pain management  - Notify physician/advanced practitioner if interventions unsuccessful or patient reports new pain  Outcome: Progressing     Problem: INFECTION - ADULT  Goal: Absence or prevention of progression during hospitalization  Description: INTERVENTIONS:  - Assess and monitor for signs and symptoms of infection  - Monitor lab/diagnostic results  - Monitor all insertion sites, i.e. indwelling lines, tubes, and drains  - Monitor endotracheal if appropriate and nasal secretions for changes in amount and color  - [place] appropriate cooling/warming therapies per order  - Administer medications as ordered  - Instruct and encourage patient and family to use good hand hygiene technique  - Identify and instruct in appropriate isolation precautions for identified infection/condition  Outcome: Progressing     Problem: SAFETY ADULT  Goal: Patient will remain free of falls  Description: INTERVENTIONS:  - Educate patient/family on patient safety including physical limitations  - Instruct patient to call for assistance with activity   - Consult OT/PT to assist with strengthening/mobility   - Keep Call bell within reach  - Keep bed low and locked with side rails adjusted as appropriate  - Keep care items and personal belongings within reach  - Initiate and maintain comfort rounds  - Make Fall Risk Sign visible to staff  - Apply yellow socks and bracelet for high fall risk patients  - Consider moving patient to room near nurses station  Outcome: Progressing  Goal: Maintain or  return to baseline ADL function  Description: INTERVENTIONS:  -  Assess patient's ability to carry out ADLs; assess patient's baseline for ADL function and identify physical deficits which impact ability to perform ADLs (bathing, care of mouth/teeth, toileting, grooming, dressing, etc.)  - Assess/evaluate cause of self-care deficits   - Assess range of motion  - Assess patient's mobility; develop plan if impaired  - Assess patient's need for assistive devices and provide as appropriate  - Encourage maximum independence but intervene and supervise when necessary  - Involve family in performance of ADLs  - Assess for home care needs following discharge   - Consider OT consult to assist with ADL evaluation and planning for discharge  - Provide patient education as appropriate  Outcome: Progressing  Goal: Maintains/Returns to pre admission functional level  Description: INTERVENTIONS:  - Perform AM-PAC 6 Click Basic Mobility/ Daily Activity assessment daily.  - Set and communicate daily mobility goal to care team and patient/family/caregiver.   - Collaborate with rehabilitation services on mobility goals if consulted  - Out of bed for toileting  - Record patient progress and toleration of activity level   Outcome: Progressing     Problem: DISCHARGE PLANNING  Goal: Discharge to home or other facility with appropriate resources  Description: INTERVENTIONS:  - Identify barriers to discharge w/patient and caregiver  - Arrange for needed discharge resources and transportation as appropriate  - Identify discharge learning needs (meds, wound care, etc.)  - Arrange for interpretive services to assist at discharge as needed  - Refer to Case Management Department for coordinating discharge planning if the patient needs post-hospital services based on physician/advanced practitioner order or complex needs related to functional status, cognitive ability, or social support system  Outcome: Progressing     Problem: Knowledge  Deficit  Goal: Patient/family/caregiver demonstrates understanding of disease process, treatment plan, medications, and discharge instructions  Description: Complete learning assessment and assess knowledge base.  Interventions:  - Provide teaching at level of understanding  - Provide teaching via preferred learning methods  Outcome: Progressing     Problem: Prexisting or High Potential for Compromised Skin Integrity  Goal: Skin integrity is maintained or improved  Description: INTERVENTIONS:  - Identify patients at risk for skin breakdown  - Assess and monitor skin integrity  - Assess and monitor nutrition and hydration status  - Monitor labs   - Assess for incontinence   - Turn and reposition patient  - Assist with mobility/ambulation  - Relieve pressure over bony prominences  - Avoid friction and shearing  - Provide appropriate hygiene as needed including keeping skin clean and dry  - Evaluate need for skin moisturizer/barrier cream  - Collaborate with interdisciplinary team   - Patient/family teaching  - Consider wound care consult   Outcome: Progressing

## 2025-01-08 ENCOUNTER — TELEPHONE (OUTPATIENT)
Age: 65
End: 2025-01-08

## 2025-01-08 ENCOUNTER — TELEPHONE (OUTPATIENT)
Dept: GYNECOLOGIC ONCOLOGY | Facility: CLINIC | Age: 65
End: 2025-01-08

## 2025-01-08 NOTE — TELEPHONE ENCOUNTER
Pt stated on her discharge paper it stated that she was to reach out to the office to schedule a johnston removal in 1 week. Pt can be reached at [phone]. Thank you.

## 2025-01-08 NOTE — TELEPHONE ENCOUNTER
"Return call placed  VM message left -\"okay to shower\".  Asked she call us back kim she have any other questions.  "

## 2025-01-08 NOTE — TELEPHONE ENCOUNTER
I left a message informing the patient that she was scheduled for a post op appointment with Pati Jerome PA-C on 1/16/2025 at 1:40PM in [facility] for the johnston removal.     I provided the office number for the patient to call if she needs to reschedule to a different time or date. If the patient would prefer to see Dr. Taveras, he has availability on 1/14/2025 at 11:00AM in [place].

## 2025-01-08 NOTE — TELEPHONE ENCOUNTER
Pt stated she had a surgery on 1/6/25 by Dr. Taveras she was wondering when can she shower? Pt can be reached at [phone]. Thank you

## 2025-01-10 PROCEDURE — 88305 TISSUE EXAM BY PATHOLOGIST: CPT | Performed by: PATHOLOGY

## 2025-01-10 PROCEDURE — 88307 TISSUE EXAM BY PATHOLOGIST: CPT | Performed by: PATHOLOGY

## 2025-01-10 PROCEDURE — 88342 IMHCHEM/IMCYTCHM 1ST ANTB: CPT | Performed by: PATHOLOGY

## 2025-01-10 PROCEDURE — 88341 IMHCHEM/IMCYTCHM EA ADD ANTB: CPT | Performed by: PATHOLOGY

## 2025-01-13 ENCOUNTER — DOCUMENTATION (OUTPATIENT)
Dept: HEMATOLOGY ONCOLOGY | Facility: CLINIC | Age: 65
End: 2025-01-13

## 2025-01-13 ENCOUNTER — PATIENT MESSAGE (OUTPATIENT)
Dept: UROLOGY | Facility: HOSPITAL | Age: 65
End: 2025-01-13

## 2025-01-13 NOTE — PROGRESS NOTES
In-basket message received from Dr. Tomas to add patient to the gyn [facility]CC on 1/20/2025. Chart reviewed and prep completed.

## 2025-01-16 ENCOUNTER — TELEPHONE (OUTPATIENT)
Dept: OTHER | Facility: OTHER | Age: 65
End: 2025-01-16

## 2025-01-16 ENCOUNTER — OFFICE VISIT (OUTPATIENT)
Age: 65
End: 2025-01-16

## 2025-01-16 VITALS
DIASTOLIC BLOOD PRESSURE: 74 MMHG | RESPIRATION RATE: 18 BRPM | BODY MASS INDEX: 35.34 KG/M2 | WEIGHT: 207 LBS | TEMPERATURE: 97.7 F | HEART RATE: 118 BPM | HEIGHT: 64 IN | SYSTOLIC BLOOD PRESSURE: 130 MMHG | OXYGEN SATURATION: 97 %

## 2025-01-16 DIAGNOSIS — C55 UTERINE LEIOMYOSARCOMA (HCC): Primary | ICD-10-CM

## 2025-01-16 DIAGNOSIS — R39.89 BLADDER PAIN: ICD-10-CM

## 2025-01-16 PROCEDURE — 99024 POSTOP FOLLOW-UP VISIT: CPT | Performed by: PHYSICIAN ASSISTANT

## 2025-01-16 RX ORDER — NITROFURANTOIN 25; 75 MG/1; MG/1
100 CAPSULE ORAL 2 TIMES DAILY
Qty: 10 CAPSULE | Refills: 0 | Status: SHIPPED | OUTPATIENT
Start: 2025-01-16

## 2025-01-16 NOTE — PROGRESS NOTES
Name: Felecia Edward      : 1960      MRN: [id number]  Encounter Provider: Pati Jerome PA-C  Encounter Date: 2025   Encounter department: [facility] GYNECOLOGY ONCOLOGY [facility]  :  Assessment & Plan  Bladder pain  Significantly improved after johnston removal.   Will plan for empiric macrobid pending urine culture.   No sediment in johnston catheter with leakage around johnston catheter which started this AM.     Precautions provided to patient regarding bladder function. Will increase PO fluids over next few hours. If she does not void within next 4-6 hrs, she will call the office.   Orders:  •  Urine culture; Future  •  nitrofurantoin (MACROBID) 100 mg capsule; Take 1 capsule (100 mg total) by mouth 2 (two) times a day    Uterine leiomyosarcoma (HCC)  Recurrent stage II uterine leiomyosarcoma with enlarging vaginal apex lesion now s/p robotic-assisted radical bilateral parametrectomy, resection bilateral parametrial tumors, upper vaginectomy, obturator lymph node dissection and cystoscopy on 25. Final pathology confirmed recurrent disease. Johnston catheter was removed. Patient with right lower quadrant discomfort that has not significantly improved since surgery. Poorly tolerates PO opiods. After johnston removal, pain improved.     Plan to monitor post-operative pain closely. If persistent and not improving, will plan for STAT CT abd/pelvis and labs. Patient to provide update tomorrow.     Return to the office in 1 week for pathology review and treatment planning.                History of Present Illness     Reason for Visit / CC: Post-op johnston catheter removal     Felecia Edward is a 64 y.o. female   who presents to the office for post-operative johnston removal. She has been afebrile. She notes significantly pelvic/bladder pain that wraps to her right lower quadrant. No n/v. Normal bowel/bladder function. No vaginal bleeding or discharge. She notes new sediment  in her johnston catheter and some leakage around the johnston this morning.          Oncology History   Cancer Staging   Uterine leiomyosarcoma (HCC)  Staging form: Corpus Uteri - Sarcoma, AJCC 8th Edition  - Pathologic stage from 6/4/2023: FIGO Stage II, calculated as Stage Unknown (pT2, pNX, cM0) - Signed by Gordo Taveras MD on 6/29/2023  Stage prefix: Initial diagnosis  Oncology History   Uterine leiomyosarcoma (HCC)   6/4/2023 Initial Diagnosis    Uterine sarcoma (HCC)     6/4/2023 -  Cancer Staged    Staging form: Corpus Uteri - Sarcoma, AJCC 8th Edition  - Pathologic stage from 6/4/2023: FIGO Stage II, calculated as Stage Unknown (pT2, pNX, cM0) - Signed by Gordo Taveras MD on 6/29/2023  Stage prefix: Initial diagnosis       6/4/2023 Surgery    HYSTERECTOMY TOTAL ABDOMINAL (DOROTHY). BILATERAL SALPINGOOPHORECTOMY  EXCISION  BIOPSY LESION/MASS ABDOMINAL-PELVIC PERITONEUM  -Leiomyosarcoma 13.5 cm extending through the myometrium, right pelvic peritoneum involved with uterine leiomyosarcoma with tumor present at unoriented resection surface.     7/11/2023 -  Chemotherapy    Gemzar 800 mg/m2 IV day 1 and 8 as well as taxotere 65 mg/m2 day 8 every 21 days.        - 12/1/2023 Chemotherapy    Adriamycin 50 mg/m2 IV every 21 days.  Including the gemcitabine and Taxotere, she completed a total of 7 cycles.     1/6/2025 Surgery    ROBOTIC ASSISTED RADICAL BILATERAL PARAMETRECTOMY. RESECTION BILATERAL PARAMETRIAL TUMORS. UPPER VAGINECTOMY. LEFT OBTURATOR LYMPH NODE DISSECTION  CYSTOSCOPY        Review of Systems   Constitutional: Negative.    Respiratory: Negative.     Cardiovascular: Negative.    Gastrointestinal:  Positive for abdominal pain (right lower quadrant). Negative for constipation, diarrhea, nausea and vomiting.   Genitourinary:         As per HPI.   Skin: Negative.     A complete review of systems is negative other than that noted above in the HPI.  Medical History Reviewed by provider this  encounter:  Tobacco  Allergies  Meds  Problems  Med Hx  Surg Hx  Fam Hx     .  Current Outpatient Medications on File Prior to Visit   Medication Sig Dispense Refill   • apixaban (Eliquis) 2.5 mg Take 1 tablet PO BID x 14 days following surgery. 28 tablet 0   • aspirin (Aspirin 81) 81 mg chewable tablet every 24 hours     • atorvastatin (LIPITOR) 40 mg tablet Take 40 mg by mouth daily at bedtime     • atorvastatin (LIPITOR) 80 mg tablet Take 80 mg by mouth daily     • cetirizine (ZyrTEC) 10 mg tablet Take 10 mg by mouth daily     • cholecalciferol (VITAMIN D3) 1,000 units tablet Take 1,000 Units by mouth daily 2 daily     • DULoxetine (CYMBALTA) 30 mg delayed release capsule TAKE 1 CAPSULE BY MOUTH EVERY DAY FOR 90 DAYS     • glimepiride (AMARYL) 4 mg tablet Take 4 mg by mouth 2 (two) times a day     • hydrochlorothiazide (HYDRODIURIL) 25 mg tablet Take 25 mg by mouth daily     • ibuprofen (MOTRIN) 800 mg tablet Take 800 mg by mouth every 6 (six) hours as needed for mild pain     • Jardiance 10 MG TABS tablet      • ketorolac (TORADOL) 10 mg tablet Take 1 tablet (10 mg total) by mouth every 6 (six) hours as needed for moderate pain 30 tablet 0   • losartan (COZAAR) 50 mg tablet Take 50 mg by mouth daily     • meclizine (ANTIVERT) 25 mg tablet Take by mouth every 12 (twelve) hours as needed for dizziness     • metoprolol succinate (TOPROL-XL) 25 mg 24 hr tablet Take 25 mg by mouth daily     • Multiple Vitamin (MULTIVITAMIN) tablet Take 1 tablet by mouth daily     • oxyCODONE (Roxicodone) 5 immediate release tablet Take 1 tablet (5 mg total) by mouth every 6 (six) hours as needed for moderate pain Max Daily Amount: 20 mg 20 tablet 0   • pantoprazole (PROTONIX) 40 mg tablet Take 1 tablet (40 mg total) by mouth daily 90 tablet 5   • pioglitazone (ACTOS) 30 mg tablet every 24 hours     • Potassium Chloride ER 20 MEQ TBCR Take 1 tablet by mouth 2 (two) times a day with meals     • rOPINIRole (REQUIP) 1 mg tablet  "TAKE 1 TABLET BY MOUTH 1 TO 3 HOURS BEFORE BEDTIME     • sertraline (ZOLOFT) 100 mg tablet TAKE 1 AND 1/2 TABS BY MOUTH DAILY     • sertraline (ZOLOFT) 50 mg tablet Take 150 mg by mouth daily     • simvastatin (ZOCOR) 40 mg tablet Take 40 mg by mouth daily     • topiramate (TOPAMAX) 100 mg tablet 1 tablet Orally at bedtime     • Triamcinolone Acetonide 55 MCG/ACT AERO into each nostril One spray each nostril daily     • Xiidra 5 % op solution INSTILL 1 DROP INTO BOTH EYES TWICE A DAY 12 HOURS APART     • ZOLMitriptan (ZOMIG) 5 MG tablet Take 5 mg by mouth once as needed for migraine Daily prn     • betamethasone, augmented, (DIPROLENE) 0.05 % lotion Apply topically 2 (two) times a day (Patient not taking: Reported on 10/30/2024) 60 mL 1     No current facility-administered medications on file prior to visit.         Objective   /74 (BP Location: Right arm, Patient Position: Sitting, Cuff Size: Large)   Pulse (!) 118   Temp 97.7 °F (36.5 °C) (Temporal)   Resp 18   Ht 5' 4\" (1.626 m)   Wt 93.9 kg (207 lb)   SpO2 97%   BMI 35.53 kg/m²     Body mass index is 35.53 kg/m².  Pain Screening:  Pain Score: 0-No pain  ECOG ECOG Performance Status: 1 - Restricted in physically strenuous activity but ambulatory and able to carry out work of a light or sedentary nature, e.g., light house work, office work     Physical Exam  Vitals reviewed.   Constitutional:       General: She is not in acute distress.     Appearance: Normal appearance. She is not ill-appearing.   HENT:      Head: Normocephalic and atraumatic.      Mouth/Throat:      Mouth: Mucous membranes are moist.   Eyes:      General: No scleral icterus.        Right eye: No discharge.         Left eye: No discharge.      Conjunctiva/sclera: Conjunctivae normal.   Pulmonary:      Effort: Pulmonary effort is normal.   Genitourinary:     Comments: Spencer catheter removed without difficulty.   Musculoskeletal:      Right lower leg: No edema.      Left lower leg: No " edema.   Skin:     General: Skin is warm and dry.      Coloration: Skin is not jaundiced.      Findings: No rash.   Neurological:      General: No focal deficit present.      Mental Status: She is alert and oriented to person, place, and time.      Cranial Nerves: No cranial nerve deficit.      Sensory: No sensory deficit.      Motor: No weakness.      Gait: Gait normal.   Psychiatric:         Mood and Affect: Mood normal.         Behavior: Behavior normal.         Thought Content: Thought content normal.         Judgment: Judgment normal.            Other Study Results Review : Pathology reports reviewed.

## 2025-01-16 NOTE — ASSESSMENT & PLAN NOTE
Recurrent stage II uterine leiomyosarcoma with enlarging vaginal apex lesion now s/p robotic-assisted radical bilateral parametrectomy, resection bilateral parametrial tumors, upper vaginectomy, obturator lymph node dissection and cystoscopy on 1/6/25. Final pathology confirmed recurrent disease. Johnston catheter was removed. Patient with right lower quadrant discomfort that has not significantly improved since surgery. Poorly tolerates PO opiods. After johnston removal, pain improved.     Plan to monitor post-operative pain closely. If persistent and not improving, will plan for STAT CT abd/pelvis and labs. Patient to provide update tomorrow.     Return to the office in 1 week for pathology review and treatment planning.

## 2025-01-16 NOTE — TELEPHONE ENCOUNTER
Patient daughter Jessica is calling because her mother  just had surgery. Per patient daughter patient got catheter  removed today and was seen in office today. Tonight the patient has pain in area of where incision are located. Patient  daughter is not sure what to do . Please follow and advise.

## 2025-01-17 ENCOUNTER — HOSPITAL ENCOUNTER (OUTPATIENT)
Dept: CT IMAGING | Facility: HOSPITAL | Age: 65
Discharge: HOME | End: 2025-01-17
Payer: COMMERCIAL

## 2025-01-17 DIAGNOSIS — C55 UTERINE LEIOMYOSARCOMA (HCC): ICD-10-CM

## 2025-01-17 DIAGNOSIS — G89.18 POSTOPERATIVE PAIN: ICD-10-CM

## 2025-01-17 DIAGNOSIS — C55 UTERINE LEIOMYOSARCOMA (HCC): Primary | ICD-10-CM

## 2025-01-17 PROCEDURE — 74177 CT ABD & PELVIS W/CONTRAST: CPT

## 2025-01-17 RX ADMIN — IOHEXOL 100 ML: 350 INJECTION, SOLUTION INTRAVENOUS at 19:00

## 2025-01-17 NOTE — TELEPHONE ENCOUNTER
Returned phone call.  Jessica reports that her mom is still having some pain between the 2 right sided incisions.  Today it improved a little bit after having the Spencer catheter removed, but is now a little bit worse.  She has not taken any oxycodone because she is nervous about taking too much medication.  No changes to the skin including redness or swelling.  The incisions look good.  No fevers, chills, sweats, nausea, or vomiting.  Voiding without issue.  I adverse advised her to try Tylenol, oxycodone, ice, and rest.  If she is still having pain in the morning she should call the office back as she may need a CT scan and blood work.    Domi Aceves  Gynecologic Oncology Fellow

## 2025-01-19 ENCOUNTER — APPOINTMENT (OUTPATIENT)
Dept: LAB | Facility: HOSPITAL | Age: 65
End: 2025-01-19
Payer: COMMERCIAL

## 2025-01-19 DIAGNOSIS — C55 UTERINE LEIOMYOSARCOMA (HCC): ICD-10-CM

## 2025-01-19 DIAGNOSIS — R39.89 BLADDER PAIN: ICD-10-CM

## 2025-01-19 DIAGNOSIS — G89.18 POSTOPERATIVE PAIN: ICD-10-CM

## 2025-01-19 LAB
ALBUMIN SERPL BCG-MCNC: 4.2 G/DL (ref 3.5–5)
ALP SERPL-CCNC: 103 U/L (ref 34–104)
ALT SERPL W P-5'-P-CCNC: 10 U/L (ref 7–52)
ANION GAP SERPL CALCULATED.3IONS-SCNC: 9 MMOL/L (ref 4–13)
AST SERPL W P-5'-P-CCNC: 13 U/L (ref 13–39)
BASOPHILS # BLD AUTO: 0.02 THOUSANDS/ΜL (ref 0–0.1)
BASOPHILS NFR BLD AUTO: 0 % (ref 0–1)
BILIRUB SERPL-MCNC: 1.86 MG/DL (ref 0.2–1)
BUN SERPL-MCNC: 19 MG/DL (ref 5–25)
CALCIUM SERPL-MCNC: 9.4 MG/DL (ref 8.4–10.2)
CHLORIDE SERPL-SCNC: 96 MMOL/L (ref 96–108)
CO2 SERPL-SCNC: 32 MMOL/L (ref 21–32)
CREAT SERPL-MCNC: 0.68 MG/DL (ref 0.6–1.3)
EOSINOPHIL # BLD AUTO: 0.06 THOUSAND/ΜL (ref 0–0.61)
EOSINOPHIL NFR BLD AUTO: 1 % (ref 0–6)
ERYTHROCYTE [DISTWIDTH] IN BLOOD BY AUTOMATED COUNT: 13.6 % (ref 11.6–15.1)
GFR SERPL CREATININE-BSD FRML MDRD: 92 ML/MIN/1.73SQ M
GLUCOSE P FAST SERPL-MCNC: 168 MG/DL (ref 65–99)
HCT VFR BLD AUTO: 40.5 % (ref 34.8–46.1)
HGB BLD-MCNC: 13.2 G/DL (ref 11.5–15.4)
IMM GRANULOCYTES # BLD AUTO: 0.06 THOUSAND/UL (ref 0–0.2)
IMM GRANULOCYTES NFR BLD AUTO: 1 % (ref 0–2)
LYMPHOCYTES # BLD AUTO: 3.15 THOUSANDS/ΜL (ref 0.6–4.47)
LYMPHOCYTES NFR BLD AUTO: 28 % (ref 14–44)
MCH RBC QN AUTO: 29 PG (ref 26.8–34.3)
MCHC RBC AUTO-ENTMCNC: 32.6 G/DL (ref 31.4–37.4)
MCV RBC AUTO: 89 FL (ref 82–98)
MONOCYTES # BLD AUTO: 0.55 THOUSAND/ΜL (ref 0.17–1.22)
MONOCYTES NFR BLD AUTO: 5 % (ref 4–12)
NEUTROPHILS # BLD AUTO: 7.41 THOUSANDS/ΜL (ref 1.85–7.62)
NEUTS SEG NFR BLD AUTO: 65 % (ref 43–75)
NRBC BLD AUTO-RTO: 0 /100 WBCS
PLATELET # BLD AUTO: 307 THOUSANDS/UL (ref 149–390)
PMV BLD AUTO: 10.3 FL (ref 8.9–12.7)
POTASSIUM SERPL-SCNC: 3.4 MMOL/L (ref 3.5–5.3)
PROT SERPL-MCNC: 7.6 G/DL (ref 6.4–8.4)
RBC # BLD AUTO: 4.55 MILLION/UL (ref 3.81–5.12)
SODIUM SERPL-SCNC: 137 MMOL/L (ref 135–147)
WBC # BLD AUTO: 11.25 THOUSAND/UL (ref 4.31–10.16)

## 2025-01-19 PROCEDURE — 36415 COLL VENOUS BLD VENIPUNCTURE: CPT

## 2025-01-19 PROCEDURE — 85025 COMPLETE CBC W/AUTO DIFF WBC: CPT

## 2025-01-19 PROCEDURE — 80053 COMPREHEN METABOLIC PANEL: CPT

## 2025-01-19 PROCEDURE — 87086 URINE CULTURE/COLONY COUNT: CPT

## 2025-01-20 ENCOUNTER — DOCUMENTATION (OUTPATIENT)
Dept: GYNECOLOGIC ONCOLOGY | Facility: CLINIC | Age: 65
End: 2025-01-20

## 2025-01-20 ENCOUNTER — TELEPHONE (OUTPATIENT)
Dept: GYNECOLOGIC ONCOLOGY | Facility: CLINIC | Age: 65
End: 2025-01-20

## 2025-01-20 ENCOUNTER — RESULTS FOLLOW-UP (OUTPATIENT)
Dept: GYNECOLOGIC ONCOLOGY | Facility: CLINIC | Age: 65
End: 2025-01-20

## 2025-01-20 DIAGNOSIS — G89.18 POSTOPERATIVE PAIN: Primary | ICD-10-CM

## 2025-01-20 LAB — BACTERIA UR CULT: ABNORMAL

## 2025-01-20 RX ORDER — TRAMADOL HYDROCHLORIDE 50 MG/1
50 TABLET ORAL EVERY 8 HOURS PRN
Qty: 6 TABLET | Refills: 0 | Status: SHIPPED | OUTPATIENT
Start: 2025-01-20

## 2025-01-20 NOTE — TELEPHONE ENCOUNTER
Return call placed, LMOM.   When patient returns call, please WARM TRANSFER, do not disconnect call.

## 2025-01-20 NOTE — TELEPHONE ENCOUNTER
Patient calling in to speak with Pati Jerome PA-C, she is returning her call.  She reports nausea and I informed her that Pati has been trying to get a hold of her this morning.  I told her that I will forward this message to Pati for call back.  She thanked.me.

## 2025-01-20 NOTE — TELEPHONE ENCOUNTER
2nd attempt made to contact patient to discuss ongoing pain, LMOM. Additionally, response sent to [employer] message.

## 2025-01-20 NOTE — PROGRESS NOTES
Multidisciplinary Gynecologic Oncology Tumor Case Review       Physician Recommended Plan     Felecia Edward is a 64 y.o. female     Diagnosis: Recurrent stage II uterine leiomyosarcoma     Patient was discussed at the Multidisciplinary Gynecologic Oncology Case review on 01/20/2025. The group recommended to consider treatment with EBRT vs. systemic therapy vs. hormonal therapy. Discussion to potentially reserve EBRT for unresectable disease.      Follow-up appointment with Dr. Taveras on 01/23/2025.     NCCN guidelines were available for review.     The final treatment plan will be left to the discretion of the patient and the treating physician.       DISCLAIMERS:    TO THE TREATING PHYSICIAN:  This conference is a meeting of clinicians from various specialty areas who evaluate and discuss patients for whom a multidisciplinary treatment approach is being considered. Please note that the above opinion was a consensus of the conference attendees and is intended only to assist in quality care of the discussed patient.  The responsibility for follow up on the input given during the conference, along with any final decisions regarding plan of care, is that of the patient and the patient's provider. Accordingly, appointments have only been recommended based on this information and have NOT been scheduled unless otherwise noted.      TO THE PATIENT:  This summary is a brief record of major aspects of your cancer treatment. You may choose to share a copy with any of your doctors or nurses. However, this is not a detailed or comprehensive record of your care.

## 2025-01-23 ENCOUNTER — OFFICE VISIT (OUTPATIENT)
Age: 65
End: 2025-01-23
Payer: COMMERCIAL

## 2025-01-23 ENCOUNTER — OFFICE VISIT (OUTPATIENT)
Dept: GASTROENTEROLOGY | Facility: CLINIC | Age: 65
End: 2025-01-23
Payer: COMMERCIAL

## 2025-01-23 VITALS
SYSTOLIC BLOOD PRESSURE: 129 MMHG | DIASTOLIC BLOOD PRESSURE: 72 MMHG | BODY MASS INDEX: 35.51 KG/M2 | WEIGHT: 208 LBS | HEIGHT: 64 IN

## 2025-01-23 VITALS
BODY MASS INDEX: 35.65 KG/M2 | TEMPERATURE: 98 F | RESPIRATION RATE: 18 BRPM | OXYGEN SATURATION: 97 % | WEIGHT: 208.8 LBS | HEART RATE: 121 BPM | HEIGHT: 64 IN | DIASTOLIC BLOOD PRESSURE: 80 MMHG | SYSTOLIC BLOOD PRESSURE: 124 MMHG

## 2025-01-23 DIAGNOSIS — E66.01 MORBID OBESITY (HCC): ICD-10-CM

## 2025-01-23 DIAGNOSIS — Z86.0100 HISTORY OF COLONIC POLYPS: Primary | ICD-10-CM

## 2025-01-23 DIAGNOSIS — K21.9 GERD WITHOUT ESOPHAGITIS: ICD-10-CM

## 2025-01-23 DIAGNOSIS — N30.00 ACUTE CYSTITIS WITHOUT HEMATURIA: ICD-10-CM

## 2025-01-23 DIAGNOSIS — K30 FUNCTIONAL DYSPEPSIA: ICD-10-CM

## 2025-01-23 DIAGNOSIS — C55 UTERINE LEIOMYOSARCOMA (HCC): Primary | ICD-10-CM

## 2025-01-23 DIAGNOSIS — E11.40 TYPE 2 DIABETES MELLITUS WITH DIABETIC NEUROPATHY, WITHOUT LONG-TERM CURRENT USE OF INSULIN (HCC): ICD-10-CM

## 2025-01-23 PROBLEM — B33.24 VIRAL CARDIOMYOPATHY (HCC): Status: RESOLVED | Noted: 2023-09-14 | Resolved: 2025-01-23

## 2025-01-23 PROBLEM — C53.8 MALIGNANT NEOPLASM OF OVERLAPPING SITES OF CERVIX (HCC): Status: RESOLVED | Noted: 2024-10-30 | Resolved: 2025-01-23

## 2025-01-23 PROCEDURE — 99215 OFFICE O/P EST HI 40 MIN: CPT | Performed by: OBSTETRICS & GYNECOLOGY

## 2025-01-23 PROCEDURE — 99213 OFFICE O/P EST LOW 20 MIN: CPT | Performed by: INTERNAL MEDICINE

## 2025-01-23 RX ORDER — DICYCLOMINE HYDROCHLORIDE 10 MG/1
10 CAPSULE ORAL EVERY 6 HOURS PRN
Qty: 30 CAPSULE | Refills: 3 | Status: SHIPPED | OUTPATIENT
Start: 2025-01-23

## 2025-01-23 RX ORDER — CEPHALEXIN 500 MG/1
500 CAPSULE ORAL EVERY 8 HOURS SCHEDULED
Qty: 21 CAPSULE | Refills: 0 | Status: SHIPPED | OUTPATIENT
Start: 2025-01-23 | End: 2025-01-30

## 2025-01-23 NOTE — PATIENT INSTRUCTIONS
Continue pantoprazole once a day.  Can take Pepcid or Tums/Rolaids as needed for breakthrough heartburn.  Continue your probiotic.  Dicyclomine up to 4 times a day as needed for abdominal discomfort.  Follow-up with me in 6 months but reach out to me if there is any problems before

## 2025-01-23 NOTE — ASSESSMENT & PLAN NOTE
Last urine culture was greater than 100,000 alphahemolytic strep.  She took a 5-day course of nitrofurantoin and did not have response.  I prescribed Keflex 500 3 times daily for 7 days.  Orders:    cephalexin (KEFLEX) 500 mg capsule; Take 1 capsule (500 mg total) by mouth every 8 (eight) hours for 7 days

## 2025-01-23 NOTE — ASSESSMENT & PLAN NOTE
On pantoprazole 40 mg daily.  Some breakthrough symptoms  -Continue pantoprazole daily  -Okay to use antacids or Pepcid as needed

## 2025-01-23 NOTE — ASSESSMENT & PLAN NOTE
64-year-old with recurrent stage II leiomyosarcoma now status post robotic assisted bilateral radical parametrectomy, upper vaginectomy, right obturator lymph node dissection, cystoscopy 1/6/2025.  Final pathology was consistent with recurrent sarcoma.  She is overall recovering well from surgery.  Spencer catheter has been removed.  Her performance status is 0.  1.  We reviewed multiple treatment options for recurrent uterine leiomyosarcoma inclusive of observation without additional treatment, pelvic radiation therapy, hormonal therapy, chemotherapy.  2.  I reviewed the consensus recommendations from the gynecologic tumor conference with her as well.  3.  Given that the right obturator lymph node was positive, there is suspicion for systemic recurrence.  We discussed the potential risks of pelvic radiation therapy including the increased risk of possible fistula formation due to recent radical pelvic surgery versus starting palliative IV chemotherapy with Adriamycin at 45 mg/m² every 21 days in combination with trabectidin at 0.9 mg/m² every 21 days for 6 cycles followed by maintenance trabectidin for up to 17 cycles as per the LMS 04 trial.  We discussed the risks of this treatment regimen including the risks of severe neutropenia, cardiomyopathy, kidney and liver damage, rhabdomyolysis, capillary leak syndrome.  She was presented with written information regarding potential side effects as well.  She understands the risks of treatment and agrees to proceed as outlined.  Consent for treatment was obtained by me in the office.  4.  Pretreatment echocardiogram as well as evaluation by cardiology.  5.  In the event that the LMS 04 trial regimen is not feasible due to potential cardiac toxicity or she is unable to tolerate the regimen, hormone therapy with palbociclib and letrozole is a reasonable alternative.  6.  Repeat genomic testing to assess for other therapeutic targets.  Orders:    Ambulatory Referral to  Palliative Care; Future    Echo complete w/ contrast if indicated; Future

## 2025-01-23 NOTE — PROGRESS NOTES
Name: Felecia Edward      : 1960      MRN: [id number]  Encounter Provider: Gordo Taveras MD  Encounter Date: 2025   Encounter department: [facility] GYNECOLOGY ONCOLOGY [facility]  :  Assessment & Plan  Uterine leiomyosarcoma (HCC)  64-year-old with recurrent stage II leiomyosarcoma now status post robotic assisted bilateral radical parametrectomy, upper vaginectomy, right obturator lymph node dissection, cystoscopy 2025.  Final pathology was consistent with recurrent sarcoma.  She is overall recovering well from surgery.  Spencer catheter has been removed.  Her performance status is 0.  1.  We reviewed multiple treatment options for recurrent uterine leiomyosarcoma inclusive of observation without additional treatment, pelvic radiation therapy, hormonal therapy, chemotherapy.  2.  I reviewed the consensus recommendations from the gynecologic tumor conference with her as well.  3.  Given that the right obturator lymph node was positive, there is suspicion for systemic recurrence.  We discussed the potential risks of pelvic radiation therapy including the increased risk of possible fistula formation due to recent radical pelvic surgery versus starting palliative IV chemotherapy with Adriamycin at 45 mg/m² every 21 days in combination with trabectidin at 0.9 mg/m² every 21 days for 6 cycles followed by maintenance trabectidin for up to 17 cycles as per the LMS 04 trial.  We discussed the risks of this treatment regimen including the risks of severe neutropenia, cardiomyopathy, kidney and liver damage, rhabdomyolysis, capillary leak syndrome.  She was presented with written information regarding potential side effects as well.  She understands the risks of treatment and agrees to proceed as outlined.  Consent for treatment was obtained by me in the office.  4.  Pretreatment echocardiogram as well as evaluation by cardiology.  5.  In the event that the LMS 04  trial regimen is not feasible due to potential cardiac toxicity or she is unable to tolerate the regimen, hormone therapy with palbociclib and letrozole is a reasonable alternative.  6.  Repeat genomic testing to assess for other therapeutic targets.  Orders:    Ambulatory Referral to Palliative Care; Future    Echo complete w/ contrast if indicated; Future    Type 2 diabetes mellitus with diabetic neuropathy, without long-term current use of insulin ([facility])    Lab Results   Component Value Date    HGBA1C 7.6 (H) 12/24/2024   Continue to monitor         Acute cystitis without hematuria  Last urine culture was greater than 100,000 alphahemolytic strep.  She took a 5-day course of nitrofurantoin and did not have response.  I prescribed Keflex 500 3 times daily for 7 days.  Orders:    cephalexin (KEFLEX) 500 mg capsule; Take 1 capsule (500 mg total) by mouth every 8 (eight) hours for 7 days            History of Present Illness   Reason for Visit / CC: Postoperative evaluation, treatment discussion   Felecia Edward is a 64 y.o. female   Who returns for postoperative evaluation and treatment discussion.  She had pelvic pain and discomfort with the Spencer catheter in situ which was removed last week.  She had a urine culture performed which demonstrated greater than 100,000 alphahemolytic strep.  She was treated empirically with Macrobid and still has symptoms.  No vaginal bleeding.  She is tired.  She is tolerating regular diet.  She does not require narcotics.  She is having normal bowel movements.  She is ambulatory.  She had a CT scan performed on 1/17/2025 for postoperative pain that did not reveal any evidence of acute findings.  I personally reviewed the images.  There was no evidence of pelvic abscess, hydronephrosis, large fluid collection.  Labs were also performed on 1/19/2025 and revealed a total blood cell count of 11.  The remainder of her CBC was normal.  CMP was also normal with the exception of a total  bilirubin of 1.86 and a glucose of 168 mg/dL.      Pertinent Medical History     Type 2 diabetes   Oncology History   Cancer Staging   Uterine leiomyosarcoma (HCC)  Staging form: Corpus Uteri - Sarcoma, AJCC 8th Edition  - Pathologic stage from 6/4/2023: FIGO Stage II, calculated as Stage Unknown (pT2, pNX, cM0) - Signed by Gordo Taveras MD on 6/29/2023  Stage prefix: Initial diagnosis  Oncology History   Uterine leiomyosarcoma (HCC)   6/4/2023 Initial Diagnosis    Uterine sarcoma (HCC)     6/4/2023 -  Cancer Staged    Staging form: Corpus Uteri - Sarcoma, AJCC 8th Edition  - Pathologic stage from 6/4/2023: FIGO Stage II, calculated as Stage Unknown (pT2, pNX, cM0) - Signed by Gordo Taveras MD on 6/29/2023  Stage prefix: Initial diagnosis       6/4/2023 Surgery    HYSTERECTOMY TOTAL ABDOMINAL (DOROTHY). BILATERAL SALPINGOOPHORECTOMY  EXCISION  BIOPSY LESION/MASS ABDOMINAL-PELVIC PERITONEUM  -Leiomyosarcoma 13.5 cm extending through the myometrium, right pelvic peritoneum involved with uterine leiomyosarcoma with tumor present at unoriented resection surface.     7/11/2023 -  Chemotherapy    Gemzar 800 mg/m2 IV day 1 and 8 as well as taxotere 65 mg/m2 day 8 every 21 days.        - 12/1/2023 Chemotherapy    Adriamycin 50 mg/m2 IV every 21 days.  Including the gemcitabine and Taxotere, she completed a total of 7 cycles.     1/6/2025 Surgery    ROBOTIC ASSISTED RADICAL BILATERAL PARAMETRECTOMY. RESECTION BILATERAL PARAMETRIAL TUMORS. UPPER VAGINECTOMY. LEFT OBTURATOR LYMPH NODE DISSECTION  CYSTOSCOPY     2/6/2025 -  Chemotherapy    DOXOrubicin (ADRIAMYCIN), 45 mg/m2 = 90 mg (75 % of original dose 60 mg/m2), Intravenous, Once, 0 of 6 cycles  Dose modification: 45 mg/m2 (original dose 60 mg/m2, Cycle 6, Reason: Other (Must fill in a comment), Comment: starting dose)  alteplase (CATHFLO), 2 mg, Intracatheter, Every 1 Minute as needed, 0 of 6 cycles  pegfilgrastim-apgf (Nyvepria), 6 mg, Subcutaneous, Once,  0 of 6 cycles  fosaprepitant (EMEND) IVPB, 150 mg, Intravenous, Once, 0 of 6 cycles        Review of Systems A complete review of systems is negative other than that noted above in the HPI.  Current Outpatient Medications on File Prior to Visit   Medication Sig Dispense Refill    aspirin (Aspirin 81) 81 mg chewable tablet every 24 hours      atorvastatin (LIPITOR) 40 mg tablet Take 40 mg by mouth daily at bedtime      cetirizine (ZyrTEC) 10 mg tablet Take 10 mg by mouth daily      cholecalciferol (VITAMIN D3) 1,000 units tablet Take 1,000 Units by mouth daily 2 daily      dicyclomine (BENTYL) 10 mg capsule Take 1 capsule (10 mg total) by mouth every 6 (six) hours as needed (pain) 30 capsule 3    DULoxetine (CYMBALTA) 30 mg delayed release capsule TAKE 1 CAPSULE BY MOUTH EVERY DAY FOR 90 DAYS      glimepiride (AMARYL) 4 mg tablet Take 4 mg by mouth 2 (two) times a day      hydrochlorothiazide (HYDRODIURIL) 25 mg tablet Take 25 mg by mouth daily      ibuprofen (MOTRIN) 800 mg tablet Take 800 mg by mouth every 6 (six) hours as needed for mild pain      Jardiance 10 MG TABS tablet       ketorolac (TORADOL) 10 mg tablet Take 1 tablet (10 mg total) by mouth every 6 (six) hours as needed for moderate pain 30 tablet 0    losartan (COZAAR) 50 mg tablet Take 50 mg by mouth daily      meclizine (ANTIVERT) 25 mg tablet Take by mouth every 12 (twelve) hours as needed for dizziness      metoprolol succinate (TOPROL-XL) 25 mg 24 hr tablet Take 25 mg by mouth daily      Multiple Vitamin (MULTIVITAMIN) tablet Take 1 tablet by mouth daily      pantoprazole (PROTONIX) 40 mg tablet Take 1 tablet (40 mg total) by mouth daily 90 tablet 5    pioglitazone (ACTOS) 30 mg tablet every 24 hours      Potassium Chloride ER 20 MEQ TBCR Take 1 tablet by mouth 2 (two) times a day with meals      rOPINIRole (REQUIP) 1 mg tablet TAKE 1 TABLET BY MOUTH 1 TO 3 HOURS BEFORE BEDTIME      sertraline (ZOLOFT) 100 mg tablet TAKE 1 AND 1/2 TABS BY MOUTH  "DAILY      sertraline (ZOLOFT) 50 mg tablet Take 150 mg by mouth daily      simvastatin (ZOCOR) 40 mg tablet Take 40 mg by mouth daily      topiramate (TOPAMAX) 100 mg tablet 1 tablet Orally at bedtime      traMADol (Ultram) 50 mg tablet Take 1 tablet (50 mg total) by mouth every 8 (eight) hours as needed for moderate pain 6 tablet 0    Triamcinolone Acetonide 55 MCG/ACT AERO into each nostril One spray each nostril daily      Xiidra 5 % op solution INSTILL 1 DROP INTO BOTH EYES TWICE A DAY 12 HOURS APART      ZOLMitriptan (ZOMIG) 5 MG tablet Take 5 mg by mouth once as needed for migraine Daily prn      nitrofurantoin (MACROBID) 100 mg capsule Take 1 capsule (100 mg total) by mouth 2 (two) times a day (Patient not taking: Reported on 1/23/2025) 10 capsule 0    oxyCODONE (Roxicodone) 5 immediate release tablet Take 1 tablet (5 mg total) by mouth every 6 (six) hours as needed for moderate pain Max Daily Amount: 20 mg (Patient not taking: Reported on 1/23/2025) 20 tablet 0    [DISCONTINUED] apixaban (Eliquis) 2.5 mg Take 1 tablet PO BID x 14 days following surgery. (Patient not taking: Reported on 1/23/2025) 28 tablet 0    [DISCONTINUED] atorvastatin (LIPITOR) 80 mg tablet Take 80 mg by mouth daily (Patient not taking: Reported on 1/23/2025)      [DISCONTINUED] betamethasone, augmented, (DIPROLENE) 0.05 % lotion Apply topically 2 (two) times a day (Patient not taking: Reported on 10/30/2024) 60 mL 1     No current facility-administered medications on file prior to visit.         Objective   /80 (BP Location: Right arm, Patient Position: Sitting, Cuff Size: Large)   Pulse (!) 121   Temp 98 °F (36.7 °C) (Temporal)   Resp 18   Ht 5' 4\" (1.626 m)   Wt 94.7 kg (208 lb 12.8 oz)   SpO2 97%   BMI 35.84 kg/m²     Body mass index is 35.84 kg/m².  Pain Screening:  Pain Score: 0-No pain  ECOG   1  Physical Exam  Vitals reviewed.   Constitutional:       General: She is not in acute distress.     Appearance: Normal " "appearance. She is not ill-appearing or toxic-appearing.   HENT:      Head: Normocephalic and atraumatic.      Mouth/Throat:      Mouth: Mucous membranes are moist.   Pulmonary:      Effort: Pulmonary effort is normal.      Breath sounds: Normal breath sounds.   Abdominal:      Palpations: Abdomen is soft. There is no mass.      Tenderness: There is no abdominal tenderness.   Skin:     General: Skin is warm and dry.      Comments: Surgical trocar sites are intact, clean and dry without induration, erythema or purulent drainage.    Neurological:      Mental Status: She is alert and oriented to person, place, and time.   Psychiatric:         Mood and Affect: Mood normal.         Behavior: Behavior normal.         Thought Content: Thought content normal.         Judgment: Judgment normal.          Labs: I have reviewed pertinent labs.   No results found for: \"\"  Lab Results   Component Value Date/Time    Potassium 3.4 (L) 01/19/2025 11:17 AM    Chloride 96 01/19/2025 11:17 AM    CO2 32 01/19/2025 11:17 AM    BUN 19 01/19/2025 11:17 AM    Creatinine 0.68 01/19/2025 11:17 AM    Glucose, Fasting 168 (H) 01/19/2025 11:17 AM    Calcium 9.4 01/19/2025 11:17 AM    AST 13 01/19/2025 11:17 AM    ALT 10 01/19/2025 11:17 AM    Alkaline Phosphatase 103 01/19/2025 11:17 AM    eGFR 92 01/19/2025 11:17 AM     Lab Results   Component Value Date/Time    WBC 11.25 (H) 01/19/2025 11:17 AM    Hemoglobin 13.2 01/19/2025 11:17 AM    Hematocrit 40.5 01/19/2025 11:17 AM    MCV 89 01/19/2025 11:17 AM    Platelets 307 01/19/2025 11:17 AM     Lab Results   Component Value Date/Time    Absolute Neutrophils 7.41 01/19/2025 11:17 AM        Trend:  No results found for: \"\"    Radiology Results Review : I have reviewed images/report studies in PACS as described above (in the HPI).  Other Study Results Review : Pathology reports reviewed.    I have spent a total time of 45 minutes in caring for this patient on the day of the " visit/encounter including Diagnostic results, Prognosis, Risks and benefits of tx options, Instructions for management, Patient and family education, Importance of tx compliance, Risk factor reductions, Impressions, Counseling / Coordination of care, Documenting in the medical record, Reviewing / ordering tests, medicine, procedures  , and Obtaining or reviewing history  .  Only a brief time was spent on the postoperative history and physical examination.

## 2025-01-23 NOTE — PROGRESS NOTES
"Name: Felecia Edward      : 1960      MRN: [id number]  Encounter Provider: Vignesh Shelton MD  Encounter Date: 2025   [facility] [place]  :  Assessment & Plan  Functional dyspepsia  Some general GI discomfort.  Suspect related to stress associated with parents of malignancy as well as recovering from abdominal surgery  -Continue probiotics  -Bentyl as needed  Orders:    dicyclomine (BENTYL) 10 mg capsule; Take 1 capsule (10 mg total) by mouth every 6 (six) hours as needed (pain)    GERD without esophagitis  On pantoprazole 40 mg daily.  Some breakthrough symptoms  -Continue pantoprazole daily  -Okay to use antacids or Pepcid as needed       History of colonic polyps  Next colonoscopy 2024.  5-year recall           History of Present Illness   HPI  Felecia Edward is a 64 y.o. female who presents with a history of GERD and functional dyspepsia who is seen back in the office today.  She was last seen by us in past when she had an EGD and colonoscopy.  She has been on pantoprazole 40 mg daily.  From an upper GI standpoint she is doing relatively well.  She reports occasional breakthrough heartburn.  She denies any dysphagia, odynophagia, early satiety.  She denies any nausea or vomiting.  She is moving her bowels regularly.  She does report to some generalized abdominal discomfort and uneasiness.  She is taking probiotics.  She was recently diagnosed with recurrence of her uterine cancer and underwent surgery 2 weeks ago.  She is post to follow-up with Dr. Taveras today  History obtained from: patient           Objective   /72   Ht 5' 4\" (1.626 m)   Wt 94.3 kg (208 lb)   BMI 35.70 kg/m²      Physical Exam  General appearance: alert, appears stated age and cooperative  Eyes: PERLLA, EOMI, no icterus   Head: Normocephalic, without obvious abnormality, atraumatic  Lungs: clear to auscultation bilaterally  Heart: regular rate " and rhythm, S1, S2 normal, no murmur, click, rub or gallop  Abdomen: soft, mild diffuse tenderness without rebound or guarding especially over surgical port sites; bowel sounds normal; no masses,  no organomegaly  Extremities: extremities normal, atraumatic, no cyanosis or edema  Neurologic: Grossly normal

## 2025-01-23 NOTE — LETTER
2025     Clarice Acuna MD  [address]  [facility] [address]    Patient: Felecia Edward   YOB: 1960   Date of Visit: 2025       Dear Dr. Acuna:    Thank you for referring Felecia Edward to me for evaluation. Below are my notes for this consultation.    If you have questions, please do not hesitate to call me. I look forward to following your patient along with you.         Sincerely,        Vignesh Shelton MD        CC: No Recipients    Vignesh Shelton MD  2025 10:47 AM  Sign when Signing Visit  Name: Felecia Edward      : 1960      MRN: [id number]  Encounter Provider: Vignesh Shelton MD  Encounter Date: 2025   Encounter department: [facility] [place]  :  Assessment & Plan  Functional dyspepsia  Some general GI discomfort.  Suspect related to stress associated with parents of malignancy as well as recovering from abdominal surgery  -Continue probiotics  -Bentyl as needed  Orders:  •  dicyclomine (BENTYL) 10 mg capsule; Take 1 capsule (10 mg total) by mouth every 6 (six) hours as needed (pain)    GERD without esophagitis  On pantoprazole 40 mg daily.  Some breakthrough symptoms  -Continue pantoprazole daily  -Okay to use antacids or Pepcid as needed       History of colonic polyps  Next colonoscopy 2024.  5-year recall           History of Present Illness  HPI  Felecia Edward is a 64 y.o. female who presents with a history of GERD and functional dyspepsia who is seen back in the office today.  She was last seen by us in past when she had an EGD and colonoscopy.  She has been on pantoprazole 40 mg daily.  From an upper GI standpoint she is doing relatively well.  She reports occasional breakthrough heartburn.  She denies any dysphagia, odynophagia, early satiety.  She denies any nausea or vomiting.  She is moving her bowels regularly.  She does report to some generalized abdominal discomfort and uneasiness.  " She is taking probiotics.  She was recently diagnosed with recurrence of her uterine cancer and underwent surgery 2 weeks ago.  She is post to follow-up with Dr. Taveras today  History obtained from: patient           Objective  /72   Ht 5' 4\" (1.626 m)   Wt 94.3 kg (208 lb)   BMI 35.70 kg/m²      Physical Exam  General appearance: alert, appears stated age and cooperative  Eyes: PERLLA, EOMI, no icterus   Head: Normocephalic, without obvious abnormality, atraumatic  Lungs: clear to auscultation bilaterally  Heart: regular rate and rhythm, S1, S2 normal, no murmur, click, rub or gallop  Abdomen: soft, mild diffuse tenderness without rebound or guarding especially over surgical port sites; bowel sounds normal; no masses,  no organomegaly  Extremities: extremities normal, atraumatic, no cyanosis or edema  Neurologic: Grossly normal    "

## 2025-01-23 NOTE — ASSESSMENT & PLAN NOTE
Some general GI discomfort.  Suspect related to stress associated with parents of malignancy as well as recovering from abdominal surgery  -Continue probiotics  -Bentyl as needed  Orders:    dicyclomine (BENTYL) 10 mg capsule; Take 1 capsule (10 mg total) by mouth every 6 (six) hours as needed (pain)

## 2025-01-24 DIAGNOSIS — D70.1 CHEMOTHERAPY INDUCED NEUTROPENIA (HCC): ICD-10-CM

## 2025-01-24 DIAGNOSIS — T45.1X5A CHEMOTHERAPY INDUCED NEUTROPENIA (HCC): ICD-10-CM

## 2025-01-24 DIAGNOSIS — C55 UTERINE LEIOMYOSARCOMA (HCC): Primary | ICD-10-CM

## 2025-01-28 DIAGNOSIS — C55 UTERINE LEIOMYOSARCOMA (HCC): Primary | ICD-10-CM

## 2025-01-30 ENCOUNTER — APPOINTMENT (OUTPATIENT)
Dept: LAB | Facility: HOSPITAL | Age: 65
End: 2025-01-30
Payer: COMMERCIAL

## 2025-01-30 ENCOUNTER — APPOINTMENT (OUTPATIENT)
Dept: LAB | Facility: HOSPITAL | Age: 65
End: 2025-01-30
Attending: UROLOGY
Payer: COMMERCIAL

## 2025-01-30 DIAGNOSIS — N20.0 NEPHROLITHIASIS: ICD-10-CM

## 2025-01-30 DIAGNOSIS — Z01.812 PRE-OPERATIVE LABORATORY EXAMINATION: ICD-10-CM

## 2025-01-30 DIAGNOSIS — R39.89 SUSPECTED UTI: ICD-10-CM

## 2025-01-30 DIAGNOSIS — Z01.810 PRE-OPERATIVE CARDIOVASCULAR EXAMINATION: ICD-10-CM

## 2025-01-30 DIAGNOSIS — N20.0 RIGHT RENAL STONE: ICD-10-CM

## 2025-01-30 LAB
ALBUMIN SERPL BCG-MCNC: 4.1 G/DL (ref 3.5–5)
ALP SERPL-CCNC: 134 U/L (ref 34–104)
ALT SERPL W P-5'-P-CCNC: 27 U/L (ref 7–52)
ANION GAP SERPL CALCULATED.3IONS-SCNC: 10 MMOL/L (ref 4–13)
AST SERPL W P-5'-P-CCNC: 54 U/L (ref 13–39)
BASOPHILS # BLD AUTO: 0.02 THOUSANDS/ΜL (ref 0–0.1)
BASOPHILS NFR BLD AUTO: 0 % (ref 0–1)
BILIRUB SERPL-MCNC: 1.4 MG/DL (ref 0.2–1)
BUN SERPL-MCNC: 18 MG/DL (ref 5–25)
CALCIUM SERPL-MCNC: 9.5 MG/DL (ref 8.4–10.2)
CHLORIDE SERPL-SCNC: 97 MMOL/L (ref 96–108)
CO2 SERPL-SCNC: 31 MMOL/L (ref 21–32)
CREAT SERPL-MCNC: 0.66 MG/DL (ref 0.6–1.3)
EOSINOPHIL # BLD AUTO: 0.12 THOUSAND/ΜL (ref 0–0.61)
EOSINOPHIL NFR BLD AUTO: 2 % (ref 0–6)
ERYTHROCYTE [DISTWIDTH] IN BLOOD BY AUTOMATED COUNT: 13.4 % (ref 11.6–15.1)
GFR SERPL CREATININE-BSD FRML MDRD: 92 ML/MIN/1.73SQ M
GLUCOSE SERPL-MCNC: 189 MG/DL (ref 65–140)
HCT VFR BLD AUTO: 39.6 % (ref 34.8–46.1)
HGB BLD-MCNC: 12.7 G/DL (ref 11.5–15.4)
IMM GRANULOCYTES # BLD AUTO: 0.02 THOUSAND/UL (ref 0–0.2)
IMM GRANULOCYTES NFR BLD AUTO: 0 % (ref 0–2)
LYMPHOCYTES # BLD AUTO: 2.57 THOUSANDS/ΜL (ref 0.6–4.47)
LYMPHOCYTES NFR BLD AUTO: 37 % (ref 14–44)
MCH RBC QN AUTO: 28.5 PG (ref 26.8–34.3)
MCHC RBC AUTO-ENTMCNC: 32.1 G/DL (ref 31.4–37.4)
MCV RBC AUTO: 89 FL (ref 82–98)
MONOCYTES # BLD AUTO: 0.4 THOUSAND/ΜL (ref 0.17–1.22)
MONOCYTES NFR BLD AUTO: 6 % (ref 4–12)
NEUTROPHILS # BLD AUTO: 3.91 THOUSANDS/ΜL (ref 1.85–7.62)
NEUTS SEG NFR BLD AUTO: 55 % (ref 43–75)
NRBC BLD AUTO-RTO: 0 /100 WBCS
PLATELET # BLD AUTO: 308 THOUSANDS/UL (ref 149–390)
PMV BLD AUTO: 10.4 FL (ref 8.9–12.7)
POTASSIUM SERPL-SCNC: 3.5 MMOL/L (ref 3.5–5.3)
PROT SERPL-MCNC: 7.5 G/DL (ref 6.4–8.4)
RBC # BLD AUTO: 4.46 MILLION/UL (ref 3.81–5.12)
SODIUM SERPL-SCNC: 138 MMOL/L (ref 135–147)
WBC # BLD AUTO: 7.04 THOUSAND/UL (ref 4.31–10.16)

## 2025-01-30 PROCEDURE — 87086 URINE CULTURE/COLONY COUNT: CPT

## 2025-01-30 PROCEDURE — 36415 COLL VENOUS BLD VENIPUNCTURE: CPT

## 2025-01-30 PROCEDURE — 80053 COMPREHEN METABOLIC PANEL: CPT

## 2025-01-30 PROCEDURE — 85025 COMPLETE CBC W/AUTO DIFF WBC: CPT

## 2025-01-31 LAB
ATRIAL RATE: 81 BPM
BACTERIA UR CULT: NORMAL
P AXIS: 56 DEGREES
PR INTERVAL: 170 MS
QRS AXIS: 5 DEGREES
QRSD INTERVAL: 60 MS
QT INTERVAL: 398 MS
QTC INTERVAL: 462 MS
T WAVE AXIS: 61 DEGREES
VENTRICULAR RATE: 81 BPM

## 2025-01-31 PROCEDURE — 93010 ELECTROCARDIOGRAM REPORT: CPT | Performed by: INTERNAL MEDICINE

## 2025-02-04 ENCOUNTER — ANESTHESIA EVENT (OUTPATIENT)
Dept: PERIOP | Facility: HOSPITAL | Age: 65
End: 2025-02-04
Payer: COMMERCIAL

## 2025-02-04 NOTE — PRE-PROCEDURE INSTRUCTIONS
Pre-Surgery Instructions:   Medication Instructions    aspirin (Aspirin 81) 81 mg chewable tablet Instructions provided by MD-per guidelines may continue to take.     atorvastatin (LIPITOR) 40 mg tablet Take night before surgery    cetirizine (ZyrTEC) 10 mg tablet Take day of surgery.    cholecalciferol (VITAMIN D3) 1,000 units tablet Stop taking 7 days prior to surgery.    dicyclomine (BENTYL) 10 mg capsule Uses PRN- OK to take day of surgery    DULoxetine (CYMBALTA) 30 mg delayed release capsule Take day of surgery.    glimepiride (AMARYL) 4 mg tablet Hold day of surgery.    hydrochlorothiazide (HYDRODIURIL) 25 mg tablet Hold day of surgery.    ibuprofen (MOTRIN) 800 mg tablet Stop taking 3 days prior to surgery.    Jardiance 10 MG TABS tablet Stop taking 4 days prior to surgery.    ketorolac (TORADOL) 10 mg tablet Stop taking 3 days prior to surgery.    losartan (COZAAR) 50 mg tablet Take night before surgery    meclizine (ANTIVERT) 25 mg tablet Uses PRN- OK to take day of surgery    metoprolol succinate (TOPROL-XL) 25 mg 24 hr tablet Take day of surgery.    Multiple Vitamin (MULTIVITAMIN) tablet Stop taking 7 days prior to surgery.    pantoprazole (PROTONIX) 40 mg tablet Take day of surgery.    pioglitazone (ACTOS) 30 mg tablet Hold day of surgery.    Potassium Chloride ER 20 MEQ TBCR Hold day of surgery.    rOPINIRole (REQUIP) 1 mg tablet Take night before surgery    sertraline (ZOLOFT) 100 mg tablet Take day of surgery.    sertraline (ZOLOFT) 50 mg tablet Take day of surgery.    simvastatin (ZOCOR) 40 mg tablet Take night before surgery    topiramate (TOPAMAX) 100 mg tablet Take night before surgery    traMADol (Ultram) 50 mg tablet Uses PRN- OK to take day of surgery    Triamcinolone Acetonide 55 MCG/ACT AERO Uses PRN- OK to take day of surgery    Xiidra 5 % op solution Uses PRN- OK to take day of surgery    ZOLMitriptan (ZOMIG) 5 MG tablet Uses PRN- DO NOT take day of surgery   Medication instructions for  day surgery reviewed. Please use only a sip of water to take your instructed medications. Avoid all over the counter vitamins, supplements and NSAIDS for one week prior to surgery per anesthesia guidelines. Tylenol is ok to take as needed.     You will receive a call one business day prior to surgery with an arrival time and hospital directions. If your surgery is scheduled on a Monday, the hospital will be calling you on the Friday prior to your surgery. If you have not heard from anyone by 8pm, please call the hospital supervisor through the hospital  at [phone]. ([place] [phone] or [place] [phone]).    Do not eat or drink anything after midnight the night before your surgery, including candy, mints, lifesavers, or chewing gum. Do not drink alcohol 24hrs before your surgery. Try not to smoke at least 24hrs before your surgery.       Follow the pre surgery showering instructions as listed in the “My Surgical Experience Booklet” or otherwise provided by your surgeon's office. Do not use a blade to shave the surgical area 1 week before surgery. It is okay to use a clean electric clippers up to 24 hours before surgery. Do not apply any lotions, creams, including makeup, cologne, deodorant, or perfumes after showering on the day of your surgery. Do not use dry shampoo, hair spray, hair gel, or any type of hair products.     No contact lenses, eye make-up, or artificial eyelashes. Remove nail polish, including gel polish, and any artificial, gel, or acrylic nails if possible. Remove all jewelry including rings and body piercing jewelry.     Wear causal clothing that is easy to take on and off. Consider your type of surgery.    Keep any valuables, jewelry, piercings at home. Please bring any specially ordered equipment (sling, braces) if indicated.    Arrange for a responsible person to drive you to and from the hospital on the day of your surgery. Please confirm the visitor policy for the day of  your procedure when you receive your phone call with an arrival time.     Call the surgeon's office with any new illnesses, exposures, or additional questions prior to surgery.    Please reference your “My Surgical Experience Booklet” for additional information to prepare for your upcoming surgery.

## 2025-02-05 ENCOUNTER — HOSPITAL ENCOUNTER (OUTPATIENT)
Dept: NON INVASIVE DIAGNOSTICS | Age: 65
Discharge: HOME | End: 2025-02-05
Payer: COMMERCIAL

## 2025-02-05 ENCOUNTER — TELEPHONE (OUTPATIENT)
Age: 65
End: 2025-02-05

## 2025-02-05 VITALS
SYSTOLIC BLOOD PRESSURE: 124 MMHG | HEART RATE: 92 BPM | DIASTOLIC BLOOD PRESSURE: 80 MMHG | HEIGHT: 64 IN | WEIGHT: 208 LBS | BODY MASS INDEX: 35.51 KG/M2

## 2025-02-05 DIAGNOSIS — C55 UTERINE LEIOMYOSARCOMA (HCC): ICD-10-CM

## 2025-02-05 LAB
AORTIC ROOT: 3.6 CM
ASCENDING AORTA: 3.5 CM
BSA FOR ECHO PROCEDURE: 1.99 M2
E WAVE DECELERATION TIME: 183 MS
E/A RATIO: 0.64
FRACTIONAL SHORTENING: 29 (ref 28–44)
GLOBAL LONGITUIDAL STRAIN: -17 %
INTERVENTRICULAR SEPTUM IN DIASTOLE (PARASTERNAL SHORT AXIS VIEW): 1.2 CM
INTERVENTRICULAR SEPTUM: 1.2 CM (ref 0.6–1.1)
LAAS-AP2: 19.7 CM2
LAAS-AP4: 20.9 CM2
LEFT ATRIUM SIZE: 4.1 CM
LEFT ATRIUM VOLUME (MOD BIPLANE): 64 ML
LEFT ATRIUM VOLUME INDEX (MOD BIPLANE): 32.2 ML/M2
LEFT INTERNAL DIMENSION IN SYSTOLE: 3 CM (ref 2.1–4)
LEFT VENTRICLE DIASTOLIC VOLUME (MOD BIPLANE): 162 ML
LEFT VENTRICLE DIASTOLIC VOLUME INDEX (MOD BIPLANE): 81.4 ML/M2
LEFT VENTRICLE SYSTOLIC VOLUME (MOD BIPLANE): 63 ML
LEFT VENTRICLE SYSTOLIC VOLUME INDEX (MOD BIPLANE): 31.7 ML/M2
LEFT VENTRICULAR INTERNAL DIMENSION IN DIASTOLE: 4.2 CM (ref 3.5–6)
LEFT VENTRICULAR POSTERIOR WALL IN END DIASTOLE: 1 CM
LEFT VENTRICULAR STROKE VOLUME: 45 ML
LV EF BIPLANE MOD: 61 %
LV EF US.2D.A4C+ESTIMATED: 63 %
LVSV (TEICH): 45 ML
MV E'TISSUE VEL-LAT: 12 CM/S
MV E'TISSUE VEL-SEP: 7 CM/S
MV PEAK A VEL: 0.81 M/S
MV PEAK E VEL: 52 CM/S
MV STENOSIS PRESSURE HALF TIME: 53 MS
MV VALVE AREA P 1/2 METHOD: 4.15
RIGHT ATRIUM AREA SYSTOLE A4C: 10.3 CM2
RIGHT VENTRICLE ID DIMENSION: 2.6 CM
SL CV LEFT ATRIUM LENGTH A2C: 5.3 CM
SL CV LV EF: 60
SL CV PED ECHO LEFT VENTRICLE DIASTOLIC VOLUME (MOD BIPLANE) 2D: 79 ML
SL CV PED ECHO LEFT VENTRICLE SYSTOLIC VOLUME (MOD BIPLANE) 2D: 34 ML
TRICUSPID ANNULAR PLANE SYSTOLIC EXCURSION: 2 CM

## 2025-02-05 PROCEDURE — 93306 TTE W/DOPPLER COMPLETE: CPT | Performed by: INTERNAL MEDICINE

## 2025-02-05 PROCEDURE — 93356 MYOCRD STRAIN IMG SPCKL TRCK: CPT | Performed by: INTERNAL MEDICINE

## 2025-02-05 PROCEDURE — 93306 TTE W/DOPPLER COMPLETE: CPT

## 2025-02-05 PROCEDURE — 93356 MYOCRD STRAIN IMG SPCKL TRCK: CPT

## 2025-02-05 NOTE — TELEPHONE ENCOUNTER
Call received from patient. Stated she keeps getting phone calls from Ra[facility] to have testing completed but believes she was told by Dr Taveras to not have that done and is questioning what she should do. Also said Dr Taveras was going to be talking to a Dr Ch on behalf of patient but she is not sure what that discussion was or what came from that. Requesting clarification.

## 2025-02-05 NOTE — TELEPHONE ENCOUNTER
Return call kristi Carter cancelled. We are not aware of who Dr. Ch is, but patient notes Dr. Ch called her directly.

## 2025-02-09 LAB
CARIS ER: POSITIVE
CARIS GENOMIC LOH - EXOME: NORMAL
CARIS HER2/NEU: NEGATIVE
CARIS HLA-A: NORMAL
CARIS HLA-B: NORMAL
CARIS HLA-C: NORMAL
CARIS MSI - EXOME: NORMAL
CARIS PD-L1 (SP142): NEGATIVE
CARIS PR: POSITIVE
CARIS TMB - EXOME: NORMAL

## 2025-02-11 NOTE — H&P
HISTORY AND PHYSICAL  ?  ?  Patient Name: Felecia Edward  Patient MRN: [id number]  Attending Provider: Michael Magallon MD  Service: Urology  Chief Complaint    Right kidney stone    HPI   Felecia Edward is a 64 y.o. female with a right kidney stone  I plan cystoscopy, right retrograde pyelography, ureteroscopy with laser lithotripsy and right stent placement.  Potential risks and complications discussed, and informed consent was given by the patient.  Medications  Meds/Allergies   lactated ringers, 125 mL/hr        Prior to Admission Medications   Prescriptions Last Dose Informant Patient Reported? Taking?   DULoxetine (CYMBALTA) 30 mg delayed release capsule 2/12/2025 Self Yes Yes   Sig: TAKE 1 CAPSULE BY MOUTH EVERY DAY FOR 90 DAYS   Jardiance 10 MG TABS tablet 2/8/2025 Self Yes Yes   Multiple Vitamin (MULTIVITAMIN) tablet Past Week Self Yes Yes   Sig: Take 1 tablet by mouth daily   Potassium Chloride ER 20 MEQ TBCR 2/12/2025 Self Yes Yes   Sig: Take 1 tablet by mouth 2 (two) times a day with meals   Triamcinolone Acetonide 55 MCG/ACT AERO Past Week Self Yes Yes   Sig: into each nostril One spray each nostril daily   Xiidra 5 % op solution  Self Yes Yes   Sig: INSTILL 1 DROP INTO BOTH EYES TWICE A DAY 12 HOURS APART   ZOLMitriptan (ZOMIG) 5 MG tablet 2/12/2025 Self Yes Yes   Sig: Take 5 mg by mouth once as needed for migraine Daily prn   aspirin (Aspirin 81) 81 mg chewable tablet  Self Yes Yes   Sig: every 24 hours   atorvastatin (LIPITOR) 40 mg tablet 2/12/2025 Self Yes Yes   Sig: Take 40 mg by mouth daily at bedtime   cetirizine (ZyrTEC) 10 mg tablet 2/12/2025 Self Yes Yes   Sig: Take 10 mg by mouth daily   cholecalciferol (VITAMIN D3) 1,000 units tablet Past Week Self Yes Yes   Sig: Take 1,000 Units by mouth daily 2 daily   dicyclomine (BENTYL) 10 mg capsule 2/12/2025 Self No Yes   Sig: Take 1 capsule (10 mg total) by mouth every 6 (six) hours as needed (pain)   glimepiride (AMARYL) 4 mg tablet 2/12/2025  Self Yes Yes   Sig: Take 4 mg by mouth 2 (two) times a day   hydrochlorothiazide (HYDRODIURIL) 25 mg tablet  Self Yes Yes   Sig: Take 25 mg by mouth daily   ibuprofen (MOTRIN) 800 mg tablet More than a month Self Yes No   Sig: Take 800 mg by mouth every 6 (six) hours as needed for mild pain   ketorolac (TORADOL) 10 mg tablet More than a month Self No No   Sig: Take 1 tablet (10 mg total) by mouth every 6 (six) hours as needed for moderate pain   losartan (COZAAR) 50 mg tablet 2025 Self Yes Yes   Sig: Take 50 mg by mouth daily   meclizine (ANTIVERT) 25 mg tablet More than a month Self Yes No   Sig: Take by mouth every 12 (twelve) hours as needed for dizziness   metoprolol succinate (TOPROL-XL) 25 mg 24 hr tablet 2025 at 10:00 PM Self Yes Yes   Sig: Take 25 mg by mouth daily   nitrofurantoin (MACROBID) 100 mg capsule  Self No No   Sig: Take 1 capsule (100 mg total) by mouth 2 (two) times a day   Patient not taking: Reported on 2025   oxyCODONE (Roxicodone) 5 immediate release tablet  Self No No   Sig: Take 1 tablet (5 mg total) by mouth every 6 (six) hours as needed for moderate pain Max Daily Amount: 20 mg   Patient not taking: Reported on 2025   pantoprazole (PROTONIX) 40 mg tablet 2025 Self No Yes   Sig: Take 1 tablet (40 mg total) by mouth daily   pioglitazone (ACTOS) 30 mg tablet 2025 Self Yes Yes   Sig: every 24 hours   rOPINIRole (REQUIP) 1 mg tablet 2025 Self Yes Yes   Sig: TAKE 1 TABLET BY MOUTH 1 TO 3 HOURS BEFORE BEDTIME   sertraline (ZOLOFT) 100 mg tablet 2025 Self Yes Yes   Sig: TAKE 1 AND 1/2 TABS BY MOUTH DAILY   sertraline (ZOLOFT) 50 mg tablet 2025 Self Yes Yes   Sig: Take 150 mg by mouth daily   simvastatin (ZOCOR) 40 mg tablet 2025 Self Yes Yes   Sig: Take 40 mg by mouth daily   topiramate (TOPAMAX) 100 mg tablet 2025 Self Yes Yes   Si tablet Orally at bedtime   traMADol (Ultram) 50 mg tablet More than a month Self No No   Sig: Take 1 tablet  (50 mg total) by mouth every 8 (eight) hours as needed for moderate pain      Facility-Administered Medications: None       Current Facility-Administered Medications:     ceFAZolin (ANCEF) IVPB (premix in dextrose) 2,000 mg 50 mL, 2,000 mg, Intravenous, Once, Michael Magallon MD    lactated ringers infusion, 125 mL/hr, Intravenous, Continuous, Desmond Morrell MD, New Bag at 02/13/25 0802  Review of Systems  10 point review of systems negative except as noted in HPI  Allergies  Allergies   Allergen Reactions    Sulfa Antibiotics Other (See Comments)     High fever  Pt unsure - it happened when she was 14 years old    Bee Pollen Swelling    Gabapentin Fatigue    Metformin GI Intolerance     PMH  Past Medical History:   Diagnosis Date    Allergic rhinitis     Anemia     Anxiety     Coronary artery disease     CPAP (continuous positive airway pressure) dependence     Depression     Diabetes mellitus (HCC)     Functional dyspepsia     GERD (gastroesophageal reflux disease)     GERD without esophagitis     Hyperlipidemia     Hypertension     Irritable bowel syndrome     Migraines     Osteoporosis     Pulmonary hypertension (HCC)     Sleep apnea, obstructive      Past surgical history  Past Surgical History:   Procedure Laterality Date    APPENDECTOMY      CHEST WALL BIOPSY N/A 06/04/2023    Procedure: EXCISION  BIOPSY LESION/MASS ABDOMINAL-PELVIC PERITONEUM;  Surgeon: Gordo Taveras MD;  Location: [facility];  Service: Gynecology Oncology    CHOLECYSTECTOMY      COLONOSCOPY      CYSTOSCOPY N/A 01/06/2025    Procedure: CYSTOSCOPY;  Surgeon: Gordo Taveras MD;  Location: [facility];  Service: Gynecology Oncology    FOOT SURGERY Left     Bone spur    HYSTERECTOMY N/A 06/04/2023    Procedure: HYSTERECTOMY TOTAL ABDOMINAL (DOROTHY), BILATERAL SALPINGOOPHORECTOMY;  Surgeon: Gordo Taveras MD;  Location: [facility];  Service: Gynecology Oncology    IR PORT PLACEMENT  06/30/2023    NM LAPS FULG/EXC OVARY  "VISCERA/PERITONEAL SURFACE N/A 01/06/2025    Procedure: ROBOTIC ASSISTED RADICAL BILATERAL PARAMETRECTOMY, RESECTION BILATERAL PARAMETRIAL TUMORS, UPPER VAGINECTOMY, LEFT OBTURATOR LYMPH NODE DISSECTION;  Surgeon: Gordo Taveras MD;  Location: [facility];  Service: Gynecology Oncology     Social history  Social History     Tobacco Use    Smoking status: Never    Smokeless tobacco: Never   Vaping Use    Vaping status: Never Used   Substance Use Topics    Alcohol use: Never    Drug use: Never     ?  Physical Exam      /86 (BP Location: Right arm)   Pulse 70   Temp (!) 97.2 °F (36.2 °C) (Temporal)   Resp 18   Ht 5' 4\" (1.626 m)   Wt 94.3 kg (208 lb)   SpO2 94%   BMI 35.70 kg/m²   General appearance: alert and oriented, in no acute distress  Head: Normocephalic, without obvious abnormality, atraumatic  Neck: no JVD and supple, symmetrical, trachea midline  Back: symmetric, no curvature. ROM normal. No CVA tenderness.  Lungs: clear to auscultation bilaterally  Heart: regular rate and rhythm  Abdomen: soft, non-tender; bowel sounds normal; no masses,  no organomegaly  Extremities: extremities normal, warm and well-perfused; no cyanosis, clubbing, or edema  Neurologic: Grossly normal  Michael Magallon MD   "

## 2025-02-13 ENCOUNTER — HOSPITAL ENCOUNTER (OUTPATIENT)
Facility: HOSPITAL | Age: 65
Setting detail: OUTPATIENT SURGERY
Discharge: HOME | End: 2025-02-13
Attending: UROLOGY | Admitting: UROLOGY
Payer: COMMERCIAL

## 2025-02-13 ENCOUNTER — ANESTHESIA (OUTPATIENT)
Dept: PERIOP | Facility: HOSPITAL | Age: 65
End: 2025-02-13
Payer: COMMERCIAL

## 2025-02-13 ENCOUNTER — TELEPHONE (OUTPATIENT)
Dept: UROLOGY | Facility: MEDICAL CENTER | Age: 65
End: 2025-02-13

## 2025-02-13 ENCOUNTER — APPOINTMENT (OUTPATIENT)
Dept: RADIOLOGY | Facility: HOSPITAL | Age: 65
End: 2025-02-13
Payer: COMMERCIAL

## 2025-02-13 VITALS
HEART RATE: 64 BPM | DIASTOLIC BLOOD PRESSURE: 76 MMHG | SYSTOLIC BLOOD PRESSURE: 141 MMHG | WEIGHT: 208 LBS | RESPIRATION RATE: 16 BRPM | OXYGEN SATURATION: 99 % | TEMPERATURE: 96.7 F | HEIGHT: 64 IN | BODY MASS INDEX: 35.51 KG/M2

## 2025-02-13 DIAGNOSIS — G89.3 CANCER ASSOCIATED PAIN: ICD-10-CM

## 2025-02-13 DIAGNOSIS — N20.0 NEPHROLITHIASIS: ICD-10-CM

## 2025-02-13 DIAGNOSIS — N20.0 RIGHT RENAL STONE: ICD-10-CM

## 2025-02-13 LAB — GLUCOSE SERPL-MCNC: 187 MG/DL (ref 65–140)

## 2025-02-13 PROCEDURE — C1758 CATHETER, URETERAL: HCPCS | Performed by: UROLOGY

## 2025-02-13 PROCEDURE — C1894 INTRO/SHEATH, NON-LASER: HCPCS | Performed by: UROLOGY

## 2025-02-13 PROCEDURE — C1747 URETEROSCOPE DIGITAL FLEX SNGL USE STD DEFLECTION APTRA: HCPCS | Performed by: UROLOGY

## 2025-02-13 PROCEDURE — NC001 PR NO CHARGE: Performed by: UROLOGY

## 2025-02-13 PROCEDURE — 74420 UROGRAPHY RTRGR +-KUB: CPT

## 2025-02-13 PROCEDURE — C2617 STENT, NON-COR, TEM W/O DEL: HCPCS | Performed by: UROLOGY

## 2025-02-13 PROCEDURE — 82948 REAGENT STRIP/BLOOD GLUCOSE: CPT

## 2025-02-13 PROCEDURE — C1769 GUIDE WIRE: HCPCS | Performed by: UROLOGY

## 2025-02-13 PROCEDURE — 52356 CYSTO/URETERO W/LITHOTRIPSY: CPT | Performed by: UROLOGY

## 2025-02-13 PROCEDURE — 82360 CALCULUS ASSAY QUANT: CPT | Performed by: UROLOGY

## 2025-02-13 DEVICE — VARIABLE LENGTH INJECTION STENT SET
Type: IMPLANTABLE DEVICE | Site: URETER | Status: FUNCTIONAL
Brand: CONTOUR VL™ INJECTION STENT SET

## 2025-02-13 RX ORDER — PHENAZOPYRIDINE HYDROCHLORIDE 100 MG/1
200 TABLET, FILM COATED ORAL ONCE AS NEEDED
Status: COMPLETED | OUTPATIENT
Start: 2025-02-13 | End: 2025-02-13

## 2025-02-13 RX ORDER — KETOROLAC TROMETHAMINE 10 MG/1
10 TABLET, FILM COATED ORAL EVERY 6 HOURS PRN
Qty: 20 TABLET | Refills: 0 | Status: ON HOLD | OUTPATIENT
Start: 2025-02-13 | End: 2025-02-21

## 2025-02-13 RX ORDER — PHENAZOPYRIDINE HYDROCHLORIDE 200 MG/1
200 TABLET, FILM COATED ORAL 3 TIMES DAILY PRN
Qty: 10 TABLET | Refills: 0 | Status: ON HOLD | OUTPATIENT
Start: 2025-02-13

## 2025-02-13 RX ORDER — DIPHENHYDRAMINE HYDROCHLORIDE 50 MG/ML
12.5 INJECTION INTRAMUSCULAR; INTRAVENOUS ONCE AS NEEDED
Status: DISCONTINUED | OUTPATIENT
Start: 2025-02-13 | End: 2025-02-13 | Stop reason: HOSPADM

## 2025-02-13 RX ORDER — MAGNESIUM HYDROXIDE 1200 MG/15ML
LIQUID ORAL AS NEEDED
Status: DISCONTINUED | OUTPATIENT
Start: 2025-02-13 | End: 2025-02-13 | Stop reason: HOSPADM

## 2025-02-13 RX ORDER — OXYCODONE HYDROCHLORIDE 5 MG/1
5 TABLET ORAL EVERY 6 HOURS PRN
Qty: 8 TABLET | Refills: 0 | Status: ON HOLD | OUTPATIENT
Start: 2025-02-13

## 2025-02-13 RX ORDER — GLYCOPYRROLATE 0.2 MG/ML
INJECTION INTRAMUSCULAR; INTRAVENOUS AS NEEDED
Status: DISCONTINUED | OUTPATIENT
Start: 2025-02-13 | End: 2025-02-13

## 2025-02-13 RX ORDER — ONDANSETRON 2 MG/ML
INJECTION INTRAMUSCULAR; INTRAVENOUS AS NEEDED
Status: DISCONTINUED | OUTPATIENT
Start: 2025-02-13 | End: 2025-02-13

## 2025-02-13 RX ORDER — OXYCODONE HYDROCHLORIDE 5 MG/1
5 TABLET ORAL EVERY 4 HOURS PRN
Refills: 0 | Status: DISCONTINUED | OUTPATIENT
Start: 2025-02-13 | End: 2025-02-13 | Stop reason: HOSPADM

## 2025-02-13 RX ORDER — EPHEDRINE SULFATE 50 MG/ML
INJECTION INTRAVENOUS AS NEEDED
Status: DISCONTINUED | OUTPATIENT
Start: 2025-02-13 | End: 2025-02-13

## 2025-02-13 RX ORDER — ONDANSETRON 2 MG/ML
4 INJECTION INTRAMUSCULAR; INTRAVENOUS EVERY 6 HOURS PRN
Status: DISCONTINUED | OUTPATIENT
Start: 2025-02-13 | End: 2025-02-13 | Stop reason: HOSPADM

## 2025-02-13 RX ORDER — FENTANYL CITRATE 50 UG/ML
INJECTION, SOLUTION INTRAMUSCULAR; INTRAVENOUS AS NEEDED
Status: DISCONTINUED | OUTPATIENT
Start: 2025-02-13 | End: 2025-02-13

## 2025-02-13 RX ORDER — DEXAMETHASONE SODIUM PHOSPHATE 10 MG/ML
INJECTION, SOLUTION INTRAMUSCULAR; INTRAVENOUS AS NEEDED
Status: DISCONTINUED | OUTPATIENT
Start: 2025-02-13 | End: 2025-02-13

## 2025-02-13 RX ORDER — ROCURONIUM BROMIDE 10 MG/ML
INJECTION, SOLUTION INTRAVENOUS AS NEEDED
Status: DISCONTINUED | OUTPATIENT
Start: 2025-02-13 | End: 2025-02-13

## 2025-02-13 RX ORDER — TAMSULOSIN HYDROCHLORIDE 0.4 MG/1
0.4 CAPSULE ORAL EVERY EVENING
Qty: 14 CAPSULE | Refills: 0 | Status: ON HOLD | OUTPATIENT
Start: 2025-02-13 | End: 2025-02-27

## 2025-02-13 RX ORDER — KETOROLAC TROMETHAMINE 30 MG/ML
15 INJECTION, SOLUTION INTRAMUSCULAR; INTRAVENOUS EVERY 6 HOURS SCHEDULED
Status: DISCONTINUED | OUTPATIENT
Start: 2025-02-13 | End: 2025-02-13 | Stop reason: HOSPADM

## 2025-02-13 RX ORDER — CEFAZOLIN SODIUM 2 G/50ML
2000 SOLUTION INTRAVENOUS ONCE
Status: COMPLETED | OUTPATIENT
Start: 2025-02-13 | End: 2025-02-13

## 2025-02-13 RX ORDER — ACETAMINOPHEN 325 MG/1
975 TABLET ORAL ONCE
Status: DISCONTINUED | OUTPATIENT
Start: 2025-02-13 | End: 2025-02-13 | Stop reason: HOSPADM

## 2025-02-13 RX ORDER — FENTANYL CITRATE 50 UG/ML
25 INJECTION, SOLUTION INTRAMUSCULAR; INTRAVENOUS
Status: DISCONTINUED | OUTPATIENT
Start: 2025-02-13 | End: 2025-02-13 | Stop reason: HOSPADM

## 2025-02-13 RX ORDER — PROPOFOL 10 MG/ML
INJECTION, EMULSION INTRAVENOUS AS NEEDED
Status: DISCONTINUED | OUTPATIENT
Start: 2025-02-13 | End: 2025-02-13

## 2025-02-13 RX ORDER — METOPROLOL TARTRATE 1 MG/ML
INJECTION, SOLUTION INTRAVENOUS AS NEEDED
Status: DISCONTINUED | OUTPATIENT
Start: 2025-02-13 | End: 2025-02-13

## 2025-02-13 RX ORDER — SODIUM CHLORIDE, SODIUM LACTATE, POTASSIUM CHLORIDE, CALCIUM CHLORIDE 600; 310; 30; 20 MG/100ML; MG/100ML; MG/100ML; MG/100ML
125 INJECTION, SOLUTION INTRAVENOUS CONTINUOUS
Status: DISCONTINUED | OUTPATIENT
Start: 2025-02-13 | End: 2025-02-13 | Stop reason: HOSPADM

## 2025-02-13 RX ORDER — SODIUM CHLORIDE 9 MG/ML
INJECTION INTRAVENOUS AS NEEDED
Status: DISCONTINUED | OUTPATIENT
Start: 2025-02-13 | End: 2025-02-13 | Stop reason: HOSPADM

## 2025-02-13 RX ORDER — SODIUM CHLORIDE, SODIUM LACTATE, POTASSIUM CHLORIDE, CALCIUM CHLORIDE 600; 310; 30; 20 MG/100ML; MG/100ML; MG/100ML; MG/100ML
125 INJECTION, SOLUTION INTRAVENOUS CONTINUOUS
Status: CANCELLED | OUTPATIENT
Start: 2025-02-13

## 2025-02-13 RX ORDER — LIDOCAINE HYDROCHLORIDE 10 MG/ML
INJECTION, SOLUTION EPIDURAL; INFILTRATION; INTRACAUDAL; PERINEURAL AS NEEDED
Status: DISCONTINUED | OUTPATIENT
Start: 2025-02-13 | End: 2025-02-13

## 2025-02-13 RX ORDER — ONDANSETRON 2 MG/ML
4 INJECTION INTRAMUSCULAR; INTRAVENOUS ONCE AS NEEDED
Status: DISCONTINUED | OUTPATIENT
Start: 2025-02-13 | End: 2025-02-13 | Stop reason: HOSPADM

## 2025-02-13 RX ORDER — MIDAZOLAM HYDROCHLORIDE 2 MG/2ML
INJECTION, SOLUTION INTRAMUSCULAR; INTRAVENOUS AS NEEDED
Status: DISCONTINUED | OUTPATIENT
Start: 2025-02-13 | End: 2025-02-13

## 2025-02-13 RX ADMIN — EPHEDRINE SULFATE 10 MG: 50 INJECTION INTRAVENOUS at 09:13

## 2025-02-13 RX ADMIN — DEXAMETHASONE SODIUM PHOSPHATE 5 MG: 10 INJECTION, SOLUTION INTRAMUSCULAR; INTRAVENOUS at 09:05

## 2025-02-13 RX ADMIN — METOPROLOL TARTRATE 2.5 MG: 1 INJECTION, SOLUTION INTRAVENOUS at 09:53

## 2025-02-13 RX ADMIN — SODIUM CHLORIDE, SODIUM LACTATE, POTASSIUM CHLORIDE, AND CALCIUM CHLORIDE: .6; .31; .03; .02 INJECTION, SOLUTION INTRAVENOUS at 08:02

## 2025-02-13 RX ADMIN — KETOROLAC TROMETHAMINE 15 MG: 30 INJECTION, SOLUTION INTRAMUSCULAR; INTRAVENOUS at 11:32

## 2025-02-13 RX ADMIN — MIDAZOLAM 1 MG: 1 INJECTION INTRAMUSCULAR; INTRAVENOUS at 08:51

## 2025-02-13 RX ADMIN — PROPOFOL 200 MG: 10 INJECTION, EMULSION INTRAVENOUS at 08:58

## 2025-02-13 RX ADMIN — EPHEDRINE SULFATE 10 MG: 50 INJECTION INTRAVENOUS at 09:09

## 2025-02-13 RX ADMIN — CEFAZOLIN SODIUM 2000 MG: 2 SOLUTION INTRAVENOUS at 09:03

## 2025-02-13 RX ADMIN — FENTANYL CITRATE 50 MCG: 50 INJECTION, SOLUTION INTRAMUSCULAR; INTRAVENOUS at 08:58

## 2025-02-13 RX ADMIN — ROCURONIUM BROMIDE 10 MG: 10 INJECTION INTRAVENOUS at 09:53

## 2025-02-13 RX ADMIN — PHENAZOPYRIDINE 200 MG: 100 TABLET ORAL at 11:31

## 2025-02-13 RX ADMIN — EPHEDRINE SULFATE 10 MG: 50 INJECTION INTRAVENOUS at 09:05

## 2025-02-13 RX ADMIN — METOPROLOL TARTRATE 2 MG: 1 INJECTION, SOLUTION INTRAVENOUS at 10:22

## 2025-02-13 RX ADMIN — SUGAMMADEX 200 MG: 100 INJECTION, SOLUTION INTRAVENOUS at 10:07

## 2025-02-13 RX ADMIN — GLYCOPYRROLATE 0.1 MG: 0.2 INJECTION, SOLUTION INTRAMUSCULAR; INTRAVENOUS at 09:05

## 2025-02-13 RX ADMIN — ONDANSETRON 4 MG: 2 INJECTION INTRAMUSCULAR; INTRAVENOUS at 10:07

## 2025-02-13 RX ADMIN — ROCURONIUM BROMIDE 40 MG: 10 INJECTION INTRAVENOUS at 08:58

## 2025-02-13 RX ADMIN — LIDOCAINE HYDROCHLORIDE 50 MG: 10 SOLUTION INTRAVENOUS at 08:58

## 2025-02-13 RX ADMIN — SODIUM CHLORIDE, SODIUM LACTATE, POTASSIUM CHLORIDE, AND CALCIUM CHLORIDE: .6; .31; .03; .02 INJECTION, SOLUTION INTRAVENOUS at 10:07

## 2025-02-13 RX ADMIN — FENTANYL CITRATE 50 MCG: 50 INJECTION, SOLUTION INTRAMUSCULAR; INTRAVENOUS at 09:16

## 2025-02-13 NOTE — NURSING NOTE
Pt is awake,alert,tolerated diet, no further itching, pain is relieved. Written and verbal instructions given to pt and her , who verbalize an understanding. Pt has no questions or complaints. Voiding QS.

## 2025-02-13 NOTE — NURSING NOTE
Pt seen by Dr Morrell, for C/O itching,which has resolved on it's own. Pt assisted OOB to BR, voided QS blood tinged urine. Pt medicated for C/O bladder cramping discomfort.  at bedside.

## 2025-02-13 NOTE — TELEPHONE ENCOUNTER
Patient underwent ureteroscopy today.  Stent is on a string and can be removed in 10 to 14 days.  Please call to arrange.  Patient will then need a follow-up visit in approximately 2 months.  She should have a KUB and renal ultrasound just prior to that visit.  This visit should be with the BAUTISTA.

## 2025-02-13 NOTE — DISCHARGE INSTR - AVS FIRST PAGE
Rest for the next few days and drink plenty of fluids.  Use Tylenol around-the-clock for pain.  More severe pain you can use Toradol or the oxycodone provided.  Remember oxycodone is constipating.  Pyridium can be used for urinary discomfort and will turn your urine a deep orange color.  Tamsulosin can help with stent discomfort and should be used for the next 2 weeks.    The stent is on a string.  Be careful not to pull it. Ureteral stents are placed at the time of surgery to help keep the ureter opened and draining and allow it to heal.   They do need to be removed or changed at intervals  to prevent future kidney damage.  It is normal to have bladder irritability from the stent.  The symptoms include urgency and frequency.   Blood in the urine is also common.  Urinary bleeding can come and go and is generally of no significance.  It is also very normal to have back pain on the side of the stent when you urinate.  Please call the office for fever, heavy bleeding with clots and difficulty voiding, difficulty voiding and severe pain that is not relieved by pain medication prescribed.

## 2025-02-13 NOTE — OP NOTE
OPERATIVE REPORT  PATIENT NAME: Felecia Edward    :  1960  MRN: [id number]  Pt Location:  OR ROOM 10    SURGERY DATE: 2025    Surgeons and Role:     * Michael Magallon MD - Primary    Preop Diagnosis:  Nephrolithiasis [N20.0]    Post-Op Diagnosis Codes:     * Right kidney stone [N20.0]    Procedure(s):  Right - CYSTOSCOPY URETEROSCOPY WITH LITHOTRIPSY HOLMIUM LASER. RETROGRADE PYELOGRAM AND INSERTION STENT URETERAL    Specimen(s):  ID Type Source Tests Collected by Time Destination   A : RIGHT RENAL STONE [facility] Kidney, Right STONE ANALYSIS Michael Magallon MD 2025 0948        Estimated Blood Loss:   Minimal    Drains:  Urethral Catheter Straight-tip;Non-latex 16 Fr. (Active)   Number of days: 38       Ureteral Internal Stent Right ureter 6 Fr. (Active)   Number of days: 0       Anesthesia Type:   General    Operative Indications:  Nephrolithiasis [N20.0]  Right kidney stone    Operative Findings:  Normal bladder and orifices.  The stone was visible as a calcification in the right upper pole of the kidney.  The stone was accessed ureteroscopically and successfully treated with the holmium laser.  Fragments were basketed for analysis.  A 6 Australian Contour stent was placed at the conclusion the procedure.  Dangler was left long for the stent to be removed in 7 to 10 days.      Complications:   None    Procedure and Technique:  PLAN FOR STENT: Removal by patient or staff in 7 to 10 days.      The patient was brought into the OR, properly identified and positioned on the table. General anesthesia was administered and the patient was placed in lithotomy position and prepped and draped in the usual sterile fashion.   Compression boots were employed.  Intravenous antibiotic was administered.  An appropriate time-out was performed.  Cystoscopy was performed with a 22 Australian cystoscope with findings as above.             The right ureteral orifice was identified and retrograde pyelogram performed with  findings as above.  A solo wire was placed, and a 10-12 O[place]i access sheath was placed over  the wire.     The flexible ureteroscope was placed thru the  sheath and advanced to stone  in the upper pole of the right kidney. The Holmium laser was used on dusting, fragmentation and popcorn settings to break up stone into dust and  small particles. Care was taken not to laser tissue.  A Nitinol basket was utilized to basket representative fragments for analysis.  All remaining fragment appeared small enough to pass around the stent. Contrast was injected through the ureteroscope and systematic examination of the calyces and renal pelvis performed. No additional stones were noted.    The ureteroscope  was removed from the ureter in conjunction with the access sheath.  No damage was noted to the ureter nor were there any ureteral stones.  The cystoscope was used to place a 6F Contour VL stent in the ureter, with the upper coils in the renal pelvis, and the distal coil in the bladder. Retrograde pyelogram on that side confirmed good position and no extravasation of contrast.     The patient was awaken from anesthesia and taken to the PACU in good condition.   I was present for the entire procedure.    Patient Disposition:  PACU  and hemodynamically stable             SIGNATURE: Michael Magallon MD  DATE: February 13, 2025  TIME: 10:32 AM

## 2025-02-13 NOTE — INTERVAL H&P NOTE
H&P reviewed. After examining the patient I find no changes in the patients condition since the H&P had been written.    Vitals:    02/13/25 0709   BP: 126/86   Pulse: 70   Resp: 18   Temp: (!) 97.2 °F (36.2 °C)   SpO2: 94%   No fevers. CT reviewed. Procedure and risks discussed with patient in the holding area. Consent signed. Laterality marked- Right

## 2025-02-13 NOTE — ANESTHESIA POSTPROCEDURE EVALUATION
Post-Op Assessment Note    CV Status:  Stable  Pain Score: 0    Pain management: adequate       Mental Status:  Alert   Hydration Status:  Stable   PONV Controlled:  None   Airway Patency:  Patent     Post Op Vitals Reviewed: Yes    No anethesia notable event occurred.    Staff: CRNA           Last Filed PACU Vitals:  Vitals Value Taken Time   Temp 99.2 °F (37.3 °C) 02/13/25 1019   Pulse 62 02/13/25 1024   /70 02/13/25 1019   Resp 16 02/13/25 1019   SpO2 95 % 02/13/25 1024   Vitals shown include unfiled device data.

## 2025-02-13 NOTE — ANESTHESIA POSTPROCEDURE EVALUATION
Post-Op Assessment Note    Last Filed PACU Vitals:  Vitals Value Taken Time   Temp 99.2 °F (37.3 °C) 02/13/25 1019   Pulse 64 02/13/25 1053   /82 02/13/25 1049   Resp 12 02/13/25 1052   SpO2 90 % 02/13/25 1053   Vitals shown include unfiled device data.    Modified Elaine:     Vitals Value Taken Time   Activity 2 02/13/25 1045   Respiration 2 02/13/25 1045   Circulation 2 02/13/25 1045   Consciousness 2 02/13/25 1045   Oxygen Saturation 2 02/13/25 1045     Modified Elaine Score: 10

## 2025-02-13 NOTE — NURSING NOTE
"Pt returned to APU awake,alert,taking po. C/O of operative pain 8/10, cramping discomfort. Also states she feels \" itchy\" some redness on her cheat area, no rash or hives noted. Dr Morrell notified.   "

## 2025-02-13 NOTE — ANESTHESIA PREPROCEDURE EVALUATION
Procedure:  CYSTOSCOPY URETEROSCOPY WITH LITHOTRIPSY HOLMIUM LASER, RETROGRADE PYELOGRAM AND INSERTION STENT URETERAL (Right: Ureter)    Relevant Problems   ENDO   (+) Type 2 diabetes mellitus with diabetic neuropathy, without long-term current use of insulin (HCC)      GI/HEPATIC   (+) GERD without esophagitis      /RENAL   (+) Right renal stone      GYN   (+) Uterine leiomyosarcoma (HCC)      NEURO/PSYCH   (+) Type 2 diabetes mellitus with diabetic neuropathy, without long-term current use of insulin (HCC)      Oncology   (+) Cancer associated pain   (+) Chemotherapy induced neutropenia (HCC)      Respiratory/Allergy   (+) Drug-induced pneumonitis      FEN/Gastrointestinal   (+) Functional dyspepsia   (+) Irritable bowel syndrome      Other   (+) Abnormal CT of the chest   (+) Class 2 obesity due to excess calories without serious comorbidity in adult   (+) History of colonic polyps   (+) Tachycardia        Physical Exam    Airway    Mallampati score: II  TM Distance: <3 FB  Neck ROM: full     Dental   Comment: Partial upper dentures upper dentures    Cardiovascular  Cardiovascular exam normal    Pulmonary  Pulmonary exam normal     Other Findings  post-pubertal.      Anesthesia Plan  ASA Score- 3     Anesthesia Type- general with ASA Monitors.         Additional Monitors:     Airway Plan: ETT.           Plan Factors-Exercise tolerance (METS): >4 METS.    Chart reviewed. EKG reviewed.  Existing labs reviewed.     Patient is not a current smoker. Patient not instructed to abstain from smoking on day of procedure. Patient did not smoke on day of surgery.            Induction- intravenous.    Postoperative Plan-         Informed Consent- Anesthetic plan and risks discussed with patient.  I personally reviewed this patient with the CRNA. Discussed and agreed on the Anesthesia Plan with the CRNA..      NPO Status:  Vitals Value Taken Time   Date of last liquid 02/12/25 02/13/25 0707   Time of last liquid 2100  02/13/25 0707   Date of last solid 02/12/25 02/13/25 0707   Time of last solid 2100 02/13/25 0707

## 2025-02-14 NOTE — TELEPHONE ENCOUNTER
Post Op Note    Felecia Edward is a 64 y.o. female s/p  CYSTOSCOPY URETEROSCOPY WITH LITHOTRIPSY HOLMIUM LASER, RETROGRADE PYELOGRAM AND INSERTION STENT URETERAL (Right: Ureter) performed 2-.  Felecia Edward is a patient of Embre Connor PA-C and is seen at the [place] office.     Call placed to pt. She did not answer. LMOM asking for pt to contact the office to see how she is doing post operatively and to review the plan for stent removal.   Office number was provided for call back and scheduling

## 2025-02-17 NOTE — TELEPHONE ENCOUNTER
Call placed to pt. Spoke with her and reviewed the post operative instructions and plan for stent removal.     Pt preferred [place] office where she is established, but unable to schedule at this location due to availability.   Pt is scheduled on 2- at 10:30am with the RN in the [place] office for stent removal.   Address date and time confirmed with pt.

## 2025-02-20 ENCOUNTER — TELEPHONE (OUTPATIENT)
Age: 65
End: 2025-02-20

## 2025-02-20 ENCOUNTER — SOCIAL WORK (OUTPATIENT)
Age: 65
End: 2025-02-20
Payer: COMMERCIAL

## 2025-02-20 ENCOUNTER — APPOINTMENT (EMERGENCY)
Dept: CT IMAGING | Facility: HOSPITAL | Age: 65
DRG: 698 | End: 2025-02-20
Payer: COMMERCIAL

## 2025-02-20 ENCOUNTER — HOSPITAL ENCOUNTER (INPATIENT)
Facility: HOSPITAL | Age: 65
LOS: 4 days | Discharge: HOME | DRG: 698 | End: 2025-02-24
Attending: EMERGENCY MEDICINE | Admitting: INTERNAL MEDICINE
Payer: COMMERCIAL

## 2025-02-20 ENCOUNTER — OFFICE VISIT (OUTPATIENT)
Age: 65
End: 2025-02-20
Payer: COMMERCIAL

## 2025-02-20 VITALS
WEIGHT: 210 LBS | DIASTOLIC BLOOD PRESSURE: 64 MMHG | BODY MASS INDEX: 36.05 KG/M2 | OXYGEN SATURATION: 98 % | RESPIRATION RATE: 15 BRPM | HEART RATE: 121 BPM | TEMPERATURE: 96.4 F | SYSTOLIC BLOOD PRESSURE: 120 MMHG

## 2025-02-20 DIAGNOSIS — C55 UTERINE LEIOMYOSARCOMA (HCC): ICD-10-CM

## 2025-02-20 DIAGNOSIS — E87.6 HYPOKALEMIA: ICD-10-CM

## 2025-02-20 DIAGNOSIS — Z51.5 PALLIATIVE CARE ENCOUNTER: ICD-10-CM

## 2025-02-20 DIAGNOSIS — R94.31 PROLONGED Q-T INTERVAL ON ECG: ICD-10-CM

## 2025-02-20 DIAGNOSIS — Z71.89 COUNSELING AND COORDINATION OF CARE: Primary | ICD-10-CM

## 2025-02-20 DIAGNOSIS — E83.42 HYPOMAGNESEMIA: ICD-10-CM

## 2025-02-20 DIAGNOSIS — N10 ACUTE PYELONEPHRITIS: Primary | ICD-10-CM

## 2025-02-20 DIAGNOSIS — Z71.89 ADVANCED CARE PLANNING/COUNSELING DISCUSSION: ICD-10-CM

## 2025-02-20 DIAGNOSIS — R10.9 RIGHT FLANK PAIN: Primary | ICD-10-CM

## 2025-02-20 DIAGNOSIS — N20.0 RIGHT RENAL STONE: ICD-10-CM

## 2025-02-20 LAB
ALBUMIN SERPL BCG-MCNC: 4.1 G/DL (ref 3.5–5)
ALP SERPL-CCNC: 87 U/L (ref 34–104)
ALT SERPL W P-5'-P-CCNC: 11 U/L (ref 7–52)
ANION GAP SERPL CALCULATED.3IONS-SCNC: 12 MMOL/L (ref 4–13)
APTT PPP: 26 SECONDS (ref 23–34)
AST SERPL W P-5'-P-CCNC: 13 U/L (ref 13–39)
BACTERIA UR QL AUTO: ABNORMAL /HPF
BASOPHILS # BLD AUTO: 0.01 THOUSANDS/ΜL (ref 0–0.1)
BASOPHILS NFR BLD AUTO: 0 % (ref 0–1)
BILIRUB SERPL-MCNC: 1.64 MG/DL (ref 0.2–1)
BILIRUB UR QL STRIP: NEGATIVE
BUN SERPL-MCNC: 15 MG/DL (ref 5–25)
CALCIUM OXALATE DIHYDRATE MFR STONE IR: 70 %
CALCIUM SERPL-MCNC: 9.3 MG/DL (ref 8.4–10.2)
CARDIAC TROPONIN I PNL SERPL HS: 4 NG/L (ref ?–50)
CHLORIDE SERPL-SCNC: 100 MMOL/L (ref 96–108)
CLARITY UR: ABNORMAL
CO2 SERPL-SCNC: 26 MMOL/L (ref 21–32)
COLOR STONE: NORMAL
COLOR UR: YELLOW
COM MFR STONE: 25 %
COMMENT-STONE3: NORMAL
COMPOSITION: NORMAL
CREAT SERPL-MCNC: 0.71 MG/DL (ref 0.6–1.3)
EOSINOPHIL # BLD AUTO: 0 THOUSAND/ΜL (ref 0–0.61)
EOSINOPHIL NFR BLD AUTO: 0 % (ref 0–6)
ERYTHROCYTE [DISTWIDTH] IN BLOOD BY AUTOMATED COUNT: 14.3 % (ref 11.6–15.1)
FLUAV RNA RESP QL NAA+PROBE: NEGATIVE
FLUBV RNA RESP QL NAA+PROBE: NEGATIVE
GFR SERPL CREATININE-BSD FRML MDRD: 89 ML/MIN/1.73SQ M
GLUCOSE SERPL-MCNC: 261 MG/DL (ref 65–140)
GLUCOSE UR STRIP-MCNC: ABNORMAL MG/DL
HCT VFR BLD AUTO: 37.3 % (ref 34.8–46.1)
HGB BLD-MCNC: 12.6 G/DL (ref 11.5–15.4)
HGB UR QL STRIP.AUTO: ABNORMAL
HYDROXYAPATITE 24H ENGDIFF UR: 5 %
IMM GRANULOCYTES # BLD AUTO: 0.04 THOUSAND/UL (ref 0–0.2)
IMM GRANULOCYTES NFR BLD AUTO: 0 % (ref 0–2)
INR PPP: 0.99 (ref 0.85–1.19)
KETONES UR STRIP-MCNC: NEGATIVE MG/DL
LABORATORY COMMENT REPORT: NORMAL
LACTATE SERPL-SCNC: 2.7 MMOL/L (ref 0.5–2)
LACTATE SERPL-SCNC: 3.6 MMOL/L (ref 0.5–2)
LEUKOCYTE ESTERASE UR QL STRIP: ABNORMAL
LYMPHOCYTES # BLD AUTO: 0.92 THOUSANDS/ΜL (ref 0.6–4.47)
LYMPHOCYTES NFR BLD AUTO: 9 % (ref 14–44)
MAGNESIUM SERPL-MCNC: 1.6 MG/DL (ref 1.9–2.7)
MCH RBC QN AUTO: 29.9 PG (ref 26.8–34.3)
MCHC RBC AUTO-ENTMCNC: 33.8 G/DL (ref 31.4–37.4)
MCV RBC AUTO: 88 FL (ref 82–98)
MONOCYTES # BLD AUTO: 0.68 THOUSAND/ΜL (ref 0.17–1.22)
MONOCYTES NFR BLD AUTO: 7 % (ref 4–12)
NEUTROPHILS # BLD AUTO: 8.4 THOUSANDS/ΜL (ref 1.85–7.62)
NEUTS SEG NFR BLD AUTO: 84 % (ref 43–75)
NITRITE UR QL STRIP: POSITIVE
NON-SQ EPI CELLS URNS QL MICRO: ABNORMAL /HPF
NRBC BLD AUTO-RTO: 0 /100 WBCS
PH UR STRIP.AUTO: 6 [PH]
PHOTO: NORMAL
PLATELET # BLD AUTO: 207 THOUSANDS/UL (ref 149–390)
PMV BLD AUTO: 10 FL (ref 8.9–12.7)
POTASSIUM SERPL-SCNC: 3.2 MMOL/L (ref 3.5–5.3)
PROCALCITONIN SERPL-MCNC: 0.17 NG/ML
PROT SERPL-MCNC: 7.2 G/DL (ref 6.4–8.4)
PROT UR STRIP-MCNC: ABNORMAL MG/DL
PROTHROMBIN TIME: 13.6 SECONDS (ref 12.3–15)
RBC # BLD AUTO: 4.22 MILLION/UL (ref 3.81–5.12)
RBC #/AREA URNS AUTO: ABNORMAL /HPF
RSV RNA RESP QL NAA+PROBE: NEGATIVE
SARS-COV-2 RNA RESP QL NAA+PROBE: NEGATIVE
SIZE STONE: NORMAL MM
SODIUM SERPL-SCNC: 138 MMOL/L (ref 135–147)
SP GR UR STRIP.AUTO: <1.005 (ref 1–1.03)
SPEC SOURCE SUBJ: NORMAL
STONE ANALYSIS-IMP: NORMAL
UROBILINOGEN UR STRIP-ACNC: <2 MG/DL
WBC # BLD AUTO: 10.05 THOUSAND/UL (ref 4.31–10.16)
WBC #/AREA URNS AUTO: ABNORMAL /HPF
WT STONE: 51 MG

## 2025-02-20 PROCEDURE — 87086 URINE CULTURE/COLONY COUNT: CPT | Performed by: EMERGENCY MEDICINE

## 2025-02-20 PROCEDURE — 96366 THER/PROPH/DIAG IV INF ADDON: CPT

## 2025-02-20 PROCEDURE — 96375 TX/PRO/DX INJ NEW DRUG ADDON: CPT

## 2025-02-20 PROCEDURE — 80053 COMPREHEN METABOLIC PANEL: CPT | Performed by: EMERGENCY MEDICINE

## 2025-02-20 PROCEDURE — 0241U HB NFCT DS VIR RESP RNA 4 TRGT: CPT | Performed by: EMERGENCY MEDICINE

## 2025-02-20 PROCEDURE — NC001 PR NO CHARGE

## 2025-02-20 PROCEDURE — 87077 CULTURE AEROBIC IDENTIFY: CPT | Performed by: EMERGENCY MEDICINE

## 2025-02-20 PROCEDURE — 87154 CUL TYP ID BLD PTHGN 6+ TRGT: CPT | Performed by: EMERGENCY MEDICINE

## 2025-02-20 PROCEDURE — 93005 ELECTROCARDIOGRAM TRACING: CPT

## 2025-02-20 PROCEDURE — 99285 EMERGENCY DEPT VISIT HI MDM: CPT | Performed by: EMERGENCY MEDICINE

## 2025-02-20 PROCEDURE — 83605 ASSAY OF LACTIC ACID: CPT | Performed by: EMERGENCY MEDICINE

## 2025-02-20 PROCEDURE — 96361 HYDRATE IV INFUSION ADD-ON: CPT

## 2025-02-20 PROCEDURE — 84145 PROCALCITONIN (PCT): CPT | Performed by: EMERGENCY MEDICINE

## 2025-02-20 PROCEDURE — 85730 THROMBOPLASTIN TIME PARTIAL: CPT | Performed by: EMERGENCY MEDICINE

## 2025-02-20 PROCEDURE — 99204 OFFICE O/P NEW MOD 45 MIN: CPT | Performed by: INTERNAL MEDICINE

## 2025-02-20 PROCEDURE — 83735 ASSAY OF MAGNESIUM: CPT | Performed by: EMERGENCY MEDICINE

## 2025-02-20 PROCEDURE — 96365 THER/PROPH/DIAG IV INF INIT: CPT

## 2025-02-20 PROCEDURE — 85025 COMPLETE CBC W/AUTO DIFF WBC: CPT | Performed by: EMERGENCY MEDICINE

## 2025-02-20 PROCEDURE — 87186 SC STD MICRODIL/AGAR DIL: CPT | Performed by: EMERGENCY MEDICINE

## 2025-02-20 PROCEDURE — 99285 EMERGENCY DEPT VISIT HI MDM: CPT

## 2025-02-20 PROCEDURE — 96367 TX/PROPH/DG ADDL SEQ IV INF: CPT

## 2025-02-20 PROCEDURE — 87040 BLOOD CULTURE FOR BACTERIA: CPT | Performed by: EMERGENCY MEDICINE

## 2025-02-20 PROCEDURE — 84484 ASSAY OF TROPONIN QUANT: CPT | Performed by: EMERGENCY MEDICINE

## 2025-02-20 PROCEDURE — 74177 CT ABD & PELVIS W/CONTRAST: CPT

## 2025-02-20 PROCEDURE — 81001 URINALYSIS AUTO W/SCOPE: CPT | Performed by: EMERGENCY MEDICINE

## 2025-02-20 PROCEDURE — 36415 COLL VENOUS BLD VENIPUNCTURE: CPT | Performed by: EMERGENCY MEDICINE

## 2025-02-20 PROCEDURE — 85610 PROTHROMBIN TIME: CPT | Performed by: EMERGENCY MEDICINE

## 2025-02-20 RX ORDER — CEFTRIAXONE 2 G/50ML
2000 INJECTION, SOLUTION INTRAVENOUS ONCE
Status: COMPLETED | OUTPATIENT
Start: 2025-02-20 | End: 2025-02-21

## 2025-02-20 RX ORDER — POTASSIUM CHLORIDE 1500 MG/1
40 TABLET, EXTENDED RELEASE ORAL ONCE
Status: COMPLETED | OUTPATIENT
Start: 2025-02-20 | End: 2025-02-20

## 2025-02-20 RX ORDER — MAGNESIUM SULFATE HEPTAHYDRATE 40 MG/ML
2 INJECTION, SOLUTION INTRAVENOUS ONCE
Status: COMPLETED | OUTPATIENT
Start: 2025-02-20 | End: 2025-02-20

## 2025-02-20 RX ADMIN — MORPHINE SULFATE 2 MG: 2 INJECTION, SOLUTION INTRAMUSCULAR; INTRAVENOUS at 18:16

## 2025-02-20 RX ADMIN — MAGNESIUM SULFATE HEPTAHYDRATE 2 G: 40 INJECTION, SOLUTION INTRAVENOUS at 19:03

## 2025-02-20 RX ADMIN — POTASSIUM CHLORIDE 40 MEQ: 1500 TABLET, EXTENDED RELEASE ORAL at 19:03

## 2025-02-20 RX ADMIN — IOHEXOL 100 ML: 350 INJECTION, SOLUTION INTRAVENOUS at 20:18

## 2025-02-20 RX ADMIN — SODIUM CHLORIDE 1000 ML: 0.9 INJECTION, SOLUTION INTRAVENOUS at 18:17

## 2025-02-20 RX ADMIN — CEFTRIAXONE 2000 MG: 2 INJECTION, SOLUTION INTRAVENOUS at 20:31

## 2025-02-20 NOTE — ED PROVIDER NOTES
Time reflects when diagnosis was documented in both MDM as applicable and the Disposition within this note       Time User Action Codes Description Comment    2/20/2025  6:09 PM Aaliyah, Angela J Add [R94.31] Prolonged Q-T interval on ECG     2/20/2025  6:35 PM Aaliyah, Angela J Add [E83.42] Hypomagnesemia     2/20/2025  9:33 PM Aaliyah, Angela J Add [N10] Acute pyelonephritis     2/20/2025  9:33 PM Aaliyah, Angela J Add [E87.6] Hypokalemia     2/20/2025  9:33 PM Aaliyah, Angela J Modify [R94.31] Prolonged Q-T interval on ECG     2/20/2025  9:33 PM Aaliyah, Angela J Modify [N10] Acute pyelonephritis           ED Disposition       ED Disposition   Admit    Condition   Stable    Date/Time   Thu Feb 20, 2025  9:34 PM    Comment   Case was discussed with GENE and the patient's admission status was agreed to be Admission Status: inpatient status to the service of Dr. Piña .               Assessment & Plan       Medical Decision Making  64 year old female presents for evaluation of generalized weakness and lightheadedness associated with fever and urinary symptoms since placement of a ureteral stent.  EKG with significant prolongation of QTc interval.  Given replacement for hypomag and hypokalemia.  UA consistent with UTI and signs of ascending infection on CT.  Patient started on rocephin and azithromycin in the ED.  Discussed with urology.  No indication for procedural intervention at this time.  Patient admitted for further IV antibiotics.      Amount and/or Complexity of Data Reviewed  Labs: ordered. Decision-making details documented in ED Course.  Radiology: ordered.    Risk  Prescription drug management.  Decision regarding hospitalization.        ED Course as of 02/20/25 2217   Thu Feb 20, 2025 1835 LACTIC ACID(!): 3.6   1835 MAGNESIUM(!): 1.6  2 g IV mag   1836 Potassium(!): 3.2  40 mEq Kdur   1912 Nitrite, UA(!): Positive  2 g IV rocephin ordered   2115 LACTIC ACID(!): 2.7  Improving. 3.6 three hours  ago   2134 Discussed with urology.  Okay to remain at [place].  Continue IV antibiotics.       Medications   morphine injection 2 mg (2 mg Intravenous Given 2/20/25 1816)   sodium chloride 0.9 % bolus 1,000 mL (0 mL Intravenous Stopped 2/20/25 2102)   magnesium sulfate 2 g/50 mL IVPB (premix) 2 g (0 g Intravenous Stopped 2/20/25 2102)   potassium chloride (Klor-Con M20) CR tablet 40 mEq (40 mEq Oral Given 2/20/25 1903)   cefTRIAXone (ROCEPHIN) IVPB (premix in dextrose) 2,000 mg 50 mL (2,000 mg Intravenous New Bag 2/20/25 2031)   iohexol (OMNIPAQUE) 350 MG/ML injection (MULTI-DOSE) 100 mL (100 mL Intravenous Given 2/20/25 2018)       ED Risk Strat Scores                            SBIRT 22yo+      Flowsheet Row Most Recent Value   Initial Alcohol Screen: US AUDIT-C     1. How often do you have a drink containing alcohol? 0 Filed at: 02/20/2025 1903   2. How many drinks containing alcohol do you have on a typical day you are drinking?  0 Filed at: 02/20/2025 1903   3a. Male UNDER 65: How often do you have five or more drinks on one occasion? 0 Filed at: 02/20/2025 1903   3b. FEMALE Any Age, or MALE 65+: How often do you have 4 or more drinks on one occassion? 0 Filed at: 02/20/2025 1903   Audit-C Score 0 Filed at: 02/20/2025 1903   VINITA: How many times in the past year have you...    Used an illegal drug or used a prescription medication for non-medical reasons? Never Filed at: 02/20/2025 1903                            History of Present Illness       Chief Complaint   Patient presents with    Dizziness     Started earlier this afternoon with weakness and fever, took tylenol at 1330, has ureteral stent placed last week and worried about infection, pain with urination but has had pain since stent was placed       Past Medical History:   Diagnosis Date    Allergic rhinitis     Anemia     Anxiety     Coronary artery disease     CPAP (continuous positive airway pressure) dependence     Depression     Diabetes  mellitus (HCC)     Functional dyspepsia     GERD (gastroesophageal reflux disease)     GERD without esophagitis     Hyperlipidemia     Hypertension     Irritable bowel syndrome     Migraines     Osteoporosis     Pulmonary hypertension (HCC)     Sleep apnea, obstructive       Past Surgical History:   Procedure Laterality Date    APPENDECTOMY      CHEST WALL BIOPSY N/A 06/04/2023    Procedure: EXCISION  BIOPSY LESION/MASS ABDOMINAL-PELVIC PERITONEUM;  Surgeon: Gordo Taveras MD;  Location:  MAIN OR;  Service: Gynecology Oncology    CHOLECYSTECTOMY      COLONOSCOPY      CYSTOSCOPY N/A 01/06/2025    Procedure: CYSTOSCOPY;  Surgeon: Gordo Taveras MD;  Location: [facility];  Service: Gynecology Oncology    FOOT SURGERY Left     Bone spur    HYSTERECTOMY N/A 06/04/2023    Procedure: HYSTERECTOMY TOTAL ABDOMINAL (DOROTHY), BILATERAL SALPINGOOPHORECTOMY;  Surgeon: Gordo Taveras MD;  Location:  MAIN OR;  Service: Gynecology Oncology    IR PORT PLACEMENT  06/30/2023    MI CYSTO/URETERO W/LITHOTRIPSY &INDWELL STENT INSRT Right 2/13/2025    Procedure: CYSTOSCOPY URETEROSCOPY WITH LITHOTRIPSY HOLMIUM LASER, RETROGRADE PYELOGRAM AND INSERTION STENT URETERAL;  Surgeon: Michael Magallon MD;  Location:  MAIN OR;  Service: Urology    MI LAPS FULG/EXC OVARY VISCERA/PERITONEAL SURFACE N/A 01/06/2025    Procedure: ROBOTIC ASSISTED RADICAL BILATERAL PARAMETRECTOMY, RESECTION BILATERAL PARAMETRIAL TUMORS, UPPER VAGINECTOMY, LEFT OBTURATOR LYMPH NODE DISSECTION;  Surgeon: Gordo Taveras MD;  Location:  MAIN OR;  Service: Gynecology Oncology      Family History   Problem Relation Age of Onset    Heart disease Mother     Cancer Mother     Breast cancer Father     Cancer Father     Diabetes Father     Hypertension Father     Coronary artery disease Brother     Diabetes Brother     Heart failure Brother     Hypertension Brother     Colon polyps Neg Hx     Colon cancer Neg Hx       Social History      Tobacco Use    Smoking status: Never    Smokeless tobacco: Never   Vaping Use    Vaping status: Never Used   Substance Use Topics    Alcohol use: Never    Drug use: Never      E-Cigarette/Vaping    E-Cigarette Use Never User       E-Cigarette/Vaping Substances    Nicotine No     THC No     CBD No     Flavoring No     Other No     Unknown No       I have reviewed and agree with the history as documented.     64 year old female presents for evaluation of fever, chills, generalized weakness, lightheadedness, nausea, suprapubic and low back pain as well as dysuria and frequency for the past week.  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  Tmax 101F.  She denies cough or congestion.  Patient has been taking tylenol for her symptoms without improvement.  She states she was prescribed oxycodone, but states it makes her feel delirious so she has not been taking it.  She states she was advised not to take NSAIDs.        Dizziness      Review of Systems   Neurological:  Positive for dizziness.           Objective       ED Triage Vitals [02/20/25 1728]   Temperature Pulse Blood Pressure Respirations SpO2 Patient Position - Orthostatic VS   99 °F (37.2 °C) (!) 125 135/85 18 95 % Lying      Temp Source Heart Rate Source BP Location FiO2 (%) Pain Score    Oral Monitor Left arm -- 5      Vitals      Date and Time Temp Pulse SpO2 Resp BP Pain Score FACES Pain Rating User   02/20/25 2200 -- 84 95 % 21 140/84 -- -- WM   02/20/25 1816 -- -- -- -- -- 5 -- RD   02/20/25 1730 -- 121 94 % 18 135/85 -- -- RD   02/20/25 1728 99 °F (37.2 °C) 125 95 % 18 135/85 5 -- MS            Physical Exam  Vitals and nursing note reviewed.   HENT:      Head: Normocephalic and atraumatic.   Cardiovascular:      Rate and Rhythm: Regular rhythm. Tachycardia present.       Pulses: Normal pulses.      Heart sounds: Normal heart sounds.   Pulmonary:      Effort: Pulmonary effort is normal. No respiratory distress.      Breath sounds: Normal breath sounds.   Abdominal:      General: There is no distension.      Palpations: Abdomen is soft.      Tenderness: There is generalized abdominal tenderness. There is no right CVA tenderness, left CVA tenderness, guarding or rebound.   Skin:     General: Skin is warm and dry.   Neurological:      Mental Status: She is alert.         Results Reviewed       Procedure Component Value Units Date/Time    Lactic acid 2 Hours [[id number]]  (Abnormal) Collected: 02/20/25 2030    Lab Status: Final result Specimen: Blood from Arm, Right Updated: 02/20/25 2054     LACTIC ACID 2.7 mmol/L     Narrative:      Result may be elevated if tourniquet was used during collection.    Urine Microscopic [[id number]]  (Abnormal) Collected: 02/20/25 1857    Lab Status: Final result Specimen: Urine, Clean Catch Updated: 02/20/25 1935     RBC, UA 2-4 /hpf      WBC, UA 10-20 /hpf      Epithelial Cells Occasional /hpf      Bacteria, UA Innumerable /hpf     Urine culture [[id number]] Collected: 02/20/25 1857    Lab Status: In process Specimen: Urine, Clean Catch Updated: 02/20/25 1935    Procalcitonin [[id number]]  (Normal) Collected: 02/20/25 1812    Lab Status: Final result Specimen: Blood from Central Venous Line Updated: 02/20/25 1922     Procalcitonin 0.17 ng/ml     UA w Reflex to Microscopic w Reflex to Culture [[id number]]  (Abnormal) Collected: 02/20/25 1857    Lab Status: Final result Specimen: Urine, Clean Catch Updated: 02/20/25 1912     Color, UA Yellow     Clarity, UA Slightly Cloudy     Specific Gravity, UA <1.005     pH, UA 6.0     Leukocytes, UA Large     Nitrite, UA Positive     Protein, UA 30 (1+) mg/dl      Glucose,  (3/10%) mg/dl      Ketones, UA Negative mg/dl      Urobilinogen, UA <2.0 mg/dl      Bilirubin, UA Negative     Occult Blood, UA Moderate     FLU/RSV/COVID - if FLU/RSV clinically relevant (2hr TAT) [[id number]]  (Normal) Collected: 02/20/25 1805    Lab Status: Final result Specimen: Nares from Nose Updated: 02/20/25 1904     SARS-CoV-2 Negative     INFLUENZA A PCR Negative     INFLUENZA B PCR Negative     RSV PCR Negative    Narrative:      This test has been performed using the CoV-2/Flu/RSV plus assay on the [employer] GeneXpert platform. This test has been validated by the  and verified by the performing laboratory.     This test is designed to amplify and detect the following: nucleocapsid (N), envelope (E), and RNA-dependent RNA polymerase (RdRP) genes of the SARS-CoV-2 genome; matrix (M), basic polymerase (PB2), and acidic protein (PA) segments of the influenza A genome; matrix (M) and non-structural protein (NS) segments of the influenza B genome, and the nucleocapsid genes of RSV A and RSV B.     Positive results are indicative of the presence of Flu A, Flu B, RSV, and/or SARS-CoV-2 RNA. Positive results for SARS-CoV-2 or suspected novel influenza should be reported to state, local, or federal health departments according to local reporting requirements.      All results should be assessed in conjunction with clinical presentation and other laboratory markers for clinical management.     FOR PEDIATRIC PATIENTS - copy/paste COVID Guidelines URL to browser: [email or web address]       HS Troponin 0hr (reflex protocol) [[id number]]  (Normal) Collected: 02/20/25 1812    Lab Status: Final result Specimen: Blood from Central Venous Line Updated: 02/20/25 1842     hs TnI 0hr 4 ng/L     Comprehensive metabolic panel [[id number]]  (Abnormal) Collected: 02/20/25 1812    Lab Status: Final result Specimen: Blood from Central Venous Line Updated: 02/20/25 1835     Sodium 138 mmol/L      Potassium 3.2 mmol/L      Chloride 100 mmol/L      CO2 26 mmol/L      ANION GAP 12 mmol/L      BUN 15 mg/dL       Creatinine 0.71 mg/dL      Glucose 261 mg/dL      Calcium 9.3 mg/dL      AST 13 U/L      ALT 11 U/L      Alkaline Phosphatase 87 U/L      Total Protein 7.2 g/dL      Albumin 4.1 g/dL      Total Bilirubin 1.64 mg/dL      eGFR 89 ml/min/1.73sq m     Narrative:      National Kid[employer] guidelines for Chronic Kidney Disease (CKD):     Stage 1 with normal or high GFR (GFR > 90 mL/min/1.73 square meters)    Stage 2 Mild CKD (GFR = 60-89 mL/min/1.73 square meters)    Stage 3A Moderate CKD (GFR = 45-59 mL/min/1.73 square meters)    Stage 3B Moderate CKD (GFR = 30-44 mL/min/1.73 square meters)    Stage 4 Severe CKD (GFR = 15-29 mL/min/1.73 square meters)    Stage 5 End Stage CKD (GFR <15 mL/min/1.73 square meters)  Note: GFR calculation is accurate only with a steady state creatinine    Protime-INR [[id number]]  (Normal) Collected: 02/20/25 1812    Lab Status: Final result Specimen: Blood from Central Venous Line Updated: 02/20/25 1835     Protime 13.6 seconds      INR 0.99    Narrative:      INR Therapeutic Range    Indication                                             INR Range      Atrial Fibrillation                                               2.0-3.0  Hypercoagulable State                                    2.0.2.3  Left Ventricular Asist Device                            2.0-3.0  Mechanical Heart Valve                                  -    Aortic(with afib, MI, embolism, HF, LA enlargement,    and/or coagulopathy)                                     2.0-3.0 (2.5-3.5)     Mitral                                                             2.5-3.5  Prosthetic/Bioprosthetic Heart Valve               2.0-3.0  Venous thromboembolism (VTE: VT, PE        2.0-3.0    APTT [[id number]]  (Normal) Collected: 02/20/25 1812    Lab Status: Final result Specimen: Blood from Central Venous Line Updated: 02/20/25 1835     PTT 26 seconds     Magnesium [[id number]]  (Abnormal) Collected: 02/20/25 1815    Lab Status: Final  result Specimen: Blood from Central Venous Line Updated: 02/20/25 1834     Magnesium 1.6 mg/dL     Lactic acid [[id number]]  (Abnormal) Collected: 02/20/25 1812    Lab Status: Final result Specimen: Blood from Central Venous Line Updated: 02/20/25 1834     LACTIC ACID 3.6 mmol/L     Narrative:      Result may be elevated if tourniquet was used during collection.    CBC and differential [[id number]]  (Abnormal) Collected: 02/20/25 1812    Lab Status: Final result Specimen: Blood from Central Venous Line Updated: 02/20/25 1820     WBC 10.05 Thousand/uL      RBC 4.22 Million/uL      Hemoglobin 12.6 g/dL      Hematocrit 37.3 %      MCV 88 fL      MCH 29.9 pg      MCHC 33.8 g/dL      RDW 14.3 %      MPV 10.0 fL      Platelets 207 Thousands/uL      nRBC 0 /100 WBCs      Segmented % 84 %      Immature Grans % 0 %      Lymphocytes % 9 %      Monocytes % 7 %      Eosinophils Relative 0 %      Basophils Relative 0 %      Absolute Neutrophils 8.40 Thousands/µL      Absolute Immature Grans 0.04 Thousand/uL      Absolute Lymphocytes 0.92 Thousands/µL      Absolute Monocytes 0.68 Thousand/µL      Eosinophils Absolute 0.00 Thousand/µL      Basophils Absolute 0.01 Thousands/µL     Blood culture #1 [[id number]] Collected: 02/20/25 1805    Lab Status: In process Specimen: Blood from Hand, Right Updated: 02/20/25 1817    Blood culture #2 [[id number]] Collected: 02/20/25 1812    Lab Status: In process Specimen: Blood from Central Venous Line Updated: 02/20/25 1817            CT abdomen pelvis with contrast   Final Interpretation by Jonathan Lopez MD (02/20 2102)      Appropriate position of right ureteral stent with mild right hydronephrosis. 3 mm stone adjacent to the distal right ureteral stent.      Mild interstitial edema throughout the right renal parenchyma and inflammatory changes tracking along the right ureter compatible with postprocedural changes or ascending infection         Workstation performed: [id number]             ECG 12  Lead Documentation Only    Date/Time: 2/20/2025 5:58 PM    Performed by: Angela Fischer MD  Authorized by: Angela Fischer MD    Indications / Diagnosis:  Lightheaded  ECG reviewed by me, the ED Provider: yes    Patient location:  ED  Previous ECG:     Previous ECG:  Compared to current    Comparison ECG info:  1/30/25 sinus rhythm, poor rwave progression    Similarity:  Changes noted  Interpretation:     Interpretation: abnormal    Rate:     ECG rate:  108    ECG rate assessment: tachycardic    Rhythm:     Rhythm: sinus tachycardia    Ectopy:     Ectopy: none    QRS:     QRS axis:  Normal    QRS intervals:  Normal  Conduction:     Conduction: normal    ST segments:     ST segments:  Normal  T waves:     T waves: normal    Other findings:     Other findings: prolonged qTc interval      Other findings comment:  Qtc 653      ED Medication and Procedure Management   Prior to Admission Medications   Prescriptions Last Dose Informant Patient Reported? Taking?   DULoxetine (CYMBALTA) 30 mg delayed release capsule  Self Yes No   Sig: TAKE 1 CAPSULE BY MOUTH EVERY DAY FOR 90 DAYS   Jardiance 10 MG TABS tablet  Self Yes No   Multiple Vitamin (MULTIVITAMIN) tablet  Self Yes No   Sig: Take 1 tablet by mouth daily   Potassium Chloride ER 20 MEQ TBCR  Self Yes No   Sig: Take 1 tablet by mouth 2 (two) times a day with meals   Triamcinolone Acetonide 55 MCG/ACT AERO  Self Yes No   Sig: into each nostril One spray each nostril daily   Xiidra 5 % op solution  Self Yes No   Sig: INSTILL 1 DROP INTO BOTH EYES TWICE A DAY 12 HOURS APART   ZOLMitriptan (ZOMIG) 5 MG tablet  Self Yes No   Sig: Take 5 mg by mouth once as needed for migraine Daily prn   aspirin (Aspirin 81) 81 mg chewable tablet  Self Yes No   Sig: every 24 hours   atorvastatin (LIPITOR) 40 mg tablet  Self Yes No   Sig: Take 40 mg by mouth daily at bedtime   cetirizine (ZyrTEC) 10 mg tablet  Self Yes No   Sig: Take 10 mg by mouth daily   cholecalciferol  (VITAMIN D3) 1,000 units tablet  Self Yes No   Sig: Take 1,000 Units by mouth daily 2 daily   dicyclomine (BENTYL) 10 mg capsule  Self No No   Sig: Take 1 capsule (10 mg total) by mouth every 6 (six) hours as needed (pain)   glimepiride (AMARYL) 4 mg tablet  Self Yes No   Sig: Take 4 mg by mouth 2 (two) times a day   hydrochlorothiazide (HYDRODIURIL) 25 mg tablet  Self Yes No   Sig: Take 25 mg by mouth daily   ibuprofen (MOTRIN) 800 mg tablet  Self Yes No   Sig: Take 800 mg by mouth every 6 (six) hours as needed for mild pain   ketorolac (TORADOL) 10 mg tablet   No No   Sig: Take 1 tablet (10 mg total) by mouth every 6 (six) hours as needed for moderate pain for up to 5 days   losartan (COZAAR) 50 mg tablet  Self Yes No   Sig: Take 50 mg by mouth daily   meclizine (ANTIVERT) 25 mg tablet  Self Yes No   Sig: Take by mouth every 12 (twelve) hours as needed for dizziness   metoprolol succinate (TOPROL-XL) 25 mg 24 hr tablet  Self Yes No   Sig: Take 25 mg by mouth daily   oxyCODONE (Roxicodone) 5 immediate release tablet   No No   Sig: Take 1 tablet (5 mg total) by mouth every 6 (six) hours as needed for moderate pain for up to 8 doses Max Daily Amount: 20 mg   pantoprazole (PROTONIX) 40 mg tablet  Self No No   Sig: Take 1 tablet (40 mg total) by mouth daily   phenazopyridine (PYRIDIUM) 200 mg tablet   No No   Sig: Take 1 tablet (200 mg total) by mouth 3 (three) times a day as needed (Dysuria and bladder discomfort.  Take with meals.)   pioglitazone (ACTOS) 30 mg tablet  Self Yes No   Sig: every 24 hours   rOPINIRole (REQUIP) 1 mg tablet  Self Yes No   Sig: TAKE 1 TABLET BY MOUTH 1 TO 3 HOURS BEFORE BEDTIME   sertraline (ZOLOFT) 100 mg tablet  Self Yes No   Sig: TAKE 1 AND 1/2 TABS BY MOUTH DAILY   sertraline (ZOLOFT) 50 mg tablet  Self Yes No   Sig: Take 150 mg by mouth daily   simvastatin (ZOCOR) 40 mg tablet  Self Yes No   Sig: Take 40 mg by mouth daily   tamsulosin (FLOMAX) 0.4 mg   No No   Sig: Take 1 capsule (0.4  mg total) by mouth every evening for 14 days   topiramate (TOPAMAX) 100 mg tablet  Self Yes No   Si tablet Orally at bedtime      Facility-Administered Medications: None     Patient's Medications   Discharge Prescriptions    No medications on file     No discharge procedures on file.  ED SEPSIS DOCUMENTATION   Time reflects when diagnosis was documented in both MDM as applicable and the Disposition within this note       Time User Action Codes Description Comment    2025  6:09 PM Angela Fischer Add [R94.31] Prolonged Q-T interval on ECG     2025  6:35 PM AaliyahAngela garcia J Add [E83.42] Hypomagnesemia     2025  9:33 PM Aaliyah, Angela J Add [N10] Acute pyelonephritis     2025  9:33 PM AaliyahCamronAngela J Add [E87.6] Hypokalemia     2025  9:33 PM Aaliyah, Angela J Modify [R94.31] Prolonged Q-T interval on ECG     2025  9:33 PM AaliyahCamron garciaica J Modify [N10] Acute pyelonephritis            Initial Sepsis Screening       Row Name 25                Is the patient's history suggestive of a new or worsening infection? Yes (Proceed)  -EE        Suspected source of infection urinary tract infection  -EE        Indicate SIRS criteria Tachycardia > 90 bpm  -EE        Are two or more of the above signs & symptoms of infection both present and new to the patient? No  -EE                  User Key  (r) = Recorded By, (t) = Taken By, (c) = Cosigned By      Initials Name Provider Type    EE Angela Fischer MD Physician                       Angela Fischer MD  [phone]

## 2025-02-20 NOTE — PROGRESS NOTES
Name: Felecia Edward      : 1960      MRN: [id number]  Encounter Provider: Supriya Mckeon MD  Encounter Date: 2025   Encounter department: [facility] PALLIATIVE [facility]  :  Assessment & Plan  Uterine leiomyosarcoma (HCC)  Recurrent, will begin doxorubicin + trabectedin q21 days on 2025  Follows Dr. Taveras    Orders:    Ambulatory Referral to Palliative Care    Right flank pain  Still with some flank pain  5mg oxycodone made her very loopy, she is opioid-naive = recommend 1/2 tabs (2.5mg) oxyIR PO q6H prn severe pain. Report if still unable to tolerate and I will rotate to hydrocodone-acetaminophen 5-325mg, 1/2 tab q6H prn   Tylenol 1000mg PO q8H prn mild/moderate pain. Max of 3000mg in 24H only       Right renal stone  S/p cystoscopy, right retrograde pyelography, ureteroscopy with laser lithotripsy and right stent placement for R kidney stone on 2025  She said her stent is going to be removed next week       Palliative care encounter         Advanced care planning/counseling discussion  Briefly, reviewed her ACP docs available. She expressed maybe wanting to change her POA to her other daughter, but decided to leave it alone.   I did offer her another  ACP document in case she wants to change it  I spent only 5 mins reviewing this         Follow up   RTO in 6 weeks  Decisional apparatus: Patient is competent on my exam today. If competence is lost, patient's substitute decision maker would default to daughter/Suzi then  then daughter/Jessica per  ACP doc and by [place] Act 169.     PDMP Review: I have reviewed the patient's controlled substance dispensing history in the Prescription Drug Monitoring Program in compliance with the [place]ISA[place] regulations before prescribing any controlled substances.    2025 1 Oxycodone Hcl (Ir) 5 Mg Tablet 8.00 2 Je Gev [id number] Pen (0406) 0 30.00 MME [place]   2025 1 Tramadol Hcl 50 Mg Tablet 6.00 2 Je Syn  [id number] Pen (8306) 0 30.00 [place]ME [place]   12/14/2024 12/12/2024 1 Oxycodone Hcl (Ir) 5 Mg Tablet 20.00 5 Ni Harry [id number] Pen (8306) 0 30.00 [place]ME [place]   08/03/2023 08/03/2023 1 Oxycodone Hcl (Ir) 5 Mg Tablet 10.00 3 Je Syn [id number] Pen (8306) 0 25.00 MME [place]   06/30/2023 06/30/2023 1 Lorazepam 1 Mg Tablet 30.00 10 Je Syn [id number] Pen (8306) 0 3.00 LME [place]     History of Present Illness   Felecia Edward is a 64 y.o. female with recurrent stage II leiomyosarcoma now status post robotic assisted bilateral radical parametrectomy, upper vaginectomy, right obturator lymph node dissection, cystoscopy 1/6/2025. Final pathology was consistent with recurrent sarcoma. She is going to begin doxorubicin + trabectedin q21 days on 2/26/2025.     She also recently underwent cystoscopy, right retrograde pyelography, ureteroscopy with laser lithotripsy and right stent placement for R kidney stone.     She is seen today with her daughter. Introduced palliative medicine. She has not a lot of symptoms to report at this time, except for some lingering pain in the R flank from a recent stone where a stent was placed. She said the stent is going to come out next week. She was given OxyIR that caused delirium/loopy. I recommend pain regimen above. Her appetite is good at this time. No other symptoms to report.    She lives with her  who does not seem to be able to provide support for her if needed. He has his own medical issues (leukemia). She has 3 children. Daughter/Jessica who was with her today is the most hands on.     She already has a POA and living will in chart. She is okay with this document for now.          Objective   /64 (BP Location: Right arm, Patient Position: Sitting, Cuff Size: Large)   Pulse (!) 121   Temp (!) 96.4 °F (35.8 °C) (Temporal)   Resp 15   Wt 95.3 kg (210 lb)   SpO2 98%   BMI 36.05 kg/m²     Physical Exam  Constitutional:       General: She is not in acute distress.     " Appearance: She is not toxic-appearing or diaphoretic.      Comments: Does not really look sickly, appears a bit tired, comfortable, pleasant   HENT:      Head: Normocephalic and atraumatic.   Eyes:      General: No scleral icterus.        Right eye: No discharge.         Left eye: No discharge.   Pulmonary:      Effort: Pulmonary effort is normal. No respiratory distress.      Comments: On RA  Abdominal:      General: Abdomen is flat. There is no distension.   Musculoskeletal:         General: No swelling.   Skin:     General: Skin is dry.      Coloration: Skin is not jaundiced or pale.   Neurological:      General: No focal deficit present.      Mental Status: She is alert and oriented to person, place, and time.   Psychiatric:         Mood and Affect: Mood normal.         Behavior: Behavior normal.         Thought Content: Thought content normal.         Judgment: Judgment normal.         Recent labs:  Lab Results   Component Value Date/Time    SODIUM 138 01/30/2025 10:05 AM    K 3.5 01/30/2025 10:05 AM    BUN 18 01/30/2025 10:05 AM    CREATININE 0.66 01/30/2025 10:05 AM    GLUC 189 (H) 01/30/2025 10:05 AM    CALCIUM 9.5 01/30/2025 10:05 AM    AST 54 (H) 01/30/2025 10:05 AM    ALT 27 01/30/2025 10:05 AM    ALB 4.1 01/30/2025 10:05 AM    TP 7.5 01/30/2025 10:05 AM    EGFR 92 01/30/2025 10:05 AM     Lab Results   Component Value Date/Time    HGB 12.7 01/30/2025 10:05 AM    WBC 7.04 01/30/2025 10:05 AM     01/30/2025 10:05 AM    INR 0.93 12/24/2024 01:37 PM     No results found for: \"[id number]\"    Recent Imaging:  Procedure: CT abdomen pelvis w contrast  Result Date: 1/17/2025  Impression: No acute findings. No findings to account for this patient's pain. Workstation performed: [id number]     Procedure: NM PET CT skull base to mid thigh  Result Date: 12/2/2024  Impression: 1. The enlarging soft tissue nodular lesion along the right margin of the vaginal cuff is FDG avid and most concerning for recurrent " disease 2. There is also a 0.8 cm left external iliac lymph node. It has enlarged and demonstrates mild FDG avidity, concerning for darek metastasis 3. No evidence of FDG avid metastatic disease within the upper abdomen, chest, or neck 4. Approximately 0.6 cm left upper arm nodular focus with SUV max 5.5 with differential considerations as above. In particular if there is any clinical symptomatology, MRI may be considered The examination demonstrates a significant  finding and was documented as such in [facility] for liaison and referring practitioner notification. Workstation performed: [id number]     Procedure: CT renal stone study abdomen pelvis wo contrast  Result Date: 11/21/2024  Impression: 1. Unchanged 10 mm nonobstructing right renal calculus and additional punctate bilateral nonobstructing calculi. No hydronephrosis or findings of obstructive uropathy. 2. Progressively enlarging nodular soft tissue lesion at the right side of the vaginal cuff adjacent to the sigmoid colon currently measuring 3.3 x 2.2 x 2.3 cm (previously 2.7 x 2.0 x 1.4 cm on 9/10/2024 and new from 9/12/2023), suspicious for locally recurrent tumor. Gynecology oncology consultation is recommended. The study was marked in EPIC for significant notification. Workstation performed: [id number]     Procedure: XR abdomen 1 view kub  Result Date: 10/30/2024  Impression: No acute abnormality. Mild constipation. Workstation performed: [id number]       Administrative Statements   I have spent a total time of 45 minutes in caring for this patient on the day of the visit/encounter including Diagnostic results, Risks and benefits of tx options, Instructions for management, Patient and family education, Importance of tx compliance, Risk factor reductions, Impressions, Counseling / Coordination of care, Documenting in the medical record, Reviewing/placing orders in the medical record (including tests, medications, and/or procedures), and Obtaining or  reviewing history  .   Topics discussed with the patient / family include symptom assessment and management, medication review, medication adjustment, psychosocial support, advanced directives, goals of care, opioid titration, supportive listening, and anticipatory guidance.    Supriya Mckeon MD  Palliative Medicine & Supportive Care  Internal Medicine  Available via [place] [employer]  Office: [phone]  Fax: [phone]

## 2025-02-20 NOTE — TELEPHONE ENCOUNTER
Received the following message from patient...    I haven’t been feeling well all day very shaky and weak and feel sick to my stomach going to [facility] in [place] now but not sure what dr to to notify about this.     Attempted to contact patient.  Left voicemail message with call back number.

## 2025-02-20 NOTE — ASSESSMENT & PLAN NOTE
Still with some flank pain  5mg oxycodone made her very loopy, she is opioid-naive = recommend 1/2 tabs (2.5mg) oxyIR PO q6H prn severe pain. Report if still unable to tolerate and I will rotate to hydrocodone-acetaminophen 5-325mg, 1/2 tab q6H prn   Tylenol 1000mg PO q8H prn mild/moderate pain. Max of 3000mg in 24H only

## 2025-02-20 NOTE — ASSESSMENT & PLAN NOTE
S/p cystoscopy, right retrograde pyelography, ureteroscopy with laser lithotripsy and right stent placement for R kidney stone on 2/13/2025  She said her stent is going to be removed next week

## 2025-02-20 NOTE — PROGRESS NOTES
Palliative Outpatient Assessment of Need    SW completed an assessment of need which was completed with patient and daughter in the office.    Relationship status:    Duration of relationship: 31 years   Name of significant other:   Children and Ages: Pt has 3 adult children. 2 daughters and 1 son   Pets:   Other important family information: Daughter Jessica is a huge support for pt.   Living situation (where and whom): Pt lives in the home with spouse.  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  Substance use or history: Pt reported never smoked or used tobacco products.   Sleep: Pt reports not being able to sleep well due to getting up throughout the night to use the restroom.  Exercise: Pt reports engaging in physical activity by walking daily.  Diet/nutrition: Pt reports no concerns with appetite.  Durable Medical Equipment needs:   Transportation: No Transportation Concerns   Financial concerns: No Financial Concerns (However, pt is aware of praveena funding through oncology if financial difficulties occur.  Advanced Directive: Pt has ACP documents on file.  Other medical or social work providers involved: Hem/Onc, Infusion, Urology, Gyn onc, GI, Radiology.  Patient/caregiver current level of coping:  Pt reports current level of coping is stable. Pt reports everyday coping by reading the bible, adolfo, and sleep.  Understanding: Pt appears understanding of current  medical status  Patient/family concerns and areas of need: Right Flank Pian, right renal stone, ACP.  Patient's interests: Pt would like to continue following up with [facility] for GOC and additional support. See plan under palliative care.      I have spent 30 minutes with Patient and family today in which greater than 50% of this time was spent in counseling/coordination of care.    *All questions may not be answered due to constraints.  Follow-up discussions may need to occur.

## 2025-02-20 NOTE — ASSESSMENT & PLAN NOTE
Briefly, reviewed her ACP docs available. She expressed maybe wanting to change her POA to her other daughter, but decided to leave it alone.   I did offer her another  ACP document in case she wants to change it  I spent only 5 mins reviewing this

## 2025-02-21 PROBLEM — E87.8 ELECTROLYTE ABNORMALITY: Status: ACTIVE | Noted: 2025-02-21

## 2025-02-21 PROBLEM — R65.20 SEVERE SEPSIS (HCC): Status: ACTIVE | Noted: 2025-02-21

## 2025-02-21 PROBLEM — R42 LIGHTHEADEDNESS: Status: ACTIVE | Noted: 2025-02-21

## 2025-02-21 PROBLEM — N30.90 CYSTITIS: Status: ACTIVE | Noted: 2025-02-21

## 2025-02-21 PROBLEM — A41.9 SEVERE SEPSIS (HCC): Status: ACTIVE | Noted: 2025-02-21

## 2025-02-21 PROBLEM — I10 PRIMARY HYPERTENSION: Status: ACTIVE | Noted: 2025-02-21

## 2025-02-21 PROBLEM — N12 PYELONEPHRITIS: Status: ACTIVE | Noted: 2025-02-21

## 2025-02-21 LAB
ANION GAP SERPL CALCULATED.3IONS-SCNC: 11 MMOL/L (ref 4–13)
ATRIAL RATE: 108 BPM
ATRIAL RATE: 109 BPM
BUN SERPL-MCNC: 12 MG/DL (ref 5–25)
CALCIUM SERPL-MCNC: 8.9 MG/DL (ref 8.4–10.2)
CHLORIDE SERPL-SCNC: 100 MMOL/L (ref 96–108)
CO2 SERPL-SCNC: 26 MMOL/L (ref 21–32)
CREAT SERPL-MCNC: 0.66 MG/DL (ref 0.6–1.3)
ERYTHROCYTE [DISTWIDTH] IN BLOOD BY AUTOMATED COUNT: 14.6 % (ref 11.6–15.1)
GFR SERPL CREATININE-BSD FRML MDRD: 92 ML/MIN/1.73SQ M
GLUCOSE SERPL-MCNC: 123 MG/DL (ref 65–140)
GLUCOSE SERPL-MCNC: 138 MG/DL (ref 65–140)
GLUCOSE SERPL-MCNC: 148 MG/DL (ref 65–140)
GLUCOSE SERPL-MCNC: 149 MG/DL (ref 65–140)
GLUCOSE SERPL-MCNC: 152 MG/DL (ref 65–140)
GLUCOSE SERPL-MCNC: 157 MG/DL (ref 65–140)
HCT VFR BLD AUTO: 36.4 % (ref 34.8–46.1)
HGB BLD-MCNC: 11.9 G/DL (ref 11.5–15.4)
LACTATE SERPL-SCNC: 1.9 MMOL/L (ref 0.5–2)
MAGNESIUM SERPL-MCNC: 2.1 MG/DL (ref 1.9–2.7)
MCH RBC QN AUTO: 29 PG (ref 26.8–34.3)
MCHC RBC AUTO-ENTMCNC: 32.7 G/DL (ref 31.4–37.4)
MCV RBC AUTO: 89 FL (ref 82–98)
P AXIS: 44 DEGREES
P AXIS: 64 DEGREES
PHOSPHATE SERPL-MCNC: 2.6 MG/DL (ref 2.3–4.1)
PLATELET # BLD AUTO: 192 THOUSANDS/UL (ref 149–390)
PMV BLD AUTO: 10.2 FL (ref 8.9–12.7)
POTASSIUM SERPL-SCNC: 3.4 MMOL/L (ref 3.5–5.3)
PR INTERVAL: 184 MS
PR INTERVAL: 186 MS
QRS AXIS: 54 DEGREES
QRS AXIS: 57 DEGREES
QRSD INTERVAL: 76 MS
QRSD INTERVAL: 78 MS
QT INTERVAL: 356 MS
QT INTERVAL: 488 MS
QTC INTERVAL: 479 MS
QTC INTERVAL: 653 MS
RBC # BLD AUTO: 4.11 MILLION/UL (ref 3.81–5.12)
SODIUM SERPL-SCNC: 137 MMOL/L (ref 135–147)
T WAVE AXIS: 77 DEGREES
T WAVE AXIS: 79 DEGREES
VENTRICULAR RATE: 108 BPM
VENTRICULAR RATE: 109 BPM
WBC # BLD AUTO: 10.64 THOUSAND/UL (ref 4.31–10.16)

## 2025-02-21 PROCEDURE — 80048 BASIC METABOLIC PNL TOTAL CA: CPT | Performed by: PHYSICIAN ASSISTANT

## 2025-02-21 PROCEDURE — 94760 N-INVAS EAR/PLS OXIMETRY 1: CPT

## 2025-02-21 PROCEDURE — 82948 REAGENT STRIP/BLOOD GLUCOSE: CPT

## 2025-02-21 PROCEDURE — 84100 ASSAY OF PHOSPHORUS: CPT | Performed by: PHYSICIAN ASSISTANT

## 2025-02-21 PROCEDURE — 36415 COLL VENOUS BLD VENIPUNCTURE: CPT | Performed by: PHYSICIAN ASSISTANT

## 2025-02-21 PROCEDURE — 85027 COMPLETE CBC AUTOMATED: CPT | Performed by: PHYSICIAN ASSISTANT

## 2025-02-21 PROCEDURE — 99223 1ST HOSP IP/OBS HIGH 75: CPT | Performed by: STUDENT IN AN ORGANIZED HEALTH CARE EDUCATION/TRAINING PROGRAM

## 2025-02-21 PROCEDURE — 93010 ELECTROCARDIOGRAM REPORT: CPT | Performed by: INTERNAL MEDICINE

## 2025-02-21 PROCEDURE — 83605 ASSAY OF LACTIC ACID: CPT | Performed by: PHYSICIAN ASSISTANT

## 2025-02-21 PROCEDURE — 83735 ASSAY OF MAGNESIUM: CPT | Performed by: PHYSICIAN ASSISTANT

## 2025-02-21 PROCEDURE — 94002 VENT MGMT INPAT INIT DAY: CPT

## 2025-02-21 RX ORDER — TOPIRAMATE 100 MG/1
100 TABLET, FILM COATED ORAL
Status: DISCONTINUED | OUTPATIENT
Start: 2025-02-21 | End: 2025-02-24 | Stop reason: HOSPADM

## 2025-02-21 RX ORDER — LOSARTAN POTASSIUM 50 MG/1
50 TABLET ORAL DAILY
Status: DISCONTINUED | OUTPATIENT
Start: 2025-02-21 | End: 2025-02-24 | Stop reason: HOSPADM

## 2025-02-21 RX ORDER — PHENAZOPYRIDINE HYDROCHLORIDE 100 MG/1
200 TABLET, FILM COATED ORAL 3 TIMES DAILY PRN
Status: DISCONTINUED | OUTPATIENT
Start: 2025-02-21 | End: 2025-02-24 | Stop reason: HOSPADM

## 2025-02-21 RX ORDER — ASPIRIN 81 MG/1
81 TABLET, CHEWABLE ORAL DAILY
Status: DISCONTINUED | OUTPATIENT
Start: 2025-02-21 | End: 2025-02-24 | Stop reason: HOSPADM

## 2025-02-21 RX ORDER — MAGNESIUM HYDROXIDE/ALUMINUM HYDROXICE/SIMETHICONE 120; 1200; 1200 MG/30ML; MG/30ML; MG/30ML
30 SUSPENSION ORAL EVERY 6 HOURS PRN
Status: DISCONTINUED | OUTPATIENT
Start: 2025-02-21 | End: 2025-02-24 | Stop reason: HOSPADM

## 2025-02-21 RX ORDER — LORATADINE 10 MG/1
10 TABLET ORAL DAILY
Status: DISCONTINUED | OUTPATIENT
Start: 2025-02-21 | End: 2025-02-24 | Stop reason: HOSPADM

## 2025-02-21 RX ORDER — PRAVASTATIN SODIUM 40 MG
80 TABLET ORAL
Status: DISCONTINUED | OUTPATIENT
Start: 2025-02-21 | End: 2025-02-21

## 2025-02-21 RX ORDER — METOPROLOL SUCCINATE 25 MG/1
25 TABLET, EXTENDED RELEASE ORAL DAILY
Status: DISCONTINUED | OUTPATIENT
Start: 2025-02-21 | End: 2025-02-24 | Stop reason: HOSPADM

## 2025-02-21 RX ORDER — SODIUM CHLORIDE, SODIUM GLUCONATE, SODIUM ACETATE, POTASSIUM CHLORIDE, MAGNESIUM CHLORIDE, SODIUM PHOSPHATE, DIBASIC, AND POTASSIUM PHOSPHATE .53; .5; .37; .037; .03; .012; .00082 G/100ML; G/100ML; G/100ML; G/100ML; G/100ML; G/100ML; G/100ML
100 INJECTION, SOLUTION INTRAVENOUS CONTINUOUS
Status: DISPENSED | OUTPATIENT
Start: 2025-02-21 | End: 2025-02-21

## 2025-02-21 RX ORDER — SENNOSIDES 8.6 MG
1 TABLET ORAL
Status: DISCONTINUED | OUTPATIENT
Start: 2025-02-21 | End: 2025-02-24 | Stop reason: HOSPADM

## 2025-02-21 RX ORDER — ACETAMINOPHEN 325 MG/1
650 TABLET ORAL EVERY 6 HOURS PRN
Status: DISCONTINUED | OUTPATIENT
Start: 2025-02-21 | End: 2025-02-24 | Stop reason: HOSPADM

## 2025-02-21 RX ORDER — ENOXAPARIN SODIUM 100 MG/ML
40 INJECTION SUBCUTANEOUS DAILY
Status: DISCONTINUED | OUTPATIENT
Start: 2025-02-21 | End: 2025-02-24 | Stop reason: HOSPADM

## 2025-02-21 RX ORDER — ONDANSETRON 2 MG/ML
4 INJECTION INTRAMUSCULAR; INTRAVENOUS EVERY 6 HOURS PRN
Status: DISCONTINUED | OUTPATIENT
Start: 2025-02-21 | End: 2025-02-24 | Stop reason: HOSPADM

## 2025-02-21 RX ORDER — TAMSULOSIN HYDROCHLORIDE 0.4 MG/1
0.4 CAPSULE ORAL EVERY EVENING
Status: DISCONTINUED | OUTPATIENT
Start: 2025-02-21 | End: 2025-02-24 | Stop reason: HOSPADM

## 2025-02-21 RX ORDER — CEFTRIAXONE 1 G/50ML
1000 INJECTION, SOLUTION INTRAVENOUS EVERY 24 HOURS
Status: DISCONTINUED | OUTPATIENT
Start: 2025-02-21 | End: 2025-02-24 | Stop reason: HOSPADM

## 2025-02-21 RX ORDER — PANTOPRAZOLE SODIUM 40 MG/1
40 TABLET, DELAYED RELEASE ORAL
Status: DISCONTINUED | OUTPATIENT
Start: 2025-02-21 | End: 2025-02-24 | Stop reason: HOSPADM

## 2025-02-21 RX ORDER — INSULIN LISPRO 100 [IU]/ML
1-5 INJECTION, SOLUTION INTRAVENOUS; SUBCUTANEOUS
Status: DISCONTINUED | OUTPATIENT
Start: 2025-02-21 | End: 2025-02-24 | Stop reason: HOSPADM

## 2025-02-21 RX ORDER — SODIUM CHLORIDE, SODIUM GLUCONATE, SODIUM ACETATE, POTASSIUM CHLORIDE, MAGNESIUM CHLORIDE, SODIUM PHOSPHATE, DIBASIC, AND POTASSIUM PHOSPHATE .53; .5; .37; .037; .03; .012; .00082 G/100ML; G/100ML; G/100ML; G/100ML; G/100ML; G/100ML; G/100ML
50 INJECTION, SOLUTION INTRAVENOUS CONTINUOUS
Status: DISCONTINUED | OUTPATIENT
Start: 2025-02-21 | End: 2025-02-22

## 2025-02-21 RX ORDER — DULOXETIN HYDROCHLORIDE 30 MG/1
30 CAPSULE, DELAYED RELEASE ORAL DAILY
Status: DISCONTINUED | OUTPATIENT
Start: 2025-02-21 | End: 2025-02-24 | Stop reason: HOSPADM

## 2025-02-21 RX ORDER — ROPINIROLE 0.25 MG/1
1 TABLET, FILM COATED ORAL
Status: DISCONTINUED | OUTPATIENT
Start: 2025-02-21 | End: 2025-02-24 | Stop reason: HOSPADM

## 2025-02-21 RX ORDER — POTASSIUM CHLORIDE 1500 MG/1
20 TABLET, EXTENDED RELEASE ORAL 2 TIMES DAILY
Status: DISCONTINUED | OUTPATIENT
Start: 2025-02-21 | End: 2025-02-24 | Stop reason: HOSPADM

## 2025-02-21 RX ORDER — LOSARTAN POTASSIUM 25 MG/1
50 TABLET ORAL DAILY
Status: DISCONTINUED | OUTPATIENT
Start: 2025-02-21 | End: 2025-02-21

## 2025-02-21 RX ORDER — INSULIN LISPRO 100 [IU]/ML
1-6 INJECTION, SOLUTION INTRAVENOUS; SUBCUTANEOUS
Status: DISCONTINUED | OUTPATIENT
Start: 2025-02-21 | End: 2025-02-24 | Stop reason: HOSPADM

## 2025-02-21 RX ORDER — HYDROCHLOROTHIAZIDE 25 MG/1
25 TABLET ORAL DAILY
Status: DISCONTINUED | OUTPATIENT
Start: 2025-02-21 | End: 2025-02-24 | Stop reason: HOSPADM

## 2025-02-21 RX ORDER — ATORVASTATIN CALCIUM 40 MG/1
40 TABLET, FILM COATED ORAL
Status: DISCONTINUED | OUTPATIENT
Start: 2025-02-21 | End: 2025-02-24 | Stop reason: HOSPADM

## 2025-02-21 RX ORDER — INSULIN GLARGINE 100 [IU]/ML
5 INJECTION, SOLUTION SUBCUTANEOUS
Status: DISCONTINUED | OUTPATIENT
Start: 2025-02-21 | End: 2025-02-24 | Stop reason: HOSPADM

## 2025-02-21 RX ADMIN — TAMSULOSIN HYDROCHLORIDE 0.4 MG: 0.4 CAPSULE ORAL at 18:07

## 2025-02-21 RX ADMIN — ENOXAPARIN SODIUM 40 MG: 40 INJECTION SUBCUTANEOUS at 08:38

## 2025-02-21 RX ADMIN — SERTRALINE HYDROCHLORIDE 150 MG: 50 TABLET ORAL at 08:38

## 2025-02-21 RX ADMIN — SODIUM CHLORIDE, SODIUM GLUCONATE, SODIUM ACETATE, POTASSIUM CHLORIDE, MAGNESIUM CHLORIDE, SODIUM PHOSPHATE, DIBASIC, AND POTASSIUM PHOSPHATE 100 ML/HR: .53; .5; .37; .037; .03; .012; .00082 INJECTION, SOLUTION INTRAVENOUS at 01:56

## 2025-02-21 RX ADMIN — PHENAZOPYRIDINE HYDROCHLORIDE 200 MG: 100 TABLET ORAL at 18:07

## 2025-02-21 RX ADMIN — Medication 2000 UNITS: at 08:38

## 2025-02-21 RX ADMIN — PHENAZOPYRIDINE HYDROCHLORIDE 200 MG: 100 TABLET ORAL at 08:45

## 2025-02-21 RX ADMIN — POTASSIUM CHLORIDE 20 MEQ: 1500 TABLET, EXTENDED RELEASE ORAL at 08:38

## 2025-02-21 RX ADMIN — LOSARTAN POTASSIUM 50 MG: 50 TABLET, FILM COATED ORAL at 08:45

## 2025-02-21 RX ADMIN — INSULIN GLARGINE 5 UNITS: 100 INJECTION, SOLUTION SUBCUTANEOUS at 08:45

## 2025-02-21 RX ADMIN — ROPINIROLE HYDROCHLORIDE 1 MG: 0.25 TABLET, FILM COATED ORAL at 20:19

## 2025-02-21 RX ADMIN — SODIUM CHLORIDE, SODIUM GLUCONATE, SODIUM ACETATE, POTASSIUM CHLORIDE, MAGNESIUM CHLORIDE, SODIUM PHOSPHATE, DIBASIC, AND POTASSIUM PHOSPHATE 50 ML/HR: .53; .5; .37; .037; .03; .012; .00082 INJECTION, SOLUTION INTRAVENOUS at 15:36

## 2025-02-21 RX ADMIN — ACETAMINOPHEN 650 MG: 325 TABLET, FILM COATED ORAL at 18:07

## 2025-02-21 RX ADMIN — ATORVASTATIN CALCIUM 40 MG: 40 TABLET, FILM COATED ORAL at 21:32

## 2025-02-21 RX ADMIN — PANTOPRAZOLE SODIUM 40 MG: 40 TABLET, DELAYED RELEASE ORAL at 06:01

## 2025-02-21 RX ADMIN — ROPINIROLE HYDROCHLORIDE 1 MG: 0.25 TABLET, FILM COATED ORAL at 02:01

## 2025-02-21 RX ADMIN — POTASSIUM CHLORIDE 20 MEQ: 1500 TABLET, EXTENDED RELEASE ORAL at 18:07

## 2025-02-21 RX ADMIN — TOPIRAMATE 100 MG: 100 TABLET, FILM COATED ORAL at 20:19

## 2025-02-21 RX ADMIN — TOPIRAMATE 100 MG: 100 TABLET, FILM COATED ORAL at 02:01

## 2025-02-21 RX ADMIN — DULOXETINE HYDROCHLORIDE 30 MG: 30 CAPSULE, DELAYED RELEASE ORAL at 08:45

## 2025-02-21 RX ADMIN — LORATADINE 10 MG: 10 TABLET ORAL at 08:45

## 2025-02-21 RX ADMIN — ASPIRIN 81 MG CHEWABLE TABLET 81 MG: 81 TABLET CHEWABLE at 08:38

## 2025-02-21 RX ADMIN — ONDANSETRON 4 MG: 2 INJECTION INTRAMUSCULAR; INTRAVENOUS at 20:15

## 2025-02-21 RX ADMIN — CEFTRIAXONE 1000 MG: 1 INJECTION, SOLUTION INTRAVENOUS at 20:19

## 2025-02-21 RX ADMIN — Medication 2.5 MG: at 15:34

## 2025-02-21 NOTE — ASSESSMENT & PLAN NOTE
S/p right pyelography, ureteroscopy with laser lithotripsy and right stent placement by urology on 2/13  Continue pyridium PRN   Discussed with urology on admission-no indication for urological intervention at this time

## 2025-02-21 NOTE — ASSESSMENT & PLAN NOTE
Home regimen: HCTZ 25 Mg daily, losartan 50 Mg daily, metoprolol succinate 25 Mg daily  Will continue metoprolol and losartan at this time, however will hold HCTZ in setting of acute infection and patient examining slightly hypovolemic  Monitor per unit protocol

## 2025-02-21 NOTE — PLAN OF CARE
Problem: Potential for Falls  Goal: Patient will remain free of falls  Description: INTERVENTIONS:  - Educate patient/family on patient safety including physical limitations  - Instruct patient to call for assistance with activity   - Consult OT/PT to assist with strengthening/mobility   - Keep Call bell within reach  - Keep bed low and locked with side rails adjusted as appropriate  - Keep care items and personal belongings within reach  - Initiate and maintain comfort rounds  - Make Fall Risk Sign visible to staff  - Apply yellow socks and bracelet for high fall risk patients  - Consider moving patient to room near nurses station  Outcome: Progressing     Problem: PAIN - ADULT  Goal: Verbalizes/displays adequate comfort level or baseline comfort level  Description: Interventions:  - Encourage patient to monitor pain and request assistance  - Assess pain using appropriate pain scale  - Administer analgesics based on type and severity of pain and evaluate response  - Implement non-pharmacological measures as appropriate and evaluate response  - Consider cultural and social influences on pain and pain management  - Notify physician/advanced practitioner if interventions unsuccessful or patient reports new pain  Outcome: Progressing     Problem: Knowledge Deficit  Goal: Patient/family/caregiver demonstrates understanding of disease process, treatment plan, medications, and discharge instructions  Description: Complete learning assessment and assess knowledge base.  Interventions:  - Provide teaching at level of understanding  - Provide teaching via preferred learning methods  Outcome: Progressing     Problem: Nutrition/Hydration-ADULT  Goal: Nutrient/Hydration intake appropriate for improving, restoring or maintaining nutritional needs  Description: Monitor and assess patient's nutrition/hydration status for malnutrition. Collaborate with interdisciplinary team and initiate plan and interventions as ordered.   Monitor patient's weight and dietary intake as ordered or per policy. Utilize nutrition screening tool and intervene as necessary. Determine patient's food preferences and provide high-protein, high-caloric foods as appropriate.     INTERVENTIONS:  - Monitor oral intake, urinary output, labs, and treatment plans  - Assess nutrition and hydration status and recommend course of action  - Evaluate amount of meals eaten  - Assist patient with eating if necessary   - Allow adequate time for meals  - Recommend/ encourage appropriate diets, oral nutritional supplements, and vitamin/mineral supplements  - Order, calculate, and assess calorie counts as needed  - Recommend, monitor, and adjust tube feedings and TPN/PPN based on assessed needs  - Assess need for intravenous fluids  - Provide specific nutrition/hydration education as appropriate  - Include patient/family/caregiver in decisions related to nutrition  Outcome: Progressing

## 2025-02-21 NOTE — ASSESSMENT & PLAN NOTE
"Presenting with worsening dysuria, right flank pain, and suprapubic pain since cystoscopy, right retrograde pyelography, ureteroscopy with laser lithotripsy and right ureteral stent placement on 2/13  CT A/P: \"Appropriate position of right ureteral stent with mild right hydronephrosis. 3 mm stone adjacent to the distal right ureteral stent.Mild interstitial edema throughout the right renal parenchyma and inflammatory changes tracking along the right ureter compatible with postprocedural changes or ascending infection\"  UA consistent with infection  Prior urine cultures reviewed-no prior pseudomonal or ESBL growth  ED discussed with urology on admission-no indication for transfer at this time and recommends ongoing IV antibiotics and following urine culture  Continue ceftriaxone day 1  UC growing >100,000 Klebsiella awaiting sensitivities  Blood cultures x 2 showing no growth to date at 24 hours  Pain control with tylenol and lower dose oxycodone 2.5mg as pt felt loopy with 5mg dosing   Pyelonephritis associated with right ureteral stent (placement -2/13/25), as evidence by worsening dysuria and right flank pain requiring treatment with IV Ceftriaxone.   "

## 2025-02-21 NOTE — ASSESSMENT & PLAN NOTE
SIRS criteria: Tachycardia and tachypnea  Suspect source is pyelonephritis as evidenced by UA and CT A/P  Severe sepsis criteria met with lactic acidosis - improved with IVF   Continue ceftriaxone  Follow-up urine culture  Follow-up blood cultures

## 2025-02-21 NOTE — H&P
"H&P - Hospitalist   Name: Felecia Edward 64 y.o. female I MRN: [id number]  Unit/Bed#: ED 11 I Date of Admission: 2/20/2025   Date of Service: 2/21/2025 I Hospital Day: 1     Assessment & Plan  Pyelonephritis  Presenting with worsening dysuria, right flank pain, and suprapubic pain since cystoscopy, right retrograde pyelography, ureteroscopy with laser lithotripsy and right ureteral stent placement on 2/13  CT A/P: \"Appropriate position of right ureteral stent with mild right hydronephrosis. 3 mm stone adjacent to the distal right ureteral stent.Mild interstitial edema throughout the right renal parenchyma and inflammatory changes tracking along the right ureter compatible with postprocedural changes or ascending infection\"  UA consistent with infection  Prior urine cultures reviewed-no prior pseudomonal or ESBL growth  ED discussed with urology on admission-no indication for transfer at this time and recommends ongoing IV antibiotics and following urine culture  Continue ceftriaxone  Pain control with tylenol and lower dose oxycodone 2.5mg as pt felt loopy with 5mg dosing   Right renal stone  S/p right pyelography, ureteroscopy with laser lithotripsy and right stent placement by urology on 2/13  Continue pyridium PRN   Discussed with urology on admission-no indication for urological intervention at this time  Severe sepsis (HCC)  SIRS criteria: Tachycardia and tachypnea  Suspect source is pyelonephritis as evidenced by UA and CT A/P  Severe sepsis criteria met with lactic acidosis - improved with IVF   Continue ceftriaxone  Follow-up urine culture  Follow-up blood cultures  Electrolyte abnormality  Potassium 3.2 and magnesium 1.6 on admission  Repleted in the ED  Goal K > 4.0 and mag > 2.0  Uterine leiomyosarcoma (HCC)  Recently diagnosed with recurrence  She is to start chemotherapy with gynecology oncology on 2/26  Lightheadedness  Endorses lightheadedness earlier today with almost fall while walking upstairs - " "denies room spinning sensation  Denies any numbness/tingling, weakness in extremities, or speech abnormality  Reports lightheadedness improved on admission and has been ambulating to and from the bathroom without assistance while in the ED  Type 2 diabetes mellitus with diabetic neuropathy, without long-term current use of insulin ([facility])  Lab Results   Component Value Date    HGBA1C 7.6 (H) 12/24/2024       No results for input(s): \"POCGLU\" in the last 72 hours.    Blood Sugar Average: Last 72 hrs:  Home regimen: Amaryl, Actos, and Marybeth  Will hold home oral medications and placed on Lantus 5 units in the morning and SSI AC/at bedtime  Primary hypertension  Home regimen: HCTZ 25 Mg daily, losartan 50 Mg daily, metoprolol succinate 25 Mg daily  Will continue metoprolol and losartan at this time, however will hold HCTZ in setting of acute infection and patient examining slightly hypovolemic  Monitor per unit protocol      VTE Pharmacologic Prophylaxis: VTE Score: 5 High Risk (Score >/= 5) - Pharmacological DVT Prophylaxis Ordered: enoxaparin (Lovenox). Sequential Compression Devices Ordered.  Code Status: Level 1 - Full Code   Discussion with family: Patient declined call to .     Anticipated Length of Stay: Patient will be admitted on an inpatient basis with an anticipated length of stay of greater than 2 midnights secondary to pyelonephritis, HTN, R renal stone s/p stent.    History of Present Illness   Chief Complaint: \"my lower stomach hurts, right back hurts, and it burns when I pee\"    Felecia Edward is a 64 y.o. female with a PMH of recently diagnosed recurrent uterine leiomyosarcoma, HTN, NIDDM 2, and right renal stone s/p right ureteral stent who presents with dysuria, suprapubic pain, right flank pain that has been ongoing since ureteral stent placement on 2/13.  Reports that symptoms fluctuate daily but have been steadily worsening over the last week.  Reports fever that developed earlier " today.  Reports intermittent dyspnea with activity.  Reports lightheadedness when attempting to ambulate upstairs earlier today.  Denies room spinning sensation.  No numbness or tingling.  Denies weakness in extremities.  Reports lightheadedness has improved since being in the ED and is ambulated to and from the bathroom without assistance.  Denies nausea, vomiting, or change in bowel habits. Denies chest pain. Reports HA currently.     Review of Systems   Constitutional:  Positive for fever. Negative for chills.   HENT:  Negative for congestion.    Eyes:  Negative for visual disturbance.   Respiratory:  Positive for shortness of breath. Negative for cough.    Cardiovascular:  Negative for chest pain and leg swelling.   Gastrointestinal:  Positive for abdominal pain. Negative for diarrhea, nausea and vomiting.   Genitourinary:  Positive for dysuria and flank pain. Negative for difficulty urinating and hematuria.   Musculoskeletal:  Negative for gait problem.   Neurological:  Positive for light-headedness and headaches. Negative for dizziness, facial asymmetry, speech difficulty, weakness and numbness.   All other systems reviewed and are negative.      Historical Information   Past Medical History:   Diagnosis Date    Allergic rhinitis     Anemia     Anxiety     Coronary artery disease     CPAP (continuous positive airway pressure) dependence     Depression     Diabetes mellitus (HCC)     Functional dyspepsia     GERD (gastroesophageal reflux disease)     GERD without esophagitis     Hyperlipidemia     Hypertension     Irritable bowel syndrome     Migraines     Osteoporosis     Pulmonary hypertension (HCC)     Sleep apnea, obstructive      Past Surgical History:   Procedure Laterality Date    APPENDECTOMY      CHEST WALL BIOPSY N/A 06/04/2023    Procedure: EXCISION  BIOPSY LESION/MASS ABDOMINAL-PELVIC PERITONEUM;  Surgeon: Gordo Taveras MD;  [facility];  Service: Gynecology Oncology     CHOLECYSTECTOMY      COLONOSCOPY      CYSTOSCOPY N/A 01/06/2025    Procedure: CYSTOSCOPY;  Surgeon: Gordo Taveras MD;  [facility];  Service: Gynecology Oncology    FOOT SURGERY Left     Bone spur    HYSTERECTOMY N/A 06/04/2023    Procedure: HYSTERECTOMY TOTAL ABDOMINAL (DOROTHY), BILATERAL SALPINGOOPHORECTOMY;  Surgeon: Gordo Taveras MD;  [facility];  Service: Gynecology Oncology    IR PORT PLACEMENT  06/30/2023    NM CYSTO/URETERO W/LITHOTRIPSY &INDWELL STENT INSRT Right 2/13/2025    Procedure: CYSTOSCOPY URETEROSCOPY WITH LITHOTRIPSY HOLMIUM LASER, RETROGRADE PYELOGRAM AND INSERTION STENT URETERAL;  Surgeon: Michael Magallon MD;  Location:  MAIN OR;  Service: Urology    NM LAPS FULG/EXC OVARY VISCERA/PERITONEAL SURFACE N/A 01/06/2025    Procedure: ROBOTIC ASSISTED RADICAL BILATERAL PARAMETRECTOMY, RESECTION BILATERAL PARAMETRIAL TUMORS, UPPER VAGINECTOMY, LEFT OBTURATOR LYMPH NODE DISSECTION;  Surgeon: Gordo Taveras MD;  [facility];  Service: Gynecology Oncology     Social History     Tobacco Use    Smoking status: Never    Smokeless tobacco: Never   Vaping Use    Vaping status: Never Used   Substance and Sexual Activity    Alcohol use: Never    Drug use: Never    Sexual activity: Not Currently     Partners: Male     Birth control/protection: Abstinence, Post-menopausal, Surgical, Female Sterilization     E-Cigarette/Vaping    E-Cigarette Use Never User      E-Cigarette/Vaping Substances    Nicotine No     THC No     CBD No     Flavoring No     Other No     Unknown No      Family History   Problem Relation Age of Onset    Heart disease Mother     Cancer Mother     Breast cancer Father     Cancer Father     Diabetes Father     Hypertension Father     Coronary artery disease Brother     Diabetes Brother     Heart failure Brother     Hypertension Brother     Colon polyps Neg Hx     Colon cancer Neg Hx      Social History:  Marital Status: /Civil  Union   Occupation: retired  Patient Pre-hospital Living Situation: Home, With spouse  Patient Pre-hospital Level of Mobility: walks  Patient Pre-hospital Diet Restrictions: none    Meds/Allergies   I have reviewed home medications with patient personally.  Prior to Admission medications    Medication Sig Start Date End Date Taking? Authorizing Provider   aspirin (Aspirin 81) 81 mg chewable tablet every 24 hours   Yes Historical Provider, MD   atorvastatin (LIPITOR) 40 mg tablet Take 40 mg by mouth daily at bedtime   Yes Historical Provider, MD   cetirizine (ZyrTEC) 10 mg tablet Take 10 mg by mouth daily at bedtime   Yes Historical Provider, MD   cholecalciferol (VITAMIN D3) 1,000 units tablet Take 2,000 Units by mouth daily 2 daily   Yes Historical Provider, MD   DULoxetine (CYMBALTA) 30 mg delayed release capsule TAKE 1 CAPSULE BY MOUTH EVERY DAY FOR 90 DAYS 6/16/23  Yes Historical Provider, MD   glimepiride (AMARYL) 4 mg tablet Take 4 mg by mouth 2 (two) times a day   Yes Historical Provider, MD   hydrochlorothiazide (HYDRODIURIL) 25 mg tablet Take 25 mg by mouth daily   Yes Historical Provider, MD   Jardiance 10 MG TABS tablet  5/7/24  Yes Historical Provider, MD   ketorolac (TORADOL) 10 mg tablet Take 1 tablet (10 mg total) by mouth every 6 (six) hours as needed for moderate pain for up to 5 days 2/13/25 2/21/25 Yes Michael Magallon MD   losartan (COZAAR) 50 mg tablet Take 50 mg by mouth daily   Yes Historical Provider, MD   metoprolol succinate (TOPROL-XL) 25 mg 24 hr tablet Take 25 mg by mouth daily   Yes Historical Provider, MD   oxyCODONE (Roxicodone) 5 immediate release tablet Take 1 tablet (5 mg total) by mouth every 6 (six) hours as needed for moderate pain for up to 8 doses Max Daily Amount: 20 mg  Patient taking differently: Take 2.5 mg by mouth every 6 (six) hours as needed for moderate pain 2/13/25  Yes Michael Magallon MD   pantoprazole (PROTONIX) 40 mg tablet Take 1 tablet (40 mg total) by mouth  daily 5/9/24  Yes Vignesh Shelton MD   phenazopyridine (PYRIDIUM) 200 mg tablet Take 1 tablet (200 mg total) by mouth 3 (three) times a day as needed (Dysuria and bladder discomfort.  Take with meals.) 2/13/25  Yes Michael Magallon MD   pioglitazone (ACTOS) 30 mg tablet every 24 hours   Yes Historical Provider, MD   Potassium Chloride ER 20 MEQ TBCR Take 1 tablet by mouth 2 (two) times a day with meals 6/17/24  Yes Historical Provider, MD   rOPINIRole (REQUIP) 1 mg tablet TAKE 1 TABLET BY MOUTH 1 TO 3 HOURS BEFORE BEDTIME 8/4/23  Yes Historical Provider, MD   sertraline (ZOLOFT) 100 mg tablet TAKE 1 AND 1/2 TABS BY MOUTH DAILY 6/16/23  Yes Historical Provider, MD   tamsulosin (FLOMAX) 0.4 mg Take 1 capsule (0.4 mg total) by mouth every evening for 14 days 2/13/25 2/27/25 Yes Michael Magallon MD   topiramate (TOPAMAX) 100 mg tablet 1 tablet Orally at bedtime   Yes Historical Provider, MD   Xiidra 5 % op solution INSTILL 1 DROP INTO BOTH EYES TWICE A DAY 12 HOURS APART 11/14/23  Yes Historical Provider, MD   simvastatin (ZOCOR) 40 mg tablet Take 40 mg by mouth daily  2/21/25 Yes Historical Provider, MD   dicyclomine (BENTYL) 10 mg capsule Take 1 capsule (10 mg total) by mouth every 6 (six) hours as needed (pain) 1/23/25   Vignesh Shelton MD   ibuprofen (MOTRIN) 800 mg tablet Take 800 mg by mouth every 6 (six) hours as needed for mild pain    Historical Provider, MD   meclizine (ANTIVERT) 25 mg tablet Take by mouth every 12 (twelve) hours as needed for dizziness    Historical Provider, MD   Multiple Vitamin (MULTIVITAMIN) tablet Take 1 tablet by mouth daily    Historical Provider, MD   Triamcinolone Acetonide 55 MCG/ACT AERO into each nostril One spray each nostril daily    Historical Provider, MD   ZOLMitriptan (ZOMIG) 5 MG tablet Take 5 mg by mouth once as needed for migraine Daily prn    Historical Provider, MD   sertraline (ZOLOFT) 50 mg tablet Take 150 mg by mouth daily  2/21/25  Historical Provider, MD      Allergies   Allergen Reactions    Sulfa Antibiotics Other (See Comments)     High fever  Pt unsure - it happened when she was 14 years old    Bee Pollen Swelling    Gabapentin Fatigue    Metformin GI Intolerance       Objective :  Temp:  [96.4 °F (35.8 °C)-99 °F (37.2 °C)] 98.1 °F (36.7 °C)  HR:  [] 105  BP: (120-151)/(64-85) 151/71  Resp:  [15-21] 20  SpO2:  [94 %-98 %] 97 %  O2 Device: None (Room air)    Physical Exam  Vitals and nursing note reviewed.   Constitutional:       General: She is not in acute distress.     Appearance: Normal appearance. She is not ill-appearing.      Comments: Pleasant and conversational.    HENT:      Head: Normocephalic.      Nose: Nose normal.      Mouth/Throat:      Mouth: Mucous membranes are dry.   Eyes:      Conjunctiva/sclera: Conjunctivae normal.   Cardiovascular:      Rate and Rhythm: Normal rate and regular rhythm.      Pulses: Normal pulses.      Heart sounds: No murmur heard.  Pulmonary:      Effort: Pulmonary effort is normal. No respiratory distress.      Breath sounds: Normal breath sounds. No wheezing, rhonchi or rales.   Abdominal:      General: There is no distension.      Palpations: Abdomen is soft.      Tenderness: There is abdominal tenderness (SPT). There is right CVA tenderness. There is no guarding or rebound.   Musculoskeletal:         General: Normal range of motion.      Cervical back: Normal range of motion.      Right lower leg: No edema.      Left lower leg: No edema.   Skin:     General: Skin is warm and dry.      Coloration: Skin is not pale.   Neurological:      General: No focal deficit present.      Mental Status: She is alert.      Comments: Oriented to self, place, month, year, and president.    Psychiatric:         Mood and Affect: Mood normal.         Thought Content: Thought content normal.          Lines/Drains:  Lines/Drains/Airways       Active Status       Name Placement date Placement time Site Days    Port A Cath 06/30/23 Right  Subclavian 06/30/23  1000  Subclavian  601                  Urinary Catheter:  Goal for removal:          Central Line:  Goal for removal: Port accessed. Will de-access as appropriate.             Lab Results: I have reviewed the following results:  Results from last 7 days   Lab Units 02/20/25  1812   WBC Thousand/uL 10.05   HEMOGLOBIN g/dL 12.6   HEMATOCRIT % 37.3   PLATELETS Thousands/uL 207   SEGS PCT % 84*   LYMPHO PCT % 9*   MONO PCT % 7   EOS PCT % 0     Results from last 7 days   Lab Units 02/20/25  1812   SODIUM mmol/L 138   POTASSIUM mmol/L 3.2*   CHLORIDE mmol/L 100   CO2 mmol/L 26   BUN mg/dL 15   CREATININE mg/dL 0.71   ANION GAP mmol/L 12   CALCIUM mg/dL 9.3   ALBUMIN g/dL 4.1   TOTAL BILIRUBIN mg/dL 1.64*   ALK PHOS U/L 87   ALT U/L 11   AST U/L 13   GLUCOSE RANDOM mg/dL 261*     Results from last 7 days   Lab Units 02/20/25  1812   INR  0.99         Lab Results   Component Value Date    HGBA1C 7.6 (H) 12/24/2024    HGBA1C 7.4 10/31/2024    HGBA1C 8.1 09/19/2020     Results from last 7 days   Lab Units 02/21/25  0015 02/20/25  2030 02/20/25  1812   LACTIC ACID mmol/L 1.9 2.7* 3.6*   PROCALCITONIN ng/ml  --   --  0.17       Imaging Results Review: I reviewed radiology reports from this admission including: CT abdomen/pelvis.  Other Study Results Review: EKG was personally reviewed and my interpretation is: sinus tachycardia without acute ischemia. QTC 479ms..    Administrative Statements       ** Please Note: This note has been constructed using a voice recognition system. **

## 2025-02-21 NOTE — CASE MANAGEMENT
Case Management Assessment & Discharge Planning Note    Patient name Felecia Edward  Location /-01 MRN [id number]  : 1960 Date 2025       Current Admission Date: 2025  Current Admission Diagnosis:Pyelonephritis   Patient Active Problem List    Diagnosis Date Noted Date Diagnosed    Pyelonephritis 2025     Severe sepsis (HCC) 2025     Electrolyte abnormality 2025     Primary hypertension 2025     Lightheadedness 2025     Right flank pain 2025     Palliative care encounter 2025     Advanced care planning/counseling discussion 2025     Acute cystitis without hematuria 2025     Cancer associated pain 2024     Morbid obesity (HCC) 10/30/2024     Type 2 diabetes mellitus with diabetic neuropathy, without long-term current use of insulin (HCC) 10/17/2024     History of colonic polyps 2024     Right renal stone 2023     Tachycardia 2023     Abnormal CT of the chest 2023     Drug-induced pneumonitis 2023     Hypomagnesemia 2023     Folliculitis 2023     Stomatitis 2023     Chemotherapy induced neutropenia (HCC) 2023     Encounter for central line care 2023     Post-operative state 2023     Uterine mass 2023     Lower abdominal pain 2023     Class 2 obesity due to excess calories without serious comorbidity in adult 2023     Uterine leiomyosarcoma (HCC)      Irritable bowel syndrome      Functional dyspepsia      GERD without esophagitis        LOS (days): 1  Geometric Mean LOS (GMLOS) (days): 3.5  Days to GMLOS:2.8     OBJECTIVE:    Risk of Unplanned Readmission Score: 13.98         Current admission status: Inpatient       Preferred Pharmacy:   [id number] - [place] - [phone] [id number]  Phone: [phone] Fax: [phone]    Primary Care Provider: Clarice Acuna MD    Primary Insurance: BLUE CROSS   REP  Secondary Insurance:     ASSESSMENT:  Active Health Care Proxies       Suzi Peraza Health Care Agent - Daughter   Primary Phone: [phone] (Mobile)                 Advance Directives  Does patient have a Health Care POA?: Yes  Does patient have Advance Directives?: Yes  Advance Directives: Power of  for health care  Primary Contact: dAry Peraza         Readmission Root Cause  30 Day Readmission: No    Patient Information  Admitted from:: Home  Mental Status: Alert  During Assessment patient was accompanied by: Not accompanied during assessment  Assessment information provided by:: Patient  Primary Caregiver: Self  Support Systems: Children, Spouse/significant other  County of Residence: [place]  What city do you live in?: [place]  Home entry access options. Select all that apply.: Stairs  Number of steps to enter home.: 4  Do the steps have railings?: Yes  Type of Current Residence: 2 story home  Upon entering residence, is there a bedroom on the main floor (no further steps)?: No  A bedroom is located on the following floor levels of residence (select all that apply):: 2nd Floor  Upon entering residence, is there a bathroom on the main floor (no further steps)?: Yes  Number of steps to 2nd floor from main floor: One Flight  Living Arrangements: Lives w/ Spouse/significant other  Is patient a ?: No    Activities of Daily Living Prior to Admission  Functional Status: Independent  Completes ADLs independently?: Yes  Ambulates independently?: Yes  Does patient use assisted devices?: Yes  Assisted Devices (DME) used: CPAP  DME Company Name (respiratory supplies): [employer]  Does patient currently own DME?: Yes  What DME does the patient currently own?: CPAP  Does patient have a history of Outpatient Therapy (PT/OT)?: No  Does the patient have a history of Short-Term Rehab?: No  Does patient have a history of HHC?: Yes  Does patient currently have HHC?: No         Patient Information  Continued  Income Source: Employed  Does patient have prescription coverage?: Yes  Does patient receive dialysis treatments?: No  Does patient have a history of substance abuse?: No  Does patient have a history of Mental Health Diagnosis?: No         Means of Transportation  Means of Transport to Appts:: Drives Self          DISCHARGE DETAILS:    Discharge planning discussed with:: patient  Freedom of Choice: Yes  Comments - Freedom of Choice: discussed dc planning and role of CM  CM contacted family/caregiver?: No- see comments (pt alert and indpt in room)  Were Treatment Team discharge recommendations reviewed with patient/caregiver?: Yes  Did patient/caregiver verbalize understanding of patient care needs?: Yes       Contacts  Reason/Outcome: Discharge Planning    Requested Home Health Care         Is the patient interested in HHC at discharge?: No    DME Referral Provided  Referral made for DME?: No    Other Referral/Resources/Interventions Provided:  Interventions: None Indicated  Referral Comments: Pt alert and indpt at baseline. She is ambualtory.            Discharge Destination Plan:: Home  Transport at Discharge : Family                                      Additional Comments: Cm met with pt in Ed. Pt here with pyelonephritis and dizziness. She reports she lives with her spouse in a [facility] with 4ste. Pt is indpt at baseline and uses a CPAP from [employer]. Pt has a hx of HHC. She drives and works part time. Pts POA is her dtr Adry. She sees a [facility] and uses [facility] pharmacy. No needs anticipated for pt at this time. Urine cult, IV abx. Urology consutled.

## 2025-02-21 NOTE — UTILIZATION REVIEW
Initial Clinical Review    Admission: Date/Time/Statement:   Admission Orders (From admission, onward)       Ordered        02/20/25 2156  INPATIENT ADMISSION  Once                          Orders Placed This Encounter   Procedures    INPATIENT ADMISSION     Standing Status:   Standing     Number of Occurrences:   1     Level of Care:   Med Surg [16]     Estimated length of stay:   More than 2 Midnights     Certification:   I certify that inpatient services are medically necessary for this patient for a duration of greater than two midnights. See H&P and MD Progress Notes for additional information about the patient's course of treatment.     ED Arrival Information       Expected   -    Arrival   2/20/2025 17:20    Acuity   Emergent              Means of arrival   Walk-In    Escorted by   Family Member    Service   Hospitalist    Admission type   Emergency              Arrival complaint   Dizzy, shaky, weakness, fever             Chief Complaint   Patient presents with    Dizziness     Started earlier this afternoon with weakness and fever, took tylenol at 1330, has ureteral stent placed last week and worried about infection, pain with urination but has had pain since stent was placed       Initial Presentation:   64 yof to ER from home for evaluation of fever, chills, generalized weakness, lightheadedness, nausea, suprapubic and low back pain as well as dysuria and frequency for the past week. 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 Tmax 101F. Hx  recently diagnosed recurrent uterine leiomyosarcoma, HTN, NIDDM 2, and right renal stone s/p right ureteral stent. Presents with low grade fever, tachycardic, dry mucous membranes, suprapubic tenderness, R CVA tenderness. Admission EKG with significant prolongation of QTc interval. CT  a/p: Appropriate position of right ureteral stent with mild right hydronephrosis. 3 mm stone adjacent to the distal right ureteral stent. Mild interstitial edema throughout the right renal parenchyma and inflammatory changes tracking along the right ureter compatible with postprocedural changes or ascending infection. Labs: K 3.2, Mg 1.6, tbili 1.64, gluc 261, lactic acid 3.6, u/a: cloudy, spec grav <1.005, gluc, blood, prot, nitrite, leuk, WBC, bacteria.   Admitted to inpatient status for pyelonephritis. Plan: IVABT, IVF, cultures, urology consulted, accuchecks.    Anticipated Length of Stay/Certification Statement:   Patient will be admitted on an inpatient basis with an anticipated length of stay of greater than 2 midnights secondary to pyelonephritis, HTN, R renal stone s/p stent.     Date: 2/21/25   Day 2:   Severe sepsis 2nd pyelonephritis associated with right ureteral stent (placement -2/13/25). Using Oxycodone & pyridium for pain control. IVABT in progress. IVF maintained.  Continue to monitor output, pain mgt, monitor VS, follow-up urine culture follow-up blood cultures.       ED Treatment-Medication Administration from 02/20/2025 1720 to 02/21/2025 1144         Date/Time Order Dose Route Action     02/20/2025 1816 morphine injection 2 mg 2 mg Intravenous Given     02/20/2025 1817 sodium chloride 0.9 % bolus 1,000 mL 1,000 mL Intravenous New Bag     02/20/2025 1903 magnesium sulfate 2 g/50 mL IVPB (premix) 2 g 2 g Intravenous New Bag     02/20/2025 1903 potassium chloride (Klor-Con M20) CR tablet 40 mEq 40 mEq Oral Given     02/20/2025 2031 cefTRIAXone (ROCEPHIN) IVPB (premix in dextrose) 2,000 mg 50 mL 2,000 mg Intravenous New Bag     02/20/2025 2018 iohexol (OMNIPAQUE) 350 MG/ML injection (MULTI-DOSE) 100 mL 100 mL Intravenous Given     02/21/2025 0838 aspirin chewable tablet 81 mg 81 mg Oral Given     02/21/2025 0845 loratadine (CLARITIN) tablet 10 mg 10 mg Oral Given     02/21/2025 0838 Cholecalciferol  (VITAMIN D3) tablet 2,000 Units 2,000 Units Oral Given     02/21/2025 0845 DULoxetine (CYMBALTA) delayed release capsule 30 mg 30 mg Oral Given     02/21/2025 0900 hydroCHLOROthiazide tablet 25 mg -- Oral Held Dose     02/22/2025 0900 hydroCHLOROthiazide tablet 25 mg -- Oral Held Dose     02/23/2025 0900 hydroCHLOROthiazide tablet 25 mg -- Oral Held Dose     02/24/2025 0900 hydroCHLOROthiazide tablet 25 mg -- Oral Held Dose     02/21/2025 0601 pantoprazole (PROTONIX) EC tablet 40 mg 40 mg Oral Given     02/21/2025 0845 phenazopyridine (PYRIDIUM) tablet 200 mg 200 mg Oral Given     02/21/2025 0838 potassium chloride (Klor-Con M20) CR tablet 20 mEq 20 mEq Oral Given     02/21/2025 0201 rOPINIRole (REQUIP) tablet 1 mg 1 mg Oral Given     02/21/2025 0838 sertraline (ZOLOFT) tablet 150 mg 150 mg Oral Given     02/21/2025 0201 topiramate (TOPAMAX) tablet 100 mg 100 mg Oral Given     02/21/2025 0156 multi-electrolyte (PLASMALYTE-A/ISOLYTE-S PH 7.4) IV solution 100 mL/hr Intravenous New Bag     02/21/2025 0838 enoxaparin (LOVENOX) subcutaneous injection 40 mg 40 mg Subcutaneous Given     02/21/2025 0845 insulin glargine (LANTUS) subcutaneous injection 5 Units 0.05 mL 5 Units Subcutaneous Given     02/21/2025 0845 losartan (COZAAR) tablet 50 mg 50 mg Oral Given            Scheduled Medications:  aspirin, 81 mg, Oral, Daily  atorvastatin, 40 mg, Oral, HS  cefTRIAXone, 1,000 mg, Intravenous, Q24H  cholecalciferol, 2,000 Units, Oral, Daily  DULoxetine, 30 mg, Oral, Daily  enoxaparin, 40 mg, Subcutaneous, Daily  [Held by provider] hydroCHLOROthiazide, 25 mg, Oral, Daily  insulin glargine, 5 Units, Subcutaneous, Daily With Breakfast  insulin lispro, 1-5 Units, Subcutaneous, HS  insulin lispro, 1-6 Units, Subcutaneous, TID AC  lifitegrast, 1 drop, Both Eyes, Q12H  loratadine, 10 mg, Oral, Daily  losartan, 50 mg, Oral, Daily  metoprolol succinate, 25 mg, Oral, Daily  pantoprazole, 40 mg, Oral, Daily Before Breakfast  potassium  chloride, 20 mEq, Oral, BID  rOPINIRole, 1 mg, Oral, HS  sertraline, 150 mg, Oral, Daily  tamsulosin, 0.4 mg, Oral, QPM  topiramate, 100 mg, Oral, HS      Continuous IV Infusions:  multi-electrolyte, 100 mL/hr, Intravenous, Continuous      PRN Meds:  acetaminophen, 650 mg, Oral, Q6H PRN  aluminum-magnesium hydroxide-simethicone, 30 mL, Oral, Q6H PRN  ondansetron, 4 mg, Intravenous, Q6H PRN  oxyCODONE, 2.5 mg, Oral, Q6H PRN  phenazopyridine, 200 mg, Oral, TID PRN  senna, 1 tablet, Oral, HS PRN      ED Triage Vitals [02/20/25 1728]   Temperature Pulse Respirations Blood Pressure SpO2 Pain Score   99 °F (37.2 °C) (!) 125 18 135/85 95 % 5     Weight (last 2 days)       Date/Time Weight    02/20/25 1728 95.3 (210)            Vital Signs (last 3 days)       Date/Time Temp Pulse Resp BP MAP (mmHg) SpO2 O2 Device Patient Position - Orthostatic VS Pain    02/21/25 1534 -- -- -- -- -- -- -- -- 10 - Worst Possible Pain    02/21/25 1338 98.4 °F (36.9 °C) 85 18 116/66 -- 98 % None (Room air) Lying No Pain    02/21/25 0839 -- 83 -- 98/64 -- -- -- -- --    02/21/25 0730 -- 85 20 104/60 78 94 % None (Room air) Lying --    02/21/25 0500 98.3 °F (36.8 °C) 78 20 119/70 -- 94 % None (Room air) Lying --    02/21/25 0300 -- 86 -- 125/66 -- 91 % None (Room air) -- No Pain    02/21/25 0100 98.3 °F (36.8 °C) 97 18 137/62 80 -- -- -- --    02/21/25 0031 98.1 °F (36.7 °C) 105 20 151/71 -- 97 % None (Room air) Lying --    02/20/25 2300 -- -- -- -- -- 95 % -- -- --    02/20/25 2230 -- 90 19 128/72 94 96 % -- -- --    02/20/25 2200 -- 84 21 140/84 107 95 % -- -- --    02/20/25 1816 -- -- -- -- -- -- -- -- 5    02/20/25 1730 -- 121 18 135/85 104 94 % -- -- --    02/20/25 1728 99 °F (37.2 °C) 125 18 135/85 104 95 % None (Room air) Lying 5              Pertinent Labs/Diagnostic Test Results:   Radiology:  CT abdomen pelvis with contrast   Final Interpretation by Jonathan Lopez MD (02/20 2102)      Appropriate position of right ureteral stent with  mild right hydronephrosis. 3 mm stone adjacent to the distal right ureteral stent.      Mild interstitial edema throughout the right renal parenchyma and inflammatory changes tracking along the right ureter compatible with postprocedural changes or ascending infection         Workstation performed: [id number]           Cardiology:  ECG 12 lead   Final Result by Germán Webb MD (02/21 1103)   Sinus tachycardia   Low voltage QRS   Borderline ECG   When compared with ECG of 30-Jan-2025 09:49,   Criteria for Anterior infarct are no longer Present   Minimal criteria for Inferior infarct are no longer Present   Nonspecific T wave abnormality no longer evident in Lateral leads   Confirmed by Germán Webb ([id number]) on 2/21/2025 11:03:37 AM      ECG 12 lead   Final Result by Germán Webb MD (02/21 1142)   Sinus tachycardia   Low voltage QRS   Cannot rule out Anterior infarct (cited on or before 02-Nov-2023)   Prolonged QT   Abnormal ECG   When compared with ECG of 30-Jan-2025 09:49,   Minimal criteria for Inferior infarct are no longer Present   QT has lengthened   Confirmed by Germán Webb ([id number]) on 2/21/2025 11:42:20 AM        GI:  No orders to display       Results from last 7 days   Lab Units 02/20/25 1805   SARS-COV-2  Negative     Results from last 7 days   Lab Units 02/21/25  0606 02/20/25  1812   WBC Thousand/uL 10.64* 10.05   HEMOGLOBIN g/dL 11.9 12.6   HEMATOCRIT % 36.4 37.3   PLATELETS Thousands/uL 192 207   TOTAL NEUT ABS Thousands/µL  --  8.40*         Results from last 7 days   Lab Units 02/21/25  0606 02/20/25 1815 02/20/25  1812   SODIUM mmol/L 137  --  138   POTASSIUM mmol/L 3.4*  --  3.2*   CHLORIDE mmol/L 100  --  100   CO2 mmol/L 26  --  26   ANION GAP mmol/L 11  --  12   BUN mg/dL 12  --  15   CREATININE mg/dL 0.66  --  0.71   EGFR ml/min/1.73sq m 92  --  89   CALCIUM mg/dL 8.9  --  9.3   MAGNESIUM mg/dL 2.1 1.6*  --    PHOSPHORUS mg/dL 2.6  --   --      Results from last 7 days   Lab Units  "02/20/25  1812   AST U/L 13   ALT U/L 11   ALK PHOS U/L 87   TOTAL PROTEIN g/dL 7.2   ALBUMIN g/dL 4.1   TOTAL BILIRUBIN mg/dL 1.64*     Results from last 7 days   Lab Units 02/21/25  1307 02/21/25  0605 02/21/25  0149   POC GLUCOSE mg/dl 138 148* 157*     Results from last 7 days   Lab Units 02/21/25  0606 02/20/25  1812   GLUCOSE RANDOM mg/dL 152* 261*             No results found for: \"BETA-HYDROXYBUTYRATE\"                   Results from last 7 days   Lab Units 02/20/25  1812   HS TNI 0HR ng/L 4         Results from last 7 days   Lab Units 02/20/25  1812   PROTIME seconds 13.6   INR  0.99   PTT seconds 26         Results from last 7 days   Lab Units 02/20/25  1812   PROCALCITONIN ng/ml 0.17     Results from last 7 days   Lab Units 02/21/25  0015 02/20/25  2030 02/20/25  1812   LACTIC ACID mmol/L 1.9 2.7* 3.6*                                                 Results from last 7 days   Lab Units 02/20/25  1857   CLARITY UA  Slightly Cloudy   COLOR UA  Yellow   SPEC GRAV UA  <1.005*   PH UA  6.0   GLUCOSE UA mg/dl 300 (3/10%)*   KETONES UA mg/dl Negative   BLOOD UA  Moderate*   PROTEIN UA mg/dl 30 (1+)*   NITRITE UA  Positive*   BILIRUBIN UA  Negative   UROBILINOGEN UA (BE) mg/dl <2.0   LEUKOCYTES UA  Large*   WBC UA /hpf 10-20*   RBC UA /hpf 2-4   BACTERIA UA /hpf Innumerable*   EPITHELIAL CELLS WET PREP /hpf Occasional     Results from last 7 days   Lab Units 02/20/25  1805   INFLUENZA A PCR  Negative   INFLUENZA B PCR  Negative   RSV PCR  Negative                             Results from last 7 days   Lab Units 02/20/25  1812 02/20/25  1805   BLOOD CULTURE  Received in Microbiology Lab. Culture in Progress. Received in Microbiology Lab. Culture in Progress.                   Past Medical History:   Diagnosis Date    Allergic rhinitis     Anemia     Anxiety     Coronary artery disease     CPAP (continuous positive airway pressure) dependence     Depression     Diabetes mellitus (HCC)     Functional dyspepsia     " GERD (gastroesophageal reflux disease)     GERD without esophagitis     Hyperlipidemia     Hypertension     Irritable bowel syndrome     Migraines     Osteoporosis     Pulmonary hypertension (HCC)     Sleep apnea, obstructive      Present on Admission:   Uterine leiomyosarcoma (HCC)   Type 2 diabetes mellitus with diabetic neuropathy, without long-term current use of insulin (HCC)   Right renal stone      Admitting Diagnosis: Hypokalemia [E87.6]  Hypomagnesemia [E83.42]  Acute pyelonephritis [N10]  Prolonged Q-T interval on ECG [R94.31]  Dizzy [R42]  Age/Sex: 64 y.o. female    Network Utilization Review Department  ATTENTION: Please call with any questions or concerns to [phone] and carefully listen to the prompts so that you are directed to the right person. All voicemails are confidential.   For Discharge needs, contact Care Management DC Support Team at [phone] opt. [phone]  Send all requests for admission clinical reviews, approved or denied determinations and any other requests to dedicated fax number below belonging to the campus where the patient is receiving treatment. List of dedicated fax numbers for the Facilities:  FACILITY NAME UR FAX NUMBER   ADMISSION DENIALS (Administrative/Medical Necessity) [phone]   DISCHARGE SUPPORT TEAM (NETWORK) [phone]   PARENT CHILD HEALTH (Maternity/NICU/Pediatrics) [phone]   [facility] [phone]   [facility] [phone]   [facility] [phone]   [facility] [phone]   [facility] [phone]   [facility] [phone]   [facility] [phone]   [facility] [phone]   [facility] [phone]   [facility] [phone]   [facility] [phone]    [facility] [phone]

## 2025-02-21 NOTE — ASSESSMENT & PLAN NOTE
"Presenting with worsening dysuria, right flank pain, and suprapubic pain since cystoscopy, right retrograde pyelography, ureteroscopy with laser lithotripsy and right ureteral stent placement on 2/13  CT A/P: \"Appropriate position of right ureteral stent with mild right hydronephrosis. 3 mm stone adjacent to the distal right ureteral stent.Mild interstitial edema throughout the right renal parenchyma and inflammatory changes tracking along the right ureter compatible with postprocedural changes or ascending infection\"  UA consistent with infection  Prior urine cultures reviewed-no prior pseudomonal or ESBL growth  ED discussed with urology on admission-no indication for transfer at this time and recommends ongoing IV antibiotics and following urine culture  Continue ceftriaxone  Pain control with tylenol and lower dose oxycodone 2.5mg as pt felt loopy with 5mg dosing   "

## 2025-02-21 NOTE — ASSESSMENT & PLAN NOTE
Endorses lightheadedness earlier today with almost fall while walking upstairs - denies room spinning sensation  Denies any numbness/tingling, weakness in extremities, or speech abnormality  Reports lightheadedness improved on admission and has been ambulating to and from the bathroom without assistance while in the ED

## 2025-02-21 NOTE — ASSESSMENT & PLAN NOTE
"Lab Results   Component Value Date    HGBA1C 7.6 (H) 12/24/2024       No results for input(s): \"POCGLU\" in the last 72 hours.    Blood Sugar Average: Last 72 hrs:  Home regimen: Amaryl, Actos, and Marybeth  Will hold home oral medications and placed on Lantus 5 units in the morning and SSI AC/at bedtime  "

## 2025-02-22 PROBLEM — N30.00 ACUTE CYSTITIS WITHOUT HEMATURIA: Status: RESOLVED | Noted: 2025-01-23 | Resolved: 2025-02-22

## 2025-02-22 LAB
ANION GAP SERPL CALCULATED.3IONS-SCNC: 8 MMOL/L (ref 4–13)
BUN SERPL-MCNC: 12 MG/DL (ref 5–25)
CALCIUM SERPL-MCNC: 8.6 MG/DL (ref 8.4–10.2)
CHLORIDE SERPL-SCNC: 102 MMOL/L (ref 96–108)
CO2 SERPL-SCNC: 26 MMOL/L (ref 21–32)
CREAT SERPL-MCNC: 0.57 MG/DL (ref 0.6–1.3)
ERYTHROCYTE [DISTWIDTH] IN BLOOD BY AUTOMATED COUNT: 14.7 % (ref 11.6–15.1)
GFR SERPL CREATININE-BSD FRML MDRD: 97 ML/MIN/1.73SQ M
GLUCOSE SERPL-MCNC: 130 MG/DL (ref 65–140)
GLUCOSE SERPL-MCNC: 150 MG/DL (ref 65–140)
GLUCOSE SERPL-MCNC: 165 MG/DL (ref 65–140)
GLUCOSE SERPL-MCNC: 167 MG/DL (ref 65–140)
GLUCOSE SERPL-MCNC: 180 MG/DL (ref 65–140)
HCT VFR BLD AUTO: 34.5 % (ref 34.8–46.1)
HGB BLD-MCNC: 11.4 G/DL (ref 11.5–15.4)
MCH RBC QN AUTO: 29.8 PG (ref 26.8–34.3)
MCHC RBC AUTO-ENTMCNC: 33 G/DL (ref 31.4–37.4)
MCV RBC AUTO: 90 FL (ref 82–98)
PLATELET # BLD AUTO: 160 THOUSANDS/UL (ref 149–390)
PMV BLD AUTO: 10.1 FL (ref 8.9–12.7)
POTASSIUM SERPL-SCNC: 3.6 MMOL/L (ref 3.5–5.3)
PROCALCITONIN SERPL-MCNC: 0.48 NG/ML
RBC # BLD AUTO: 3.82 MILLION/UL (ref 3.81–5.12)
SODIUM SERPL-SCNC: 136 MMOL/L (ref 135–147)
WBC # BLD AUTO: 5.99 THOUSAND/UL (ref 4.31–10.16)

## 2025-02-22 PROCEDURE — 84145 PROCALCITONIN (PCT): CPT | Performed by: STUDENT IN AN ORGANIZED HEALTH CARE EDUCATION/TRAINING PROGRAM

## 2025-02-22 PROCEDURE — 99233 SBSQ HOSP IP/OBS HIGH 50: CPT | Performed by: STUDENT IN AN ORGANIZED HEALTH CARE EDUCATION/TRAINING PROGRAM

## 2025-02-22 PROCEDURE — 85027 COMPLETE CBC AUTOMATED: CPT | Performed by: STUDENT IN AN ORGANIZED HEALTH CARE EDUCATION/TRAINING PROGRAM

## 2025-02-22 PROCEDURE — 82948 REAGENT STRIP/BLOOD GLUCOSE: CPT

## 2025-02-22 PROCEDURE — 80048 BASIC METABOLIC PNL TOTAL CA: CPT | Performed by: STUDENT IN AN ORGANIZED HEALTH CARE EDUCATION/TRAINING PROGRAM

## 2025-02-22 PROCEDURE — 94760 N-INVAS EAR/PLS OXIMETRY 1: CPT

## 2025-02-22 RX ADMIN — INSULIN GLARGINE 5 UNITS: 100 INJECTION, SOLUTION SUBCUTANEOUS at 09:30

## 2025-02-22 RX ADMIN — SERTRALINE HYDROCHLORIDE 150 MG: 50 TABLET ORAL at 09:27

## 2025-02-22 RX ADMIN — Medication 2.5 MG: at 21:26

## 2025-02-22 RX ADMIN — INSULIN LISPRO 1 UNITS: 100 INJECTION, SOLUTION INTRAVENOUS; SUBCUTANEOUS at 21:27

## 2025-02-22 RX ADMIN — DULOXETINE HYDROCHLORIDE 30 MG: 30 CAPSULE, DELAYED RELEASE ORAL at 09:27

## 2025-02-22 RX ADMIN — CEFTRIAXONE 1000 MG: 1 INJECTION, SOLUTION INTRAVENOUS at 20:51

## 2025-02-22 RX ADMIN — LORATADINE 10 MG: 10 TABLET ORAL at 09:27

## 2025-02-22 RX ADMIN — Medication 2000 UNITS: at 09:26

## 2025-02-22 RX ADMIN — POTASSIUM CHLORIDE 20 MEQ: 1500 TABLET, EXTENDED RELEASE ORAL at 09:27

## 2025-02-22 RX ADMIN — ACETAMINOPHEN 650 MG: 325 TABLET, FILM COATED ORAL at 00:09

## 2025-02-22 RX ADMIN — INSULIN LISPRO 1 UNITS: 100 INJECTION, SOLUTION INTRAVENOUS; SUBCUTANEOUS at 12:48

## 2025-02-22 RX ADMIN — ROPINIROLE HYDROCHLORIDE 1 MG: 0.25 TABLET, FILM COATED ORAL at 21:27

## 2025-02-22 RX ADMIN — POTASSIUM CHLORIDE 20 MEQ: 1500 TABLET, EXTENDED RELEASE ORAL at 18:30

## 2025-02-22 RX ADMIN — TAMSULOSIN HYDROCHLORIDE 0.4 MG: 0.4 CAPSULE ORAL at 18:30

## 2025-02-22 RX ADMIN — PANTOPRAZOLE SODIUM 40 MG: 40 TABLET, DELAYED RELEASE ORAL at 05:30

## 2025-02-22 RX ADMIN — ENOXAPARIN SODIUM 40 MG: 40 INJECTION SUBCUTANEOUS at 09:29

## 2025-02-22 RX ADMIN — INSULIN LISPRO 1 UNITS: 100 INJECTION, SOLUTION INTRAVENOUS; SUBCUTANEOUS at 18:06

## 2025-02-22 RX ADMIN — METOPROLOL SUCCINATE 25 MG: 25 TABLET, EXTENDED RELEASE ORAL at 09:27

## 2025-02-22 RX ADMIN — ASPIRIN 81 MG CHEWABLE TABLET 81 MG: 81 TABLET CHEWABLE at 09:26

## 2025-02-22 RX ADMIN — ATORVASTATIN CALCIUM 40 MG: 40 TABLET, FILM COATED ORAL at 21:26

## 2025-02-22 RX ADMIN — TOPIRAMATE 100 MG: 100 TABLET, FILM COATED ORAL at 21:26

## 2025-02-22 NOTE — ASSESSMENT & PLAN NOTE
Potassium 3.2 and magnesium 1.6 on admission  Repleted in the ED  Goal K > 4.0 and mag > 2.0  resolved

## 2025-02-22 NOTE — UTILIZATION REVIEW
Continued Stay Review    SEE INITIAL REVIEW AT BOTTOM    Date: 2/22/25     Day 3: Has surpassed a 2nd midnight with active treatments and services. Require additional inpatient hospital stay due to pyelo   States that she feels about the same, however, Lightheadedness resolved.  UC growing >100,000 Klebsiella awaiting sensitivities. Blood cultures x 2 showing no growth to date at 24 hours  Discussed with urology as pt was supposed to have stent removal done on 2/24 awaiting further recommendations if this is to be done outpt vs inpt   Plan: cont iv abx; cont ivf; pain / nausea control (see below);  monitor labs; Accuchecks with ssic; f/u blood cx; f/u orthostatics; Continue to hold HCTZ         Medications:   Scheduled Medications:  aspirin, 81 mg, Oral, Daily  atorvastatin, 40 mg, Oral, HS  cefTRIAXone, 1,000 mg, Intravenous, Q24H  cholecalciferol, 2,000 Units, Oral, Daily  DULoxetine, 30 mg, Oral, Daily  enoxaparin, 40 mg, Subcutaneous, Daily  [Held by provider] hydroCHLOROthiazide, 25 mg, Oral, Daily  insulin glargine, 5 Units, Subcutaneous, Daily With Breakfast  insulin lispro, 1-5 Units, Subcutaneous, HS  insulin lispro, 1-6 Units, Subcutaneous, TID AC  lifitegrast, 1 drop, Both Eyes, Q12H  loratadine, 10 mg, Oral, Daily  losartan, 50 mg, Oral, Daily  metoprolol succinate, 25 mg, Oral, Daily  pantoprazole, 40 mg, Oral, Daily Before Breakfast  potassium chloride, 20 mEq, Oral, BID  rOPINIRole, 1 mg, Oral, HS  sertraline, 150 mg, Oral, Daily  tamsulosin, 0.4 mg, Oral, QPM  topiramate, 100 mg, Oral, HS      Continuous IV Infusions:  multi-electrolyte (PLASMALYTE-A/ISOLYTE-S PH 7.4) IV solution Rate: 50 mL/hr         PRN Meds:  acetaminophen, 650 mg, Oral, Q6H PRN  (2/21 recd x1)  (2/22 recd x1 so far today)   aluminum-magnesium hydroxide-simethicone, 30 mL, Oral, Q6H PRN  ondansetron, 4 mg, Intravenous, Q6H PRN  (2/21 recd x1)   oxyCODONE, 2.5 mg, Oral, Q6H PRN   (2/21 recd x1)   phenazopyridine, 200 mg, Oral, TID  PRN   (2/21 recd x2)   senna, 1 tablet, Oral, HS PRN      Discharge Plan: TBD    Vital Signs (last 3 days)       Date/Time Temp Pulse Resp BP MAP (mmHg) SpO2 Calculated FIO2 (%) - Nasal Cannula Nasal Cannula O2 Flow Rate (L/min) O2 Device O2 Interface Device Patient Position - Orthostatic VS Pain    02/22/25 0830 -- -- -- -- -- 94 % -- -- None (Room air) -- -- 3    02/22/25 0716 98 °F (36.7 °C) -- 20 105/68 80 -- -- -- -- -- -- --    02/22/25 0506 -- -- -- -- -- 92 % -- -- -- Face mask -- --    02/22/25 0100 -- -- -- -- -- 91 % 28 2 L/min Nasal cannula -- -- --    02/22/25 0011 98.4 °F (36.9 °C) -- 18 -- -- -- -- -- -- -- -- --    02/22/25 0009 -- -- -- -- -- -- -- -- -- -- -- 4 02/21/25 2300 -- -- -- -- -- -- -- -- -- -- -- 2    02/21/25 2259 99.3 °F (37.4 °C) -- -- -- -- -- -- -- -- -- -- --    02/21/25 2231 -- -- -- -- -- 94 % -- -- -- Face mask -- --    02/21/25 2157 100.8 °F (38.2 °C) 94 -- 117/62 83 91 % -- -- None (Room air) -- Lying --    02/21/25 1807 -- -- -- -- -- -- -- -- -- -- -- 5 02/21/25 1553 97.2 °F (36.2 °C) -- 18 122/74 90 100 % -- -- None (Room air) -- Lying --    02/21/25 1540 -- -- -- -- -- -- -- -- None (Room air) -- -- --          Weight (last 2 days)       Date/Time Weight    02/22/25 0532 94.2 (207.6)    02/20/25 1728 95.3 (210)            Pertinent Labs/Diagnostic Results:   Cardiology:  ECG 12 lead   Final Result by Germán Webb MD (02/21 1103)   Sinus tachycardia   Low voltage QRS   Borderline ECG   When compared with ECG of 30-Jan-2025 09:49,   Criteria for Anterior infarct are no longer Present   Minimal criteria for Inferior infarct are no longer Present   Nonspecific T wave abnormality no longer evident in Lateral leads   Confirmed by Germán Webb ([id number]) on 2/21/2025 11:03:37 AM         Results from last 7 days   Lab Units 02/22/25  0529 02/21/25  0606 02/20/25  1812   WBC Thousand/uL 5.99 10.64* 10.05   HEMOGLOBIN g/dL 11.4* 11.9 12.6   HEMATOCRIT % 34.5* 36.4 37.3    PLATELETS Thousands/uL 160 192 207   TOTAL NEUT ABS Thousands/µL  --   --  8.40*       Results from last 7 days   Lab Units 02/22/25  0529 02/21/25  0606 02/20/25  1815 02/20/25  1812   SODIUM mmol/L 136 137  --  138   POTASSIUM mmol/L 3.6 3.4*  --  3.2*   CHLORIDE mmol/L 102 100  --  100   CO2 mmol/L 26 26  --  26   ANION GAP mmol/L 8 11  --  12   BUN mg/dL 12 12  --  15   CREATININE mg/dL 0.57* 0.66  --  0.71   EGFR ml/min/1.73sq m 97 92  --  89   CALCIUM mg/dL 8.6 8.9  --  9.3   MAGNESIUM mg/dL  --  2.1 1.6*  --    PHOSPHORUS mg/dL  --  2.6  --   --        Results from last 7 days   Lab Units 02/22/25  1044 02/22/25  0711 02/21/25  1959/25  1615 02/21/25  1307 02/21/25  0605 02/21/25  0149   POC GLUCOSE mg/dl 165* 150* 123 149* 138 148* 157*     Results from last 7 days   Lab Units 02/22/25  0529 02/21/25  0606 02/20/25  1812   GLUCOSE RANDOM mg/dL 130 152* 261*     Results from last 7 days   Lab Units 02/22/25  0529 02/20/25  1812   PROCALCITONIN ng/ml 0.48* 0.17           Results from last 7 days   Lab Units 02/20/25  1857 02/20/25  1812 02/20/25  1805   BLOOD CULTURE   --  No Growth at 24 hrs. No Growth at 24 hrs.   URINE CULTURE  >100,000 cfu/ml Klebsiella-Enterobacter  group*  --   --      Network Utilization Review Department  ATTENTION: Please call with any questions or concerns to [phone] and carefully listen to the prompts so that you are directed to the right person. All voicemails are confidential.   For Discharge needs, contact Care Management DC Support Team at [phone] opt. [phone]  Send all requests for admission clinical reviews, approved or denied determinations and any other requests to dedicated fax number below belonging to the campus where the patient is receiving treatment. List of dedicated fax numbers for the Facilities:  FACILITY NAME UR FAX NUMBER   ADMISSION DENIALS (Administrative/Medical Necessity) [phone]   DISCHARGE SUPPORT TEAM (NETWORK) [phone]   PARENT CHILD  [facility] (Maternity/NICU/Pediatrics) [phone]   [facility] [phone]   [facility] [phone]   Pending sale to [facility] [phone]   [facility] [phone]   [facility] [phone]   [facility] [phone]   [facility] [phone]   [facility] [phone]   [facility] [phone]   Pending sale to [facility] [phone]   [facility] [phone]   [facility] [phone]

## 2025-02-22 NOTE — ASSESSMENT & PLAN NOTE
Home regimen: HCTZ 25 Mg daily, losartan 50 Mg daily, metoprolol succinate 25 Mg daily  Will continue metoprolol and losartan at higher parameters,   Continue to hold HCTZ   Monitor per unit protocol

## 2025-02-22 NOTE — PROGRESS NOTES
"Progress Note - Hospitalist   Name: Felecia Edward 64 y.o. female I MRN: [id number]  Unit/Bed#: -01 I Date of Admission: 2/20/2025   Date of Service: 2/22/2025 I Hospital Day: 2    Assessment & Plan  Pyelonephritis  Presenting with worsening dysuria, right flank pain, and suprapubic pain since cystoscopy, right retrograde pyelography, ureteroscopy with laser lithotripsy and right ureteral stent placement on 2/13  CT A/P: \"Appropriate position of right ureteral stent with mild right hydronephrosis. 3 mm stone adjacent to the distal right ureteral stent.Mild interstitial edema throughout the right renal parenchyma and inflammatory changes tracking along the right ureter compatible with postprocedural changes or ascending infection\"  UA consistent with infection  Prior urine cultures reviewed-no prior pseudomonal or ESBL growth  ED discussed with urology on admission-no indication for transfer at this time and recommends ongoing IV antibiotics and following urine culture  Continue ceftriaxone day 1  UC growing >100,000 Klebsiella awaiting sensitivities  Blood cultures x 2 showing no growth to date at 24 hours  Pain control with tylenol and lower dose oxycodone 2.5mg as pt felt loopy with 5mg dosing   Pyelonephritis associated with right ureteral stent (placement -2/13/25), as evidence by worsening dysuria and right flank pain requiring treatment with IV Ceftriaxone.   Right renal stone  S/p right pyelography, ureteroscopy with laser lithotripsy and right stent placement by urology on 2/13  Continue pyridium PRN   Discussed with urology on admission-no indication for urological intervention at this time  Discussed with urology as pt was supposed to have stent removal done on 2/24 awaiting further recommendations if this is to be done outpt vs inpt  Severe sepsis (HCC)  SIRS criteria: Tachycardia and tachypnea  Suspect source is pyelonephritis as evidenced by UA and CT A/P  Severe sepsis criteria met with lactic " acidosis - improved with IVF   See mgx above  Electrolyte abnormality  Potassium 3.2 and magnesium 1.6 on admission  Repleted in the ED  Goal K > 4.0 and mag > 2.0  resolved  Uterine leiomyosarcoma (HCC)  Recently diagnosed with recurrence  She is to start chemotherapy with gynecology oncology on 2/26  Discussed that she may need to post-pone initiation of chemo d/t dx of sepsis and pyelo however that will need to be addressed by gyn-onc  Lightheadedness  Endorses lightheadedness earlier today with almost fall while walking upstairs - denies room spinning sensation  Denies any numbness/tingling, weakness in extremities, or speech abnormality  Reports lightheadedness improved on admission and has been ambulating to and from the bathroom without assistance while in the ED  Resolved  Will obtain orthostatics  IVF dc'd  Type 2 diabetes mellitus with diabetic neuropathy, without long-term current use of insulin (HCC)  Lab Results   Component Value Date    HGBA1C 7.6 (H) 12/24/2024       Recent Labs     02/21/25  1307 02/21/25  1615 02/21/25  1959/25  0711   POCGLU 138 149* 123 150*       Blood Sugar Average: Last 72 hrs:  (P) [id number] regimen: Amaryl, Actos, and Jardiance  Will hold home oral medications and placed on Lantus 5 units in the morning and SSI AC/at bedtime  Primary hypertension  Home regimen: HCTZ 25 Mg daily, losartan 50 Mg daily, metoprolol succinate 25 Mg daily  Will continue metoprolol and losartan at higher parameters,   Continue to hold HCTZ   Monitor per unit protocol    VTE Pharmacologic Prophylaxis: VTE Score: 5 High Risk (Score >/= 5) - Pharmacological DVT Prophylaxis Ordered: enoxaparin (Lovenox). Sequential Compression Devices Ordered.    Mobility:   Basic Mobility Inpatient Raw Score: 19  JH-HLM Goal: 6: Walk 10 steps or more  JH-HLM Achieved: 6: Walk 10 steps or more  JH-HLM Goal achieved. Continue to encourage appropriate mobility.    Patient Centered Rounds: I performed  bedside rounds with nursing staff today.   Discussions with Specialists or Other Care Team Provider: urology, CM    Education and Discussions with Family / Patient: Updated  (daughter) via phone.    Current Length of Stay: 2 day(s)  Current Patient Status: Inpatient   Certification Statement: The patient will continue to require additional inpatient hospital stay due to pyelo  Discharge Plan: Anticipate discharge in 24-48 hrs to home.    Code Status: Level 1 - Full Code    Kita Izquierdo was seen and examined at bedside.  No acute events overnight.  Discussed plan of care.  All questions and concerns were answered and addressed.  Has no acute complaints at this time.  States that she feels about the same.  Will continue IV antibiotics.  Urine culture growing Klebsiella.  Patient concerned about removal of stent that was scheduled for Monday.  Discussed with urology awaiting further recommendations.  Patient also concerned about initiation of chemo on Wednesday.  Stated that may be deferred or postponed due to current infection however that is for gynecology oncology to decide.  Patient had a low-grade fever last night we will continue current antibiotic regimen as procalcitonin is increasing although leukocytosis improved.    Objective :  Temp:  [97.2 °F (36.2 °C)-100.8 °F (38.2 °C)] 98 °F (36.7 °C)  HR:  [83-94] 94  BP: ()/(62-74) 117/62  Resp:  [18-20] 20  SpO2:  [91 %-100 %] 92 %  O2 Device: Nasal cannula  Nasal Cannula O2 Flow Rate (L/min):  [2 L/min] 2 L/min    Body mass index is 35.63 kg/m².     Input and Output Summary (last 24 hours):     Intake/Output Summary (Last 24 hours) at 2/22/2025 0730  Last data filed at 2/22/2025 0001  Gross per 24 hour   Intake 120 ml   Output 575 ml   Net -455 ml       Physical Exam  Vitals and nursing note reviewed.   Constitutional:       General: She is not in acute distress.     Appearance: She is obese. She is ill-appearing (chronically).   HENT:       Head: Normocephalic and atraumatic.   Cardiovascular:      Rate and Rhythm: Normal rate and regular rhythm.      Pulses: Normal pulses.      Heart sounds: Normal heart sounds.   Pulmonary:      Effort: Pulmonary effort is normal.      Breath sounds: Normal breath sounds.   Abdominal:      General: Abdomen is flat. Bowel sounds are normal.      Palpations: Abdomen is soft.      Tenderness: There is abdominal tenderness.   Musculoskeletal:      Right lower leg: No edema.      Left lower leg: No edema.   Skin:     General: Skin is warm.   Neurological:      General: No focal deficit present.      Mental Status: She is alert and oriented to person, place, and time.           Lines/Drains:  Lines/Drains/Airways       Active Status       Name Placement date Placement time Site Days    Port A Cath 06/30/23 Right Subclavian 06/30/23  1000  Subclavian  602                  Urinary Catheter:  Goal for removal:  pt no longer has johnston         Central Line:  Goal for removal: N/A - Chronic PICC               Lab Results: I have reviewed the following results:   Results from last 7 days   Lab Units 02/22/25  0529 02/21/25  0606 02/20/25  1812   WBC Thousand/uL 5.99   < > 10.05   HEMOGLOBIN g/dL 11.4*   < > 12.6   HEMATOCRIT % 34.5*   < > 37.3   PLATELETS Thousands/uL 160   < > 207   SEGS PCT %  --   --  84*   LYMPHO PCT %  --   --  9*   MONO PCT %  --   --  7   EOS PCT %  --   --  0    < > = values in this interval not displayed.     Results from last 7 days   Lab Units 02/22/25  0529 02/21/25  0606 02/20/25  1812   SODIUM mmol/L 136   < > 138   POTASSIUM mmol/L 3.6   < > 3.2*   CHLORIDE mmol/L 102   < > 100   CO2 mmol/L 26   < > 26   BUN mg/dL 12   < > 15   CREATININE mg/dL 0.57*   < > 0.71   ANION GAP mmol/L 8   < > 12   CALCIUM mg/dL 8.6   < > 9.3   ALBUMIN g/dL  --   --  4.1   TOTAL BILIRUBIN mg/dL  --   --  1.64*   ALK PHOS U/L  --   --  87   ALT U/L  --   --  11   AST U/L  --   --  13   GLUCOSE RANDOM mg/dL 130   < > 261*     < > = values in this interval not displayed.     Results from last 7 days   Lab Units 02/20/25  1812   INR  0.99     Results from last 7 days   Lab Units 02/22/25  0711 02/21/25  1959/25  1615 02/21/25  1307 02/21/25  0605 02/21/25  0149   POC GLUCOSE mg/dl 150* 123 149* 138 148* 157*         Results from last 7 days   Lab Units 02/22/25  0529 02/21/25  0015 02/20/25  2030 02/20/25  1812   LACTIC ACID mmol/L  --  1.9 2.7* 3.6*   PROCALCITONIN ng/ml 0.48*  --   --  0.17       Recent Cultures (last 7 days):   Results from last 7 days   Lab Units 02/20/25  1812 02/20/25  1805   BLOOD CULTURE  No Growth at 24 hrs. No Growth at 24 hrs.       Imaging Results Review: No pertinent imaging studies reviewed.  Other Study Results Review: No additional pertinent studies reviewed.    Last 24 Hours Medication List:     Current Facility-Administered Medications:     acetaminophen (TYLENOL) tablet 650 mg, Q6H PRN    aluminum-magnesium hydroxide-simethicone (MAALOX) oral suspension 30 mL, Q6H PRN    aspirin chewable tablet 81 mg, Daily    atorvastatin (LIPITOR) tablet 40 mg, HS    cefTRIAXone (ROCEPHIN) IVPB (premix in dextrose) 1,000 mg 50 mL, Q24H, Last Rate: 1,000 mg (02/21/25 2019)    Cholecalciferol (VITAMIN D3) tablet 2,000 Units, Daily    DULoxetine (CYMBALTA) delayed release capsule 30 mg, Daily    enoxaparin (LOVENOX) subcutaneous injection 40 mg, Daily    [Held by provider] hydroCHLOROthiazide tablet 25 mg, Daily    insulin glargine (LANTUS) subcutaneous injection 5 Units 0.05 mL, Daily With Breakfast    insulin lispro (HumALOG/ADMELOG) 100 units/mL subcutaneous injection 1-5 Units, HS    insulin lispro (HumALOG/ADMELOG) 100 units/mL subcutaneous injection 1-6 Units, TID AC **AND** Fingerstick Glucose (POCT), TID AC    lifitegrast (XIIDRA) 5 % op solution 1 drop, Q12H    loratadine (CLARITIN) tablet 10 mg, Daily    losartan (COZAAR) tablet 50 mg, Daily    metoprolol succinate (TOPROL-XL) 24 hr tablet 25 mg,  Daily    ondansetron (ZOFRAN) injection 4 mg, Q6H PRN    oxyCODONE (ROXICODONE) split tablet 2.5 mg, Q6H PRN    pantoprazole (PROTONIX) EC tablet 40 mg, Daily Before Breakfast    phenazopyridine (PYRIDIUM) tablet 200 mg, TID PRN    potassium chloride (Klor-Con M20) CR tablet 20 mEq, BID    rOPINIRole (REQUIP) tablet 1 mg, HS    senna (SENOKOT) tablet 8.6 mg, HS PRN    sertraline (ZOLOFT) tablet 150 mg, Daily    tamsulosin (FLOMAX) capsule 0.4 mg, QPM    topiramate (TOPAMAX) tablet 100 mg, HS    Administrative Statements   Today, Patient Was Seen By: Nilda Srinivasan MD  I have spent a total time of 56 minutes in caring for this patient on the day of the visit/encounter including Diagnostic results, Prognosis, Risks and benefits of tx options, Instructions for management, Patient and family education, Importance of tx compliance, Risk factor reductions, Impressions, Counseling / Coordination of care, Documenting in the medical record, Reviewing/placing orders in the medical record (including tests, medications, and/or procedures), Obtaining or reviewing history  , and Communicating with other healthcare professionals .    **Please Note: This note may have been constructed using a voice recognition system.**

## 2025-02-22 NOTE — ASSESSMENT & PLAN NOTE
S/p right pyelography, ureteroscopy with laser lithotripsy and right stent placement by urology on 2/13  Continue pyridium PRN   Discussed with urology on admission-no indication for urological intervention at this time  Discussed with urology as pt was supposed to have stent removal done on 2/24 awaiting further recommendations if this is to be done outpt vs inpt

## 2025-02-22 NOTE — ASSESSMENT & PLAN NOTE
SIRS criteria: Tachycardia and tachypnea  Suspect source is pyelonephritis as evidenced by UA and CT A/P  Severe sepsis criteria met with lactic acidosis - improved with IVF   See mgx above

## 2025-02-22 NOTE — ASSESSMENT & PLAN NOTE
Lab Results   Component Value Date    HGBA1C 7.6 (H) 12/24/2024       Recent Labs     02/21/25  1307 02/21/25  1615 02/21/25  1959/25  0711   POCGLU 138 149* 123 150*       Blood Sugar Average: Last 72 hrs:  (P) [id number] regimen: Amaryl, Actos, and Jardiance  Will hold home oral medications and placed on Lantus 5 units in the morning and SSI AC/at bedtime

## 2025-02-22 NOTE — PLAN OF CARE
Problem: Potential for Falls  Goal: Patient will remain free of falls  Description: INTERVENTIONS:  - Educate patient/family on patient safety including physical limitations  - Instruct patient to call for assistance with activity   - Consult OT/PT to assist with strengthening/mobility   - Keep Call bell within reach  - Keep bed low and locked with side rails adjusted as appropriate  - Keep care items and personal belongings within reach  - Initiate and maintain comfort rounds  - Make Fall Risk Sign visible to staff  - Offer Toileting every 2 Hours, in advance of need  - Initiate/Maintain bed alarm  - Obtain necessary fall risk management equipment  - Apply yellow socks and bracelet for high fall risk patients  - Consider moving patient to room near nurses station  Outcome: Progressing     Problem: PAIN - ADULT  Goal: Verbalizes/displays adequate comfort level or baseline comfort level  Description: Interventions:  - Encourage patient to monitor pain and request assistance  - Assess pain using appropriate pain scale  - Administer analgesics based on type and severity of pain and evaluate response  - Implement non-pharmacological measures as appropriate and evaluate response  - Consider cultural and social influences on pain and pain management  - Notify physician/advanced practitioner if interventions unsuccessful or patient reports new pain  Outcome: Progressing     Problem: Knowledge Deficit  Goal: Patient/family/caregiver demonstrates understanding of disease process, treatment plan, medications, and discharge instructions  Description: Complete learning assessment and assess knowledge base.  Interventions:  - Provide teaching at level of understanding  - Provide teaching via preferred learning methods  Outcome: Progressing     Problem: Nutrition/Hydration-ADULT  Goal: Nutrient/Hydration intake appropriate for improving, restoring or maintaining nutritional needs  Description: Monitor and assess patient's  nutrition/hydration status for malnutrition. Collaborate with interdisciplinary team and initiate plan and interventions as ordered.  Monitor patient's weight and dietary intake as ordered or per policy. Utilize nutrition screening tool and intervene as necessary. Determine patient's food preferences and provide high-protein, high-caloric foods as appropriate.     INTERVENTIONS:  - Monitor oral intake, urinary output, labs, and treatment plans  - Assess nutrition and hydration status and recommend course of action  - Evaluate amount of meals eaten  - Assist patient with eating if necessary   - Allow adequate time for meals  - Recommend/ encourage appropriate diets, oral nutritional supplements, and vitamin/mineral supplements  - Order, calculate, and assess calorie counts as needed  - Recommend, monitor, and adjust tube feedings and TPN/PPN based on assessed needs  - Assess need for intravenous fluids  - Provide specific nutrition/hydration education as appropriate  - Include patient/family/caregiver in decisions related to nutrition  Outcome: Progressing

## 2025-02-22 NOTE — ASSESSMENT & PLAN NOTE
Recently diagnosed with recurrence  She is to start chemotherapy with gynecology oncology on 2/26  Discussed that she may need to post-pone initiation of chemo d/t dx of sepsis and pyelo however that will need to be addressed by gyn-onc

## 2025-02-22 NOTE — PLAN OF CARE
Problem: Potential for Falls  Goal: Patient will remain free of falls  Description: INTERVENTIONS:  - Educate patient/family on patient safety including physical limitations  - Instruct patient to call for assistance with activity   - Consult OT/PT to assist with strengthening/mobility   - Keep Call bell within reach  - Keep bed low and locked with side rails adjusted as appropriate  - Keep care items and personal belongings within reach  - Initiate and maintain comfort rounds  - Make Fall Risk Sign visible to staff  - Offer Toileting every  Hours, in advance of need  - Initiate/Maintain alarm  - Obtain necessary fall risk management equipment:   - Apply yellow socks and bracelet for high fall risk patients  - Consider moving patient to room near nurses station  Outcome: Progressing     Problem: PAIN - ADULT  Goal: Verbalizes/displays adequate comfort level or baseline comfort level  Description: Interventions:  - Encourage patient to monitor pain and request assistance  - Assess pain using appropriate pain scale  - Administer analgesics based on type and severity of pain and evaluate response  - Implement non-pharmacological measures as appropriate and evaluate response  - Consider cultural and social influences on pain and pain management  - Notify physician/advanced practitioner if interventions unsuccessful or patient reports new pain  Outcome: Progressing     Problem: Knowledge Deficit  Goal: Patient/family/caregiver demonstrates understanding of disease process, treatment plan, medications, and discharge instructions  Description: Complete learning assessment and assess knowledge base.  Interventions:  - Provide teaching at level of understanding  - Provide teaching via preferred learning methods  Outcome: Progressing     Problem: Nutrition/Hydration-ADULT  Goal: Nutrient/Hydration intake appropriate for improving, restoring or maintaining nutritional needs  Description: Monitor and assess patient's  nutrition/hydration status for malnutrition. Collaborate with interdisciplinary team and initiate plan and interventions as ordered.  Monitor patient's weight and dietary intake as ordered or per policy. Utilize nutrition screening tool and intervene as necessary. Determine patient's food preferences and provide high-protein, high-caloric foods as appropriate.     INTERVENTIONS:  - Monitor oral intake, urinary output, labs, and treatment plans  - Assess nutrition and hydration status and recommend course of action  - Evaluate amount of meals eaten  - Assist patient with eating if necessary   - Allow adequate time for meals  - Recommend/ encourage appropriate diets, oral nutritional supplements, and vitamin/mineral supplements  - Order, calculate, and assess calorie counts as needed  - Recommend, monitor, and adjust tube feedings and TPN/PPN based on assessed needs  - Assess need for intravenous fluids  - Provide specific nutrition/hydration education as appropriate  - Include patient/family/caregiver in decisions related to nutrition  Outcome: Progressing

## 2025-02-22 NOTE — ASSESSMENT & PLAN NOTE
Endorses lightheadedness earlier today with almost fall while walking upstairs - denies room spinning sensation  Denies any numbness/tingling, weakness in extremities, or speech abnormality  Reports lightheadedness improved on admission and has been ambulating to and from the bathroom without assistance while in the ED  Resolved  Will obtain orthostatics  IVF dc'd

## 2025-02-23 LAB
ANION GAP SERPL CALCULATED.3IONS-SCNC: 7 MMOL/L (ref 4–13)
BACTERIA UR CULT: ABNORMAL
BUN SERPL-MCNC: 15 MG/DL (ref 5–25)
CALCIUM SERPL-MCNC: 8.9 MG/DL (ref 8.4–10.2)
CHLORIDE SERPL-SCNC: 103 MMOL/L (ref 96–108)
CO2 SERPL-SCNC: 27 MMOL/L (ref 21–32)
CREAT SERPL-MCNC: 0.64 MG/DL (ref 0.6–1.3)
GFR SERPL CREATININE-BSD FRML MDRD: 93 ML/MIN/1.73SQ M
GLUCOSE SERPL-MCNC: 139 MG/DL (ref 65–140)
GLUCOSE SERPL-MCNC: 149 MG/DL (ref 65–140)
GLUCOSE SERPL-MCNC: 151 MG/DL (ref 65–140)
GLUCOSE SERPL-MCNC: 175 MG/DL (ref 65–140)
GLUCOSE SERPL-MCNC: 187 MG/DL (ref 65–140)
MAGNESIUM SERPL-MCNC: 2 MG/DL (ref 1.9–2.7)
POTASSIUM SERPL-SCNC: 3.8 MMOL/L (ref 3.5–5.3)
PROCALCITONIN SERPL-MCNC: 0.34 NG/ML
SODIUM SERPL-SCNC: 137 MMOL/L (ref 135–147)

## 2025-02-23 PROCEDURE — 80048 BASIC METABOLIC PNL TOTAL CA: CPT | Performed by: STUDENT IN AN ORGANIZED HEALTH CARE EDUCATION/TRAINING PROGRAM

## 2025-02-23 PROCEDURE — 84145 PROCALCITONIN (PCT): CPT | Performed by: STUDENT IN AN ORGANIZED HEALTH CARE EDUCATION/TRAINING PROGRAM

## 2025-02-23 PROCEDURE — 99233 SBSQ HOSP IP/OBS HIGH 50: CPT | Performed by: STUDENT IN AN ORGANIZED HEALTH CARE EDUCATION/TRAINING PROGRAM

## 2025-02-23 PROCEDURE — 83735 ASSAY OF MAGNESIUM: CPT | Performed by: STUDENT IN AN ORGANIZED HEALTH CARE EDUCATION/TRAINING PROGRAM

## 2025-02-23 PROCEDURE — 82948 REAGENT STRIP/BLOOD GLUCOSE: CPT

## 2025-02-23 RX ADMIN — TOPIRAMATE 100 MG: 100 TABLET, FILM COATED ORAL at 21:10

## 2025-02-23 RX ADMIN — TAMSULOSIN HYDROCHLORIDE 0.4 MG: 0.4 CAPSULE ORAL at 18:15

## 2025-02-23 RX ADMIN — ENOXAPARIN SODIUM 40 MG: 40 INJECTION SUBCUTANEOUS at 08:14

## 2025-02-23 RX ADMIN — ROPINIROLE HYDROCHLORIDE 1 MG: 0.25 TABLET, FILM COATED ORAL at 21:10

## 2025-02-23 RX ADMIN — Medication 2000 UNITS: at 08:14

## 2025-02-23 RX ADMIN — POTASSIUM CHLORIDE 20 MEQ: 1500 TABLET, EXTENDED RELEASE ORAL at 18:15

## 2025-02-23 RX ADMIN — ATORVASTATIN CALCIUM 40 MG: 40 TABLET, FILM COATED ORAL at 21:10

## 2025-02-23 RX ADMIN — METOPROLOL SUCCINATE 25 MG: 25 TABLET, EXTENDED RELEASE ORAL at 08:13

## 2025-02-23 RX ADMIN — SERTRALINE HYDROCHLORIDE 150 MG: 50 TABLET ORAL at 08:28

## 2025-02-23 RX ADMIN — INSULIN LISPRO 1 UNITS: 100 INJECTION, SOLUTION INTRAVENOUS; SUBCUTANEOUS at 13:12

## 2025-02-23 RX ADMIN — ASPIRIN 81 MG CHEWABLE TABLET 81 MG: 81 TABLET CHEWABLE at 08:13

## 2025-02-23 RX ADMIN — INSULIN GLARGINE 5 UNITS: 100 INJECTION, SOLUTION SUBCUTANEOUS at 08:09

## 2025-02-23 RX ADMIN — PANTOPRAZOLE SODIUM 40 MG: 40 TABLET, DELAYED RELEASE ORAL at 05:49

## 2025-02-23 RX ADMIN — CEFTRIAXONE 1000 MG: 1 INJECTION, SOLUTION INTRAVENOUS at 21:09

## 2025-02-23 RX ADMIN — DULOXETINE HYDROCHLORIDE 30 MG: 30 CAPSULE, DELAYED RELEASE ORAL at 08:13

## 2025-02-23 RX ADMIN — INSULIN LISPRO 1 UNITS: 100 INJECTION, SOLUTION INTRAVENOUS; SUBCUTANEOUS at 08:10

## 2025-02-23 RX ADMIN — LORATADINE 10 MG: 10 TABLET ORAL at 08:14

## 2025-02-23 RX ADMIN — ACETAMINOPHEN 650 MG: 325 TABLET, FILM COATED ORAL at 21:14

## 2025-02-23 RX ADMIN — POTASSIUM CHLORIDE 20 MEQ: 1500 TABLET, EXTENDED RELEASE ORAL at 08:14

## 2025-02-23 NOTE — ASSESSMENT & PLAN NOTE
Home regimen: HCTZ 25 Mg daily, losartan 50 Mg daily, metoprolol succinate 25 Mg daily  Will continue metoprolol and losartan at higher parameters,   Continue to hold HCTZ on discharge  Monitor per unit protocol

## 2025-02-23 NOTE — ASSESSMENT & PLAN NOTE
S/p right pyelography, ureteroscopy with laser lithotripsy and right stent placement by urology on 2/13  Continue pyridium PRN   Discussed with urology on admission-no indication for urological intervention at this time  Discussed with urology as pt was supposed to have stent removal done on 2/24 urology recommending postponement of procedure due to current infection

## 2025-02-23 NOTE — PLAN OF CARE
Problem: Potential for Falls  Goal: Patient will remain free of falls  Description: INTERVENTIONS:  - Educate patient/family on patient safety including physical limitations  - Instruct patient to call for assistance with activity   - Consult OT/PT to assist with strengthening/mobility   - Keep Call bell within reach  - Keep bed low and locked with side rails adjusted as appropriate  - Keep care items and personal belongings within reach  - Initiate and maintain comfort rounds  - Make Fall Risk Sign visible to staff  - Offer Toileting every 2 Hours, in advance of need  - Initiate/Maintain bed alarm  - Obtain necessary fall risk management equipment: yellow socks   - Apply yellow socks and bracelet for high fall risk patients  - Consider moving patient to room near nurses station  Outcome: Progressing     Problem: PAIN - ADULT  Goal: Verbalizes/displays adequate comfort level or baseline comfort level  Description: Interventions:  - Encourage patient to monitor pain and request assistance  - Assess pain using appropriate pain scale  - Administer analgesics based on type and severity of pain and evaluate response  - Implement non-pharmacological measures as appropriate and evaluate response  - Consider cultural and social influences on pain and pain management  - Notify physician/advanced practitioner if interventions unsuccessful or patient reports new pain  Outcome: Progressing     Problem: Knowledge Deficit  Goal: Patient/family/caregiver demonstrates understanding of disease process, treatment plan, medications, and discharge instructions  Description: Complete learning assessment and assess knowledge base.  Interventions:  - Provide teaching at level of understanding  - Provide teaching via preferred learning methods  Outcome: Progressing     Problem: Nutrition/Hydration-ADULT  Goal: Nutrient/Hydration intake appropriate for improving, restoring or maintaining nutritional needs  Description: Monitor and assess  patient's nutrition/hydration status for malnutrition. Collaborate with interdisciplinary team and initiate plan and interventions as ordered.  Monitor patient's weight and dietary intake as ordered or per policy. Utilize nutrition screening tool and intervene as necessary. Determine patient's food preferences and provide high-protein, high-caloric foods as appropriate.     INTERVENTIONS:  - Monitor oral intake, urinary output, labs, and treatment plans  - Assess nutrition and hydration status and recommend course of action  - Evaluate amount of meals eaten  - Assist patient with eating if necessary   - Allow adequate time for meals  - Recommend/ encourage appropriate diets, oral nutritional supplements, and vitamin/mineral supplements  - Order, calculate, and assess calorie counts as needed  - Recommend, monitor, and adjust tube feedings and TPN/PPN based on assessed needs  - Assess need for intravenous fluids  - Provide specific nutrition/hydration education as appropriate  - Include patient/family/caregiver in decisions related to nutrition  Outcome: Progressing

## 2025-02-23 NOTE — ASSESSMENT & PLAN NOTE
Lab Results   Component Value Date    HGBA1C 7.6 (H) 12/24/2024       Recent Labs     02/22/25  0711 02/22/25  1044 02/22/25  1642 02/22/25  2108   POCGLU 150* 165* 180* 167*       Blood Sugar Average: Last 72 hrs:  (P) 153Home regimen: Amaryl, Actos, and Jardiance  Will hold home oral medications and placed on Lantus 5 units in the morning and SSI AC/at bedtime

## 2025-02-23 NOTE — ASSESSMENT & PLAN NOTE
Lab Results   Component Value Date    HGBA1C 7.6 (H) 12/24/2024       Recent Labs     02/22/25  1642 02/22/25  2108 02/23/25  0753 02/23/25  1040   POCGLU 180* 167* 175* 187*       Blood Sugar Average: Last 72 hrs:  (P) [id number] regimen: Amaryl, Actos, and Jardiance  Will hold home oral medications and placed on Lantus 5 units in the morning and SSI AC/at bedtime

## 2025-02-23 NOTE — PROGRESS NOTES
"Progress Note - Hospitalist   Name: Felecia Edward 64 y.o. female I MRN: [id number]  Unit/Bed#: -01 I Date of Admission: 2/20/2025   Date of Service: 2/23/2025 I Hospital Day: 3    Assessment & Plan  Pyelonephritis  Presenting with worsening dysuria, right flank pain, and suprapubic pain since cystoscopy, right retrograde pyelography, ureteroscopy with laser lithotripsy and right ureteral stent placement on 2/13  CT A/P: \"Appropriate position of right ureteral stent with mild right hydronephrosis. 3 mm stone adjacent to the distal right ureteral stent.Mild interstitial edema throughout the right renal parenchyma and inflammatory changes tracking along the right ureter compatible with postprocedural changes or ascending infection\"  UA consistent with infection  Prior urine cultures reviewed-no prior pseudomonal or ESBL growth  ED discussed with urology on admission-no indication for transfer at this time and recommends ongoing IV antibiotics and following urine culture  Continue ceftriaxone day 3/12  UC growing >100,000 Serratia, susceptible to ceftriaxone and ceftazidime could transition to cefdinir 300 mg twice daily on discharge  Blood cultures x 2 showing no growth to date at 48 hours  Pain control with tylenol and lower dose oxycodone 2.5mg as pt felt loopy with 5mg dosing   Pyelonephritis associated with right ureteral stent (placement -2/13/25), as evidence by worsening dysuria and right flank pain requiring treatment with IV Ceftriaxone.   Right renal stone  S/p right pyelography, ureteroscopy with laser lithotripsy and right stent placement by urology on 2/13  Continue pyridium PRN   Discussed with urology on admission-no indication for urological intervention at this time  Discussed with urology as pt was supposed to have stent removal done on 2/24 urology recommending postponement of procedure due to current infection  Severe sepsis (HCC)  SIRS criteria: Tachycardia and tachypnea  Suspect source is " pyelonephritis as evidenced by UA and CT A/P  Severe sepsis criteria met with lactic acidosis - improved with IVF   See mgx above  Electrolyte abnormality  Potassium 3.2 and magnesium 1.6 on admission  Repleted in the ED  Goal K > 4.0 and mag > 2.0  resolved  Uterine leiomyosarcoma (HCC)  Recently diagnosed with recurrence  She is to start chemotherapy with gynecology oncology on 2/26  Discussed that she may need to post-pone initiation of chemo d/t dx of sepsis and pyelo however that will need to be addressed by gyn-onc  Lightheadedness  Endorses lightheadedness earlier today with almost fall while walking upstairs - denies room spinning sensation  Denies any numbness/tingling, weakness in extremities, or speech abnormality  Reports lightheadedness improved on admission and has been ambulating to and from the bathroom without assistance while in the ED  Resolved  Orthostatics negative   IVF dc'd  Type 2 diabetes mellitus with diabetic neuropathy, without long-term current use of insulin ([facility])  Lab Results   Component Value Date    HGBA1C 7.6 (H) 12/24/2024       Recent Labs     02/22/25  1642 02/22/25  2108 02/23/25  0753 02/23/25  1040   POCGLU 180* 167* 175* 187*       Blood Sugar Average: Last 72 hrs:  (P) [id number] regimen: Amaryl, Actos, and Jardiance  Will hold home oral medications and placed on Lantus 5 units in the morning and SSI AC/at bedtime  Primary hypertension  Home regimen: HCTZ 25 Mg daily, losartan 50 Mg daily, metoprolol succinate 25 Mg daily  Will continue metoprolol and losartan at higher parameters,   Continue to hold HCTZ on discharge  Monitor per unit protocol    VTE Pharmacologic Prophylaxis: VTE Score: 5 High Risk (Score >/= 5) - Pharmacological DVT Prophylaxis Ordered: enoxaparin (Lovenox). Sequential Compression Devices Ordered.    Mobility:   Basic Mobility Inpatient Raw Score: 19  JH-HLM Goal: 6: Walk 10 steps or more  JH-HLM Achieved: 6: Walk 10 steps or more  JH-HLM  Goal achieved. Continue to encourage appropriate mobility.    Patient Centered Rounds: I performed bedside rounds with nursing staff today.   Discussions with Specialists or Other Care Team Provider: urology, CM    Education and Discussions with Family / Patient: Updated  (daughter) via phone.    Current Length of Stay: 3 day(s)  Current Patient Status: Inpatient   Certification Statement: The patient will continue to require additional inpatient hospital stay due to pyelo  Discharge Plan: Anticipate discharge tomorrow to home.    Code Status: Level 1 - Full Code    Kita Izquierdo was seen and examined at bedside.  No acute events overnight.  Discussed plan of care.  All questions and concerns were answered and addressed.  Has no acute complaints at this time.  States that she feels about the same.  Urine culture growing Serratia sensitive to ceftriaxone continue at this time.  Blood work ordered for tomorrow for potential chemo if gynecology oncology does not postpone.  Discussed with urology patient to postpone stent removal procedure due to pyelonephritis..    Objective :  Temp:  [97.9 °F (36.6 °C)-98.2 °F (36.8 °C)] 97.9 °F (36.6 °C)  HR:  [77-82] 77  BP: ()/(62-73) 112/73  Resp:  [18-20] 20  SpO2:  [95 %] 95 %  O2 Device: None (Room air)    Body mass index is 35.43 kg/m².     Input and Output Summary (last 24 hours):     Intake/Output Summary (Last 24 hours) at 2/23/2025 1201  Last data filed at 2/23/2025 1139  Gross per 24 hour   Intake 240 ml   Output 1300 ml   Net -1060 ml       Physical Exam  Vitals and nursing note reviewed.   Constitutional:       General: She is not in acute distress.     Appearance: She is obese. She is ill-appearing (chronically).   HENT:      Head: Normocephalic and atraumatic.   Cardiovascular:      Rate and Rhythm: Normal rate and regular rhythm.      Pulses: Normal pulses.      Heart sounds: Normal heart sounds.   Pulmonary:      Effort: Pulmonary effort is  normal.      Breath sounds: Normal breath sounds.   Abdominal:      General: Abdomen is flat. Bowel sounds are normal.      Palpations: Abdomen is soft.      Tenderness: There is abdominal tenderness.   Musculoskeletal:      Right lower leg: No edema.      Left lower leg: No edema.   Skin:     General: Skin is warm.   Neurological:      General: No focal deficit present.      Mental Status: She is alert and oriented to person, place, and time.           Lines/Drains:  Lines/Drains/Airways       Active Status       Name Placement date Placement time Site Days    Port A Cath 06/30/23 Right Subclavian 06/30/23  1000  Subclavian  604                  Urinary Catheter:  Goal for removal:  pt no longer has johnston         Central Line:  Goal for removal: N/A - Chronic PICC               Lab Results: I have reviewed the following results:   Results from last 7 days   Lab Units 02/22/25  0529 02/21/25  0606 02/20/25  1812   WBC Thousand/uL 5.99   < > 10.05   HEMOGLOBIN g/dL 11.4*   < > 12.6   HEMATOCRIT % 34.5*   < > 37.3   PLATELETS Thousands/uL 160   < > 207   SEGS PCT %  --   --  84*   LYMPHO PCT %  --   --  9*   MONO PCT %  --   --  7   EOS PCT %  --   --  0    < > = values in this interval not displayed.     Results from last 7 days   Lab Units 02/23/25  0548 02/21/25  0606 02/20/25  1812   SODIUM mmol/L 137   < > 138   POTASSIUM mmol/L 3.8   < > 3.2*   CHLORIDE mmol/L 103   < > 100   CO2 mmol/L 27   < > 26   BUN mg/dL 15   < > 15   CREATININE mg/dL 0.64   < > 0.71   ANION GAP mmol/L 7   < > 12   CALCIUM mg/dL 8.9   < > 9.3   ALBUMIN g/dL  --   --  4.1   TOTAL BILIRUBIN mg/dL  --   --  1.64*   ALK PHOS U/L  --   --  87   ALT U/L  --   --  11   AST U/L  --   --  13   GLUCOSE RANDOM mg/dL 149*   < > 261*    < > = values in this interval not displayed.     Results from last 7 days   Lab Units 02/20/25  1812   INR  0.99     Results from last 7 days   Lab Units 02/23/25  1040 02/23/25  0753 02/22/25  2108 02/22/25  1645  02/22/25  1044 02/22/25  0711 02/21/25  1959/25  1615 02/21/25  1307 02/21/25  0605 02/21/25  0149   POC GLUCOSE mg/dl 187* 175* 167* 180* 165* 150* 123 149* 138 148* 157*         Results from last 7 days   Lab Units 02/23/25  0548 02/22/25  0529 02/21/25  0015 02/20/25  2030 02/20/25  1812   LACTIC ACID mmol/L  --   --  1.9 2.7* 3.6*   PROCALCITONIN ng/ml 0.34* 0.48*  --   --  0.17       Recent Cultures (last 7 days):   Results from last 7 days   Lab Units 02/20/25  1857 02/20/25  1812 02/20/25  1805   BLOOD CULTURE   --  No Growth at 48 hrs. No Growth at 48 hrs.   URINE CULTURE  >100,000 cfu/ml Serratia marcescens*  --   --        Imaging Results Review: No pertinent imaging studies reviewed.  Other Study Results Review: No additional pertinent studies reviewed.    Last 24 Hours Medication List:     Current Facility-Administered Medications:     acetaminophen (TYLENOL) tablet 650 mg, Q6H PRN    aluminum-magnesium hydroxide-simethicone (MAALOX) oral suspension 30 mL, Q6H PRN    aspirin chewable tablet 81 mg, Daily    atorvastatin (LIPITOR) tablet 40 mg, HS    cefTRIAXone (ROCEPHIN) IVPB (premix in dextrose) 1,000 mg 50 mL, Q24H, Last Rate: 1,000 mg (02/22/25 2051)    Cholecalciferol (VITAMIN D3) tablet 2,000 Units, Daily    DULoxetine (CYMBALTA) delayed release capsule 30 mg, Daily    enoxaparin (LOVENOX) subcutaneous injection 40 mg, Daily    [Held by provider] hydroCHLOROthiazide tablet 25 mg, Daily    insulin glargine (LANTUS) subcutaneous injection 5 Units 0.05 mL, Daily With Breakfast    insulin lispro (HumALOG/ADMELOG) 100 units/mL subcutaneous injection 1-5 Units, HS    insulin lispro (HumALOG/ADMELOG) 100 units/mL subcutaneous injection 1-6 Units, TID AC **AND** Fingerstick Glucose (POCT), TID AC    lifitegrast (XIIDRA) 5 % op solution 1 drop, Q12H    loratadine (CLARITIN) tablet 10 mg, Daily    losartan (COZAAR) tablet 50 mg, Daily    metoprolol succinate (TOPROL-XL) 24 hr tablet 25 mg, Daily     ondansetron (ZOFRAN) injection 4 mg, Q6H PRN    oxyCODONE (ROXICODONE) split tablet 2.5 mg, Q6H PRN    pantoprazole (PROTONIX) EC tablet 40 mg, Daily Before Breakfast    phenazopyridine (PYRIDIUM) tablet 200 mg, TID PRN    potassium chloride (Klor-Con M20) CR tablet 20 mEq, BID    rOPINIRole (REQUIP) tablet 1 mg, HS    senna (SENOKOT) tablet 8.6 mg, HS PRN    sertraline (ZOLOFT) tablet 150 mg, Daily    tamsulosin (FLOMAX) capsule 0.4 mg, QPM    topiramate (TOPAMAX) tablet 100 mg, HS    Administrative Statements   Today, Patient Was Seen By: Nilda Srinivasan MD  I have spent a total time of 56 minutes in caring for this patient on the day of the visit/encounter including Diagnostic results, Prognosis, Risks and benefits of tx options, Instructions for management, Patient and family education, Importance of tx compliance, Risk factor reductions, Impressions, Counseling / Coordination of care, Documenting in the medical record, Reviewing/placing orders in the medical record (including tests, medications, and/or procedures), Obtaining or reviewing history  , and Communicating with other healthcare professionals .    **Please Note: This note may have been constructed using a voice recognition system.**

## 2025-02-23 NOTE — ASSESSMENT & PLAN NOTE
"Presenting with worsening dysuria, right flank pain, and suprapubic pain since cystoscopy, right retrograde pyelography, ureteroscopy with laser lithotripsy and right ureteral stent placement on 2/13  CT A/P: \"Appropriate position of right ureteral stent with mild right hydronephrosis. 3 mm stone adjacent to the distal right ureteral stent.Mild interstitial edema throughout the right renal parenchyma and inflammatory changes tracking along the right ureter compatible with postprocedural changes or ascending infection\"  UA consistent with infection  Prior urine cultures reviewed-no prior pseudomonal or ESBL growth  ED discussed with urology on admission-no indication for transfer at this time and recommends ongoing IV antibiotics and following urine culture  Continue ceftriaxone day 3/12  UC growing >100,000 Serratia, susceptible to ceftriaxone and ceftazidime could transition to cefdinir 300 mg twice daily on discharge  Blood cultures x 2 showing no growth to date at 48 hours  Pain control with tylenol and lower dose oxycodone 2.5mg as pt felt loopy with 5mg dosing   Pyelonephritis associated with right ureteral stent (placement -2/13/25), as evidence by worsening dysuria and right flank pain requiring treatment with IV Ceftriaxone.   "

## 2025-02-23 NOTE — ASSESSMENT & PLAN NOTE
Endorses lightheadedness earlier today with almost fall while walking upstairs - denies room spinning sensation  Denies any numbness/tingling, weakness in extremities, or speech abnormality  Reports lightheadedness improved on admission and has been ambulating to and from the bathroom without assistance while in the ED  Resolved  orthostatics negative  IVF dc'd

## 2025-02-23 NOTE — UTILIZATION REVIEW
NOTIFICATION OF INPATIENT ADMISSION   AUTHORIZATION REQUEST   SERVICING FACILITY:   [place] [address]  Tax ID: [id number]  NPI: [id number] ATTENDING PROVIDER:  Attending Name and NPI#: Nilda Srinivasan Md [[id number]]  Address: [address] [place] [address]  Phone: [phone]   ADMISSION INFORMATION:  Place of Service: Inpatient [facility]  Place of Service Code: 21  Inpatient Admission Date/Time: 2/20/25  9:56 PM  Discharge Date/Time: No discharge date for patient encounter.  Admitting Diagnosis Code/Description:  Hypokalemia [E87.6]  Hypomagnesemia [E83.42]  Acute pyelonephritis [N10]  Prolonged Q-T interval on ECG [R94.31]  Dizzy [R42]     UTILIZATION REVIEW CONTACT:  Lizbeth Dempsey, Utilization   Network Utilization Review Department  Phone: [phone]  Fax: [phone]  Email: [email or web address]  Contact for approvals/pending authorizations, clinical reviews, and discharge.     PHYSICIAN ADVISORY SERVICES:  Medical Necessity Denial & Ikwf-dt-Oosj Review  Phone: [phone]  Fax: [phone]  Email: [email or web address]     DISCHARGE SUPPORT TEAM:  For Patients Discharge Needs & Updates  Phone: [phone] opt. 2 Fax: [phone]  Email: [email or web address]

## 2025-02-24 ENCOUNTER — RESULTS FOLLOW-UP (OUTPATIENT)
Dept: EMERGENCY DEPT | Facility: HOSPITAL | Age: 65
End: 2025-02-24

## 2025-02-24 ENCOUNTER — HOSPITAL ENCOUNTER (OUTPATIENT)
Dept: INFUSION CENTER | Facility: HOSPITAL | Age: 65
Discharge: HOME | End: 2025-02-24

## 2025-02-24 ENCOUNTER — TELEPHONE (OUTPATIENT)
Age: 65
End: 2025-02-24

## 2025-02-24 VITALS
DIASTOLIC BLOOD PRESSURE: 70 MMHG | HEIGHT: 64 IN | HEART RATE: 82 BPM | WEIGHT: 205.4 LBS | SYSTOLIC BLOOD PRESSURE: 109 MMHG | TEMPERATURE: 97.9 F | OXYGEN SATURATION: 95 % | BODY MASS INDEX: 35.07 KG/M2 | RESPIRATION RATE: 14 BRPM

## 2025-02-24 LAB
ALBUMIN SERPL BCG-MCNC: 3.8 G/DL (ref 3.5–5)
ALP SERPL-CCNC: 76 U/L (ref 34–104)
ALT SERPL W P-5'-P-CCNC: 14 U/L (ref 7–52)
ANION GAP SERPL CALCULATED.3IONS-SCNC: 8 MMOL/L (ref 4–13)
AST SERPL W P-5'-P-CCNC: 15 U/L (ref 13–39)
BASOPHILS # BLD AUTO: 0.01 THOUSANDS/ÂΜL (ref 0–0.1)
BASOPHILS NFR BLD AUTO: 0 % (ref 0–1)
BILIRUB SERPL-MCNC: 0.82 MG/DL (ref 0.2–1)
BUN SERPL-MCNC: 19 MG/DL (ref 5–25)
CALCIUM SERPL-MCNC: 9 MG/DL (ref 8.4–10.2)
CHLORIDE SERPL-SCNC: 106 MMOL/L (ref 96–108)
CK SERPL-CCNC: 25 U/L (ref 26–192)
CO2 SERPL-SCNC: 25 MMOL/L (ref 21–32)
CREAT SERPL-MCNC: 0.6 MG/DL (ref 0.6–1.3)
EOSINOPHIL # BLD AUTO: 0.13 THOUSAND/ÂΜL (ref 0–0.61)
EOSINOPHIL NFR BLD AUTO: 3 % (ref 0–6)
ERYTHROCYTE [DISTWIDTH] IN BLOOD BY AUTOMATED COUNT: 14.6 % (ref 11.6–15.1)
GFR SERPL CREATININE-BSD FRML MDRD: 95 ML/MIN/1.73SQ M
GLUCOSE SERPL-MCNC: 134 MG/DL (ref 65–140)
GLUCOSE SERPL-MCNC: 150 MG/DL (ref 65–140)
GLUCOSE SERPL-MCNC: 169 MG/DL (ref 65–140)
HCT VFR BLD AUTO: 34.5 % (ref 34.8–46.1)
HGB BLD-MCNC: 11.1 G/DL (ref 11.5–15.4)
IMM GRANULOCYTES # BLD AUTO: 0.02 THOUSAND/UL (ref 0–0.2)
IMM GRANULOCYTES NFR BLD AUTO: 0 % (ref 0–2)
LYMPHOCYTES # BLD AUTO: 2.23 THOUSANDS/ÂΜL (ref 0.6–4.47)
LYMPHOCYTES NFR BLD AUTO: 43 % (ref 14–44)
MAGNESIUM SERPL-MCNC: 2 MG/DL (ref 1.9–2.7)
MCH RBC QN AUTO: 28.9 PG (ref 26.8–34.3)
MCHC RBC AUTO-ENTMCNC: 32.2 G/DL (ref 31.4–37.4)
MCV RBC AUTO: 90 FL (ref 82–98)
MONOCYTES # BLD AUTO: 0.4 THOUSAND/ÂΜL (ref 0.17–1.22)
MONOCYTES NFR BLD AUTO: 8 % (ref 4–12)
NEUTROPHILS # BLD AUTO: 2.36 THOUSANDS/ÂΜL (ref 1.85–7.62)
NEUTS SEG NFR BLD AUTO: 46 % (ref 43–75)
NRBC BLD AUTO-RTO: 0 /100 WBCS
PLATELET # BLD AUTO: 220 THOUSANDS/UL (ref 149–390)
PMV BLD AUTO: 10.5 FL (ref 8.9–12.7)
POTASSIUM SERPL-SCNC: 3.9 MMOL/L (ref 3.5–5.3)
PROT SERPL-MCNC: 7 G/DL (ref 6.4–8.4)
RBC # BLD AUTO: 3.84 MILLION/UL (ref 3.81–5.12)
SODIUM SERPL-SCNC: 139 MMOL/L (ref 135–147)
WBC # BLD AUTO: 5.15 THOUSAND/UL (ref 4.31–10.16)

## 2025-02-24 PROCEDURE — 85025 COMPLETE CBC W/AUTO DIFF WBC: CPT | Performed by: STUDENT IN AN ORGANIZED HEALTH CARE EDUCATION/TRAINING PROGRAM

## 2025-02-24 PROCEDURE — 99239 HOSP IP/OBS DSCHRG MGMT >30: CPT | Performed by: INTERNAL MEDICINE

## 2025-02-24 PROCEDURE — 82948 REAGENT STRIP/BLOOD GLUCOSE: CPT

## 2025-02-24 PROCEDURE — 82550 ASSAY OF CK (CPK): CPT | Performed by: STUDENT IN AN ORGANIZED HEALTH CARE EDUCATION/TRAINING PROGRAM

## 2025-02-24 PROCEDURE — 83735 ASSAY OF MAGNESIUM: CPT | Performed by: STUDENT IN AN ORGANIZED HEALTH CARE EDUCATION/TRAINING PROGRAM

## 2025-02-24 PROCEDURE — 80053 COMPREHEN METABOLIC PANEL: CPT | Performed by: STUDENT IN AN ORGANIZED HEALTH CARE EDUCATION/TRAINING PROGRAM

## 2025-02-24 RX ORDER — CEFDINIR 300 MG/1
300 CAPSULE ORAL EVERY 12 HOURS SCHEDULED
Qty: 14 CAPSULE | Refills: 0 | Status: SHIPPED | OUTPATIENT
Start: 2025-02-24 | End: 2025-02-25

## 2025-02-24 RX ADMIN — LOSARTAN POTASSIUM 50 MG: 50 TABLET, FILM COATED ORAL at 08:03

## 2025-02-24 RX ADMIN — LORATADINE 10 MG: 10 TABLET ORAL at 08:02

## 2025-02-24 RX ADMIN — METOPROLOL SUCCINATE 25 MG: 25 TABLET, EXTENDED RELEASE ORAL at 08:02

## 2025-02-24 RX ADMIN — DULOXETINE HYDROCHLORIDE 30 MG: 30 CAPSULE, DELAYED RELEASE ORAL at 08:02

## 2025-02-24 RX ADMIN — SERTRALINE HYDROCHLORIDE 150 MG: 50 TABLET ORAL at 08:02

## 2025-02-24 RX ADMIN — ENOXAPARIN SODIUM 40 MG: 40 INJECTION SUBCUTANEOUS at 08:04

## 2025-02-24 RX ADMIN — Medication 2000 UNITS: at 08:02

## 2025-02-24 RX ADMIN — INSULIN LISPRO 1 UNITS: 100 INJECTION, SOLUTION INTRAVENOUS; SUBCUTANEOUS at 07:53

## 2025-02-24 RX ADMIN — INSULIN LISPRO 1 UNITS: 100 INJECTION, SOLUTION INTRAVENOUS; SUBCUTANEOUS at 12:17

## 2025-02-24 RX ADMIN — ASPIRIN 81 MG CHEWABLE TABLET 81 MG: 81 TABLET CHEWABLE at 08:02

## 2025-02-24 RX ADMIN — ACETAMINOPHEN 650 MG: 325 TABLET, FILM COATED ORAL at 11:05

## 2025-02-24 RX ADMIN — POTASSIUM CHLORIDE 20 MEQ: 1500 TABLET, EXTENDED RELEASE ORAL at 08:02

## 2025-02-24 RX ADMIN — INSULIN GLARGINE 5 UNITS: 100 INJECTION, SOLUTION SUBCUTANEOUS at 08:08

## 2025-02-24 RX ADMIN — PANTOPRAZOLE SODIUM 40 MG: 40 TABLET, DELAYED RELEASE ORAL at 04:44

## 2025-02-24 NOTE — TELEPHONE ENCOUNTER
Daughter, Jessica, calling to speak with Pati Jerome regarding patients care. She would like to know what's going on with patients chemo treatment which is supposed to start 2/26/25.  Jessica can be reached [phone]

## 2025-02-24 NOTE — TELEPHONE ENCOUNTER
Return call to mila Lopez Felecia is admitted with pyelonephritis.  Reviewed is unlikely she would get chemo this week.   Informed we would let Dr Taveras know of Felecia's admission.

## 2025-02-24 NOTE — ASSESSMENT & PLAN NOTE
S/p right pyelography, ureteroscopy with laser lithotripsy and right stent placement by urology on 2/13  Continue pyridium PRN   Discussed with urology on admission-no indication for urological intervention at this time  Discussed with urology as pt was supposed to have stent removal done on 2/24 urology recommending postponement of procedure due to current infection  Outpatient follow-up with urology

## 2025-02-24 NOTE — ASSESSMENT & PLAN NOTE
Home regimen: HCTZ 25 Mg daily, losartan 50 Mg daily, metoprolol succinate 25 Mg daily  Will continue metoprolol and losartan at higher parameters,   Continue to hold HCTZ on discharge  Monitor per unit protocol  Follow-up with PCP as outpatient

## 2025-02-24 NOTE — CASE MANAGEMENT
Case Management Discharge Planning Note    Patient name Felecia Edward  Location /-01 MRN [id number]  : 1960 Date 2025       Current Admission Date: 2025  Current Admission Diagnosis:Pyelonephritis   Patient Active Problem List    Diagnosis Date Noted Date Diagnosed    Pyelonephritis 2025     Severe sepsis (HCC) 2025     Electrolyte abnormality 2025     Primary hypertension 2025     Lightheadedness 2025     Right flank pain 2025     Palliative care encounter 2025     Advanced care planning/counseling discussion 2025     Cancer associated pain 2024     Morbid obesity (HCC) 10/30/2024     Type 2 diabetes mellitus with diabetic neuropathy, without long-term current use of insulin (HCC) 10/17/2024     History of colonic polyps 2024     Right renal stone 2023     Tachycardia 2023     Abnormal CT of the chest 2023     Drug-induced pneumonitis 2023     Hypomagnesemia 2023     Folliculitis 2023     Stomatitis 2023     Chemotherapy induced neutropenia (HCC) 2023     Encounter for central line care 2023     Post-operative state 2023     Uterine mass 2023     Lower abdominal pain 2023     Class 2 obesity due to excess calories without serious comorbidity in adult 2023     Uterine leiomyosarcoma (HCC)      Irritable bowel syndrome      Functional dyspepsia      GERD without esophagitis        LOS (days): 4  Geometric Mean LOS (GMLOS) (days): 4.8  Days to GMLOS:1.1     OBJECTIVE:  Risk of Unplanned Readmission Score: 13.57         Current admission status: Inpatient   Preferred Pharmacy:   [id number] [place] - [phone]   [place] [address]  Phone: [phone] Fax: [phone]    Primary Care Provider: Clarice Acuna MD    Primary Insurance: [employer]  Secondary Insurance:     DISCHARGE DETAILS:        Met with patient,  she plans to return home today. Her spouse will pick her up at 3:30. She reports no discharge needs.  IMM reviewed with patient, patient agrees with discharge determination.                                                                                 IMM Given (Date):: 02/24/25  IMM Given to:: Patient

## 2025-02-24 NOTE — ASSESSMENT & PLAN NOTE
"Presenting with worsening dysuria, right flank pain, and suprapubic pain since cystoscopy, right retrograde pyelography, ureteroscopy with laser lithotripsy and right ureteral stent placement on 2/13  CT A/P: \"Appropriate position of right ureteral stent with mild right hydronephrosis. 3 mm stone adjacent to the distal right ureteral stent.Mild interstitial edema throughout the right renal parenchyma and inflammatory changes tracking along the right ureter compatible with postprocedural changes or ascending infection\"  UA consistent with infection  Prior urine cultures reviewed-no prior pseudomonal or ESBL growth  ED discussed with urology on admission-no indication for transfer at this time and recommends ongoing IV antibiotics and following urine culture  Continue ceftriaxone day 3/12  UC growing >100,000 Serratia, susceptible to ceftriaxone and ceftazidime could transition to cefdinir 300 mg twice daily on discharge  Blood cultures x 2 showing no growth to date at 48 hours  Pain control with tylenol and lower dose oxycodone 2.5mg as pt felt loopy with 5mg dosing   Pyelonephritis associated with right ureteral stent (placement -2/13/25), as evidence by worsening dysuria and right flank pain requiring treatment with IV Ceftriaxone.   Patient will be discharged on oral antibiotic to complete the course  "

## 2025-02-24 NOTE — ASSESSMENT & PLAN NOTE
Endorses lightheadedness earlier today with almost fall while walking upstairs - denies room spinning sensation  Denies any numbness/tingling, weakness in extremities, or speech abnormality  Reports lightheadedness improved on admission and has been ambulating to and from the bathroom without assistance while in the ED  Resolved  Orthostatics negative   Resolved

## 2025-02-24 NOTE — PLAN OF CARE
Problem: Potential for Falls  Goal: Patient will remain free of falls  Description: INTERVENTIONS:  - Educate patient/family on patient safety including physical limitations  - Instruct patient to call for assistance with activity   - Consult OT/PT to assist with strengthening/mobility   - Keep Call bell within reach  - Keep bed low and locked with side rails adjusted as appropriate  - Keep care items and personal belongings within reach  - Initiate and maintain comfort rounds  - Make Fall Risk Sign visible to staff  - Offer Toileting every x Hours, in advance of need  - Initiate/Maintain xalarm  - Obtain necessary fall risk management equipment: xxxxx  - Apply yellow socks and bracelet for high fall risk patients  - Consider moving patient to room near nurses station  2/24/2025 1521 by Dipika Quarles RN  Outcome: Adequate for Discharge  2/24/2025 0756 by Dipika Quarles RN  Outcome: Progressing     Problem: PAIN - ADULT  Goal: Verbalizes/displays adequate comfort level or baseline comfort level  Description: Interventions:  - Encourage patient to monitor pain and request assistance  - Assess pain using appropriate pain scale  - Administer analgesics based on type and severity of pain and evaluate response  - Implement non-pharmacological measures as appropriate and evaluate response  - Consider cultural and social influences on pain and pain management  - Notify physician/advanced practitioner if interventions unsuccessful or patient reports new pain  2/24/2025 1521 by Dipika Quarles RN  Outcome: Adequate for Discharge  2/24/2025 0756 by Dipika Quarles RN  Outcome: Progressing     Problem: Knowledge Deficit  Goal: Patient/family/caregiver demonstrates understanding of disease process, treatment plan, medications, and discharge instructions  Description: Complete learning assessment and assess knowledge base.  Interventions:  - Provide teaching at level of understanding  - Provide teaching via preferred  learning methods  2/24/2025 1521 by Dipika Quarles RN  Outcome: Adequate for Discharge  2/24/2025 0756 by Dipika Quarles RN  Outcome: Progressing     Problem: Nutrition/Hydration-ADULT  Goal: Nutrient/Hydration intake appropriate for improving, restoring or maintaining nutritional needs  Description: Monitor and assess patient's nutrition/hydration status for malnutrition. Collaborate with interdisciplinary team and initiate plan and interventions as ordered.  Monitor patient's weight and dietary intake as ordered or per policy. Utilize nutrition screening tool and intervene as necessary. Determine patient's food preferences and provide high-protein, high-caloric foods as appropriate.     INTERVENTIONS:  - Monitor oral intake, urinary output, labs, and treatment plans  - Assess nutrition and hydration status and recommend course of action  - Evaluate amount of meals eaten  - Assist patient with eating if necessary   - Allow adequate time for meals  - Recommend/ encourage appropriate diets, oral nutritional supplements, and vitamin/mineral supplements  - Order, calculate, and assess calorie counts as needed  - Recommend, monitor, and adjust tube feedings and TPN/PPN based on assessed needs  - Assess need for intravenous fluids  - Provide specific nutrition/hydration education as appropriate  - Include patient/family/caregiver in decisions related to nutrition  2/24/2025 1521 by Dipika Quarles, RN  Outcome: Adequate for Discharge  2/24/2025 0756 by Dipika Quarles, RN  Outcome: Progressing

## 2025-02-24 NOTE — PLAN OF CARE
Problem: Potential for Falls  Goal: Patient will remain free of falls  Description: INTERVENTIONS:  - Educate patient/family on patient safety including physical limitations  - Instruct patient to call for assistance with activity   - Consult OT/PT to assist with strengthening/mobility   - Keep Call bell within reach  - Keep bed low and locked with side rails adjusted as appropriate  - Keep care items and personal belongings within reach  - Initiate and maintain comfort rounds  - Make Fall Risk Sign visible to staff  - Offer Toileting every x Hours, in advance of need  - Initiate/Maintain xalarm  - Obtain necessary fall risk management equipment: xxxxx  - Apply yellow socks and bracelet for high fall risk patients  - Consider moving patient to room near nurses station  Outcome: Progressing     Problem: PAIN - ADULT  Goal: Verbalizes/displays adequate comfort level or baseline comfort level  Description: Interventions:  - Encourage patient to monitor pain and request assistance  - Assess pain using appropriate pain scale  - Administer analgesics based on type and severity of pain and evaluate response  - Implement non-pharmacological measures as appropriate and evaluate response  - Consider cultural and social influences on pain and pain management  - Notify physician/advanced practitioner if interventions unsuccessful or patient reports new pain  Outcome: Progressing     Problem: Knowledge Deficit  Goal: Patient/family/caregiver demonstrates understanding of disease process, treatment plan, medications, and discharge instructions  Description: Complete learning assessment and assess knowledge base.  Interventions:  - Provide teaching at level of understanding  - Provide teaching via preferred learning methods  Outcome: Progressing     Problem: Nutrition/Hydration-ADULT  Goal: Nutrient/Hydration intake appropriate for improving, restoring or maintaining nutritional needs  Description: Monitor and assess patient's  nutrition/hydration status for malnutrition. Collaborate with interdisciplinary team and initiate plan and interventions as ordered.  Monitor patient's weight and dietary intake as ordered or per policy. Utilize nutrition screening tool and intervene as necessary. Determine patient's food preferences and provide high-protein, high-caloric foods as appropriate.     INTERVENTIONS:  - Monitor oral intake, urinary output, labs, and treatment plans  - Assess nutrition and hydration status and recommend course of action  - Evaluate amount of meals eaten  - Assist patient with eating if necessary   - Allow adequate time for meals  - Recommend/ encourage appropriate diets, oral nutritional supplements, and vitamin/mineral supplements  - Order, calculate, and assess calorie counts as needed  - Recommend, monitor, and adjust tube feedings and TPN/PPN based on assessed needs  - Assess need for intravenous fluids  - Provide specific nutrition/hydration education as appropriate  - Include patient/family/caregiver in decisions related to nutrition  Outcome: Progressing

## 2025-02-24 NOTE — DISCHARGE SUMMARY
"Discharge Summary - Hospitalist   Name: Felecia Edward 64 y.o. female I MRN: [id number]  Unit/Bed#: -01 I Date of Admission: 2/20/2025   Date of Service: 2/24/2025 I Hospital Day: 4     Assessment & Plan  Pyelonephritis  Presenting with worsening dysuria, right flank pain, and suprapubic pain since cystoscopy, right retrograde pyelography, ureteroscopy with laser lithotripsy and right ureteral stent placement on 2/13  CT A/P: \"Appropriate position of right ureteral stent with mild right hydronephrosis. 3 mm stone adjacent to the distal right ureteral stent.Mild interstitial edema throughout the right renal parenchyma and inflammatory changes tracking along the right ureter compatible with postprocedural changes or ascending infection\"  UA consistent with infection  Prior urine cultures reviewed-no prior pseudomonal or ESBL growth  ED discussed with urology on admission-no indication for transfer at this time and recommends ongoing IV antibiotics and following urine culture  Continue ceftriaxone day 3/12  UC growing >100,000 Serratia, susceptible to ceftriaxone and ceftazidime could transition to cefdinir 300 mg twice daily on discharge  Blood cultures x 2 showing no growth to date at 48 hours  Pain control with tylenol and lower dose oxycodone 2.5mg as pt felt loopy with 5mg dosing   Pyelonephritis associated with right ureteral stent (placement -2/13/25), as evidence by worsening dysuria and right flank pain requiring treatment with IV Ceftriaxone.   Patient will be discharged on oral antibiotic to complete the course  Right renal stone  S/p right pyelography, ureteroscopy with laser lithotripsy and right stent placement by urology on 2/13  Continue pyridium PRN   Discussed with urology on admission-no indication for urological intervention at this time  Discussed with urology as pt was supposed to have stent removal done on 2/24 urology recommending postponement of procedure due to current " infection  Outpatient follow-up with urology  Severe sepsis ([facility])  SIRS criteria: Tachycardia and tachypnea  Suspect source is pyelonephritis as evidenced by UA and CT A/P  Severe sepsis criteria met with lactic acidosis - improved with IVF   See mgx above  Electrolyte abnormality  Potassium 3.2 and magnesium 1.6 on admission  Repleted in the ED  Goal K > 4.0 and mag > 2.0  resolved  Uterine leiomyosarcoma (HCC)  Recently diagnosed with recurrence  She is to start chemotherapy with gynecology oncology on 2/26  Discussed that she may need to post-pone initiation of chemo d/t dx of sepsis and pyelo however that will need to be addressed by gyn-onc  Lightheadedness  Endorses lightheadedness earlier today with almost fall while walking upstairs - denies room spinning sensation  Denies any numbness/tingling, weakness in extremities, or speech abnormality  Reports lightheadedness improved on admission and has been ambulating to and from the bathroom without assistance while in the ED  Resolved  Orthostatics negative   Resolved  Type 2 diabetes mellitus with diabetic neuropathy, without long-term current use of insulin ([facility])  Lab Results   Component Value Date    HGBA1C 7.6 (H) 12/24/2024       Recent Labs     02/23/25  1539 02/23/25  2100 02/24/25  0713 02/24/25  1133   POCGLU 139 151* 150* 169*       Blood Sugar Average: Last 72 hrs:  (P) 156.0827598591291967Slua regimen: Amaryl, Actos, and Jardiance  Will hold home oral medications and placed on Lantus 5 units in the morning and SSI AC/at bedtime  Primary hypertension  Home regimen: HCTZ 25 Mg daily, losartan 50 Mg daily, metoprolol succinate 25 Mg daily  Will continue metoprolol and losartan at higher parameters,   Continue to hold HCTZ on discharge  Monitor per unit protocol  Follow-up with PCP as outpatient     Hospital Course:     Felecia Edward is a 64 y.o. female patient who originally presented to the hospital on   Admission Orders (From admission, onward)        Ordered        02/20/25 2156  INPATIENT ADMISSION  Once                         due to right flank pain.  Patient was admitted and diagnosed with UTI/pyelonephritis and started on IV antibiotic.  Patient urine culture growing Serratia and patient is feeling much better.  Hydrochlorothiazide held at discharge.  Patient will follow-up with GYN oncology and urology as outpatient.  Patient will be discharged on oral antibiotic to complete the course  Discussed with daughter Jessica and she will make appointments for outpatient follow-up  On Exam-  Chest-bilateral air entry, clear to auscultation  Abdomen-soft, nontender  Heart-S1 S2 regular  Extremities-no pedal edema or calf tenderness  Neuro-alert awake oriented x3.  No focal deficits    Please see above list of diagnoses and related plan for additional information.   Follow-up with PCP as outpatient  Follow-up with GYN oncology and urology as outpatient    CONSULTING PROVIDERS   None    PROCEDURES PERFORMED  * No surgery found *    RADIOLOGY RESULTS  CT abdomen pelvis with contrast  Result Date: 2/20/2025  Narrative: CT ABDOMEN AND PELVIS WITH IV CONTRAST INDICATION: diffuse abdominal tenderness. elevated lactic. febrile illness with ureteral stent in place. . COMPARISON: 1/17/2025 TECHNIQUE: CT examination of the abdomen and pelvis was performed. Multiplanar 2D reformatted images were created from the source data. This examination, like all CT scans performed in the [facility], was performed utilizing techniques to minimize radiation dose exposure, including the use of iterative reconstruction and automated exposure control. Radiation dose length product (DLP) for this visit: 782.1 mGy-cm IV Contrast: 100 mL of iohexol Enteric Contrast: Not administered. FINDINGS: ABDOMEN LOWER CHEST: No clinically significant abnormality in the visualized lower chest. LIVER/BILIARY TREE: Prominence of common bile duct and central intrahepatic biliary tree, most  likely sequela of prior cholecystectomy. Liver is otherwise unremarkable. GALLBLADDER: Post cholecystectomy. SPLEEN: Unremarkable. PANCREAS: Unremarkable. ADRENAL GLANDS: Unremarkable. KIDNEYS/URETERS: Right ureteral stent in place. Mild right hydronephrosis. 3 mm stone adjacent to the distal right ureteral stent. Subcentimeter hypodensities compatible with simple cysts  throughout the bilateral renal parenchyma. Mild interstitial edema throughout the right renal parenchyma mild inflammatory changes track along the right ureter. Layering stones within the right kidney measuring up to 5 mm. STOMACH AND BOWEL: Colonic diverticulosis without findings of acute diverticulitis. APPENDIX: No findings to suggest appendicitis. ABDOMINOPELVIC CAVITY: No ascites. No pneumoperitoneum. No lymphadenopathy. VESSELS: Unremarkable for patient's age. PELVIS REPRODUCTIVE ORGANS: Post hysterectomy. URINARY BLADDER: Unremarkable. ABDOMINAL WALL/INGUINAL REGIONS: Unremarkable. BONES: No acute fracture or suspicious osseous lesion.     Impression: Appropriate position of right ureteral stent with mild right hydronephrosis. 3 mm stone adjacent to the distal right ureteral stent. Mild interstitial edema throughout the right renal parenchyma and inflammatory changes tracking along the right ureter compatible with postprocedural changes or ascending infection Workstation performed: [id number]     XR retrograde pyelogram  Result Date: 2/20/2025  Narrative: RIGHT RETROGRADE PYELOGRAM INDICATION: CYSTOSCOPY URETEROSCOPY WITH LITHOTRIPSY HOLMIUM LASER, RETROGRADE PYELOGRAM AND INSERTION STENT URETERAL (Right: Ureter). COMPARISON: None IMAGES: 9 FLUOROSCOPY TIME: 2 minutes 29 seconds CONTRAST: 17 mL of iohexol Fluoroscopic guidance provided for retrograde pyelogram. Upper pole and UP junction filling defects. Lithotripsy. Right ureteral stent placed. Osseous and soft tissue detail limited by technique.     Impression: Fluoroscopic guidance  provided for right retrograde pyelogram. Please see separate procedure report for additional details. Workstation performed: [id number]     Echo complete w/ contrast if indicated  Result Date: 2/5/2025  Narrative:   Left Ventricle: Left ventricular cavity size is normal. Wall thickness is mildly increased. The left ventricular ejection fraction is 60%. Systolic function is normal. Global longitudinal strain is borderline at -17%. Wall motion is normal. Diastolic function is mildly abnormal, consistent with grade I (abnormal) relaxation.   Mitral Valve: There is trace regurgitation. Strain was performed to quantify interventricular dyssynchrony and evaluate components of myocardial function due to  Chemotherapy . Results from the utilization of Strain Analysis are listed in the report below.       LABS  Results from last 7 days   Lab Units 02/24/25 0443 02/22/25  0529 02/21/25  0606 02/20/25  1812   WBC Thousand/uL 5.15 5.99 10.64* 10.05   HEMOGLOBIN g/dL 11.1* 11.4* 11.9 12.6   HEMATOCRIT % 34.5* 34.5* 36.4 37.3   MCV fL 90 90 89 88   PLATELETS Thousands/uL 220 160 192 207   INR   --   --   --  0.99     Results from last 7 days   Lab Units 02/24/25  0443 02/23/25  0548 02/22/25  0529 02/21/25  0606 02/20/25  1812   SODIUM mmol/L 139 137 136 137 138   POTASSIUM mmol/L 3.9 3.8 3.6 3.4* 3.2*   CHLORIDE mmol/L 106 103 102 100 100   CO2 mmol/L 25 27 26 26 26   BUN mg/dL 19 15 12 12 15   CREATININE mg/dL 0.60 0.64 0.57* 0.66 0.71   CALCIUM mg/dL 9.0 8.9 8.6 8.9 9.3   ALBUMIN g/dL 3.8  --   --   --  4.1   TOTAL BILIRUBIN mg/dL 0.82  --   --   --  1.64*   ALK PHOS U/L 76  --   --   --  87   ALT U/L 14  --   --   --  11   AST U/L 15  --   --   --  13   EGFR ml/min/1.73sq m 95 93 97 92 89   GLUCOSE RANDOM mg/dL 134 149* 130 152* 261*     Results from last 7 days   Lab Units 02/20/25  1812   HS TNI 0HR ng/L 4              Results from last 7 days   Lab Units 02/24/25  1133 02/24/25  0713 02/23/25  2100 02/23/25  1539  02/23/25  1040 02/23/25  0753 02/22/25  2108 02/22/25  1642 02/22/25  1044 02/22/25  0711   POC GLUCOSE mg/dl 169* 150* 151* 139 187* 175* 167* 180* 165* 150*             Results from last 7 days   Lab Units 02/23/25  0548 02/22/25  0529 02/21/25  0015 02/20/25  2030 02/20/25  1812   LACTIC ACID mmol/L  --   --  1.9 2.7* 3.6*   PROCALCITONIN ng/ml 0.34*   < >  --   --  0.17    < > = values in this interval not displayed.           Cultures:   Results from last 7 days   Lab Units 02/20/25  1857   COLOR UA  Yellow   CLARITY UA  Slightly Cloudy   SPEC GRAV UA  <1.005*   PH UA  6.0   LEUKOCYTES UA  Large*   NITRITE UA  Positive*   GLUCOSE UA mg/dl 300 (3/10%)*   KETONES UA mg/dl Negative   BILIRUBIN UA  Negative   BLOOD UA  Moderate*      Results from last 7 days   Lab Units 02/20/25  1857   RBC UA /hpf 2-4   WBC UA /hpf 10-20*   EPITHELIAL CELLS WET PREP /hpf Occasional   BACTERIA UA /hpf Innumerable*      Results from last 7 days   Lab Units 02/20/25  1857 02/20/25  1812 02/20/25  1805   BLOOD CULTURE   --  No Growth at 72 hrs. No Growth at 72 hrs.   URINE CULTURE  >100,000 cfu/ml Serratia marcescens*  --   --    INFLUENZA A PCR   --   --  Negative       Condition at Discharge:  good      Discharge instructions/Information to patient and family:   See after visit summary for information provided to patient and family.      Provisions for Follow-Up Care:  See after visit summary for information related to follow-up care and any pertinent home health orders.      Disposition:     Home       Discharge Statement:  I spent 40 minutes discharging the patient. This time was spent on the day of discharge. I had direct contact with the patient on the day of discharge. Greater than 50% of the total time was spent examining patient, answering all patient questions, arranging and discussing plan of care with patient as well as directly providing post-discharge instructions.  Additional time then spent on discharge  activities.    Discharge Medications:  See after visit summary for reconciled discharge medications provided to patient and family.      ** Please Note: This note has been constructed using a voice recognition system **

## 2025-02-24 NOTE — ASSESSMENT & PLAN NOTE
Lab Results   Component Value Date    HGBA1C 7.6 (H) 12/24/2024       Recent Labs     02/23/25  1539 02/23/25  2100 02/24/25  0713 02/24/25  1133   POCGLU 139 151* 150* 169*       Blood Sugar Average: Last 72 hrs:  (P) [id number] regimen: Amaryl, Actos, and Jardiance  Will hold home oral medications and placed on Lantus 5 units in the morning and SSI AC/at bedtime

## 2025-02-25 RX ORDER — CEFDINIR 300 MG/1
300 CAPSULE ORAL EVERY 12 HOURS SCHEDULED
Qty: 20 CAPSULE | Refills: 0 | Status: SHIPPED | OUTPATIENT
Start: 2025-02-25 | End: 2025-03-07

## 2025-02-25 NOTE — QUICK NOTE
Informed by lab patient blood cultures 1 out of 2 bottles after 4 days are growing Serratia.  Discussed with patient's daughter Jessica and patient was discharged on 7 days of cefdinir.  Sent a new prescription for total of 10 days at the time of discharge.  Daughter understands and will  the new prescriptions to complete a 10-day course

## 2025-02-25 NOTE — CASE MANAGEMENT
Case Management Progress Note    Patient name Felecia Edward  Location /-01 MRN [id number]  : 1960 Date 2025       LOS (days): 4  Geometric Mean LOS (GMLOS) (days): 4.8  Days to GMLOS:1.1        OBJECTIVE:        Current admission status: Inpatient  Preferred Pharmacy:   [id number] - [place] - [address] [id number]  Phone: [phone] Fax: [phone]    Primary Care Provider: Clarice Acuna MD    Primary Insurance: [employer]  Secondary Insurance:     PROGRESS NOTE:    Notification made to OP CM Handoff: [facility] OP CM regarding discharge planning and disposition.   Message left for Jordan PAIGE

## 2025-02-25 NOTE — UTILIZATION REVIEW
NOTIFICATION OF ADMISSION DISCHARGE   This is a Notification of Discharge from [facility]. Please be advised that this patient has been discharge from our facility. Below you will find the admission and discharge date and time including the patient’s disposition.   UTILIZATION REVIEW CONTACT:  Lizbeth Dempsey  Utilization   Network Utilization Review Department  Phone: [phone] x carefully listen to the prompts. All voicemails are confidential.  Email: [email or web address]     ADMISSION INFORMATION  PRESENTATION DATE: 2/20/2025  5:26 PM  OBERVATION ADMISSION DATE: N/A  INPATIENT ADMISSION DATE: 2/20/25  9:56 PM   DISCHARGE DATE: 2/24/2025  3:23 PM   DISPOSITION:Home/Self Care    Network Utilization Review Department  ATTENTION: Please call with any questions or concerns to [phone] and carefully listen to the prompts so that you are directed to the right person. All voicemails are confidential.   For Discharge needs, contact Care Management DC Support Team at [phone] opt. 2  Send all requests for admission clinical reviews, approved or denied determinations and any other requests to dedicated fax number below belonging to the campus where the patient is receiving treatment. List of dedicated fax numbers for the Facilities:  FACILITY NAME UR FAX NUMBER   ADMISSION DENIALS (Administrative/Medical Necessity) [phone]   DISCHARGE SUPPORT TEAM ([facility]) [phone]   PARENT CHILD HEALTH (Maternity/NICU/Pediatrics) [phone]   [facility] [phone]   [facility] [phone]   [facility] [phone]   [facility] [phone]   [facility] [phone]   [facility] [phone]   [facility] [phone]   [facility] [phone]    [facility] [phone]   [facility] [phone]   [facility] [phone]   [facility] [phone]

## 2025-02-26 ENCOUNTER — HOSPITAL ENCOUNTER (OUTPATIENT)
Dept: INFUSION CENTER | Facility: HOSPITAL | Age: 65
End: 2025-02-26
Attending: OBSTETRICS & GYNECOLOGY

## 2025-02-26 LAB
BACTERIA BLD CULT: ABNORMAL
BACTERIA BLD CULT: NORMAL
GRAM STN SPEC: ABNORMAL
S MARCESCENS DNA BLD POS NAA+NON-PROBE: DETECTED

## 2025-02-27 ENCOUNTER — HOSPITAL ENCOUNTER (OUTPATIENT)
Dept: INFUSION CENTER | Facility: HOSPITAL | Age: 65
End: 2025-02-27
Attending: OBSTETRICS & GYNECOLOGY

## 2025-03-03 ENCOUNTER — HOSPITAL ENCOUNTER (OUTPATIENT)
Dept: INFUSION CENTER | Facility: HOSPITAL | Age: 65
End: 2025-03-03

## 2025-03-05 RX ORDER — HYDROCHLOROTHIAZIDE 25 MG/1
TABLET ORAL
COMMUNITY
Start: 2025-02-28

## 2025-03-06 ENCOUNTER — PROCEDURE VISIT (OUTPATIENT)
Dept: UROLOGY | Facility: MEDICAL CENTER | Age: 65
End: 2025-03-06

## 2025-03-06 VITALS
HEIGHT: 64 IN | DIASTOLIC BLOOD PRESSURE: 70 MMHG | SYSTOLIC BLOOD PRESSURE: 140 MMHG | WEIGHT: 202 LBS | BODY MASS INDEX: 34.49 KG/M2

## 2025-03-06 DIAGNOSIS — N20.0 RIGHT RENAL STONE: Primary | ICD-10-CM

## 2025-03-06 PROCEDURE — 99024 POSTOP FOLLOW-UP VISIT: CPT

## 2025-03-06 NOTE — PROGRESS NOTES
"3/6/2025  Felecia Edward is a 64 y.o. female  [id number]        Diagnosis  Chief Complaint    Post-op         Patient is s/p right ureteroscopy with stone extraction on 2/13/25 with Dr. Magallon    Plan  Return to the [place] office for FU      Procedure Stent with String Removal    Vitals:    03/06/25 0951   BP: 140/70   BP Location: Left arm   Patient Position: Sitting   Cuff Size: Adult   Weight: 91.6 kg (202 lb)   Height: 5' 4\" (1.626 m)       Stent with string removed intact without difficulty. Reviewed post stent removal symptoms including flank pain, dysuria, and hematuria. Instructed patient to increase oral fluid intake.  Encouraged the use of NSAIDS and other prescribed pain medication as needed for discomfort. Patient instructed to call the office or report to the ER for uncontrolled pain, fever, chills, nausea or vomiting.       Alessia Hugo RN   "

## 2025-03-10 ENCOUNTER — HOSPITAL ENCOUNTER (OUTPATIENT)
Dept: INFUSION CENTER | Facility: HOSPITAL | Age: 65
Discharge: HOME | End: 2025-03-10
Payer: COMMERCIAL

## 2025-03-10 DIAGNOSIS — Z45.2 ENCOUNTER FOR CENTRAL LINE CARE: Primary | ICD-10-CM

## 2025-03-10 DIAGNOSIS — C55 UTERINE LEIOMYOSARCOMA (HCC): ICD-10-CM

## 2025-03-10 LAB
ALBUMIN SERPL BCG-MCNC: 4.2 G/DL (ref 3.5–5)
ALP SERPL-CCNC: 81 U/L (ref 34–104)
ALT SERPL W P-5'-P-CCNC: 14 U/L (ref 7–52)
ANION GAP SERPL CALCULATED.3IONS-SCNC: 11 MMOL/L (ref 4–13)
AST SERPL W P-5'-P-CCNC: 19 U/L (ref 13–39)
BASOPHILS # BLD AUTO: 0.01 THOUSANDS/ÂΜL (ref 0–0.1)
BASOPHILS NFR BLD AUTO: 0 % (ref 0–1)
BILIRUB SERPL-MCNC: 1.04 MG/DL (ref 0.2–1)
BUN SERPL-MCNC: 14 MG/DL (ref 5–25)
CALCIUM SERPL-MCNC: 9.1 MG/DL (ref 8.4–10.2)
CHLORIDE SERPL-SCNC: 104 MMOL/L (ref 96–108)
CK SERPL-CCNC: 38 U/L (ref 26–192)
CO2 SERPL-SCNC: 26 MMOL/L (ref 21–32)
CREAT SERPL-MCNC: 0.56 MG/DL (ref 0.6–1.3)
EOSINOPHIL # BLD AUTO: 0.09 THOUSAND/ÂΜL (ref 0–0.61)
EOSINOPHIL NFR BLD AUTO: 2 % (ref 0–6)
ERYTHROCYTE [DISTWIDTH] IN BLOOD BY AUTOMATED COUNT: 14.6 % (ref 11.6–15.1)
GFR SERPL CREATININE-BSD FRML MDRD: 98 ML/MIN/1.73SQ M
GLUCOSE SERPL-MCNC: 168 MG/DL (ref 65–140)
HCT VFR BLD AUTO: 37.5 % (ref 34.8–46.1)
HGB BLD-MCNC: 12.1 G/DL (ref 11.5–15.4)
IMM GRANULOCYTES # BLD AUTO: 0.03 THOUSAND/UL (ref 0–0.2)
IMM GRANULOCYTES NFR BLD AUTO: 1 % (ref 0–2)
LYMPHOCYTES # BLD AUTO: 2.61 THOUSANDS/ÂΜL (ref 0.6–4.47)
LYMPHOCYTES NFR BLD AUTO: 44 % (ref 14–44)
MAGNESIUM SERPL-MCNC: 1.6 MG/DL (ref 1.9–2.7)
MCH RBC QN AUTO: 28.8 PG (ref 26.8–34.3)
MCHC RBC AUTO-ENTMCNC: 32.3 G/DL (ref 31.4–37.4)
MCV RBC AUTO: 89 FL (ref 82–98)
MONOCYTES # BLD AUTO: 0.3 THOUSAND/ÂΜL (ref 0.17–1.22)
MONOCYTES NFR BLD AUTO: 5 % (ref 4–12)
NEUTROPHILS # BLD AUTO: 2.95 THOUSANDS/ÂΜL (ref 1.85–7.62)
NEUTS SEG NFR BLD AUTO: 48 % (ref 43–75)
NRBC BLD AUTO-RTO: 0 /100 WBCS
PLATELET # BLD AUTO: 263 THOUSANDS/UL (ref 149–390)
PMV BLD AUTO: 9.7 FL (ref 8.9–12.7)
POTASSIUM SERPL-SCNC: 3.6 MMOL/L (ref 3.5–5.3)
PROT SERPL-MCNC: 7.3 G/DL (ref 6.4–8.4)
RBC # BLD AUTO: 4.2 MILLION/UL (ref 3.81–5.12)
SODIUM SERPL-SCNC: 141 MMOL/L (ref 135–147)
WBC # BLD AUTO: 5.99 THOUSAND/UL (ref 4.31–10.16)

## 2025-03-10 PROCEDURE — 82550 ASSAY OF CK (CPK): CPT

## 2025-03-10 PROCEDURE — 85025 COMPLETE CBC W/AUTO DIFF WBC: CPT

## 2025-03-10 PROCEDURE — 83735 ASSAY OF MAGNESIUM: CPT

## 2025-03-10 PROCEDURE — 80053 COMPREHEN METABOLIC PANEL: CPT

## 2025-03-10 NOTE — PROGRESS NOTES
Felecia Edward  tolerated treatment well with no complications.      Felecia Edward is aware of future appt on 03/12/2025 at 1000.     AVS printed and given to Felecia Edward:    No (Declined by Felecia Edward)

## 2025-03-11 RX ORDER — PALONOSETRON 0.05 MG/ML
0.25 INJECTION, SOLUTION INTRAVENOUS ONCE
Status: CANCELLED | OUTPATIENT
Start: 2025-03-12

## 2025-03-11 RX ORDER — MAGNESIUM SULFATE HEPTAHYDRATE 40 MG/ML
2 INJECTION, SOLUTION INTRAVENOUS ONCE
Status: CANCELLED
Start: 2025-03-12

## 2025-03-11 RX ORDER — SODIUM CHLORIDE 9 MG/ML
20 INJECTION, SOLUTION INTRAVENOUS ONCE
Status: CANCELLED | OUTPATIENT
Start: 2025-03-12

## 2025-03-11 RX ORDER — DOXORUBICIN HYDROCHLORIDE 2 MG/ML
45 INJECTION, SOLUTION INTRAVENOUS ONCE
Status: CANCELLED | OUTPATIENT
Start: 2025-03-12

## 2025-03-12 ENCOUNTER — HOSPITAL ENCOUNTER (OUTPATIENT)
Dept: INFUSION CENTER | Facility: HOSPITAL | Age: 65
Discharge: HOME | End: 2025-03-12
Attending: OBSTETRICS & GYNECOLOGY
Payer: COMMERCIAL

## 2025-03-12 VITALS — HEIGHT: 64 IN | WEIGHT: 209.66 LBS | BODY MASS INDEX: 35.79 KG/M2

## 2025-03-12 DIAGNOSIS — E83.42 HYPOMAGNESEMIA: Primary | ICD-10-CM

## 2025-03-12 DIAGNOSIS — C55 UTERINE LEIOMYOSARCOMA (HCC): ICD-10-CM

## 2025-03-12 DIAGNOSIS — T45.1X5A CHEMOTHERAPY INDUCED NEUTROPENIA (HCC): ICD-10-CM

## 2025-03-12 DIAGNOSIS — C55 UTERINE LEIOMYOSARCOMA (HCC): Primary | ICD-10-CM

## 2025-03-12 DIAGNOSIS — D70.1 CHEMOTHERAPY INDUCED NEUTROPENIA (HCC): ICD-10-CM

## 2025-03-12 PROCEDURE — 96375 TX/PRO/DX INJ NEW DRUG ADDON: CPT

## 2025-03-12 PROCEDURE — 96413 CHEMO IV INFUSION 1 HR: CPT

## 2025-03-12 PROCEDURE — 96411 CHEMO IV PUSH ADDL DRUG: CPT

## 2025-03-12 PROCEDURE — 96367 TX/PROPH/DG ADDL SEQ IV INF: CPT

## 2025-03-12 PROCEDURE — 96415 CHEMO IV INFUSION ADDL HR: CPT

## 2025-03-12 RX ORDER — LORAZEPAM 1 MG/1
1 TABLET ORAL EVERY 8 HOURS PRN
Qty: 20 TABLET | Refills: 0 | Status: SHIPPED | OUTPATIENT
Start: 2025-03-12

## 2025-03-12 RX ORDER — PALONOSETRON 0.05 MG/ML
0.25 INJECTION, SOLUTION INTRAVENOUS ONCE
Status: COMPLETED | OUTPATIENT
Start: 2025-03-12 | End: 2025-03-12

## 2025-03-12 RX ORDER — ONDANSETRON 8 MG/1
8 TABLET, FILM COATED ORAL EVERY 8 HOURS PRN
Qty: 30 TABLET | Refills: 1 | Status: SHIPPED | OUTPATIENT
Start: 2025-03-12

## 2025-03-12 RX ORDER — SODIUM CHLORIDE 9 MG/ML
20 INJECTION, SOLUTION INTRAVENOUS ONCE
Status: COMPLETED | OUTPATIENT
Start: 2025-03-12 | End: 2025-03-12

## 2025-03-12 RX ORDER — MAGNESIUM SULFATE HEPTAHYDRATE 40 MG/ML
2 INJECTION, SOLUTION INTRAVENOUS ONCE
Status: COMPLETED | OUTPATIENT
Start: 2025-03-12 | End: 2025-03-12

## 2025-03-12 RX ORDER — DOXORUBICIN HYDROCHLORIDE 2 MG/ML
45 INJECTION, SOLUTION INTRAVENOUS ONCE
Status: COMPLETED | OUTPATIENT
Start: 2025-03-12 | End: 2025-03-12

## 2025-03-12 RX ADMIN — MAGNESIUM SULFATE IN WATER 2 G: 40 INJECTION, SOLUTION INTRAVENOUS at 09:59

## 2025-03-12 RX ADMIN — FOSAPREPITANT 150 MG: 150 INJECTION, POWDER, LYOPHILIZED, FOR SOLUTION INTRAVENOUS at 11:36

## 2025-03-12 RX ADMIN — PALONOSETRON 0.25 MG: 0.05 INJECTION, SOLUTION INTRAVENOUS at 11:06

## 2025-03-12 RX ADMIN — SODIUM CHLORIDE 20 ML/HR: 9 INJECTION, SOLUTION INTRAVENOUS at 09:59

## 2025-03-12 RX ADMIN — DEXAMETHASONE SODIUM PHOSPHATE 20 MG: 10 INJECTION, SOLUTION INTRAMUSCULAR; INTRAVENOUS at 11:05

## 2025-03-12 RX ADMIN — TRABECTEDIN 1.77 MG: 0.05 INJECTION, POWDER, LYOPHILIZED, FOR SOLUTION INTRAVENOUS at 12:20

## 2025-03-12 RX ADMIN — DOXORUBICIN HYDROCHLORIDE 88 MG: 2 INJECTION, SOLUTION INTRAVENOUS at 15:18

## 2025-03-12 NOTE — PROGRESS NOTES
Felecia Edward  tolerated treatment well with no complications.      Felecia Edward is aware of future appt on 03/13/2025 at 03:30 PM.     AVS printed and given to Felecia Edward:  Yes

## 2025-03-13 ENCOUNTER — HOSPITAL ENCOUNTER (OUTPATIENT)
Dept: INFUSION CENTER | Facility: HOSPITAL | Age: 65
Discharge: HOME | End: 2025-03-13
Attending: OBSTETRICS & GYNECOLOGY
Payer: COMMERCIAL

## 2025-03-13 ENCOUNTER — HOSPITAL ENCOUNTER (OUTPATIENT)
Dept: ULTRASOUND IMAGING | Facility: HOSPITAL | Age: 65
End: 2025-03-13
Payer: COMMERCIAL

## 2025-03-13 DIAGNOSIS — T45.1X5A CHEMOTHERAPY INDUCED NEUTROPENIA (HCC): ICD-10-CM

## 2025-03-13 DIAGNOSIS — C55 UTERINE LEIOMYOSARCOMA (HCC): Primary | ICD-10-CM

## 2025-03-13 DIAGNOSIS — N20.0 RIGHT RENAL STONE: ICD-10-CM

## 2025-03-13 DIAGNOSIS — D70.1 CHEMOTHERAPY INDUCED NEUTROPENIA (HCC): ICD-10-CM

## 2025-03-13 PROCEDURE — 76775 US EXAM ABDO BACK WALL LIM: CPT

## 2025-03-13 PROCEDURE — 96372 THER/PROPH/DIAG INJ SC/IM: CPT

## 2025-03-13 RX ADMIN — PEGFILGRASTIM-APGF 6 MG: 6 INJECTION, SOLUTION SUBCUTANEOUS at 15:32

## 2025-03-13 NOTE — PROGRESS NOTES
Felecia Edward  tolerated treatment well with no complications.      Felecia Edward is aware of future appt on 3/17 at 11am.     AVS printed and given to Felecia Edward:  Yes

## 2025-03-13 NOTE — PLAN OF CARE
Problem: INFECTION - ADULT  Goal: Absence or prevention of progression during hospitalization  Description: INTERVENTIONS:  - Assess and monitor for signs and symptoms of infection  - Monitor lab/diagnostic results  - Monitor all insertion sites, i.e. indwelling lines, tubes, and drains  - Monitor endotracheal if appropriate and nasal secretions for changes in amount and color  - [place] appropriate cooling/warming therapies per order  - Administer medications as ordered  - Instruct and encourage patient and family to use good hand hygiene technique  - Identify and instruct in appropriate isolation precautions for identified infection/condition  Outcome: Progressing  Goal: Absence of fever/infection during neutropenic period  Description: INTERVENTIONS:  - Monitor WBC    Outcome: Progressing

## 2025-03-17 ENCOUNTER — HOSPITAL ENCOUNTER (OUTPATIENT)
Dept: INFUSION CENTER | Facility: HOSPITAL | Age: 65
Discharge: HOME | End: 2025-03-17
Payer: COMMERCIAL

## 2025-03-17 DIAGNOSIS — Z45.2 ENCOUNTER FOR CENTRAL LINE CARE: Primary | ICD-10-CM

## 2025-03-17 DIAGNOSIS — C55 UTERINE LEIOMYOSARCOMA (HCC): ICD-10-CM

## 2025-03-17 LAB
ALBUMIN SERPL BCG-MCNC: 4.2 G/DL (ref 3.5–5)
ALP SERPL-CCNC: 106 U/L (ref 34–104)
ALT SERPL W P-5'-P-CCNC: 27 U/L (ref 7–52)
ANION GAP SERPL CALCULATED.3IONS-SCNC: 10 MMOL/L (ref 4–13)
AST SERPL W P-5'-P-CCNC: 22 U/L (ref 13–39)
BASOPHILS # BLD MANUAL: 0 THOUSAND/UL (ref 0–0.1)
BASOPHILS NFR MAR MANUAL: 0 % (ref 0–1)
BILIRUB SERPL-MCNC: 1.3 MG/DL (ref 0.2–1)
BUN SERPL-MCNC: 23 MG/DL (ref 5–25)
CALCIUM SERPL-MCNC: 9 MG/DL (ref 8.4–10.2)
CHLORIDE SERPL-SCNC: 102 MMOL/L (ref 96–108)
CO2 SERPL-SCNC: 25 MMOL/L (ref 21–32)
CREAT SERPL-MCNC: 0.72 MG/DL (ref 0.6–1.3)
EOSINOPHIL # BLD MANUAL: 0.24 THOUSAND/UL (ref 0–0.4)
EOSINOPHIL NFR BLD MANUAL: 3 % (ref 0–6)
ERYTHROCYTE [DISTWIDTH] IN BLOOD BY AUTOMATED COUNT: 15 % (ref 11.6–15.1)
GFR SERPL CREATININE-BSD FRML MDRD: 88 ML/MIN/1.73SQ M
GLUCOSE SERPL-MCNC: 156 MG/DL (ref 65–140)
HCT VFR BLD AUTO: 38.6 % (ref 34.8–46.1)
HGB BLD-MCNC: 12.6 G/DL (ref 11.5–15.4)
LYMPHOCYTES # BLD AUTO: 2.22 THOUSAND/UL (ref 0.6–4.47)
LYMPHOCYTES # BLD AUTO: 26 % (ref 14–44)
MAGNESIUM SERPL-MCNC: 2 MG/DL (ref 1.9–2.7)
MCH RBC QN AUTO: 29.7 PG (ref 26.8–34.3)
MCHC RBC AUTO-ENTMCNC: 32.6 G/DL (ref 31.4–37.4)
MCV RBC AUTO: 91 FL (ref 82–98)
MONOCYTES # BLD AUTO: 0 THOUSAND/UL (ref 0–1.22)
MONOCYTES NFR BLD: 0 % (ref 4–12)
NEUTROPHILS # BLD MANUAL: 5.46 THOUSAND/UL (ref 1.85–7.62)
NEUTS SEG NFR BLD AUTO: 69 % (ref 43–75)
PLATELET # BLD AUTO: 195 THOUSANDS/UL (ref 149–390)
PLATELET BLD QL SMEAR: ADEQUATE
PLATELET CLUMP BLD QL SMEAR: PRESENT
PMV BLD AUTO: 9.9 FL (ref 8.9–12.7)
POTASSIUM SERPL-SCNC: 3.7 MMOL/L (ref 3.5–5.3)
PROT SERPL-MCNC: 7.2 G/DL (ref 6.4–8.4)
RBC # BLD AUTO: 4.24 MILLION/UL (ref 3.81–5.12)
RBC MORPH BLD: NORMAL
SODIUM SERPL-SCNC: 137 MMOL/L (ref 135–147)
VARIANT LYMPHS # BLD AUTO: 2 %
WBC # BLD AUTO: 7.92 THOUSAND/UL (ref 4.31–10.16)

## 2025-03-17 PROCEDURE — 83735 ASSAY OF MAGNESIUM: CPT

## 2025-03-17 PROCEDURE — 80053 COMPREHEN METABOLIC PANEL: CPT

## 2025-03-17 PROCEDURE — 85027 COMPLETE CBC AUTOMATED: CPT

## 2025-03-17 PROCEDURE — 85007 BL SMEAR W/DIFF WBC COUNT: CPT

## 2025-03-17 NOTE — PROGRESS NOTES
Felecia Edward  tolerated treatment well with no complications.      Felecia Edward is aware of future appt on 3/24 at 1430.     AVS printed and given to Felecia Edward:  No (Declined by Felecia Edward)

## 2025-03-18 ENCOUNTER — RESULTS FOLLOW-UP (OUTPATIENT)
Dept: UROLOGY | Facility: MEDICAL CENTER | Age: 65
End: 2025-03-18

## 2025-03-18 NOTE — RESULT ENCOUNTER NOTE
Inform pt that the  test was normal.  The right hydronephrosis has improved post stent removal.  There may be a small nonblocking left kidney stone.  Keep usual follow up appt.

## 2025-03-23 PROBLEM — R65.20 SEVERE SEPSIS (HCC): Status: RESOLVED | Noted: 2025-02-21 | Resolved: 2025-03-23

## 2025-03-23 PROBLEM — N12 PYELONEPHRITIS: Status: RESOLVED | Noted: 2025-02-21 | Resolved: 2025-03-23

## 2025-03-23 PROBLEM — A41.9 SEVERE SEPSIS (HCC): Status: RESOLVED | Noted: 2025-02-21 | Resolved: 2025-03-23

## 2025-03-24 ENCOUNTER — HOSPITAL ENCOUNTER (OUTPATIENT)
Dept: INFUSION CENTER | Facility: HOSPITAL | Age: 65
Discharge: HOME | End: 2025-03-24
Payer: COMMERCIAL

## 2025-03-24 DIAGNOSIS — Z45.2 ENCOUNTER FOR CENTRAL LINE CARE: ICD-10-CM

## 2025-03-24 DIAGNOSIS — C55 UTERINE LEIOMYOSARCOMA (HCC): Primary | ICD-10-CM

## 2025-03-24 LAB
ALBUMIN SERPL BCG-MCNC: 3.8 G/DL (ref 3.5–5)
ALP SERPL-CCNC: 94 U/L (ref 34–104)
ALT SERPL W P-5'-P-CCNC: 14 U/L (ref 7–52)
ANION GAP SERPL CALCULATED.3IONS-SCNC: 11 MMOL/L (ref 4–13)
ANISOCYTOSIS BLD QL SMEAR: PRESENT
AST SERPL W P-5'-P-CCNC: 10 U/L (ref 13–39)
BASOPHILS # BLD MANUAL: 0 THOUSAND/UL (ref 0–0.1)
BASOPHILS NFR MAR MANUAL: 0 % (ref 0–1)
BILIRUB SERPL-MCNC: 0.49 MG/DL (ref 0.2–1)
BUN SERPL-MCNC: 10 MG/DL (ref 5–25)
CALCIUM SERPL-MCNC: 8.9 MG/DL (ref 8.4–10.2)
CHLORIDE SERPL-SCNC: 101 MMOL/L (ref 96–108)
CO2 SERPL-SCNC: 26 MMOL/L (ref 21–32)
CREAT SERPL-MCNC: 0.61 MG/DL (ref 0.6–1.3)
DOHLE BOD BLD QL SMEAR: PRESENT
EOSINOPHIL # BLD MANUAL: 0.1 THOUSAND/UL (ref 0–0.4)
EOSINOPHIL NFR BLD MANUAL: 1 % (ref 0–6)
ERYTHROCYTE [DISTWIDTH] IN BLOOD BY AUTOMATED COUNT: 14.9 % (ref 11.6–15.1)
GFR SERPL CREATININE-BSD FRML MDRD: 95 ML/MIN/1.73SQ M
GLUCOSE SERPL-MCNC: 184 MG/DL (ref 65–140)
HCT VFR BLD AUTO: 34.2 % (ref 34.8–46.1)
HGB BLD-MCNC: 11.3 G/DL (ref 11.5–15.4)
LYMPHOCYTES # BLD AUTO: 2.97 THOUSAND/UL (ref 0.6–4.47)
LYMPHOCYTES # BLD AUTO: 23 % (ref 14–44)
MAGNESIUM SERPL-MCNC: 1.4 MG/DL (ref 1.9–2.7)
MCH RBC QN AUTO: 29 PG (ref 26.8–34.3)
MCHC RBC AUTO-ENTMCNC: 33 G/DL (ref 31.4–37.4)
MCV RBC AUTO: 88 FL (ref 82–98)
METAMYELOCYTE ABSOLUTE CT: 0.2 THOUSAND/UL (ref 0–0.1)
METAMYELOCYTES NFR BLD MANUAL: 2 % (ref 0–1)
MICROCYTES BLD QL AUTO: PRESENT
MONOCYTES # BLD AUTO: 0.5 THOUSAND/UL (ref 0–1.22)
MONOCYTES NFR BLD: 5 % (ref 4–12)
MYELOCYTE ABSOLUTE CT: 0.1 THOUSAND/UL (ref 0–0.1)
MYELOCYTES NFR BLD MANUAL: 1 % (ref 0–1)
NEUTROPHILS # BLD MANUAL: 6.05 THOUSAND/UL (ref 1.85–7.62)
NEUTS SEG NFR BLD AUTO: 61 % (ref 43–75)
PLATELET # BLD AUTO: 255 THOUSANDS/UL (ref 149–390)
PLATELET BLD QL SMEAR: ADEQUATE
PMV BLD AUTO: 10.4 FL (ref 8.9–12.7)
POTASSIUM SERPL-SCNC: 2.9 MMOL/L (ref 3.5–5.3)
PROT SERPL-MCNC: 7.1 G/DL (ref 6.4–8.4)
RBC # BLD AUTO: 3.9 MILLION/UL (ref 3.81–5.12)
RBC MORPH BLD: PRESENT
SODIUM SERPL-SCNC: 138 MMOL/L (ref 135–147)
TOXIC GRANULES BLD QL SMEAR: PRESENT
VARIANT LYMPHS # BLD AUTO: 7 %
WBC # BLD AUTO: 9.91 THOUSAND/UL (ref 4.31–10.16)

## 2025-03-24 PROCEDURE — 80053 COMPREHEN METABOLIC PANEL: CPT

## 2025-03-24 PROCEDURE — 85007 BL SMEAR W/DIFF WBC COUNT: CPT

## 2025-03-24 PROCEDURE — 83735 ASSAY OF MAGNESIUM: CPT

## 2025-03-24 PROCEDURE — 85027 COMPLETE CBC AUTOMATED: CPT

## 2025-03-24 NOTE — PROGRESS NOTES
Felecia Edward  tolerated treatment well with no complications.      Felecia Edward is aware of future appt on 3/31 at 2 pm.     AVS printed and given to Felecia Edward:  No (Declined by Felecia Edward)

## 2025-03-27 ENCOUNTER — APPOINTMENT (OUTPATIENT)
Dept: LAB | Facility: HOSPITAL | Age: 65
End: 2025-03-27
Payer: COMMERCIAL

## 2025-03-27 ENCOUNTER — OFFICE VISIT (OUTPATIENT)
Age: 65
End: 2025-03-27

## 2025-03-27 ENCOUNTER — DOCUMENTATION (OUTPATIENT)
Dept: HEMATOLOGY ONCOLOGY | Facility: CLINIC | Age: 65
End: 2025-03-27

## 2025-03-27 VITALS
WEIGHT: 204 LBS | SYSTOLIC BLOOD PRESSURE: 128 MMHG | BODY MASS INDEX: 34.83 KG/M2 | HEIGHT: 64 IN | TEMPERATURE: 97.2 F | HEART RATE: 99 BPM | DIASTOLIC BLOOD PRESSURE: 72 MMHG | RESPIRATION RATE: 20 BRPM | OXYGEN SATURATION: 97 %

## 2025-03-27 DIAGNOSIS — R30.0 DYSURIA: ICD-10-CM

## 2025-03-27 DIAGNOSIS — E87.6 HYPOKALEMIA: ICD-10-CM

## 2025-03-27 DIAGNOSIS — L73.9 FOLLICULITIS: ICD-10-CM

## 2025-03-27 DIAGNOSIS — C55 UTERINE LEIOMYOSARCOMA (HCC): Primary | ICD-10-CM

## 2025-03-27 PROCEDURE — 99024 POSTOP FOLLOW-UP VISIT: CPT | Performed by: PHYSICIAN ASSISTANT

## 2025-03-27 PROCEDURE — 87077 CULTURE AEROBIC IDENTIFY: CPT

## 2025-03-27 PROCEDURE — 87086 URINE CULTURE/COLONY COUNT: CPT

## 2025-03-27 PROCEDURE — 87186 SC STD MICRODIL/AGAR DIL: CPT

## 2025-03-27 RX ORDER — BETAMETHASONE DIPROPIONATE 0.5 MG/ML
LOTION, AUGMENTED TOPICAL 2 TIMES DAILY
Qty: 60 ML | Refills: 1 | Status: SHIPPED | OUTPATIENT
Start: 2025-03-27

## 2025-03-27 RX ORDER — CLINDAMYCIN PHOSPHATE 11.9 MG/ML
SOLUTION TOPICAL 2 TIMES DAILY
Qty: 60 ML | Refills: 1 | Status: SHIPPED | OUTPATIENT
Start: 2025-03-27

## 2025-03-27 RX ORDER — NITROFURANTOIN 25; 75 MG/1; MG/1
100 CAPSULE ORAL 2 TIMES DAILY
Qty: 10 CAPSULE | Refills: 0 | Status: SHIPPED | OUTPATIENT
Start: 2025-03-27

## 2025-03-27 RX ORDER — POTASSIUM CHLORIDE 1500 MG/1
20 TABLET, EXTENDED RELEASE ORAL 2 TIMES DAILY
Qty: 60 TABLET | Refills: 0 | Status: SHIPPED | OUTPATIENT
Start: 2025-03-27

## 2025-03-27 NOTE — PATIENT INSTRUCTIONS
For nausea:  Start taking zofran with or without ativan every 8 hrs starting approximately Saturday thru Wednesday after treatment.     For the diarrhea:   Once diarrhea starts, take 2 tablets of imodium (4mg) every 6 hrs.     Keep hydrated!

## 2025-03-27 NOTE — ASSESSMENT & PLAN NOTE
Related to developing alopecia.   Plan for topical steroids/antibiotic.   Monitor closely.   Orders:    betamethasone, augmented, (DIPROLENE) 0.05 % lotion; Apply topically 2 (two) times a day    clindamycin (CLEOCIN T) 1 % external solution; Apply topically 2 (two) times a day

## 2025-03-27 NOTE — PROGRESS NOTES
Name: Felecia Edward      : 1960      MRN: [id number]  Encounter Provider: Pati Jerome PA-C  Encounter Date: 3/27/2025   Encounter department: [facility] GYNECOLOGY ONCOLOGY [facility]  :  Assessment & Plan  Folliculitis  Related to developing alopecia.   Plan for topical steroids/antibiotic.   Monitor closely.   Orders:    betamethasone, augmented, (DIPROLENE) 0.05 % lotion; Apply topically 2 (two) times a day    clindamycin (CLEOCIN T) 1 % external solution; Apply topically 2 (two) times a day    Hypokalemia  Plan for oral repletion. Trend CMP.  Orders:    potassium chloride (Klor-Con M20) 20 mEq tablet; Take 1 tablet (20 mEq total) by mouth 2 (two) times a day    Dysuria  New onset dysuria without frequency.   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 She experienced treatment related fatigue, nausea and diarrhea. Her symptoms improved after approximately 5 days. Otherwise, she is tolerating treatment well.     Continue with cycle 2 of treatment as planned, as long as her metabolic and hematologic parameters are adequate.     We discussed ways to improve treatment related symptoms. The patient will plan to start zofran every 8 hrs continuously days 2-7 after treatment with prn ativan. Additionally, she will increase her imodium use if she experiences diarrhea again, using imodium 4mg PO every 6 hrs.     The patient will call the office with poorly managed symptoms following cycle 2.     Return to the office as per her chemotherapy calendar.                History of Present Illness     Reason for  Visit / CC:  Pre-Chemo Visit    Felecia Edward is a 64 y.o. female   Who presents to the office for pre-chemotherapy evaluation. She has been afebrile. Patient notes feeling welling for the first and second day following treatment. After that, she developed fatigue, occasional diarrhea and nausea. She denies abdominal pain. This last         Oncology History   Cancer Staging   Uterine leiomyosarcoma (HCC)  Staging form: Corpus Uteri - Sarcoma, AJCC 8th Edition  - Pathologic stage from 6/4/2023: FIGO Stage II, calculated as Stage Unknown (pT2, pNX, cM0) - Signed by Gordo Taveras MD on 6/29/2023  Stage prefix: Initial diagnosis  Oncology History   Uterine leiomyosarcoma (HCC)   6/4/2023 Initial Diagnosis    Uterine sarcoma (HCC)     6/4/2023 -  Cancer Staged    Staging form: Corpus Uteri - Sarcoma, AJCC 8th Edition  - Pathologic stage from 6/4/2023: FIGO Stage II, calculated as Stage Unknown (pT2, pNX, cM0) - Signed by Gordo Taveras MD on 6/29/2023  Stage prefix: Initial diagnosis       6/4/2023 Surgery    HYSTERECTOMY TOTAL ABDOMINAL (DOROTHY). BILATERAL SALPINGOOPHORECTOMY  EXCISION  BIOPSY LESION/MASS ABDOMINAL-PELVIC PERITONEUM  -Leiomyosarcoma 13.5 cm extending through the myometrium, right pelvic peritoneum involved with uterine leiomyosarcoma with tumor present at unoriented resection surface.     7/11/2023 -  Chemotherapy    Gemzar 800 mg/m2 IV day 1 and 8 as well as taxotere 65 mg/m2 day 8 every 21 days.        - 12/1/2023 Chemotherapy    Adriamycin 50 mg/m2 IV every 21 days.  Including the gemcitabine and Taxotere, she completed a total of 7 cycles.     1/6/2025 Surgery    ROBOTIC ASSISTED RADICAL BILATERAL PARAMETRECTOMY. RESECTION BILATERAL PARAMETRIAL TUMORS. UPPER VAGINECTOMY. LEFT OBTURATOR LYMPH NODE DISSECTION  CYSTOSCOPY     3/12/2025 -  Chemotherapy    Adriamycin 45 mg/m2 and trabectidin 0.9 mg/m2 IV every 21 days          Review of Systems   Constitutional:  Positive for fatigue.    HENT: Negative.     Eyes: Negative.    Respiratory: Negative.     Cardiovascular: Negative.    Gastrointestinal:  Positive for diarrhea and nausea. Negative for abdominal distention.   Genitourinary: Negative.    Musculoskeletal: Negative.    Skin:  Positive for rash (as per HPI).   Neurological: Negative.    Psychiatric/Behavioral: Negative.      A complete review of systems is negative other than that noted above in the HPI.  Medical History Reviewed by provider this encounter:     .  Current Outpatient Medications on File Prior to Visit   Medication Sig Dispense Refill    aspirin (Aspirin 81) 81 mg chewable tablet every 24 hours      atorvastatin (LIPITOR) 40 mg tablet Take 40 mg by mouth daily at bedtime      cetirizine (ZyrTEC) 10 mg tablet Take 10 mg by mouth daily at bedtime      cholecalciferol (VITAMIN D3) 1,000 units tablet Take 2,000 Units by mouth daily 2 daily      dicyclomine (BENTYL) 10 mg capsule Take 1 capsule (10 mg total) by mouth every 6 (six) hours as needed (pain) 30 capsule 3    DULoxetine (CYMBALTA) 30 mg delayed release capsule TAKE 1 CAPSULE BY MOUTH EVERY DAY FOR 90 DAYS      glimepiride (AMARYL) 4 mg tablet Take 4 mg by mouth 2 (two) times a day      hydroCHLOROthiazide 25 mg tablet       ibuprofen (MOTRIN) 800 mg tablet Take 800 mg by mouth every 6 (six) hours as needed for mild pain      Jardiance 10 MG TABS tablet       ketorolac (TORADOL) 10 mg tablet Take 1 tablet (10 mg total) by mouth every 6 (six) hours as needed for moderate pain for up to 5 days 20 tablet 0    LORazepam (ATIVAN) 1 mg tablet Take 1 tablet (1 mg total) by mouth every 8 (eight) hours as needed (nausea or anxiety) 20 tablet 0    losartan (COZAAR) 50 mg tablet Take 50 mg by mouth daily      meclizine (ANTIVERT) 25 mg tablet Take by mouth every 12 (twelve) hours as needed for dizziness      metoprolol succinate (TOPROL-XL) 25 mg 24 hr tablet Take 25 mg by mouth daily      Multiple Vitamin (MULTIVITAMIN) tablet Take  "1 tablet by mouth daily      ondansetron (ZOFRAN) 8 mg tablet Take 1 tablet (8 mg total) by mouth every 8 (eight) hours as needed for nausea or vomiting 30 tablet 1    oxyCODONE (Roxicodone) 5 immediate release tablet Take 1 tablet (5 mg total) by mouth every 6 (six) hours as needed for moderate pain for up to 8 doses Max Daily Amount: 20 mg (Patient taking differently: Take 2.5 mg by mouth every 6 (six) hours as needed for moderate pain) 8 tablet 0    pantoprazole (PROTONIX) 40 mg tablet Take 1 tablet (40 mg total) by mouth daily 90 tablet 5    phenazopyridine (PYRIDIUM) 200 mg tablet Take 1 tablet (200 mg total) by mouth 3 (three) times a day as needed (Dysuria and bladder discomfort.  Take with meals.) 10 tablet 0    pioglitazone (ACTOS) 30 mg tablet every 24 hours      rOPINIRole (REQUIP) 1 mg tablet TAKE 1 TABLET BY MOUTH 1 TO 3 HOURS BEFORE BEDTIME      sertraline (ZOLOFT) 100 mg tablet TAKE 1 AND 1/2 TABS BY MOUTH DAILY      tamsulosin (FLOMAX) 0.4 mg Take 1 capsule (0.4 mg total) by mouth every evening for 14 days 14 capsule 0    topiramate (TOPAMAX) 100 mg tablet 1 tablet Orally at bedtime      Triamcinolone Acetonide 55 MCG/ACT AERO into each nostril One spray each nostril daily      Xiidra 5 % op solution INSTILL 1 DROP INTO BOTH EYES TWICE A DAY 12 HOURS APART      ZOLMitriptan (ZOMIG) 5 MG tablet Take 5 mg by mouth once as needed for migraine Daily prn       No current facility-administered medications on file prior to visit.         Objective   /72 (BP Location: Left arm, Patient Position: Sitting, Cuff Size: Adult)   Pulse 99   Temp (!) 97.2 °F (36.2 °C) (Temporal)   Resp 20   Ht 5' 4\" (1.626 m)   Wt 92.5 kg (204 lb)   SpO2 97%   BMI 35.02 kg/m²     Body mass index is 35.02 kg/m².  Pain Screening:  Pain Score: 0-No pain  ECOG       Physical Exam  Vitals reviewed.   Constitutional:       General: She is not in acute distress.     Appearance: Normal appearance. She is not ill-appearing. "   HENT:      Head: Normocephalic and atraumatic.      Mouth/Throat:      Mouth: Mucous membranes are moist.   Eyes:      General: No scleral icterus.        Right eye: No discharge.         Left eye: No discharge.      Conjunctiva/sclera: Conjunctivae normal.   Pulmonary:      Effort: Pulmonary effort is normal.   Musculoskeletal:      Right lower leg: No edema.      Left lower leg: No edema.   Skin:     General: Skin is warm and dry.      Coloration: Skin is not jaundiced.      Findings: No rash.   Neurological:      General: No focal deficit present.      Mental Status: She is alert and oriented to person, place, and time.      Cranial Nerves: No cranial nerve deficit.      Sensory: No sensory deficit.      Motor: No weakness.      Gait: Gait normal.   Psychiatric:         Mood and Affect: Mood normal.         Behavior: Behavior normal.         Thought Content: Thought content normal.         Judgment: Judgment normal.          Labs: I have reviewed pertinent labs.   Lab Results   Component Value Date/Time    WBC 9.91 03/24/2025 02:52 PM    RBC 3.90 03/24/2025 02:52 PM    Hemoglobin 11.3 (L) 03/24/2025 02:52 PM    Hematocrit 34.2 (L) 03/24/2025 02:52 PM    MCV 88 03/24/2025 02:52 PM    MCH 29.0 03/24/2025 02:52 PM    RDW 14.9 03/24/2025 02:52 PM    Platelets 255 03/24/2025 02:52 PM    Segmented % 48 03/10/2025 11:36 AM    Lymphocytes % 23 03/24/2025 02:52 PM    Lymphocytes % 44 03/10/2025 11:36 AM    Monocytes % 5 03/24/2025 02:52 PM    Monocytes % 5 03/10/2025 11:36 AM    Eosinophils % 1 03/24/2025 02:52 PM    Eosinophils Relative 2 03/10/2025 11:36 AM    Basophils % 0 03/24/2025 02:52 PM    Basophils Relative 0 03/10/2025 11:36 AM    Immature Grans % 1 03/10/2025 11:36 AM    Absolute Neutrophils 2.95 03/10/2025 11:36 AM      Lab Results   Component Value Date/Time    Sodium 138 03/24/2025 02:52 PM    Potassium 2.9 (L) 03/24/2025 02:52 PM    Chloride 101 03/24/2025 02:52 PM    CO2 26 03/24/2025 02:52 PM    ANION  GAP 11 03/24/2025 02:52 PM    BUN 10 03/24/2025 02:52 PM    Creatinine 0.61 03/24/2025 02:52 PM    Glucose 184 (H) 03/24/2025 02:52 PM    Glucose, Fasting 168 (H) 01/19/2025 11:17 AM    Calcium 8.9 03/24/2025 02:52 PM    AST 10 (L) 03/24/2025 02:52 PM    ALT 14 03/24/2025 02:52 PM    Alkaline Phosphatase 94 03/24/2025 02:52 PM    Total Protein 7.1 03/24/2025 02:52 PM    Albumin 3.8 03/24/2025 02:52 PM    Total Bilirubin 0.49 03/24/2025 02:52 PM    eGFR 95 03/24/2025 02:52 PM

## 2025-03-28 NOTE — ASSESSMENT & PLAN NOTE
Recurrent stage II leiomyosarcoma s/p surgical resection on 1/6/25 who is receiving systematic treatment with adriamycin 45 mg/m2 and trabectidin 0.9 mg/m2 IV every 21 days. She experienced treatment related fatigue, nausea and diarrhea. Her symptoms improved after approximately 5 days. Otherwise, she is tolerating treatment well.     Continue with cycle 2 of treatment as planned, as long as her metabolic and hematologic parameters are adequate.     We discussed ways to improve treatment related symptoms. The patient will plan to start zofran every 8 hrs continuously days 2-7 after treatment with prn ativan. Additionally, she will increase her imodium use if she experiences diarrhea again, using imodium 4mg PO every 6 hrs.     The patient will call the office with poorly managed symptoms following cycle 2.     Return to the office as per her chemotherapy calendar.

## 2025-03-29 LAB — BACTERIA UR CULT: ABNORMAL

## 2025-03-31 ENCOUNTER — RESULTS FOLLOW-UP (OUTPATIENT)
Dept: GYNECOLOGIC ONCOLOGY | Facility: CLINIC | Age: 65
End: 2025-03-31

## 2025-03-31 ENCOUNTER — HOSPITAL ENCOUNTER (OUTPATIENT)
Dept: INFUSION CENTER | Facility: HOSPITAL | Age: 65
Discharge: HOME | End: 2025-03-31
Payer: COMMERCIAL

## 2025-03-31 ENCOUNTER — DOCUMENTATION (OUTPATIENT)
Dept: HEMATOLOGY ONCOLOGY | Facility: CLINIC | Age: 65
End: 2025-03-31

## 2025-03-31 DIAGNOSIS — Z45.2 ENCOUNTER FOR CENTRAL LINE CARE: Primary | ICD-10-CM

## 2025-03-31 DIAGNOSIS — C55 UTERINE LEIOMYOSARCOMA (HCC): ICD-10-CM

## 2025-03-31 DIAGNOSIS — N39.0 URINARY TRACT INFECTION WITHOUT HEMATURIA, SITE UNSPECIFIED: Primary | ICD-10-CM

## 2025-03-31 LAB
ALBUMIN SERPL BCG-MCNC: 4.1 G/DL (ref 3.5–5)
ALP SERPL-CCNC: 88 U/L (ref 34–104)
ALT SERPL W P-5'-P-CCNC: 11 U/L (ref 7–52)
ANION GAP SERPL CALCULATED.3IONS-SCNC: 10 MMOL/L (ref 4–13)
ANISOCYTOSIS BLD QL SMEAR: PRESENT
AST SERPL W P-5'-P-CCNC: 15 U/L (ref 13–39)
BASOPHILS # BLD MANUAL: 0 THOUSAND/UL (ref 0–0.1)
BASOPHILS NFR MAR MANUAL: 0 % (ref 0–1)
BILIRUB SERPL-MCNC: 0.58 MG/DL (ref 0.2–1)
BUN SERPL-MCNC: 15 MG/DL (ref 5–25)
CALCIUM SERPL-MCNC: 9.3 MG/DL (ref 8.4–10.2)
CHLORIDE SERPL-SCNC: 105 MMOL/L (ref 96–108)
CK SERPL-CCNC: 29 U/L (ref 26–192)
CO2 SERPL-SCNC: 26 MMOL/L (ref 21–32)
CREAT SERPL-MCNC: 0.62 MG/DL (ref 0.6–1.3)
EOSINOPHIL # BLD MANUAL: 0 THOUSAND/UL (ref 0–0.4)
EOSINOPHIL NFR BLD MANUAL: 0 % (ref 0–6)
ERYTHROCYTE [DISTWIDTH] IN BLOOD BY AUTOMATED COUNT: 15.5 % (ref 11.6–15.1)
GFR SERPL CREATININE-BSD FRML MDRD: 94 ML/MIN/1.73SQ M
GLUCOSE SERPL-MCNC: 143 MG/DL (ref 65–140)
HCT VFR BLD AUTO: 35.3 % (ref 34.8–46.1)
HGB BLD-MCNC: 11.3 G/DL (ref 11.5–15.4)
LYMPHOCYTES # BLD AUTO: 29 % (ref 14–44)
LYMPHOCYTES # BLD AUTO: 3.34 THOUSAND/UL (ref 0.6–4.47)
MAGNESIUM SERPL-MCNC: 1.8 MG/DL (ref 1.9–2.7)
MCH RBC QN AUTO: 28.8 PG (ref 26.8–34.3)
MCHC RBC AUTO-ENTMCNC: 32 G/DL (ref 31.4–37.4)
MCV RBC AUTO: 90 FL (ref 82–98)
MONOCYTES # BLD AUTO: 0.35 THOUSAND/UL (ref 0–1.22)
MONOCYTES NFR BLD: 3 % (ref 4–12)
MYELOCYTE ABSOLUTE CT: 0.12 THOUSAND/UL (ref 0–0.1)
MYELOCYTES NFR BLD MANUAL: 1 % (ref 0–1)
NEUTROPHILS # BLD MANUAL: 7.71 THOUSAND/UL (ref 1.85–7.62)
NEUTS BAND NFR BLD MANUAL: 1 % (ref 0–8)
NEUTS SEG NFR BLD AUTO: 66 % (ref 43–75)
PLATELET # BLD AUTO: 487 THOUSANDS/UL (ref 149–390)
PLATELET BLD QL SMEAR: ABNORMAL
PMV BLD AUTO: 9.3 FL (ref 8.9–12.7)
POIKILOCYTOSIS BLD QL SMEAR: PRESENT
POLYCHROMASIA BLD QL SMEAR: PRESENT
POTASSIUM SERPL-SCNC: 4.1 MMOL/L (ref 3.5–5.3)
PROT SERPL-MCNC: 7.4 G/DL (ref 6.4–8.4)
RBC # BLD AUTO: 3.92 MILLION/UL (ref 3.81–5.12)
RBC MORPH BLD: PRESENT
SODIUM SERPL-SCNC: 141 MMOL/L (ref 135–147)
WBC # BLD AUTO: 11.5 THOUSAND/UL (ref 4.31–10.16)

## 2025-03-31 PROCEDURE — 82550 ASSAY OF CK (CPK): CPT

## 2025-03-31 PROCEDURE — 80053 COMPREHEN METABOLIC PANEL: CPT

## 2025-03-31 PROCEDURE — 85027 COMPLETE CBC AUTOMATED: CPT

## 2025-03-31 PROCEDURE — 83735 ASSAY OF MAGNESIUM: CPT

## 2025-03-31 PROCEDURE — 85007 BL SMEAR W/DIFF WBC COUNT: CPT

## 2025-03-31 RX ORDER — LEVOFLOXACIN 500 MG/1
500 TABLET, FILM COATED ORAL EVERY 24 HOURS
Qty: 7 TABLET | Refills: 0 | Status: SHIPPED | OUTPATIENT
Start: 2025-03-31 | End: 2025-04-07

## 2025-03-31 NOTE — PROGRESS NOTES
Felecia Edward  tolerated treatment well with no complications.      Felecia Edward is aware of future appt on 4/2 at 0930.     AVS printed and given to Felecia Edward:  No (Declined by Felecia Edward)

## 2025-03-31 NOTE — PROGRESS NOTES
Outreached PT to discuss financial assistance opportunities. PT did not answer. Voicemail was left detailing the reason for the call, this writer's contact information, and a high-level overview of options.   Will call again on Wednesday.      Lisbeth Breaux MPH  Phone: [phone]  Email: [email or web address]

## 2025-04-01 RX ORDER — PALONOSETRON 0.05 MG/ML
0.25 INJECTION, SOLUTION INTRAVENOUS ONCE
Status: CANCELLED | OUTPATIENT
Start: 2025-04-02

## 2025-04-01 RX ORDER — MAGNESIUM SULFATE HEPTAHYDRATE 40 MG/ML
2 INJECTION, SOLUTION INTRAVENOUS ONCE
Status: CANCELLED
Start: 2025-04-02

## 2025-04-01 RX ORDER — DOXORUBICIN HYDROCHLORIDE 2 MG/ML
45 INJECTION, SOLUTION INTRAVENOUS ONCE
Status: CANCELLED | OUTPATIENT
Start: 2025-04-02

## 2025-04-01 RX ORDER — SODIUM CHLORIDE 9 MG/ML
20 INJECTION, SOLUTION INTRAVENOUS ONCE
Status: CANCELLED | OUTPATIENT
Start: 2025-04-02

## 2025-04-02 ENCOUNTER — DOCUMENTATION (OUTPATIENT)
Dept: HEMATOLOGY ONCOLOGY | Facility: CLINIC | Age: 65
End: 2025-04-02

## 2025-04-02 ENCOUNTER — PATIENT OUTREACH (OUTPATIENT)
Dept: CASE MANAGEMENT | Facility: HOSPITAL | Age: 65
End: 2025-04-02

## 2025-04-02 ENCOUNTER — HOSPITAL ENCOUNTER (OUTPATIENT)
Dept: INFUSION CENTER | Facility: HOSPITAL | Age: 65
Discharge: HOME | End: 2025-04-02
Attending: OBSTETRICS & GYNECOLOGY
Payer: COMMERCIAL

## 2025-04-02 VITALS
BODY MASS INDEX: 35.08 KG/M2 | SYSTOLIC BLOOD PRESSURE: 138 MMHG | HEART RATE: 94 BPM | WEIGHT: 205.47 LBS | HEIGHT: 64 IN | DIASTOLIC BLOOD PRESSURE: 88 MMHG | OXYGEN SATURATION: 94 % | TEMPERATURE: 98 F | RESPIRATION RATE: 20 BRPM

## 2025-04-02 DIAGNOSIS — D70.1 CHEMOTHERAPY INDUCED NEUTROPENIA (HCC): ICD-10-CM

## 2025-04-02 DIAGNOSIS — T45.1X5A CHEMOTHERAPY INDUCED NEUTROPENIA (HCC): ICD-10-CM

## 2025-04-02 DIAGNOSIS — E83.42 HYPOMAGNESEMIA: Primary | ICD-10-CM

## 2025-04-02 DIAGNOSIS — C55 UTERINE LEIOMYOSARCOMA (HCC): ICD-10-CM

## 2025-04-02 PROCEDURE — 96367 TX/PROPH/DG ADDL SEQ IV INF: CPT

## 2025-04-02 PROCEDURE — 96415 CHEMO IV INFUSION ADDL HR: CPT

## 2025-04-02 PROCEDURE — 96375 TX/PRO/DX INJ NEW DRUG ADDON: CPT

## 2025-04-02 PROCEDURE — 96411 CHEMO IV PUSH ADDL DRUG: CPT

## 2025-04-02 PROCEDURE — 96368 THER/DIAG CONCURRENT INF: CPT

## 2025-04-02 PROCEDURE — 96413 CHEMO IV INFUSION 1 HR: CPT

## 2025-04-02 RX ORDER — DOXORUBICIN HYDROCHLORIDE 2 MG/ML
45 INJECTION, SOLUTION INTRAVENOUS ONCE
Status: COMPLETED | OUTPATIENT
Start: 2025-04-02 | End: 2025-04-02

## 2025-04-02 RX ORDER — SODIUM CHLORIDE 9 MG/ML
20 INJECTION, SOLUTION INTRAVENOUS ONCE
Status: COMPLETED | OUTPATIENT
Start: 2025-04-02 | End: 2025-04-02

## 2025-04-02 RX ORDER — PALONOSETRON 0.05 MG/ML
0.25 INJECTION, SOLUTION INTRAVENOUS ONCE
Status: COMPLETED | OUTPATIENT
Start: 2025-04-02 | End: 2025-04-02

## 2025-04-02 RX ORDER — MAGNESIUM SULFATE HEPTAHYDRATE 40 MG/ML
2 INJECTION, SOLUTION INTRAVENOUS ONCE
Status: COMPLETED | OUTPATIENT
Start: 2025-04-02 | End: 2025-04-02

## 2025-04-02 RX ADMIN — SODIUM CHLORIDE 20 ML/HR: 0.9 INJECTION, SOLUTION INTRAVENOUS at 09:56

## 2025-04-02 RX ADMIN — FOSAPREPITANT 150 MG: 150 INJECTION, POWDER, LYOPHILIZED, FOR SOLUTION INTRAVENOUS at 10:22

## 2025-04-02 RX ADMIN — DEXAMETHASONE SODIUM PHOSPHATE 20 MG: 10 INJECTION, SOLUTION INTRAMUSCULAR; INTRAVENOUS at 09:56

## 2025-04-02 RX ADMIN — MAGNESIUM SULFATE IN WATER 2 G: 40 INJECTION, SOLUTION INTRAVENOUS at 09:57

## 2025-04-02 RX ADMIN — PALONOSETRON 0.25 MG: 0.05 INJECTION, SOLUTION INTRAVENOUS at 09:56

## 2025-04-02 RX ADMIN — DOXORUBICIN HYDROCHLORIDE 88 MG: 2 INJECTION, SOLUTION INTRAVENOUS at 11:02

## 2025-04-02 RX ADMIN — TRABECTEDIN 1.77 MG: 0.05 INJECTION, POWDER, LYOPHILIZED, FOR SOLUTION INTRAVENOUS at 11:21

## 2025-04-02 NOTE — PROGRESS NOTES
Outreached PT to discuss financial assistance opportunities. PT did not answer. Voicemail was left detailing the reason for the call, this writer's contact information, and a high-level overview of options.     Lisbeth Breaux MPH  Phone: [phone]  Email: [email or web address]

## 2025-04-02 NOTE — PROGRESS NOTES
"BEATRIZ Gil reached out to this LSW requesting a [employer] application be provided to the pt.  LSW met with the pt and explained that the FA had been trying to reach out to her. Pt states that she is aware and \"heard all of the messages.\"  LSW asked the pt if she had bills from the hospital and she laughed and responded, \"plenty\".  LSW explained that the reason Lisbeth was attempting to reach her was to discuss [employer].  LSW proceeded to explain the application, the documents needed and how to submit this, all the while the pt was looking at her her crossword puzzle and working in her book.  LSW asked the pt if she had any questions, to which she responded, \"nope\".  LSW encouraged her to reach out to Lisbeth for further questions or concerns.  "

## 2025-04-02 NOTE — PROGRESS NOTES
LFA IV infiltrated during Mag infusion. Site warm to touch. Per pharmacy, magnesium is not a vesicant, no antidote necessary. IV removed and ice applied to site. Pati Jerome PA-C aware.

## 2025-04-02 NOTE — PROGRESS NOTES
Felecia Edward  tolerated treatment well with no complications.      Felecia Edward is aware of future appt on 4/3 at 1430.     AVS printed and given to Felecia Edward:  No (Declined by Felecia Edward)

## 2025-04-03 ENCOUNTER — HOSPITAL ENCOUNTER (OUTPATIENT)
Dept: INFUSION CENTER | Facility: HOSPITAL | Age: 65
Discharge: HOME | End: 2025-04-03
Attending: OBSTETRICS & GYNECOLOGY
Payer: COMMERCIAL

## 2025-04-03 VITALS
OXYGEN SATURATION: 94 % | RESPIRATION RATE: 20 BRPM | SYSTOLIC BLOOD PRESSURE: 146 MMHG | TEMPERATURE: 97.8 F | HEART RATE: 89 BPM | DIASTOLIC BLOOD PRESSURE: 85 MMHG

## 2025-04-03 DIAGNOSIS — D70.1 CHEMOTHERAPY INDUCED NEUTROPENIA (HCC): ICD-10-CM

## 2025-04-03 DIAGNOSIS — T45.1X5A CHEMOTHERAPY INDUCED NEUTROPENIA (HCC): ICD-10-CM

## 2025-04-03 DIAGNOSIS — C55 UTERINE LEIOMYOSARCOMA (HCC): Primary | ICD-10-CM

## 2025-04-03 PROCEDURE — 96372 THER/PROPH/DIAG INJ SC/IM: CPT

## 2025-04-03 RX ADMIN — PEGFILGRASTIM-APGF 6 MG: 6 INJECTION, SOLUTION SUBCUTANEOUS at 14:49

## 2025-04-03 NOTE — PROGRESS NOTES
Pt tolerated nyvepria injection without any difficulty. Band-aid applied. Patient ambulated with a steady gait off of unit. Declined AVS        Aware of next appt 04/07/25 @ 1000 am

## 2025-04-07 ENCOUNTER — HOSPITAL ENCOUNTER (OUTPATIENT)
Dept: INFUSION CENTER | Facility: HOSPITAL | Age: 65
Discharge: HOME | End: 2025-04-07
Payer: COMMERCIAL

## 2025-04-07 DIAGNOSIS — Z45.2 ENCOUNTER FOR CENTRAL LINE CARE: Primary | ICD-10-CM

## 2025-04-07 DIAGNOSIS — C55 UTERINE LEIOMYOSARCOMA (HCC): ICD-10-CM

## 2025-04-07 LAB
ALBUMIN SERPL BCG-MCNC: 4 G/DL (ref 3.5–5)
ALP SERPL-CCNC: 121 U/L (ref 34–104)
ALT SERPL W P-5'-P-CCNC: 26 U/L (ref 7–52)
ANION GAP SERPL CALCULATED.3IONS-SCNC: 11 MMOL/L (ref 4–13)
ANISOCYTOSIS BLD QL SMEAR: PRESENT
AST SERPL W P-5'-P-CCNC: 19 U/L (ref 13–39)
BASOPHILS # BLD MANUAL: 0 THOUSAND/UL (ref 0–0.1)
BASOPHILS NFR MAR MANUAL: 0 % (ref 0–1)
BILIRUB SERPL-MCNC: 0.72 MG/DL (ref 0.2–1)
BUN SERPL-MCNC: 19 MG/DL (ref 5–25)
CALCIUM SERPL-MCNC: 8.9 MG/DL (ref 8.4–10.2)
CHLORIDE SERPL-SCNC: 102 MMOL/L (ref 96–108)
CO2 SERPL-SCNC: 25 MMOL/L (ref 21–32)
CREAT SERPL-MCNC: 0.59 MG/DL (ref 0.6–1.3)
EOSINOPHIL # BLD MANUAL: 0 THOUSAND/UL (ref 0–0.4)
EOSINOPHIL NFR BLD MANUAL: 0 % (ref 0–6)
ERYTHROCYTE [DISTWIDTH] IN BLOOD BY AUTOMATED COUNT: 16.4 % (ref 11.6–15.1)
GFR SERPL CREATININE-BSD FRML MDRD: 96 ML/MIN/1.73SQ M
GLUCOSE SERPL-MCNC: 205 MG/DL (ref 65–140)
HCT VFR BLD AUTO: 36 % (ref 34.8–46.1)
HGB BLD-MCNC: 11.6 G/DL (ref 11.5–15.4)
LYMPHOCYTES # BLD AUTO: 17 % (ref 14–44)
LYMPHOCYTES # BLD AUTO: 2.66 THOUSAND/UL (ref 0.6–4.47)
MAGNESIUM SERPL-MCNC: 1.9 MG/DL (ref 1.9–2.7)
MCH RBC QN AUTO: 29.6 PG (ref 26.8–34.3)
MCHC RBC AUTO-ENTMCNC: 32.2 G/DL (ref 31.4–37.4)
MCV RBC AUTO: 92 FL (ref 82–98)
MONOCYTES # BLD AUTO: 0 THOUSAND/UL (ref 0–1.22)
MONOCYTES NFR BLD: 0 % (ref 4–12)
NEUTROPHILS # BLD MANUAL: 12.98 THOUSAND/UL (ref 1.85–7.62)
NEUTS SEG NFR BLD AUTO: 83 % (ref 43–75)
PLATELET # BLD AUTO: 294 THOUSANDS/UL (ref 149–390)
PLATELET BLD QL SMEAR: ADEQUATE
PMV BLD AUTO: 9.5 FL (ref 8.9–12.7)
POTASSIUM SERPL-SCNC: 3.5 MMOL/L (ref 3.5–5.3)
PROT SERPL-MCNC: 6.9 G/DL (ref 6.4–8.4)
RBC # BLD AUTO: 3.92 MILLION/UL (ref 3.81–5.12)
RBC MORPH BLD: PRESENT
SODIUM SERPL-SCNC: 138 MMOL/L (ref 135–147)
WBC # BLD AUTO: 15.64 THOUSAND/UL (ref 4.31–10.16)

## 2025-04-07 PROCEDURE — 83735 ASSAY OF MAGNESIUM: CPT

## 2025-04-07 PROCEDURE — 85007 BL SMEAR W/DIFF WBC COUNT: CPT

## 2025-04-07 PROCEDURE — 80053 COMPREHEN METABOLIC PANEL: CPT

## 2025-04-07 PROCEDURE — 85027 COMPLETE CBC AUTOMATED: CPT

## 2025-04-07 NOTE — ASSESSMENT & PLAN NOTE
She is now on doxorubicin + trabectedin q21 days starting 3/12/2025. Tolerating well, except for some mild fatigue a few days after treatment, that resolves and improves

## 2025-04-07 NOTE — PROGRESS NOTES
Name: Felecia Edward      : 1960      MRN: [id number]  Encounter Provider: Supriya Mckeon MD  Encounter Date: 2025   Encounter department: [facility] PALLIATIVE CARE [facility]  :  Assessment & Plan  Uterine leiomyosarcoma (HCC)  She is now on doxorubicin + trabectedin q21 days starting 3/12/2025. Tolerating well, except for some mild fatigue a few days after treatment, that resolves and improves       Palliative care encounter         Physical deconditioning  Discussed onc PT in the future, depending on further tolerance of treatments         Follow up   RTO in 3 months    Decisional apparatus: Patient is competent on my exam today. If competence is lost, patient's substitute decision maker would default to daughter/Suzi then  then daughter/Jessica per  ACP doc and by [place] Act 169.     PDMP Review: I have reviewed the patient's controlled substance dispensing history in the Prescription Drug Monitoring Program in compliance with the [facility] regulations before prescribing any controlled substances.    2025 1 Lorazepam 1 Mg Tablet 20.00 7 Je Syn [id number] Pen (8306) 0 2.86 LME [place]   2025 1 Oxycodone Hcl (Ir) 5 Mg Tablet 8.00 2 Je Gev [id number] Pen (8306) 0 30.00 [place]ME [place]   2025 1 Tramadol Hcl 50 Mg Tablet 6.00 2 Je Syn [id number] Pen (8306) 0 30.00 [place]ME [place]   2024 1 Oxycodone Hcl (Ir) 5 Mg Tablet 20.00 5 Ni Harry [id number] Pen (8306) 0 30.00 MME [place]   2023 1 Oxycodone Hcl (Ir) 5 Mg Tablet 10.00 3 Je Syn [id number] Pen (8306) 0 25.00 MME [place]   2023 1 Lorazepam 1 Mg Tablet 30.00 10 Je Syn [id number] Pen (8306) 0 3.00 LME [place]     History of Present Illness   Felecia Edward is a 64 y.o. female with recurrent stage II leiomyosarcoma now status post robotic assisted bilateral radical parametrectomy, upper vaginectomy, right obturator lymph node dissection, cystoscopy  1/6/2025. Final pathology was consistent with recurrent sarcoma. She is now on doxorubicin + trabectedin q21 days starting 3/12/2025.     She also recently underwent cystoscopy, right retrograde pyelography, ureteroscopy with laser lithotripsy and right stent placement for R kidney stone. The stent has since been removed on 3/6/2025.     Since her last visit, she was hospitalized 2/20 to 2/24/2025 due to sepsis from acute pyelonephritis. Because of this her chemo was postponed until 4/2/2025. She last saw oncology on 3/26/2025 where she reports tolerating treatment relatively well.     She returns today with her daughter. She looks better, healthier and more stable than on last visit. She no longer has the R flank pain since her stent was removed. She reports tolerating her new treatments very well, except for expected fatigue that happens a few days after her treatments that a last few days after. Otherwise, no other issues.     She already has a POA and living will in chart. She is okay with this document for now.     Objective   There were no vitals taken for this visit.    Physical Exam  Constitutional:       General: She is not in acute distress.     Appearance: She is not toxic-appearing or diaphoretic.      Comments: Chromically ill looking, appears a bit tired, stable, healthier compared to last visit, comfortable, pleasant   HENT:      Head: Normocephalic and atraumatic.   Eyes:      General: No scleral icterus.        Right eye: No discharge.         Left eye: No discharge.   Pulmonary:      Effort: Pulmonary effort is normal. No respiratory distress.      Comments: On RA  Abdominal:      General: Abdomen is flat. There is no distension.   Musculoskeletal:         General: No swelling.   Skin:     General: Skin is dry.      Coloration: Skin is not jaundiced or pale.   Neurological:      General: No focal deficit present.      Mental Status: She is alert and oriented to person, place, and time.  "  Psychiatric:         Mood and Affect: Mood normal.         Behavior: Behavior normal.         Thought Content: Thought content normal.         Judgment: Judgment normal.         Recent labs:  Lab Results   Component Value Date/Time    SODIUM 138 04/07/2025 10:09 AM    K 3.5 04/07/2025 10:09 AM    BUN 19 04/07/2025 10:09 AM    CREATININE 0.59 (L) 04/07/2025 10:09 AM    GLUC 205 (H) 04/07/2025 10:09 AM    CALCIUM 8.9 04/07/2025 10:09 AM    AST 19 04/07/2025 10:09 AM    ALT 26 04/07/2025 10:09 AM    ALB 4.0 04/07/2025 10:09 AM    TP 6.9 04/07/2025 10:09 AM    EGFR 96 04/07/2025 10:09 AM     Lab Results   Component Value Date/Time    HGB 11.6 04/07/2025 10:09 AM    WBC 15.64 (H) 04/07/2025 10:09 AM     04/07/2025 10:09 AM    INR 0.99 02/20/2025 06:12 PM    PTT 26 02/20/2025 06:12 PM     No results found for: \"[id number]\"    Recent Imaging:  Procedure: US kidney and bladder  Result Date: 3/18/2025  Impression: Resolution of right hydronephrosis status post stent removal. Possible nonobstructing calculus lower pole left kidney versus vascular calcification. No hydronephrosis. Bilateral simple cysts. Workstation performed: [id number]     Procedure: CT abdomen pelvis with contrast  Result Date: 2/20/2025  Impression: Appropriate position of right ureteral stent with mild right hydronephrosis. 3 mm stone adjacent to the distal right ureteral stent. Mild interstitial edema throughout the right renal parenchyma and inflammatory changes tracking along the right ureter compatible with postprocedural changes or ascending infection Workstation performed: [id number]     Procedure: XR retrograde pyelogram  Result Date: 2/20/2025  Impression: Fluoroscopic guidance provided for right retrograde pyelogram. Please see separate procedure report for additional details. Workstation performed: [id number]     Procedure: CT abdomen pelvis w contrast  Result Date: 1/17/2025  Impression: No acute findings. No findings to account " for this patient's pain. Workstation performed: [id number]       Administrative Statements   I have spent a total time of 20 minutes in caring for this patient on the day of the visit/encounter including Diagnostic results, Risks and benefits of tx options, Instructions for management, Patient and family education, Importance of tx compliance, Risk factor reductions, Impressions, Counseling / Coordination of care, Documenting in the medical record, Reviewing/placing orders in the medical record (including tests, medications, and/or procedures), and Obtaining or reviewing history  .   Topics discussed with the patient / family include symptom assessment and management, medication review, medication adjustment, psychosocial support, advanced directives, goals of care, opioid titration, supportive listening, and anticipatory guidance.    Supriya Mckeon MD  Palliative Medicine & Supportive Care  Internal Medicine  Available via [facility] [employer]  Office: [phone]  Fax: [phone]

## 2025-04-07 NOTE — PROGRESS NOTES
Felecia Edward  tolerated treatment well with no complications.      Felecia Edward is aware of future appt on 4/14 at 0930.     AVS printed and given to Felecia Edward:  No (Declined by Felecia Edward)

## 2025-04-08 ENCOUNTER — OFFICE VISIT (OUTPATIENT)
Age: 65
End: 2025-04-08
Payer: COMMERCIAL

## 2025-04-08 VITALS
RESPIRATION RATE: 15 BRPM | BODY MASS INDEX: 34.5 KG/M2 | DIASTOLIC BLOOD PRESSURE: 74 MMHG | OXYGEN SATURATION: 97 % | SYSTOLIC BLOOD PRESSURE: 124 MMHG | TEMPERATURE: 98 F | HEART RATE: 90 BPM | WEIGHT: 201 LBS

## 2025-04-08 DIAGNOSIS — Z71.89 ADVANCED CARE PLANNING/COUNSELING DISCUSSION: ICD-10-CM

## 2025-04-08 DIAGNOSIS — R53.81 PHYSICAL DECONDITIONING: ICD-10-CM

## 2025-04-08 DIAGNOSIS — C55 UTERINE LEIOMYOSARCOMA (HCC): Primary | ICD-10-CM

## 2025-04-08 DIAGNOSIS — Z51.5 PALLIATIVE CARE ENCOUNTER: ICD-10-CM

## 2025-04-08 DIAGNOSIS — R10.9 RIGHT FLANK PAIN: ICD-10-CM

## 2025-04-08 PROCEDURE — G2211 COMPLEX E/M VISIT ADD ON: HCPCS | Performed by: INTERNAL MEDICINE

## 2025-04-08 PROCEDURE — 99213 OFFICE O/P EST LOW 20 MIN: CPT | Performed by: INTERNAL MEDICINE

## 2025-04-14 ENCOUNTER — HOSPITAL ENCOUNTER (OUTPATIENT)
Dept: INFUSION CENTER | Facility: HOSPITAL | Age: 65
Discharge: HOME | End: 2025-04-14
Payer: COMMERCIAL

## 2025-04-14 ENCOUNTER — TELEPHONE (OUTPATIENT)
Dept: HEMATOLOGY ONCOLOGY | Facility: CLINIC | Age: 65
End: 2025-04-14

## 2025-04-14 DIAGNOSIS — Z45.2 ENCOUNTER FOR CENTRAL LINE CARE: Primary | ICD-10-CM

## 2025-04-14 DIAGNOSIS — C55 UTERINE LEIOMYOSARCOMA (HCC): ICD-10-CM

## 2025-04-14 LAB
ALBUMIN SERPL BCG-MCNC: 4.1 G/DL (ref 3.5–5)
ALP SERPL-CCNC: 93 U/L (ref 34–104)
ALT SERPL W P-5'-P-CCNC: 14 U/L (ref 7–52)
ANION GAP SERPL CALCULATED.3IONS-SCNC: 12 MMOL/L (ref 4–13)
AST SERPL W P-5'-P-CCNC: 13 U/L (ref 13–39)
BASOPHILS # BLD AUTO: 0.05 THOUSANDS/ÂΜL (ref 0–0.1)
BASOPHILS NFR BLD AUTO: 1 % (ref 0–1)
BILIRUB SERPL-MCNC: 0.66 MG/DL (ref 0.2–1)
BUN SERPL-MCNC: 8 MG/DL (ref 5–25)
CALCIUM SERPL-MCNC: 9 MG/DL (ref 8.4–10.2)
CHLORIDE SERPL-SCNC: 104 MMOL/L (ref 96–108)
CO2 SERPL-SCNC: 25 MMOL/L (ref 21–32)
CREAT SERPL-MCNC: 0.55 MG/DL (ref 0.6–1.3)
EOSINOPHIL # BLD AUTO: 0.05 THOUSAND/ÂΜL (ref 0–0.61)
EOSINOPHIL NFR BLD AUTO: 1 % (ref 0–6)
ERYTHROCYTE [DISTWIDTH] IN BLOOD BY AUTOMATED COUNT: 16.2 % (ref 11.6–15.1)
GFR SERPL CREATININE-BSD FRML MDRD: 98 ML/MIN/1.73SQ M
GLUCOSE SERPL-MCNC: 159 MG/DL (ref 65–140)
HCT VFR BLD AUTO: 33.5 % (ref 34.8–46.1)
HGB BLD-MCNC: 10.9 G/DL (ref 11.5–15.4)
IMM GRANULOCYTES # BLD AUTO: 0.07 THOUSAND/UL (ref 0–0.2)
IMM GRANULOCYTES NFR BLD AUTO: 1 % (ref 0–2)
LYMPHOCYTES # BLD AUTO: 1.99 THOUSANDS/ÂΜL (ref 0.6–4.47)
LYMPHOCYTES NFR BLD AUTO: 39 % (ref 14–44)
MAGNESIUM SERPL-MCNC: 1.4 MG/DL (ref 1.9–2.7)
MCH RBC QN AUTO: 29.4 PG (ref 26.8–34.3)
MCHC RBC AUTO-ENTMCNC: 32.5 G/DL (ref 31.4–37.4)
MCV RBC AUTO: 90 FL (ref 82–98)
MONOCYTES # BLD AUTO: 0.52 THOUSAND/ÂΜL (ref 0.17–1.22)
MONOCYTES NFR BLD AUTO: 10 % (ref 4–12)
NEUTROPHILS # BLD AUTO: 2.43 THOUSANDS/ÂΜL (ref 1.85–7.62)
NEUTS SEG NFR BLD AUTO: 48 % (ref 43–75)
NRBC BLD AUTO-RTO: 0 /100 WBCS
PLATELET # BLD AUTO: 138 THOUSANDS/UL (ref 149–390)
PMV BLD AUTO: 9.8 FL (ref 8.9–12.7)
POTASSIUM SERPL-SCNC: 3.3 MMOL/L (ref 3.5–5.3)
PROT SERPL-MCNC: 6.9 G/DL (ref 6.4–8.4)
RBC # BLD AUTO: 3.71 MILLION/UL (ref 3.81–5.12)
SODIUM SERPL-SCNC: 141 MMOL/L (ref 135–147)
WBC # BLD AUTO: 5.11 THOUSAND/UL (ref 4.31–10.16)

## 2025-04-14 PROCEDURE — 85025 COMPLETE CBC W/AUTO DIFF WBC: CPT

## 2025-04-14 PROCEDURE — 80053 COMPREHEN METABOLIC PANEL: CPT

## 2025-04-14 PROCEDURE — 83735 ASSAY OF MAGNESIUM: CPT

## 2025-04-14 NOTE — TELEPHONE ENCOUNTER
Writer left  for PT on 4/14/25 @ 11:15 AM to review FA application and provided clarity on the supporting documents required to submit. Writer encouraged a call back and provided all contact information to do so.      Multiple unsuccessful attempts (4) have been made by Financial Advocacy Team to connect with PT regarding support. Writer will relay this to SW on case as well.                Max Thompson  Phone:[phone]  Email:[email or web address]

## 2025-04-14 NOTE — PROGRESS NOTES
Felecia Edward  tolerated treatment well with no complications.      Felecia Edward is aware of future appt on 4/21 at 1pm.     AVS printed and given to Felecia Edward:  No (Declined by Felecia Edward)

## 2025-04-17 ENCOUNTER — TELEPHONE (OUTPATIENT)
Dept: GYNECOLOGIC ONCOLOGY | Facility: CLINIC | Age: 65
End: 2025-04-17

## 2025-04-17 ENCOUNTER — OFFICE VISIT (OUTPATIENT)
Age: 65
End: 2025-04-17
Payer: COMMERCIAL

## 2025-04-17 VITALS
HEIGHT: 64 IN | WEIGHT: 205.2 LBS | RESPIRATION RATE: 18 BRPM | TEMPERATURE: 97.4 F | DIASTOLIC BLOOD PRESSURE: 74 MMHG | SYSTOLIC BLOOD PRESSURE: 124 MMHG | HEART RATE: 110 BPM | OXYGEN SATURATION: 96 % | BODY MASS INDEX: 35.03 KG/M2

## 2025-04-17 DIAGNOSIS — C55 UTERINE LEIOMYOSARCOMA (HCC): Primary | ICD-10-CM

## 2025-04-17 DIAGNOSIS — T45.1X5A CHEMOTHERAPY-INDUCED NAUSEA: ICD-10-CM

## 2025-04-17 DIAGNOSIS — R11.0 CHEMOTHERAPY-INDUCED NAUSEA: ICD-10-CM

## 2025-04-17 DIAGNOSIS — T45.1X5A CHEMOTHERAPY-INDUCED FATIGUE: ICD-10-CM

## 2025-04-17 DIAGNOSIS — R53.83 CHEMOTHERAPY-INDUCED FATIGUE: ICD-10-CM

## 2025-04-17 DIAGNOSIS — E87.6 HYPOKALEMIA: ICD-10-CM

## 2025-04-17 PROCEDURE — 99215 OFFICE O/P EST HI 40 MIN: CPT | Performed by: PHYSICIAN ASSISTANT

## 2025-04-17 NOTE — ASSESSMENT & PLAN NOTE
Recurrent stage II leiomyosarcoma s/p surgical resection on 1/6/25 who is receiving systematic treatment with adriamycin 45 mg/m2 and trabectidin 0.9 mg/m2 IV every 21 days. She experienced treatment related fatigue and nausea. Her nausea is well managed with oral anti-emetics. Otherwise, tolerating treatment well.      Continue with cycle 3 of treatment as planned, as long as her metabolic and hematologic parameters are adequate.       Plan for CT chest/abdomen/pelvis after completion of cycle 3. Return to the office as per her chemotherapy calendar.      Orders:    CT chest abdomen pelvis w contrast; Future

## 2025-04-17 NOTE — ASSESSMENT & PLAN NOTE
Well managed.   Continued scheduled zofran for approximately 5-7 days following treatment and prn ativan.

## 2025-04-17 NOTE — PROGRESS NOTES
Name: Felecia Edward      : 1960      MRN: [id number]  Encounter Provider: Pati Jerome PA-C  Encounter Date: 2025   Encounter department: [facility] GYNECOLOGY ONCOLOGY [facility]  :  Assessment & Plan  Uterine leiomyosarcoma (HCC)  Recurrent stage II leiomyosarcoma s/p surgical resection on 25 who is receiving systematic treatment with adriamycin 45 mg/m2 and trabectidin 0.9 mg/m2 IV every 21 days. She experienced treatment related fatigue and nausea. Her nausea is well managed with oral anti-emetics. Otherwise, tolerating treatment well.      Continue with cycle 3 of treatment as planned, as long as her metabolic and hematologic parameters are adequate.       Plan for CT chest/abdomen/pelvis after completion of cycle 3. Return to the office as per her chemotherapy calendar.      Orders:    CT chest abdomen pelvis w contrast; Future    Chemotherapy-induced fatigue  Grade 1.       Chemotherapy-induced nausea  Well managed.   Continued scheduled zofran for approximately 5-7 days following treatment and prn ativan.       Hypokalemia  Increase oral repletion ot 40 mEq BID x 3 days, then resume 20 mEq BID thereafter.               History of Present Illness     Reason for Visit / CC:  Pre-Chemo Visit    Felecia Edward is a 64 y.o. female   Who presents to the office for pre-chemotherapy evaluation. Overall, she tolerated cycle 2 of treatment much better than her initial cycle. She has been afebrile. She notes her treatment-related fatigue is stable and improves approximately 1 week following treatment. Her nausea is well managed with oral anti-emetics. She denies vomiting. No cough/SOB, chest pain.            Oncology History   Cancer Staging   Uterine leiomyosarcoma (HCC)  Staging form: Corpus Uteri - Sarcoma, AJCC 8th Edition  - Pathologic stage from 2023: FIGO Stage II, calculated as Stage Unknown (pT2, pNX, cM0) - Signed by Gordo Taveras  MD on 6/29/2023  Stage prefix: Initial diagnosis  Oncology History   Uterine leiomyosarcoma (HCC)   6/4/2023 Initial Diagnosis    Uterine sarcoma (HCC)     6/4/2023 -  Cancer Staged    Staging form: Corpus Uteri - Sarcoma, AJCC 8th Edition  - Pathologic stage from 6/4/2023: FIGO Stage II, calculated as Stage Unknown (pT2, pNX, cM0) - Signed by Gordo Taveras MD on 6/29/2023  Stage prefix: Initial diagnosis       6/4/2023 Surgery    HYSTERECTOMY TOTAL ABDOMINAL (DOROTHY). BILATERAL SALPINGOOPHORECTOMY  EXCISION  BIOPSY LESION/MASS ABDOMINAL-PELVIC PERITONEUM  -Leiomyosarcoma 13.5 cm extending through the myometrium, right pelvic peritoneum involved with uterine leiomyosarcoma with tumor present at unoriented resection surface.     7/11/2023 -  Chemotherapy    Gemzar 800 mg/m2 IV day 1 and 8 as well as taxotere 65 mg/m2 day 8 every 21 days.        - 12/1/2023 Chemotherapy    Adriamycin 50 mg/m2 IV every 21 days.  Including the gemcitabine and Taxotere, she completed a total of 7 cycles.     1/6/2025 Surgery    ROBOTIC ASSISTED RADICAL BILATERAL PARAMETRECTOMY. RESECTION BILATERAL PARAMETRIAL TUMORS. UPPER VAGINECTOMY. LEFT OBTURATOR LYMPH NODE DISSECTION  CYSTOSCOPY     3/12/2025 -  Chemotherapy    Adriamycin 45 mg/m2 and trabectidin 0.9 mg/m2 IV every 21 days          Review of Systems   Constitutional:  Positive for fatigue. Negative for fever.   HENT: Negative.     Eyes: Negative.    Respiratory: Negative.     Cardiovascular: Negative.    Gastrointestinal:  Positive for nausea (improved). Negative for abdominal pain.   Genitourinary: Negative.    Musculoskeletal: Negative.    Skin: Negative.    Neurological: Negative.    Psychiatric/Behavioral: Negative.      A complete review of systems is negative other than that noted above in the HPI.  Medical History Reviewed by provider this encounter:  Tobacco  Allergies  Meds  Problems  Med Hx  Surg Hx  Fam Hx     .  Current Outpatient Medications on File Prior  to Visit   Medication Sig Dispense Refill    aspirin (Aspirin 81) 81 mg chewable tablet every 24 hours      atorvastatin (LIPITOR) 40 mg tablet Take 40 mg by mouth daily at bedtime      betamethasone, augmented, (DIPROLENE) 0.05 % lotion Apply topically 2 (two) times a day 60 mL 1    cetirizine (ZyrTEC) 10 mg tablet Take 10 mg by mouth daily at bedtime      cholecalciferol (VITAMIN D3) 1,000 units tablet Take 2,000 Units by mouth daily 2 daily      clindamycin (CLEOCIN T) 1 % external solution Apply topically 2 (two) times a day 60 mL 1    dicyclomine (BENTYL) 10 mg capsule Take 1 capsule (10 mg total) by mouth every 6 (six) hours as needed (pain) 30 capsule 3    DULoxetine (CYMBALTA) 30 mg delayed release capsule TAKE 1 CAPSULE BY MOUTH EVERY DAY FOR 90 DAYS      glimepiride (AMARYL) 4 mg tablet Take 4 mg by mouth 2 (two) times a day      hydroCHLOROthiazide 25 mg tablet       ibuprofen (MOTRIN) 800 mg tablet Take 800 mg by mouth every 6 (six) hours as needed for mild pain      Jardiance 10 MG TABS tablet       ketorolac (TORADOL) 10 mg tablet Take 1 tablet (10 mg total) by mouth every 6 (six) hours as needed for moderate pain for up to 5 days 20 tablet 0    LORazepam (ATIVAN) 1 mg tablet Take 1 tablet (1 mg total) by mouth every 8 (eight) hours as needed (nausea or anxiety) 20 tablet 0    losartan (COZAAR) 50 mg tablet Take 50 mg by mouth daily      meclizine (ANTIVERT) 25 mg tablet Take by mouth every 12 (twelve) hours as needed for dizziness      metoprolol succinate (TOPROL-XL) 25 mg 24 hr tablet Take 25 mg by mouth daily      Multiple Vitamin (MULTIVITAMIN) tablet Take 1 tablet by mouth daily      nitrofurantoin (MACROBID) 100 mg capsule Take 1 capsule (100 mg total) by mouth 2 (two) times a day 10 capsule 0    ondansetron (ZOFRAN) 8 mg tablet Take 1 tablet (8 mg total) by mouth every 8 (eight) hours as needed for nausea or vomiting 30 tablet 1    oxyCODONE (Roxicodone) 5 immediate release tablet Take 1 tablet  "(5 mg total) by mouth every 6 (six) hours as needed for moderate pain for up to 8 doses Max Daily Amount: 20 mg (Patient taking differently: Take 2.5 mg by mouth every 6 (six) hours as needed for moderate pain) 8 tablet 0    pantoprazole (PROTONIX) 40 mg tablet Take 1 tablet (40 mg total) by mouth daily 90 tablet 5    phenazopyridine (PYRIDIUM) 200 mg tablet Take 1 tablet (200 mg total) by mouth 3 (three) times a day as needed (Dysuria and bladder discomfort.  Take with meals.) 10 tablet 0    pioglitazone (ACTOS) 30 mg tablet every 24 hours      potassium chloride (Klor-Con M20) 20 mEq tablet Take 1 tablet (20 mEq total) by mouth 2 (two) times a day 60 tablet 0    rOPINIRole (REQUIP) 1 mg tablet TAKE 1 TABLET BY MOUTH 1 TO 3 HOURS BEFORE BEDTIME      sertraline (ZOLOFT) 100 mg tablet TAKE 1 AND 1/2 TABS BY MOUTH DAILY      tamsulosin (FLOMAX) 0.4 mg Take 1 capsule (0.4 mg total) by mouth every evening for 14 days 14 capsule 0    topiramate (TOPAMAX) 100 mg tablet 1 tablet Orally at bedtime      Triamcinolone Acetonide 55 MCG/ACT AERO into each nostril One spray each nostril daily      Xiidra 5 % op solution INSTILL 1 DROP INTO BOTH EYES TWICE A DAY 12 HOURS APART      ZOLMitriptan (ZOMIG) 5 MG tablet Take 5 mg by mouth once as needed for migraine Daily prn       No current facility-administered medications on file prior to visit.         Objective   /74 (BP Location: Left arm, Patient Position: Sitting, Cuff Size: Large)   Pulse (!) 110   Temp (!) 97.4 °F (36.3 °C) (Temporal)   Resp 18   Ht 5' 4\" (1.626 m)   Wt 93.1 kg (205 lb 3.2 oz)   SpO2 96%   BMI 35.22 kg/m²     Body mass index is 35.22 kg/m².  Pain Screening:  Pain Score: 0-No pain  ECOG ECOG Performance Status: 0 - Fully active, able to carry on all pre-disease performance without restriction     Physical Exam  Constitutional:       Appearance: She is well-developed.   Pulmonary:      Effort: Pulmonary effort is normal.   Skin:     General: Skin " is warm and dry.      Findings: No rash.   Neurological:      Mental Status: She is alert and oriented to person, place, and time.   Psychiatric:         Behavior: Behavior normal.         Thought Content: Thought content normal.         Judgment: Judgment normal.          Labs: I have reviewed pertinent labs.   Lab Results   Component Value Date/Time    WBC 5.11 04/14/2025 09:52 AM    RBC 3.71 (L) 04/14/2025 09:52 AM    Hemoglobin 10.9 (L) 04/14/2025 09:52 AM    Hematocrit 33.5 (L) 04/14/2025 09:52 AM    MCV 90 04/14/2025 09:52 AM    MCH 29.4 04/14/2025 09:52 AM    RDW 16.2 (H) 04/14/2025 09:52 AM    Platelets 138 (L) 04/14/2025 09:52 AM    Segmented % 48 04/14/2025 09:52 AM    Bands % 1 03/31/2025 02:16 PM    Lymphocytes % 39 04/14/2025 09:52 AM    Monocytes % 10 04/14/2025 09:52 AM    Eosinophils Relative 1 04/14/2025 09:52 AM    Basophils Relative 1 04/14/2025 09:52 AM    Immature Grans % 1 04/14/2025 09:52 AM    Absolute Neutrophils 2.43 04/14/2025 09:52 AM      Lab Results   Component Value Date/Time    Sodium 141 04/14/2025 09:52 AM    Potassium 3.3 (L) 04/14/2025 09:52 AM    Chloride 104 04/14/2025 09:52 AM    CO2 25 04/14/2025 09:52 AM    ANION GAP 12 04/14/2025 09:52 AM    BUN 8 04/14/2025 09:52 AM    Creatinine 0.55 (L) 04/14/2025 09:52 AM    Glucose 159 (H) 04/14/2025 09:52 AM    Glucose, Fasting 168 (H) 01/19/2025 11:17 AM    Calcium 9.0 04/14/2025 09:52 AM    AST 13 04/14/2025 09:52 AM    ALT 14 04/14/2025 09:52 AM    Alkaline Phosphatase 93 04/14/2025 09:52 AM    Total Protein 6.9 04/14/2025 09:52 AM    Albumin 4.1 04/14/2025 09:52 AM    Total Bilirubin 0.66 04/14/2025 09:52 AM    eGFR 98 04/14/2025 09:52 AM

## 2025-04-17 NOTE — TELEPHONE ENCOUNTER
Called and left a message to the patient in regards to her CT scan that has just been scheduled it is for 05/05/2024 and 10:30 am at the [facility]. CB#[phone].

## 2025-04-18 DIAGNOSIS — E87.6 HYPOKALEMIA: ICD-10-CM

## 2025-04-18 RX ORDER — POTASSIUM CHLORIDE 1500 MG/1
20 TABLET, EXTENDED RELEASE ORAL 2 TIMES DAILY
Qty: 180 TABLET | Refills: 1 | Status: SHIPPED | OUTPATIENT
Start: 2025-04-18

## 2025-04-21 ENCOUNTER — HOSPITAL ENCOUNTER (OUTPATIENT)
Dept: INFUSION CENTER | Facility: HOSPITAL | Age: 65
Discharge: HOME | End: 2025-04-21
Payer: COMMERCIAL

## 2025-04-21 DIAGNOSIS — Z45.2 ENCOUNTER FOR CENTRAL LINE CARE: Primary | ICD-10-CM

## 2025-04-21 DIAGNOSIS — C55 UTERINE LEIOMYOSARCOMA (HCC): ICD-10-CM

## 2025-04-21 LAB
ALBUMIN SERPL BCG-MCNC: 4.3 G/DL (ref 3.5–5)
ALP SERPL-CCNC: 93 U/L (ref 34–104)
ALT SERPL W P-5'-P-CCNC: 12 U/L (ref 7–52)
ANION GAP SERPL CALCULATED.3IONS-SCNC: 8 MMOL/L (ref 4–13)
ANISOCYTOSIS BLD QL SMEAR: PRESENT
AST SERPL W P-5'-P-CCNC: 16 U/L (ref 13–39)
BASOPHILS # BLD MANUAL: 0 THOUSAND/UL (ref 0–0.1)
BASOPHILS NFR MAR MANUAL: 0 % (ref 0–1)
BILIRUB SERPL-MCNC: 0.74 MG/DL (ref 0.2–1)
BUN SERPL-MCNC: 10 MG/DL (ref 5–25)
CALCIUM SERPL-MCNC: 9.3 MG/DL (ref 8.4–10.2)
CHLORIDE SERPL-SCNC: 105 MMOL/L (ref 96–108)
CK SERPL-CCNC: 26 U/L (ref 26–192)
CO2 SERPL-SCNC: 26 MMOL/L (ref 21–32)
CREAT SERPL-MCNC: 0.48 MG/DL (ref 0.6–1.3)
EOSINOPHIL # BLD MANUAL: 0 THOUSAND/UL (ref 0–0.4)
EOSINOPHIL NFR BLD MANUAL: 0 % (ref 0–6)
ERYTHROCYTE [DISTWIDTH] IN BLOOD BY AUTOMATED COUNT: 17.7 % (ref 11.6–15.1)
GFR SERPL CREATININE-BSD FRML MDRD: 103 ML/MIN/1.73SQ M
GLUCOSE SERPL-MCNC: 156 MG/DL (ref 65–140)
HCT VFR BLD AUTO: 35.1 % (ref 34.8–46.1)
HGB BLD-MCNC: 11.4 G/DL (ref 11.5–15.4)
LYMPHOCYTES # BLD AUTO: 1.79 THOUSAND/UL (ref 0.6–4.47)
LYMPHOCYTES # BLD AUTO: 21 % (ref 14–44)
MAGNESIUM SERPL-MCNC: 1.7 MG/DL (ref 1.9–2.7)
MCH RBC QN AUTO: 29.8 PG (ref 26.8–34.3)
MCHC RBC AUTO-ENTMCNC: 32.5 G/DL (ref 31.4–37.4)
MCV RBC AUTO: 92 FL (ref 82–98)
MONOCYTES # BLD AUTO: 0.49 THOUSAND/UL (ref 0–1.22)
MONOCYTES NFR BLD: 6 % (ref 4–12)
NEUTROPHILS # BLD MANUAL: 5.85 THOUSAND/UL (ref 1.85–7.62)
NEUTS BAND NFR BLD MANUAL: 2 % (ref 0–8)
NEUTS SEG NFR BLD AUTO: 70 % (ref 43–75)
PLATELET # BLD AUTO: 332 THOUSANDS/UL (ref 149–390)
PLATELET BLD QL SMEAR: ADEQUATE
PMV BLD AUTO: 9.3 FL (ref 8.9–12.7)
POTASSIUM SERPL-SCNC: 3.8 MMOL/L (ref 3.5–5.3)
PROT SERPL-MCNC: 7.2 G/DL (ref 6.4–8.4)
RBC # BLD AUTO: 3.83 MILLION/UL (ref 3.81–5.12)
RBC MORPH BLD: PRESENT
SODIUM SERPL-SCNC: 139 MMOL/L (ref 135–147)
VARIANT LYMPHS # BLD AUTO: 1 %
WBC # BLD AUTO: 8.12 THOUSAND/UL (ref 4.31–10.16)

## 2025-04-21 PROCEDURE — 85027 COMPLETE CBC AUTOMATED: CPT

## 2025-04-21 PROCEDURE — 82550 ASSAY OF CK (CPK): CPT

## 2025-04-21 PROCEDURE — 83735 ASSAY OF MAGNESIUM: CPT

## 2025-04-21 PROCEDURE — 80053 COMPREHEN METABOLIC PANEL: CPT

## 2025-04-21 PROCEDURE — 85007 BL SMEAR W/DIFF WBC COUNT: CPT

## 2025-04-21 NOTE — PROGRESS NOTES
Pt's port accessed using sterile technique. Labs drawn without difficulty. Port de-accessed. Band-aid applied. Pt ambulated with a steady gait off unit. Declined AVS       Aware of next appt 04/23/25 @ 0930 am

## 2025-04-22 RX ORDER — MAGNESIUM SULFATE HEPTAHYDRATE 40 MG/ML
2 INJECTION, SOLUTION INTRAVENOUS ONCE
Status: CANCELLED
Start: 2025-04-23

## 2025-04-22 RX ORDER — DOXORUBICIN HYDROCHLORIDE 2 MG/ML
45 INJECTION, SOLUTION INTRAVENOUS ONCE
Status: CANCELLED | OUTPATIENT
Start: 2025-04-23

## 2025-04-22 RX ORDER — SODIUM CHLORIDE 9 MG/ML
20 INJECTION, SOLUTION INTRAVENOUS ONCE
Status: CANCELLED | OUTPATIENT
Start: 2025-04-23

## 2025-04-22 RX ORDER — PALONOSETRON 0.05 MG/ML
0.25 INJECTION, SOLUTION INTRAVENOUS ONCE
Status: CANCELLED | OUTPATIENT
Start: 2025-04-23

## 2025-04-23 ENCOUNTER — HOSPITAL ENCOUNTER (OUTPATIENT)
Dept: INFUSION CENTER | Facility: HOSPITAL | Age: 65
Discharge: HOME | End: 2025-04-23
Attending: OBSTETRICS & GYNECOLOGY
Payer: COMMERCIAL

## 2025-04-23 VITALS
SYSTOLIC BLOOD PRESSURE: 146 MMHG | BODY MASS INDEX: 34.82 KG/M2 | OXYGEN SATURATION: 92 % | HEIGHT: 64 IN | WEIGHT: 203.93 LBS | RESPIRATION RATE: 18 BRPM | HEART RATE: 56 BPM | DIASTOLIC BLOOD PRESSURE: 75 MMHG | TEMPERATURE: 97.7 F

## 2025-04-23 DIAGNOSIS — T45.1X5A CHEMOTHERAPY INDUCED NEUTROPENIA (HCC): ICD-10-CM

## 2025-04-23 DIAGNOSIS — C55 UTERINE LEIOMYOSARCOMA (HCC): ICD-10-CM

## 2025-04-23 DIAGNOSIS — E83.42 HYPOMAGNESEMIA: Primary | ICD-10-CM

## 2025-04-23 DIAGNOSIS — D70.1 CHEMOTHERAPY INDUCED NEUTROPENIA (HCC): ICD-10-CM

## 2025-04-23 PROCEDURE — 96411 CHEMO IV PUSH ADDL DRUG: CPT

## 2025-04-23 PROCEDURE — 96415 CHEMO IV INFUSION ADDL HR: CPT

## 2025-04-23 PROCEDURE — 96375 TX/PRO/DX INJ NEW DRUG ADDON: CPT

## 2025-04-23 PROCEDURE — 96368 THER/DIAG CONCURRENT INF: CPT

## 2025-04-23 PROCEDURE — 96367 TX/PROPH/DG ADDL SEQ IV INF: CPT

## 2025-04-23 PROCEDURE — 96413 CHEMO IV INFUSION 1 HR: CPT

## 2025-04-23 RX ORDER — DOXORUBICIN HYDROCHLORIDE 2 MG/ML
45 INJECTION, SOLUTION INTRAVENOUS ONCE
Status: COMPLETED | OUTPATIENT
Start: 2025-04-23 | End: 2025-04-23

## 2025-04-23 RX ORDER — MAGNESIUM SULFATE HEPTAHYDRATE 40 MG/ML
2 INJECTION, SOLUTION INTRAVENOUS ONCE
Status: COMPLETED | OUTPATIENT
Start: 2025-04-23 | End: 2025-04-23

## 2025-04-23 RX ORDER — SODIUM CHLORIDE 9 MG/ML
20 INJECTION, SOLUTION INTRAVENOUS ONCE
Status: COMPLETED | OUTPATIENT
Start: 2025-04-23 | End: 2025-04-23

## 2025-04-23 RX ORDER — PALONOSETRON 0.05 MG/ML
0.25 INJECTION, SOLUTION INTRAVENOUS ONCE
Status: COMPLETED | OUTPATIENT
Start: 2025-04-23 | End: 2025-04-23

## 2025-04-23 RX ADMIN — FOSAPREPITANT 150 MG: 150 INJECTION, POWDER, LYOPHILIZED, FOR SOLUTION INTRAVENOUS at 10:01

## 2025-04-23 RX ADMIN — TRABECTEDIN 1.77 MG: 0.05 INJECTION, POWDER, LYOPHILIZED, FOR SOLUTION INTRAVENOUS at 10:45

## 2025-04-23 RX ADMIN — SODIUM CHLORIDE 20 ML/HR: 0.9 INJECTION, SOLUTION INTRAVENOUS at 09:32

## 2025-04-23 RX ADMIN — DEXAMETHASONE SODIUM PHOSPHATE 20 MG: 10 INJECTION, SOLUTION INTRAMUSCULAR; INTRAVENOUS at 09:33

## 2025-04-23 RX ADMIN — PALONOSETRON 0.25 MG: 0.05 INJECTION, SOLUTION INTRAVENOUS at 09:34

## 2025-04-23 RX ADMIN — DOXORUBICIN HYDROCHLORIDE 88 MG: 2 INJECTION, SOLUTION INTRAVENOUS at 14:00

## 2025-04-23 RX ADMIN — MAGNESIUM SULFATE IN WATER 2 G: 40 INJECTION, SOLUTION INTRAVENOUS at 09:37

## 2025-04-23 NOTE — PROGRESS NOTES
Felecia Edward  tolerated treatment well with no complications.      Felecia Edward is aware of future appt on 4/24 at 1100.     AVS printed and given to Felecia Edward:  Yes

## 2025-04-24 ENCOUNTER — HOSPITAL ENCOUNTER (OUTPATIENT)
Dept: INFUSION CENTER | Facility: HOSPITAL | Age: 65
Discharge: HOME | End: 2025-04-24
Attending: OBSTETRICS & GYNECOLOGY
Payer: COMMERCIAL

## 2025-04-24 VITALS
RESPIRATION RATE: 18 BRPM | HEART RATE: 74 BPM | SYSTOLIC BLOOD PRESSURE: 140 MMHG | DIASTOLIC BLOOD PRESSURE: 87 MMHG | OXYGEN SATURATION: 97 % | TEMPERATURE: 98.1 F

## 2025-04-24 DIAGNOSIS — C55 UTERINE LEIOMYOSARCOMA (HCC): Primary | ICD-10-CM

## 2025-04-24 DIAGNOSIS — D70.1 CHEMOTHERAPY INDUCED NEUTROPENIA (HCC): ICD-10-CM

## 2025-04-24 DIAGNOSIS — T45.1X5A CHEMOTHERAPY INDUCED NEUTROPENIA (HCC): ICD-10-CM

## 2025-04-24 PROCEDURE — 96372 THER/PROPH/DIAG INJ SC/IM: CPT

## 2025-04-24 RX ADMIN — PEGFILGRASTIM-APGF 6 MG: 6 INJECTION, SOLUTION SUBCUTANEOUS at 14:54

## 2025-04-24 NOTE — PROGRESS NOTES
Felecia Edward  tolerated treatment well with no complications.      Felecia Edward is aware of future appt on 04/28/2025 at 10:00 AM.     AVS printed and given to Felecia Edward:  No (Declined by Felecia Edward)

## 2025-04-24 NOTE — PLAN OF CARE
Problem: Potential for Falls  Goal: Patient will remain free of falls  Description: INTERVENTIONS:- Educate patient/family on patient safety including physical limitations- Instruct patient to call for assistance with activity - Consult OT/PT to assist with strengthening/mobility - Keep Call bell within reach- Keep bed low and locked with side rails adjusted as appropriate- Keep care items and personal belongings within reach- Initiate and maintain comfort rounds- Make Fall Risk Sign visible to staff- Offer Toileting every x Hours, in advance of need- Initiate/Maintain xalarm- Obtain necessary fall risk management equipment: x- Apply yellow socks and bracelet for high fall risk patients- Consider moving patient to room near nurses station  Outcome: Progressing     Problem: Knowledge Deficit  Goal: Patient/family/caregiver demonstrates understanding of disease process, treatment plan, medications, and discharge instructions  Description: Complete learning assessment and assess knowledge base.Interventions:- Provide teaching at level of understanding- Provide teaching via preferred learning methods  Outcome: Progressing

## 2025-04-28 ENCOUNTER — HOSPITAL ENCOUNTER (OUTPATIENT)
Dept: INFUSION CENTER | Facility: HOSPITAL | Age: 65
Discharge: HOME | End: 2025-04-28
Payer: COMMERCIAL

## 2025-04-28 DIAGNOSIS — Z45.2 ENCOUNTER FOR CENTRAL LINE CARE: Primary | ICD-10-CM

## 2025-04-28 DIAGNOSIS — C55 UTERINE LEIOMYOSARCOMA (HCC): ICD-10-CM

## 2025-04-28 LAB
ALBUMIN SERPL BCG-MCNC: 4 G/DL (ref 3.5–5)
ALP SERPL-CCNC: 109 U/L (ref 34–104)
ALT SERPL W P-5'-P-CCNC: 24 U/L (ref 7–52)
ANION GAP SERPL CALCULATED.3IONS-SCNC: 10 MMOL/L (ref 4–13)
ANISOCYTOSIS BLD QL SMEAR: PRESENT
AST SERPL W P-5'-P-CCNC: 17 U/L (ref 13–39)
BASOPHILS # BLD MANUAL: 0 THOUSAND/UL (ref 0–0.1)
BASOPHILS NFR MAR MANUAL: 0 % (ref 0–1)
BILIRUB SERPL-MCNC: 0.85 MG/DL (ref 0.2–1)
BUN SERPL-MCNC: 18 MG/DL (ref 5–25)
CALCIUM SERPL-MCNC: 8.9 MG/DL (ref 8.4–10.2)
CHLORIDE SERPL-SCNC: 103 MMOL/L (ref 96–108)
CO2 SERPL-SCNC: 25 MMOL/L (ref 21–32)
CREAT SERPL-MCNC: 0.55 MG/DL (ref 0.6–1.3)
EOSINOPHIL # BLD MANUAL: 0 THOUSAND/UL (ref 0–0.4)
EOSINOPHIL NFR BLD MANUAL: 0 % (ref 0–6)
ERYTHROCYTE [DISTWIDTH] IN BLOOD BY AUTOMATED COUNT: 17.6 % (ref 11.6–15.1)
GFR SERPL CREATININE-BSD FRML MDRD: 98 ML/MIN/1.73SQ M
GLUCOSE SERPL-MCNC: 187 MG/DL (ref 65–140)
HCT VFR BLD AUTO: 34.4 % (ref 34.8–46.1)
HGB BLD-MCNC: 11.2 G/DL (ref 11.5–15.4)
LYMPHOCYTES # BLD AUTO: 1.51 THOUSAND/UL (ref 0.6–4.47)
LYMPHOCYTES # BLD AUTO: 10 % (ref 14–44)
MACROCYTES BLD QL AUTO: PRESENT
MAGNESIUM SERPL-MCNC: 1.8 MG/DL (ref 1.9–2.7)
MCH RBC QN AUTO: 30.9 PG (ref 26.8–34.3)
MCHC RBC AUTO-ENTMCNC: 32.6 G/DL (ref 31.4–37.4)
MCV RBC AUTO: 95 FL (ref 82–98)
MONOCYTES # BLD AUTO: 0.41 THOUSAND/UL (ref 0–1.22)
MONOCYTES NFR BLD: 3 % (ref 4–12)
NEUTROPHILS # BLD MANUAL: 11.79 THOUSAND/UL (ref 1.85–7.62)
NEUTS SEG NFR BLD AUTO: 86 % (ref 43–75)
PLASMA CELLS NFR BLD: 1 % (ref 0–0)
PLATELET # BLD AUTO: 230 THOUSANDS/UL (ref 149–390)
PLATELET BLD QL SMEAR: ADEQUATE
PMV BLD AUTO: 9.2 FL (ref 8.9–12.7)
POTASSIUM SERPL-SCNC: 3.7 MMOL/L (ref 3.5–5.3)
PROT SERPL-MCNC: 6.9 G/DL (ref 6.4–8.4)
RBC # BLD AUTO: 3.63 MILLION/UL (ref 3.81–5.12)
RBC MORPH BLD: PRESENT
SODIUM SERPL-SCNC: 138 MMOL/L (ref 135–147)
WBC # BLD AUTO: 13.71 THOUSAND/UL (ref 4.31–10.16)

## 2025-04-28 PROCEDURE — 85027 COMPLETE CBC AUTOMATED: CPT

## 2025-04-28 PROCEDURE — 83735 ASSAY OF MAGNESIUM: CPT

## 2025-04-28 PROCEDURE — 80053 COMPREHEN METABOLIC PANEL: CPT

## 2025-04-28 PROCEDURE — 85007 BL SMEAR W/DIFF WBC COUNT: CPT

## 2025-04-28 NOTE — PROGRESS NOTES
Felecia Edward  tolerated treatment well with no complications.      Felecia Edward is aware of future appt on 5/5 at 12 noon.     AVS printed and given to Felecia Edward:  Yes

## 2025-05-01 ENCOUNTER — HOSPITAL ENCOUNTER (OUTPATIENT)
Dept: CT IMAGING | Facility: HOSPITAL | Age: 65
Discharge: HOME | End: 2025-05-01
Attending: PHYSICIAN ASSISTANT
Payer: COMMERCIAL

## 2025-05-01 DIAGNOSIS — C55 UTERINE LEIOMYOSARCOMA (HCC): ICD-10-CM

## 2025-05-01 PROCEDURE — 74177 CT ABD & PELVIS W/CONTRAST: CPT

## 2025-05-01 PROCEDURE — 71260 CT THORAX DX C+: CPT

## 2025-05-01 RX ADMIN — IOHEXOL 100 ML: 350 INJECTION, SOLUTION INTRAVENOUS at 10:41

## 2025-05-05 ENCOUNTER — HOSPITAL ENCOUNTER (OUTPATIENT)
Dept: INFUSION CENTER | Facility: HOSPITAL | Age: 65
Discharge: HOME | End: 2025-05-05
Payer: COMMERCIAL

## 2025-05-05 DIAGNOSIS — Z45.2 ENCOUNTER FOR CENTRAL LINE CARE: Primary | ICD-10-CM

## 2025-05-05 DIAGNOSIS — C55 UTERINE LEIOMYOSARCOMA (HCC): ICD-10-CM

## 2025-05-05 LAB
ALBUMIN SERPL BCG-MCNC: 4 G/DL (ref 3.5–5)
ALP SERPL-CCNC: 94 U/L (ref 34–104)
ALT SERPL W P-5'-P-CCNC: 13 U/L (ref 7–52)
ANION GAP SERPL CALCULATED.3IONS-SCNC: 8 MMOL/L (ref 4–13)
AST SERPL W P-5'-P-CCNC: 12 U/L (ref 13–39)
BASOPHILS # BLD AUTO: 0.02 THOUSANDS/ÂΜL (ref 0–0.1)
BASOPHILS NFR BLD AUTO: 1 % (ref 0–1)
BILIRUB SERPL-MCNC: 0.65 MG/DL (ref 0.2–1)
BUN SERPL-MCNC: 11 MG/DL (ref 5–25)
CALCIUM SERPL-MCNC: 9.2 MG/DL (ref 8.4–10.2)
CHLORIDE SERPL-SCNC: 106 MMOL/L (ref 96–108)
CO2 SERPL-SCNC: 26 MMOL/L (ref 21–32)
CREAT SERPL-MCNC: 0.51 MG/DL (ref 0.6–1.3)
EOSINOPHIL # BLD AUTO: 0.08 THOUSAND/ÂΜL (ref 0–0.61)
EOSINOPHIL NFR BLD AUTO: 2 % (ref 0–6)
ERYTHROCYTE [DISTWIDTH] IN BLOOD BY AUTOMATED COUNT: 18.6 % (ref 11.6–15.1)
GFR SERPL CREATININE-BSD FRML MDRD: 101 ML/MIN/1.73SQ M
GLUCOSE SERPL-MCNC: 141 MG/DL (ref 65–140)
HCT VFR BLD AUTO: 30.5 % (ref 34.8–46.1)
HGB BLD-MCNC: 10 G/DL (ref 11.5–15.4)
IMM GRANULOCYTES # BLD AUTO: 0.06 THOUSAND/UL (ref 0–0.2)
IMM GRANULOCYTES NFR BLD AUTO: 2 % (ref 0–2)
LYMPHOCYTES # BLD AUTO: 1.29 THOUSANDS/ÂΜL (ref 0.6–4.47)
LYMPHOCYTES NFR BLD AUTO: 32 % (ref 14–44)
MAGNESIUM SERPL-MCNC: 1.7 MG/DL (ref 1.9–2.7)
MCH RBC QN AUTO: 30.7 PG (ref 26.8–34.3)
MCHC RBC AUTO-ENTMCNC: 32.8 G/DL (ref 31.4–37.4)
MCV RBC AUTO: 94 FL (ref 82–98)
MONOCYTES # BLD AUTO: 0.46 THOUSAND/ÂΜL (ref 0.17–1.22)
MONOCYTES NFR BLD AUTO: 11 % (ref 4–12)
NEUTROPHILS # BLD AUTO: 2.19 THOUSANDS/ÂΜL (ref 1.85–7.62)
NEUTS SEG NFR BLD AUTO: 52 % (ref 43–75)
NRBC BLD AUTO-RTO: 0 /100 WBCS
PLATELET # BLD AUTO: 179 THOUSANDS/UL (ref 149–390)
PMV BLD AUTO: 10 FL (ref 8.9–12.7)
POTASSIUM SERPL-SCNC: 3.8 MMOL/L (ref 3.5–5.3)
PROT SERPL-MCNC: 6.7 G/DL (ref 6.4–8.4)
RBC # BLD AUTO: 3.26 MILLION/UL (ref 3.81–5.12)
SODIUM SERPL-SCNC: 140 MMOL/L (ref 135–147)
WBC # BLD AUTO: 4.1 THOUSAND/UL (ref 4.31–10.16)

## 2025-05-05 PROCEDURE — 85025 COMPLETE CBC W/AUTO DIFF WBC: CPT

## 2025-05-05 PROCEDURE — 83735 ASSAY OF MAGNESIUM: CPT

## 2025-05-05 PROCEDURE — 80053 COMPREHEN METABOLIC PANEL: CPT

## 2025-05-05 NOTE — PROGRESS NOTES
Felecia Edward  tolerated treatment well with no complications.      Felecia Edward is aware of future appt on 5/12 at 1130.     AVS printed and given to Felecia Edward:  No (Declined by Felecia Edward)

## 2025-05-07 ENCOUNTER — TELEPHONE (OUTPATIENT)
Dept: PALLIATIVE MEDICINE | Facility: CLINIC | Age: 65
End: 2025-05-07

## 2025-05-07 NOTE — TELEPHONE ENCOUNTER
Left message for pt to schedule follow up visit she may schedule in [facility]. Due to our provider leaving the practice, pt may schedule in [facility] on 5/22.

## 2025-05-08 ENCOUNTER — OFFICE VISIT (OUTPATIENT)
Age: 65
End: 2025-05-08
Payer: COMMERCIAL

## 2025-05-08 VITALS
HEART RATE: 101 BPM | OXYGEN SATURATION: 95 % | WEIGHT: 204 LBS | RESPIRATION RATE: 18 BRPM | TEMPERATURE: 98.3 F | SYSTOLIC BLOOD PRESSURE: 140 MMHG | HEIGHT: 64 IN | BODY MASS INDEX: 34.83 KG/M2 | DIASTOLIC BLOOD PRESSURE: 82 MMHG

## 2025-05-08 DIAGNOSIS — D70.1 CHEMOTHERAPY INDUCED NEUTROPENIA (HCC): ICD-10-CM

## 2025-05-08 DIAGNOSIS — C55 UTERINE LEIOMYOSARCOMA (HCC): Primary | ICD-10-CM

## 2025-05-08 DIAGNOSIS — T45.1X5A CHEMOTHERAPY INDUCED NEUTROPENIA (HCC): ICD-10-CM

## 2025-05-08 PROCEDURE — 99214 OFFICE O/P EST MOD 30 MIN: CPT | Performed by: OBSTETRICS & GYNECOLOGY

## 2025-05-08 NOTE — PROGRESS NOTES
Name: Felecia Edward      : 1960      MRN: [id number]  Encounter Provider: Gordo Taveras MD  Encounter Date: 2025   Encounter department: [facility] GYNECOLOGY ONCOLOGY [facility]  :  Assessment & Plan  Uterine leiomyosarcoma (HCC)  64-year-old with recurrent uterine leiomyosarcoma status post surgical resection 2025 and currently receiving palliative Adriamycin at 45 mg/m² and trabectidin at 0.9 mg/m² every 21 days.  Dose reductions for both Adriamycin and trabectidin were necessary due to hematologic toxicity.  She has received 3 cycles of treatment.  I reviewed CBC, CMP, magnesium, CT chest abdomen pelvis images.  There is no evidence of measurable disease.  She has dyspnea and weakness.  Her performance status is 1.  1.  Continue adjuvant treatment with Adriamycin at 45 mg/m² and trabectidin 0.9 mg/m² IV every 21 days for a total of 6 cycles of treatment followed by repeat CT imaging to assess disease response.  She will then transition to trabectidin maintenance therapy for 11 additional cycles of treatment.  2.  Echocardiogram for toxicity monitoring.  Will continue to trend CK as well.  Orders:    Echo complete w/ contrast if indicated; Future    Chemotherapy induced neutropenia (HCC)  Current ANC 2.19.  Plan to continue pegfilgrastim on day 2 of each cycle.         Assessment & Plan            History of Present Illness   Reason for Visit / CC: Chemotherapy evaluation, weakness, occasional tremors, nausea   Felecia Edward is a 64 y.o. female   History of Present Illness  Here prior to cycle 4 of Adriamycin and trabectidin.  She is receiving G-CSF support due to hematologic toxicity/chemotherapy induced neutropenia and fatigue.  She also has treatment-related nausea.  Labs from 2025 revealed a normal CMP, CBC with anemia, hemoglobin 10 g/dL, leukopenia with ANC 2.19, normal platelet count.  CK has been normal.  She has chemotherapy-induced  nausea which is controlled with oral medications.  She has a grade 1 peripheral neuropathy that was present after her first chemotherapy regimen that has been unchanged.  She has dyspnea on exertion.  She overall feels weak and has lost 15 pounds.  She is eating and having bowel movements.  No leg swelling.  No other interval change in medications or medical history since her last visit to the office.  She has an occasional tremor which occurred with her first chemotherapy regimen as well.  No visual changes.  She has headaches from the loud television at home.  Pertinent Medical History     Type 2 diabetes       Oncology History   Cancer Staging   Uterine leiomyosarcoma (HCC)  Staging form: Corpus Uteri - Sarcoma, AJCC 8th Edition  - Pathologic stage from 6/4/2023: FIGO Stage II, calculated as Stage Unknown (pT2, pNX, cM0) - Signed by Gordo Taveras MD on 6/29/2023  Stage prefix: Initial diagnosis  Oncology History   Uterine leiomyosarcoma (HCC)   6/4/2023 Initial Diagnosis    Uterine sarcoma (HCC)     6/4/2023 -  Cancer Staged    Staging form: Corpus Uteri - Sarcoma, AJCC 8th Edition  - Pathologic stage from 6/4/2023: FIGO Stage II, calculated as Stage Unknown (pT2, pNX, cM0) - Signed by Gordo Taveras MD on 6/29/2023  Stage prefix: Initial diagnosis       6/4/2023 Surgery    HYSTERECTOMY TOTAL ABDOMINAL (DOROTHY). BILATERAL SALPINGOOPHORECTOMY  EXCISION  BIOPSY LESION/MASS ABDOMINAL-PELVIC PERITONEUM  -Leiomyosarcoma 13.5 cm extending through the myometrium, right pelvic peritoneum involved with uterine leiomyosarcoma with tumor present at unoriented resection surface.     7/11/2023 -  Chemotherapy    Gemzar 800 mg/m2 IV day 1 and 8 as well as taxotere 65 mg/m2 day 8 every 21 days.        - 12/1/2023 Chemotherapy    Adriamycin 50 mg/m2 IV every 21 days.  Including the gemcitabine and Taxotere, she completed a total of 7 cycles.     1/6/2025 Surgery    ROBOTIC ASSISTED RADICAL BILATERAL  PARAMETRECTOMY. RESECTION BILATERAL PARAMETRIAL TUMORS. UPPER VAGINECTOMY. LEFT OBTURATOR LYMPH NODE DISSECTION  CYSTOSCOPY     3/12/2025 -  Chemotherapy    Adriamycin 45 mg/m2 and trabectidin 0.9 mg/m2 IV every 21 days          Review of Systems   Constitutional:  Positive for unexpected weight change. Negative for activity change.        Weight loss   HENT: Negative.     Eyes: Negative.    Respiratory:  Positive for shortness of breath.    Cardiovascular: Negative.    Gastrointestinal:  Negative for abdominal distention and abdominal pain.   Endocrine: Negative.    Genitourinary:  Negative for pelvic pain and vaginal bleeding.   Musculoskeletal: Negative.    Skin: Negative.    Allergic/Immunologic: Negative.    Neurological: Negative.    Hematological: Negative.    Psychiatric/Behavioral: Negative.      A complete review of systems is negative other than that noted above in the HPI.  Current Outpatient Medications on File Prior to Visit   Medication Sig Dispense Refill    aspirin (Aspirin 81) 81 mg chewable tablet every 24 hours      atorvastatin (LIPITOR) 40 mg tablet Take 40 mg by mouth daily at bedtime      betamethasone, augmented, (DIPROLENE) 0.05 % lotion Apply topically 2 (two) times a day 60 mL 1    cetirizine (ZyrTEC) 10 mg tablet Take 10 mg by mouth daily at bedtime      cholecalciferol (VITAMIN D3) 1,000 units tablet Take 2,000 Units by mouth daily 2 daily      clindamycin (CLEOCIN T) 1 % external solution Apply topically 2 (two) times a day 60 mL 1    dicyclomine (BENTYL) 10 mg capsule Take 1 capsule (10 mg total) by mouth every 6 (six) hours as needed (pain) 30 capsule 3    DULoxetine (CYMBALTA) 30 mg delayed release capsule TAKE 1 CAPSULE BY MOUTH EVERY DAY FOR 90 DAYS      glimepiride (AMARYL) 4 mg tablet Take 4 mg by mouth 2 (two) times a day      hydroCHLOROthiazide 25 mg tablet       ibuprofen (MOTRIN) 800 mg tablet Take 800 mg by mouth every 6 (six) hours as needed for mild pain      Jardiance  10 MG TABS tablet       ketorolac (TORADOL) 10 mg tablet Take 1 tablet (10 mg total) by mouth every 6 (six) hours as needed for moderate pain for up to 5 days 20 tablet 0    LORazepam (ATIVAN) 1 mg tablet Take 1 tablet (1 mg total) by mouth every 8 (eight) hours as needed (nausea or anxiety) 20 tablet 0    losartan (COZAAR) 50 mg tablet Take 50 mg by mouth daily      meclizine (ANTIVERT) 25 mg tablet Take by mouth every 12 (twelve) hours as needed for dizziness      metoprolol succinate (TOPROL-XL) 25 mg 24 hr tablet Take 25 mg by mouth daily      Multiple Vitamin (MULTIVITAMIN) tablet Take 1 tablet by mouth daily      nitrofurantoin (MACROBID) 100 mg capsule Take 1 capsule (100 mg total) by mouth 2 (two) times a day 10 capsule 0    ondansetron (ZOFRAN) 8 mg tablet Take 1 tablet (8 mg total) by mouth every 8 (eight) hours as needed for nausea or vomiting 30 tablet 1    oxyCODONE (Roxicodone) 5 immediate release tablet Take 1 tablet (5 mg total) by mouth every 6 (six) hours as needed for moderate pain for up to 8 doses Max Daily Amount: 20 mg (Patient taking differently: Take 2.5 mg by mouth every 6 (six) hours as needed for moderate pain) 8 tablet 0    pantoprazole (PROTONIX) 40 mg tablet Take 1 tablet (40 mg total) by mouth daily 90 tablet 5    phenazopyridine (PYRIDIUM) 200 mg tablet Take 1 tablet (200 mg total) by mouth 3 (three) times a day as needed (Dysuria and bladder discomfort.  Take with meals.) 10 tablet 0    pioglitazone (ACTOS) 30 mg tablet every 24 hours      potassium chloride (Klor-Con M20) 20 mEq tablet TAKE 1 TABLET BY MOUTH 2 TIMES A DAY. 180 tablet 1    rOPINIRole (REQUIP) 1 mg tablet TAKE 1 TABLET BY MOUTH 1 TO 3 HOURS BEFORE BEDTIME      sertraline (ZOLOFT) 100 mg tablet TAKE 1 AND 1/2 TABS BY MOUTH DAILY      tamsulosin (FLOMAX) 0.4 mg Take 1 capsule (0.4 mg total) by mouth every evening for 14 days 14 capsule 0    topiramate (TOPAMAX) 100 mg tablet 1 tablet Orally at bedtime       "Triamcinolone Acetonide 55 MCG/ACT AERO into each nostril One spray each nostril daily      Xiidra 5 % op solution INSTILL 1 DROP INTO BOTH EYES TWICE A DAY 12 HOURS APART      ZOLMitriptan (ZOMIG) 5 MG tablet Take 5 mg by mouth once as needed for migraine Daily prn       Current Facility-Administered Medications on File Prior to Visit   Medication Dose Route Frequency Provider Last Rate Last Admin    [DISCONTINUED] alteplase (CATHFLO) injection 2 mg  2 mg Intracatheter Q1MIN PRN Gordo Taveras MD             Objective   There were no vitals taken for this visit.    There is no height or weight on file to calculate BMI.  Pain Screening:     ECOG   1  Physical Exam  Vitals reviewed.   Constitutional:       General: She is not in acute distress.     Appearance: Normal appearance. She is not ill-appearing.   HENT:      Head: Normocephalic and atraumatic.      Mouth/Throat:      Mouth: Mucous membranes are moist.   Eyes:      General: No scleral icterus.        Right eye: No discharge.         Left eye: No discharge.      Conjunctiva/sclera: Conjunctivae normal.   Pulmonary:      Effort: Pulmonary effort is normal.   Musculoskeletal:      Right lower leg: No edema.      Left lower leg: No edema.   Skin:     General: Skin is warm and dry.      Coloration: Skin is not jaundiced.      Findings: No rash.   Neurological:      General: No focal deficit present.      Mental Status: She is alert and oriented to person, place, and time.      Cranial Nerves: No cranial nerve deficit.      Sensory: No sensory deficit.      Motor: No weakness.      Gait: Gait normal.   Psychiatric:         Mood and Affect: Mood normal.         Behavior: Behavior normal.         Thought Content: Thought content normal.         Judgment: Judgment normal.       Physical Exam       Results    Labs: I have reviewed pertinent labs.   No results found for: \"\"  Lab Results   Component Value Date/Time    Potassium 3.8 05/05/2025 12:09 PM    " "Chloride 106 05/05/2025 12:09 PM    CO2 26 05/05/2025 12:09 PM    BUN 11 05/05/2025 12:09 PM    Creatinine 0.51 (L) 05/05/2025 12:09 PM    Glucose, Fasting 168 (H) 01/19/2025 11:17 AM    Calcium 9.2 05/05/2025 12:09 PM    AST 12 (L) 05/05/2025 12:09 PM    ALT 13 05/05/2025 12:09 PM    Alkaline Phosphatase 94 05/05/2025 12:09 PM    eGFR 101 05/05/2025 12:09 PM     Lab Results   Component Value Date/Time    WBC 4.10 (L) 05/05/2025 12:09 PM    Hemoglobin 10.0 (L) 05/05/2025 12:09 PM    Hematocrit 30.5 (L) 05/05/2025 12:09 PM    MCV 94 05/05/2025 12:09 PM    Platelets 179 05/05/2025 12:09 PM     Lab Results   Component Value Date/Time    Absolute Neutrophils 2.19 05/05/2025 12:09 PM        Trend:  No results found for: \"\"    Radiology Results Review: I personally reviewed the following image studies in PACS and associated radiology reports: CT chest and CT abdomen/pelvis. My interpretation of the radiology images/reports is: No visible evidence of recurrent disease.  Stable small bilateral renal cysts..        "

## 2025-05-08 NOTE — ASSESSMENT & PLAN NOTE
64-year-old with recurrent uterine leiomyosarcoma status post surgical resection 1/6/2025 and currently receiving palliative Adriamycin at 45 mg/m² and trabectidin at 0.9 mg/m² every 21 days.  Dose reductions for both Adriamycin and trabectidin were necessary due to hematologic toxicity.  She has received 3 cycles of treatment.  I reviewed CBC, CMP, magnesium, CT chest abdomen pelvis images.  There is no evidence of measurable disease.  She has dyspnea and weakness.  Her performance status is 1.  1.  Continue adjuvant treatment with Adriamycin at 45 mg/m² and trabectidin 0.9 mg/m² IV every 21 days for a total of 6 cycles of treatment followed by repeat CT imaging to assess disease response.  She will then transition to trabectidin maintenance therapy for 11 additional cycles of treatment.  2.  Echocardiogram for toxicity monitoring.  Will continue to trend CK as well.  Orders:    Echo complete w/ contrast if indicated; Future

## 2025-05-12 ENCOUNTER — HOSPITAL ENCOUNTER (OUTPATIENT)
Dept: INFUSION CENTER | Facility: HOSPITAL | Age: 65
Discharge: HOME | End: 2025-05-12
Payer: COMMERCIAL

## 2025-05-12 DIAGNOSIS — C55 UTERINE LEIOMYOSARCOMA (HCC): ICD-10-CM

## 2025-05-12 DIAGNOSIS — R30.0 DYSURIA: Primary | ICD-10-CM

## 2025-05-12 DIAGNOSIS — R30.0 DYSURIA: ICD-10-CM

## 2025-05-12 DIAGNOSIS — Z45.2 ENCOUNTER FOR CENTRAL LINE CARE: Primary | ICD-10-CM

## 2025-05-12 LAB
ALBUMIN SERPL BCG-MCNC: 4.1 G/DL (ref 3.5–5)
ALP SERPL-CCNC: 87 U/L (ref 34–104)
ALT SERPL W P-5'-P-CCNC: 11 U/L (ref 7–52)
ANION GAP SERPL CALCULATED.3IONS-SCNC: 8 MMOL/L (ref 4–13)
AST SERPL W P-5'-P-CCNC: 16 U/L (ref 13–39)
BASOPHILS # BLD AUTO: 0.03 THOUSANDS/ÂΜL (ref 0–0.1)
BASOPHILS NFR BLD AUTO: 0 % (ref 0–1)
BILIRUB SERPL-MCNC: 0.86 MG/DL (ref 0.2–1)
BUN SERPL-MCNC: 14 MG/DL (ref 5–25)
CALCIUM SERPL-MCNC: 9.2 MG/DL (ref 8.4–10.2)
CHLORIDE SERPL-SCNC: 105 MMOL/L (ref 96–108)
CK SERPL-CCNC: 27 U/L (ref 26–192)
CO2 SERPL-SCNC: 25 MMOL/L (ref 21–32)
CREAT SERPL-MCNC: 0.55 MG/DL (ref 0.6–1.3)
EOSINOPHIL # BLD AUTO: 0.08 THOUSAND/ÂΜL (ref 0–0.61)
EOSINOPHIL NFR BLD AUTO: 1 % (ref 0–6)
ERYTHROCYTE [DISTWIDTH] IN BLOOD BY AUTOMATED COUNT: 18.9 % (ref 11.6–15.1)
GFR SERPL CREATININE-BSD FRML MDRD: 98 ML/MIN/1.73SQ M
GLUCOSE SERPL-MCNC: 140 MG/DL (ref 65–140)
HCT VFR BLD AUTO: 32.9 % (ref 34.8–46.1)
HGB BLD-MCNC: 10.6 G/DL (ref 11.5–15.4)
IMM GRANULOCYTES # BLD AUTO: 0.08 THOUSAND/UL (ref 0–0.2)
IMM GRANULOCYTES NFR BLD AUTO: 1 % (ref 0–2)
LYMPHOCYTES # BLD AUTO: 1.45 THOUSANDS/ÂΜL (ref 0.6–4.47)
LYMPHOCYTES NFR BLD AUTO: 21 % (ref 14–44)
MAGNESIUM SERPL-MCNC: 1.8 MG/DL (ref 1.9–2.7)
MCH RBC QN AUTO: 30.6 PG (ref 26.8–34.3)
MCHC RBC AUTO-ENTMCNC: 32.2 G/DL (ref 31.4–37.4)
MCV RBC AUTO: 95 FL (ref 82–98)
MONOCYTES # BLD AUTO: 0.73 THOUSAND/ÂΜL (ref 0.17–1.22)
MONOCYTES NFR BLD AUTO: 10 % (ref 4–12)
NEUTROPHILS # BLD AUTO: 4.66 THOUSANDS/ÂΜL (ref 1.85–7.62)
NEUTS SEG NFR BLD AUTO: 67 % (ref 43–75)
NRBC BLD AUTO-RTO: 0 /100 WBCS
PLATELET # BLD AUTO: 352 THOUSANDS/UL (ref 149–390)
PMV BLD AUTO: 9.3 FL (ref 8.9–12.7)
POTASSIUM SERPL-SCNC: 4.2 MMOL/L (ref 3.5–5.3)
PROT SERPL-MCNC: 6.9 G/DL (ref 6.4–8.4)
RBC # BLD AUTO: 3.46 MILLION/UL (ref 3.81–5.12)
SODIUM SERPL-SCNC: 138 MMOL/L (ref 135–147)
WBC # BLD AUTO: 7.03 THOUSAND/UL (ref 4.31–10.16)

## 2025-05-12 PROCEDURE — 80053 COMPREHEN METABOLIC PANEL: CPT

## 2025-05-12 PROCEDURE — 87086 URINE CULTURE/COLONY COUNT: CPT

## 2025-05-12 PROCEDURE — 87077 CULTURE AEROBIC IDENTIFY: CPT

## 2025-05-12 PROCEDURE — 85025 COMPLETE CBC W/AUTO DIFF WBC: CPT

## 2025-05-12 PROCEDURE — 82550 ASSAY OF CK (CPK): CPT

## 2025-05-12 PROCEDURE — 87186 SC STD MICRODIL/AGAR DIL: CPT

## 2025-05-12 PROCEDURE — 83735 ASSAY OF MAGNESIUM: CPT

## 2025-05-12 NOTE — PROGRESS NOTES
Felecia Edward  tolerated Port flush with labs well with no complications.      Felecia Edward is aware of future appt on 5/14 at 0930.     AVS printed and given to Felecia Edward:    No (Declined by Felecia Edward)

## 2025-05-13 ENCOUNTER — RESULTS FOLLOW-UP (OUTPATIENT)
Dept: GYNECOLOGIC ONCOLOGY | Facility: CLINIC | Age: 65
End: 2025-05-13

## 2025-05-13 DIAGNOSIS — N39.0 URINARY TRACT INFECTION WITHOUT HEMATURIA, SITE UNSPECIFIED: Primary | ICD-10-CM

## 2025-05-13 DIAGNOSIS — R30.0 DYSURIA: ICD-10-CM

## 2025-05-13 RX ORDER — NITROFURANTOIN 25; 75 MG/1; MG/1
100 CAPSULE ORAL 2 TIMES DAILY
Qty: 14 CAPSULE | Refills: 0 | Status: SHIPPED | OUTPATIENT
Start: 2025-05-13 | End: 2025-05-14 | Stop reason: ALTCHOICE

## 2025-05-13 RX ORDER — SODIUM CHLORIDE 9 MG/ML
20 INJECTION, SOLUTION INTRAVENOUS ONCE
Status: CANCELLED | OUTPATIENT
Start: 2025-05-14

## 2025-05-13 RX ORDER — DOXORUBICIN HYDROCHLORIDE 2 MG/ML
45 INJECTION, SOLUTION INTRAVENOUS ONCE
Status: CANCELLED | OUTPATIENT
Start: 2025-05-14

## 2025-05-13 RX ORDER — PALONOSETRON 0.05 MG/ML
0.25 INJECTION, SOLUTION INTRAVENOUS ONCE
Status: CANCELLED | OUTPATIENT
Start: 2025-05-14

## 2025-05-13 NOTE — PROGRESS NOTES
Name: Felecia Edward      : 1960      MRN: [id number]  Encounter Provider: Ember Connor PA-C  Encounter Date: 2025   Encounter department: [facility]  :  Assessment & Plan  Type 2 diabetes mellitus with diabetic neuropathy, without long-term current use of insulin (HCC)    Lab Results   Component Value Date    HGBA1C 7.6 (H) 2024     Right renal stone  S/p right ureteroscopy with stone extraction on 25 with Dr. Magallon   Right ureteral stent removal in office 3/6/25  Ultrasound renal/bladder 3/13/25 - Resolution of right hydronephrosis status post stent removal. Possible nonobstructing calculus lower pole left kidney versus vascular calcification. No hydronephrosis. B/l simple cysts   CT c/a/p 25 -- Punctate right renal nonobstructing calculi. Subcentimeter hypoattenuating renal lesion(s), too small to characterize but statistically likely benign, which do not warrant follow-up   Patient remains at this time   Kidney stone diet discussed   ER protocol reviewed   Repeat ultrasound stone/bladder annually for surveillance, order placed   Follow up annually / reach out with new onset symptoms     Recurrent UTI  Symptoms -- dysuria, frequency, no blood   Recent UC positive for E.coli bacteria   Macrobid per PCP   Patient taking Hiprex 1 g twice daily for recurrent UTI prevention  Side effects discussed   Maintain adequate hydration plus cranberry juice  Avoid bladder irritants, constipation, practice good genital hygiene regimen  Follow-up annually and/or sooner depending on UTI frequency moving forward  Uterine leiomyosarcoma (HCC)  Currently undergoing 2 different injectable chemotherapies  Following closely with GYN and heme/onc   Low estrogen/progesterone levels are likely contributing to an increase in her UTIs      History of Present Illness   Felecia Edward is a 64 y.o. female with PMH of uterine leiomyosarcoma who presents to the office to discuss  her history of nephrolithiasis.  She recently underwent right uteroscopy with stone extraction on 2/13/2025 by Dr. Magallon.  She represented to the office on 3/6/2025 for right ureteral stent removal.  Postoperative ultrasound renal/bladder from 3/13/2025 reveals resolution of prior right hydronephrosis, possible small nonobstructing left lower pole renal stone, no hydronephrosis.  Bilateral simple cyst.  The patient remains asymptomatic at this time and denies hematuria, incomplete bladder emptying, or pain in the bladder/bilateral flanks.  She does report an increase in her UTI frequency over the last 3 months.  Her classic UTI symptoms consist of dysuria, frequency, and bladder pressure.  Most recent urine culture returned positive for E. coli bacteria.  She is currently on Macrobid per her primary care physician.  As she is currently undergoing treatment for her uterine cancer, patient educated that low estrogen environments can increase patient's risk for recurrent UTIs.  Recommend she begins utilizing Hip-Javid 1 g twice daily for UTI prevention.  She will also continue drinking substantial amounts of water and cranberry juice.    Review of Systems   Constitutional:  Negative for activity change, chills, fatigue and fever.   Respiratory:  Negative for apnea, cough and shortness of breath.    Cardiovascular:  Negative for chest pain and leg swelling.   Gastrointestinal:  Negative for abdominal distention, abdominal pain, constipation and diarrhea.   Genitourinary:  Negative for difficulty urinating, dysuria, flank pain, frequency, hematuria, pelvic pain, urgency, vaginal bleeding and vaginal discharge.   Musculoskeletal:  Negative for arthralgias and back pain.   Neurological:  Negative for dizziness and headaches.   Psychiatric/Behavioral: Negative.     All other systems reviewed and are negative.    Medical History Reviewed by provider this encounter:  Tobacco  Allergies  Meds  Problems  Med Hx  Surg Hx   Fam Hx     .  Current Outpatient Medications on File Prior to Visit   Medication Sig Dispense Refill    aspirin (Aspirin 81) 81 mg chewable tablet every 24 hours      atorvastatin (LIPITOR) 40 mg tablet Take 40 mg by mouth daily at bedtime      betamethasone, augmented, (DIPROLENE) 0.05 % lotion Apply topically 2 (two) times a day 60 mL 1    cetirizine (ZyrTEC) 10 mg tablet Take 10 mg by mouth daily at bedtime      cholecalciferol (VITAMIN D3) 1,000 units tablet Take 2,000 Units by mouth daily 2 daily      clindamycin (CLEOCIN T) 1 % external solution Apply topically 2 (two) times a day 60 mL 1    dicyclomine (BENTYL) 10 mg capsule Take 1 capsule (10 mg total) by mouth every 6 (six) hours as needed (pain) 30 capsule 3    DULoxetine (CYMBALTA) 30 mg delayed release capsule TAKE 1 CAPSULE BY MOUTH EVERY DAY FOR 90 DAYS      glimepiride (AMARYL) 4 mg tablet Take 4 mg by mouth 2 (two) times a day      hydroCHLOROthiazide 25 mg tablet       ibuprofen (MOTRIN) 800 mg tablet Take 800 mg by mouth every 6 (six) hours as needed for mild pain      LORazepam (ATIVAN) 1 mg tablet Take 1 tablet (1 mg total) by mouth every 8 (eight) hours as needed (nausea or anxiety) 20 tablet 0    losartan (COZAAR) 50 mg tablet Take 50 mg by mouth daily      meclizine (ANTIVERT) 25 mg tablet Take by mouth every 12 (twelve) hours as needed for dizziness      metoprolol succinate (TOPROL-XL) 25 mg 24 hr tablet Take 25 mg by mouth daily      Multiple Vitamin (MULTIVITAMIN) tablet Take 1 tablet by mouth daily      ondansetron (ZOFRAN) 8 mg tablet Take 1 tablet (8 mg total) by mouth every 8 (eight) hours as needed for nausea or vomiting 30 tablet 1    pantoprazole (PROTONIX) 40 mg tablet Take 1 tablet (40 mg total) by mouth daily 90 tablet 5    phenazopyridine (PYRIDIUM) 200 mg tablet Take 1 tablet (200 mg total) by mouth 3 (three) times a day as needed (Dysuria and bladder discomfort.  Take with meals.) 10 tablet 0    pioglitazone (ACTOS) 30 mg  tablet every 24 hours      potassium chloride (Klor-Con M20) 20 mEq tablet TAKE 1 TABLET BY MOUTH 2 TIMES A DAY. 180 tablet 1    rOPINIRole (REQUIP) 1 mg tablet TAKE 1 TABLET BY MOUTH 1 TO 3 HOURS BEFORE BEDTIME      sertraline (ZOLOFT) 100 mg tablet TAKE 1 AND 1/2 TABS BY MOUTH DAILY      tamsulosin (FLOMAX) 0.4 mg Take 1 capsule (0.4 mg total) by mouth every evening for 14 days 14 capsule 0    topiramate (TOPAMAX) 100 mg tablet 1 tablet Orally at bedtime      Triamcinolone Acetonide 55 MCG/ACT AERO into each nostril One spray each nostril daily      Xiidra 5 % op solution INSTILL 1 DROP INTO BOTH EYES TWICE A DAY 12 HOURS APART      ZOLMitriptan (ZOMIG) 5 MG tablet Take 5 mg by mouth once as needed for migraine Daily prn      Jardiance 10 MG TABS tablet  (Patient not taking: Reported on 5/14/2025)      ketorolac (TORADOL) 10 mg tablet Take 1 tablet (10 mg total) by mouth every 6 (six) hours as needed for moderate pain for up to 5 days 20 tablet 0    nitrofurantoin (MACROBID) 100 mg capsule Take 1 capsule (100 mg total) by mouth 2 (two) times a day (Patient not taking: Reported on 5/14/2025) 14 capsule 0    oxyCODONE (Roxicodone) 5 immediate release tablet Take 1 tablet (5 mg total) by mouth every 6 (six) hours as needed for moderate pain for up to 8 doses Max Daily Amount: 20 mg (Patient not taking: Reported on 5/14/2025) 8 tablet 0     Current Facility-Administered Medications on File Prior to Visit   Medication Dose Route Frequency Provider Last Rate Last Admin    alteplase (CATHFLO) injection 2 mg  2 mg Intracatheter Q1MIN PRN Gordo Taveras MD          Social History     Tobacco Use    Smoking status: Never    Smokeless tobacco: Never   Vaping Use    Vaping status: Never Used   Substance and Sexual Activity    Alcohol use: Never    Drug use: Never    Sexual activity: Not Currently     Partners: Male     Birth control/protection: Abstinence, Post-menopausal, Surgical, Female Sterilization       "  Objective   /82 (BP Location: Left arm, Patient Position: Sitting, Cuff Size: Adult)   Pulse 57   Ht 5' 4.02\" (1.626 m)   Wt 93.9 kg (207 lb)   SpO2 98%   BMI 35.51 kg/m²     Physical Exam  Vitals and nursing note reviewed.   Constitutional:       General: She is not in acute distress.     Appearance: Normal appearance. She is well-developed. She is not ill-appearing.   HENT:      Head: Normocephalic and atraumatic.     Eyes:      Conjunctiva/sclera: Conjunctivae normal.       Cardiovascular:      Rate and Rhythm: Normal rate and regular rhythm.      Heart sounds: No murmur heard.  Pulmonary:      Effort: Pulmonary effort is normal. No respiratory distress.      Breath sounds: Normal breath sounds.   Abdominal:      General: Abdomen is flat. There is no distension.      Palpations: Abdomen is soft.      Tenderness: There is no abdominal tenderness. There is no right CVA tenderness or left CVA tenderness.     Musculoskeletal:         General: No swelling.      Cervical back: Neck supple.     Skin:     General: Skin is warm and dry.      Capillary Refill: Capillary refill takes less than 2 seconds.     Neurological:      Mental Status: She is alert and oriented to person, place, and time.     Psychiatric:         Mood and Affect: Mood normal.         Results   No results found for: \"PSA\"  Lab Results   Component Value Date    CALCIUM 9.2 05/12/2025    K 4.2 05/12/2025    CO2 25 05/12/2025     05/12/2025    BUN 14 05/12/2025    CREATININE 0.55 (L) 05/12/2025     Lab Results   Component Value Date    WBC 7.03 05/12/2025    HGB 10.6 (L) 05/12/2025    HCT 32.9 (L) 05/12/2025    MCV 95 05/12/2025     05/12/2025       Office Urine Dip  No results found for this or any previous visit (from the past hour).      "

## 2025-05-14 ENCOUNTER — OFFICE VISIT (OUTPATIENT)
Dept: UROLOGY | Facility: HOSPITAL | Age: 65
End: 2025-05-14
Payer: COMMERCIAL

## 2025-05-14 ENCOUNTER — HOSPITAL ENCOUNTER (OUTPATIENT)
Dept: INFUSION CENTER | Facility: HOSPITAL | Age: 65
Discharge: HOME | End: 2025-05-14
Attending: OBSTETRICS & GYNECOLOGY
Payer: COMMERCIAL

## 2025-05-14 VITALS
SYSTOLIC BLOOD PRESSURE: 135 MMHG | DIASTOLIC BLOOD PRESSURE: 66 MMHG | TEMPERATURE: 97.8 F | HEIGHT: 64 IN | HEART RATE: 87 BPM | RESPIRATION RATE: 20 BRPM | OXYGEN SATURATION: 98 % | WEIGHT: 208.34 LBS | BODY MASS INDEX: 35.57 KG/M2

## 2025-05-14 VITALS
BODY MASS INDEX: 35.34 KG/M2 | SYSTOLIC BLOOD PRESSURE: 122 MMHG | HEART RATE: 57 BPM | OXYGEN SATURATION: 98 % | DIASTOLIC BLOOD PRESSURE: 82 MMHG | HEIGHT: 64 IN | WEIGHT: 207 LBS

## 2025-05-14 DIAGNOSIS — D70.1 CHEMOTHERAPY INDUCED NEUTROPENIA (HCC): ICD-10-CM

## 2025-05-14 DIAGNOSIS — N39.0 URINARY TRACT INFECTION WITHOUT HEMATURIA, SITE UNSPECIFIED: Primary | ICD-10-CM

## 2025-05-14 DIAGNOSIS — N39.0 RECURRENT UTI: ICD-10-CM

## 2025-05-14 DIAGNOSIS — E11.40 TYPE 2 DIABETES MELLITUS WITH DIABETIC NEUROPATHY, WITHOUT LONG-TERM CURRENT USE OF INSULIN (HCC): Primary | ICD-10-CM

## 2025-05-14 DIAGNOSIS — T45.1X5A CHEMOTHERAPY INDUCED NEUTROPENIA (HCC): ICD-10-CM

## 2025-05-14 DIAGNOSIS — C55 UTERINE LEIOMYOSARCOMA (HCC): Primary | ICD-10-CM

## 2025-05-14 DIAGNOSIS — C55 UTERINE LEIOMYOSARCOMA (HCC): ICD-10-CM

## 2025-05-14 DIAGNOSIS — N20.0 RIGHT RENAL STONE: ICD-10-CM

## 2025-05-14 LAB — BACTERIA UR CULT: ABNORMAL

## 2025-05-14 PROCEDURE — 99214 OFFICE O/P EST MOD 30 MIN: CPT

## 2025-05-14 RX ORDER — SODIUM CHLORIDE 9 MG/ML
20 INJECTION, SOLUTION INTRAVENOUS ONCE
Status: COMPLETED | OUTPATIENT
Start: 2025-05-14 | End: 2025-05-14

## 2025-05-14 RX ORDER — PALONOSETRON 0.05 MG/ML
0.25 INJECTION, SOLUTION INTRAVENOUS ONCE
Status: COMPLETED | OUTPATIENT
Start: 2025-05-14 | End: 2025-05-14

## 2025-05-14 RX ORDER — METHENAMINE HIPPURATE 1000 MG/1
1 TABLET ORAL 2 TIMES DAILY WITH MEALS
Qty: 30 TABLET | Refills: 0 | Status: SHIPPED | OUTPATIENT
Start: 2025-05-14

## 2025-05-14 RX ORDER — LEVOFLOXACIN 500 MG/1
500 TABLET, FILM COATED ORAL EVERY 24 HOURS
Qty: 7 TABLET | Refills: 0 | Status: SHIPPED | OUTPATIENT
Start: 2025-05-14 | End: 2025-05-21

## 2025-05-14 RX ORDER — DOXORUBICIN HYDROCHLORIDE 2 MG/ML
45 INJECTION, SOLUTION INTRAVENOUS ONCE
Status: COMPLETED | OUTPATIENT
Start: 2025-05-14 | End: 2025-05-14

## 2025-05-14 RX ADMIN — DEXAMETHASONE SODIUM PHOSPHATE 20 MG: 10 INJECTION, SOLUTION INTRAMUSCULAR; INTRAVENOUS at 09:43

## 2025-05-14 RX ADMIN — DOXORUBICIN HYDROCHLORIDE 88 MG: 2 INJECTION, SOLUTION INTRAVENOUS at 11:01

## 2025-05-14 RX ADMIN — SODIUM CHLORIDE 20 ML/HR: 0.9 INJECTION, SOLUTION INTRAVENOUS at 09:42

## 2025-05-14 RX ADMIN — TRABECTEDIN 1.77 MG: 0.05 INJECTION, POWDER, LYOPHILIZED, FOR SOLUTION INTRAVENOUS at 11:17

## 2025-05-14 RX ADMIN — FOSAPREPITANT 150 MG: 150 INJECTION, POWDER, LYOPHILIZED, FOR SOLUTION INTRAVENOUS at 10:11

## 2025-05-14 RX ADMIN — PALONOSETRON 0.25 MG: 0.05 INJECTION, SOLUTION INTRAVENOUS at 09:46

## 2025-05-14 NOTE — ASSESSMENT & PLAN NOTE
Currently undergoing 2 different injectable chemotherapies  Following closely with GYN and heme/onc   Low estrogen/progesterone levels are likely contributing to an increase in her UTIs

## 2025-05-14 NOTE — ASSESSMENT & PLAN NOTE
S/p right ureteroscopy with stone extraction on 2/13/25 with Dr. Magallon   Right ureteral stent removal in office 3/6/25  Ultrasound renal/bladder 3/13/25 - Resolution of right hydronephrosis status post stent removal. Possible nonobstructing calculus lower pole left kidney versus vascular calcification. No hydronephrosis. B/l simple cysts   CT c/a/p 5/1/25 -- Punctate right renal nonobstructing calculi. Subcentimeter hypoattenuating renal lesion(s), too small to characterize but statistically likely benign, which do not warrant follow-up   Patient remains at this time   Kidney stone diet discussed   ER protocol reviewed   Repeat ultrasound stone/bladder annually for surveillance, order placed   Follow up annually / reach out with new onset symptoms

## 2025-05-14 NOTE — ASSESSMENT & PLAN NOTE
Symptoms -- dysuria, frequency, no blood   Recent UC positive for E.coli bacteria   Macrobid per PCP   Patient taking Hiprex 1 g twice daily for recurrent UTI prevention  Side effects discussed   Maintain adequate hydration plus cranberry juice  Avoid bladder irritants, constipation, practice good genital hygiene regimen  Follow-up annually and/or sooner depending on UTI frequency moving forward

## 2025-05-14 NOTE — PROGRESS NOTES
Felecia Edward  tolerated treatment well with no complications.      Felecia Edward is aware of future appt on 5/15/2025 at 1430.     AVS printed and given to Felecia Edward:  Yes

## 2025-05-15 ENCOUNTER — HOSPITAL ENCOUNTER (OUTPATIENT)
Dept: INFUSION CENTER | Facility: HOSPITAL | Age: 65
Discharge: HOME | End: 2025-05-15
Attending: OBSTETRICS & GYNECOLOGY
Payer: COMMERCIAL

## 2025-05-15 DIAGNOSIS — D70.1 CHEMOTHERAPY INDUCED NEUTROPENIA (HCC): ICD-10-CM

## 2025-05-15 DIAGNOSIS — C55 UTERINE LEIOMYOSARCOMA (HCC): Primary | ICD-10-CM

## 2025-05-15 DIAGNOSIS — T45.1X5A CHEMOTHERAPY INDUCED NEUTROPENIA (HCC): ICD-10-CM

## 2025-05-15 RX ADMIN — PEGFILGRASTIM-APGF 6 MG: 6 INJECTION, SOLUTION SUBCUTANEOUS at 14:38

## 2025-05-15 NOTE — PROGRESS NOTES
Felecia Edward  tolerated SQ [employer] well with no complications.      Felecia Edward is aware of future appt on 5/19 at 1pm.     AVS printed and given to Felecia Edward    Left unit in stable condition.

## 2025-05-19 ENCOUNTER — HOSPITAL ENCOUNTER (OUTPATIENT)
Dept: INFUSION CENTER | Facility: HOSPITAL | Age: 65
Discharge: HOME | End: 2025-05-19
Payer: COMMERCIAL

## 2025-05-19 DIAGNOSIS — Z45.2 ENCOUNTER FOR CENTRAL LINE CARE: Primary | ICD-10-CM

## 2025-05-19 DIAGNOSIS — C55 UTERINE LEIOMYOSARCOMA (HCC): ICD-10-CM

## 2025-05-19 LAB
ALBUMIN SERPL BCG-MCNC: 4 G/DL (ref 3.5–5)
ALP SERPL-CCNC: 117 U/L (ref 34–104)
ALT SERPL W P-5'-P-CCNC: 15 U/L (ref 7–52)
ANION GAP SERPL CALCULATED.3IONS-SCNC: 9 MMOL/L (ref 4–13)
ANISOCYTOSIS BLD QL SMEAR: PRESENT
AST SERPL W P-5'-P-CCNC: 15 U/L (ref 13–39)
BASOPHILS # BLD MANUAL: 0 THOUSAND/UL (ref 0–0.1)
BASOPHILS NFR MAR MANUAL: 0 % (ref 0–1)
BILIRUB SERPL-MCNC: 0.83 MG/DL (ref 0.2–1)
BUN SERPL-MCNC: 15 MG/DL (ref 5–25)
CALCIUM SERPL-MCNC: 8.8 MG/DL (ref 8.4–10.2)
CHLORIDE SERPL-SCNC: 102 MMOL/L (ref 96–108)
CO2 SERPL-SCNC: 27 MMOL/L (ref 21–32)
CREAT SERPL-MCNC: 0.49 MG/DL (ref 0.6–1.3)
EOSINOPHIL # BLD MANUAL: 0 THOUSAND/UL (ref 0–0.4)
EOSINOPHIL NFR BLD MANUAL: 0 % (ref 0–6)
ERYTHROCYTE [DISTWIDTH] IN BLOOD BY AUTOMATED COUNT: 17.3 % (ref 11.6–15.1)
GFR SERPL CREATININE-BSD FRML MDRD: 102 ML/MIN/1.73SQ M
GLUCOSE SERPL-MCNC: 173 MG/DL (ref 65–140)
HCT VFR BLD AUTO: 33 % (ref 34.8–46.1)
HGB BLD-MCNC: 10.9 G/DL (ref 11.5–15.4)
LYMPHOCYTES # BLD AUTO: 1.53 THOUSAND/UL (ref 0.6–4.47)
LYMPHOCYTES # BLD AUTO: 12 % (ref 14–44)
MACROCYTES BLD QL AUTO: PRESENT
MAGNESIUM SERPL-MCNC: 1.7 MG/DL (ref 1.9–2.7)
MCH RBC QN AUTO: 31.5 PG (ref 26.8–34.3)
MCHC RBC AUTO-ENTMCNC: 33 G/DL (ref 31.4–37.4)
MCV RBC AUTO: 95 FL (ref 82–98)
MONOCYTES # BLD AUTO: 0 THOUSAND/UL (ref 0–1.22)
MONOCYTES NFR BLD: 0 % (ref 4–12)
NEUTROPHILS # BLD MANUAL: 9.37 THOUSAND/UL (ref 1.85–7.62)
NEUTS SEG NFR BLD AUTO: 86 % (ref 43–75)
PLATELET # BLD AUTO: 220 THOUSANDS/UL (ref 149–390)
PLATELET BLD QL SMEAR: ADEQUATE
PMV BLD AUTO: 9 FL (ref 8.9–12.7)
POIKILOCYTOSIS BLD QL SMEAR: PRESENT
POLYCHROMASIA BLD QL SMEAR: PRESENT
POTASSIUM SERPL-SCNC: 3.5 MMOL/L (ref 3.5–5.3)
PROT SERPL-MCNC: 6.6 G/DL (ref 6.4–8.4)
RBC # BLD AUTO: 3.46 MILLION/UL (ref 3.81–5.12)
RBC MORPH BLD: PRESENT
SODIUM SERPL-SCNC: 138 MMOL/L (ref 135–147)
VARIANT LYMPHS # BLD AUTO: 2 %
WBC # BLD AUTO: 10.9 THOUSAND/UL (ref 4.31–10.16)

## 2025-05-19 PROCEDURE — 80053 COMPREHEN METABOLIC PANEL: CPT

## 2025-05-19 PROCEDURE — 83735 ASSAY OF MAGNESIUM: CPT

## 2025-05-19 PROCEDURE — 85007 BL SMEAR W/DIFF WBC COUNT: CPT

## 2025-05-19 PROCEDURE — 85027 COMPLETE CBC AUTOMATED: CPT

## 2025-05-19 RX ORDER — LORAZEPAM 1 MG/1
1 TABLET ORAL EVERY 8 HOURS PRN
Qty: 20 TABLET | Refills: 0 | Status: SHIPPED | OUTPATIENT
Start: 2025-05-19

## 2025-05-19 RX ORDER — ONDANSETRON 8 MG/1
8 TABLET, FILM COATED ORAL EVERY 8 HOURS PRN
Qty: 30 TABLET | Refills: 0 | Status: SHIPPED | OUTPATIENT
Start: 2025-05-19

## 2025-05-19 NOTE — PROGRESS NOTES
Felecia Edward  tolerated treatment well with no complications.      Felecia Edward is aware of future appt on 5/27/25 at 1130.     AVS printed and given to Felecia Edward:    No (Declined by Felecia Edward)

## 2025-05-19 NOTE — TELEPHONE ENCOUNTER
Refill must be reviewed and completed by the office or provider. The refill is unable to be approved or denied by the medication management team.      Patient Id Prescription # Sold Filled Written Drug Label Qty Days Strength MME* Prescriber Pharmacy Payment REFILL #/Auth State Detail   1 [id number] ** 03/12/2025 03/12/2025 LORazepam (Tablet) 20.0 7 1 MG NA JUSTIN [facility] Medicare 0 / 0 [place]    1 [id number] ** 02/13/2025 02/13/2025 oxyCODONE HCL (Tablet) 8.0 2 5 MG 30.0 LALO BASHIR [facility] Medicare 0 / 0 PA    1 [id number] ** 01/20/2025 01/20/2025 traMADol HCL (Tablet) 6.0 2 50 MG 30.0 JUSTIN [facility] Medicare 0 / 0 [place]    1 [id number] ** 12/14/2024 12/12/2024 oxyCODONE HCL (Tablet) 20.0 5 5 MG 30.0 EJ AREVALO [facility] Medicare 0 / 0 [place]

## 2025-05-21 DIAGNOSIS — R10.13 EPIGASTRIC PAIN: ICD-10-CM

## 2025-05-21 RX ORDER — PANTOPRAZOLE SODIUM 40 MG/1
40 TABLET, DELAYED RELEASE ORAL DAILY
Qty: 90 TABLET | Refills: 1 | Status: SHIPPED | OUTPATIENT
Start: 2025-05-21

## 2025-05-21 NOTE — TELEPHONE ENCOUNTER
Reason for call:   [x] Refill   [] Prior Auth  [] Other:     Office:   [] PCP/Provider -   [x] Specialty/Provider -     Medication:     pantoprazole (PROTONIX) 40 mg tablet       Dose/Frequency:  Take 1 tablet (40 mg total) by mouth daily     Quantity: 90 tablet     Pharmacy: [facility] [address]      Mail Away Pharmacy   Does the patient have enough for 10 days?   [x] Yes   [] No - Send as HP to POD

## 2025-05-22 ENCOUNTER — HOSPITAL ENCOUNTER (OUTPATIENT)
Dept: NON INVASIVE DIAGNOSTICS | Facility: HOSPITAL | Age: 65
Discharge: HOME | End: 2025-05-22
Attending: OBSTETRICS & GYNECOLOGY
Payer: COMMERCIAL

## 2025-05-22 VITALS
DIASTOLIC BLOOD PRESSURE: 66 MMHG | WEIGHT: 208 LBS | HEIGHT: 64 IN | BODY MASS INDEX: 35.51 KG/M2 | SYSTOLIC BLOOD PRESSURE: 135 MMHG | HEART RATE: 95 BPM

## 2025-05-22 DIAGNOSIS — C55 UTERINE LEIOMYOSARCOMA (HCC): ICD-10-CM

## 2025-05-22 LAB
AORTIC ROOT: 3.8 CM
AV LVOT MEAN GRADIENT: 2 MMHG
AV LVOT PEAK GRADIENT: 3 MMHG
BSA FOR ECHO PROCEDURE: 1.99 M2
DOP CALC LVOT PEAK VEL VTI: 16.18 CM
DOP CALC LVOT PEAK VEL: 0.84 M/S
FRACTIONAL SHORTENING: 26 (ref 28–44)
INTERVENTRICULAR SEPTUM IN DIASTOLE (PARASTERNAL SHORT AXIS VIEW): 1.4 CM
INTERVENTRICULAR SEPTUM: 1.4 CM (ref 0.6–1.1)
LAAS-AP2: 19.4 CM2
LAAS-AP4: 19.8 CM2
LEFT ATRIUM SIZE: 3.8 CM
LEFT ATRIUM VOLUME (MOD BIPLANE): 59 ML
LEFT ATRIUM VOLUME INDEX (MOD BIPLANE): 29.6 ML/M2
LEFT INTERNAL DIMENSION IN SYSTOLE: 3.2 CM (ref 2.1–4)
LEFT VENTRICLE DIASTOLIC VOLUME (MOD BIPLANE): 101 ML
LEFT VENTRICLE DIASTOLIC VOLUME INDEX (MOD BIPLANE): 50.8 ML/M2
LEFT VENTRICLE SYSTOLIC VOLUME (MOD BIPLANE): 42 ML
LEFT VENTRICLE SYSTOLIC VOLUME INDEX (MOD BIPLANE): 21.1 ML/M2
LEFT VENTRICULAR INTERNAL DIMENSION IN DIASTOLE: 4.3 CM (ref 3.5–6)
LEFT VENTRICULAR POSTERIOR WALL IN END DIASTOLE: 0.8 CM
LEFT VENTRICULAR STROKE VOLUME: 40 ML
LV EF BIPLANE MOD: 58 %
LV EF US.2D.A4C+ESTIMATED: 61 %
LVSV (TEICH): 40 ML
RA PRESSURE ESTIMATED: 3 MMHG
RIGHT ATRIAL 2D VOLUME: 19 ML
RIGHT ATRIUM AREA SYSTOLE A4C: 10.1 CM2
RIGHT VENTRICLE ID DIMENSION: 3.3 CM
SL CV LEFT ATRIUM LENGTH A2C: 5.3 CM
SL CV LV EF: 60
SL CV PED ECHO LEFT VENTRICLE DIASTOLIC VOLUME (MOD BIPLANE) 2D: 81 ML
SL CV PED ECHO LEFT VENTRICLE SYSTOLIC VOLUME (MOD BIPLANE) 2D: 41 ML
TRICUSPID ANNULAR PLANE SYSTOLIC EXCURSION: 1.8 CM

## 2025-05-22 PROCEDURE — 93306 TTE W/DOPPLER COMPLETE: CPT | Performed by: INTERNAL MEDICINE

## 2025-05-22 PROCEDURE — 93306 TTE W/DOPPLER COMPLETE: CPT

## 2025-05-27 ENCOUNTER — HOSPITAL ENCOUNTER (OUTPATIENT)
Dept: INFUSION CENTER | Facility: HOSPITAL | Age: 65
Discharge: HOME | End: 2025-05-27
Payer: COMMERCIAL

## 2025-05-27 DIAGNOSIS — Z45.2 ENCOUNTER FOR CENTRAL LINE CARE: Primary | ICD-10-CM

## 2025-05-27 DIAGNOSIS — C55 UTERINE LEIOMYOSARCOMA (HCC): ICD-10-CM

## 2025-05-27 LAB
ALBUMIN SERPL BCG-MCNC: 3.8 G/DL (ref 3.5–5)
ALP SERPL-CCNC: 91 U/L (ref 34–104)
ALT SERPL W P-5'-P-CCNC: 11 U/L (ref 7–52)
ANION GAP SERPL CALCULATED.3IONS-SCNC: 8 MMOL/L (ref 4–13)
AST SERPL W P-5'-P-CCNC: 15 U/L (ref 13–39)
BASOPHILS # BLD AUTO: 0.03 THOUSANDS/ÂΜL (ref 0–0.1)
BASOPHILS NFR BLD AUTO: 1 % (ref 0–1)
BILIRUB SERPL-MCNC: 0.66 MG/DL (ref 0.2–1)
BUN SERPL-MCNC: 9 MG/DL (ref 5–25)
CALCIUM SERPL-MCNC: 9 MG/DL (ref 8.4–10.2)
CHLORIDE SERPL-SCNC: 104 MMOL/L (ref 96–108)
CO2 SERPL-SCNC: 27 MMOL/L (ref 21–32)
CREAT SERPL-MCNC: 0.51 MG/DL (ref 0.6–1.3)
EOSINOPHIL # BLD AUTO: 0.09 THOUSAND/ÂΜL (ref 0–0.61)
EOSINOPHIL NFR BLD AUTO: 2 % (ref 0–6)
ERYTHROCYTE [DISTWIDTH] IN BLOOD BY AUTOMATED COUNT: 17.4 % (ref 11.6–15.1)
GFR SERPL CREATININE-BSD FRML MDRD: 101 ML/MIN/1.73SQ M
GLUCOSE SERPL-MCNC: 161 MG/DL (ref 65–140)
HCT VFR BLD AUTO: 29.7 % (ref 34.8–46.1)
HGB BLD-MCNC: 9.8 G/DL (ref 11.5–15.4)
IMM GRANULOCYTES # BLD AUTO: 0.14 THOUSAND/UL (ref 0–0.2)
IMM GRANULOCYTES NFR BLD AUTO: 2 % (ref 0–2)
LYMPHOCYTES # BLD AUTO: 1.54 THOUSANDS/ÂΜL (ref 0.6–4.47)
LYMPHOCYTES NFR BLD AUTO: 26 % (ref 14–44)
MAGNESIUM SERPL-MCNC: 1.6 MG/DL (ref 1.9–2.7)
MCH RBC QN AUTO: 31.4 PG (ref 26.8–34.3)
MCHC RBC AUTO-ENTMCNC: 33 G/DL (ref 31.4–37.4)
MCV RBC AUTO: 95 FL (ref 82–98)
MONOCYTES # BLD AUTO: 0.64 THOUSAND/ÂΜL (ref 0.17–1.22)
MONOCYTES NFR BLD AUTO: 11 % (ref 4–12)
NEUTROPHILS # BLD AUTO: 3.49 THOUSANDS/ÂΜL (ref 1.85–7.62)
NEUTS SEG NFR BLD AUTO: 58 % (ref 43–75)
NRBC BLD AUTO-RTO: 0 /100 WBCS
PLATELET # BLD AUTO: 189 THOUSANDS/UL (ref 149–390)
PMV BLD AUTO: 9.8 FL (ref 8.9–12.7)
POTASSIUM SERPL-SCNC: 3.6 MMOL/L (ref 3.5–5.3)
PROT SERPL-MCNC: 6.5 G/DL (ref 6.4–8.4)
RBC # BLD AUTO: 3.12 MILLION/UL (ref 3.81–5.12)
SODIUM SERPL-SCNC: 139 MMOL/L (ref 135–147)
WBC # BLD AUTO: 5.93 THOUSAND/UL (ref 4.31–10.16)

## 2025-05-27 PROCEDURE — 83735 ASSAY OF MAGNESIUM: CPT

## 2025-05-27 PROCEDURE — 85025 COMPLETE CBC W/AUTO DIFF WBC: CPT

## 2025-05-27 PROCEDURE — 80053 COMPREHEN METABOLIC PANEL: CPT

## 2025-05-29 ENCOUNTER — OFFICE VISIT (OUTPATIENT)
Age: 65
End: 2025-05-29
Payer: COMMERCIAL

## 2025-05-29 VITALS
HEIGHT: 64 IN | WEIGHT: 205.8 LBS | DIASTOLIC BLOOD PRESSURE: 80 MMHG | OXYGEN SATURATION: 93 % | BODY MASS INDEX: 35.13 KG/M2 | RESPIRATION RATE: 18 BRPM | HEART RATE: 128 BPM | SYSTOLIC BLOOD PRESSURE: 114 MMHG | TEMPERATURE: 97.7 F

## 2025-05-29 DIAGNOSIS — C55 UTERINE LEIOMYOSARCOMA (HCC): Primary | ICD-10-CM

## 2025-05-29 DIAGNOSIS — T45.1X5A CHEMOTHERAPY-INDUCED FATIGUE: ICD-10-CM

## 2025-05-29 DIAGNOSIS — R53.83 CHEMOTHERAPY-INDUCED FATIGUE: ICD-10-CM

## 2025-05-29 DIAGNOSIS — T45.1X5A CHEMOTHERAPY-INDUCED NAUSEA: ICD-10-CM

## 2025-05-29 DIAGNOSIS — R11.0 CHEMOTHERAPY-INDUCED NAUSEA: ICD-10-CM

## 2025-05-29 DIAGNOSIS — D70.1 CHEMOTHERAPY INDUCED NEUTROPENIA (HCC): ICD-10-CM

## 2025-05-29 DIAGNOSIS — N39.0 RECURRENT UTI: ICD-10-CM

## 2025-05-29 DIAGNOSIS — T45.1X5A CHEMOTHERAPY INDUCED NEUTROPENIA (HCC): ICD-10-CM

## 2025-05-29 PROCEDURE — 99215 OFFICE O/P EST HI 40 MIN: CPT | Performed by: PHYSICIAN ASSISTANT

## 2025-05-29 NOTE — ASSESSMENT & PLAN NOTE
Recurrent stage II leiomyosarcoma s/p surgical resection on 1/6/25 who is receiving systemic therapy with adriamycin 45 mg/m2 and trabectidin 0.9 mg/m2 IV every 21 days. Her chemotherapy-induced neutropenia is well managed with the addition of GSF. She has grade 2 treatment-related fatigue. No measurable disease was noted on CT imaging after completion of 3 cycles.     Proceed with cycle 5 of treatment as planned as long as her metabolic and hematologic parameters are adequate.     Return to the office as per her chemotherapy calendar.

## 2025-05-29 NOTE — PROGRESS NOTES
Name: Felecia Edward      : 1960      MRN: [id number]  Encounter Provider: Pati Jerome PA-C  Encounter Date: 2025   Encounter department: [facility] GYNECOLOGY [facility]COL[facility]  :  Assessment & Plan  Uterine leiomyosarcoma (HCC)  Recurrent stage II leiomyosarcoma s/p surgical resection on 25 who is receiving systemic therapy with adriamycin 45 mg/m2 and trabectidin 0.9 mg/m2 IV every 21 days. Her chemotherapy-induced neutropenia is well managed with the addition of GSF. She has grade 2 treatment-related fatigue. No measurable disease was noted on CT imaging after completion of 3 cycles.     Proceed with cycle 5 of treatment as planned as long as her metabolic and hematologic parameters are adequate.     Return to the office as per her chemotherapy calendar.   Recurrent UTI  Management per urology.       Chemotherapy induced neutropenia (HCC)  Improved.  Continue day 2 GSF.   Trend CBC/Diff.        Chemotherapy-induced nausea  Grade 1.   Well managed with oral anti-emetics.        Chemotherapy-induced fatigue  Grade 2.               History of Present Illness     Reason for Visit / CC:  Pre-Chemo Visit    Felecia Edward is a 64 y.o. female   who presents to the office for pre-chemotherapy evaluation. She has been afebrile. The patient continues to tolerate treatment well. She notes progressive fatigue that limits her daily activities at time. She notes occasional nausea. This is relieved with her oral anti-emetics. She denies vomiting. Normal bowel/bladder function.           Oncology History   Cancer Staging   Uterine leiomyosarcoma (HCC)  Staging form: Corpus Uteri - Sarcoma, AJCC 8th Edition  - Pathologic stage from 2023: FIGO Stage II, calculated as Stage Unknown (pT2, pNX, cM0) - Signed by Gordo Taveras MD on 2023  Stage prefix: Initial diagnosis  Oncology History   Uterine leiomyosarcoma (HCC)   2023 Initial Diagnosis     Uterine sarcoma (HCC)     6/4/2023 -  Cancer Staged    Staging form: Corpus Uteri - Sarcoma, AJCC 8th Edition  - Pathologic stage from 6/4/2023: FIGO Stage II, calculated as Stage Unknown (pT2, pNX, cM0) - Signed by Gordo Taveras MD on 6/29/2023  Stage prefix: Initial diagnosis       6/4/2023 Surgery    HYSTERECTOMY TOTAL ABDOMINAL (DOROTHY). BILATERAL SALPINGOOPHORECTOMY  EXCISION  BIOPSY LESION/MASS ABDOMINAL-PELVIC PERITONEUM  -Leiomyosarcoma 13.5 cm extending through the myometrium, right pelvic peritoneum involved with uterine leiomyosarcoma with tumor present at unoriented resection surface.     7/11/2023 -  Chemotherapy    Gemzar 800 mg/m2 IV day 1 and 8 as well as taxotere 65 mg/m2 day 8 every 21 days.        - 12/1/2023 Chemotherapy    Adriamycin 50 mg/m2 IV every 21 days.  Including the gemcitabine and Taxotere, she completed a total of 7 cycles.     1/6/2025 Surgery    ROBOTIC ASSISTED RADICAL BILATERAL PARAMETRECTOMY. RESECTION BILATERAL PARAMETRIAL TUMORS. UPPER VAGINECTOMY. LEFT OBTURATOR LYMPH NODE DISSECTION  CYSTOSCOPY     3/12/2025 -  Chemotherapy    Adriamycin 45 mg/m2 and trabectidin 0.9 mg/m2 IV every 21 days          Review of Systems   Constitutional:  Positive for fatigue. Negative for fever.   HENT: Negative.     Eyes: Negative.    Respiratory: Negative.     Cardiovascular: Negative.    Gastrointestinal:  Positive for nausea. Negative for abdominal pain and vomiting.   Genitourinary: Negative.    Musculoskeletal: Negative.    Skin: Negative.    Neurological: Negative.    Psychiatric/Behavioral: Negative.      A complete review of systems is negative other than that noted above in the HPI.  Medical History Reviewed by provider this encounter:  Tobacco  Allergies  Meds  Problems  Med Hx  Surg Hx  Fam Hx     .  Medications Ordered Prior to Encounter[1]      Objective   /80 (BP Location: Left arm, Patient Position: Sitting, Cuff Size: Large)   Pulse (!) 128   Temp 97.7 °F  "(36.5 °C) (Temporal)   Resp 18   Ht 5' 4\" (1.626 m)   Wt 93.4 kg (205 lb 12.8 oz)   SpO2 93%   BMI 35.33 kg/m²     Body mass index is 35.33 kg/m².  Pain Screening:  Pain Score: 0-No pain  ECOG ECOG Performance Status: 1 - Restricted in physically strenuous activity but ambulatory and able to carry out work of a light or sedentary nature, e.g., light house work, office work     Physical Exam  Constitutional:       Appearance: She is well-developed.   Pulmonary:      Effort: Pulmonary effort is normal.     Skin:     General: Skin is warm and dry.      Findings: No rash.     Neurological:      Mental Status: She is alert and oriented to person, place, and time.     Psychiatric:         Behavior: Behavior normal.         Thought Content: Thought content normal.         Judgment: Judgment normal.          Labs: I have reviewed pertinent labs.   Lab Results   Component Value Date/Time    WBC 5.93 05/27/2025 11:39 AM    RBC 3.12 (L) 05/27/2025 11:39 AM    Hemoglobin 9.8 (L) 05/27/2025 11:39 AM    Hematocrit 29.7 (L) 05/27/2025 11:39 AM    MCV 95 05/27/2025 11:39 AM    MCH 31.4 05/27/2025 11:39 AM    RDW 17.4 (H) 05/27/2025 11:39 AM    Platelets 189 05/27/2025 11:39 AM    Segmented % 58 05/27/2025 11:39 AM    Bands % 2 04/21/2025 01:43 PM    Lymphocytes % 26 05/27/2025 11:39 AM    Monocytes % 11 05/27/2025 11:39 AM    Eosinophils Relative 2 05/27/2025 11:39 AM    Basophils Relative 1 05/27/2025 11:39 AM    Immature Grans % 2 05/27/2025 11:39 AM    Absolute Neutrophils 3.49 05/27/2025 11:39 AM      Lab Results   Component Value Date/Time    Sodium 139 05/27/2025 11:39 AM    Potassium 3.6 05/27/2025 11:39 AM    Chloride 104 05/27/2025 11:39 AM    CO2 27 05/27/2025 11:39 AM    ANION GAP 8 05/27/2025 11:39 AM    BUN 9 05/27/2025 11:39 AM    Creatinine 0.51 (L) 05/27/2025 11:39 AM    Glucose 161 (H) 05/27/2025 11:39 AM    Glucose, Fasting 168 (H) 01/19/2025 11:17 AM    Calcium 9.0 05/27/2025 11:39 AM    AST 15 05/27/2025 " 11:39 AM    ALT 11 05/27/2025 11:39 AM    Alkaline Phosphatase 91 05/27/2025 11:39 AM    Total Protein 6.5 05/27/2025 11:39 AM    Albumin 3.8 05/27/2025 11:39 AM    Total Bilirubin 0.66 05/27/2025 11:39 AM    eGFR 101 05/27/2025 11:39 AM        Radiology Results Review: I have reviewed radiology reports from 5/22/25 including: ECHO.               [1]  Current Outpatient Medications on File Prior to Visit   Medication Sig Dispense Refill   • aspirin (Aspirin 81) 81 mg chewable tablet every 24 hours     • atorvastatin (LIPITOR) 40 mg tablet Take 40 mg by mouth daily at bedtime     • betamethasone, augmented, (DIPROLENE) 0.05 % lotion Apply topically 2 (two) times a day 60 mL 1   • cetirizine (ZyrTEC) 10 mg tablet Take 10 mg by mouth daily at bedtime     • cholecalciferol (VITAMIN D3) 1,000 units tablet Take 2,000 Units by mouth in the morning. 2 daily .     • clindamycin (CLEOCIN T) 1 % external solution Apply topically 2 (two) times a day 60 mL 1   • dicyclomine (BENTYL) 10 mg capsule Take 1 capsule (10 mg total) by mouth every 6 (six) hours as needed (pain) 30 capsule 3   • DULoxetine (CYMBALTA) 30 mg delayed release capsule      • glimepiride (AMARYL) 4 mg tablet Take 4 mg by mouth in the morning and 4 mg in the evening.     • hydroCHLOROthiazide 25 mg tablet      • ibuprofen (MOTRIN) 800 mg tablet Take 800 mg by mouth every 6 (six) hours as needed for mild pain     • ketorolac (TORADOL) 10 mg tablet Take 1 tablet (10 mg total) by mouth every 6 (six) hours as needed for moderate pain for up to 5 days 20 tablet 0   • LORazepam (ATIVAN) 1 mg tablet Take 1 tablet (1 mg total) by mouth every 8 (eight) hours as needed (nausea or anxiety) 20 tablet 0   • losartan (COZAAR) 50 mg tablet Take 50 mg by mouth in the morning.     • meclizine (ANTIVERT) 25 mg tablet Take by mouth every 12 (twelve) hours as needed for dizziness     • methenamine hippurate (HIPREX) 1 g tablet Take 1 tablet (1 g total) by mouth 2 (two) times a day  with meals 30 tablet 0   • metoprolol succinate (TOPROL-XL) 25 mg 24 hr tablet Take 25 mg by mouth in the morning.     • Multiple Vitamin (MULTIVITAMIN) tablet Take 1 tablet by mouth in the morning.     • ondansetron (ZOFRAN) 8 mg tablet Take 1 tablet (8 mg total) by mouth every 8 (eight) hours as needed for nausea or vomiting 30 tablet 0   • pantoprazole (PROTONIX) 40 mg tablet Take 1 tablet (40 mg total) by mouth daily 90 tablet 1   • phenazopyridine (PYRIDIUM) 200 mg tablet Take 1 tablet (200 mg total) by mouth 3 (three) times a day as needed (Dysuria and bladder discomfort.  Take with meals.) 10 tablet 0   • pioglitazone (ACTOS) 30 mg tablet every 24 hours     • potassium chloride (Klor-Con M20) 20 mEq tablet TAKE 1 TABLET BY MOUTH 2 TIMES A DAY. 180 tablet 1   • rOPINIRole (REQUIP) 1 mg tablet      • sertraline (ZOLOFT) 100 mg tablet      • tamsulosin (FLOMAX) 0.4 mg Take 1 capsule (0.4 mg total) by mouth every evening for 14 days 14 capsule 0   • topiramate (TOPAMAX) 100 mg tablet      • Triamcinolone Acetonide 55 MCG/ACT AERO into each nostril One spray each nostril daily     • Xiidra 5 % op solution      • ZOLMitriptan (ZOMIG) 5 MG tablet Take 5 mg by mouth once as needed for migraine Daily prn     • Jardiance 10 MG TABS tablet  (Patient not taking: Reported on 5/29/2025)     • oxyCODONE (Roxicodone) 5 immediate release tablet Take 1 tablet (5 mg total) by mouth every 6 (six) hours as needed for moderate pain for up to 8 doses Max Daily Amount: 20 mg (Patient not taking: Reported on 5/14/2025) 8 tablet 0     No current facility-administered medications on file prior to visit.

## 2025-06-02 ENCOUNTER — HOSPITAL ENCOUNTER (OUTPATIENT)
Dept: INFUSION CENTER | Facility: HOSPITAL | Age: 65
Discharge: HOME | End: 2025-06-02
Payer: COMMERCIAL

## 2025-06-02 DIAGNOSIS — Z45.2 ENCOUNTER FOR CENTRAL LINE CARE: ICD-10-CM

## 2025-06-02 DIAGNOSIS — C55 UTERINE LEIOMYOSARCOMA (HCC): Primary | ICD-10-CM

## 2025-06-02 LAB
ALBUMIN SERPL BCG-MCNC: 4 G/DL (ref 3.5–5)
ALP SERPL-CCNC: 87 U/L (ref 34–104)
ALT SERPL W P-5'-P-CCNC: 12 U/L (ref 7–52)
ANION GAP SERPL CALCULATED.3IONS-SCNC: 8 MMOL/L (ref 4–13)
AST SERPL W P-5'-P-CCNC: 15 U/L (ref 13–39)
BASOPHILS # BLD AUTO: 0.02 THOUSANDS/ÂΜL (ref 0–0.1)
BASOPHILS NFR BLD AUTO: 0 % (ref 0–1)
BILIRUB SERPL-MCNC: 0.77 MG/DL (ref 0.2–1)
BUN SERPL-MCNC: 14 MG/DL (ref 5–25)
CALCIUM SERPL-MCNC: 8.9 MG/DL (ref 8.4–10.2)
CHLORIDE SERPL-SCNC: 103 MMOL/L (ref 96–108)
CK SERPL-CCNC: 22 U/L (ref 26–192)
CO2 SERPL-SCNC: 28 MMOL/L (ref 21–32)
CREAT SERPL-MCNC: 0.41 MG/DL (ref 0.6–1.3)
EOSINOPHIL # BLD AUTO: 0.05 THOUSAND/ÂΜL (ref 0–0.61)
EOSINOPHIL NFR BLD AUTO: 1 % (ref 0–6)
ERYTHROCYTE [DISTWIDTH] IN BLOOD BY AUTOMATED COUNT: 17.7 % (ref 11.6–15.1)
GFR SERPL CREATININE-BSD FRML MDRD: 108 ML/MIN/1.73SQ M
GLUCOSE SERPL-MCNC: 142 MG/DL (ref 65–140)
HCT VFR BLD AUTO: 32.6 % (ref 34.8–46.1)
HGB BLD-MCNC: 10.6 G/DL (ref 11.5–15.4)
IMM GRANULOCYTES # BLD AUTO: 0.1 THOUSAND/UL (ref 0–0.2)
IMM GRANULOCYTES NFR BLD AUTO: 1 % (ref 0–2)
LYMPHOCYTES # BLD AUTO: 1.55 THOUSANDS/ÂΜL (ref 0.6–4.47)
LYMPHOCYTES NFR BLD AUTO: 22 % (ref 14–44)
MCH RBC QN AUTO: 31.7 PG (ref 26.8–34.3)
MCHC RBC AUTO-ENTMCNC: 32.5 G/DL (ref 31.4–37.4)
MCV RBC AUTO: 98 FL (ref 82–98)
MONOCYTES # BLD AUTO: 0.74 THOUSAND/ÂΜL (ref 0.17–1.22)
MONOCYTES NFR BLD AUTO: 11 % (ref 4–12)
NEUTROPHILS # BLD AUTO: 4.46 THOUSANDS/ÂΜL (ref 1.85–7.62)
NEUTS SEG NFR BLD AUTO: 65 % (ref 43–75)
NRBC BLD AUTO-RTO: 0 /100 WBCS
PLATELET # BLD AUTO: 320 THOUSANDS/UL (ref 149–390)
PMV BLD AUTO: 9.6 FL (ref 8.9–12.7)
POTASSIUM SERPL-SCNC: 3.6 MMOL/L (ref 3.5–5.3)
PROT SERPL-MCNC: 6.9 G/DL (ref 6.4–8.4)
RBC # BLD AUTO: 3.34 MILLION/UL (ref 3.81–5.12)
SODIUM SERPL-SCNC: 139 MMOL/L (ref 135–147)
WBC # BLD AUTO: 6.92 THOUSAND/UL (ref 4.31–10.16)

## 2025-06-02 PROCEDURE — 85025 COMPLETE CBC W/AUTO DIFF WBC: CPT

## 2025-06-02 PROCEDURE — 80053 COMPREHEN METABOLIC PANEL: CPT

## 2025-06-02 PROCEDURE — 82550 ASSAY OF CK (CPK): CPT

## 2025-06-02 NOTE — PROGRESS NOTES
Felecia Edward  tolerated treatment well with no complications.      Felecia Edward is aware of future appt on 6/4/2025 at 0900.     AVS printed and given to Felecia Edward:    No (Declined by Felecia Edward)

## 2025-06-03 DIAGNOSIS — C55 UTERINE LEIOMYOSARCOMA (HCC): Primary | ICD-10-CM

## 2025-06-03 RX ORDER — DOXORUBICIN HYDROCHLORIDE 2 MG/ML
45 INJECTION, SOLUTION INTRAVENOUS ONCE
Status: CANCELLED | OUTPATIENT
Start: 2025-06-04

## 2025-06-03 RX ORDER — PALONOSETRON 0.05 MG/ML
0.25 INJECTION, SOLUTION INTRAVENOUS ONCE
Status: CANCELLED | OUTPATIENT
Start: 2025-06-04

## 2025-06-03 RX ORDER — SODIUM CHLORIDE 9 MG/ML
20 INJECTION, SOLUTION INTRAVENOUS ONCE
Status: CANCELLED | OUTPATIENT
Start: 2025-06-04

## 2025-06-04 ENCOUNTER — HOSPITAL ENCOUNTER (OUTPATIENT)
Dept: INFUSION CENTER | Facility: HOSPITAL | Age: 65
Discharge: HOME | End: 2025-06-04
Attending: OBSTETRICS & GYNECOLOGY
Payer: COMMERCIAL

## 2025-06-04 VITALS
DIASTOLIC BLOOD PRESSURE: 84 MMHG | SYSTOLIC BLOOD PRESSURE: 130 MMHG | BODY MASS INDEX: 34.59 KG/M2 | RESPIRATION RATE: 20 BRPM | HEART RATE: 95 BPM | HEIGHT: 64 IN | WEIGHT: 202.6 LBS | OXYGEN SATURATION: 99 % | TEMPERATURE: 97.6 F

## 2025-06-04 DIAGNOSIS — D70.1 CHEMOTHERAPY INDUCED NEUTROPENIA (HCC): ICD-10-CM

## 2025-06-04 DIAGNOSIS — C55 UTERINE LEIOMYOSARCOMA (HCC): Primary | ICD-10-CM

## 2025-06-04 DIAGNOSIS — T45.1X5A CHEMOTHERAPY INDUCED NEUTROPENIA (HCC): ICD-10-CM

## 2025-06-04 LAB — MAGNESIUM SERPL-MCNC: 1.9 MG/DL (ref 1.9–2.7)

## 2025-06-04 PROCEDURE — 96367 TX/PROPH/DG ADDL SEQ IV INF: CPT

## 2025-06-04 PROCEDURE — 96415 CHEMO IV INFUSION ADDL HR: CPT

## 2025-06-04 PROCEDURE — 83735 ASSAY OF MAGNESIUM: CPT

## 2025-06-04 PROCEDURE — 96411 CHEMO IV PUSH ADDL DRUG: CPT

## 2025-06-04 PROCEDURE — 96413 CHEMO IV INFUSION 1 HR: CPT

## 2025-06-04 PROCEDURE — 96375 TX/PRO/DX INJ NEW DRUG ADDON: CPT

## 2025-06-04 RX ORDER — SODIUM CHLORIDE 9 MG/ML
20 INJECTION, SOLUTION INTRAVENOUS ONCE
Status: COMPLETED | OUTPATIENT
Start: 2025-06-04 | End: 2025-06-04

## 2025-06-04 RX ORDER — DOXORUBICIN HYDROCHLORIDE 2 MG/ML
45 INJECTION, SOLUTION INTRAVENOUS ONCE
Status: COMPLETED | OUTPATIENT
Start: 2025-06-04 | End: 2025-06-04

## 2025-06-04 RX ORDER — PALONOSETRON 0.05 MG/ML
0.25 INJECTION, SOLUTION INTRAVENOUS ONCE
Status: COMPLETED | OUTPATIENT
Start: 2025-06-04 | End: 2025-06-04

## 2025-06-04 RX ADMIN — DOXORUBICIN HYDROCHLORIDE 88 MG: 2 INJECTION, SOLUTION INTRAVENOUS at 10:23

## 2025-06-04 RX ADMIN — SODIUM CHLORIDE 20 ML/HR: 0.9 INJECTION, SOLUTION INTRAVENOUS at 09:16

## 2025-06-04 RX ADMIN — DEXAMETHASONE SODIUM PHOSPHATE 20 MG: 10 INJECTION, SOLUTION INTRAMUSCULAR; INTRAVENOUS at 09:16

## 2025-06-04 RX ADMIN — PALONOSETRON 0.25 MG: 0.05 INJECTION, SOLUTION INTRAVENOUS at 09:42

## 2025-06-04 RX ADMIN — FOSAPREPITANT 150 MG: 150 INJECTION, POWDER, LYOPHILIZED, FOR SOLUTION INTRAVENOUS at 09:45

## 2025-06-04 RX ADMIN — TRABECTEDIN 1.77 MG: 0.05 INJECTION, POWDER, LYOPHILIZED, FOR SOLUTION INTRAVENOUS at 10:41

## 2025-06-04 NOTE — PROGRESS NOTES
Pt tolerated treatment with no adv reactions; next appt 6/5 at 1500; pt left unit ambulatory with steady gait.

## 2025-06-05 ENCOUNTER — HOSPITAL ENCOUNTER (OUTPATIENT)
Dept: INFUSION CENTER | Facility: HOSPITAL | Age: 65
Discharge: HOME | End: 2025-06-05
Attending: OBSTETRICS & GYNECOLOGY
Payer: COMMERCIAL

## 2025-06-05 VITALS — TEMPERATURE: 96.9 F

## 2025-06-05 DIAGNOSIS — C55 UTERINE LEIOMYOSARCOMA (HCC): Primary | ICD-10-CM

## 2025-06-05 DIAGNOSIS — D70.1 CHEMOTHERAPY INDUCED NEUTROPENIA (HCC): ICD-10-CM

## 2025-06-05 DIAGNOSIS — T45.1X5A CHEMOTHERAPY INDUCED NEUTROPENIA (HCC): ICD-10-CM

## 2025-06-05 PROCEDURE — 96372 THER/PROPH/DIAG INJ SC/IM: CPT

## 2025-06-05 RX ADMIN — PEGFILGRASTIM-APGF 6 MG: 6 INJECTION, SOLUTION SUBCUTANEOUS at 15:04

## 2025-06-05 NOTE — PROGRESS NOTES
Felecia Edward  tolerated treatment well with no complications.      Felecia Edward is aware of future appt on 6/9 at 1:30.     AVS printed and given to Felecia Edward:  No (Declined by Felecia Edward)

## 2025-06-08 DIAGNOSIS — C55 UTERINE LEIOMYOSARCOMA (HCC): ICD-10-CM

## 2025-06-09 ENCOUNTER — HOSPITAL ENCOUNTER (OUTPATIENT)
Dept: INFUSION CENTER | Facility: HOSPITAL | Age: 65
Discharge: HOME | End: 2025-06-09
Payer: COMMERCIAL

## 2025-06-09 DIAGNOSIS — C55 UTERINE LEIOMYOSARCOMA (HCC): Primary | ICD-10-CM

## 2025-06-09 DIAGNOSIS — Z45.2 ENCOUNTER FOR CENTRAL LINE CARE: ICD-10-CM

## 2025-06-09 LAB
ALBUMIN SERPL BCG-MCNC: 3.9 G/DL (ref 3.5–5)
ALP SERPL-CCNC: 118 U/L (ref 34–104)
ALT SERPL W P-5'-P-CCNC: 20 U/L (ref 7–52)
ANION GAP SERPL CALCULATED.3IONS-SCNC: 9 MMOL/L (ref 4–13)
ANISOCYTOSIS BLD QL SMEAR: PRESENT
AST SERPL W P-5'-P-CCNC: 17 U/L (ref 13–39)
BASOPHILS # BLD MANUAL: 0 THOUSAND/UL (ref 0–0.1)
BASOPHILS NFR MAR MANUAL: 0 % (ref 0–1)
BILIRUB SERPL-MCNC: 0.86 MG/DL (ref 0.2–1)
BUN SERPL-MCNC: 13 MG/DL (ref 5–25)
CALCIUM SERPL-MCNC: 8.8 MG/DL (ref 8.4–10.2)
CHLORIDE SERPL-SCNC: 104 MMOL/L (ref 96–108)
CO2 SERPL-SCNC: 25 MMOL/L (ref 21–32)
CREAT SERPL-MCNC: 0.41 MG/DL (ref 0.6–1.3)
EOSINOPHIL # BLD MANUAL: 0 THOUSAND/UL (ref 0–0.4)
EOSINOPHIL NFR BLD MANUAL: 0 % (ref 0–6)
ERYTHROCYTE [DISTWIDTH] IN BLOOD BY AUTOMATED COUNT: 16.4 % (ref 11.6–15.1)
GFR SERPL CREATININE-BSD FRML MDRD: 108 ML/MIN/1.73SQ M
GLUCOSE SERPL-MCNC: 144 MG/DL (ref 65–140)
HCT VFR BLD AUTO: 32.9 % (ref 34.8–46.1)
HGB BLD-MCNC: 10.7 G/DL (ref 11.5–15.4)
LYMPHOCYTES # BLD AUTO: 1.1 THOUSAND/UL (ref 0.6–4.47)
LYMPHOCYTES # BLD AUTO: 14 % (ref 14–44)
MACROCYTES BLD QL AUTO: PRESENT
MAGNESIUM SERPL-MCNC: 1.9 MG/DL (ref 1.9–2.7)
MCH RBC QN AUTO: 32 PG (ref 26.8–34.3)
MCHC RBC AUTO-ENTMCNC: 32.5 G/DL (ref 31.4–37.4)
MCV RBC AUTO: 99 FL (ref 82–98)
MONOCYTES # BLD AUTO: 0.31 THOUSAND/UL (ref 0–1.22)
MONOCYTES NFR BLD: 4 % (ref 4–12)
NEUTROPHILS # BLD MANUAL: 6.43 THOUSAND/UL (ref 1.85–7.62)
NEUTS SEG NFR BLD AUTO: 82 % (ref 43–75)
PLATELET # BLD AUTO: 189 THOUSANDS/UL (ref 149–390)
PLATELET BLD QL SMEAR: ADEQUATE
PMV BLD AUTO: 9.3 FL (ref 8.9–12.7)
POTASSIUM SERPL-SCNC: 3.5 MMOL/L (ref 3.5–5.3)
PROT SERPL-MCNC: 6.8 G/DL (ref 6.4–8.4)
RBC # BLD AUTO: 3.34 MILLION/UL (ref 3.81–5.12)
RBC MORPH BLD: PRESENT
SODIUM SERPL-SCNC: 138 MMOL/L (ref 135–147)
WBC # BLD AUTO: 7.84 THOUSAND/UL (ref 4.31–10.16)

## 2025-06-09 PROCEDURE — 83735 ASSAY OF MAGNESIUM: CPT

## 2025-06-09 PROCEDURE — 85007 BL SMEAR W/DIFF WBC COUNT: CPT

## 2025-06-09 PROCEDURE — 85027 COMPLETE CBC AUTOMATED: CPT

## 2025-06-09 PROCEDURE — 80053 COMPREHEN METABOLIC PANEL: CPT

## 2025-06-09 RX ORDER — ONDANSETRON 8 MG/1
TABLET, FILM COATED ORAL
Qty: 30 TABLET | Refills: 0 | Status: SHIPPED | OUTPATIENT
Start: 2025-06-09

## 2025-06-09 NOTE — PROGRESS NOTES
Felecia Edward  tolerated treatment well with no complications.  Pt is c/o increased nausea and diarrhea. Spoke with Pati Jerome PA-C about adding lomotil to her plan. Per Pati Jerome PA-C will contact pt directly. Pt in understanding.    Felecia Edward is aware of future appt on 6/16/2025 at 1230.     AVS printed and given to Felecia Edward:    No (Declined by Felecia Edward)

## 2025-06-10 ENCOUNTER — TELEPHONE (OUTPATIENT)
Dept: GYNECOLOGIC ONCOLOGY | Facility: CLINIC | Age: 65
End: 2025-06-10

## 2025-06-10 ENCOUNTER — APPOINTMENT (EMERGENCY)
Dept: CT IMAGING | Facility: HOSPITAL | Age: 65
DRG: 392 | End: 2025-06-10
Payer: COMMERCIAL

## 2025-06-10 ENCOUNTER — HOSPITAL ENCOUNTER (INPATIENT)
Facility: HOSPITAL | Age: 65
LOS: 2 days | Discharge: HOME | DRG: 392 | End: 2025-06-13
Attending: EMERGENCY MEDICINE | Admitting: INTERNAL MEDICINE
Payer: COMMERCIAL

## 2025-06-10 ENCOUNTER — TELEPHONE (OUTPATIENT)
Dept: OTHER | Facility: OTHER | Age: 65
End: 2025-06-10

## 2025-06-10 ENCOUNTER — APPOINTMENT (EMERGENCY)
Dept: RADIOLOGY | Facility: HOSPITAL | Age: 65
DRG: 392 | End: 2025-06-10
Payer: COMMERCIAL

## 2025-06-10 DIAGNOSIS — R65.10 SIRS (SYSTEMIC INFLAMMATORY RESPONSE SYNDROME) (HCC): ICD-10-CM

## 2025-06-10 DIAGNOSIS — R10.9 ABDOMINAL PAIN: ICD-10-CM

## 2025-06-10 DIAGNOSIS — E87.6 HYPOKALEMIA: Primary | ICD-10-CM

## 2025-06-10 DIAGNOSIS — R19.7 NAUSEA VOMITING AND DIARRHEA: ICD-10-CM

## 2025-06-10 DIAGNOSIS — R11.2 NAUSEA VOMITING AND DIARRHEA: ICD-10-CM

## 2025-06-10 PROBLEM — R11.10 VOMITING AND DIARRHEA: Status: ACTIVE | Noted: 2025-06-10

## 2025-06-10 LAB
2HR DELTA HS TROPONIN: -10 NG/L
ALBUMIN SERPL BCG-MCNC: 4 G/DL (ref 3.5–5)
ALP SERPL-CCNC: 99 U/L (ref 34–104)
ALT SERPL W P-5'-P-CCNC: 20 U/L (ref 7–52)
ANION GAP SERPL CALCULATED.3IONS-SCNC: 10 MMOL/L (ref 4–13)
ANISOCYTOSIS BLD QL SMEAR: PRESENT
APTT PPP: 27 SECONDS (ref 23–34)
AST SERPL W P-5'-P-CCNC: 20 U/L (ref 13–39)
BACTERIA UR QL AUTO: NORMAL /HPF
BASO STIPL BLD QL SMEAR: PRESENT
BASOPHILS # BLD MANUAL: 0.03 THOUSAND/UL (ref 0–0.1)
BASOPHILS NFR MAR MANUAL: 1 % (ref 0–1)
BILIRUB SERPL-MCNC: 0.59 MG/DL (ref 0.2–1)
BILIRUB UR QL STRIP: NEGATIVE
BUN SERPL-MCNC: 11 MG/DL (ref 5–25)
CALCIUM SERPL-MCNC: 8.8 MG/DL (ref 8.4–10.2)
CARDIAC TROPONIN I PNL SERPL HS: 11 NG/L (ref ?–50)
CARDIAC TROPONIN I PNL SERPL HS: 21 NG/L (ref ?–50)
CHLORIDE SERPL-SCNC: 105 MMOL/L (ref 96–108)
CLARITY UR: CLEAR
CO2 SERPL-SCNC: 23 MMOL/L (ref 21–32)
COLOR UR: YELLOW
CREAT SERPL-MCNC: 0.4 MG/DL (ref 0.6–1.3)
DOHLE BOD BLD QL SMEAR: PRESENT
EOSINOPHIL # BLD MANUAL: 0.09 THOUSAND/UL (ref 0–0.4)
EOSINOPHIL NFR BLD MANUAL: 3 % (ref 0–6)
ERYTHROCYTE [DISTWIDTH] IN BLOOD BY AUTOMATED COUNT: 16 % (ref 11.6–15.1)
GFR SERPL CREATININE-BSD FRML MDRD: 109 ML/MIN/1.73SQ M
GLUCOSE SERPL-MCNC: 135 MG/DL (ref 65–140)
GLUCOSE UR STRIP-MCNC: NEGATIVE MG/DL
HCT VFR BLD AUTO: 30.8 % (ref 34.8–46.1)
HGB BLD-MCNC: 10.1 G/DL (ref 11.5–15.4)
HGB UR QL STRIP.AUTO: NEGATIVE
INR PPP: 0.93 (ref 0.85–1.19)
KETONES UR STRIP-MCNC: NEGATIVE MG/DL
LACTATE SERPL-SCNC: 1.7 MMOL/L (ref 0.5–2)
LACTATE SERPL-SCNC: 2.4 MMOL/L (ref 0.5–2)
LEUKOCYTE ESTERASE UR QL STRIP: NEGATIVE
LIPASE SERPL-CCNC: 10 U/L (ref 11–82)
LYMPHOCYTES # BLD AUTO: 1.14 THOUSAND/UL (ref 0.6–4.47)
LYMPHOCYTES # BLD AUTO: 33 % (ref 14–44)
MCH RBC QN AUTO: 31.8 PG (ref 26.8–34.3)
MCHC RBC AUTO-ENTMCNC: 32.8 G/DL (ref 31.4–37.4)
MCV RBC AUTO: 97 FL (ref 82–98)
MONOCYTES # BLD AUTO: 0.06 THOUSAND/UL (ref 0–1.22)
MONOCYTES NFR BLD: 2 % (ref 4–12)
NEUTROPHILS # BLD MANUAL: 1.69 THOUSAND/UL (ref 1.85–7.62)
NEUTS SEG NFR BLD AUTO: 56 % (ref 43–75)
NITRITE UR QL STRIP: NEGATIVE
NON-SQ EPI CELLS URNS QL MICRO: NORMAL /HPF
PH UR STRIP.AUTO: 6.5 [PH]
PLATELET # BLD AUTO: 163 THOUSANDS/UL (ref 149–390)
PLATELET BLD QL SMEAR: ADEQUATE
PMV BLD AUTO: 9.3 FL (ref 8.9–12.7)
POTASSIUM SERPL-SCNC: 3 MMOL/L (ref 3.5–5.3)
PROCALCITONIN SERPL-MCNC: 0.14 NG/ML
PROT SERPL-MCNC: 6.8 G/DL (ref 6.4–8.4)
PROT UR STRIP-MCNC: ABNORMAL MG/DL
PROTHROMBIN TIME: 13 SECONDS (ref 12.3–15)
RBC # BLD AUTO: 3.18 MILLION/UL (ref 3.81–5.12)
RBC #/AREA URNS AUTO: NORMAL /HPF
RBC MORPH BLD: PRESENT
SODIUM SERPL-SCNC: 138 MMOL/L (ref 135–147)
SP GR UR STRIP.AUTO: <1.005 (ref 1–1.03)
TOXIC GRANULES BLD QL SMEAR: PRESENT
UROBILINOGEN UR STRIP-ACNC: <2 MG/DL
VARIANT LYMPHS # BLD AUTO: 5 %
WBC # BLD AUTO: 3.01 THOUSAND/UL (ref 4.31–10.16)
WBC #/AREA URNS AUTO: NORMAL /HPF

## 2025-06-10 PROCEDURE — 85610 PROTHROMBIN TIME: CPT | Performed by: EMERGENCY MEDICINE

## 2025-06-10 PROCEDURE — 99285 EMERGENCY DEPT VISIT HI MDM: CPT

## 2025-06-10 PROCEDURE — 96361 HYDRATE IV INFUSION ADD-ON: CPT

## 2025-06-10 PROCEDURE — 87040 BLOOD CULTURE FOR BACTERIA: CPT | Performed by: EMERGENCY MEDICINE

## 2025-06-10 PROCEDURE — 84484 ASSAY OF TROPONIN QUANT: CPT | Performed by: EMERGENCY MEDICINE

## 2025-06-10 PROCEDURE — 71045 X-RAY EXAM CHEST 1 VIEW: CPT

## 2025-06-10 PROCEDURE — 84145 PROCALCITONIN (PCT): CPT | Performed by: EMERGENCY MEDICINE

## 2025-06-10 PROCEDURE — 83690 ASSAY OF LIPASE: CPT | Performed by: EMERGENCY MEDICINE

## 2025-06-10 PROCEDURE — 80053 COMPREHEN METABOLIC PANEL: CPT | Performed by: EMERGENCY MEDICINE

## 2025-06-10 PROCEDURE — 85027 COMPLETE CBC AUTOMATED: CPT | Performed by: EMERGENCY MEDICINE

## 2025-06-10 PROCEDURE — 81001 URINALYSIS AUTO W/SCOPE: CPT | Performed by: EMERGENCY MEDICINE

## 2025-06-10 PROCEDURE — 96374 THER/PROPH/DIAG INJ IV PUSH: CPT

## 2025-06-10 PROCEDURE — 93005 ELECTROCARDIOGRAM TRACING: CPT

## 2025-06-10 PROCEDURE — 85007 BL SMEAR W/DIFF WBC COUNT: CPT | Performed by: EMERGENCY MEDICINE

## 2025-06-10 PROCEDURE — 83605 ASSAY OF LACTIC ACID: CPT | Performed by: EMERGENCY MEDICINE

## 2025-06-10 PROCEDURE — 85730 THROMBOPLASTIN TIME PARTIAL: CPT | Performed by: EMERGENCY MEDICINE

## 2025-06-10 PROCEDURE — 36415 COLL VENOUS BLD VENIPUNCTURE: CPT | Performed by: EMERGENCY MEDICINE

## 2025-06-10 PROCEDURE — 99285 EMERGENCY DEPT VISIT HI MDM: CPT | Performed by: EMERGENCY MEDICINE

## 2025-06-10 PROCEDURE — 74177 CT ABD & PELVIS W/CONTRAST: CPT

## 2025-06-10 RX ORDER — METOPROLOL SUCCINATE 25 MG/1
25 TABLET, EXTENDED RELEASE ORAL DAILY
Status: DISCONTINUED | OUTPATIENT
Start: 2025-06-11 | End: 2025-06-11

## 2025-06-10 RX ORDER — POTASSIUM CHLORIDE 20MEQ/15ML
40 LIQUID (ML) ORAL ONCE
Status: DISCONTINUED | OUTPATIENT
Start: 2025-06-10 | End: 2025-06-10

## 2025-06-10 RX ORDER — ROPINIROLE 0.25 MG/1
1 TABLET, FILM COATED ORAL
Status: DISCONTINUED | OUTPATIENT
Start: 2025-06-11 | End: 2025-06-13 | Stop reason: HOSPADM

## 2025-06-10 RX ORDER — DULOXETIN HYDROCHLORIDE 30 MG/1
30 CAPSULE, DELAYED RELEASE ORAL DAILY
Status: DISCONTINUED | OUTPATIENT
Start: 2025-06-11 | End: 2025-06-13 | Stop reason: HOSPADM

## 2025-06-10 RX ORDER — LORAZEPAM 1 MG/1
1 TABLET ORAL EVERY 8 HOURS PRN
Status: DISCONTINUED | OUTPATIENT
Start: 2025-06-10 | End: 2025-06-13 | Stop reason: HOSPADM

## 2025-06-10 RX ORDER — ASPIRIN 81 MG/1
81 TABLET, CHEWABLE ORAL DAILY
Status: DISCONTINUED | OUTPATIENT
Start: 2025-06-11 | End: 2025-06-13 | Stop reason: HOSPADM

## 2025-06-10 RX ORDER — POTASSIUM CHLORIDE 14.9 MG/ML
20 INJECTION INTRAVENOUS ONCE
Status: COMPLETED | OUTPATIENT
Start: 2025-06-10 | End: 2025-06-11

## 2025-06-10 RX ORDER — ENOXAPARIN SODIUM 100 MG/ML
40 INJECTION SUBCUTANEOUS DAILY
Status: DISCONTINUED | OUTPATIENT
Start: 2025-06-11 | End: 2025-06-13 | Stop reason: HOSPADM

## 2025-06-10 RX ORDER — ATORVASTATIN CALCIUM 40 MG/1
40 TABLET, FILM COATED ORAL
Status: DISCONTINUED | OUTPATIENT
Start: 2025-06-11 | End: 2025-06-13 | Stop reason: HOSPADM

## 2025-06-10 RX ORDER — ONDANSETRON 2 MG/ML
4 INJECTION INTRAMUSCULAR; INTRAVENOUS ONCE
Status: COMPLETED | OUTPATIENT
Start: 2025-06-10 | End: 2025-06-10

## 2025-06-10 RX ORDER — HYDROCHLOROTHIAZIDE 25 MG/1
25 TABLET ORAL DAILY
Status: DISCONTINUED | OUTPATIENT
Start: 2025-06-11 | End: 2025-06-11

## 2025-06-10 RX ORDER — LOSARTAN POTASSIUM 50 MG/1
50 TABLET ORAL DAILY
Status: DISCONTINUED | OUTPATIENT
Start: 2025-06-11 | End: 2025-06-11

## 2025-06-10 RX ORDER — LORATADINE 10 MG/1
10 TABLET ORAL
Status: DISCONTINUED | OUTPATIENT
Start: 2025-06-11 | End: 2025-06-13 | Stop reason: HOSPADM

## 2025-06-10 RX ORDER — SODIUM CHLORIDE, SODIUM GLUCONATE, SODIUM ACETATE, POTASSIUM CHLORIDE, MAGNESIUM CHLORIDE, SODIUM PHOSPHATE, DIBASIC, AND POTASSIUM PHOSPHATE .53; .5; .37; .037; .03; .012; .00082 G/100ML; G/100ML; G/100ML; G/100ML; G/100ML; G/100ML; G/100ML
250 INJECTION, SOLUTION INTRAVENOUS ONCE
Status: COMPLETED | OUTPATIENT
Start: 2025-06-11 | End: 2025-06-11

## 2025-06-10 RX ORDER — DICYCLOMINE HYDROCHLORIDE 10 MG/1
10 CAPSULE ORAL EVERY 6 HOURS PRN
Status: DISCONTINUED | OUTPATIENT
Start: 2025-06-10 | End: 2025-06-13

## 2025-06-10 RX ORDER — IBUPROFEN 400 MG/1
800 TABLET, FILM COATED ORAL EVERY 6 HOURS PRN
Status: DISCONTINUED | OUTPATIENT
Start: 2025-06-10 | End: 2025-06-12

## 2025-06-10 RX ORDER — SERTRALINE HYDROCHLORIDE 100 MG/1
100 TABLET, FILM COATED ORAL
Status: DISCONTINUED | OUTPATIENT
Start: 2025-06-11 | End: 2025-06-11

## 2025-06-10 RX ORDER — PANTOPRAZOLE SODIUM 40 MG/1
40 TABLET, DELAYED RELEASE ORAL
Status: DISCONTINUED | OUTPATIENT
Start: 2025-06-11 | End: 2025-06-11

## 2025-06-10 RX ORDER — MECLIZINE HCL 12.5 MG 12.5 MG/1
25 TABLET ORAL EVERY 12 HOURS PRN
Status: DISCONTINUED | OUTPATIENT
Start: 2025-06-10 | End: 2025-06-13 | Stop reason: HOSPADM

## 2025-06-10 RX ADMIN — ONDANSETRON 4 MG: 2 INJECTION INTRAMUSCULAR; INTRAVENOUS at 20:32

## 2025-06-10 RX ADMIN — PIPERACILLIN AND TAZOBACTAM 4.5 G: 4; .5 INJECTION, POWDER, FOR SOLUTION INTRAVENOUS at 23:30

## 2025-06-10 RX ADMIN — SODIUM CHLORIDE 1000 ML: 0.9 INJECTION, SOLUTION INTRAVENOUS at 20:32

## 2025-06-10 RX ADMIN — IOHEXOL 100 ML: 350 INJECTION, SOLUTION INTRAVENOUS at 21:18

## 2025-06-10 NOTE — TELEPHONE ENCOUNTER
"Patient's daughter stated, \"My mother is having diarrhea, dehydration and unable to keep anything down. She might have a stomach bug.\"    On call provider notified via secure chat   "

## 2025-06-10 NOTE — TELEPHONE ENCOUNTER
Call placed to patient to assess symptoms. Patient notes diarrhea and single episode of vomiting last night. This morning, she is feeling better and has not had diarrhea. She notes adequate fluid intake, and is voiding appropriately, with clear urine. Will continue to closely monitor over next 24-48 hrs. Labs from 6/9/25 reviewed with significant abnormalities.

## 2025-06-10 NOTE — TELEPHONE ENCOUNTER
Gyn onc fellow telephone encounter    Returned call to pt's daughter Michelle.     Briefly, this is a 64 y.o. 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 Notes from PA Synnamon reviewed. Per Michelle, Felecia has had a diarrheal illness for 2 days. She had 1 episode of emesis but ongoing diarrhea. She has been able to hydrate. She has taken pepto bismol without relief. Labs from 6/9 reviewed, wnl.     Reviewed precautions for presenting to ED including lethargy, inability to PO hydrate, confusion, or decreased UOP. Michelle states her sister will be checking on Felecia soon and will decide whether she needs to present to ED for further evaluation/ hydration. Alternatively if she is otherwise doing well, can trial 1-2 doses of immodium to help with symptoms with possible IV hydration at infusion center.    I also attempted to reach Felecia and left a message reviewing the above.     SHAMEKA Tomas MD PGY-7  Gynecologic Oncology Fellow

## 2025-06-11 PROBLEM — D64.89 OTHER SPECIFIED ANEMIAS: Status: ACTIVE | Noted: 2025-06-11

## 2025-06-11 PROBLEM — G47.33 OSA (OBSTRUCTIVE SLEEP APNEA): Status: ACTIVE | Noted: 2025-06-11

## 2025-06-11 PROBLEM — R10.84 GENERALIZED ABDOMINAL PAIN: Status: ACTIVE | Noted: 2025-06-11

## 2025-06-11 LAB
ALBUMIN SERPL BCG-MCNC: 3.8 G/DL (ref 3.5–5)
ALP SERPL-CCNC: 121 U/L (ref 34–104)
ALT SERPL W P-5'-P-CCNC: 24 U/L (ref 7–52)
ANION GAP SERPL CALCULATED.3IONS-SCNC: 10 MMOL/L (ref 4–13)
AST SERPL W P-5'-P-CCNC: 35 U/L (ref 13–39)
ATRIAL RATE: 97 BPM
BILIRUB SERPL-MCNC: 0.65 MG/DL (ref 0.2–1)
BUN SERPL-MCNC: 7 MG/DL (ref 5–25)
CALCIUM SERPL-MCNC: 8.6 MG/DL (ref 8.4–10.2)
CHLORIDE SERPL-SCNC: 107 MMOL/L (ref 96–108)
CO2 SERPL-SCNC: 22 MMOL/L (ref 21–32)
CREAT SERPL-MCNC: 0.51 MG/DL (ref 0.6–1.3)
ERYTHROCYTE [DISTWIDTH] IN BLOOD BY AUTOMATED COUNT: 16.2 % (ref 11.6–15.1)
GFR SERPL CREATININE-BSD FRML MDRD: 101 ML/MIN/1.73SQ M
GLUCOSE SERPL-MCNC: 154 MG/DL (ref 65–140)
HCT VFR BLD AUTO: 31.4 % (ref 34.8–46.1)
HGB BLD-MCNC: 10.3 G/DL (ref 11.5–15.4)
MAGNESIUM SERPL-MCNC: 1.9 MG/DL (ref 1.9–2.7)
MCH RBC QN AUTO: 32.5 PG (ref 26.8–34.3)
MCHC RBC AUTO-ENTMCNC: 32.8 G/DL (ref 31.4–37.4)
MCV RBC AUTO: 99 FL (ref 82–98)
NRBC BLD AUTO-RTO: 0 /100 WBCS
P AXIS: 38 DEGREES
PHOSPHATE SERPL-MCNC: 2.3 MG/DL (ref 2.3–4.1)
PLATELET # BLD AUTO: 162 THOUSANDS/UL (ref 149–390)
PMV BLD AUTO: 9.5 FL (ref 8.9–12.7)
POTASSIUM SERPL-SCNC: 3.4 MMOL/L (ref 3.5–5.3)
PR INTERVAL: 172 MS
PROCALCITONIN SERPL-MCNC: 0.15 NG/ML
PROT SERPL-MCNC: 6.6 G/DL (ref 6.4–8.4)
QRS AXIS: 43 DEGREES
QRSD INTERVAL: 72 MS
QT INTERVAL: 360 MS
QTC INTERVAL: 457 MS
RBC # BLD AUTO: 3.17 MILLION/UL (ref 3.81–5.12)
SODIUM SERPL-SCNC: 139 MMOL/L (ref 135–147)
T WAVE AXIS: 43 DEGREES
VENTRICULAR RATE: 97 BPM
WBC # BLD AUTO: 3.84 THOUSAND/UL (ref 4.31–10.16)

## 2025-06-11 PROCEDURE — 84145 PROCALCITONIN (PCT): CPT

## 2025-06-11 PROCEDURE — 80053 COMPREHEN METABOLIC PANEL: CPT

## 2025-06-11 PROCEDURE — 83735 ASSAY OF MAGNESIUM: CPT

## 2025-06-11 PROCEDURE — 99223 1ST HOSP IP/OBS HIGH 75: CPT | Performed by: INTERNAL MEDICINE

## 2025-06-11 PROCEDURE — 93010 ELECTROCARDIOGRAM REPORT: CPT | Performed by: INTERNAL MEDICINE

## 2025-06-11 PROCEDURE — 84100 ASSAY OF PHOSPHORUS: CPT

## 2025-06-11 PROCEDURE — 99214 OFFICE O/P EST MOD 30 MIN: CPT | Performed by: INTERNAL MEDICINE

## 2025-06-11 PROCEDURE — 85027 COMPLETE CBC AUTOMATED: CPT

## 2025-06-11 PROCEDURE — 87505 NFCT AGENT DETECTION GI: CPT | Performed by: NURSE PRACTITIONER

## 2025-06-11 RX ORDER — ONDANSETRON 2 MG/ML
4 INJECTION INTRAMUSCULAR; INTRAVENOUS EVERY 8 HOURS PRN
Status: DISCONTINUED | OUTPATIENT
Start: 2025-06-11 | End: 2025-06-12

## 2025-06-11 RX ORDER — POTASSIUM CHLORIDE 14.9 MG/ML
20 INJECTION INTRAVENOUS ONCE
Status: COMPLETED | OUTPATIENT
Start: 2025-06-11 | End: 2025-06-11

## 2025-06-11 RX ORDER — ACETAMINOPHEN 10 MG/ML
1000 INJECTION, SOLUTION INTRAVENOUS EVERY 6 HOURS
Status: DISCONTINUED | OUTPATIENT
Start: 2025-06-11 | End: 2025-06-11

## 2025-06-11 RX ORDER — SODIUM CHLORIDE 9 MG/ML
75 INJECTION, SOLUTION INTRAVENOUS CONTINUOUS
Status: DISPENSED | OUTPATIENT
Start: 2025-06-11 | End: 2025-06-11

## 2025-06-11 RX ORDER — METOPROLOL SUCCINATE 25 MG/1
25 TABLET, EXTENDED RELEASE ORAL DAILY
Status: DISCONTINUED | OUTPATIENT
Start: 2025-06-12 | End: 2025-06-13 | Stop reason: HOSPADM

## 2025-06-11 RX ORDER — ACETAMINOPHEN 325 MG/1
650 TABLET ORAL EVERY 6 HOURS PRN
Status: DISCONTINUED | OUTPATIENT
Start: 2025-06-11 | End: 2025-06-13 | Stop reason: HOSPADM

## 2025-06-11 RX ORDER — ACETAMINOPHEN 10 MG/ML
1000 INJECTION, SOLUTION INTRAVENOUS 3 TIMES DAILY
Status: DISCONTINUED | OUTPATIENT
Start: 2025-06-11 | End: 2025-06-11

## 2025-06-11 RX ORDER — PANTOPRAZOLE SODIUM 40 MG/10ML
40 INJECTION, POWDER, LYOPHILIZED, FOR SOLUTION INTRAVENOUS EVERY 12 HOURS SCHEDULED
Status: DISCONTINUED | OUTPATIENT
Start: 2025-06-11 | End: 2025-06-13 | Stop reason: HOSPADM

## 2025-06-11 RX ORDER — ONDANSETRON 2 MG/ML
4 INJECTION INTRAMUSCULAR; INTRAVENOUS EVERY 6 HOURS SCHEDULED
Status: DISCONTINUED | OUTPATIENT
Start: 2025-06-11 | End: 2025-06-12

## 2025-06-11 RX ORDER — SODIUM CHLORIDE, SODIUM GLUCONATE, SODIUM ACETATE, POTASSIUM CHLORIDE, MAGNESIUM CHLORIDE, SODIUM PHOSPHATE, DIBASIC, AND POTASSIUM PHOSPHATE .53; .5; .37; .037; .03; .012; .00082 G/100ML; G/100ML; G/100ML; G/100ML; G/100ML; G/100ML; G/100ML
75 INJECTION, SOLUTION INTRAVENOUS CONTINUOUS
Status: DISCONTINUED | OUTPATIENT
Start: 2025-06-11 | End: 2025-06-11

## 2025-06-11 RX ORDER — DICYCLOMINE HYDROCHLORIDE 10 MG/1
10 CAPSULE ORAL EVERY 6 HOURS SCHEDULED
Status: DISCONTINUED | OUTPATIENT
Start: 2025-06-11 | End: 2025-06-13 | Stop reason: HOSPADM

## 2025-06-11 RX ORDER — LOSARTAN POTASSIUM 50 MG/1
50 TABLET ORAL DAILY
Status: DISCONTINUED | OUTPATIENT
Start: 2025-06-12 | End: 2025-06-13 | Stop reason: HOSPADM

## 2025-06-11 RX ORDER — ONDANSETRON 2 MG/ML
4 INJECTION INTRAMUSCULAR; INTRAVENOUS ONCE
Status: COMPLETED | OUTPATIENT
Start: 2025-06-11 | End: 2025-06-11

## 2025-06-11 RX ADMIN — SODIUM CHLORIDE, SODIUM GLUCONATE, SODIUM ACETATE, POTASSIUM CHLORIDE, MAGNESIUM CHLORIDE, SODIUM PHOSPHATE, DIBASIC, AND POTASSIUM PHOSPHATE 75 ML/HR: .53; .5; .37; .037; .03; .012; .00082 INJECTION, SOLUTION INTRAVENOUS at 08:57

## 2025-06-11 RX ADMIN — SERTRALINE HYDROCHLORIDE 150 MG: 50 TABLET ORAL at 20:33

## 2025-06-11 RX ADMIN — ROPINIROLE HYDROCHLORIDE 1 MG: 0.25 TABLET, FILM COATED ORAL at 00:39

## 2025-06-11 RX ADMIN — SERTRALINE HYDROCHLORIDE 150 MG: 50 TABLET ORAL at 00:38

## 2025-06-11 RX ADMIN — DULOXETINE HYDROCHLORIDE 30 MG: 30 CAPSULE, DELAYED RELEASE ORAL at 09:03

## 2025-06-11 RX ADMIN — ONDANSETRON 4 MG: 2 INJECTION INTRAMUSCULAR; INTRAVENOUS at 17:44

## 2025-06-11 RX ADMIN — PIPERACILLIN AND TAZOBACTAM 4.5 G: 4; .5 INJECTION, POWDER, FOR SOLUTION INTRAVENOUS at 05:15

## 2025-06-11 RX ADMIN — DICYCLOMINE HYDROCHLORIDE 10 MG: 10 CAPSULE ORAL at 14:14

## 2025-06-11 RX ADMIN — SODIUM CHLORIDE, SODIUM GLUCONATE, SODIUM ACETATE, POTASSIUM CHLORIDE, MAGNESIUM CHLORIDE, SODIUM PHOSPHATE, DIBASIC, AND POTASSIUM PHOSPHATE 250 ML: .53; .5; .37; .037; .03; .012; .00082 INJECTION, SOLUTION INTRAVENOUS at 00:04

## 2025-06-11 RX ADMIN — DICYCLOMINE HYDROCHLORIDE 10 MG: 10 CAPSULE ORAL at 17:44

## 2025-06-11 RX ADMIN — LORATADINE 10 MG: 10 TABLET ORAL at 20:33

## 2025-06-11 RX ADMIN — MULTIPLE VITAMINS W/ MINERALS TAB 1 TABLET: TAB ORAL at 09:03

## 2025-06-11 RX ADMIN — METOPROLOL SUCCINATE 25 MG: 25 TABLET, EXTENDED RELEASE ORAL at 09:03

## 2025-06-11 RX ADMIN — PANTOPRAZOLE SODIUM 40 MG: 40 TABLET, DELAYED RELEASE ORAL at 05:15

## 2025-06-11 RX ADMIN — LOSARTAN POTASSIUM 50 MG: 50 TABLET, FILM COATED ORAL at 09:03

## 2025-06-11 RX ADMIN — ATORVASTATIN CALCIUM 40 MG: 40 TABLET, FILM COATED ORAL at 00:38

## 2025-06-11 RX ADMIN — POTASSIUM CHLORIDE 20 MEQ: 14.9 INJECTION, SOLUTION INTRAVENOUS at 11:12

## 2025-06-11 RX ADMIN — ONDANSETRON 4 MG: 2 INJECTION INTRAMUSCULAR; INTRAVENOUS at 01:36

## 2025-06-11 RX ADMIN — ATORVASTATIN CALCIUM 40 MG: 40 TABLET, FILM COATED ORAL at 20:34

## 2025-06-11 RX ADMIN — POTASSIUM CHLORIDE 20 MEQ: 14.9 INJECTION, SOLUTION INTRAVENOUS at 00:04

## 2025-06-11 RX ADMIN — ONDANSETRON 4 MG: 2 INJECTION INTRAMUSCULAR; INTRAVENOUS at 11:59

## 2025-06-11 RX ADMIN — ASPIRIN 81 MG CHEWABLE TABLET 81 MG: 81 TABLET CHEWABLE at 09:03

## 2025-06-11 RX ADMIN — PANTOPRAZOLE SODIUM 40 MG: 40 INJECTION, POWDER, FOR SOLUTION INTRAVENOUS at 11:59

## 2025-06-11 RX ADMIN — ACETAMINOPHEN 1000 MG: 10 INJECTION INTRAVENOUS at 03:41

## 2025-06-11 RX ADMIN — ENOXAPARIN SODIUM 40 MG: 40 INJECTION SUBCUTANEOUS at 09:03

## 2025-06-11 RX ADMIN — PIPERACILLIN AND TAZOBACTAM 4.5 G: 4; .5 INJECTION, POWDER, FOR SOLUTION INTRAVENOUS at 20:38

## 2025-06-11 RX ADMIN — ROPINIROLE HYDROCHLORIDE 1 MG: 0.25 TABLET, FILM COATED ORAL at 20:33

## 2025-06-11 RX ADMIN — PANTOPRAZOLE SODIUM 40 MG: 40 INJECTION, POWDER, FOR SOLUTION INTRAVENOUS at 20:33

## 2025-06-11 RX ADMIN — PIPERACILLIN AND TAZOBACTAM 4.5 G: 4; .5 INJECTION, POWDER, FOR SOLUTION INTRAVENOUS at 14:12

## 2025-06-11 RX ADMIN — LORAZEPAM 1 MG: 1 TABLET ORAL at 00:05

## 2025-06-11 RX ADMIN — SODIUM CHLORIDE 75 ML/HR: 0.9 INJECTION, SOLUTION INTRAVENOUS at 11:12

## 2025-06-11 RX ADMIN — LORATADINE 10 MG: 10 TABLET ORAL at 00:38

## 2025-06-11 NOTE — CASE MANAGEMENT
Case Management Assessment & Discharge Planning Note    Patient name Felecia Edward  Location /-01 MRN [id number]  : 1960 Date 2025       Current Admission Date: 6/10/2025  Current Admission Diagnosis:Vomiting and diarrhea   Patient Active Problem List    Diagnosis Date Noted    SUSY (obstructive sleep apnea) 2025    Generalized abdominal pain 2025    Other specified anemias 2025    Hypokalemia 06/10/2025    Vomiting and diarrhea 06/10/2025    Recurrent UTI 2025    Chemotherapy-induced fatigue 2025    Nausea 2025    Physical deconditioning 2025    Electrolyte abnormality 2025    Primary hypertension 2025    Lightheadedness 2025    Palliative care encounter 2025    Advanced care planning/counseling discussion 2025    Cancer associated pain 2024    Morbid obesity (HCC) 10/30/2024    Type 2 diabetes mellitus with diabetic neuropathy, without long-term current use of insulin (HCC) 10/17/2024    History of colonic polyps 2024    Right renal stone 2023    Tachycardia 2023    Abnormal CT of the chest 2023    Drug-induced pneumonitis 2023    Hypomagnesemia 2023    Folliculitis 2023    Stomatitis 2023    Chemotherapy induced neutropenia (HCC) 2023    Encounter for central line care 2023    Post-operative state 2023    Uterine mass 2023    Lower abdominal pain 2023    Class 2 obesity due to excess calories without serious comorbidity in adult 2023    Uterine leiomyosarcoma (HCC)     Irritable bowel syndrome     Functional dyspepsia     GERD without esophagitis       LOS (days): 0  Geometric Mean LOS (GMLOS) (days):   Days to GMLOS:     OBJECTIVE:              Current admission status: Observation       Preferred Pharmacy:   [id number] - [place] - [phone]   [place] [id number]  Phone: [phone] Fax:  [phone]    Primary Care Provider: Clarice Acuna MD    Primary Insurance: [employer] REP  Secondary Insurance:     ASSESSMENT:  Active Health Care Proxies       Suzi Peraza Health Care Agent - Daughter   Primary Phone: [phone] (Mobile)                 Advance Directives  Does patient have a Health Care POA?: Yes  Does patient have Advance Directives?: Yes  Advance Directives: Power of  for health care  Primary Contact: Adry (daughter)    Obs Notice Signed: 06/11/25    Readmission Root Cause  30 Day Readmission: No    Patient Information  Admitted from:: Home  Mental Status: Alert  During Assessment patient was accompanied by: Not accompanied during assessment  Assessment information provided by:: Patient  Primary Caregiver: Self  Support Systems: Children, Spouse/significant other  Home entry access options. Select all that apply.: Stairs  Number of steps to enter home.: 4  Type of Current Residence: 2 story home  Upon entering residence, is there a bedroom on the main floor (no further steps)?: No  A bedroom is located on the following floor levels of residence (select all that apply):: 2nd Floor  Upon entering residence, is there a bathroom on the main floor (no further steps)?: No  Indicate which floors of current residence have a bathroom (select all the apply):: 2nd Floor  Number of steps to 2nd floor from main floor: One Flight  Living Arrangements: Lives w/ Spouse/significant other  Is patient a ?: No    Activities of Daily Living Prior to Admission  Functional Status: Independent  Completes ADLs independently?: Yes  Ambulates independently?: Yes  Does patient use assisted devices?: Yes  Assisted Devices (DME) used: CPAP  DME Company Name (respiratory supplies): [employer]  Does patient currently own DME?: Yes  What DME does the patient currently own?: CPAP  Does patient have a history of Outpatient Therapy (PT/OT)?: No  Does the patient have a history of Short-Term  Rehab?: No  Does patient have a history of HHC?: No  Does patient currently have HHC?: No         Patient Information Continued  Does patient have prescription coverage?: Yes  Can the patient afford their medications and any related supplies (such as glucometers or test strips)?: Yes  Does patient receive dialysis treatments?: No  Does patient have a history of substance abuse?: No  Does patient have a history of Mental Health Diagnosis?: No         Means of Transportation  Means of Transport to Appts:: Drives Self          DISCHARGE DETAILS:    Discharge planning discussed with:: patient  Freedom of Choice: Yes  Comments - Freedom of Choice: no anticipated dc needs  CM contacted family/caregiver?: No- see comments (reports being in contact with family)  Were Treatment Team discharge recommendations reviewed with patient/caregiver?: Yes  Did patient/caregiver verbalize understanding of patient care needs?: Yes  Were patient/caregiver advised of the risks associated with not following Treatment Team discharge recommendations?: Yes      Requested Home Health Care         Is the patient interested in HHC at discharge?: No    DME Referral Provided  Referral made for DME?: No      Treatment Team Recommendation: Home  Expected Discharge Disposition: Home or Self Care     Transport at Discharge : Family       Additional Comments: Met with pt to discuss the role of CM and to dsicuss any help pt may need prior to dc. Pt lives with his  in a 2 story home with 4 ERIN. Pt performed ADL's indptly pta, pt uses a CPAP through [employer]. No hx of HHC or rehab. NO hx of mental health or D&A treatment. Pt's preferred pharmacy is [facility]. Pt drives. Pt's family will transport home at dc.

## 2025-06-11 NOTE — ASSESSMENT & PLAN NOTE
Hemoglobin 10.1 on admission 6/10.  With a.m. labs today hemoglobin 10.3.  Patient does note last bowel movement loose/liquid was black.  Likely secondary to chemotherapy.    Stool studies pending to rule out infectious cause  Monitor hemoglobin transfuse as necessary  Iron panel, B12/folate panel pending  Monitor for GI bleed/melena    Patient's last EGD and colonoscopy were both completed on 8/12/2024 by Dr. Shelton.  Colonoscopy; 3 polyps, diverticulosis, small hemorrhoid, 5-year recall  EGD; mild antral gastritis.

## 2025-06-11 NOTE — ASSESSMENT & PLAN NOTE
"Lab Results   Component Value Date    HGBA1C 7.6 (H) 12/24/2024       No results for input(s): \"POCGLU\" in the last 72 hours.    Blood Sugar Average: Last 72 hrs:    Home agents held.  Sliding scale and hypoglycemia protocol.  "

## 2025-06-11 NOTE — ASSESSMENT & PLAN NOTE
"Presents with vomiting and diarrhea x 2 days.  Accompanied by diffuse abdominal pain.  Patient is undergoing chemotherapy for uterine cancer.  Last dose was 6 days ago.  Has not had a reaction like this prior.  Sepsis alert fired in ED.  CT:   \"Suggestion of mild diffuse colitis and mild mesenteric inflammation and lymphadenopathy in the right lower quadrant. Findings may be secondary to diarrhea and/or mild typhlitis. No findings to indicate enteritis. \"  Started on Zosyn in ED. Will continue.  "

## 2025-06-11 NOTE — PLAN OF CARE
Problem: Potential for Falls  Goal: Patient will remain free of falls  Description: INTERVENTIONS:  - Educate patient/family on patient safety including physical limitations  - Instruct patient to call for assistance with activity   - Consider consulting OT/PT to assist with strengthening/mobility based on AM PAC & JH-HLM score  - Consult OT/PT to assist with strengthening/mobility   - Keep Call bell within reach  - Keep bed low and locked with side rails adjusted as appropriate  - Keep care items and personal belongings within reach  - Initiate and maintain comfort rounds  - Make Fall Risk Sign visible to staff  - Offer Toileting every 2 Hours, in advance of need  - Initiate/Maintain bed alarm  - Obtain necessary fall risk management equipment:   - Apply yellow socks and bracelet for high fall risk patients  - Consider moving patient to room near nurses station  Outcome: Progressing     Problem: HEMATOLOGIC - ADULT  Goal: Maintains hematologic stability  Description: INTERVENTIONS  - Assess for signs and symptoms of bleeding or hemorrhage  - Monitor labs  - Administer supportive blood products/factors as ordered and appropriate  Outcome: Progressing     Problem: MUSCULOSKELETAL - ADULT  Goal: Maintain or return mobility to safest level of function  Description: INTERVENTIONS:  - Assess patient's ability to carry out ADLs; assess patient's baseline for ADL function and identify physical deficits which impact ability to perform ADLs (bathing, care of mouth/teeth, toileting, grooming, dressing, etc.)  - Assess/evaluate cause of self-care deficits   - Assess range of motion  - Assess patient's mobility  - Assess patient's need for assistive devices and provide as appropriate  - Encourage maximum independence but intervene and supervise when necessary  - Involve family in performance of ADLs  - Assess for home care needs following discharge   - Consider OT consult to assist with ADL evaluation and planning for  discharge  - Provide patient education as appropriate  Outcome: Progressing     Problem: GASTROINTESTINAL - ADULT  Goal: Minimal or absence of nausea and/or vomiting  Description: INTERVENTIONS:  - Administer IV fluids if ordered to ensure adequate hydration  - Maintain NPO status until nausea and vomiting are resolved  - Nasogastric tube if orderxed  - Administer ordered antiemetic medications as needed  - Provide nonpharmacologic comfort measures as appropriate  - Advance diet as tolerated, if ordered  - Consider nutrition services referral to assist patient with adequate nutrition and appropriate food choices  Outcome: Progressing

## 2025-06-11 NOTE — CONSULTS
Consultation - Gastroenterology   Name: Felecia Edward 64 y.o. female I MRN: [id number]  Unit/Bed#: -01 I Date of Admission: 6/10/2025   Date of Service: 6/11/2025 I Hospital Day: 0   Inpatient consult to gastroenterology  Consult performed by: LADARIUS Coelho  Consult ordered by: Sorin Piña MD        Physician Requesting Evaluation: Sorin Piña MD   Reason for Evaluation / Principal Problem: Nausea, vomiting, diarrhea, abdominal pain    Assessment & Plan  Generalized abdominal pain  Patient states symptoms have been on and off for the past 7 days.  On exam, patient does have diffuse abdominal tenderness with palpation.  CT A/P; notes colitis and inflammation at cecum (typhlitis) disease which may be secondary to chemotherapy.  Likely symptoms 2/2, inflammation, chemotherapy.  In the setting of loose/liquid BMs, ordered stool for C. difficile and stool culture.    Clear liquids as tolerated  Antiemetics and pain management as necessary  Stool studies for C. difficile and stool culture pending  Blood cultures x 2 pending  Zosyn IV hours  Dicyclomine 10 mg p.o. every 6 hours  Vomiting and diarrhea  Nausea  Patient states that her symptoms had been persistent for the last 7 days however the diarrhea just began approximately 3 days ago.  She states her last bowel movement was loose/liquid and melena.  Symptoms possibly 2/2 chemotherapy as well as CT identified colitis    Zofran 4 mg every 6 hours scheduled  Stool studies for C. difficile and stool culture ordered  Clear liquids as to tolerated    Other specified anemias  Hemoglobin 10.1 on admission 6/10.  With a.m. labs today hemoglobin 10.3.  Patient does note last bowel movement loose/liquid was black.  Likely secondary to chemotherapy.    Stool studies pending to rule out infectious cause  Monitor hemoglobin transfuse as necessary  Iron panel, B12/folate panel pending  Monitor for GI bleed/melena    Patient's last EGD and colonoscopy were  both completed on 8/12/2024 by Dr. Shelton.  Colonoscopy; 3 polyps, diverticulosis, small hemorrhoid, 5-year recall  EGD; mild antral gastritis.  GERD without esophagitis  Patient with past medical history of GERD.  Patient currently prescribed pantoprazole 40 mg daily.  Last EGD 8/12/2024; mild antral gastritis.    Pantoprazole 40 mg IVP every 12 hours  Uterine leiomyosarcoma (HCC)  Patient states originally diagnosed uterine cancer in 2023.  Was not in remission for a full year when cancer reappeared.  Patient states she is currently receiving chemotherapy states she receives every 21 days and last dose was this past Wednesday 6/4.        History of Present Illness   HPI:  Felecia Edward is a 64 y.o. female who presents past medical history of cholecystectomy, CAD, type 2 diabetes, GERD, CKD, SUSY, uterine cancer presented to the emergency room with nausea, vomiting and diarrhea.  Patient reported that symptoms began Sunday 6/8.  Patient with diffuse abdominal pain.  Patient current only undergoing chemotherapy for uterine cancer.  States her last chemotherapy treatment was last Wednesday and she gets treatments every 21 days.  She states she has not had symptoms like this before related to her chemotherapy.  This is her second go around with uterine cancer.  Was initially diagnosed in 2023 however had been in remission but did not last a full year.    CT A/P;   Mesenteric fat stranding adjacent to the cecum and proximal ascending colon.   Numerous subcentimeter mesenteric lymph nodes in the right lower quadrant.   Suggestion of mild wall thickening throughout the colon.   Diverticulosis without evidence of diverticulitis. No evidence of bowel obstruction..    Significant lab work on admission; WBCs 7.84, hemoglobin 10.7, platelets 189, potassium 3.0, creatinine 0.40, liver enzymes normal, lipase less than 10.    On exam, patient complains of diffuse abdominal tenderness with palpation.  She states she is having  persistent waves of nausea and states she vomited last yesterday with bile.  She states her symptoms do make her not feel like eating.  She states she has had the diarrhea for approximately 3 days and has having multiple loose/liquid bowel movements.  She does state that her last bowel movement was black.    She had EGD and colonoscopy 8/12/2024 with Dr. Shelton.  Colonoscopy; 3 polyps, diverticulosis, small hemorrhoid, 5-year recall.  EGD; mild antral gastritis.        Review of Systems   Constitutional:  Negative for chills and fever.   HENT:  Negative for ear pain and sore throat.    Eyes:  Negative for pain and visual disturbance.   Respiratory:  Negative for cough and shortness of breath.    Cardiovascular:  Negative for chest pain and palpitations.   Gastrointestinal:  Positive for abdominal pain (Generalized abdominal tenderness with palpation), diarrhea, nausea and vomiting.        Patient states episode of melena this morning with a liquid/loose bowel movement.   Genitourinary:  Negative for dysuria and hematuria.   Musculoskeletal:  Negative for arthralgias and back pain.   Skin:  Negative for color change and rash.   Neurological:  Negative for seizures and syncope.   All other systems reviewed and are negative.    Medical History Review: I have reviewed the patient's PMH, PSH, Social History, Family History, Meds, and Allergies     Objective :  Temp:  [97 °F (36.1 °C)-98.9 °F (37.2 °C)] 97 °F (36.1 °C)  HR:  [] 89  BP: (131-145)/(70-95) 145/94  Resp:  [17-19] 19  SpO2:  [95 %-98 %] 98 %  O2 Device: None (Room air)    Physical Exam  Vitals and nursing note reviewed.   Constitutional:       General: She is not in acute distress.     Appearance: She is well-developed.   HENT:      Head: Normocephalic and atraumatic.      Nose: Nose normal.     Eyes:      Conjunctiva/sclera: Conjunctivae normal.       Cardiovascular:      Rate and Rhythm: Normal rate and regular rhythm.      Heart sounds: No murmur  heard.  Pulmonary:      Effort: Pulmonary effort is normal. No respiratory distress.      Breath sounds: Normal breath sounds.   Abdominal:      Palpations: Abdomen is soft.      Tenderness: There is abdominal tenderness (Generalized abdominal tenderness with palpation).      Comments: Persistent nausea, vomited bile 6/10, notes melena liquid/loose bowel movement this morning.     Musculoskeletal:         General: No swelling.      Cervical back: Normal range of motion and neck supple.     Skin:     General: Skin is warm and dry.     Neurological:      Mental Status: She is alert and oriented to person, place, and time.     Psychiatric:         Mood and Affect: Mood normal.         Lab Results: I have reviewed the following results:CBC/BMP:   .     06/11/25  0515   WBC 3.84*   HGB 10.3*   HCT 31.4*      SODIUM 139   K 3.4*      CO2 22   BUN 7   CREATININE 0.51*   GLUC 154*   MG 1.9   PHOS 2.3        Imaging Results Review: I reviewed radiology reports from this admission including: CT abdomen/pelvis.  Other Study Results Review: Other studies reviewed include: Lab work, imaging, colonoscopy and EGD

## 2025-06-11 NOTE — ASSESSMENT & PLAN NOTE
Patient states she has been nonadherent with her CPAP recently.  Would prefer not to use this tonight in setting of nausea and vomiting.

## 2025-06-11 NOTE — PLAN OF CARE
Problem: Potential for Falls  Goal: Patient will remain free of falls  Description: INTERVENTIONS:  - Educate patient/family on patient safety including physical limitations  - Instruct patient to call for assistance with activity   - Consider consulting OT/PT to assist with strengthening/mobility based on AM PAC & JH-HLM score  - Consult OT/PT to assist with strengthening/mobility   - Keep Call bell within reach  - Keep bed low and locked with side rails adjusted as appropriate  - Keep care items and personal belongings within reach  - Initiate and maintain comfort rounds  - Make Fall Risk Sign visible to staff  - Offer Toileting every 2 Hours, in advance of need  - Initiate/Maintain bed alarm  - Obtain necessary fall risk management equipment:   - Apply yellow socks and bracelet for high fall risk patients  - Consider moving patient to room near nurses station  Outcome: Progressing     Problem: HEMATOLOGIC - ADULT  Goal: Maintains hematologic stability  Description: INTERVENTIONS  - Assess for signs and symptoms of bleeding or hemorrhage  - Monitor labs  - Administer supportive blood products/factors as ordered and appropriate  Outcome: Progressing     Problem: MUSCULOSKELETAL - ADULT  Goal: Maintain or return mobility to safest level of function  Description: INTERVENTIONS:  - Assess patient's ability to carry out ADLs; assess patient's baseline for ADL function and identify physical deficits which impact ability to perform ADLs (bathing, care of mouth/teeth, toileting, grooming, dressing, etc.)  - Assess/evaluate cause of self-care deficits   - Assess range of motion  - Assess patient's mobility  - Assess patient's need for assistive devices and provide as appropriate  - Encourage maximum independence but intervene and supervise when necessary  - Involve family in performance of ADLs  - Assess for home care needs following discharge   - Consider OT consult to assist with ADL evaluation and planning for  discharge  - Provide patient education as appropriate  Outcome: Progressing     Problem: GASTROINTESTINAL - ADULT  Goal: Minimal or absence of nausea and/or vomiting  Description: INTERVENTIONS:  - Administer IV fluids if ordered to ensure adequate hydration  - Maintain NPO status until nausea and vomiting are resolved  - Nasogastric tube if ordered  - Administer ordered antiemetic medications as needed  - Provide nonpharmacologic comfort measures as appropriate  - Advance diet as tolerated, if ordered  - Consider nutrition services referral to assist patient with adequate nutrition and appropriate food choices  Outcome: Progressing

## 2025-06-11 NOTE — ED PROVIDER NOTES
Time reflects when diagnosis was documented in both MDM as applicable and the Disposition within this note       Time User Action Codes Description Comment    6/10/2025 11:05 PM Lauren Boyer [E87.6] Hypokalemia     6/10/2025 11:05 PM Lauren Boyer Add [R10.9] Abdominal pain     6/10/2025 11:05 PM Lauren Boyer Add [R11.2,  R19.7] Nausea vomiting and diarrhea     6/10/2025 11:05 PM Lauren Boyer [R65.10] SIRS (systemic inflammatory response syndrome) (HCC)           ED Disposition       ED Disposition   Admit    Condition   Stable    Date/Time   Tue Elliot 10, 2025 11:11 PM    Comment   Case was discussed with GENE and the patient's admission status was agreed to be Admission Status: observation status to the service of Dr. Piña .               Assessment & Plan       Medical Decision Making  64-year-old female presenting with vomiting and diarrhea.  Suspect viral etiology.  Given recent chemotherapy session, concern for neutropenia/sepsis.  Obtain labs, CT abdomen pelvis.  Symptom control with fluids and antiemetics.    Amount and/or Complexity of Data Reviewed  Labs: ordered.  Radiology: ordered.    Risk  Prescription drug management.  Decision regarding hospitalization.             Medications   sodium chloride 0.9 % bolus 1,000 mL (1,000 mL Intravenous New Bag 6/10/25 2032)   ondansetron (ZOFRAN) injection 4 mg (4 mg Intravenous Given 6/10/25 2032)   iohexol (OMNIPAQUE) 350 MG/ML injection (MULTI-DOSE) 100 mL (100 mL Intravenous Given 6/10/25 2118)   piperacillin-tazobactam (ZOSYN) IVPB 4.5 g (4.5 g Intravenous New Bag 6/10/25 2330)   potassium chloride 20 mEq IVPB (premix) (has no administration in time range)       ED Risk Strat Scores                    No data recorded        SBIRT 22yo+      Flowsheet Row Most Recent Value   Initial Alcohol Screen: US AUDIT-C     1. How often do you have a drink containing alcohol? 0 Filed at: 06/10/2025 2037   2. How many drinks containing alcohol do you have on a typical  day you are drinking?  0 Filed at: 06/10/2025 2037   3a. Male UNDER 65: How often do you have five or more drinks on one occasion? 0 Filed at: 06/10/2025 2037   3b. FEMALE Any Age, or MALE 65+: How often do you have 4 or more drinks on one occassion? 0 Filed at: 06/10/2025 2037   Audit-C Score 0 Filed at: 06/10/2025 2037   VINITA: How many times in the past year have you...    Used an illegal drug or used a prescription medication for non-medical reasons? Never Filed at: 06/10/2025 2037                            History of Present Illness       Chief Complaint   Patient presents with    GI Problem     Pt to ED c/o diarrhea, vomiting, and nausea that started Sunday. States her last round of chemo on Wednesday.        Past Medical History[1]   Past Surgical History[2]   Family History[3]   Social History[4]   E-Cigarette/Vaping    E-Cigarette Use Never User       E-Cigarette/Vaping Substances    Nicotine No     THC No     CBD No     Flavoring No     Other No     Unknown No       I have reviewed and agree with the history as documented.     64-year-old female presents for evaluation of vomiting and diarrhea that started 2 days ago.  Associated diffuse abdominal pain.  Patient reports her last dose of chemotherapy was 6 days ago.  Has not had similar reaction to prior doses of chemotherapy.  Denies chest pain, shortness of breath.        Review of Systems   Gastrointestinal:  Positive for abdominal pain, diarrhea and vomiting.           Objective       ED Triage Vitals   Temperature Pulse Blood Pressure Respirations SpO2 Patient Position - Orthostatic VS   06/10/25 2012 06/10/25 2012 06/10/25 2012 06/10/25 2012 06/10/25 2012 06/11/25 0045   98.7 °F (37.1 °C) (!) 108 145/81 18 98 % Lying      Temp Source Heart Rate Source BP Location FiO2 (%) Pain Score    06/10/25 2012 06/10/25 2012 06/10/25 2012 -- 06/10/25 2012    Oral Monitor Left arm  No Pain      Vitals      Date and Time Temp Pulse SpO2 Resp BP Pain Score FACES  Pain Rating User   06/12/25 0731 97.2 °F (36.2 °C) 80 97 % 18 144/90 -- -- DII   06/11/25 2100 -- -- -- -- -- No Pain -- TM   06/11/25 2036 98 °F (36.7 °C) -- -- 18 -- -- -- TM   06/11/25 2036 -- 78 96 % -- 141/85 -- -- DII   06/11/25 1531 97.6 °F (36.4 °C) -- -- -- -- -- -- GS   06/11/25 1531 -- 79 98 % 20 126/82 -- -- DII   06/11/25 1152 -- -- 96 % -- -- No Pain --    06/11/25 0724 97 °F (36.1 °C) 89 98 % -- 145/94 -- -- DII   06/11/25 0134 -- 95 96 % -- 132/90 -- -- DII   06/11/25 0059 -- -- -- -- -- No Pain -- TM   06/11/25 0045 98.9 °F (37.2 °C) -- -- -- -- -- -- TM   06/11/25 0045 -- 92 97 % -- 143/95 -- -- DII   06/10/25 2230 -- 88 96 % 19 143/70 -- -- LK   06/10/25 2200 -- 87 96 % 19 136/71 -- -- LK   06/10/25 2130 -- 84 97 % 18 141/75 -- -- LK   06/10/25 2100 -- 87 95 % 18 133/81 -- -- LK   06/10/25 2030 -- 97 97 % 17 131/81 -- -- LK   06/10/25 2012 98.7 °F (37.1 °C) 108 98 % 18 145/81 No Pain -- AD            Physical Exam  Vitals and nursing note reviewed.   Constitutional:       Appearance: She is well-developed.   HENT:      Head: Normocephalic and atraumatic.      Right Ear: External ear normal.      Left Ear: External ear normal.      Nose: Nose normal.     Eyes:      General: No scleral icterus.      Cardiovascular:      Rate and Rhythm: Tachycardia present.   Pulmonary:      Effort: Pulmonary effort is normal. No respiratory distress.   Abdominal:      General: There is no distension.      Tenderness: There is abdominal tenderness.      Comments: Mildly tender diffusely     Musculoskeletal:         General: No deformity. Normal range of motion.      Cervical back: Normal range of motion.     Skin:     Findings: No rash.     Neurological:      General: No focal deficit present.      Mental Status: She is alert and oriented to person, place, and time.     Psychiatric:         Mood and Affect: Mood normal.         Results Reviewed       Procedure Component Value Units Date/Time    Blood culture #1  [[id number]] Collected: 06/10/25 2033    Lab Status: Preliminary result Specimen: Blood from Arm, Right Updated: 06/12/25 0701     Blood Culture No Growth at 24 hrs.    Blood culture #2 [[id number]] Collected: 06/10/25 2028    Lab Status: Preliminary result Specimen: Blood from Arm, Right Updated: 06/12/25 0701     Blood Culture No Growth at 24 hrs.    Urine Microscopic [[id number]]  (Normal) Collected: 06/10/25 2305    Lab Status: Final result Specimen: Urine, Clean Catch Updated: 06/10/25 2329     RBC, UA 0-1 /hpf      WBC, UA 1-2 /hpf      Epithelial Cells Occasional /hpf      Bacteria, UA Occasional /hpf     UA w Reflex to Microscopic w Reflex to Culture [[id number]]  (Abnormal) Collected: 06/10/25 2305    Lab Status: Final result Specimen: Urine, Clean Catch Updated: 06/10/25 2317     Color, UA Yellow     Clarity, UA Clear     Specific Gravity, UA <1.005     pH, UA 6.5     Leukocytes, UA Negative     Nitrite, UA Negative     Protein, UA 30 (1+) mg/dl      Glucose, UA Negative mg/dl      Ketones, UA Negative mg/dl      Urobilinogen, UA <2.0 mg/dl      Bilirubin, UA Negative     Occult Blood, UA Negative    HS Troponin I 2hr [[id number]]  (Normal) Collected: 06/10/25 2233    Lab Status: Final result Specimen: Blood from Arm, Right Updated: 06/10/25 2308     hs TnI 2hr 11 ng/L      Delta 2hr hsTnI -10 ng/L     Lactic acid 2 Hours [[id number]]  (Normal) Collected: 06/10/25 2233    Lab Status: Final result Specimen: Blood from Arm, Right Updated: 06/10/25 2255     LACTIC ACID 1.7 mmol/L     Narrative:      Result may be elevated if tourniquet was used during collection.    Manual Differential(PHLEBS Do Not Order) [[id number]]  (Abnormal) Collected: 06/10/25 2028    Lab Status: Final result Specimen: Blood from Arm, Right Updated: 06/10/25 2125     Segmented % 56 %      Lymphocytes % 33 %      Monocytes % 2 %      Eosinophils % 3 %      Basophils % 1 %      Atypical Lymphocytes % 5 %      Absolute Neutrophils 1.69  Thousand/uL      Absolute Lymphocytes 1.14 Thousand/uL      Absolute Monocytes 0.06 Thousand/uL      Absolute Eosinophils 0.09 Thousand/uL      Absolute Basophils 0.03 Thousand/uL      Total Counted --     Dohle Bodies Present     Toxic Granulation Present     RBC Morphology Present     Platelet Estimate Adequate     Anisocytosis Present     Basophilic Stippling Present    Procalcitonin [[id number]]  (Normal) Collected: 06/10/25 2028    Lab Status: Final result Specimen: Blood from Arm, Right Updated: 06/10/25 2101     Procalcitonin 0.14 ng/ml     HS Troponin 0hr (reflex protocol) [[id number]]  (Normal) Collected: 06/10/25 2028    Lab Status: Final result Specimen: Blood from Arm, Right Updated: 06/10/25 2059     hs TnI 0hr 21 ng/L     Protime-INR [[id number]]  (Normal) Collected: 06/10/25 2028    Lab Status: Final result Specimen: Blood from Arm, Right Updated: 06/10/25 2052     Protime 13.0 seconds      INR 0.93    Narrative:      INR Therapeutic Range    Indication                                             INR Range      Atrial Fibrillation                                               2.0-3.0  Hypercoagulable State                                    2.0.2.3  Left Ventricular Asist Device                            2.0-3.0  Mechanical Heart Valve                                  -    Aortic(with afib, MI, embolism, HF, LA enlargement,    and/or coagulopathy)                                     2.0-3.0 (2.5-3.5)     Mitral                                                             2.5-3.5  Prosthetic/Bioprosthetic Heart Valve               2.0-3.0  Venous thromboembolism (VTE: VT, PE        2.0-3.0    APTT [[id number]]  (Normal) Collected: 06/10/25 2028    Lab Status: Final result Specimen: Blood from Arm, Right Updated: 06/10/25 2052     PTT 27 seconds     Comprehensive metabolic panel [[id number]]  (Abnormal) Collected: 06/10/25 2028    Lab Status: Final result Specimen: Blood from Arm, Right Updated: 06/10/25  2051     Sodium 138 mmol/L      Potassium 3.0 mmol/L      Chloride 105 mmol/L      CO2 23 mmol/L      ANION GAP 10 mmol/L      BUN 11 mg/dL      Creatinine 0.40 mg/dL      Glucose 135 mg/dL      Calcium 8.8 mg/dL      AST 20 U/L      ALT 20 U/L      Alkaline Phosphatase 99 U/L      Total Protein 6.8 g/dL      Albumin 4.0 g/dL      Total Bilirubin 0.59 mg/dL      eGFR 109 ml/min/1.73sq m     Narrative:      [employer] guidelines for Chronic Kidney Disease (CKD):     Stage 1 with normal or high GFR (GFR > 90 mL/min/1.73 square meters)    Stage 2 Mild CKD (GFR = 60-89 mL/min/1.73 square meters)    Stage 3A Moderate CKD (GFR = 45-59 mL/min/1.73 square meters)    Stage 3B Moderate CKD (GFR = 30-44 mL/min/1.73 square meters)    Stage 4 Severe CKD (GFR = 15-29 mL/min/1.73 square meters)    Stage 5 End Stage CKD (GFR <15 mL/min/1.73 square meters)  Note: GFR calculation is accurate only with a steady state creatinine    Lipase [[id number]]  (Abnormal) Collected: 06/10/25 2028    Lab Status: Final result Specimen: Blood from Arm, Right Updated: 06/10/25 2051     Lipase 10 u/L     Lactic acid [[id number]]  (Abnormal) Collected: 06/10/25 2028    Lab Status: Final result Specimen: Blood from Arm, Right Updated: 06/10/25 2050     LACTIC ACID 2.4 mmol/L     Narrative:      Result may be elevated if tourniquet was used during collection.    CBC and differential [[id number]]  (Abnormal) Collected: 06/10/25 2028    Lab Status: Final result Specimen: Blood from Arm, Right Updated: 06/10/25 2047     WBC 3.01 Thousand/uL      RBC 3.18 Million/uL      Hemoglobin 10.1 g/dL      Hematocrit 30.8 %      MCV 97 fL      MCH 31.8 pg      MCHC 32.8 g/dL      RDW 16.0 %      MPV 9.3 fL      Platelets 163 Thousands/uL             CT abdomen pelvis with contrast   Final Interpretation by Neel Allen MD (06/10 2233)      Suggestion of mild diffuse colitis and mild mesenteric inflammation and lymphadenopathy in the  right lower quadrant. Findings may be secondary to diarrhea and/or mild typhlitis. No findings to indicate enteritis.         Workstation performed: [id number]         XR chest portable   Final Interpretation by Erin Abdul MD (06/11 0925)      No acute cardiopulmonary disease.            Workstation performed: [id number]             Procedures    ED Medication and Procedure Management   Prior to Admission Medications   Prescriptions Last Dose Informant Patient Reported? Taking?   DULoxetine (CYMBALTA) 30 mg delayed release capsule  Self Yes No   Jardiance 10 MG TABS tablet  Self Yes No   Patient not taking: Reported on 5/29/2025   LORazepam (ATIVAN) 1 mg tablet  Self No Yes   Sig: Take 1 tablet (1 mg total) by mouth every 8 (eight) hours as needed (nausea or anxiety)   Multiple Vitamin (MULTIVITAMIN) tablet  Self Yes Yes   Sig: Take 1 tablet by mouth in the morning.   Triamcinolone Acetonide 55 MCG/ACT AERO  Self Yes No   Sig: into each nostril One spray each nostril daily   Xiidra 5 % op solution  Self Yes No   ZOLMitriptan (ZOMIG) 5 MG tablet  Self Yes No   Sig: Take 5 mg by mouth once as needed for migraine Daily prn   aspirin (Aspirin 81) 81 mg chewable tablet  Self Yes No   Sig: every 24 hours   atorvastatin (LIPITOR) 40 mg tablet  Self Yes Yes   Sig: Take 40 mg by mouth daily at bedtime   betamethasone, augmented, (DIPROLENE) 0.05 % lotion  Self No No   Sig: Apply topically 2 (two) times a day   cetirizine (ZyrTEC) 10 mg tablet  Self Yes No   Sig: Take 10 mg by mouth daily at bedtime   cholecalciferol (VITAMIN D3) 1,000 units tablet  Self Yes Yes   Sig: Take 2,000 Units by mouth in the morning. 2 daily .   clindamycin (CLEOCIN T) 1 % external solution  Self No No   Sig: Apply topically 2 (two) times a day   dicyclomine (BENTYL) 10 mg capsule  Self No No   Sig: Take 1 capsule (10 mg total) by mouth every 6 (six) hours as needed (pain)   glimepiride (AMARYL) 4 mg tablet  Self Yes No   Sig: Take 4 mg by  mouth in the morning and 4 mg in the evening.   ibuprofen (MOTRIN) 800 mg tablet  Self Yes No   Sig: Take 800 mg by mouth every 6 (six) hours as needed for mild pain   ketorolac (TORADOL) 10 mg tablet  Self No No   Sig: Take 1 tablet (10 mg total) by mouth every 6 (six) hours as needed for moderate pain for up to 5 days   losartan (COZAAR) 50 mg tablet  Self Yes Yes   Sig: Take 50 mg by mouth in the morning.   meclizine (ANTIVERT) 25 mg tablet  Self Yes Yes   Sig: Take by mouth every 12 (twelve) hours as needed for dizziness   methenamine hippurate (HIPREX) 1 g tablet  Self No No   Sig: Take 1 tablet (1 g total) by mouth 2 (two) times a day with meals   metoprolol succinate (TOPROL-XL) 25 mg 24 hr tablet  Self Yes Yes   Sig: Take 25 mg by mouth in the morning.   ondansetron (ZOFRAN) 8 mg tablet   No No   Sig: TAKE 1 TABLET BY MOUTH EVERY 8 HOURS AS NEEDED FOR NAUSEA OR VOMITING.   oxyCODONE (Roxicodone) 5 immediate release tablet  Self No No   Sig: Take 1 tablet (5 mg total) by mouth every 6 (six) hours as needed for moderate pain for up to 8 doses Max Daily Amount: 20 mg   Patient not taking: Reported on 5/14/2025   pantoprazole (PROTONIX) 40 mg tablet  Self No No   Sig: Take 1 tablet (40 mg total) by mouth daily   phenazopyridine (PYRIDIUM) 200 mg tablet  Self No No   Sig: Take 1 tablet (200 mg total) by mouth 3 (three) times a day as needed (Dysuria and bladder discomfort.  Take with meals.)   pioglitazone (ACTOS) 30 mg tablet  Self Yes No   Sig: every 24 hours   potassium chloride (Klor-Con M20) 20 mEq tablet  Self No No   Sig: TAKE 1 TABLET BY MOUTH 2 TIMES A DAY.   rOPINIRole (REQUIP) 1 mg tablet  Self Yes Yes   sertraline (ZOLOFT) 100 mg tablet  Self Yes Yes   Sig: Take 1.5 tablets by mouth daily at bedtime   tamsulosin (FLOMAX) 0.4 mg  Self No No   Sig: Take 1 capsule (0.4 mg total) by mouth every evening for 14 days   topiramate (TOPAMAX) 100 mg tablet  Self Yes No      Facility-Administered Medications:  None     Current Discharge Medication List        CONTINUE these medications which have NOT CHANGED    Details   atorvastatin (LIPITOR) 40 mg tablet Take 40 mg by mouth daily at bedtime      cholecalciferol (VITAMIN D3) 1,000 units tablet Take 2,000 Units by mouth in the morning. 2 daily .      LORazepam (ATIVAN) 1 mg tablet Take 1 tablet (1 mg total) by mouth every 8 (eight) hours as needed (nausea or anxiety)  Qty: 20 tablet, Refills: 0    Associated Diagnoses: Uterine leiomyosarcoma (HCC)      losartan (COZAAR) 50 mg tablet Take 50 mg by mouth in the morning.      meclizine (ANTIVERT) 25 mg tablet Take by mouth every 12 (twelve) hours as needed for dizziness      metoprolol succinate (TOPROL-XL) 25 mg 24 hr tablet Take 25 mg by mouth in the morning.      Multiple Vitamin (MULTIVITAMIN) tablet Take 1 tablet by mouth in the morning.      rOPINIRole (REQUIP) 1 mg tablet       sertraline (ZOLOFT) 100 mg tablet Take 1.5 tablets by mouth daily at bedtime      aspirin (Aspirin 81) 81 mg chewable tablet every 24 hours      betamethasone, augmented, (DIPROLENE) 0.05 % lotion Apply topically 2 (two) times a day  Qty: 60 mL, Refills: 1    Associated Diagnoses: Folliculitis      cetirizine (ZyrTEC) 10 mg tablet Take 10 mg by mouth daily at bedtime      clindamycin (CLEOCIN T) 1 % external solution Apply topically 2 (two) times a day  Qty: 60 mL, Refills: 1    Associated Diagnoses: Folliculitis      dicyclomine (BENTYL) 10 mg capsule Take 1 capsule (10 mg total) by mouth every 6 (six) hours as needed (pain)  Qty: 30 capsule, Refills: 3    Associated Diagnoses: Functional dyspepsia      DULoxetine (CYMBALTA) 30 mg delayed release capsule       glimepiride (AMARYL) 4 mg tablet Take 4 mg by mouth in the morning and 4 mg in the evening.      ibuprofen (MOTRIN) 800 mg tablet Take 800 mg by mouth every 6 (six) hours as needed for mild pain      Jardiance 10 MG TABS tablet       ketorolac (TORADOL) 10 mg tablet Take 1 tablet (10 mg  total) by mouth every 6 (six) hours as needed for moderate pain for up to 5 days  Qty: 20 tablet, Refills: 0    Associated Diagnoses: Right renal stone      methenamine hippurate (HIPREX) 1 g tablet Take 1 tablet (1 g total) by mouth 2 (two) times a day with meals  Qty: 30 tablet, Refills: 0    Associated Diagnoses: Recurrent UTI      ondansetron (ZOFRAN) 8 mg tablet TAKE 1 TABLET BY MOUTH EVERY 8 HOURS AS NEEDED FOR NAUSEA OR VOMITING.  Qty: 30 tablet, Refills: 0    Associated Diagnoses: Uterine leiomyosarcoma (HCC)      oxyCODONE (Roxicodone) 5 immediate release tablet Take 1 tablet (5 mg total) by mouth every 6 (six) hours as needed for moderate pain for up to 8 doses Max Daily Amount: 20 mg  Qty: 8 tablet, Refills: 0    Associated Diagnoses: Cancer associated pain      pantoprazole (PROTONIX) 40 mg tablet Take 1 tablet (40 mg total) by mouth daily  Qty: 90 tablet, Refills: 1    Associated Diagnoses: Epigastric pain      phenazopyridine (PYRIDIUM) 200 mg tablet Take 1 tablet (200 mg total) by mouth 3 (three) times a day as needed (Dysuria and bladder discomfort.  Take with meals.)  Qty: 10 tablet, Refills: 0    Associated Diagnoses: Right renal stone      pioglitazone (ACTOS) 30 mg tablet every 24 hours      potassium chloride (Klor-Con M20) 20 mEq tablet TAKE 1 TABLET BY MOUTH 2 TIMES A DAY.  Qty: 180 tablet, Refills: 1    Associated Diagnoses: Hypokalemia      tamsulosin (FLOMAX) 0.4 mg Take 1 capsule (0.4 mg total) by mouth every evening for 14 days  Qty: 14 capsule, Refills: 0    Associated Diagnoses: Right renal stone      topiramate (TOPAMAX) 100 mg tablet       Triamcinolone Acetonide 55 MCG/ACT AERO into each nostril One spray each nostril daily      Xiidra 5 % op solution       ZOLMitriptan (ZOMIG) 5 MG tablet Take 5 mg by mouth once as needed for migraine Daily prn           No discharge procedures on file.  ED SEPSIS DOCUMENTATION   Time reflects when diagnosis was documented in both MDM as applicable  "and the Disposition within this note       Time User Action Codes Description Comment    6/10/2025 11:05 PM Lauren Boyer [E87.6] Hypokalemia     6/10/2025 11:05 PM Lauren Boyer [R10.9] Abdominal pain     6/10/2025 11:05 PM Lauren Boyer Add [R11.2,  R19.7] Nausea vomiting and diarrhea     6/10/2025 11:05 PM Lauren Boyer [R65.10] SIRS (systemic inflammatory response syndrome) ([facility])            Initial Sepsis Screening       Row Name 06/10/25 2251                Is the patient's history suggestive of a new or worsening infection? Yes (Proceed)  -EB        Suspected source of infection acute abdominal infection  -EB        Indicate SIRS criteria Leukocytosis (WBC > 41386 IJL) OR Leukopenia (WBC <4000 IJL) OR Bandemia (WBC >10% bands);Tachycardia > 90 bpm  -EB        Are two or more of the above signs & symptoms of infection both present and new to the patient? Yes (Proceed)  -EB        Assess for evidence of organ dysfunction: Are any of the below criteria present within 6 hours of suspected infection and SIRS criteria that are NOT considered to be chronic conditions? Lactate > 2.0  -EB        Date of presentation of severe sepsis 06/10/25  -EB        Time of presentation of severe sepsis 2252  -EB        Sepsis Note: Click \"NEXT\" below (NOT \"close\") to generate sepsis note based on above information. YES (proceed by clicking \"NEXT\")  -EB                  User Key  (r) = Recorded By, (t) = Taken By, (c) = Cosigned By      Initials Name Provider Type    EB Lauren Boyer DO Physician                           [1]   Past Medical History:  Diagnosis Date    Allergic rhinitis     Anemia     Anxiety     Coronary artery disease     CPAP (continuous positive airway pressure) dependence     Depression     Diabetes mellitus (HCC)     Functional dyspepsia     GERD (gastroesophageal reflux disease)     GERD without esophagitis     Hyperlipidemia     Hypertension     Irritable bowel syndrome     Migraines     Osteoporosis     " Pulmonary hypertension (HCC)     Sleep apnea, obstructive    [2]   Past Surgical History:  Procedure Laterality Date    APPENDECTOMY      CHEST WALL BIOPSY N/A 06/04/2023    Procedure: EXCISION  BIOPSY LESION/MASS ABDOMINAL-PELVIC PERITONEUM;  Surgeon: Gordo Taveras MD;  Location:  MAIN OR;  Service: Gynecology Oncology    CHOLECYSTECTOMY      COLONOSCOPY      CYSTOSCOPY N/A 01/06/2025    Procedure: CYSTOSCOPY;  Surgeon: Gordo Taveras MD;  Location: [facility];  Service: Gynecology Oncology    FOOT SURGERY Left     Bone spur    HYSTERECTOMY N/A 06/04/2023    Procedure: HYSTERECTOMY TOTAL ABDOMINAL (DOROTHY), BILATERAL SALPINGOOPHORECTOMY;  Surgeon: Gordo Taveras MD;  Location: [facility];  Service: Gynecology Oncology    IR PORT PLACEMENT  06/30/2023    MO CYSTO/URETERO W/LITHOTRIPSY &INDWELL STENT INSRT Right 2/13/2025    Procedure: CYSTOSCOPY URETEROSCOPY WITH LITHOTRIPSY HOLMIUM LASER, RETROGRADE PYELOGRAM AND INSERTION STENT URETERAL;  Surgeon: Michael Magallon MD;  Location:  MAIN OR;  Service: Urology    MO LAPS FULG/EXC OVARY VISCERA/PERITONEAL SURFACE N/A 01/06/2025    Procedure: ROBOTIC ASSISTED RADICAL BILATERAL PARAMETRECTOMY, RESECTION BILATERAL PARAMETRIAL TUMORS, UPPER VAGINECTOMY, LEFT OBTURATOR LYMPH NODE DISSECTION;  Surgeon: Gordo Tavears MD;  Location: [facility];  Service: Gynecology Oncology   [3]   Family History  Problem Relation Name Age of Onset    Heart disease Mother Joy sowden     Cancer Mother Joy sowden     Breast cancer Father Misael nuñezden     Cancer Father Misael nuñezden     Diabetes Father Misael sowden     Hypertension Father Misael wayne     Coronary artery disease Brother Jean Paul wayne     Diabetes Brother Jean Paul wayne     Heart failure Brother Jean Paul wayne     Hypertension Brother Jean Paul wayne     Colon polyps Neg Hx      Colon cancer Neg Hx     [4]   Social History  Tobacco Use    Smoking status: Never    Smokeless tobacco: Never    Vaping Use    Vaping status: Never Used   Substance Use Topics    Alcohol use: Never    Drug use: Never        Daren Caruso DO  06/12/25 0907

## 2025-06-11 NOTE — ASSESSMENT & PLAN NOTE
Patient states originally diagnosed uterine cancer in 2023.  Was not in remission for a full year when cancer reappeared.  Patient states she is currently receiving chemotherapy states she receives every 21 days and last dose was this past Wednesday 6/4.

## 2025-06-11 NOTE — ASSESSMENT & PLAN NOTE
Patient with past medical history of GERD.  Patient currently prescribed pantoprazole 40 mg daily.  Last EGD 8/12/2024; mild antral gastritis.    Pantoprazole 40 mg IVP every 12 hours

## 2025-06-11 NOTE — ASSESSMENT & PLAN NOTE
"Lab Results   Component Value Date    HGBA1C 7.6 (H) 12/24/2024       No results for input(s): \"POCGLU\" in the last 72 hours.    Blood Sugar Average: Last 72 hrs:      "

## 2025-06-11 NOTE — ASSESSMENT & PLAN NOTE
Patient states that her symptoms had been persistent for the last 7 days however the diarrhea just began approximately 3 days ago.  She states her last bowel movement was loose/liquid and melena.  Symptoms possibly 2/2 chemotherapy as well as CT identified colitis    Zofran 4 mg every 6 hours scheduled  Stool studies for C. difficile and stool culture ordered  Clear liquids as to tolerated

## 2025-06-11 NOTE — UTILIZATION REVIEW
Initial Clinical Review    Admission: Date/Time/Statement:  6/10/25 2312 observation and CHANGED 6/11/25 1803 INPATIENT RE: PATIENT NEEDS > 2 MIDNIGHT STAY DUE TO PERSISTENT NAUSEA AND LOOSE BLACK STOOLS.  TO CONTINUE IV PANTOPRAZOLE, TREND CBC, CHECK STOOL C DIFF AND ENTERIC PANEL, ANTIEMETICS.  GI CONSULTED;  IVF IV ZOSYN IN PROCESS.   Admission Orders (From admission, onward)       Ordered        06/11/25 1803  INPATIENT ADMISSION  Once            06/10/25 2312  Place in Observation  Once                          Orders Placed This Encounter   Procedures    INPATIENT ADMISSION     Standing Status:   Standing     Number of Occurrences:   1     Level of Care:   Med Surg [16]     Estimated length of stay:   More than 2 Midnights     Certification:   I certify that inpatient services are medically necessary for this patient for a duration of greater than two midnights. See H&P and MD Progress Notes for additional information about the patient's course of treatment.     ED Arrival Information       Expected   -    Arrival   6/10/2025 20:03    Acuity   Emergent              Means of arrival   Walk-In    Escorted by   Family Member    Service   Hospitalist    Admission type   Emergency              Arrival complaint   Diarrhea & vomiting x3D after chemo             Chief Complaint   Patient presents with    GI Problem     Pt to ED c/o diarrhea, vomiting, and nausea that started Sunday. States her last round of chemo on Wednesday.        Initial Presentation: 64 y.o. female  to ED via walk in from home.    Admitted to observation with Dx: Vomiting and Diarrhea.  Presented to ED with vomiting and diarrhea starting 2 days prior to arrival,  diffuse abdominal pain.  Last Chemo about 6 days ago. PMHx: coronary artery disease, type 2 diabetes mellitus, gastroesophageal reflux disease, chronic kidney disease, obstructive sleep apnea, uterine cancer. On exam: tachycardia.  Abdomen diffusely tender.   K 3.  Lactic acid 2.4.    "wbc 3.01.  H&H 10.1/30.8.   Imaging shows:   \"Suggestion of mild diffuse colitis and mild mesenteric inflammation and lymphadenopathy in the right lower quadrant. Findings may be secondary to diarrhea and/or mild typhlitis. No findings to indicate enteritis. \" . ED treatment:  IVF bolus of 1 liter, Zofran and started on Zosyn.    Plan includes to continue Zosyn.  Analgesia and antiemetics as needed.  Trend BMP and replete electrolytes.  Hold home HCTZ.   Continue IVF.   Hold home oral Diabetic medications and start SSI.     Anticipated Length of Stay/Certification Statement: Patient will be admitted on an observation basis with an anticipated length of stay of less than 2 midnights secondary to symptom control/observation.     Date: 6/11/25   Day 2 CHANGED TO INPATIENT :  continued diffuse abdominal pain.   Persistent waves of nausea.  Last vomited yesterday and was bile.  Continued loose and liquid stools, today black.   On exam abdominal tenderness.   Plan:  clears as tolerated.  Antiemetics and analgesia as needed.  Zosyn.   Scheduled IV Zofran and Dicyclomine added.  Stool studies for C diff and culture.  Trend CBC.  Consult GI.  Pantoprazole IV    6/11/25 per GI \"Suspect nausea vomiting and diarrhea is from acute infectious etiologies from viral or bacterial sources or typhlitis secondary to chemotherapy. Collect stool cultures and C. difficile. Continue supportive care IV fluids and IV antibiotics. Will use antiemetics as Zofran. Continue pantoprazole twice daily. Monitor bowel movements and hemoglobin trend.\"    Patient has crossed 3 midnights and requires ongoing care    6/12/2025 .  Patient presents with  continued abdominal discomfort but is improving.  Feels nausea improved with Zofran.    On exam abdominal tenderness.   Abnormal labs or imaging:  stool enteric panel negative.  K 3.3.  bun 4. Creatinine 0.41.   wbc 3.20.  H&H 9.6/28.9.   Diagnosis/Plan    Abdominal pain, vomiting, diarrhea/Uterine " leiomyosarcoma/  Type 2 diabetes/Hypertension/Sleep apnea/Hypokalemia-repleted.  Stool C diff pending.  Continue Zosyn. Diet advanced.  IVF stopped.   Stop scheduled Zofran and continued as needed antiemetics.  Continue IV pantoprazole.  Replete electrolytes.     ED Treatment-Medication Administration from 06/10/2025 2003 to 06/10/2025 2342         Date/Time Order Dose Route Action     06/10/2025 2032 sodium chloride 0.9 % bolus 1,000 mL 1,000 mL Intravenous New Bag     06/10/2025 2032 ondansetron (ZOFRAN) injection 4 mg 4 mg Intravenous Given     06/10/2025 2330 piperacillin-tazobactam (ZOSYN) IVPB 4.5 g 4.5 g Intravenous New Bag          Scheduled Medications:  aspirin, 81 mg, Oral, Daily  atorvastatin, 40 mg, Oral, HS  DULoxetine, 30 mg, Oral, Daily  enoxaparin, 40 mg, Subcutaneous, Daily  loratadine, 10 mg, Oral, HS  losartan, 50 mg, Oral, Daily  metoprolol succinate, 25 mg, Oral, Daily  multivitamin-minerals, 1 tablet, Oral, Daily  pantoprazole, 40 mg, Oral, Daily Before Breakfast  piperacillin-tazobactam, 4.5 g, Intravenous, Q8H  rOPINIRole, 1 mg, Oral, HS  sertraline, 150 mg, Oral, HS    acetaminophen (Ofirmev) injection 1,000 mg  Dose: 1,000 mg  Freq: Every 6 hours Route: IV  Last Dose: Stopped (06/11/25 0857)  Start: 06/11/25 0115 End: 06/11/25 0746  dicyclomine (BENTYL) capsule 10 mg  Dose: 10 mg  Freq: Every 6 hours scheduled Route: PO  Start: 06/11/25 1245   ondansetron (ZOFRAN) injection 4 mg  Dose: 4 mg  Freq: Every 6 hours scheduled Route: IV  Indications of Use: CANCER CHEMOTHERAPY-INDUCED NAUSEA AND VOMITING  Start: 06/11/25 1200 End: 06/12/25 0924  pantoprazole (PROTONIX) injection 40 mg  Dose: 40 mg  Freq: Every 12 hours scheduled Route: IV  Start: 06/11/25 1130    potassium chloride 20 mEq IVPB (premix)  Dose: 20 mEq  Freq: Once Route: IV  Last Dose: 20 mEq (06/11/25 1112)  Start: 06/11/25 1000 End: 06/11/25 1312   potassium chloride (Klor-Con M20) CR tablet 40 mEq  Dose: 40 mEq  Freq: Once  Route: PO  Start: 06/12/25 0730 End: 06/12/25 0858    Continuous IV Infusions:  multi-electrolyte, 75 mL/hr, Intravenous, Continuous - dc 1056 6/11    sodium chloride 0.9 % infusion  Rate: 75 mL/hr Dose: 75 mL/hr  Freq: Continuous Route: IV  Last Dose: Stopped (06/11/25 2034)  Start: 06/11/25 1100 End: 06/11/25 2111    PRN Meds:  acetaminophen, 650 mg, Oral, Q6H PRN  dicyclomine, 10 mg, Oral, Q6H PRN  ibuprofen, 800 mg, Oral, Q6H PRN  LORazepam, 1 mg, Oral, Q8H PRN x 1 6/11  meclizine, 25 mg, Oral, Q12H PRN  ondansetron, 4 mg, Intravenous, Q6H PRN    ED Triage Vitals [06/10/25 2012]   Temperature Pulse Respirations Blood Pressure SpO2 Pain Score   98.7 °F (37.1 °C) (!) 108 18 145/81 98 % No Pain     Weight (last 2 days)       Date/Time Weight    06/10/25 2012 91.6 (202)          Vital Signs (last 3 days)       Date/Time Temp Pulse Resp BP MAP (mmHg) SpO2 O2 Device Patient Position - Orthostatic VS [place] Coma Scale Score Pain    06/12/25 1026 -- -- -- -- -- 96 % None (Room air) -- 15 No Pain    06/12/25 07:31:53 97.2 °F (36.2 °C) 80 18 144/90 108 97 % -- -- -- --    06/11/25 2100 -- -- -- -- -- -- -- -- -- No Pain    06/11/25 20:36:02 98 °F (36.7 °C) 78 18 141/85 104 96 % None (Room air) Lying -- --    06/11/25 1925 -- -- -- -- -- -- -- -- 15 --    06/11/25 15:31:40 97.6 °F (36.4 °C) 79 20 126/82 97 98 % None (Room air) Lying -- --    06/11/25 1152 -- -- -- -- -- 96 % None (Room air) -- 15 No Pain    06/11/25 07:24:39 97 °F (36.1 °C) 89 -- 145/94 111 98 % -- -- -- --    06/11/25 01:34:25 -- 95 -- 132/90 104 96 % -- -- -- --    06/11/25 0059 -- -- -- -- -- -- -- -- -- No Pain    06/11/25 00:45:57 98.9 °F (37.2 °C) 92 -- 143/95 111 97 % -- Lying -- --    06/11/25 0000 -- -- -- -- -- -- -- -- 15 --    06/10/25 2230 -- 88 19 143/70 100 96 % None (Room air) -- -- --    06/10/25 2200 -- 87 19 136/71 98 96 % -- -- -- --    06/10/25 2130 -- 84 18 141/75 103 97 % -- -- -- --    06/10/25 2100 -- 87 18 133/81 102 95 % -- --  -- --    06/10/25 2037 -- -- -- -- -- -- -- -- 15 --    06/10/25 2030 -- 97 17 131/81 101 97 % None (Room air) -- -- --    06/10/25 2012 98.7 °F (37.1 °C) 108 18 145/81 -- 98 % None (Room air) -- -- No Pain              Pertinent Labs/Diagnostic Test Results:   Radiology:  CT abdomen pelvis with contrast   Final Interpretation by Neel Allen MD (06/10 2233)      Suggestion of mild diffuse colitis and mild mesenteric inflammation and lymphadenopathy in the right lower quadrant. Findings may be secondary to diarrhea and/or mild typhlitis. No findings to indicate enteritis.         Workstation performed: [id number]         XR chest portable   Final Interpretation by Erin Abdul MD (06/11 0925)      No acute cardiopulmonary disease.            Workstation performed: [id number]           Cardiology:  ECG 12 lead   Final Result by Aayush Leal MD (06/11 0918)   Normal sinus rhythm   Normal ECG   When compared with ECG of 20-Feb-2025 17:59,   No significant change was found   Confirmed by Aayush Leal ([id number]) on 6/11/2025 9:18:27 AM        GI:  No orders to display     Results from last 7 days   Lab Units 06/12/25  0404 06/11/25  0515 06/10/25  2028 06/09/25  1345   WBC Thousand/uL 3.20* 3.84* 3.01* 7.84   HEMOGLOBIN g/dL 9.6* 10.3* 10.1* 10.7*   HEMATOCRIT % 28.9* 31.4* 30.8* 32.9*   PLATELETS Thousands/uL 126* 162 163 189     Results from last 7 days   Lab Units 06/12/25  0404 06/11/25  0515 06/10/25  2028 06/09/25  1345   SODIUM mmol/L 139 139 138 138   POTASSIUM mmol/L 3.3* 3.4* 3.0* 3.5   CHLORIDE mmol/L 107 107 105 104   CO2 mmol/L 23 22 23 25   ANION GAP mmol/L 9 10 10 9   BUN mg/dL 4* 7 11 13   CREATININE mg/dL 0.41* 0.51* 0.40* 0.41*   EGFR ml/min/1.73sq m 108 101 109 108   CALCIUM mg/dL 8.3* 8.6 8.8 8.8   MAGNESIUM mg/dL  --  1.9  --  1.9   PHOSPHORUS mg/dL  --  2.3  --   --      Results from last 7 days   Lab Units 06/11/25  0515 06/10/25  2028 06/09/25  1345   AST U/L 35 20 17   ALT  U/L 24 20 20   ALK PHOS U/L 121* 99 118*   TOTAL PROTEIN g/dL 6.6 6.8 6.8   ALBUMIN g/dL 3.8 4.0 3.9   TOTAL BILIRUBIN mg/dL 0.65 0.59 0.86     Results from last 7 days   Lab Units 06/12/25  0404 06/11/25  0515 06/10/25  2028 06/09/25  1345   GLUCOSE RANDOM mg/dL 133 154* 135 144*     Results from last 7 days   Lab Units 06/10/25  2233 06/10/25  2028   HS TNI 0HR ng/L  --  21   HS TNI 2HR ng/L 11  --    HSTNI D2 ng/L -10  --      Results from last 7 days   Lab Units 06/10/25  2028   PROTIME seconds 13.0   INR  0.93   PTT seconds 27     Results from last 7 days   Lab Units 06/11/25 0515 06/10/25  2028   PROCALCITONIN ng/ml 0.15 0.14     Results from last 7 days   Lab Units 06/10/25  2233 06/10/25  2028   LACTIC ACID mmol/L 1.7 2.4*     Results from last 7 days   Lab Units 06/12/25  0404   FERRITIN ng/mL 206     Results from last 7 days   Lab Units 06/10/25  2028   LIPASE u/L 10*     Results from last 7 days   Lab Units 06/10/25  2305   CLARITY UA  Clear   COLOR UA  Yellow   SPEC GRAV UA  <1.005*   PH UA  6.5   GLUCOSE UA mg/dl Negative   KETONES UA mg/dl Negative   BLOOD UA  Negative   PROTEIN UA mg/dl 30 (1+)*   NITRITE UA  Negative   BILIRUBIN UA  Negative   UROBILINOGEN UA (BE) mg/dl <2.0   LEUKOCYTES UA  Negative   WBC UA /hpf 1-2   RBC UA /hpf 0-1   BACTERIA UA /hpf Occasional   EPITHELIAL CELLS WET PREP /hpf Occasional     Results from last 7 days   Lab Units 06/11/25  1206   SALMONELLA SP PCR  Negative   SHIGELLA SP/ENTEROINVASIVE E. COLI (EIEC)  Negative   CAMPYLOBACTER SP (JEJUNI AND COLI)  Negative   SHIGA TOXIN 1/SHIGA TOXIN 2  Negative     Results from last 7 days   Lab Units 06/10/25  2033 06/10/25  2028   BLOOD CULTURE  No Growth at 24 hrs. No Growth at 24 hrs.       Past Medical History[1]  Present on Admission:   Uterine leiomyosarcoma (HCC)   Type 2 diabetes mellitus with diabetic neuropathy, without long-term current use of insulin (HCC)   Class 2 obesity due to excess calories without serious  comorbidity in adult   Primary hypertension   Hypokalemia   Vomiting and diarrhea   GERD without esophagitis   Nausea      Admitting Diagnosis: Hypokalemia [E87.6]  Abdominal pain [R10.9]  SIRS (systemic inflammatory response syndrome) (HCC) [R65.10]  Vomiting and diarrhea [R11.10, R19.7]  Nausea vomiting and diarrhea [R11.2, R19.7]  Age/Sex: 64 y.o. female    Network Utilization Review Department  ATTENTION: Please call with any questions or concerns to [phone] and carefully listen to the prompts so that you are directed to the right person. All voicemails are confidential.   For Discharge needs, contact Care Management DC Support Team at [phone] opt. [phone]  Send all requests for admission clinical reviews, approved or denied determinations and any other requests to dedicated fax number below belonging to the campus where the patient is receiving treatment. List of dedicated fax numbers for the Facilities:  FACILITY NAME UR FAX NUMBER   ADMISSION DENIALS (Administrative/Medical Necessity) [phone]   DISCHARGE SUPPORT TEAM (NETWORK) [phone]   PARENT CHILD HEALTH (Maternity/NICU/Pediatrics) [phone]   [facility] [phone]   [facility] [phone]   [facility] [phone]   [facility] [phone]   [facility] [phone]   [facility] [phone]   [facility] [phone]   [facility] [phone]   [facility] [phone]   [facility] [phone]   [facility] [phone]   [facility] [phone]              [1]   Past Medical History:  Diagnosis Date    Allergic rhinitis     Anemia     Anxiety     Coronary artery disease     CPAP (continuous  positive airway pressure) dependence     Depression     Diabetes mellitus (HCC)     Functional dyspepsia     GERD (gastroesophageal reflux disease)     GERD without esophagitis     Hyperlipidemia     Hypertension     Irritable bowel syndrome     Migraines     Osteoporosis     Pulmonary hypertension (HCC)     Sleep apnea, obstructive

## 2025-06-11 NOTE — H&P
"H&P - Hospitalist   Name: Felecia Edward 64 y.o. female I MRN: [id number]  Unit/Bed#: -01 I Date of Admission: 6/10/2025   Date of Service: 6/11/2025 I Hospital Day: 0     Assessment & Plan  Vomiting and diarrhea  Presents with vomiting and diarrhea x 2 days.  Accompanied by diffuse abdominal pain.  Patient is undergoing chemotherapy for uterine cancer.  Last dose was 6 days ago.  Has not had a reaction like this prior.  Sepsis alert fired in ED.  CT:   \"Suggestion of mild diffuse colitis and mild mesenteric inflammation and lymphadenopathy in the right lower quadrant. Findings may be secondary to diarrhea and/or mild typhlitis. No findings to indicate enteritis. \"  Started on Zosyn in ED. Will continue.  Uterine leiomyosarcoma (HCC)  Patient of Pati Jerome PA-C. Undergoing doxorubicin and trabectedin therapy.   Hypokalemia  Potassium 3.0.  Replete and recheck.  Class 2 obesity due to excess calories without serious comorbidity in adult  Body mass index is 34.67 kg/m².  Recommend incorporating a more whole foods plant-predominant diet along with decreasing consumption of red meats and processed foods  Per AHA guidelines, recommend moderate-vigorous intensity exercise for 30 minutes a day for 5 days a week or a total of 150 min/week  Type 2 diabetes mellitus with diabetic neuropathy, without long-term current use of insulin ([facility])  Lab Results   Component Value Date    HGBA1C 7.6 (H) 12/24/2024       No results for input(s): \"POCGLU\" in the last 72 hours.    Blood Sugar Average: Last 72 hrs:    Home agents held.  Sliding scale and hypoglycemia protocol.  Primary hypertension  Continue metoprolol, losartan.  Hold hydrochlorothiazide.  SUSY (obstructive sleep apnea)  Patient states she has been nonadherent with her CPAP recently.  Would prefer not to use this tonight in setting of nausea and vomiting.      VTE Pharmacologic Prophylaxis: VTE Score: 5 High Risk (Score >/= 5) - Pharmacological DVT Prophylaxis " Ordered: enoxaparin (Lovenox). Sequential Compression Devices Ordered.  Code Status: Level 1 - Full Code   Discussion with family: Patient declined call to .     Anticipated Length of Stay: Patient will be admitted on an observation basis with an anticipated length of stay of less than 2 midnights secondary to symptom control/observation.    History of Present Illness   Chief Complaint: Vomiting and diarrhea    Felecia Edward is a 64 y.o. female with a PMH of coronary artery disease, type 2 diabetes mellitus, gastroesophageal reflux disease, chronic kidney disease, obstructive sleep apnea, uterine cancer who presents with vomiting and diarrhea.  Patient reports nausea, vomiting, and diarrhea since Sunday 6/08/25.  She reports diffuse abdominal pain accompanies this.  She is currently undergoing chemotherapy for uterine cancer.  She states her last dose was 6 days ago.  She denies any sort of reaction like this to chemotherapy in the past.  She denies CP, SOB, fever, chills.    Review of Systems   Constitutional:  Negative for chills and fever.   HENT:  Negative for ear pain and sore throat.    Eyes:  Negative for pain and visual disturbance.   Respiratory:  Negative for cough and shortness of breath.    Cardiovascular:  Negative for chest pain and palpitations.   Gastrointestinal:  Positive for abdominal pain, diarrhea, nausea and vomiting.   Genitourinary:  Negative for dysuria and hematuria.   Musculoskeletal:  Negative for arthralgias and back pain.   Skin:  Negative for color change and rash.   Neurological:  Negative for seizures and syncope.   All other systems reviewed and are negative.      Historical Information   Past Medical History[1]  Past Surgical History[2]  Social History[3]  E-Cigarette/Vaping    E-Cigarette Use Never User      E-Cigarette/Vaping Substances    Nicotine No     THC No     CBD No     Flavoring No     Other No     Unknown No      Family History[4]  Social  History:  Marital Status: /Civil Union   Occupation:   Patient Pre-hospital Living Situation: Home  Patient Pre-hospital Level of Mobility: walks  Patient Pre-hospital Diet Restrictions: diabetic    Meds/Allergies   I have reviewed home medications with patient personally.  Prior to Admission medications    Medication Sig Start Date End Date Taking? Authorizing Provider   aspirin (Aspirin 81) 81 mg chewable tablet every 24 hours    Historical Provider, MD   atorvastatin (LIPITOR) 40 mg tablet Take 40 mg by mouth daily at bedtime    Historical Provider, MD   betamethasone, augmented, (DIPROLENE) 0.05 % lotion Apply topically 2 (two) times a day 3/27/25   Pati Jerome PA-C   cetirizine (ZyrTEC) 10 mg tablet Take 10 mg by mouth daily at bedtime    Historical Provider, MD   cholecalciferol (VITAMIN D3) 1,000 units tablet Take 2,000 Units by mouth in the morning. 2 daily .    Historical Provider, MD   clindamycin (CLEOCIN T) 1 % external solution Apply topically 2 (two) times a day 3/27/25   Pati Jerome PA-C   dicyclomine (BENTYL) 10 mg capsule Take 1 capsule (10 mg total) by mouth every 6 (six) hours as needed (pain) 1/23/25   Vignesh Shelton MD   DULoxetine (CYMBALTA) 30 mg delayed release capsule  6/16/23   Historical Provider, MD   glimepiride (AMARYL) 4 mg tablet Take 4 mg by mouth in the morning and 4 mg in the evening.    Historical Provider, MD   ibuprofen (MOTRIN) 800 mg tablet Take 800 mg by mouth every 6 (six) hours as needed for mild pain    Historical Provider, MD   Jardiance 10 MG TABS tablet  5/7/24   Historical Provider, MD   ketorolac (TORADOL) 10 mg tablet Take 1 tablet (10 mg total) by mouth every 6 (six) hours as needed for moderate pain for up to 5 days 2/13/25 5/29/25  Michael Magallon MD   LORazepam (ATIVAN) 1 mg tablet Take 1 tablet (1 mg total) by mouth every 8 (eight) hours as needed (nausea or anxiety) 5/19/25   LADARIUS Dc   losartan (COZAAR) 50 mg  tablet Take 50 mg by mouth in the morning.    Historical Provider, MD   meclizine (ANTIVERT) 25 mg tablet Take by mouth every 12 (twelve) hours as needed for dizziness    Historical Provider, MD   methenamine hippurate (HIPREX) 1 g tablet Take 1 tablet (1 g total) by mouth 2 (two) times a day with meals 5/14/25   Ember Connor PA-C   metoprolol succinate (TOPROL-XL) 25 mg 24 hr tablet Take 25 mg by mouth in the morning.    Historical Provider, MD   Multiple Vitamin (MULTIVITAMIN) tablet Take 1 tablet by mouth in the morning.    Historical Provider, MD   ondansetron (ZOFRAN) 8 mg tablet TAKE 1 TABLET BY MOUTH EVERY 8 HOURS AS NEEDED FOR NAUSEA OR VOMITING. 6/9/25   Pati Jerome PA-C   oxyCODONE (Roxicodone) 5 immediate release tablet Take 1 tablet (5 mg total) by mouth every 6 (six) hours as needed for moderate pain for up to 8 doses Max Daily Amount: 20 mg  Patient not taking: Reported on 5/14/2025 2/13/25   Michael Magallon MD   pantoprazole (PROTONIX) 40 mg tablet Take 1 tablet (40 mg total) by mouth daily 5/21/25   Vignesh Shelton MD   phenazopyridine (PYRIDIUM) 200 mg tablet Take 1 tablet (200 mg total) by mouth 3 (three) times a day as needed (Dysuria and bladder discomfort.  Take with meals.) 2/13/25   Michael Magallon MD   pioglitazone (ACTOS) 30 mg tablet every 24 hours    Historical Provider, MD   potassium chloride (Klor-Con M20) 20 mEq tablet TAKE 1 TABLET BY MOUTH 2 TIMES A DAY. 4/18/25   Pati Jerome PA-C   rOPINIRole (REQUIP) 1 mg tablet  8/4/23   Historical Provider, MD   sertraline (ZOLOFT) 100 mg tablet Take 1.5 tablets by mouth daily at bedtime 6/16/23   Historical Provider, MD   tamsulosin (FLOMAX) 0.4 mg Take 1 capsule (0.4 mg total) by mouth every evening for 14 days 2/13/25 5/29/25  Michael Magallon MD   topiramate (TOPAMAX) 100 mg tablet     Historical Provider, MD   Triamcinolone Acetonide 55 MCG/ACT AERO into each nostril One spray each nostril daily    Historical  Provider, MD   Xiidra 5 % op solution  11/14/23   Historical Provider, MD   ZOLMitriptan (ZOMIG) 5 MG tablet Take 5 mg by mouth once as needed for migraine Daily prn    Historical Provider, MD   hydroCHLOROthiazide 25 mg tablet  2/28/25 6/11/25  Historical Provider, MD     Allergies   Allergen Reactions    Sulfa Antibiotics Other (See Comments)     High fever  Pt unsure - it happened when she was 14 years old    Bee Pollen Swelling    Gabapentin Fatigue    Metformin GI Intolerance       Objective :  Temp:  [98.7 °F (37.1 °C)] 98.7 °F (37.1 °C)  HR:  [] 88  BP: (131-145)/(70-81) 143/70  Resp:  [17-19] 19  SpO2:  [95 %-98 %] 96 %  O2 Device: None (Room air)    Physical Exam  Vitals and nursing note reviewed.   Constitutional:       General: She is not in acute distress.     Appearance: She is well-developed. She is obese.   HENT:      Head: Normocephalic and atraumatic.     Eyes:      Conjunctiva/sclera: Conjunctivae normal.       Cardiovascular:      Rate and Rhythm: Normal rate and regular rhythm.      Heart sounds: No murmur heard.  Pulmonary:      Effort: Pulmonary effort is normal. No respiratory distress.      Breath sounds: Normal breath sounds.   Abdominal:      Palpations: Abdomen is soft.      Tenderness: There is abdominal tenderness.     Musculoskeletal:         General: No swelling.      Cervical back: Neck supple.     Skin:     General: Skin is warm and dry.     Neurological:      General: No focal deficit present.      Mental Status: She is alert and oriented to person, place, and time.     Psychiatric:         Mood and Affect: Mood normal.          Lines/Drains:      Central Line:  Goal for removal: N/A - Chronic PICC             Lab Results: I have reviewed the following results:  Results from last 7 days   Lab Units 06/10/25  2028   WBC Thousand/uL 3.01*   HEMOGLOBIN g/dL 10.1*   HEMATOCRIT % 30.8*   PLATELETS Thousands/uL 163   LYMPHO PCT % 33   MONO PCT % 2*   EOS PCT % 3     Results from  last 7 days   Lab Units 06/10/25  2028   SODIUM mmol/L 138   POTASSIUM mmol/L 3.0*   CHLORIDE mmol/L 105   CO2 mmol/L 23   BUN mg/dL 11   CREATININE mg/dL 0.40*   ANION GAP mmol/L 10   CALCIUM mg/dL 8.8   ALBUMIN g/dL 4.0   TOTAL BILIRUBIN mg/dL 0.59   ALK PHOS U/L 99   ALT U/L 20   AST U/L 20   GLUCOSE RANDOM mg/dL 135     Results from last 7 days   Lab Units 06/10/25  2028   INR  0.93         Lab Results   Component Value Date    HGBA1C 7.6 (H) 12/24/2024    HGBA1C 7.4 10/31/2024    HGBA1C 8.1 09/19/2020     Results from last 7 days   Lab Units 06/10/25  2233 06/10/25  2028   LACTIC ACID mmol/L 1.7 2.4*   PROCALCITONIN ng/ml  --  0.14       Imaging Results Review: I personally reviewed the following image studies in PACS and associated radiology reports: chest xray and CT abdomen/pelvis. My interpretation of the radiology images/reports is: Bilateral renal cysts, mild colitis.      Administrative Statements   I have spent a total time of 76 minutes in caring for this patient on the day of the visit/encounter including Diagnostic results, Patient and family education, Documenting in the medical record, Reviewing/placing orders in the medical record (including tests, medications, and/or procedures), and Obtaining or reviewing history  .    ** Please Note: This note has been constructed using a voice recognition system. **         [1]   Past Medical History:  Diagnosis Date    Allergic rhinitis     Anemia     Anxiety     Coronary artery disease     CPAP (continuous positive airway pressure) dependence     Depression     Diabetes mellitus (HCC)     Functional dyspepsia     GERD (gastroesophageal reflux disease)     GERD without esophagitis     Hyperlipidemia     Hypertension     Irritable bowel syndrome     Migraines     Osteoporosis     Pulmonary hypertension (HCC)     Sleep apnea, obstructive    [2]   Past Surgical History:  Procedure Laterality Date    APPENDECTOMY      CHEST WALL BIOPSY N/A 06/04/2023     Procedure: EXCISION  BIOPSY LESION/MASS ABDOMINAL-PELVIC PERITONEUM;  Surgeon: Gordo Taveras MD;  Location: [facility];  Service: Gynecology Oncology    CHOLECYSTECTOMY      COLONOSCOPY      CYSTOSCOPY N/A 01/06/2025    Procedure: CYSTOSCOPY;  Surgeon: Gordo Taveras MD;  Location: [facility];  Service: Gynecology Oncology    FOOT SURGERY Left     Bone spur    HYSTERECTOMY N/A 06/04/2023    Procedure: HYSTERECTOMY TOTAL ABDOMINAL (DOROTHY), BILATERAL SALPINGOOPHORECTOMY;  Surgeon: Gordo Taveras MD;  Location: [facility];  Service: Gynecology Oncology    IR PORT PLACEMENT  06/30/2023    NY CYSTO/URETERO W/LITHOTRIPSY &INDWELL STENT INSRT Right 2/13/2025    Procedure: CYSTOSCOPY URETEROSCOPY WITH LITHOTRIPSY HOLMIUM LASER, RETROGRADE PYELOGRAM AND INSERTION STENT URETERAL;  Surgeon: Michael Magallon MD;  Location:  MAIN OR;  Service: Urology    NY LAPS FULG/EXC OVARY VISCERA/PERITONEAL SURFACE N/A 01/06/2025    Procedure: ROBOTIC ASSISTED RADICAL BILATERAL PARAMETRECTOMY, RESECTION BILATERAL PARAMETRIAL TUMORS, UPPER VAGINECTOMY, LEFT OBTURATOR LYMPH NODE DISSECTION;  Surgeon: Gordo Taveras MD;  Location: [facility];  Service: Gynecology Oncology   [3]   Social History  Tobacco Use    Smoking status: Never    Smokeless tobacco: Never   Vaping Use    Vaping status: Never Used   Substance and Sexual Activity    Alcohol use: Never    Drug use: Never    Sexual activity: Not Currently     Partners: Male     Birth control/protection: Abstinence, Post-menopausal, Surgical, Female Sterilization   [4]   Family History  Problem Relation Name Age of Onset    Heart disease Mother Joy wayne     Cancer Mother Joy wayne     Breast cancer Father Misael wayne     Cancer Father Misael wayne     Diabetes Father Misael wayne     Hypertension Father Misael wayne     Coronary artery disease Brother Jean Paul wayne     Diabetes Brother Jean Paul wayne     Heart failure Brother Jean Paul wayne      Hypertension Brother Jean Paul wayne     Colon polyps Neg Hx      Colon cancer Neg Hx

## 2025-06-11 NOTE — ASSESSMENT & PLAN NOTE
Patient states symptoms have been on and off for the past 7 days.  On exam, patient does have diffuse abdominal tenderness with palpation.  CT A/P; notes colitis and inflammation at cecum (typhlitis) disease which may be secondary to chemotherapy.  Likely symptoms 2/2, inflammation, chemotherapy.  In the setting of loose/liquid BMs, ordered stool for C. difficile and stool culture.    Clear liquids as tolerated  Antiemetics and pain management as necessary  Stool studies for C. difficile and stool culture pending  Blood cultures x 2 pending  Zosyn IV hours  Dicyclomine 10 mg p.o. every 6 hours

## 2025-06-11 NOTE — ASSESSMENT & PLAN NOTE
Body mass index is 34.67 kg/m².  Recommend incorporating a more whole foods plant-predominant diet along with decreasing consumption of red meats and processed foods  Per AHA guidelines, recommend moderate-vigorous intensity exercise for 30 minutes a day for 5 days a week or a total of 150 min/week

## 2025-06-12 LAB
ANION GAP SERPL CALCULATED.3IONS-SCNC: 9 MMOL/L (ref 4–13)
BUN SERPL-MCNC: 4 MG/DL (ref 5–25)
C COLI+JEJUNI TUF STL QL NAA+PROBE: NEGATIVE
C DIFF TOX A+B STL QL IA: NEGATIVE
C DIFF TOX GENS STL QL NAA+PROBE: POSITIVE
CALCIUM SERPL-MCNC: 8.3 MG/DL (ref 8.4–10.2)
CHLORIDE SERPL-SCNC: 107 MMOL/L (ref 96–108)
CO2 SERPL-SCNC: 23 MMOL/L (ref 21–32)
CREAT SERPL-MCNC: 0.41 MG/DL (ref 0.6–1.3)
EC STX1+STX2 GENES STL QL NAA+PROBE: NEGATIVE
ERYTHROCYTE [DISTWIDTH] IN BLOOD BY AUTOMATED COUNT: 15.7 % (ref 11.6–15.1)
FERRITIN SERPL-MCNC: 206 NG/ML (ref 30–307)
FOLATE SERPL-MCNC: 13.8 NG/ML
GFR SERPL CREATININE-BSD FRML MDRD: 108 ML/MIN/1.73SQ M
GLUCOSE SERPL-MCNC: 133 MG/DL (ref 65–140)
HCT VFR BLD AUTO: 28.9 % (ref 34.8–46.1)
HGB BLD-MCNC: 9.6 G/DL (ref 11.5–15.4)
IRON SATN MFR SERPL: 25 % (ref 15–50)
IRON SERPL-MCNC: 69 UG/DL (ref 50–212)
MCH RBC QN AUTO: 32 PG (ref 26.8–34.3)
MCHC RBC AUTO-ENTMCNC: 33.2 G/DL (ref 31.4–37.4)
MCV RBC AUTO: 96 FL (ref 82–98)
NRBC BLD AUTO-RTO: 0 /100 WBCS
PLATELET # BLD AUTO: 126 THOUSANDS/UL (ref 149–390)
PMV BLD AUTO: 9.6 FL (ref 8.9–12.7)
POTASSIUM SERPL-SCNC: 3.3 MMOL/L (ref 3.5–5.3)
RBC # BLD AUTO: 3 MILLION/UL (ref 3.81–5.12)
SALMONELLA SP SPAO STL QL NAA+PROBE: NEGATIVE
SHIGELLA SP+EIEC IPAH STL QL NAA+PROBE: NEGATIVE
SODIUM SERPL-SCNC: 139 MMOL/L (ref 135–147)
TIBC SERPL-MCNC: 274.4 UG/DL (ref 250–450)
TRANSFERRIN SERPL-MCNC: 196 MG/DL (ref 203–362)
UIBC SERPL-MCNC: 205 UG/DL (ref 155–355)
VIT B12 SERPL-MCNC: 1134 PG/ML (ref 180–914)
WBC # BLD AUTO: 3.2 THOUSAND/UL (ref 4.31–10.16)

## 2025-06-12 PROCEDURE — 99232 SBSQ HOSP IP/OBS MODERATE 35: CPT | Performed by: INTERNAL MEDICINE

## 2025-06-12 PROCEDURE — 82728 ASSAY OF FERRITIN: CPT | Performed by: INTERNAL MEDICINE

## 2025-06-12 PROCEDURE — 83550 IRON BINDING TEST: CPT | Performed by: INTERNAL MEDICINE

## 2025-06-12 PROCEDURE — 82607 VITAMIN B-12: CPT | Performed by: INTERNAL MEDICINE

## 2025-06-12 PROCEDURE — 83540 ASSAY OF IRON: CPT | Performed by: INTERNAL MEDICINE

## 2025-06-12 PROCEDURE — 85027 COMPLETE CBC AUTOMATED: CPT | Performed by: INTERNAL MEDICINE

## 2025-06-12 PROCEDURE — 82746 ASSAY OF FOLIC ACID SERUM: CPT | Performed by: INTERNAL MEDICINE

## 2025-06-12 PROCEDURE — 80048 BASIC METABOLIC PNL TOTAL CA: CPT | Performed by: INTERNAL MEDICINE

## 2025-06-12 RX ORDER — IBUPROFEN 400 MG/1
800 TABLET, FILM COATED ORAL EVERY 6 HOURS PRN
Status: DISCONTINUED | OUTPATIENT
Start: 2025-06-12 | End: 2025-06-13 | Stop reason: HOSPADM

## 2025-06-12 RX ORDER — POTASSIUM CHLORIDE 1500 MG/1
40 TABLET, EXTENDED RELEASE ORAL ONCE
Status: COMPLETED | OUTPATIENT
Start: 2025-06-12 | End: 2025-06-12

## 2025-06-12 RX ORDER — VANCOMYCIN HYDROCHLORIDE 125 MG/1
125 CAPSULE ORAL EVERY 12 HOURS SCHEDULED
Status: DISCONTINUED | OUTPATIENT
Start: 2025-06-12 | End: 2025-06-13 | Stop reason: HOSPADM

## 2025-06-12 RX ORDER — ONDANSETRON 2 MG/ML
4 INJECTION INTRAMUSCULAR; INTRAVENOUS EVERY 6 HOURS PRN
Status: DISCONTINUED | OUTPATIENT
Start: 2025-06-12 | End: 2025-06-13 | Stop reason: HOSPADM

## 2025-06-12 RX ADMIN — ONDANSETRON 4 MG: 2 INJECTION INTRAMUSCULAR; INTRAVENOUS at 04:04

## 2025-06-12 RX ADMIN — POTASSIUM CHLORIDE 40 MEQ: 1500 TABLET, EXTENDED RELEASE ORAL at 08:58

## 2025-06-12 RX ADMIN — LORATADINE 10 MG: 10 TABLET ORAL at 21:58

## 2025-06-12 RX ADMIN — PIPERACILLIN AND TAZOBACTAM 4.5 G: 4; .5 INJECTION, POWDER, FOR SOLUTION INTRAVENOUS at 04:03

## 2025-06-12 RX ADMIN — PANTOPRAZOLE SODIUM 40 MG: 40 INJECTION, POWDER, FOR SOLUTION INTRAVENOUS at 08:59

## 2025-06-12 RX ADMIN — DICYCLOMINE HYDROCHLORIDE 10 MG: 10 CAPSULE ORAL at 12:32

## 2025-06-12 RX ADMIN — DICYCLOMINE HYDROCHLORIDE 10 MG: 10 CAPSULE ORAL at 04:04

## 2025-06-12 RX ADMIN — DICYCLOMINE HYDROCHLORIDE 10 MG: 10 CAPSULE ORAL at 18:14

## 2025-06-12 RX ADMIN — PIPERACILLIN AND TAZOBACTAM 4.5 G: 4; .5 INJECTION, POWDER, FOR SOLUTION INTRAVENOUS at 20:35

## 2025-06-12 RX ADMIN — MULTIPLE VITAMINS W/ MINERALS TAB 1 TABLET: TAB ORAL at 08:58

## 2025-06-12 RX ADMIN — PANTOPRAZOLE SODIUM 40 MG: 40 INJECTION, POWDER, FOR SOLUTION INTRAVENOUS at 21:24

## 2025-06-12 RX ADMIN — ROPINIROLE HYDROCHLORIDE 1 MG: 0.25 TABLET, FILM COATED ORAL at 21:59

## 2025-06-12 RX ADMIN — METOPROLOL SUCCINATE 25 MG: 25 TABLET, EXTENDED RELEASE ORAL at 08:59

## 2025-06-12 RX ADMIN — VANCOMYCIN HYDROCHLORIDE 125 MG: 125 CAPSULE ORAL at 20:35

## 2025-06-12 RX ADMIN — LOSARTAN POTASSIUM 50 MG: 50 TABLET, FILM COATED ORAL at 08:58

## 2025-06-12 RX ADMIN — ASPIRIN 81 MG CHEWABLE TABLET 81 MG: 81 TABLET CHEWABLE at 08:58

## 2025-06-12 RX ADMIN — PIPERACILLIN AND TAZOBACTAM 4.5 G: 4; .5 INJECTION, POWDER, FOR SOLUTION INTRAVENOUS at 12:32

## 2025-06-12 RX ADMIN — ATORVASTATIN CALCIUM 40 MG: 40 TABLET, FILM COATED ORAL at 21:58

## 2025-06-12 RX ADMIN — DULOXETINE HYDROCHLORIDE 30 MG: 30 CAPSULE, DELAYED RELEASE ORAL at 08:58

## 2025-06-12 RX ADMIN — SERTRALINE HYDROCHLORIDE 150 MG: 50 TABLET ORAL at 21:59

## 2025-06-12 RX ADMIN — ENOXAPARIN SODIUM 40 MG: 40 INJECTION SUBCUTANEOUS at 08:59

## 2025-06-12 NOTE — ASSESSMENT & PLAN NOTE
Hemoglobin 10.1 on admission 6/10.  With a.m. labs today hemoglobin 9.6 (10.3).  Patient had noted a black stool previously.  With a.m. bowel movement this morning no melena noted.  Likely secondary to chemotherapy.    Stool studies pending to rule out infectious cause  Monitor hemoglobin transfuse as necessary  Iron panel, B12/folate panel pending  Monitor for GI bleed/melena  Pent-up resolved 40 mg IVP every 12 hours    Patient's last EGD and colonoscopy were both completed on 8/12/2024 by Dr. Shelton.  Colonoscopy; 3 polyps, diverticulosis, small hemorrhoid, 5-year recall  EGD; mild antral gastritis.

## 2025-06-12 NOTE — ASSESSMENT & PLAN NOTE
Patient states symptoms have been on and off for the past 7 days.  On exam, patient does have diffuse abdominal tenderness with palpation.  CT A/P; notes colitis and inflammation at cecum (typhlitis) disease which may be secondary to chemotherapy.  Likely symptoms 2/2, inflammation, chemotherapy.  In the setting of loose/liquid BMs, ordered stool for C. difficile and stool culture.    Advance diet as tolerated   Antiemetics and pain management as necessary  Stool studies for C. difficile and stool culture pending  Blood cultures x 2 pending  Zosyn IV hours  Dicyclomine 10 mg p.o. every 6 hours

## 2025-06-12 NOTE — UTILIZATION REVIEW
NOTIFICATION OF INPATIENT ADMISSION   AUTHORIZATION REQUEST   SERVICING FACILITY:   [place] [address]  Tax ID: [id number]  NPI: [id number] ATTENDING PROVIDER:  Attending Name and NPI#: Sorin Piña Md [[id number]]  Address: [address] [place] [address]  Phone: [phone]   ADMISSION INFORMATION:  Place of Service: Inpatient [facility]  Place of Service Code: 21  Inpatient Admission Date/Time: 6/11/25  6:03 PM  Discharge Date/Time: No discharge date for patient encounter.  Admitting Diagnosis Code/Description:  Hypokalemia [E87.6]  Abdominal pain [R10.9]  SIRS (systemic inflammatory response syndrome) (HCC) [R65.10]  Vomiting and diarrhea [R11.10, R19.7]  Nausea vomiting and diarrhea [R11.2, R19.7]     UTILIZATION REVIEW CONTACT:  Lizbeth Dempsey, Utilization   Network Utilization Review Department  Phone: [phone]  Fax: [phone]  Email: [email or web address]  Contact for approvals/pending authorizations, clinical reviews, and discharge.     PHYSICIAN ADVISORY SERVICES:  Medical Necessity Denial & [id number]-[id number]-[id number] Review  Phone: [phone]  Fax: [phone]  Email: [email or web address]     DISCHARGE SUPPORT TEAM:  For Patients Discharge Needs & Updates  Phone: [phone] opt. 2 Fax: [phone]  Email: [email or web address]

## 2025-06-12 NOTE — PROGRESS NOTES
Progress Note - Gastroenterology   Name: Felecia Edward 64 y.o. female I MRN: [id number]  Unit/Bed#: -01 I Date of Admission: 6/10/2025   Date of Service: 6/12/2025 I Hospital Day: 1    Assessment & Plan  Generalized abdominal pain  Patient states symptoms have been on and off for the past 7 days.  On exam, patient does have diffuse abdominal tenderness with palpation.  CT A/P; notes colitis and inflammation at cecum (typhlitis) disease which may be secondary to chemotherapy.  Likely symptoms 2/2, inflammation, chemotherapy.  In the setting of loose/liquid BMs, ordered stool for C. difficile and stool culture.    Advance diet as tolerated   Antiemetics and pain management as necessary  Stool studies for C. difficile and stool culture pending  Blood cultures x 2 pending  Zosyn IV hours  Dicyclomine 10 mg p.o. every 6 hours  Vomiting and diarrhea  Nausea  Patient states that her symptoms had been persistent for the last 7 days however the diarrhea just began approximately 3 days ago.  She states her last bowel movement was loose/liquid and melena.  Symptoms possibly 2/2 chemotherapy as well as CT identified colitis    Zofran 4 mg every 6 hours as needed.  Discussed with patient if she is getting increased nausea after meals she may want to take the medication prior to meals  Stool studies for C. difficile and stool culture ordered  Diet as tolerated    Other specified anemias  Hemoglobin 10.1 on admission 6/10.  With a.m. labs today hemoglobin 9.6 (10.3).  Patient had noted a black stool previously.  With a.m. bowel movement this morning no melena noted.  Likely secondary to chemotherapy.    Stool studies pending to rule out infectious cause  Monitor hemoglobin transfuse as necessary  Iron panel, B12/folate panel pending  Monitor for GI bleed/melena  Pent-up resolved 40 mg IVP every 12 hours    Patient's last EGD and colonoscopy were both completed on 8/12/2024 by Dr. Shelton.  Colonoscopy; 3 polyps,  diverticulosis, small hemorrhoid, 5-year recall  EGD; mild antral gastritis.  GERD without esophagitis  Patient with past medical history of GERD.  Patient currently prescribed pantoprazole 40 mg daily.  Last EGD 8/12/2024; mild antral gastritis.    Pantoprazole 40 mg IVP every 12 hours  Uterine leiomyosarcoma (HCC)  Patient states originally diagnosed uterine cancer in 2023.  Was not in remission for a full year when cancer reappeared.  Patient states she is currently receiving chemotherapy states she receives every 21 days and last dose was this past Wednesday 6/4.          Subjective   Patient's abdominal discomfort improved.  She continues to have mild abdominal discomfort with palpation.  States her nausea has greatly improved as well with taking Zofran on a scheduled basis.  Discussed patient advance in diet.  Awaiting results of stool study for C. difficile.    Objective :  Temp:  [97.2 °F (36.2 °C)-98 °F (36.7 °C)] 97.2 °F (36.2 °C)  HR:  [78-80] 80  BP: (126-144)/(82-90) 144/90  Resp:  [18-20] 18  SpO2:  [96 %-98 %] 97 %  O2 Device: None (Room air)    Physical Exam  Vitals and nursing note reviewed.   Constitutional:       General: She is not in acute distress.     Appearance: She is well-developed.   HENT:      Head: Normocephalic and atraumatic.      Mouth/Throat:      Mouth: Mucous membranes are moist.     Eyes:      Conjunctiva/sclera: Conjunctivae normal.       Cardiovascular:      Rate and Rhythm: Normal rate and regular rhythm.      Heart sounds: No murmur heard.  Pulmonary:      Effort: Pulmonary effort is normal. No respiratory distress.      Breath sounds: Normal breath sounds.   Abdominal:      Palpations: Abdomen is soft.      Tenderness: There is abdominal tenderness (Mild generalized abdominal discomfort with palp patient).     Musculoskeletal:         General: No swelling. Normal range of motion.      Cervical back: Normal range of motion and neck supple.     Skin:     General: Skin is warm  and dry.     Neurological:      Mental Status: She is alert and oriented to person, place, and time.     Psychiatric:         Mood and Affect: Mood normal.         Lab Results: I have reviewed the following results:

## 2025-06-12 NOTE — CASE MANAGEMENT
Case Management Discharge Planning Note    Patient name Felecia Edward  Location /-01 MRN [id number]  : 1960 Date 2025       Current Admission Date: 6/10/2025  Current Admission Diagnosis:Vomiting and diarrhea   Patient Active Problem List    Diagnosis Date Noted    SUSY (obstructive sleep apnea) 2025    Generalized abdominal pain 2025    Other specified anemias 2025    Hypokalemia 06/10/2025    Vomiting and diarrhea 06/10/2025    Recurrent UTI 2025    Chemotherapy-induced fatigue 2025    Nausea 2025    Physical deconditioning 2025    Electrolyte abnormality 2025    Primary hypertension 2025    Lightheadedness 2025    Palliative care encounter 2025    Advanced care planning/counseling discussion 2025    Cancer associated pain 2024    Morbid obesity (HCC) 10/30/2024    Type 2 diabetes mellitus with diabetic neuropathy, without long-term current use of insulin (HCC) 10/17/2024    History of colonic polyps 2024    Right renal stone 2023    Tachycardia 2023    Abnormal CT of the chest 2023    Drug-induced pneumonitis 2023    Hypomagnesemia 2023    Folliculitis 2023    Stomatitis 2023    Chemotherapy induced neutropenia (HCC) 2023    Encounter for central line care 2023    Post-operative state 2023    Uterine mass 2023    Lower abdominal pain 2023    Class 2 obesity due to excess calories without serious comorbidity in adult 2023    Uterine leiomyosarcoma (HCC)     Irritable bowel syndrome     Functional dyspepsia     GERD without esophagitis       LOS (days): 1  Geometric Mean LOS (GMLOS) (days): 2.5  Days to GMLOS:1.8     OBJECTIVE:  Risk of Unplanned Readmission Score: 20.39         Current admission status: Inpatient   Preferred Pharmacy:   [id number] - [place] - [phone]   [place] [address]  Phone:  [phone] Fax: [phone]    Primary Care Provider: Clarice Acuna MD    Primary Insurance: [employer]  Secondary Insurance:     DISCHARGE DETAILS:                                                                                           IMM Given (Date):: 06/12/25  IMM Given to:: Patient

## 2025-06-12 NOTE — PROGRESS NOTES
"Progress Note - Hospitalist   Name: Felecia Edward 64 y.o. female I MRN: [id number]  Unit/Bed#: -01 I Date of Admission: 6/10/2025   Date of Service: 6/12/2025 I Hospital Day: 1    Assessment & Plan  Vomiting and diarrhea  Presents with vomiting and diarrhea x 2 days.  Accompanied by diffuse abdominal pain.  Patient is undergoing chemotherapy for uterine cancer.  Last dose was 6 days ago.  Has not had a reaction like this prior.  Sepsis alert fired in ED.  CT:   \"Suggestion of mild diffuse colitis and mild mesenteric inflammation and lymphadenopathy in the right lower quadrant. Findings may be secondary to diarrhea and/or mild typhlitis. No findings to indicate enteritis. \"  Started on Zosyn in ED. Will continue.  Uterine leiomyosarcoma (HCC)  Patient of Pati Jerome PA-C. Undergoing doxorubicin and trabectedin therapy.   Hypokalemia  Potassium 3.0.  Replete and recheck.  Class 2 obesity due to excess calories without serious comorbidity in adult  Body mass index is 34.67 kg/m².  Recommend incorporating a more whole foods plant-predominant diet along with decreasing consumption of red meats and processed foods  Per AHA guidelines, recommend moderate-vigorous intensity exercise for 30 minutes a day for 5 days a week or a total of 150 min/week  Type 2 diabetes mellitus with diabetic neuropathy, without long-term current use of insulin ([facility])  Lab Results   Component Value Date    HGBA1C 7.6 (H) 12/24/2024       No results for input(s): \"POCGLU\" in the last 72 hours.    Blood Sugar Average: Last 72 hrs:    Home agents held.  Sliding scale and hypoglycemia protocol.  Primary hypertension  Continue metoprolol, losartan.  Hold hydrochlorothiazide.  SUSY (obstructive sleep apnea)  Patient states she has been nonadherent with her CPAP recently.  Would prefer not to use this tonight in setting of nausea and vomiting.  GERD without esophagitis    Nausea    Generalized abdominal pain    Other specified anemias      VTE " Pharmacologic Prophylaxis: VTE Score: 5 High Risk (Score >/= 5) - Pharmacological DVT Prophylaxis Ordered: enoxaparin (Lovenox). Sequential Compression Devices Ordered.    Mobility:   Basic Mobility Inpatient Raw Score: 24  JH-HLM Goal: 8: Walk 250 feet or more  JH-HLM Achieved: 8: Walk 250 feet ot more  JH-HLM Goal achieved. Continue to encourage appropriate mobility.    Patient Centered Rounds: I performed bedside rounds with nursing staff today.   Discussions with Specialists or Other Care Team Provider: GI, case management, nursing    Education and Discussions with Family / Patient: Patient declined call to .     Current Length of Stay: 1 day(s)  Current Patient Status: Inpatient   Certification Statement: The patient will continue to require additional inpatient hospital stay due to diarrhea, monitor on diet advanced, IV antibioticC. difficile pending  Discharge Plan: Anticipate discharge tomorrow to home.    Code Status: Level 1 - Full Code    Subjective   Complains of diarrhea still.  Loose bowel movements.  Nausea has improved with scheduled Zofran.  Able to tolerate clears, only ate a little bit of lunch.    Objective :  Temp:  [97.2 °F (36.2 °C)-98 °F (36.7 °C)] 97.2 °F (36.2 °C)  HR:  [78-80] 80  BP: (126-144)/(82-90) 144/90  Resp:  [18-20] 18  SpO2:  [96 %-98 %] 96 %  O2 Device: None (Room air)    Body mass index is 34.67 kg/m².     Input and Output Summary (last 24 hours):     Intake/Output Summary (Last 24 hours) at 6/12/2025 1445  Last data filed at 6/12/2025 1401  Gross per 24 hour   Intake 1035 ml   Output 450 ml   Net 585 ml       Physical Exam    Gen.-Patient comfortable   Neck- Supple. No thyromegaly or lymphadenopathy  Lungs-Clear bilaterally without any wheeze or rales   Heart S1-S2, regular rate and rhythm, no murmurs  Abdomen-soft + improved but still has tender, no organomegaly. Bowel sounds present  Extremities-no cyanosi,  clubbing or edema  Skin- no rash  Neuro-nonfocal      Lines/Drains:  Lines/Drains/Airways       Active Status       Name Placement date Placement time Site Days    Port A Cath 06/30/23 Right Subclavian 06/30/23  1000  Subclavian  713                    Central Line:  Goal for removal: Port accessed. Will de-access as appropriate.               Lab Results: I have reviewed the following results:   Results from last 7 days   Lab Units 06/12/25  0404 06/11/25  0515 06/10/25  2028   WBC Thousand/uL 3.20*   < > 3.01*   HEMOGLOBIN g/dL 9.6*   < > 10.1*   HEMATOCRIT % 28.9*   < > 30.8*   PLATELETS Thousands/uL 126*   < > 163   LYMPHO PCT %  --   --  33   MONO PCT %  --   --  2*   EOS PCT %  --   --  3    < > = values in this interval not displayed.     Results from last 7 days   Lab Units 06/12/25 0404 06/11/25 0515   SODIUM mmol/L 139 139   POTASSIUM mmol/L 3.3* 3.4*   CHLORIDE mmol/L 107 107   CO2 mmol/L 23 22   BUN mg/dL 4* 7   CREATININE mg/dL 0.41* 0.51*   ANION GAP mmol/L 9 10   CALCIUM mg/dL 8.3* 8.6   ALBUMIN g/dL  --  3.8   TOTAL BILIRUBIN mg/dL  --  0.65   ALK PHOS U/L  --  121*   ALT U/L  --  24   AST U/L  --  35   GLUCOSE RANDOM mg/dL 133 154*     Results from last 7 days   Lab Units 06/10/25  2028   INR  0.93             Results from last 7 days   Lab Units 06/11/25  0515 06/10/25  2233 06/10/25  2028   LACTIC ACID mmol/L  --  1.7 2.4*   PROCALCITONIN ng/ml 0.15  --  0.14       Recent Cultures (last 7 days):   Results from last 7 days   Lab Units 06/10/25  2033 06/10/25  2028   BLOOD CULTURE  No Growth at 24 hrs. No Growth at 24 hrs.       Imaging Results Review: No pertinent imaging studies reviewed.  Other Study Results Review: No additional pertinent studies reviewed.    Last 24 Hours Medication List:     Current Facility-Administered Medications:     acetaminophen (TYLENOL) tablet 650 mg, Q6H PRN    aspirin chewable tablet 81 mg, Daily    atorvastatin (LIPITOR) tablet 40 mg, HS    dicyclomine (BENTYL) capsule 10 mg, Q6H PRN    dicyclomine (BENTYL)  capsule 10 mg, Q6H MAN    DULoxetine (CYMBALTA) delayed release capsule 30 mg, Daily    enoxaparin (LOVENOX) subcutaneous injection 40 mg, Daily    ibuprofen (MOTRIN) tablet 800 mg, Q6H PRN    loratadine (CLARITIN) tablet 10 mg, HS    LORazepam (ATIVAN) tablet 1 mg, Q8H PRN    losartan (COZAAR) tablet 50 mg, Daily    meclizine (ANTIVERT) tablet 25 mg, Q12H PRN    metoprolol succinate (TOPROL-XL) 24 hr tablet 25 mg, Daily    multivitamin-minerals (CENTRUM) tablet 1 tablet, Daily    ondansetron (ZOFRAN) injection 4 mg, Q6H PRN    pantoprazole (PROTONIX) injection 40 mg, Q12H MAN    piperacillin-tazobactam (ZOSYN) IVPB (EXTENDED INFUSION) 4.5 g, Q8H    rOPINIRole (REQUIP) tablet 1 mg, HS    sertraline (ZOLOFT) tablet 150 mg, HS    Administrative Statements   Today, Patient Was Seen By: Sorin Piña MD  I have spent a total time of 40 minutes in caring for this patient on the day of the visit/encounter including Diagnostic results, Impressions, Counseling / Coordination of care, Documenting in the medical record, Reviewing/placing orders in the medical record (including tests, medications, and/or procedures), Obtaining or reviewing history  , and Communicating with other healthcare professionals .    **Please Note: This note may have been constructed using a voice recognition system.**

## 2025-06-12 NOTE — ASSESSMENT & PLAN NOTE
Patient states that her symptoms had been persistent for the last 7 days however the diarrhea just began approximately 3 days ago.  She states her last bowel movement was loose/liquid and melena.  Symptoms possibly 2/2 chemotherapy as well as CT identified colitis    Zofran 4 mg every 6 hours as needed.  Discussed with patient if she is getting increased nausea after meals she may want to take the medication prior to meals  Stool studies for C. difficile and stool culture ordered  Diet as tolerated

## 2025-06-13 VITALS
HEIGHT: 64 IN | WEIGHT: 202 LBS | BODY MASS INDEX: 34.49 KG/M2 | DIASTOLIC BLOOD PRESSURE: 79 MMHG | RESPIRATION RATE: 16 BRPM | HEART RATE: 76 BPM | TEMPERATURE: 97.5 F | OXYGEN SATURATION: 97 % | SYSTOLIC BLOOD PRESSURE: 123 MMHG

## 2025-06-13 PROBLEM — N39.0 RECURRENT UTI: Status: RESOLVED | Noted: 2025-05-14 | Resolved: 2025-06-13

## 2025-06-13 PROBLEM — D61.818 PANCYTOPENIA (HCC): Status: ACTIVE | Noted: 2025-06-13

## 2025-06-13 LAB
ANION GAP SERPL CALCULATED.3IONS-SCNC: 6 MMOL/L (ref 4–13)
ANISOCYTOSIS BLD QL SMEAR: PRESENT
BUN SERPL-MCNC: 5 MG/DL (ref 5–25)
CALCIUM SERPL-MCNC: 8.6 MG/DL (ref 8.4–10.2)
CHLORIDE SERPL-SCNC: 108 MMOL/L (ref 96–108)
CO2 SERPL-SCNC: 28 MMOL/L (ref 21–32)
CREAT SERPL-MCNC: 0.36 MG/DL (ref 0.6–1.3)
ERYTHROCYTE [DISTWIDTH] IN BLOOD BY AUTOMATED COUNT: 15.3 % (ref 11.6–15.1)
GFR SERPL CREATININE-BSD FRML MDRD: 113 ML/MIN/1.73SQ M
GLUCOSE SERPL-MCNC: 130 MG/DL (ref 65–140)
HCT VFR BLD AUTO: 26.4 % (ref 34.8–46.1)
HGB BLD-MCNC: 8.8 G/DL (ref 11.5–15.4)
LYMPHOCYTES # BLD AUTO: 39 %
MCH RBC QN AUTO: 31.4 PG (ref 26.8–34.3)
MCHC RBC AUTO-ENTMCNC: 33.3 G/DL (ref 31.4–37.4)
MCV RBC AUTO: 94 FL (ref 82–98)
MONOCYTES NFR BLD AUTO: 6 % (ref 4–12)
NEUTS BAND NFR BLD MANUAL: 1 % (ref 0–8)
NEUTS SEG NFR BLD AUTO: 54 %
PLATELET # BLD AUTO: 108 THOUSANDS/UL (ref 149–390)
PLATELET BLD QL SMEAR: ABNORMAL
PMV BLD AUTO: 10 FL (ref 8.9–12.7)
POTASSIUM SERPL-SCNC: 3.1 MMOL/L (ref 3.5–5.3)
RBC # BLD AUTO: 2.8 MILLION/UL (ref 3.81–5.12)
SODIUM SERPL-SCNC: 142 MMOL/L (ref 135–147)
TOTAL CELLS COUNTED SPEC: 100
WBC # BLD AUTO: 2.75 THOUSAND/UL (ref 4.31–10.16)

## 2025-06-13 PROCEDURE — 99239 HOSP IP/OBS DSCHRG MGMT >30: CPT | Performed by: INTERNAL MEDICINE

## 2025-06-13 PROCEDURE — 85027 COMPLETE CBC AUTOMATED: CPT | Performed by: INTERNAL MEDICINE

## 2025-06-13 PROCEDURE — 99232 SBSQ HOSP IP/OBS MODERATE 35: CPT | Performed by: INTERNAL MEDICINE

## 2025-06-13 PROCEDURE — 85007 BL SMEAR W/DIFF WBC COUNT: CPT | Performed by: INTERNAL MEDICINE

## 2025-06-13 PROCEDURE — 80048 BASIC METABOLIC PNL TOTAL CA: CPT | Performed by: INTERNAL MEDICINE

## 2025-06-13 RX ORDER — VANCOMYCIN HYDROCHLORIDE 125 MG/1
125 CAPSULE ORAL EVERY 12 HOURS SCHEDULED
Qty: 16 CAPSULE | Refills: 0 | Status: SHIPPED | OUTPATIENT
Start: 2025-06-13 | End: 2025-06-21

## 2025-06-13 RX ORDER — POTASSIUM CHLORIDE 1500 MG/1
40 TABLET, EXTENDED RELEASE ORAL ONCE
Status: COMPLETED | OUTPATIENT
Start: 2025-06-13 | End: 2025-06-13

## 2025-06-13 RX ADMIN — DICYCLOMINE HYDROCHLORIDE 10 MG: 10 CAPSULE ORAL at 11:35

## 2025-06-13 RX ADMIN — DICYCLOMINE HYDROCHLORIDE 10 MG: 10 CAPSULE ORAL at 05:01

## 2025-06-13 RX ADMIN — DICYCLOMINE HYDROCHLORIDE 10 MG: 10 CAPSULE ORAL at 00:34

## 2025-06-13 RX ADMIN — POTASSIUM CHLORIDE 40 MEQ: 1500 TABLET, EXTENDED RELEASE ORAL at 10:01

## 2025-06-13 RX ADMIN — ENOXAPARIN SODIUM 40 MG: 40 INJECTION SUBCUTANEOUS at 07:44

## 2025-06-13 RX ADMIN — VANCOMYCIN HYDROCHLORIDE 125 MG: 125 CAPSULE ORAL at 07:46

## 2025-06-13 RX ADMIN — PANTOPRAZOLE SODIUM 40 MG: 40 INJECTION, POWDER, FOR SOLUTION INTRAVENOUS at 07:43

## 2025-06-13 RX ADMIN — METOPROLOL SUCCINATE 25 MG: 25 TABLET, EXTENDED RELEASE ORAL at 07:45

## 2025-06-13 RX ADMIN — ASPIRIN 81 MG CHEWABLE TABLET 81 MG: 81 TABLET CHEWABLE at 07:44

## 2025-06-13 RX ADMIN — PIPERACILLIN AND TAZOBACTAM 4.5 G: 4; .5 INJECTION, POWDER, FOR SOLUTION INTRAVENOUS at 04:35

## 2025-06-13 RX ADMIN — LOSARTAN POTASSIUM 50 MG: 50 TABLET, FILM COATED ORAL at 07:45

## 2025-06-13 RX ADMIN — PIPERACILLIN AND TAZOBACTAM 4.5 G: 4; .5 INJECTION, POWDER, FOR SOLUTION INTRAVENOUS at 12:57

## 2025-06-13 RX ADMIN — MULTIPLE VITAMINS W/ MINERALS TAB 1 TABLET: TAB ORAL at 07:46

## 2025-06-13 RX ADMIN — DULOXETINE HYDROCHLORIDE 30 MG: 30 CAPSULE, DELAYED RELEASE ORAL at 07:44

## 2025-06-13 NOTE — ASSESSMENT & PLAN NOTE
"Presents with vomiting and diarrhea x 2 days.  Accompanied by diffuse abdominal pain.  Patient is undergoing chemotherapy for uterine cancer.  Last dose was 6 days ago.  Has not had a reaction like this prior.  Sepsis alert fired in ED.  CT:   \"Suggestion of mild diffuse colitis and mild mesenteric inflammation and lymphadenopathy in the right lower quadrant. Findings may be secondary to diarrhea and/or mild typhlitis. No findings to indicate enteritis. \"  Started on Zosyn in ED. discharged with Augmentin.  Discussed with GI  "

## 2025-06-13 NOTE — ASSESSMENT & PLAN NOTE
Patient states symptoms have been on and off for the past 7 days.  On exam, patient does have diffuse abdominal tenderness with palpation.  CT A/P; notes colitis and inflammation at cecum (typhlitis) disease which may be secondary to chemotherapy.  Likely symptoms 2/2, inflammation, chemotherapy.  C. difficile PCR positive, EIA negative.      Advance diet as tolerated   Antiemetics and pain management as necessary  Stool studies for C. difficile our, negative EIA.  Will treat patient prophylactically with vancomycin twice daily   Stool culture negative  Blood cultures x 2 pending, no growth after 48 hours  Zosyn IV hours  Dicyclomine 10 mg p.o. every 6 hours

## 2025-06-13 NOTE — ASSESSMENT & PLAN NOTE
Patient states that her symptoms had been persistent for the last 7 days however the diarrhea just began approximately 3 days ago.  She states her last bowel movement was loose/liquid and melena.  Symptoms possibly 2/2 chemotherapy as well as CT identified colitis    Zofran 4 mg every 6 hours as needed.  Discussed with patient if she is getting increased nausea after meals she may want to take the medication prior to meals  Diet as tolerated

## 2025-06-13 NOTE — DISCHARGE SUMMARY
"Discharge Summary - Hospitalist   Name: Felecia Edward 64 y.o. female I MRN: [id number]  Unit/Bed#: -01 I Date of Admission: 6/10/2025   Date of Service: 6/13/2025 I Hospital Day: 2     Assessment & Plan  Vomiting and diarrhea  Presents with vomiting and diarrhea x 2 days.  Accompanied by diffuse abdominal pain.  Patient is undergoing chemotherapy for uterine cancer.  Last dose was 6 days ago.  Has not had a reaction like this prior.  Sepsis alert fired in ED.  CT:   \"Suggestion of mild diffuse colitis and mild mesenteric inflammation and lymphadenopathy in the right lower quadrant. Findings may be secondary to diarrhea and/or mild typhlitis. No findings to indicate enteritis. \"  Started on Zosyn in ED. discharged with Augmentin.  Discussed with GI  Uterine leiomyosarcoma (HCC)  Patient of Pati Jerome PA-C. Undergoing doxorubicin and trabectedin therapy.   Hypokalemia  Potassium 3.0.  Replete and recheck.  Class 2 obesity due to excess calories without serious comorbidity in adult  Body mass index is 34.67 kg/m².  Recommend incorporating a more whole foods plant-predominant diet along with decreasing consumption of red meats and processed foods  Per AHA guidelines, recommend moderate-vigorous intensity exercise for 30 minutes a day for 5 days a week or a total of 150 min/week  Type 2 diabetes mellitus with diabetic neuropathy, without long-term current use of insulin ([facility])  Lab Results   Component Value Date    HGBA1C 7.6 (H) 12/24/2024       No results for input(s): \"POCGLU\" in the last 72 hours.    Blood Sugar Average: Last 72 hrs:    Home agents held.  Sliding scale and hypoglycemia protocol.  Primary hypertension  Continue metoprolol, losartan.  Hold hydrochlorothiazide.  SUSY (obstructive sleep apnea)  Patient states she has been nonadherent with her CPAP recently.  Would prefer not to use this tonight in setting of nausea and vomiting.  GERD without esophagitis    Nausea    Generalized abdominal " "pain    Other specified anemias    Pancytopenia (HCC)  Discussed with lab, added on differential count, peripheral smear.  Discussed with hematology oncology and GYN oncology, does not need filgrastim.  Repeat CBC on Monday     Admission Date: 6/10/2025 2011  Discharge Date: 06/13/25  Admitting Diagnosis: Hypokalemia [E87.6]  Abdominal pain [R10.9]  SIRS (systemic inflammatory response syndrome) (HCC) [R65.10]  Vomiting and diarrhea [R11.10, R19.7]  Nausea vomiting and diarrhea [R11.2, R19.7]  Discharge Diagnosis:   Medical Problems       Resolved Problems  Date Reviewed: 5/29/2025   None         HPI: As in HPI, \"Felecia Edward is a 64 y.o. female with a PMH of coronary artery disease, type 2 diabetes mellitus, gastroesophageal reflux disease, chronic kidney disease, obstructive sleep apnea, uterine cancer who presents with vomiting and diarrhea.  Patient reports nausea, vomiting, and diarrhea since Sunday 6/08/25.  She reports diffuse abdominal pain accompanies this.  She is currently undergoing chemotherapy for uterine cancer.  She states her last dose was 6 days ago.  She denies any sort of reaction like this to chemotherapy in the past.  She denies CP, SOB, fever, chills.\"       Procedures Performed: No orders of the defined types were placed in this encounter.      Summary of Hospital Course: Patient was admitted for abdominal pain, vomiting, nausea, diarrhea.  CT obtained showed mild diffuse colitis, findings may be secondary to diarrhea/mild typhlitis.  GI consulted.  Patient was started on scheduled antinausea medication, Zosyn, IV hydration.  Patient's diet advanced as tolerated.  Patient able to eat solid food at the time of discharge.  Patient had C. difficile testing which showed PCR positive EIA toxin CAB, negative.  With pancytopenia and patient being on antibiotic, patient was started on p.o. vancomycin.  Patient will be discharged with the same    Significant Findings, Care, Treatment and Services " Provided:     XR chest portable  Result Date: 6/11/2025  Impression: No acute cardiopulmonary disease. Workstation performed: [id number]     CT abdomen pelvis with contrast  Result Date: 6/10/2025  Impression: Suggestion of mild diffuse colitis and mild mesenteric inflammation and lymphadenopathy in the right lower quadrant. Findings may be secondary to diarrhea and/or mild typhlitis. No findings to indicate enteritis. Workstation performed: [id number]       No Chest XR results available for this patient.      Complications: none    Condition at Discharge: stable       Discharge instructions/Information to patient and family:   See After Visit Summary (AVS) for information provided to patient and family.      Provisions for Follow-Up Care:  See after visit summary for information related to follow-up care and any pertinent home health orders.      PCP: Clarice Acuna MD    Disposition: Home    Planned Readmission: No     Discharge Medications:  See after visit summary for reconciled discharge medications provided to patient and family.      Discharge Statement:  I have spent a total time of 33 minutes in caring for this patient on the day of the visit/encounter. >30 minutes of time was spent on: Diagnostic results, Impressions, Counseling / Coordination of care, Documenting in the medical record, Reviewing / ordering tests, medicine, procedures  , and Communicating with other healthcare professionals .

## 2025-06-13 NOTE — CASE MANAGEMENT
Case Management Progress Note    Patient name Felecia Edward  Location /-01 MRN [id number]  : 1960 Date 2025       LOS (days): 2  Geometric Mean LOS (GMLOS) (days): 2.5  Days to GMLOS:0.6        OBJECTIVE:        Current admission status: Inpatient  Preferred Pharmacy:   [facility]/pharmacy #[id number] - [place] - [phone]   [place] [address]  Phone: [phone] Fax: [phone]    Primary Care Provider: Clarice Acuna MD    Primary Insurance: [employer]  Secondary Insurance:     PROGRESS NOTE:      Notification made to OP CM Handoff: [facility] OP CM regarding discharge planning and disposition.

## 2025-06-13 NOTE — PLAN OF CARE
Problem: Potential for Falls  Goal: Patient will remain free of falls  Description: INTERVENTIONS:  - Educate patient/family on patient safety including physical limitations  - Instruct patient to call for assistance with activity   - Consider consulting OT/PT to assist with strengthening/mobility based on AM PAC & JH-HLM score  - Consult OT/PT to assist with strengthening/mobility   - Keep Call bell within reach  - Keep bed low and locked with side rails adjusted as appropriate  - Keep care items and personal belongings within reach  - Initiate and maintain comfort rounds  - Make Fall Risk Sign visible to staff  - Offer Toileting every 2 Hours, in advance of need  - Initiate/Maintain bed alarm  - Obtain necessary fall risk management equipment:   - Apply yellow socks and bracelet for high fall risk patients  - Consider moving patient to room near nurses station  Outcome: Progressing     Problem: HEMATOLOGIC - ADULT  Goal: Maintains hematologic stability  Description: INTERVENTIONS  - Assess for signs and symptoms of bleeding or hemorrhage  - Monitor labs  - Administer supportive blood products/factors as ordered and appropriate  Outcome: Progressing     Problem: MUSCULOSKELETAL - ADULT  Goal: Maintain or return mobility to safest level of function  Description: INTERVENTIONS:  - Assess patient's ability to carry out ADLs; assess patient's baseline for ADL function and identify physical deficits which impact ability to perform ADLs (bathing, care of mouth/teeth, toileting, grooming, dressing, etc.)  - Assess/evaluate cause of self-care deficits   - Assess range of motion  - Assess patient's mobility  - Assess patient's need for assistive devices and provide as appropriate  - Encourage maximum independence but intervene and supervise when necessary  - Involve family in performance of ADLs  - Assess for home care needs following discharge   - Consider OT consult to assist with ADL evaluation and planning for  discharge  - Provide patient education as appropriate  Outcome: Progressing     Problem: GASTROINTESTINAL - ADULT  Goal: Minimal or absence of nausea and/or vomiting  Description: INTERVENTIONS:  - Administer IV fluids if ordered to ensure adequate hydration  - Maintain NPO status until nausea and vomiting are resolved  - Nasogastric tube if ordered  - Administer ordered antiemetic medications as needed  - Provide nonpharmacologic comfort measures as appropriate  - Advance diet as tolerated, if ordered  - Consider nutrition services referral to assist patient with adequate nutrition and appropriate food choices  Outcome: Progressing  Goal: Maintains or returns to baseline bowel function  Description: INTERVENTIONS:  - Assess bowel function  - Encourage oral fluids to ensure adequate hydration  - Administer IV fluids if ordered to ensure adequate hydration  - Administer ordered medications as needed  - Encourage mobilization and activity  - Consider nutritional services referral to assist patient with adequate nutrition and appropriate food choices  Outcome: Progressing  Goal: Maintains adequate nutritional intake  Description: INTERVENTIONS:  - Monitor percentage of each meal consumed  - Identify factors contributing to decreased intake, treat as appropriate  - Assist with meals as needed  - Monitor I&O, weight, and lab values if indicated  - Obtain nutrition services referral as needed  Outcome: Progressing  Goal: Oral mucous membranes remain intact  Description: INTERVENTIONS  - Assess oral mucosa and hygiene practices  - Implement preventative oral hygiene regimen  - Implement oral medicated treatments as ordered  - Initiate Nutrition services referral as needed  Outcome: Progressing

## 2025-06-13 NOTE — ASSESSMENT & PLAN NOTE
Hemoglobin 10.1 on admission 6/10.  With a.m. labs today hemoglobin 8.8 (9.6, 10.3).  Patient small bowel movement today, denies hematochezia or melena.    Likely secondary to chemotherapy.    Stool studies pending to rule out infectious cause  Monitor hemoglobin transfuse as necessary  Iron panel, B12/folate panel pending  Monitor for GI bleed/melena  Pent-up resolved 40 mg IVP every 12 hours    Patient's last EGD and colonoscopy were both completed on 8/12/2024 by Dr. Shelton.  Colonoscopy; 3 polyps, diverticulosis, small hemorrhoid, 5-year recall  EGD; mild antral gastritis.

## 2025-06-13 NOTE — PROGRESS NOTES
Progress Note - Gastroenterology   Name: Felecia Edward 64 y.o. female I MRN: [id number]  Unit/Bed#: -01 I Date of Admission: 6/10/2025   Date of Service: 6/13/2025 I Hospital Day: 2    Assessment & Plan  Generalized abdominal pain  Patient states symptoms have been on and off for the past 7 days.  On exam, patient does have diffuse abdominal tenderness with palpation.  CT A/P; notes colitis and inflammation at cecum (typhlitis) disease which may be secondary to chemotherapy.  Likely symptoms 2/2, inflammation, chemotherapy.  C. difficile PCR positive, EIA negative.      Advance diet as tolerated   Antiemetics and pain management as necessary  Stool studies for C. difficile our, negative EIA.  Will treat patient prophylactically with vancomycin twice daily   Stool culture negative  Blood cultures x 2 pending, no growth after 48 hours  Zosyn IV hours  Dicyclomine 10 mg p.o. every 6 hours  Vomiting and diarrhea  Nausea  Patient states that her symptoms had been persistent for the last 7 days however the diarrhea just began approximately 3 days ago.  She states her last bowel movement was loose/liquid and melena.  Symptoms possibly 2/2 chemotherapy as well as CT identified colitis    Zofran 4 mg every 6 hours as needed.  Discussed with patient if she is getting increased nausea after meals she may want to take the medication prior to meals  Diet as tolerated    Other specified anemias  Hemoglobin 10.1 on admission 6/10.  With a.m. labs today hemoglobin 8.8 (9.6, 10.3).  Patient small bowel movement today, denies hematochezia or melena.    Likely secondary to chemotherapy.    Stool studies pending to rule out infectious cause  Monitor hemoglobin transfuse as necessary  Iron panel, B12/folate panel pending  Monitor for GI bleed/melena  Pent-up resolved 40 mg IVP every 12 hours    Patient's last EGD and colonoscopy were both completed on 8/12/2024 by Dr. Shelton.  Colonoscopy; 3 polyps, diverticulosis, small  hemorrhoid, 5-year recall  EGD; mild antral gastritis.  GERD without esophagitis  Patient with past medical history of GERD.  Patient currently prescribed pantoprazole 40 mg daily.  Last EGD 8/12/2024; mild antral gastritis.    Pantoprazole 40 mg IVP every 12 hours  Uterine leiomyosarcoma (HCC)  Patient states originally diagnosed uterine cancer in 2023.  Was not in remission for a full year when cancer reappeared.  Patient states she is currently receiving chemotherapy states she receives every 21 days and last dose was this past Wednesday 6/4.          Subjective   Patient out of bed to chair.  She continues to have mild abdominal discomfort.  Her stool studies did come back PCR positive for C. difficile but negative EIA.  We are treating the patient prophylactically with vancomycin twice daily.  She is tolerating her meals.  Discussed current treatment plan with the patient and also today's hemoglobin 8.8.    Objective :  Temp:  [97.2 °F (36.2 °C)-97.5 °F (36.4 °C)] 97.5 °F (36.4 °C)  HR:  [72-76] 76  BP: (123-137)/(75-79) 123/79  Resp:  [16-17] 16  SpO2:  [96 %-98 %] 97 %  O2 Device: None (Room air)    Physical Exam  Vitals and nursing note reviewed.   Constitutional:       General: She is not in acute distress.     Appearance: She is well-developed.   HENT:      Head: Normocephalic and atraumatic.     Eyes:      Conjunctiva/sclera: Conjunctivae normal.       Cardiovascular:      Rate and Rhythm: Normal rate and regular rhythm.      Heart sounds: No murmur heard.  Pulmonary:      Effort: Pulmonary effort is normal. No respiratory distress.      Breath sounds: Normal breath sounds.   Abdominal:      Palpations: Abdomen is soft.      Tenderness: There is no abdominal tenderness (Mild abdominal discomfort with palpation).     Musculoskeletal:         General: No swelling.      Cervical back: Neck supple.     Skin:     General: Skin is warm and dry.     Neurological:      Mental Status: She is alert and oriented to  person, place, and time.     Psychiatric:         Mood and Affect: Mood normal.         Lab Results: I have reviewed the following results:

## 2025-06-13 NOTE — ASSESSMENT & PLAN NOTE
Discussed with lab, added on differential count, peripheral smear.  Discussed with hematology oncology and GYN oncology, does not need filgrastim.  Repeat CBC on Monday

## 2025-06-15 LAB
BACTERIA BLD CULT: NORMAL
BACTERIA BLD CULT: NORMAL

## 2025-06-16 ENCOUNTER — HOSPITAL ENCOUNTER (OUTPATIENT)
Dept: INFUSION CENTER | Facility: HOSPITAL | Age: 65
Discharge: HOME | End: 2025-06-16
Payer: COMMERCIAL

## 2025-06-16 DIAGNOSIS — C55 UTERINE LEIOMYOSARCOMA (HCC): ICD-10-CM

## 2025-06-16 DIAGNOSIS — Z45.2 ENCOUNTER FOR CENTRAL LINE CARE: Primary | ICD-10-CM

## 2025-06-16 LAB
ALBUMIN SERPL BCG-MCNC: 4.1 G/DL (ref 3.5–5)
ALP SERPL-CCNC: 111 U/L (ref 34–104)
ALT SERPL W P-5'-P-CCNC: 16 U/L (ref 7–52)
ANION GAP SERPL CALCULATED.3IONS-SCNC: 11 MMOL/L (ref 4–13)
AST SERPL W P-5'-P-CCNC: 15 U/L (ref 13–39)
BACTERIA BLD CULT: NORMAL
BACTERIA BLD CULT: NORMAL
BASOPHILS # BLD AUTO: 0.02 THOUSANDS/ÂΜL (ref 0–0.1)
BASOPHILS NFR BLD AUTO: 1 % (ref 0–1)
BILIRUB SERPL-MCNC: 0.63 MG/DL (ref 0.2–1)
BUN SERPL-MCNC: 8 MG/DL (ref 5–25)
CALCIUM SERPL-MCNC: 9 MG/DL (ref 8.4–10.2)
CHLORIDE SERPL-SCNC: 103 MMOL/L (ref 96–108)
CO2 SERPL-SCNC: 28 MMOL/L (ref 21–32)
CREAT SERPL-MCNC: 0.47 MG/DL (ref 0.6–1.3)
EOSINOPHIL # BLD AUTO: 0.02 THOUSAND/ÂΜL (ref 0–0.61)
EOSINOPHIL NFR BLD AUTO: 1 % (ref 0–6)
ERYTHROCYTE [DISTWIDTH] IN BLOOD BY AUTOMATED COUNT: 16.4 % (ref 11.6–15.1)
GFR SERPL CREATININE-BSD FRML MDRD: 103 ML/MIN/1.73SQ M
GLUCOSE SERPL-MCNC: 151 MG/DL (ref 65–140)
HCT VFR BLD AUTO: 29.1 % (ref 34.8–46.1)
HGB BLD-MCNC: 9.7 G/DL (ref 11.5–15.4)
IMM GRANULOCYTES # BLD AUTO: 0.04 THOUSAND/UL (ref 0–0.2)
IMM GRANULOCYTES NFR BLD AUTO: 1 % (ref 0–2)
LYMPHOCYTES # BLD AUTO: 1.71 THOUSANDS/ÂΜL (ref 0.6–4.47)
LYMPHOCYTES NFR BLD AUTO: 39 % (ref 14–44)
MAGNESIUM SERPL-MCNC: 1.4 MG/DL (ref 1.9–2.7)
MCH RBC QN AUTO: 32 PG (ref 26.8–34.3)
MCHC RBC AUTO-ENTMCNC: 33.3 G/DL (ref 31.4–37.4)
MCV RBC AUTO: 96 FL (ref 82–98)
MONOCYTES # BLD AUTO: 0.45 THOUSAND/ÂΜL (ref 0.17–1.22)
MONOCYTES NFR BLD AUTO: 10 % (ref 4–12)
NEUTROPHILS # BLD AUTO: 2.17 THOUSANDS/ÂΜL (ref 1.85–7.62)
NEUTS SEG NFR BLD AUTO: 48 % (ref 43–75)
NRBC BLD AUTO-RTO: 0 /100 WBCS
PLATELET # BLD AUTO: 179 THOUSANDS/UL (ref 149–390)
PMV BLD AUTO: 10.8 FL (ref 8.9–12.7)
POTASSIUM SERPL-SCNC: 3.2 MMOL/L (ref 3.5–5.3)
PROT SERPL-MCNC: 6.4 G/DL (ref 6.4–8.4)
RBC # BLD AUTO: 3.03 MILLION/UL (ref 3.81–5.12)
SODIUM SERPL-SCNC: 142 MMOL/L (ref 135–147)
WBC # BLD AUTO: 4.41 THOUSAND/UL (ref 4.31–10.16)

## 2025-06-16 PROCEDURE — 83735 ASSAY OF MAGNESIUM: CPT

## 2025-06-16 PROCEDURE — 80053 COMPREHEN METABOLIC PANEL: CPT

## 2025-06-16 PROCEDURE — 85025 COMPLETE CBC W/AUTO DIFF WBC: CPT

## 2025-06-16 NOTE — PROGRESS NOTES
..Felecia Edward  tolerated treatment well with no complications.      Felecia Edward is aware of future appt on 6/23 at 12:00.     AVS printed and given to Felecia Edward:  No (Declined by Felecia Edward)

## 2025-06-16 NOTE — UTILIZATION REVIEW
NOTIFICATION OF ADMISSION DISCHARGE   This is a Notification of Discharge from [facility]. Please be advised that this patient has been discharge from our facility. Below you will find the admission and discharge date and time including the patient’s disposition.   UTILIZATION REVIEW CONTACT:  Utilization Review Assistants  Network Utilization Review Department  Phone: [phone] x carefully listen to the prompts. All voicemails are confidential.  Email: [email or web address]     ADMISSION INFORMATION  PRESENTATION DATE: 6/10/2025  8:11 PM  OBERVATION ADMISSION DATE: 06/10/2025 2312  INPATIENT ADMISSION DATE: 6/11/25  6:03 PM   DISCHARGE DATE: 6/13/2025  5:25 PM   DISPOSITION:Home/Self Care    Network Utilization Review Department  ATTENTION: Please call with any questions or concerns to [phone] and carefully listen to the prompts so that you are directed to the right person. All voicemails are confidential.   For Discharge needs, contact Care Management DC Support Team at [phone] opt. 2  Send all requests for admission clinical reviews, approved or denied determinations and any other requests to dedicated fax number below belonging to the campus where the patient is receiving treatment. List of dedicated fax numbers for the Facilities:  FACILITY NAME UR FAX NUMBER   ADMISSION DENIALS (Administrative/Medical Necessity) [phone]   DISCHARGE SUPPORT TEAM ([facility]) [phone]   PARENT CHILD HEALTH (Maternity/NICU/Pediatrics) [phone]   [facility] [phone]   [facility] [phone]   [facility] [phone]   [facility] [phone]   [facility] [phone]   [facility] [phone]   General acute hospital [phone]   [facility] [phone]    [facility] [phone]   [facility] [phone]   [facility] [phone]   [facility] [phone]

## 2025-06-19 ENCOUNTER — DOCUMENTATION (OUTPATIENT)
Dept: HEMATOLOGY ONCOLOGY | Facility: CLINIC | Age: 65
End: 2025-06-19

## 2025-06-19 ENCOUNTER — OFFICE VISIT (OUTPATIENT)
Age: 65
End: 2025-06-19
Payer: COMMERCIAL

## 2025-06-19 VITALS
OXYGEN SATURATION: 94 % | HEIGHT: 64 IN | RESPIRATION RATE: 16 BRPM | BODY MASS INDEX: 34.56 KG/M2 | WEIGHT: 202.4 LBS | DIASTOLIC BLOOD PRESSURE: 88 MMHG | SYSTOLIC BLOOD PRESSURE: 130 MMHG | TEMPERATURE: 97.7 F | HEART RATE: 108 BPM

## 2025-06-19 DIAGNOSIS — C55 UTERINE LEIOMYOSARCOMA (HCC): Primary | ICD-10-CM

## 2025-06-19 DIAGNOSIS — D70.1 CHEMOTHERAPY INDUCED NEUTROPENIA (HCC): ICD-10-CM

## 2025-06-19 DIAGNOSIS — R11.2 CHEMOTHERAPY INDUCED NAUSEA AND VOMITING: ICD-10-CM

## 2025-06-19 DIAGNOSIS — T45.1X5A CHEMOTHERAPY INDUCED NEUTROPENIA (HCC): ICD-10-CM

## 2025-06-19 DIAGNOSIS — T45.1X5A CHEMOTHERAPY INDUCED NAUSEA AND VOMITING: ICD-10-CM

## 2025-06-19 PROCEDURE — 99215 OFFICE O/P EST HI 40 MIN: CPT | Performed by: OBSTETRICS & GYNECOLOGY

## 2025-06-19 RX ORDER — PROMETHAZINE HYDROCHLORIDE 25 MG/1
25 TABLET ORAL EVERY 6 HOURS PRN
Qty: 30 TABLET | Refills: 0 | Status: SHIPPED | OUTPATIENT
Start: 2025-06-19

## 2025-06-19 NOTE — PROGRESS NOTES
Provided application to check-out at  to provide to PT after appt. On application, this writer provided her phone number for inquiries.       Lisbeth Breaux MPH  Phone: [phone]  Email: [email or web address]

## 2025-06-19 NOTE — ASSESSMENT & PLAN NOTE
64-year-old with recurrent stage II leiomyosarcoma status post surgical resection 1/6/2025 currently receiving systemic therapy with Adriamycin 45 mg/m² and trabectidin 0.9 mg/m².  I reviewed CMP, CBC, blood cultures, CT abdomen pelvis images.  She has received 5 cycles of treatment.  Performance status is 1.  1.  Plan to stop Adriamycin and continue with trabectidin 0.9 mg/m² as maintenance therapy for up to 17 cycles total.  2.  Plan for CT of chest abdomen pelvis after 3 treatments of single agent trabectidin to assess disease response.       
Continue empiric G-CSF therapy.  Will reduce the dose of G-CSF while on maintenance trabectidin.  Continue to monitor.       
Plan to stop Adriamycin as above.  Will provide as needed Phenergan, lorazepam in addition to as needed Zofran.  We reviewed the risks of adding Phenergan including the risks of gait instability, somnolence especially when combined with lorazepam.  She understands that combining lorazepam and Phenergan at the same time may cause extreme fatigue.  Orders:    promethazine (PHENERGAN) 25 mg tablet; Take 1 tablet (25 mg total) by mouth every 6 (six) hours as needed for nausea or vomiting    
Render In Strict Bullet Format?: No
Detail Level: Zone
Plan: Discussed avoiding acidic based foods such as tomato/vinegar for now.\\n.\\nSince lesions have improved discussed if lesions return, see oral surgeon for biopsy to confirm LP.\\n.\\nFinish out ZPack from PCP and Macroobid from Urologist

## 2025-06-19 NOTE — PROGRESS NOTES
Name: Felecia Edward      : 1960      MRN: [id number]  Encounter Provider: Gordo Taveras MD  Encounter Date: 2025   Encounter department: [facility] GYNECOLOGY ONCOLOGY [facility]  :  Assessment & Plan  Uterine leiomyosarcoma (HCC)  64-year-old with recurrent stage II leiomyosarcoma status post surgical resection 2025 currently receiving systemic therapy with Adriamycin 45 mg/m² and trabectidin 0.9 mg/m².  I reviewed CMP, CBC, blood cultures, CT abdomen pelvis images.  She has received 5 cycles of treatment.  Performance status is 1.  1.  Plan to stop Adriamycin and continue with trabectidin 0.9 mg/m² as maintenance therapy for up to 17 cycles total.  2.  Plan for CT of chest abdomen pelvis after 3 treatments of single agent trabectidin to assess disease response.       Chemotherapy induced neutropenia (HCC)  Continue empiric G-CSF therapy.  Will reduce the dose of G-CSF while on maintenance trabectidin.  Continue to monitor.       Chemotherapy induced nausea and vomiting  Plan to stop Adriamycin as above.  Will provide as needed Phenergan, lorazepam in addition to as needed Zofran.  We reviewed the risks of adding Phenergan including the risks of gait instability, somnolence especially when combined with lorazepam.  She understands that combining lorazepam and Phenergan at the same time may cause extreme fatigue.  Orders:    promethazine (PHENERGAN) 25 mg tablet; Take 1 tablet (25 mg total) by mouth every 6 (six) hours as needed for nausea or vomiting      Assessment & Plan            History of Present Illness   Reason for Visit / CC: Prechemotherapy evaluation, recent admission for vomiting and diarrhea   Felecia Edward is a 64 y.o. female currently receiving palliative doxorubicin and trabectidin for recurrent leiomyosarcoma.  She was admitted 6/10/2025 to 2025 with vomiting and diarrhea.  This was likely secondary to her chemotherapy  treatment.  Recent labs reveal chemotherapy-induced anemia, hypokalemia.  During the hospital mission, she had a CT scan of the abdomen pelvis on 6/10/2025 which revealed some mesenteric fat stranding adjacent to the cecum and proximal ascending colon.  Mesenteric lymphadenopathy as well.  This was felt to be reactive.  Chest x-ray was negative for acute cardiopulmonary disease.  The vomiting diarrhea have resolved.  She is tolerating diet.  No blood in the stool.  She is ambulatory.  She is fatigued.  History of Present Illness    Pertinent Medical History     Type 2 diabetes         Oncology History   Cancer Staging   Uterine leiomyosarcoma (HCC)  Staging form: Corpus Uteri - Sarcoma, AJCC 8th Edition  - Pathologic stage from 6/4/2023: FIGO Stage II, calculated as Stage Unknown (pT2, pNX, cM0) - Signed by Gordo Taveras MD on 6/29/2023  Stage prefix: Initial diagnosis  Oncology History   Uterine leiomyosarcoma (HCC)   6/4/2023 Initial Diagnosis    Uterine sarcoma (HCC)     6/4/2023 -  Cancer Staged    Staging form: Corpus Uteri - Sarcoma, AJCC 8th Edition  - Pathologic stage from 6/4/2023: FIGO Stage II, calculated as Stage Unknown (pT2, pNX, cM0) - Signed by Gordo Taveras MD on 6/29/2023  Stage prefix: Initial diagnosis       6/4/2023 Surgery    HYSTERECTOMY TOTAL ABDOMINAL (DOROTHY). BILATERAL SALPINGOOPHORECTOMY  EXCISION  BIOPSY LESION/MASS ABDOMINAL-PELVIC PERITONEUM  -Leiomyosarcoma 13.5 cm extending through the myometrium, right pelvic peritoneum involved with uterine leiomyosarcoma with tumor present at unoriented resection surface.     7/11/2023 -  Chemotherapy    Gemzar 800 mg/m2 IV day 1 and 8 as well as taxotere 65 mg/m2 day 8 every 21 days.        - 12/1/2023 Chemotherapy    Adriamycin 50 mg/m2 IV every 21 days.  Including the gemcitabine and Taxotere, she completed a total of 7 cycles.     1/6/2025 Surgery    ROBOTIC ASSISTED RADICAL BILATERAL PARAMETRECTOMY. RESECTION BILATERAL  PARAMETRIAL TUMORS. UPPER VAGINECTOMY. LEFT OBTURATOR LYMPH NODE DISSECTION  CYSTOSCOPY     3/12/2025 -  Chemotherapy    Adriamycin 45 mg/m2 and trabectidin 0.9 mg/m2 IV every 21 days          Review of Systems   Constitutional:  Positive for fatigue. Negative for activity change and unexpected weight change.   HENT: Negative.     Eyes: Negative.    Respiratory: Negative.     Cardiovascular: Negative.    Gastrointestinal:  Negative for abdominal distention and abdominal pain.   Endocrine: Negative.    Genitourinary:  Negative for pelvic pain and vaginal bleeding.   Musculoskeletal: Negative.    Skin: Negative.    Allergic/Immunologic: Negative.    Neurological: Negative.    Hematological: Negative.    Psychiatric/Behavioral: Negative.      A complete review of systems is negative other than that noted above in the HPI.  Medications Ordered Prior to Encounter[1]      Objective   There were no vitals taken for this visit.    There is no height or weight on file to calculate BMI.  Pain Screening:     ECOG   1  Physical Exam  Vitals reviewed.   Constitutional:       General: She is not in acute distress.     Appearance: Normal appearance. She is obese. She is not ill-appearing.   HENT:      Head: Normocephalic and atraumatic.      Mouth/Throat:      Mouth: Mucous membranes are moist.     Eyes:      General: No scleral icterus.        Right eye: No discharge.         Left eye: No discharge.      Conjunctiva/sclera: Conjunctivae normal.     Pulmonary:      Effort: Pulmonary effort is normal.     Musculoskeletal:      Right lower leg: No edema.      Left lower leg: No edema.     Skin:     General: Skin is warm and dry.      Coloration: Skin is not jaundiced.      Findings: No rash.     Neurological:      General: No focal deficit present.      Mental Status: She is alert and oriented to person, place, and time.      Cranial Nerves: No cranial nerve deficit.      Sensory: No sensory deficit.      Motor: No weakness.       "Gait: Gait normal.     Psychiatric:         Mood and Affect: Mood normal.         Behavior: Behavior normal.         Thought Content: Thought content normal.         Judgment: Judgment normal.       Physical Exam       Results    Labs: I have reviewed pertinent labs.   No results found for: \"\"  Lab Results   Component Value Date/Time    Potassium 3.2 (L) 06/16/2025 12:33 PM    Chloride 103 06/16/2025 12:33 PM    CO2 28 06/16/2025 12:33 PM    BUN 8 06/16/2025 12:33 PM    Creatinine 0.47 (L) 06/16/2025 12:33 PM    Glucose, Fasting 168 (H) 01/19/2025 11:17 AM    Calcium 9.0 06/16/2025 12:33 PM    AST 15 06/16/2025 12:33 PM    ALT 16 06/16/2025 12:33 PM    Alkaline Phosphatase 111 (H) 06/16/2025 12:33 PM    eGFR 103 06/16/2025 12:33 PM     Lab Results   Component Value Date/Time    WBC 4.41 06/16/2025 12:33 PM    Hemoglobin 9.7 (L) 06/16/2025 12:33 PM    Hematocrit 29.1 (L) 06/16/2025 12:33 PM    MCV 96 06/16/2025 12:33 PM    Platelets 179 06/16/2025 12:33 PM     Lab Results   Component Value Date/Time    Absolute Neutrophils 2.17 06/16/2025 12:33 PM        Trend:  No results found for: \"\"    Radiology Results Review: I personally reviewed the following image studies in PACS and associated radiology reports: CT abdomen/pelvis. My interpretation of the radiology images/reports is: No visible evidence of recurrent disease.  Mesenteric inflammation adjacent to the cecum with nonspecific lymphadenopathy.             [1]   Current Outpatient Medications on File Prior to Visit   Medication Sig Dispense Refill    amoxicillin-clavulanate (AUGMENTIN) 875-125 mg per tablet Take 1 tablet by mouth every 12 (twelve) hours for 6 days 12 tablet 0    aspirin (Aspirin 81) 81 mg chewable tablet every 24 hours      atorvastatin (LIPITOR) 40 mg tablet Take 40 mg by mouth daily at bedtime      betamethasone, augmented, (DIPROLENE) 0.05 % lotion Apply topically 2 (two) times a day 60 mL 1    cetirizine (ZyrTEC) 10 mg tablet " Take 10 mg by mouth daily at bedtime      cholecalciferol (VITAMIN D3) 1,000 units tablet Take 2,000 Units by mouth in the morning. 2 daily .      clindamycin (CLEOCIN T) 1 % external solution Apply topically 2 (two) times a day 60 mL 1    dicyclomine (BENTYL) 10 mg capsule Take 1 capsule (10 mg total) by mouth every 6 (six) hours as needed (pain) 30 capsule 3    DULoxetine (CYMBALTA) 30 mg delayed release capsule       glimepiride (AMARYL) 4 mg tablet Take 4 mg by mouth in the morning and 4 mg in the evening.      LORazepam (ATIVAN) 1 mg tablet Take 1 tablet (1 mg total) by mouth every 8 (eight) hours as needed (nausea or anxiety) 20 tablet 0    losartan (COZAAR) 50 mg tablet Take 50 mg by mouth in the morning.      meclizine (ANTIVERT) 25 mg tablet Take by mouth every 12 (twelve) hours as needed for dizziness      methenamine hippurate (HIPREX) 1 g tablet Take 1 tablet (1 g total) by mouth 2 (two) times a day with meals 30 tablet 0    metoprolol succinate (TOPROL-XL) 25 mg 24 hr tablet Take 25 mg by mouth in the morning.      Multiple Vitamin (MULTIVITAMIN) tablet Take 1 tablet by mouth in the morning.      ondansetron (ZOFRAN) 8 mg tablet TAKE 1 TABLET BY MOUTH EVERY 8 HOURS AS NEEDED FOR NAUSEA OR VOMITING. 30 tablet 0    pantoprazole (PROTONIX) 40 mg tablet Take 1 tablet (40 mg total) by mouth daily 90 tablet 1    phenazopyridine (PYRIDIUM) 200 mg tablet Take 1 tablet (200 mg total) by mouth 3 (three) times a day as needed (Dysuria and bladder discomfort.  Take with meals.) 10 tablet 0    pioglitazone (ACTOS) 30 mg tablet every 24 hours      potassium chloride (Klor-Con M20) 20 mEq tablet TAKE 1 TABLET BY MOUTH 2 TIMES A DAY. 180 tablet 1    rOPINIRole (REQUIP) 1 mg tablet       sertraline (ZOLOFT) 100 mg tablet Take 1.5 tablets by mouth daily at bedtime      tamsulosin (FLOMAX) 0.4 mg Take 1 capsule (0.4 mg total) by mouth every evening for 14 days 14 capsule 0    topiramate (TOPAMAX) 100 mg tablet        Triamcinolone Acetonide 55 MCG/ACT AERO into each nostril One spray each nostril daily      vancomycin (VANCOCIN) 125 MG capsule Take 1 capsule (125 mg total) by mouth every 12 (twelve) hours for 8 days 16 capsule 0    Xiidra 5 % op solution       ZOLMitriptan (ZOMIG) 5 MG tablet Take 5 mg by mouth once as needed for migraine Daily prn      oxyCODONE (Roxicodone) 5 immediate release tablet Take 1 tablet (5 mg total) by mouth every 6 (six) hours as needed for moderate pain for up to 8 doses Max Daily Amount: 20 mg (Patient not taking: Reported on 5/14/2025) 8 tablet 0     Current Facility-Administered Medications on File Prior to Visit   Medication Dose Route Frequency Provider Last Rate Last Admin    [DISCONTINUED] alteplase (CATHFLO) injection 2 mg  2 mg Intracatheter Q1MIN PRN Gordo Taveras MD

## 2025-06-19 NOTE — PROGRESS NOTES
Outreached PT to discuss financial assistance opportunities. PT did not answer. Voicemail was left detailing the reason for the call, this writer's contact information, and a high-level overview of options.       Lisbeth Breaux MPH  Phone: [phone]  Email: [email or web address]

## 2025-06-21 DIAGNOSIS — C55 UTERINE LEIOMYOSARCOMA (HCC): Primary | ICD-10-CM

## 2025-06-23 ENCOUNTER — HOSPITAL ENCOUNTER (OUTPATIENT)
Dept: INFUSION CENTER | Facility: HOSPITAL | Age: 65
Discharge: HOME | End: 2025-06-23
Payer: COMMERCIAL

## 2025-06-23 DIAGNOSIS — C55 UTERINE LEIOMYOSARCOMA (HCC): Primary | ICD-10-CM

## 2025-06-23 DIAGNOSIS — Z45.2 ENCOUNTER FOR CENTRAL LINE CARE: ICD-10-CM

## 2025-06-23 LAB
ALBUMIN SERPL BCG-MCNC: 4 G/DL (ref 3.5–5)
ALP SERPL-CCNC: 95 U/L (ref 34–104)
ALT SERPL W P-5'-P-CCNC: 10 U/L (ref 7–52)
ANION GAP SERPL CALCULATED.3IONS-SCNC: 6 MMOL/L (ref 4–13)
AST SERPL W P-5'-P-CCNC: 15 U/L (ref 13–39)
BASOPHILS # BLD AUTO: 0.03 THOUSANDS/ÂΜL (ref 0–0.1)
BASOPHILS NFR BLD AUTO: 1 % (ref 0–1)
BILIRUB SERPL-MCNC: 0.5 MG/DL (ref 0.2–1)
BUN SERPL-MCNC: 10 MG/DL (ref 5–25)
CALCIUM SERPL-MCNC: 8.9 MG/DL (ref 8.4–10.2)
CHLORIDE SERPL-SCNC: 106 MMOL/L (ref 96–108)
CK SERPL-CCNC: 21 U/L (ref 26–192)
CO2 SERPL-SCNC: 28 MMOL/L (ref 21–32)
CREAT SERPL-MCNC: 0.46 MG/DL (ref 0.6–1.3)
EOSINOPHIL # BLD AUTO: 0 THOUSAND/ÂΜL (ref 0–0.61)
EOSINOPHIL NFR BLD AUTO: 0 % (ref 0–6)
ERYTHROCYTE [DISTWIDTH] IN BLOOD BY AUTOMATED COUNT: 16.5 % (ref 11.6–15.1)
GFR SERPL CREATININE-BSD FRML MDRD: 104 ML/MIN/1.73SQ M
GLUCOSE SERPL-MCNC: 129 MG/DL (ref 65–140)
HCT VFR BLD AUTO: 30.3 % (ref 34.8–46.1)
HGB BLD-MCNC: 10 G/DL (ref 11.5–15.4)
IMM GRANULOCYTES # BLD AUTO: 0.05 THOUSAND/UL (ref 0–0.2)
IMM GRANULOCYTES NFR BLD AUTO: 1 % (ref 0–2)
LYMPHOCYTES # BLD AUTO: 1.73 THOUSANDS/ÂΜL (ref 0.6–4.47)
LYMPHOCYTES NFR BLD AUTO: 35 % (ref 14–44)
MAGNESIUM SERPL-MCNC: 1.8 MG/DL (ref 1.9–2.7)
MCH RBC QN AUTO: 32.2 PG (ref 26.8–34.3)
MCHC RBC AUTO-ENTMCNC: 33 G/DL (ref 31.4–37.4)
MCV RBC AUTO: 97 FL (ref 82–98)
MONOCYTES # BLD AUTO: 0.6 THOUSAND/ÂΜL (ref 0.17–1.22)
MONOCYTES NFR BLD AUTO: 12 % (ref 4–12)
NEUTROPHILS # BLD AUTO: 2.56 THOUSANDS/ÂΜL (ref 1.85–7.62)
NEUTS SEG NFR BLD AUTO: 51 % (ref 43–75)
NRBC BLD AUTO-RTO: 0 /100 WBCS
PLATELET # BLD AUTO: 354 THOUSANDS/UL (ref 149–390)
PMV BLD AUTO: 9.4 FL (ref 8.9–12.7)
POTASSIUM SERPL-SCNC: 3.7 MMOL/L (ref 3.5–5.3)
PROT SERPL-MCNC: 6.6 G/DL (ref 6.4–8.4)
RBC # BLD AUTO: 3.11 MILLION/UL (ref 3.81–5.12)
SODIUM SERPL-SCNC: 140 MMOL/L (ref 135–147)
WBC # BLD AUTO: 4.97 THOUSAND/UL (ref 4.31–10.16)

## 2025-06-23 PROCEDURE — 82550 ASSAY OF CK (CPK): CPT

## 2025-06-23 PROCEDURE — 80053 COMPREHEN METABOLIC PANEL: CPT

## 2025-06-23 PROCEDURE — 85025 COMPLETE CBC W/AUTO DIFF WBC: CPT

## 2025-06-23 PROCEDURE — 83735 ASSAY OF MAGNESIUM: CPT

## 2025-06-23 NOTE — PROGRESS NOTES
Pt here for port labs; labs obtained without difficulty; pt aware of next appt 6/25 at 0900; left unit ambulatory with steady gait.

## 2025-06-24 ENCOUNTER — TELEPHONE (OUTPATIENT)
Age: 65
End: 2025-06-24

## 2025-06-24 RX ORDER — PALONOSETRON 0.05 MG/ML
0.25 INJECTION, SOLUTION INTRAVENOUS ONCE
Status: CANCELLED | OUTPATIENT
Start: 2025-06-25

## 2025-06-24 RX ORDER — SODIUM CHLORIDE 9 MG/ML
20 INJECTION, SOLUTION INTRAVENOUS ONCE
Status: CANCELLED | OUTPATIENT
Start: 2025-06-25

## 2025-06-24 NOTE — TELEPHONE ENCOUNTER
[facility] [place] call back from Dr. Taveras to discuss some clinical recommendations (HCTZ was mentioned) they have for pt but they would like to get Dr. Taveras approval as well. Call back number provided was [phone].

## 2025-06-25 ENCOUNTER — HOSPITAL ENCOUNTER (OUTPATIENT)
Dept: INFUSION CENTER | Facility: HOSPITAL | Age: 65
Discharge: HOME | End: 2025-06-25
Attending: OBSTETRICS & GYNECOLOGY
Payer: COMMERCIAL

## 2025-06-25 VITALS
TEMPERATURE: 97.1 F | WEIGHT: 198.63 LBS | RESPIRATION RATE: 18 BRPM | BODY MASS INDEX: 33.91 KG/M2 | HEIGHT: 64 IN | SYSTOLIC BLOOD PRESSURE: 156 MMHG | OXYGEN SATURATION: 95 % | DIASTOLIC BLOOD PRESSURE: 97 MMHG | HEART RATE: 94 BPM

## 2025-06-25 DIAGNOSIS — G44.89 OTHER HEADACHE SYNDROME: Primary | ICD-10-CM

## 2025-06-25 DIAGNOSIS — C55 UTERINE LEIOMYOSARCOMA (HCC): Primary | ICD-10-CM

## 2025-06-25 DIAGNOSIS — G44.89 OTHER HEADACHE SYNDROME: ICD-10-CM

## 2025-06-25 DIAGNOSIS — C55 UTERINE LEIOMYOSARCOMA (HCC): ICD-10-CM

## 2025-06-25 DIAGNOSIS — T45.1X5A CHEMOTHERAPY INDUCED NEUTROPENIA (HCC): ICD-10-CM

## 2025-06-25 DIAGNOSIS — D70.1 CHEMOTHERAPY INDUCED NEUTROPENIA (HCC): ICD-10-CM

## 2025-06-25 PROCEDURE — 96415 CHEMO IV INFUSION ADDL HR: CPT

## 2025-06-25 PROCEDURE — 96367 TX/PROPH/DG ADDL SEQ IV INF: CPT

## 2025-06-25 PROCEDURE — 96413 CHEMO IV INFUSION 1 HR: CPT

## 2025-06-25 PROCEDURE — 96375 TX/PRO/DX INJ NEW DRUG ADDON: CPT

## 2025-06-25 RX ORDER — ACETAMINOPHEN 325 MG/1
650 TABLET ORAL ONCE
Status: CANCELLED
Start: 2025-06-25 | End: 2025-06-25

## 2025-06-25 RX ORDER — PALONOSETRON 0.05 MG/ML
0.25 INJECTION, SOLUTION INTRAVENOUS ONCE
Status: COMPLETED | OUTPATIENT
Start: 2025-06-25 | End: 2025-06-25

## 2025-06-25 RX ORDER — ACETAMINOPHEN 325 MG/1
650 TABLET ORAL ONCE
Status: COMPLETED | OUTPATIENT
Start: 2025-06-25 | End: 2025-06-25

## 2025-06-25 RX ORDER — SODIUM CHLORIDE 9 MG/ML
20 INJECTION, SOLUTION INTRAVENOUS ONCE
Status: COMPLETED | OUTPATIENT
Start: 2025-06-25 | End: 2025-06-25

## 2025-06-25 RX ORDER — LORAZEPAM 1 MG/1
1 TABLET ORAL EVERY 8 HOURS PRN
Qty: 20 TABLET | Refills: 0 | Status: SHIPPED | OUTPATIENT
Start: 2025-06-25

## 2025-06-25 RX ADMIN — DEXAMETHASONE SODIUM PHOSPHATE 20 MG: 10 INJECTION, SOLUTION INTRAMUSCULAR; INTRAVENOUS at 09:14

## 2025-06-25 RX ADMIN — FOSAPREPITANT 150 MG: 150 INJECTION, POWDER, LYOPHILIZED, FOR SOLUTION INTRAVENOUS at 09:45

## 2025-06-25 RX ADMIN — TRABECTEDIN 1.77 MG: 0.05 INJECTION, POWDER, LYOPHILIZED, FOR SOLUTION INTRAVENOUS at 10:23

## 2025-06-25 RX ADMIN — SODIUM CHLORIDE 20 ML/HR: 0.9 INJECTION, SOLUTION INTRAVENOUS at 09:15

## 2025-06-25 RX ADMIN — ACETAMINOPHEN 650 MG: 325 TABLET, FILM COATED ORAL at 12:11

## 2025-06-25 RX ADMIN — PALONOSETRON 0.25 MG: 0.05 INJECTION, SOLUTION INTRAVENOUS at 09:44

## 2025-06-25 NOTE — PROGRESS NOTES
Felecia Edward  tolerated treatment well with no complications.      Felecia Edward is aware of future appt on 6/26/2025 at 1430.     AVS printed and given to Felecia Edward:  Yes; new schedule received.     Epidermal Autograft Text: The defect edges were debeveled with a #15 scalpel blade.  Given the location of the defect, shape of the defect and the proximity to free margins an epidermal autograft was deemed most appropriate.  Using a sterile surgical marker, the primary defect shape was transferred to the donor site. The epidermal graft was then harvested.  The skin graft was then placed in the primary defect and oriented appropriately.

## 2025-06-26 ENCOUNTER — HOSPITAL ENCOUNTER (OUTPATIENT)
Age: 65
Discharge: HOME | End: 2025-06-26
Attending: OBSTETRICS & GYNECOLOGY
Payer: COMMERCIAL

## 2025-06-26 VITALS — TEMPERATURE: 97.6 F

## 2025-06-26 DIAGNOSIS — T45.1X5A CHEMOTHERAPY INDUCED NEUTROPENIA (HCC): ICD-10-CM

## 2025-06-26 DIAGNOSIS — G44.89 OTHER HEADACHE SYNDROME: Primary | ICD-10-CM

## 2025-06-26 DIAGNOSIS — C55 UTERINE LEIOMYOSARCOMA (HCC): ICD-10-CM

## 2025-06-26 DIAGNOSIS — D70.1 CHEMOTHERAPY INDUCED NEUTROPENIA (HCC): ICD-10-CM

## 2025-06-26 PROCEDURE — 96372 THER/PROPH/DIAG INJ SC/IM: CPT

## 2025-06-26 RX ADMIN — PEGFILGRASTIM-APGF 4 MG: 6 INJECTION, SOLUTION SUBCUTANEOUS at 14:53

## 2025-06-26 NOTE — PROGRESS NOTES
Felecia Edward  tolerated SQ Nyvepira well with no complications.      Felecia Edward is aware of future appt on 6/30 at 10:30am.     AVS Declined. Appointment calendar provided.     Left unit in stable condition.

## 2025-06-30 ENCOUNTER — HOSPITAL ENCOUNTER (OUTPATIENT)
Dept: INFUSION CENTER | Facility: HOSPITAL | Age: 65
Discharge: HOME | End: 2025-06-30
Payer: COMMERCIAL

## 2025-06-30 DIAGNOSIS — Z45.2 ENCOUNTER FOR CENTRAL LINE CARE: Primary | ICD-10-CM

## 2025-06-30 DIAGNOSIS — C55 UTERINE LEIOMYOSARCOMA (HCC): ICD-10-CM

## 2025-06-30 LAB
ALBUMIN SERPL BCG-MCNC: 4.1 G/DL (ref 3.5–5)
ALP SERPL-CCNC: 120 U/L (ref 34–104)
ALT SERPL W P-5'-P-CCNC: 34 U/L (ref 7–52)
ANION GAP SERPL CALCULATED.3IONS-SCNC: 8 MMOL/L (ref 4–13)
AST SERPL W P-5'-P-CCNC: 31 U/L (ref 13–39)
BASOPHILS # BLD AUTO: 0.03 THOUSANDS/ÂΜL (ref 0–0.1)
BASOPHILS NFR BLD AUTO: 0 % (ref 0–1)
BILIRUB SERPL-MCNC: 0.75 MG/DL (ref 0.2–1)
BUN SERPL-MCNC: 15 MG/DL (ref 5–25)
CALCIUM SERPL-MCNC: 9.1 MG/DL (ref 8.4–10.2)
CHLORIDE SERPL-SCNC: 105 MMOL/L (ref 96–108)
CO2 SERPL-SCNC: 28 MMOL/L (ref 21–32)
CREAT SERPL-MCNC: 0.64 MG/DL (ref 0.6–1.3)
EOSINOPHIL # BLD AUTO: 0.01 THOUSAND/ÂΜL (ref 0–0.61)
EOSINOPHIL NFR BLD AUTO: 0 % (ref 0–6)
ERYTHROCYTE [DISTWIDTH] IN BLOOD BY AUTOMATED COUNT: 15.5 % (ref 11.6–15.1)
GFR SERPL CREATININE-BSD FRML MDRD: 93 ML/MIN/1.73SQ M
GLUCOSE SERPL-MCNC: 154 MG/DL (ref 65–140)
HCT VFR BLD AUTO: 35.5 % (ref 34.8–46.1)
HGB BLD-MCNC: 11.5 G/DL (ref 11.5–15.4)
IMM GRANULOCYTES # BLD AUTO: 0.19 THOUSAND/UL (ref 0–0.2)
IMM GRANULOCYTES NFR BLD AUTO: 1 % (ref 0–2)
LYMPHOCYTES # BLD AUTO: 2.2 THOUSANDS/ÂΜL (ref 0.6–4.47)
LYMPHOCYTES NFR BLD AUTO: 13 % (ref 14–44)
MAGNESIUM SERPL-MCNC: 2.1 MG/DL (ref 1.9–2.7)
MCH RBC QN AUTO: 32.3 PG (ref 26.8–34.3)
MCHC RBC AUTO-ENTMCNC: 32.4 G/DL (ref 31.4–37.4)
MCV RBC AUTO: 100 FL (ref 82–98)
MONOCYTES # BLD AUTO: 0.4 THOUSAND/ÂΜL (ref 0.17–1.22)
MONOCYTES NFR BLD AUTO: 2 % (ref 4–12)
NEUTROPHILS # BLD AUTO: 14.48 THOUSANDS/ÂΜL (ref 1.85–7.62)
NEUTS SEG NFR BLD AUTO: 84 % (ref 43–75)
NRBC BLD AUTO-RTO: 0 /100 WBCS
PLATELET # BLD AUTO: 282 THOUSANDS/UL (ref 149–390)
PMV BLD AUTO: 9.2 FL (ref 8.9–12.7)
POTASSIUM SERPL-SCNC: 3.3 MMOL/L (ref 3.5–5.3)
PROT SERPL-MCNC: 7.2 G/DL (ref 6.4–8.4)
RBC # BLD AUTO: 3.56 MILLION/UL (ref 3.81–5.12)
SODIUM SERPL-SCNC: 141 MMOL/L (ref 135–147)
WBC # BLD AUTO: 17.31 THOUSAND/UL (ref 4.31–10.16)

## 2025-06-30 PROCEDURE — 83735 ASSAY OF MAGNESIUM: CPT

## 2025-06-30 PROCEDURE — 80053 COMPREHEN METABOLIC PANEL: CPT

## 2025-06-30 PROCEDURE — 85025 COMPLETE CBC W/AUTO DIFF WBC: CPT

## 2025-06-30 NOTE — PROGRESS NOTES
Felecia Edward  tolerated treatment well with no complications.      Felecia Edward is aware of future appt on 7/7/25 at 1200.     AVS printed and given to Felecia Edward:    No (Declined by Felecia Edward)

## 2025-07-07 ENCOUNTER — HOSPITAL ENCOUNTER (OUTPATIENT)
Dept: INFUSION CENTER | Facility: HOSPITAL | Age: 65
Discharge: HOME | End: 2025-07-07
Payer: COMMERCIAL

## 2025-07-07 DIAGNOSIS — C55 UTERINE LEIOMYOSARCOMA (HCC): ICD-10-CM

## 2025-07-07 DIAGNOSIS — Z45.2 ENCOUNTER FOR CENTRAL LINE CARE: Primary | ICD-10-CM

## 2025-07-07 LAB
ALBUMIN SERPL BCG-MCNC: 4.3 G/DL (ref 3.5–5)
ALP SERPL-CCNC: 107 U/L (ref 34–104)
ALT SERPL W P-5'-P-CCNC: 14 U/L (ref 7–52)
ANION GAP SERPL CALCULATED.3IONS-SCNC: 8 MMOL/L (ref 4–13)
AST SERPL W P-5'-P-CCNC: 16 U/L (ref 13–39)
BASOPHILS # BLD AUTO: 0.01 THOUSANDS/ÂΜL (ref 0–0.1)
BASOPHILS NFR BLD AUTO: 0 % (ref 0–1)
BILIRUB SERPL-MCNC: 0.59 MG/DL (ref 0.2–1)
BUN SERPL-MCNC: 14 MG/DL (ref 5–25)
CALCIUM SERPL-MCNC: 9.2 MG/DL (ref 8.4–10.2)
CHLORIDE SERPL-SCNC: 106 MMOL/L (ref 96–108)
CO2 SERPL-SCNC: 27 MMOL/L (ref 21–32)
CREAT SERPL-MCNC: 0.52 MG/DL (ref 0.6–1.3)
EOSINOPHIL # BLD AUTO: 0.04 THOUSAND/ÂΜL (ref 0–0.61)
EOSINOPHIL NFR BLD AUTO: 1 % (ref 0–6)
ERYTHROCYTE [DISTWIDTH] IN BLOOD BY AUTOMATED COUNT: 15.9 % (ref 11.6–15.1)
GFR SERPL CREATININE-BSD FRML MDRD: 100 ML/MIN/1.73SQ M
GLUCOSE SERPL-MCNC: 197 MG/DL (ref 65–140)
HCT VFR BLD AUTO: 33.7 % (ref 34.8–46.1)
HGB BLD-MCNC: 11 G/DL (ref 11.5–15.4)
IMM GRANULOCYTES # BLD AUTO: 0.06 THOUSAND/UL (ref 0–0.2)
IMM GRANULOCYTES NFR BLD AUTO: 1 % (ref 0–2)
LYMPHOCYTES # BLD AUTO: 2.04 THOUSANDS/ÂΜL (ref 0.6–4.47)
LYMPHOCYTES NFR BLD AUTO: 25 % (ref 14–44)
MAGNESIUM SERPL-MCNC: 1.9 MG/DL (ref 1.9–2.7)
MCH RBC QN AUTO: 32.4 PG (ref 26.8–34.3)
MCHC RBC AUTO-ENTMCNC: 32.6 G/DL (ref 31.4–37.4)
MCV RBC AUTO: 99 FL (ref 82–98)
MONOCYTES # BLD AUTO: 0.45 THOUSAND/ÂΜL (ref 0.17–1.22)
MONOCYTES NFR BLD AUTO: 6 % (ref 4–12)
NEUTROPHILS # BLD AUTO: 5.53 THOUSANDS/ÂΜL (ref 1.85–7.62)
NEUTS SEG NFR BLD AUTO: 67 % (ref 43–75)
NRBC BLD AUTO-RTO: 0 /100 WBCS
PLATELET # BLD AUTO: 264 THOUSANDS/UL (ref 149–390)
PMV BLD AUTO: 9.8 FL (ref 8.9–12.7)
POTASSIUM SERPL-SCNC: 3.4 MMOL/L (ref 3.5–5.3)
PROT SERPL-MCNC: 7 G/DL (ref 6.4–8.4)
RBC # BLD AUTO: 3.4 MILLION/UL (ref 3.81–5.12)
SODIUM SERPL-SCNC: 141 MMOL/L (ref 135–147)
WBC # BLD AUTO: 8.13 THOUSAND/UL (ref 4.31–10.16)

## 2025-07-07 PROCEDURE — 80053 COMPREHEN METABOLIC PANEL: CPT

## 2025-07-07 PROCEDURE — 85025 COMPLETE CBC W/AUTO DIFF WBC: CPT

## 2025-07-07 PROCEDURE — 83735 ASSAY OF MAGNESIUM: CPT

## 2025-07-07 NOTE — PROGRESS NOTES
Felecia Edward  tolerated treatment well with no complications.      Felecia Edward is aware of future appt on 7/14 at 1130.     AVS printed and given to Felecia Edward:   No (Declined by Felecia Edward)

## 2025-07-09 ENCOUNTER — DOCUMENTATION (OUTPATIENT)
Dept: HEMATOLOGY ONCOLOGY | Facility: CLINIC | Age: 65
End: 2025-07-09

## 2025-07-09 NOTE — PROGRESS NOTES
PT provided praveena care application, letter, & SSA award letter.   Still in need of taxes (if PT files), 3 mo bank statements, and confirmation of no other income.    Lisbeth Breaux MPH  Phone: [phone]  Email: [email or web address]

## 2025-07-10 ENCOUNTER — OFFICE VISIT (OUTPATIENT)
Age: 65
End: 2025-07-10
Payer: COMMERCIAL

## 2025-07-10 VITALS
HEART RATE: 113 BPM | BODY MASS INDEX: 33.7 KG/M2 | OXYGEN SATURATION: 97 % | TEMPERATURE: 97.7 F | DIASTOLIC BLOOD PRESSURE: 88 MMHG | HEIGHT: 64 IN | RESPIRATION RATE: 16 BRPM | SYSTOLIC BLOOD PRESSURE: 136 MMHG | WEIGHT: 197.4 LBS

## 2025-07-10 DIAGNOSIS — I50.30 DIASTOLIC CONGESTIVE HEART FAILURE, UNSPECIFIED HF CHRONICITY (HCC): ICD-10-CM

## 2025-07-10 DIAGNOSIS — D61.818 PANCYTOPENIA (HCC): ICD-10-CM

## 2025-07-10 DIAGNOSIS — C55 UTERINE LEIOMYOSARCOMA (HCC): Primary | ICD-10-CM

## 2025-07-10 PROCEDURE — 99214 OFFICE O/P EST MOD 30 MIN: CPT | Performed by: OBSTETRICS & GYNECOLOGY

## 2025-07-10 NOTE — ASSESSMENT & PLAN NOTE
Resolved while off Adriamycin.  Plan to continue G-CSF while on maintenance trabectidin for now.

## 2025-07-10 NOTE — ASSESSMENT & PLAN NOTE
64-year-old with recurrent stage II leiomyosarcoma status post surgical resection 1/6/2025 who completed 5 cycles of Adriamycin and trabectidin is currently on trabectidin maintenance therapy.  She is here prior to cycle 2 of maintenance therapy.  Her performance status is 0.  1.  Continue palliative maintenance trabectidin at 0.9 mg/m² every 21 days with G-CSF support.  2.  CT chest abdomen pelvis in September, 3 months after the June CT scan.  She understands that she will have CT monitoring during maintenance therapy every 3 months.  3.  Return as per chemotherapy calendar.  Orders:    CT chest abdomen pelvis w contrast; Future

## 2025-07-10 NOTE — PROGRESS NOTES
Name: Felecia Edward      : 1960      MRN: [id number]  Encounter Provider: Gordo Taveras MD  Encounter Date: 7/10/2025   Encounter department: [facility] GYNECOLOGY ONCOLOGY [facility]  :  Assessment & Plan  Uterine leiomyosarcoma (HCC)  64-year-old with recurrent stage II leiomyosarcoma status post surgical resection 2025 who completed 5 cycles of Adriamycin and trabectidin is currently on trabectidin maintenance therapy.  She is here prior to cycle 2 of maintenance therapy.  Her performance status is 0.  1.  Continue palliative maintenance trabectidin at 0.9 mg/m² every 21 days with G-CSF support.  2.  CT chest abdomen pelvis in September, 3 months after the  CT scan.  She understands that she will have CT monitoring during maintenance therapy every 3 months.  3.  Return as per chemotherapy calendar.  Orders:    CT chest abdomen pelvis w contrast; Future    Pancytopenia (HCC)  Resolved while off Adriamycin.  Plan to continue G-CSF while on maintenance trabectidin for now.       Diastolic congestive heart failure, unspecified HF chronicity (HCC)  Wt Readings from Last 3 Encounters:   07/10/25 89.5 kg (197 lb 6.4 oz)   25 90.1 kg (198 lb 10.2 oz)   25 91.8 kg (202 lb 6.4 oz)   Continue to monitor.  Last echocardiogram in May 2025 revealed an EF of 60%.  She is asymptomatic.                   Assessment & Plan            History of Present Illness   Reason for Visit / CC: Prechemotherapy evaluation   Felecia Edward is a 64 y.o. female who returns for evaluation while on maintenance trabectidin therapy.  Labs from 2025 revealed a total white blood cell count of 8.13, hemoglobin 11 g/dL, platelet count 264.  CMP was normal with the exception of a potassium of 3.4 mmol/L and glucose of 197 mg/dL.  She is doing well.  She is considering going back to work part-time.  She feels much better off the Adriamycin.  She has been tolerating the G-CSF  injections well.  No nausea, vomiting.  She is still somewhat fatigued.  No other interval change in medications or medical history since her last visit.  History of Present Illness    Pertinent Medical History     Type 2 diabetes           Oncology History   Cancer Staging   Uterine leiomyosarcoma (HCC)  Staging form: Corpus Uteri - Sarcoma, AJCC 8th Edition  - Pathologic stage from 6/4/2023: FIGO Stage II, calculated as Stage Unknown (pT2, pNX, cM0) - Signed by Gordo Taveras MD on 6/29/2023  Stage prefix: Initial diagnosis  Oncology History   Uterine leiomyosarcoma (HCC)   6/4/2023 Initial Diagnosis    Uterine sarcoma (HCC)     6/4/2023 -  Cancer Staged    Staging form: Corpus Uteri - Sarcoma, AJCC 8th Edition  - Pathologic stage from 6/4/2023: FIGO Stage II, calculated as Stage Unknown (pT2, pNX, cM0) - Signed by Gordo Taveras MD on 6/29/2023  Stage prefix: Initial diagnosis       6/4/2023 Surgery    HYSTERECTOMY TOTAL ABDOMINAL (DOROTHY). BILATERAL SALPINGOOPHORECTOMY  EXCISION  BIOPSY LESION/MASS ABDOMINAL-PELVIC PERITONEUM  -Leiomyosarcoma 13.5 cm extending through the myometrium, right pelvic peritoneum involved with uterine leiomyosarcoma with tumor present at unoriented resection surface.     7/11/2023 -  Chemotherapy    Gemzar 800 mg/m2 IV day 1 and 8 as well as taxotere 65 mg/m2 day 8 every 21 days.        - 12/1/2023 Chemotherapy    Adriamycin 50 mg/m2 IV every 21 days.  Including the gemcitabine and Taxotere, she completed a total of 7 cycles.     1/6/2025 Surgery    ROBOTIC ASSISTED RADICAL BILATERAL PARAMETRECTOMY. RESECTION BILATERAL PARAMETRIAL TUMORS. UPPER VAGINECTOMY. LEFT OBTURATOR LYMPH NODE DISSECTION  CYSTOSCOPY     3/12/2025 - 6/4/2025 Chemotherapy    Adriamycin 45 mg/m2 and trabectidin 0.9 mg/m2 IV every 21 days.  Dose was reduced due to pancytopenia, fatigue, weakness.  She completed 5 cycles of treatment.       6/25/2025 -  Chemotherapy    Maintenance trabectidin at 0.9  "mg/m² IV every 21 days with G-CSF support        Review of Systems   Constitutional:  Positive for fatigue. Negative for activity change and unexpected weight change.   HENT: Negative.     Eyes: Negative.    Respiratory: Negative.     Cardiovascular: Negative.    Gastrointestinal:  Negative for abdominal distention and abdominal pain.   Endocrine: Negative.    Genitourinary:  Negative for pelvic pain and vaginal bleeding.   Musculoskeletal: Negative.    Skin: Negative.    Allergic/Immunologic: Negative.    Neurological: Negative.    Hematological: Negative.    Psychiatric/Behavioral: Negative.      A complete review of systems is negative other than that noted above in the HPI.  Medications Ordered Prior to Encounter[1]      Objective   /88 (BP Location: Left arm, Patient Position: Sitting, Cuff Size: Large)   Pulse (!) 113   Temp 97.7 °F (36.5 °C) (Temporal)   Resp 16   Ht 5' 4.02\" (1.626 m)   Wt 89.5 kg (197 lb 6.4 oz)   SpO2 97%   BMI 33.86 kg/m²     Body mass index is 33.86 kg/m².  Pain Screening:  Pain Score: 0-No pain  ECOG   0  Physical Exam  Vitals reviewed.   Constitutional:       General: She is not in acute distress.     Appearance: Normal appearance. She is not ill-appearing.   HENT:      Head: Normocephalic and atraumatic.      Mouth/Throat:      Mouth: Mucous membranes are moist.     Eyes:      General: No scleral icterus.        Right eye: No discharge.         Left eye: No discharge.      Conjunctiva/sclera: Conjunctivae normal.     Pulmonary:      Effort: Pulmonary effort is normal.     Musculoskeletal:      Right lower leg: No edema.      Left lower leg: No edema.     Skin:     General: Skin is warm and dry.      Coloration: Skin is not jaundiced.      Findings: No rash.     Neurological:      General: No focal deficit present.      Mental Status: She is alert and oriented to person, place, and time.      Cranial Nerves: No cranial nerve deficit.      Sensory: No sensory deficit.      " "Motor: No weakness.      Gait: Gait normal.     Psychiatric:         Mood and Affect: Mood normal.         Behavior: Behavior normal.         Thought Content: Thought content normal.         Judgment: Judgment normal.       Physical Exam       Results    Labs: I have reviewed pertinent labs.   No results found for: \"\"  Lab Results   Component Value Date/Time    Potassium 3.4 (L) 07/07/2025 12:04 PM    Chloride 106 07/07/2025 12:04 PM    CO2 27 07/07/2025 12:04 PM    BUN 14 07/07/2025 12:04 PM    Creatinine 0.52 (L) 07/07/2025 12:04 PM    Glucose, Fasting 168 (H) 01/19/2025 11:17 AM    Calcium 9.2 07/07/2025 12:04 PM    AST 16 07/07/2025 12:04 PM    ALT 14 07/07/2025 12:04 PM    Alkaline Phosphatase 107 (H) 07/07/2025 12:04 PM    eGFR 100 07/07/2025 12:04 PM     Lab Results   Component Value Date/Time    WBC 8.13 07/07/2025 12:04 PM    Hemoglobin 11.0 (L) 07/07/2025 12:04 PM    Hematocrit 33.7 (L) 07/07/2025 12:04 PM    MCV 99 (H) 07/07/2025 12:04 PM    Platelets 264 07/07/2025 12:04 PM     Lab Results   Component Value Date/Time    Absolute Neutrophils 5.53 07/07/2025 12:04 PM        Trend:  No results found for: \"\"      Other Study Results Review : Other studies reviewed include: Echocardiogram           [1]   Current Outpatient Medications on File Prior to Visit   Medication Sig Dispense Refill    aspirin (Aspirin 81) 81 mg chewable tablet every 24 hours      atorvastatin (LIPITOR) 40 mg tablet Take 40 mg by mouth daily at bedtime      betamethasone, augmented, (DIPROLENE) 0.05 % lotion Apply topically 2 (two) times a day 60 mL 1    cetirizine (ZyrTEC) 10 mg tablet Take 10 mg by mouth daily at bedtime      cholecalciferol (VITAMIN D3) 1,000 units tablet Take 2,000 Units by mouth in the morning. 2 daily .      clindamycin (CLEOCIN T) 1 % external solution Apply topically 2 (two) times a day 60 mL 1    dicyclomine (BENTYL) 10 mg capsule Take 1 capsule (10 mg total) by mouth every 6 (six) hours as " needed (pain) 30 capsule 3    DULoxetine (CYMBALTA) 30 mg delayed release capsule       glimepiride (AMARYL) 4 mg tablet Take 4 mg by mouth in the morning and 4 mg in the evening.      LORazepam (ATIVAN) 1 mg tablet Take 1 tablet (1 mg total) by mouth every 8 (eight) hours as needed (nausea or anxiety) 20 tablet 0    losartan (COZAAR) 50 mg tablet Take 50 mg by mouth in the morning.      meclizine (ANTIVERT) 25 mg tablet Take by mouth every 12 (twelve) hours as needed for dizziness      methenamine hippurate (HIPREX) 1 g tablet Take 1 tablet (1 g total) by mouth 2 (two) times a day with meals 30 tablet 0    metoprolol succinate (TOPROL-XL) 25 mg 24 hr tablet Take 25 mg by mouth in the morning.      Multiple Vitamin (MULTIVITAMIN) tablet Take 1 tablet by mouth in the morning.      ondansetron (ZOFRAN) 8 mg tablet TAKE 1 TABLET BY MOUTH EVERY 8 HOURS AS NEEDED FOR NAUSEA OR VOMITING. 30 tablet 0    pantoprazole (PROTONIX) 40 mg tablet Take 1 tablet (40 mg total) by mouth daily 90 tablet 1    phenazopyridine (PYRIDIUM) 200 mg tablet Take 1 tablet (200 mg total) by mouth 3 (three) times a day as needed (Dysuria and bladder discomfort.  Take with meals.) 10 tablet 0    pioglitazone (ACTOS) 30 mg tablet every 24 hours      potassium chloride (Klor-Con M20) 20 mEq tablet TAKE 1 TABLET BY MOUTH 2 TIMES A DAY. 180 tablet 1    promethazine (PHENERGAN) 25 mg tablet Take 1 tablet (25 mg total) by mouth every 6 (six) hours as needed for nausea or vomiting 30 tablet 0    rOPINIRole (REQUIP) 1 mg tablet       sertraline (ZOLOFT) 100 mg tablet Take 1.5 tablets by mouth daily at bedtime      tamsulosin (FLOMAX) 0.4 mg Take 1 capsule (0.4 mg total) by mouth every evening for 14 days 14 capsule 0    topiramate (TOPAMAX) 100 mg tablet       Triamcinolone Acetonide 55 MCG/ACT AERO into each nostril One spray each nostril daily      Xiidra 5 % op solution       ZOLMitriptan (ZOMIG) 5 MG tablet Take 5 mg by mouth once as needed for  migraine Daily prn      oxyCODONE (Roxicodone) 5 immediate release tablet Take 1 tablet (5 mg total) by mouth every 6 (six) hours as needed for moderate pain for up to 8 doses Max Daily Amount: 20 mg (Patient not taking: Reported on 5/14/2025) 8 tablet 0     No current facility-administered medications on file prior to visit.

## 2025-07-14 ENCOUNTER — HOSPITAL ENCOUNTER (OUTPATIENT)
Dept: INFUSION CENTER | Facility: HOSPITAL | Age: 65
Discharge: HOME | End: 2025-07-14
Payer: COMMERCIAL

## 2025-07-14 DIAGNOSIS — Z45.2 ENCOUNTER FOR CENTRAL LINE CARE: Primary | ICD-10-CM

## 2025-07-14 DIAGNOSIS — C55 UTERINE LEIOMYOSARCOMA (HCC): ICD-10-CM

## 2025-07-14 LAB
ALBUMIN SERPL BCG-MCNC: 4.4 G/DL (ref 3.5–5)
ALP SERPL-CCNC: 95 U/L (ref 34–104)
ALT SERPL W P-5'-P-CCNC: 13 U/L (ref 7–52)
ANION GAP SERPL CALCULATED.3IONS-SCNC: 9 MMOL/L (ref 4–13)
AST SERPL W P-5'-P-CCNC: 18 U/L (ref 13–39)
BASOPHILS # BLD AUTO: 0.01 THOUSANDS/ÂΜL (ref 0–0.1)
BASOPHILS NFR BLD AUTO: 0 % (ref 0–1)
BILIRUB SERPL-MCNC: 0.82 MG/DL (ref 0.2–1)
BUN SERPL-MCNC: 15 MG/DL (ref 5–25)
CALCIUM SERPL-MCNC: 9.5 MG/DL (ref 8.4–10.2)
CHLORIDE SERPL-SCNC: 105 MMOL/L (ref 96–108)
CK SERPL-CCNC: 27 U/L (ref 26–192)
CO2 SERPL-SCNC: 27 MMOL/L (ref 21–32)
CREAT SERPL-MCNC: 0.65 MG/DL (ref 0.6–1.3)
EOSINOPHIL # BLD AUTO: 0.1 THOUSAND/ÂΜL (ref 0–0.61)
EOSINOPHIL NFR BLD AUTO: 2 % (ref 0–6)
ERYTHROCYTE [DISTWIDTH] IN BLOOD BY AUTOMATED COUNT: 15.3 % (ref 11.6–15.1)
GFR SERPL CREATININE-BSD FRML MDRD: 93 ML/MIN/1.73SQ M
GLUCOSE SERPL-MCNC: 131 MG/DL (ref 65–140)
HCT VFR BLD AUTO: 33.9 % (ref 34.8–46.1)
HGB BLD-MCNC: 11 G/DL (ref 11.5–15.4)
IMM GRANULOCYTES # BLD AUTO: 0.02 THOUSAND/UL (ref 0–0.2)
IMM GRANULOCYTES NFR BLD AUTO: 0 % (ref 0–2)
LYMPHOCYTES # BLD AUTO: 2.1 THOUSANDS/ÂΜL (ref 0.6–4.47)
LYMPHOCYTES NFR BLD AUTO: 31 % (ref 14–44)
MAGNESIUM SERPL-MCNC: 1.8 MG/DL (ref 1.9–2.7)
MCH RBC QN AUTO: 31.9 PG (ref 26.8–34.3)
MCHC RBC AUTO-ENTMCNC: 32.4 G/DL (ref 31.4–37.4)
MCV RBC AUTO: 98 FL (ref 82–98)
MONOCYTES # BLD AUTO: 0.48 THOUSAND/ÂΜL (ref 0.17–1.22)
MONOCYTES NFR BLD AUTO: 7 % (ref 4–12)
NEUTROPHILS # BLD AUTO: 4.13 THOUSANDS/ÂΜL (ref 1.85–7.62)
NEUTS SEG NFR BLD AUTO: 60 % (ref 43–75)
NRBC BLD AUTO-RTO: 0 /100 WBCS
PLATELET # BLD AUTO: 277 THOUSANDS/UL (ref 149–390)
PMV BLD AUTO: 9.7 FL (ref 8.9–12.7)
POTASSIUM SERPL-SCNC: 3.4 MMOL/L (ref 3.5–5.3)
PROT SERPL-MCNC: 7.2 G/DL (ref 6.4–8.4)
RBC # BLD AUTO: 3.45 MILLION/UL (ref 3.81–5.12)
SODIUM SERPL-SCNC: 141 MMOL/L (ref 135–147)
WBC # BLD AUTO: 6.84 THOUSAND/UL (ref 4.31–10.16)

## 2025-07-14 PROCEDURE — 83735 ASSAY OF MAGNESIUM: CPT

## 2025-07-14 PROCEDURE — 85025 COMPLETE CBC W/AUTO DIFF WBC: CPT

## 2025-07-14 PROCEDURE — 80053 COMPREHEN METABOLIC PANEL: CPT

## 2025-07-14 PROCEDURE — 82550 ASSAY OF CK (CPK): CPT

## 2025-07-15 RX ORDER — SODIUM CHLORIDE 9 MG/ML
20 INJECTION, SOLUTION INTRAVENOUS ONCE
Status: CANCELLED | OUTPATIENT
Start: 2025-07-16

## 2025-07-15 RX ORDER — PALONOSETRON 0.05 MG/ML
0.25 INJECTION, SOLUTION INTRAVENOUS ONCE
Status: CANCELLED | OUTPATIENT
Start: 2025-07-16

## 2025-07-16 ENCOUNTER — HOSPITAL ENCOUNTER (OUTPATIENT)
Dept: INFUSION CENTER | Facility: HOSPITAL | Age: 65
Discharge: HOME | End: 2025-07-16
Attending: OBSTETRICS & GYNECOLOGY
Payer: COMMERCIAL

## 2025-07-16 VITALS
HEIGHT: 64 IN | OXYGEN SATURATION: 96 % | SYSTOLIC BLOOD PRESSURE: 138 MMHG | RESPIRATION RATE: 16 BRPM | BODY MASS INDEX: 33.5 KG/M2 | HEART RATE: 101 BPM | WEIGHT: 196.21 LBS | TEMPERATURE: 97 F | DIASTOLIC BLOOD PRESSURE: 84 MMHG

## 2025-07-16 DIAGNOSIS — T45.1X5A CHEMOTHERAPY INDUCED NEUTROPENIA (HCC): ICD-10-CM

## 2025-07-16 DIAGNOSIS — C55 UTERINE LEIOMYOSARCOMA (HCC): Primary | ICD-10-CM

## 2025-07-16 DIAGNOSIS — D70.1 CHEMOTHERAPY INDUCED NEUTROPENIA (HCC): ICD-10-CM

## 2025-07-16 DIAGNOSIS — G44.89 OTHER HEADACHE SYNDROME: ICD-10-CM

## 2025-07-16 PROCEDURE — 96413 CHEMO IV INFUSION 1 HR: CPT

## 2025-07-16 PROCEDURE — 96415 CHEMO IV INFUSION ADDL HR: CPT

## 2025-07-16 PROCEDURE — 96375 TX/PRO/DX INJ NEW DRUG ADDON: CPT

## 2025-07-16 PROCEDURE — 96367 TX/PROPH/DG ADDL SEQ IV INF: CPT

## 2025-07-16 RX ORDER — SODIUM CHLORIDE 9 MG/ML
20 INJECTION, SOLUTION INTRAVENOUS ONCE
Status: COMPLETED | OUTPATIENT
Start: 2025-07-16 | End: 2025-07-16

## 2025-07-16 RX ORDER — PALONOSETRON 0.05 MG/ML
0.25 INJECTION, SOLUTION INTRAVENOUS ONCE
Status: COMPLETED | OUTPATIENT
Start: 2025-07-16 | End: 2025-07-16

## 2025-07-16 RX ADMIN — SODIUM CHLORIDE 20 ML/HR: 9 INJECTION, SOLUTION INTRAVENOUS at 09:20

## 2025-07-16 RX ADMIN — FOSAPREPITANT 150 MG: 150 INJECTION, POWDER, LYOPHILIZED, FOR SOLUTION INTRAVENOUS at 09:45

## 2025-07-16 RX ADMIN — DEXAMETHASONE SODIUM PHOSPHATE 20 MG: 10 INJECTION, SOLUTION INTRAMUSCULAR; INTRAVENOUS at 09:20

## 2025-07-16 RX ADMIN — PALONOSETRON 0.25 MG: 0.05 INJECTION, SOLUTION INTRAVENOUS at 09:22

## 2025-07-16 RX ADMIN — TRABECTEDIN 1.76 MG: 0.05 INJECTION, POWDER, LYOPHILIZED, FOR SOLUTION INTRAVENOUS at 10:24

## 2025-07-16 NOTE — PROGRESS NOTES
Felecia Edward  tolerated treatment well with no complications.      Felecia Edward is aware of future appt on 07/17/2025 at 02:30 PM.     AVS printed and given to Felecia Edward:  No (Declined by Felecia Edward)

## 2025-07-17 ENCOUNTER — HOSPITAL ENCOUNTER (OUTPATIENT)
Dept: INFUSION CENTER | Facility: HOSPITAL | Age: 65
Discharge: HOME | End: 2025-07-17
Attending: OBSTETRICS & GYNECOLOGY
Payer: COMMERCIAL

## 2025-07-17 VITALS — TEMPERATURE: 98.1 F

## 2025-07-17 DIAGNOSIS — T45.1X5A CHEMOTHERAPY INDUCED NEUTROPENIA (HCC): ICD-10-CM

## 2025-07-17 DIAGNOSIS — G44.89 OTHER HEADACHE SYNDROME: Primary | ICD-10-CM

## 2025-07-17 DIAGNOSIS — C55 UTERINE LEIOMYOSARCOMA (HCC): ICD-10-CM

## 2025-07-17 DIAGNOSIS — D70.1 CHEMOTHERAPY INDUCED NEUTROPENIA (HCC): ICD-10-CM

## 2025-07-17 PROCEDURE — 96372 THER/PROPH/DIAG INJ SC/IM: CPT

## 2025-07-17 RX ADMIN — PEGFILGRASTIM-APGF 4 MG: 6 INJECTION, SOLUTION SUBCUTANEOUS at 14:23

## 2025-07-18 ENCOUNTER — TELEPHONE (OUTPATIENT)
Dept: NUTRITION | Facility: HOSPITAL | Age: 65
End: 2025-07-18

## 2025-07-18 NOTE — TELEPHONE ENCOUNTER
Received notification by infusion RN (Kailey Castro) on 7/17 that pt has triggered for oncology nutrition care (reason for referral: patient request due to eating a lot but losing weight).    Contacted Felecia today to establish care.  No answer.  Left voice message with the reason for today's call and this RD’s contact information asking that Felecia call back as able/desired.

## 2025-07-22 ENCOUNTER — HOSPITAL ENCOUNTER (OUTPATIENT)
Age: 65
Discharge: HOME | End: 2025-07-22
Payer: COMMERCIAL

## 2025-07-22 DIAGNOSIS — C55 UTERINE LEIOMYOSARCOMA (HCC): ICD-10-CM

## 2025-07-22 DIAGNOSIS — Z45.2 ENCOUNTER FOR CENTRAL LINE CARE: Primary | ICD-10-CM

## 2025-07-22 LAB
ALBUMIN SERPL BCG-MCNC: 4.3 G/DL (ref 3.5–5.2)
ALP SERPL-CCNC: 133 U/L (ref 35–160)
ALT SERPL W P-5'-P-CCNC: 27 U/L (ref 5–33)
ANION GAP SERPL CALCULATED.3IONS-SCNC: 14 MMOL/L (ref 7–16)
AST SERPL W P-5'-P-CCNC: 30 U/L (ref 5–32)
BASOPHILS # BLD AUTO: 0.03 THOUSANDS/ÂΜL (ref 0–0.1)
BASOPHILS NFR BLD AUTO: 0 % (ref 0–1)
BILIRUB SERPL-MCNC: 0.46 MG/DL (ref 0.2–1.2)
BUN SERPL-MCNC: 12 MG/DL (ref 8–23)
CALCIUM SERPL-MCNC: 9.7 MG/DL (ref 8.6–10.2)
CHLORIDE SERPL-SCNC: 105 MMOL/L (ref 98–107)
CO2 SERPL-SCNC: 23 MMOL/L (ref 22–29)
CREAT SERPL-MCNC: 0.48 MG/DL (ref 0.5–0.9)
EOSINOPHIL # BLD AUTO: 0.13 THOUSAND/ÂΜL (ref 0–0.61)
EOSINOPHIL NFR BLD AUTO: 1 % (ref 0–6)
ERYTHROCYTE [DISTWIDTH] IN BLOOD BY AUTOMATED COUNT: 15 % (ref 11.6–15.1)
GFR SERPL CREATININE-BSD FRML MDRD: 103 ML/MIN/1.73SQ M
GLUCOSE SERPL-MCNC: 164 MG/DL (ref 65–140)
HCT VFR BLD AUTO: 33.4 % (ref 34.8–46.1)
HGB BLD-MCNC: 11.1 G/DL (ref 11.5–15.4)
IMM GRANULOCYTES # BLD AUTO: 0.1 THOUSAND/UL (ref 0–0.2)
IMM GRANULOCYTES NFR BLD AUTO: 1 % (ref 0–2)
LYMPHOCYTES # BLD AUTO: 2.38 THOUSANDS/ÂΜL (ref 0.6–4.47)
LYMPHOCYTES NFR BLD AUTO: 21 % (ref 14–44)
MAGNESIUM SERPL-MCNC: 2.1 MG/DL (ref 1.6–2.6)
MCH RBC QN AUTO: 32.6 PG (ref 26.8–34.3)
MCHC RBC AUTO-ENTMCNC: 33.2 G/DL (ref 31.4–37.4)
MCV RBC AUTO: 98 FL (ref 82–98)
MONOCYTES # BLD AUTO: 0.57 THOUSAND/ÂΜL (ref 0.17–1.22)
MONOCYTES NFR BLD AUTO: 5 % (ref 4–12)
NEUTROPHILS # BLD AUTO: 8.28 THOUSANDS/ÂΜL (ref 1.85–7.62)
NEUTS SEG NFR BLD AUTO: 72 % (ref 43–75)
NRBC BLD AUTO-RTO: 0 /100 WBCS
PLATELET # BLD AUTO: 212 THOUSANDS/UL (ref 149–390)
PMV BLD AUTO: 9.9 FL (ref 8.9–12.7)
POTASSIUM SERPL-SCNC: 3.4 MMOL/L (ref 3.5–5.1)
PROT SERPL-MCNC: 6.7 G/DL (ref 6.4–8.3)
RBC # BLD AUTO: 3.41 MILLION/UL (ref 3.81–5.12)
SODIUM SERPL-SCNC: 142 MMOL/L (ref 136–145)
WBC # BLD AUTO: 11.49 THOUSAND/UL (ref 4.31–10.16)

## 2025-07-22 PROCEDURE — 85025 COMPLETE CBC W/AUTO DIFF WBC: CPT

## 2025-07-22 PROCEDURE — 80053 COMPREHEN METABOLIC PANEL: CPT

## 2025-07-22 PROCEDURE — 83735 ASSAY OF MAGNESIUM: CPT

## 2025-07-22 NOTE — PROGRESS NOTES
Felecia Edward  tolerated treatment well with no complications.      Felecia Edward is aware of future appt on 7/28 at 1200.     AVS printed and given to Felecia Edward:    No (Declined by Felecia Edward)

## 2025-07-24 NOTE — TELEPHONE ENCOUNTER
Pt called RD back and left message. She states she will be at [facility] on 8/6 and would like for me to see her there. Will schedule appt and call her back to confirm.

## 2025-07-25 ENCOUNTER — OFFICE VISIT (OUTPATIENT)
Dept: GASTROENTEROLOGY | Facility: CLINIC | Age: 65
End: 2025-07-25
Payer: COMMERCIAL

## 2025-07-25 ENCOUNTER — PATIENT MESSAGE (OUTPATIENT)
Dept: UROLOGY | Facility: HOSPITAL | Age: 65
End: 2025-07-25

## 2025-07-25 VITALS
HEIGHT: 64 IN | BODY MASS INDEX: 34.18 KG/M2 | SYSTOLIC BLOOD PRESSURE: 130 MMHG | WEIGHT: 200.2 LBS | DIASTOLIC BLOOD PRESSURE: 80 MMHG

## 2025-07-25 DIAGNOSIS — K21.9 GERD WITHOUT ESOPHAGITIS: ICD-10-CM

## 2025-07-25 DIAGNOSIS — C55 UTERINE LEIOMYOSARCOMA (HCC): ICD-10-CM

## 2025-07-25 DIAGNOSIS — K58.9 IRRITABLE BOWEL SYNDROME, UNSPECIFIED TYPE: ICD-10-CM

## 2025-07-25 DIAGNOSIS — R10.13 EPIGASTRIC PAIN: ICD-10-CM

## 2025-07-25 DIAGNOSIS — K30 FUNCTIONAL DYSPEPSIA: Primary | ICD-10-CM

## 2025-07-25 PROCEDURE — 99213 OFFICE O/P EST LOW 20 MIN: CPT | Performed by: INTERNAL MEDICINE

## 2025-07-25 RX ORDER — ONDANSETRON 8 MG/1
8 TABLET, FILM COATED ORAL EVERY 8 HOURS PRN
Qty: 30 TABLET | Refills: 5 | Status: SHIPPED | OUTPATIENT
Start: 2025-07-25

## 2025-07-25 RX ORDER — PANTOPRAZOLE SODIUM 40 MG/1
40 TABLET, DELAYED RELEASE ORAL 2 TIMES DAILY
Qty: 180 TABLET | Refills: 5 | Status: SHIPPED | OUTPATIENT
Start: 2025-07-25

## 2025-07-25 NOTE — ASSESSMENT & PLAN NOTE
Follows with Dr. Taveras.  Continue chemotherapy  Orders:    ondansetron (ZOFRAN) 8 mg tablet; Take 1 tablet (8 mg total) by mouth every 8 (eight) hours as needed for nausea or vomiting

## 2025-07-25 NOTE — LETTER
2025     Clarice Acuna MD  [address]  [facility] [address]    Patient: Felecia Edward   YOB: 1960   Date of Visit: 2025       Dear MD Gordo Covington MD:    Thank you for referring Felecia Edward to me for evaluation. Below are my notes for this consultation.    If you have questions, please do not hesitate to call me. I look forward to following your patient along with you.         Sincerely,        Vignesh Shelton MD        CC: MD Vignesh Gillette MD  2025  2:26 PM  Sign when Signing Visit  Name: Felecia Edward      : 1960      MRN: [id number]  Encounter Provider: Vignesh Shelton MD  Encounter Date: 2025   Encounter department: [facility] GASTROENTEROLOGY SPECIALISTS  :  Assessment & Plan  Functional dyspepsia  GERD without esophagitis  Epigastric pain  Some more upper GI dyspepsia.  Taking pantoprazole once a day.  Has Zofran from oncology which seems to help  -Increase pantoprazole to twice a day  - Use Zofran as needed we will see what the sugar looks like tonight      Orders:  •  pantoprazole (PROTONIX) 40 mg tablet; Take 1 tablet (40 mg total) by mouth 2 (two) times a day    Uterine leiomyosarcoma (HCC)  Follows with Dr. Taveras.  Continue chemotherapy  Orders:  •  ondansetron (ZOFRAN) 8 mg tablet; Take 1 tablet (8 mg total) by mouth every 8 (eight) hours as needed for nausea or vomiting        History of Present Illness  Felecia Edward is a 64 y.o. female who presents for follow-up.  She has a history of GERD and functional dyspepsia.  She has been on pantoprazole 40 mg daily.  Her last EGD was May 2024.  She reports that she is having some increasing dyspeptic symptoms she continues with chemotherapy for her endometrial cancer.  She reports some heartburn.  She denies any dysphagia, odynophagia, or early satiety.  She is moving her bowels regularly.  She denies any GI bleeding.   "Her weight is increasing.  HPI  History obtained from: patient  Review of Systems A complete review of systems is negative other than that noted above in the HPI.      Current Medications[1]  Objective  /80   Ht 5' 4.02\" (1.626 m)   Wt 90.8 kg (200 lb 3.2 oz)   BMI 34.34 kg/m²     Physical Exam   General appearance: alert, appears stated age and cooperative  Eyes: PERLLA, EOMI, no icterus   Head: Normocephalic, without obvious abnormality, atraumatic  Lungs: clear to auscultation bilaterally  Heart: regular rate and rhythm, S1, S2 normal, no murmur, click, rub or gallop  Abdomen: soft, mild diffuse tenderness without rebound or guarding; bowel sounds normal; no masses,  no organomegaly  Extremities: extremities normal, atraumatic, no cyanosis or edema  Neurologic: Grossly normal        Lab Results: I personally reviewed relevant lab results.       Results for orders placed during the hospital encounter of 08/12/24    EGD    Impression  Mild antral gastritis.  Biopsies taken for H. Pylori  Normal esophagus  Normal duodenum    RECOMMENDATION:  Resume regular diet and medications  Will call biopsy results in 1 to 2 weeks  Follow-up in the office 3 to 4 months        Vignesh Shelton MD           [1]   Current Outpatient Medications   Medication Sig Dispense Refill   • aspirin (Aspirin 81) 81 mg chewable tablet every 24 hours     • atorvastatin (LIPITOR) 40 mg tablet Take 40 mg by mouth daily at bedtime     • betamethasone, augmented, (DIPROLENE) 0.05 % lotion Apply topically 2 (two) times a day (Patient taking differently: Apply topically 2 (two) times a day as needed) 60 mL 1   • cetirizine (ZyrTEC) 10 mg tablet Take 10 mg by mouth daily at bedtime     • cholecalciferol (VITAMIN D3) 1,000 units tablet Take 2,000 Units by mouth in the morning. 2 daily .     • clindamycin (CLEOCIN T) 1 % external solution Apply topically 2 (two) times a day (Patient taking differently: Apply topically 2 (two) times a day as " needed) 60 mL 1   • dicyclomine (BENTYL) 10 mg capsule Take 1 capsule (10 mg total) by mouth every 6 (six) hours as needed (pain) 30 capsule 3   • DULoxetine (CYMBALTA) 30 mg delayed release capsule      • glimepiride (AMARYL) 4 mg tablet Take 4 mg by mouth in the morning and 4 mg in the evening.     • LORazepam (ATIVAN) 1 mg tablet Take 1 tablet (1 mg total) by mouth every 8 (eight) hours as needed (nausea or anxiety) 20 tablet 0   • losartan (COZAAR) 50 mg tablet Take 50 mg by mouth in the morning.     • meclizine (ANTIVERT) 25 mg tablet Take by mouth every 12 (twelve) hours as needed for dizziness     • methenamine hippurate (HIPREX) 1 g tablet Take 1 tablet (1 g total) by mouth 2 (two) times a day with meals 30 tablet 0   • metoprolol succinate (TOPROL-XL) 25 mg 24 hr tablet Take 25 mg by mouth in the morning.     • Multiple Vitamin (MULTIVITAMIN) tablet Take 1 tablet by mouth in the morning.     • ondansetron (ZOFRAN) 8 mg tablet Take 1 tablet (8 mg total) by mouth every 8 (eight) hours as needed for nausea or vomiting 30 tablet 5   • pantoprazole (PROTONIX) 40 mg tablet Take 1 tablet (40 mg total) by mouth 2 (two) times a day 180 tablet 5   • phenazopyridine (PYRIDIUM) 200 mg tablet Take 1 tablet (200 mg total) by mouth 3 (three) times a day as needed (Dysuria and bladder discomfort.  Take with meals.) 10 tablet 0   • pioglitazone (ACTOS) 30 mg tablet every 24 hours     • potassium chloride (Klor-Con M20) 20 mEq tablet TAKE 1 TABLET BY MOUTH 2 TIMES A DAY. 180 tablet 1   • promethazine (PHENERGAN) 25 mg tablet Take 1 tablet (25 mg total) by mouth every 6 (six) hours as needed for nausea or vomiting 30 tablet 0   • rOPINIRole (REQUIP) 1 mg tablet      • sertraline (ZOLOFT) 100 mg tablet Take 1.5 tablets by mouth daily at bedtime     • topiramate (TOPAMAX) 100 mg tablet      • Triamcinolone Acetonide 55 MCG/ACT AERO into each nostril One spray each nostril daily     • Xiidra 5 % op solution      • ZOLMitriptan  (ZOMIG) 5 MG tablet Take 5 mg by mouth once as needed for migraine Daily prn     • oxyCODONE (Roxicodone) 5 immediate release tablet Take 1 tablet (5 mg total) by mouth every 6 (six) hours as needed for moderate pain for up to 8 doses Max Daily Amount: 20 mg (Patient not taking: Reported on 5/14/2025) 8 tablet 0   • tamsulosin (FLOMAX) 0.4 mg Take 1 capsule (0.4 mg total) by mouth every evening for 14 days 14 capsule 0     No current facility-administered medications for this visit.

## 2025-07-25 NOTE — PROGRESS NOTES
"Name: Felecia Edward      : 1960      MRN: [id number]  Encounter Provider: Vignesh Shelton MD  Encounter Date: 2025   Encounter department: [facility] GASTROENTEROLOGY SPECIAL[facility]  :  Assessment & Plan  Functional dyspepsia  GERD without esophagitis  Epigastric pain  Some more upper GI dyspepsia.  Taking pantoprazole once a day.  Has Zofran from oncology which seems to help  -Increase pantoprazole to twice a day  - Use Zofran as needed we will see what the sugar looks like tonight      Orders:    pantoprazole (PROTONIX) 40 mg tablet; Take 1 tablet (40 mg total) by mouth 2 (two) times a day    Uterine leiomyosarcoma (HCC)  Follows with Dr. Taveras.  Continue chemotherapy  Orders:    ondansetron (ZOFRAN) 8 mg tablet; Take 1 tablet (8 mg total) by mouth every 8 (eight) hours as needed for nausea or vomiting        History of Present Illness   Felecia Edward is a 64 y.o. female who presents for follow-up.  She has a history of GERD and functional dyspepsia.  She has been on pantoprazole 40 mg daily.  Her last EGD was May 2024.  She reports that she is having some increasing dyspeptic symptoms she continues with chemotherapy for her endometrial cancer.  She reports some heartburn.  She denies any dysphagia, odynophagia, or early satiety.  She is moving her bowels regularly.  She denies any GI bleeding.  Her weight is increasing.  HPI  History obtained from: patient  Review of Systems A complete review of systems is negative other than that noted above in the HPI.      Current Medications[1]  Objective   /80   Ht 5' 4.02\" (1.626 m)   Wt 90.8 kg (200 lb 3.2 oz)   BMI 34.34 kg/m²     Physical Exam   General appearance: alert, appears stated age and cooperative  Eyes: PERLLA, EOMI, no icterus   Head: Normocephalic, without obvious abnormality, atraumatic  Lungs: clear to auscultation bilaterally  Heart: regular rate and rhythm, S1, S2 normal, no murmur, click, rub or gallop  Abdomen: " soft, mild diffuse tenderness without rebound or guarding; bowel sounds normal; no masses,  no organomegaly  Extremities: extremities normal, atraumatic, no cyanosis or edema  Neurologic: Grossly normal        Lab Results: I personally reviewed relevant lab results.       Results for orders placed during the hospital encounter of 08/12/24    EGD    Impression  Mild antral gastritis.  Biopsies taken for H. Pylori  Normal esophagus  Normal duodenum    RECOMMENDATION:  Resume regular diet and medications  Will call biopsy results in 1 to 2 weeks  Follow-up in the office 3 to 4 months        Vignesh Shelton MD           [1]   Current Outpatient Medications   Medication Sig Dispense Refill    aspirin (Aspirin 81) 81 mg chewable tablet every 24 hours      atorvastatin (LIPITOR) 40 mg tablet Take 40 mg by mouth daily at bedtime      betamethasone, augmented, (DIPROLENE) 0.05 % lotion Apply topically 2 (two) times a day (Patient taking differently: Apply topically 2 (two) times a day as needed) 60 mL 1    cetirizine (ZyrTEC) 10 mg tablet Take 10 mg by mouth daily at bedtime      cholecalciferol (VITAMIN D3) 1,000 units tablet Take 2,000 Units by mouth in the morning. 2 daily .      clindamycin (CLEOCIN T) 1 % external solution Apply topically 2 (two) times a day (Patient taking differently: Apply topically 2 (two) times a day as needed) 60 mL 1    dicyclomine (BENTYL) 10 mg capsule Take 1 capsule (10 mg total) by mouth every 6 (six) hours as needed (pain) 30 capsule 3    DULoxetine (CYMBALTA) 30 mg delayed release capsule       glimepiride (AMARYL) 4 mg tablet Take 4 mg by mouth in the morning and 4 mg in the evening.      LORazepam (ATIVAN) 1 mg tablet Take 1 tablet (1 mg total) by mouth every 8 (eight) hours as needed (nausea or anxiety) 20 tablet 0    losartan (COZAAR) 50 mg tablet Take 50 mg by mouth in the morning.      meclizine (ANTIVERT) 25 mg tablet Take by mouth every 12 (twelve) hours as needed for dizziness       methenamine hippurate (HIPREX) 1 g tablet Take 1 tablet (1 g total) by mouth 2 (two) times a day with meals 30 tablet 0    metoprolol succinate (TOPROL-XL) 25 mg 24 hr tablet Take 25 mg by mouth in the morning.      Multiple Vitamin (MULTIVITAMIN) tablet Take 1 tablet by mouth in the morning.      ondansetron (ZOFRAN) 8 mg tablet Take 1 tablet (8 mg total) by mouth every 8 (eight) hours as needed for nausea or vomiting 30 tablet 5    pantoprazole (PROTONIX) 40 mg tablet Take 1 tablet (40 mg total) by mouth 2 (two) times a day 180 tablet 5    phenazopyridine (PYRIDIUM) 200 mg tablet Take 1 tablet (200 mg total) by mouth 3 (three) times a day as needed (Dysuria and bladder discomfort.  Take with meals.) 10 tablet 0    pioglitazone (ACTOS) 30 mg tablet every 24 hours      potassium chloride (Klor-Con M20) 20 mEq tablet TAKE 1 TABLET BY MOUTH 2 TIMES A DAY. 180 tablet 1    promethazine (PHENERGAN) 25 mg tablet Take 1 tablet (25 mg total) by mouth every 6 (six) hours as needed for nausea or vomiting 30 tablet 0    rOPINIRole (REQUIP) 1 mg tablet       sertraline (ZOLOFT) 100 mg tablet Take 1.5 tablets by mouth daily at bedtime      topiramate (TOPAMAX) 100 mg tablet       Triamcinolone Acetonide 55 MCG/ACT AERO into each nostril One spray each nostril daily      Xiidra 5 % op solution       ZOLMitriptan (ZOMIG) 5 MG tablet Take 5 mg by mouth once as needed for migraine Daily prn      oxyCODONE (Roxicodone) 5 immediate release tablet Take 1 tablet (5 mg total) by mouth every 6 (six) hours as needed for moderate pain for up to 8 doses Max Daily Amount: 20 mg (Patient not taking: Reported on 5/14/2025) 8 tablet 0    tamsulosin (FLOMAX) 0.4 mg Take 1 capsule (0.4 mg total) by mouth every evening for 14 days 14 capsule 0     No current facility-administered medications for this visit.

## 2025-07-25 NOTE — ASSESSMENT & PLAN NOTE
Some more upper GI dyspepsia.  Taking pantoprazole once a day.  Has Zofran from oncology which seems to help  -Increase pantoprazole to twice a day  - Use Zofran as needed we will see what the sugar looks like tonight      Orders:    pantoprazole (PROTONIX) 40 mg tablet; Take 1 tablet (40 mg total) by mouth 2 (two) times a day

## 2025-07-26 ENCOUNTER — PATIENT MESSAGE (OUTPATIENT)
Dept: UROLOGY | Facility: HOSPITAL | Age: 65
End: 2025-07-26

## 2025-07-26 DIAGNOSIS — N39.0 RECURRENT UTI: ICD-10-CM

## 2025-07-28 ENCOUNTER — HOSPITAL ENCOUNTER (OUTPATIENT)
Age: 65
Discharge: HOME | End: 2025-07-28
Payer: COMMERCIAL

## 2025-07-28 DIAGNOSIS — C55 UTERINE LEIOMYOSARCOMA (HCC): ICD-10-CM

## 2025-07-28 DIAGNOSIS — Z45.2 ENCOUNTER FOR CENTRAL LINE CARE: Primary | ICD-10-CM

## 2025-07-28 LAB
ALBUMIN SERPL BCG-MCNC: 4 G/DL (ref 3.5–5.2)
ALP SERPL-CCNC: 115 U/L (ref 35–160)
ALT SERPL W P-5'-P-CCNC: 17 U/L (ref 5–33)
ANION GAP SERPL CALCULATED.3IONS-SCNC: 12 MMOL/L (ref 7–16)
AST SERPL W P-5'-P-CCNC: 21 U/L (ref 5–32)
BASOPHILS # BLD AUTO: 0.01 THOUSANDS/ÂΜL (ref 0–0.1)
BASOPHILS NFR BLD AUTO: 0 % (ref 0–1)
BILIRUB SERPL-MCNC: 0.59 MG/DL (ref 0.2–1.2)
BUN SERPL-MCNC: 9 MG/DL (ref 8–23)
CALCIUM SERPL-MCNC: 9.1 MG/DL (ref 8.6–10.2)
CHLORIDE SERPL-SCNC: 104 MMOL/L (ref 98–107)
CO2 SERPL-SCNC: 24 MMOL/L (ref 22–29)
CREAT SERPL-MCNC: 0.52 MG/DL (ref 0.5–0.9)
EOSINOPHIL # BLD AUTO: 0.08 THOUSAND/ÂΜL (ref 0–0.61)
EOSINOPHIL NFR BLD AUTO: 1 % (ref 0–6)
ERYTHROCYTE [DISTWIDTH] IN BLOOD BY AUTOMATED COUNT: 14.8 % (ref 11.6–15.1)
GFR SERPL CREATININE-BSD FRML MDRD: 100 ML/MIN/1.73SQ M
GLUCOSE SERPL-MCNC: 171 MG/DL (ref 65–140)
HCT VFR BLD AUTO: 32.9 % (ref 34.8–46.1)
HGB BLD-MCNC: 10.8 G/DL (ref 11.5–15.4)
IMM GRANULOCYTES # BLD AUTO: 0.02 THOUSAND/UL (ref 0–0.2)
IMM GRANULOCYTES NFR BLD AUTO: 0 % (ref 0–2)
LYMPHOCYTES # BLD AUTO: 1.65 THOUSANDS/ÂΜL (ref 0.6–4.47)
LYMPHOCYTES NFR BLD AUTO: 26 % (ref 14–44)
MAGNESIUM SERPL-MCNC: 1.9 MG/DL (ref 1.6–2.6)
MCH RBC QN AUTO: 31.9 PG (ref 26.8–34.3)
MCHC RBC AUTO-ENTMCNC: 32.8 G/DL (ref 31.4–37.4)
MCV RBC AUTO: 97 FL (ref 82–98)
MONOCYTES # BLD AUTO: 0.44 THOUSAND/ÂΜL (ref 0.17–1.22)
MONOCYTES NFR BLD AUTO: 7 % (ref 4–12)
NEUTROPHILS # BLD AUTO: 4.21 THOUSANDS/ÂΜL (ref 1.85–7.62)
NEUTS SEG NFR BLD AUTO: 66 % (ref 43–75)
NRBC BLD AUTO-RTO: 0 /100 WBCS
PLATELET # BLD AUTO: 213 THOUSANDS/UL (ref 149–390)
PMV BLD AUTO: 10.1 FL (ref 8.9–12.7)
POTASSIUM SERPL-SCNC: 3.9 MMOL/L (ref 3.5–5.1)
PROT SERPL-MCNC: 6.5 G/DL (ref 6.4–8.3)
RBC # BLD AUTO: 3.39 MILLION/UL (ref 3.81–5.12)
SODIUM SERPL-SCNC: 140 MMOL/L (ref 136–145)
WBC # BLD AUTO: 6.41 THOUSAND/UL (ref 4.31–10.16)

## 2025-07-28 PROCEDURE — 83735 ASSAY OF MAGNESIUM: CPT

## 2025-07-28 PROCEDURE — 85025 COMPLETE CBC W/AUTO DIFF WBC: CPT

## 2025-07-28 PROCEDURE — 80053 COMPREHEN METABOLIC PANEL: CPT

## 2025-07-28 RX ORDER — METHENAMINE HIPPURATE 1000 MG/1
1 TABLET ORAL 2 TIMES DAILY WITH MEALS
Qty: 30 TABLET | Refills: 5 | Status: SHIPPED | OUTPATIENT
Start: 2025-07-28 | End: 2025-08-05 | Stop reason: SDUPTHER

## 2025-07-31 ENCOUNTER — OFFICE VISIT (OUTPATIENT)
Age: 65
End: 2025-07-31
Payer: COMMERCIAL

## 2025-07-31 VITALS
RESPIRATION RATE: 18 BRPM | HEIGHT: 64 IN | OXYGEN SATURATION: 98 % | HEART RATE: 97 BPM | DIASTOLIC BLOOD PRESSURE: 78 MMHG | WEIGHT: 197 LBS | BODY MASS INDEX: 33.63 KG/M2 | SYSTOLIC BLOOD PRESSURE: 122 MMHG | TEMPERATURE: 98.1 F

## 2025-07-31 DIAGNOSIS — T45.1X5A CHEMOTHERAPY INDUCED NEUTROPENIA (HCC): ICD-10-CM

## 2025-07-31 DIAGNOSIS — D70.1 CHEMOTHERAPY INDUCED NEUTROPENIA (HCC): ICD-10-CM

## 2025-07-31 DIAGNOSIS — C55 UTERINE LEIOMYOSARCOMA (HCC): Primary | ICD-10-CM

## 2025-07-31 DIAGNOSIS — Z12.31 SCREENING MAMMOGRAM FOR BREAST CANCER: ICD-10-CM

## 2025-07-31 PROCEDURE — 99215 OFFICE O/P EST HI 40 MIN: CPT | Performed by: PHYSICIAN ASSISTANT

## 2025-08-04 ENCOUNTER — HOSPITAL ENCOUNTER (OUTPATIENT)
Age: 65
Discharge: HOME | End: 2025-08-04

## 2025-08-04 ENCOUNTER — HOSPITAL ENCOUNTER (OUTPATIENT)
Dept: INFUSION CENTER | Facility: HOSPITAL | Age: 65
Discharge: HOME | End: 2025-08-04
Payer: COMMERCIAL

## 2025-08-04 DIAGNOSIS — Z45.2 ENCOUNTER FOR CENTRAL LINE CARE: Primary | ICD-10-CM

## 2025-08-04 DIAGNOSIS — C55 UTERINE LEIOMYOSARCOMA (HCC): ICD-10-CM

## 2025-08-04 DIAGNOSIS — L73.9 FOLLICULITIS: ICD-10-CM

## 2025-08-04 LAB
ALBUMIN SERPL BCG-MCNC: 4.4 G/DL (ref 3.5–5)
ALP SERPL-CCNC: 92 U/L (ref 34–104)
ALT SERPL W P-5'-P-CCNC: 12 U/L (ref 7–52)
ANION GAP SERPL CALCULATED.3IONS-SCNC: 9 MMOL/L (ref 4–13)
AST SERPL W P-5'-P-CCNC: 16 U/L (ref 13–39)
BASOPHILS # BLD AUTO: 0.01 THOUSANDS/ÂΜL (ref 0–0.1)
BASOPHILS NFR BLD AUTO: 0 % (ref 0–1)
BILIRUB SERPL-MCNC: 0.77 MG/DL (ref 0.2–1)
BUN SERPL-MCNC: 17 MG/DL (ref 5–25)
CALCIUM SERPL-MCNC: 9.8 MG/DL (ref 8.4–10.2)
CHLORIDE SERPL-SCNC: 104 MMOL/L (ref 96–108)
CK SERPL-CCNC: 25 U/L (ref 26–192)
CO2 SERPL-SCNC: 27 MMOL/L (ref 21–32)
CREAT SERPL-MCNC: 0.66 MG/DL (ref 0.6–1.3)
EOSINOPHIL # BLD AUTO: 0.08 THOUSAND/ÂΜL (ref 0–0.61)
EOSINOPHIL NFR BLD AUTO: 1 % (ref 0–6)
ERYTHROCYTE [DISTWIDTH] IN BLOOD BY AUTOMATED COUNT: 14.2 % (ref 11.6–15.1)
GFR SERPL CREATININE-BSD FRML MDRD: 92 ML/MIN/1.73SQ M
GLUCOSE SERPL-MCNC: 159 MG/DL (ref 65–140)
HCT VFR BLD AUTO: 34.9 % (ref 34.8–46.1)
HGB BLD-MCNC: 11.6 G/DL (ref 11.5–15.4)
IMM GRANULOCYTES # BLD AUTO: 0.03 THOUSAND/UL (ref 0–0.2)
IMM GRANULOCYTES NFR BLD AUTO: 0 % (ref 0–2)
LYMPHOCYTES # BLD AUTO: 2.63 THOUSANDS/ÂΜL (ref 0.6–4.47)
LYMPHOCYTES NFR BLD AUTO: 35 % (ref 14–44)
MAGNESIUM SERPL-MCNC: 2 MG/DL (ref 1.9–2.7)
MCH RBC QN AUTO: 31.8 PG (ref 26.8–34.3)
MCHC RBC AUTO-ENTMCNC: 33.2 G/DL (ref 31.4–37.4)
MCV RBC AUTO: 96 FL (ref 82–98)
MONOCYTES # BLD AUTO: 0.63 THOUSAND/ÂΜL (ref 0.17–1.22)
MONOCYTES NFR BLD AUTO: 8 % (ref 4–12)
NEUTROPHILS # BLD AUTO: 4.15 THOUSANDS/ÂΜL (ref 1.85–7.62)
NEUTS SEG NFR BLD AUTO: 56 % (ref 43–75)
NRBC BLD AUTO-RTO: 0 /100 WBCS
PLATELET # BLD AUTO: 253 THOUSANDS/UL (ref 149–390)
PMV BLD AUTO: 9.7 FL (ref 8.9–12.7)
POTASSIUM SERPL-SCNC: 4 MMOL/L (ref 3.5–5.3)
PROT SERPL-MCNC: 7.3 G/DL (ref 6.4–8.4)
RBC # BLD AUTO: 3.65 MILLION/UL (ref 3.81–5.12)
SODIUM SERPL-SCNC: 140 MMOL/L (ref 135–147)
WBC # BLD AUTO: 7.53 THOUSAND/UL (ref 4.31–10.16)

## 2025-08-04 PROCEDURE — 85025 COMPLETE CBC W/AUTO DIFF WBC: CPT

## 2025-08-04 PROCEDURE — 80053 COMPREHEN METABOLIC PANEL: CPT

## 2025-08-04 PROCEDURE — 83735 ASSAY OF MAGNESIUM: CPT

## 2025-08-04 PROCEDURE — 82550 ASSAY OF CK (CPK): CPT

## 2025-08-05 ENCOUNTER — TELEPHONE (OUTPATIENT)
Age: 65
End: 2025-08-05

## 2025-08-05 ENCOUNTER — NURSE TRIAGE (OUTPATIENT)
Age: 65
End: 2025-08-05

## 2025-08-05 DIAGNOSIS — N39.0 RECURRENT UTI: ICD-10-CM

## 2025-08-05 DIAGNOSIS — R10.13 EPIGASTRIC PAIN: Primary | ICD-10-CM

## 2025-08-05 RX ORDER — CLINDAMYCIN PHOSPHATE 11.9 MG/ML
SOLUTION TOPICAL 2 TIMES DAILY
Qty: 60 ML | Refills: 1 | Status: SHIPPED | OUTPATIENT
Start: 2025-08-05

## 2025-08-05 RX ORDER — METHENAMINE HIPPURATE 1000 MG/1
1 TABLET ORAL 2 TIMES DAILY WITH MEALS
Qty: 60 TABLET | Refills: 5 | Status: SHIPPED | OUTPATIENT
Start: 2025-08-05

## 2025-08-05 RX ORDER — PALONOSETRON 0.05 MG/ML
0.25 INJECTION, SOLUTION INTRAVENOUS ONCE
Status: CANCELLED | OUTPATIENT
Start: 2025-08-06

## 2025-08-05 RX ORDER — PANTOPRAZOLE SODIUM 40 MG/1
40 TABLET, DELAYED RELEASE ORAL 2 TIMES DAILY
Qty: 180 TABLET | Refills: 5 | Status: SHIPPED | OUTPATIENT
Start: 2025-08-05

## 2025-08-05 RX ORDER — SODIUM CHLORIDE 9 MG/ML
20 INJECTION, SOLUTION INTRAVENOUS ONCE
Status: CANCELLED | OUTPATIENT
Start: 2025-08-06

## 2025-08-06 ENCOUNTER — NUTRITION (OUTPATIENT)
Dept: NUTRITION | Facility: HOSPITAL | Age: 65
End: 2025-08-06

## 2025-08-06 ENCOUNTER — HOSPITAL ENCOUNTER (OUTPATIENT)
Dept: INFUSION CENTER | Facility: HOSPITAL | Age: 65
Discharge: HOME | End: 2025-08-06
Attending: OBSTETRICS & GYNECOLOGY
Payer: COMMERCIAL

## 2025-08-06 DIAGNOSIS — Z71.3 NUTRITIONAL COUNSELING: Primary | ICD-10-CM

## 2025-08-07 ENCOUNTER — HOSPITAL ENCOUNTER (OUTPATIENT)
Dept: INFUSION CENTER | Facility: HOSPITAL | Age: 65
Discharge: HOME | End: 2025-08-07
Attending: OBSTETRICS & GYNECOLOGY
Payer: COMMERCIAL

## 2025-08-11 ENCOUNTER — HOSPITAL ENCOUNTER (OUTPATIENT)
Age: 65
Discharge: HOME | End: 2025-08-11

## 2025-08-12 ENCOUNTER — HOSPITAL ENCOUNTER (OUTPATIENT)
Age: 65
Discharge: HOME | End: 2025-08-12
Payer: COMMERCIAL

## 2025-08-13 ENCOUNTER — HOSPITAL ENCOUNTER (OUTPATIENT)
Dept: CT IMAGING | Facility: HOSPITAL | Age: 65
Discharge: HOME | End: 2025-08-13
Attending: OBSTETRICS & GYNECOLOGY
Payer: COMMERCIAL

## 2025-08-18 ENCOUNTER — DOCUMENTATION (OUTPATIENT)
Dept: HEMATOLOGY ONCOLOGY | Facility: CLINIC | Age: 65
End: 2025-08-18

## 2025-08-18 ENCOUNTER — HOSPITAL ENCOUNTER (OUTPATIENT)
Age: 65
Discharge: HOME | End: 2025-08-18
Payer: COMMERCIAL

## 2025-08-18 DIAGNOSIS — C55 UTERINE LEIOMYOSARCOMA (HCC): ICD-10-CM

## 2025-08-18 DIAGNOSIS — Z45.2 ENCOUNTER FOR CENTRAL LINE CARE: Primary | ICD-10-CM

## 2025-08-18 LAB
ALBUMIN SERPL BCG-MCNC: 4.3 G/DL (ref 3.5–5.2)
ALP SERPL-CCNC: 136 U/L (ref 35–160)
ALT SERPL W P-5'-P-CCNC: 13 U/L (ref 5–33)
ANION GAP SERPL CALCULATED.3IONS-SCNC: 12 MMOL/L (ref 7–16)
AST SERPL W P-5'-P-CCNC: 17 U/L (ref 5–32)
BASOPHILS # BLD AUTO: 0.03 THOUSANDS/ÂΜL (ref 0–0.1)
BASOPHILS NFR BLD AUTO: 0 % (ref 0–1)
BILIRUB SERPL-MCNC: 0.66 MG/DL (ref 0.2–1.2)
BUN SERPL-MCNC: 17 MG/DL (ref 8–23)
CALCIUM SERPL-MCNC: 9.4 MG/DL (ref 8.6–10.2)
CHLORIDE SERPL-SCNC: 105 MMOL/L (ref 98–107)
CO2 SERPL-SCNC: 25 MMOL/L (ref 22–29)
CREAT SERPL-MCNC: 0.57 MG/DL (ref 0.5–0.9)
EOSINOPHIL # BLD AUTO: 0.07 THOUSAND/ÂΜL (ref 0–0.61)
EOSINOPHIL NFR BLD AUTO: 1 % (ref 0–6)
ERYTHROCYTE [DISTWIDTH] IN BLOOD BY AUTOMATED COUNT: 14.5 % (ref 11.6–15.1)
GFR SERPL CREATININE-BSD FRML MDRD: 97 ML/MIN/1.73SQ M
GLUCOSE SERPL-MCNC: 89 MG/DL (ref 65–140)
HCT VFR BLD AUTO: 32.8 % (ref 34.8–46.1)
HGB BLD-MCNC: 11 G/DL (ref 11.5–15.4)
IMM GRANULOCYTES # BLD AUTO: 0.03 THOUSAND/UL (ref 0–0.2)
IMM GRANULOCYTES NFR BLD AUTO: 0 % (ref 0–2)
LYMPHOCYTES # BLD AUTO: 2.51 THOUSANDS/ÂΜL (ref 0.6–4.47)
LYMPHOCYTES NFR BLD AUTO: 27 % (ref 14–44)
MAGNESIUM SERPL-MCNC: 2.2 MG/DL (ref 1.6–2.6)
MCH RBC QN AUTO: 32.3 PG (ref 26.8–34.3)
MCHC RBC AUTO-ENTMCNC: 33.5 G/DL (ref 31.4–37.4)
MCV RBC AUTO: 96 FL (ref 82–98)
MONOCYTES # BLD AUTO: 0.47 THOUSAND/ÂΜL (ref 0.17–1.22)
MONOCYTES NFR BLD AUTO: 5 % (ref 4–12)
NEUTROPHILS # BLD AUTO: 6.36 THOUSANDS/ÂΜL (ref 1.85–7.62)
NEUTS SEG NFR BLD AUTO: 67 % (ref 43–75)
NRBC BLD AUTO-RTO: 0 /100 WBCS
PLATELET # BLD AUTO: 212 THOUSANDS/UL (ref 149–390)
PMV BLD AUTO: 9.6 FL (ref 8.9–12.7)
POTASSIUM SERPL-SCNC: 3.9 MMOL/L (ref 3.5–5.1)
PROT SERPL-MCNC: 7 G/DL (ref 6.4–8.3)
RBC # BLD AUTO: 3.41 MILLION/UL (ref 3.81–5.12)
SODIUM SERPL-SCNC: 142 MMOL/L (ref 136–145)
WBC # BLD AUTO: 9.47 THOUSAND/UL (ref 4.31–10.16)

## 2025-08-18 PROCEDURE — 85025 COMPLETE CBC W/AUTO DIFF WBC: CPT

## 2025-08-18 PROCEDURE — 83735 ASSAY OF MAGNESIUM: CPT

## 2025-08-18 PROCEDURE — 80053 COMPREHEN METABOLIC PANEL: CPT

## 2025-08-19 ENCOUNTER — DOCUMENTATION (OUTPATIENT)
Dept: HEMATOLOGY ONCOLOGY | Facility: CLINIC | Age: 65
End: 2025-08-19

## 2025-08-20 ENCOUNTER — HOSPITAL ENCOUNTER (OUTPATIENT)
Dept: MAMMOGRAPHY | Facility: CLINIC | Age: 65
Discharge: HOME | End: 2025-08-20
Attending: PHYSICIAN ASSISTANT
Payer: COMMERCIAL

## 2025-08-20 VITALS — HEIGHT: 64 IN | WEIGHT: 193 LBS | BODY MASS INDEX: 32.95 KG/M2

## 2025-08-20 DIAGNOSIS — Z12.31 SCREENING MAMMOGRAM FOR BREAST CANCER: ICD-10-CM

## 2025-08-20 PROCEDURE — 77063 BREAST TOMOSYNTHESIS BI: CPT

## 2025-08-20 PROCEDURE — 77067 SCR MAMMO BI INCL CAD: CPT

## 2025-08-21 ENCOUNTER — OFFICE VISIT (OUTPATIENT)
Age: 65
End: 2025-08-21
Payer: COMMERCIAL

## 2025-08-21 VITALS
HEART RATE: 116 BPM | RESPIRATION RATE: 18 BRPM | BODY MASS INDEX: 34.21 KG/M2 | HEIGHT: 64 IN | TEMPERATURE: 97.6 F | WEIGHT: 200.4 LBS | SYSTOLIC BLOOD PRESSURE: 112 MMHG | OXYGEN SATURATION: 98 % | DIASTOLIC BLOOD PRESSURE: 70 MMHG

## 2025-08-21 DIAGNOSIS — C55 UTERINE LEIOMYOSARCOMA (HCC): Primary | ICD-10-CM

## 2025-08-21 DIAGNOSIS — T45.1X5A CHEMOTHERAPY INDUCED NEUTROPENIA (HCC): ICD-10-CM

## 2025-08-21 DIAGNOSIS — G44.89 OTHER HEADACHE SYNDROME: ICD-10-CM

## 2025-08-21 DIAGNOSIS — D70.1 CHEMOTHERAPY INDUCED NEUTROPENIA (HCC): ICD-10-CM

## 2025-08-21 PROCEDURE — 99214 OFFICE O/P EST MOD 30 MIN: CPT | Performed by: OBSTETRICS & GYNECOLOGY

## 2025-08-22 ENCOUNTER — DOCUMENTATION (OUTPATIENT)
Dept: GYNECOLOGIC ONCOLOGY | Facility: CLINIC | Age: 65
End: 2025-08-22

## 2025-08-22 DIAGNOSIS — C55 UTERINE LEIOMYOSARCOMA (HCC): Primary | ICD-10-CM

## (undated) DEVICE — INTENDED FOR TISSUE SEPARATION, AND OTHER PROCEDURES THAT REQUIRE A SHARP SURGICAL BLADE TO PUNCTURE OR CUT.: Brand: BARD-PARKER SAFETY BLADES SIZE 11, STERILE

## (undated) DEVICE — DRAPE TOWEL: Brand: CONVERTORS

## (undated) DEVICE — PROGRASP FORCEPS: Brand: ENDOWRIST

## (undated) DEVICE — TROCAR: Brand: KII FIOS FIRST ENTRY

## (undated) DEVICE — TUBING SUCTION 5MM X 12 FT

## (undated) DEVICE — GLOVE PI ULTRA TOUCH SZ.7.5

## (undated) DEVICE — INTENDED FOR TISSUE SEPARATION, AND OTHER PROCEDURES THAT REQUIRE A SHARP SURGICAL BLADE TO PUNCTURE OR CUT.: Brand: BARD-PARKER SAFETY BLADES SIZE 15, STERILE

## (undated) DEVICE — CANNULA SEAL

## (undated) DEVICE — SUT PLAIN 2-0 CTX 27 IN 872H

## (undated) DEVICE — INVIEW CLEAR LEGGINGS: Brand: CONVERTORS

## (undated) DEVICE — SHEATH URETERAL ACCESS 10/12FR 25CM PROXIS

## (undated) DEVICE — VESSEL SEALER EXTEND: Brand: ENDOWRIST

## (undated) DEVICE — MAYO STAND COVER: Brand: CONVERTORS

## (undated) DEVICE — TELFA NON-ADHERENT ABSORBENT DRESSING: Brand: TELFA

## (undated) DEVICE — GLOVE INDICATOR PI UNDERGLOVE SZ 8 BLUE

## (undated) DEVICE — CHLORHEXIDINE 4PCT 4 OZ

## (undated) DEVICE — ENSEAL 20 CM SHAFT, LARGE JAW: Brand: ENSEAL X1

## (undated) DEVICE — ELECTRO LUBE IS A SINGLE PATIENT USE DEVICE THAT IS INTENDED TO BE USED ON ELECTROSURGICAL ELECTRODES TO REDUCE STICKING.: Brand: KEY SURGICAL ELECTRO LUBE

## (undated) DEVICE — PREMIUM DRY TRAY LF: Brand: MEDLINE INDUSTRIES, INC.

## (undated) DEVICE — ELECTRODE BLADE MOD  E-Z CLEAN 6.5IN -0014M

## (undated) DEVICE — AIRSEAL TUBE SMOKE EVAC LUMENX3 FILTERED

## (undated) DEVICE — GLOVE INDICATOR PI UNDERGLOVE SZ 7 BLUE

## (undated) DEVICE — ABDOMINAL PAD: Brand: DERMACEA

## (undated) DEVICE — BULB SYRINGE,IRRIGATION WITH PROTECTIVE CAP: Brand: DOVER

## (undated) DEVICE — HEMOSTATIC MATRIX SURGIFLO 8ML W/THROMBIN

## (undated) DEVICE — CHLORAPREP HI-LITE 26ML ORANGE

## (undated) DEVICE — ANTIBACTERIAL VIOLET BRAIDED (POLYGLACTIN 910), SYNTHETIC ABSORBABLE SUTURE: Brand: COATED VICRYL

## (undated) DEVICE — TOWEL SET X-RAY

## (undated) DEVICE — DISPOSABLE OR TOWEL: Brand: CARDINAL HEALTH

## (undated) DEVICE — POV-IOD SOLUTION 4OZ BT

## (undated) DEVICE — STERILE CYSTO PACK: Brand: CARDINAL HEALTH

## (undated) DEVICE — SHEATH URETERAL ACCESS 10/12FR 35CM PROXIS

## (undated) DEVICE — UROLOGIC DRAIN BAG: Brand: UNBRANDED

## (undated) DEVICE — MEDI-VAC YANK SUCT HNDL W/TPRD BULBOUS TIP: Brand: CARDINAL HEALTH

## (undated) DEVICE — PAD GROUNDING DUAL ADULT

## (undated) DEVICE — Device

## (undated) DEVICE — SUT STRATAFIX SPIRAL PLUS 3-0 PS-2 30 X 30 CM SXMP2B408

## (undated) DEVICE — TROCAR PORT ACCESS 12 X120MM W/BLDLS OPTICAL TIP AIRSEAL

## (undated) DEVICE — SINGLE PORT MANIFOLD: Brand: NEPTUNE 2

## (undated) DEVICE — KIT, BETHLEHEM ROBOTIC PROST: Brand: CARDINAL HEALTH

## (undated) DEVICE — PACK PBDS STERILE LAP LITHOTOMY RF

## (undated) DEVICE — 40595 XL TRENDELENBURG POSITIONING KIT: Brand: 40595 XL TRENDELENBURG POSITIONING KIT

## (undated) DEVICE — BASKET SPECIMEN RETRIVAL 1.9FR 120CM

## (undated) DEVICE — CYSTO TUBING SINGLE IRRIGATION

## (undated) DEVICE — SPECIMEN CONTAINER STERILE PEEL PACK

## (undated) DEVICE — TRAY FOLEY 16FR URIMETER SILICONE SURESTEP

## (undated) DEVICE — STERILE POLYISOPRENE POWDER-FREE SURGICAL GLOVES: Brand: PROTEXIS

## (undated) DEVICE — GAUZE SPONGES,16 PLY: Brand: CURITY

## (undated) DEVICE — FENESTRATED BIPOLAR FORCEPS: Brand: ENDOWRIST

## (undated) DEVICE — TELFA ADHESIVE ISLAND DRESSING: Brand: TELFA

## (undated) DEVICE — BASIC SINGLE BASIN-LF: Brand: MEDLINE INDUSTRIES, INC.

## (undated) DEVICE — SUT MONOCRYL 4-0 PS-2 27 IN Y426H

## (undated) DEVICE — SURGIFLO ENDOSCOPIC APPICATOR: Brand: ETHICON

## (undated) DEVICE — EXOFIN PRECISION PEN HIGH VISCOSITY TOPICAL SKIN ADHESIVE: Brand: EXOFIN PRECISION PEN, 1G

## (undated) DEVICE — TIP COVER ACCESSORY

## (undated) DEVICE — SUT VICRYL 0 CT-1 CR/8 27 IN JJ41G

## (undated) DEVICE — PENCIL ELECTROSURG E-Z CLEAN -0035H

## (undated) DEVICE — GUIDEWIRE STRGHT TIP 0.035 IN  SOLO PLUS

## (undated) DEVICE — SCD SEQUENTIAL COMPRESSION COMFORT SLEEVE MEDIUM KNEE LENGTH: Brand: KENDALL SCD

## (undated) DEVICE — SUT PDS II 1 XLH 96 IN LOOPED Z881G

## (undated) DEVICE — NEEDLE BLUNT 18 G X 1 1/2IN

## (undated) DEVICE — LUBRICANT JELLY SURGILUBE TUBE 4OZ FLIP TOP

## (undated) DEVICE — SUT VICRYL 0 REEL 54 IN J287G

## (undated) DEVICE — ANTIBACTERIAL UNDYED BRAIDED (POLYGLACTIN 910), SYNTHETIC ABSORBABLE SUTURE: Brand: COATED VICRYL

## (undated) DEVICE — BLADELESS OBTURATOR: Brand: WECK VISTA

## (undated) DEVICE — MONOPOLAR CURVED SCISSORS: Brand: ENDOWRIST

## (undated) DEVICE — PACK TUR

## (undated) DEVICE — SINGLE-USE DIGITAL FLEXIBLE URETEROSCOPE: Brand: APTRA

## (undated) DEVICE — COLUMN DRAPE

## (undated) DEVICE — CATH URETERAL 5FR X 70 CM FLEX TIP POLYUR BARD

## (undated) DEVICE — VISUALIZATION SYSTEM: Brand: CLEARIFY

## (undated) DEVICE — ADHESIVE SKIN HIGH VISCOSITY EXOFIN 1ML

## (undated) DEVICE — ARM DRAPE

## (undated) DEVICE — ENDOSCOPIC VALVE WITH ADAPTER.: Brand: SURSEAL® II

## (undated) DEVICE — Device: Brand: TISSUE RETRIEVAL SYSTEM